# Patient Record
Sex: FEMALE | Race: BLACK OR AFRICAN AMERICAN | NOT HISPANIC OR LATINO | Employment: OTHER | URBAN - METROPOLITAN AREA
[De-identification: names, ages, dates, MRNs, and addresses within clinical notes are randomized per-mention and may not be internally consistent; named-entity substitution may affect disease eponyms.]

---

## 2017-01-17 ENCOUNTER — NURSE TRIAGE (OUTPATIENT)
Dept: ADMINISTRATIVE | Facility: CLINIC | Age: 82
End: 2017-01-17

## 2017-01-17 PROCEDURE — 93005 ELECTROCARDIOGRAM TRACING: CPT

## 2017-01-17 PROCEDURE — 99285 EMERGENCY DEPT VISIT HI MDM: CPT | Mod: 25

## 2017-01-17 PROCEDURE — 96374 THER/PROPH/DIAG INJ IV PUSH: CPT

## 2017-01-17 PROCEDURE — 96372 THER/PROPH/DIAG INJ SC/IM: CPT

## 2017-01-17 PROCEDURE — 96375 TX/PRO/DX INJ NEW DRUG ADDON: CPT

## 2017-01-17 PROCEDURE — 99285 EMERGENCY DEPT VISIT HI MDM: CPT | Mod: GC,,, | Performed by: EMERGENCY MEDICINE

## 2017-01-17 PROCEDURE — 93010 ELECTROCARDIOGRAM REPORT: CPT | Mod: ,,, | Performed by: INTERNAL MEDICINE

## 2017-01-17 PROCEDURE — 96376 TX/PRO/DX INJ SAME DRUG ADON: CPT

## 2017-01-18 ENCOUNTER — HOSPITAL ENCOUNTER (INPATIENT)
Facility: HOSPITAL | Age: 82
LOS: 3 days | Discharge: HOME | DRG: 248 | End: 2017-01-21
Attending: EMERGENCY MEDICINE | Admitting: INTERNAL MEDICINE
Payer: MEDICARE

## 2017-01-18 DIAGNOSIS — E78.00 PURE HYPERCHOLESTEROLEMIA: ICD-10-CM

## 2017-01-18 DIAGNOSIS — R07.9 CHEST PAIN, UNSPECIFIED TYPE: ICD-10-CM

## 2017-01-18 DIAGNOSIS — N18.9 ACUTE ON CHRONIC RENAL FAILURE: ICD-10-CM

## 2017-01-18 DIAGNOSIS — N18.30 CHRONIC KIDNEY DISEASE, STAGE III (MODERATE): ICD-10-CM

## 2017-01-18 DIAGNOSIS — I35.0 AORTIC STENOSIS, SEVERE: ICD-10-CM

## 2017-01-18 DIAGNOSIS — I35.0 NONRHEUMATIC AORTIC VALVE STENOSIS: ICD-10-CM

## 2017-01-18 DIAGNOSIS — I21.4 NSTEMI (NON-ST ELEVATED MYOCARDIAL INFARCTION): ICD-10-CM

## 2017-01-18 DIAGNOSIS — N17.9 ACUTE ON CHRONIC RENAL FAILURE: ICD-10-CM

## 2017-01-18 DIAGNOSIS — R79.89 ELEVATED TROPONIN LEVEL: ICD-10-CM

## 2017-01-18 DIAGNOSIS — I10 ESSENTIAL HYPERTENSION: ICD-10-CM

## 2017-01-18 DIAGNOSIS — I25.10 CORONARY ARTERY DISEASE INVOLVING NATIVE CORONARY ARTERY OF NATIVE HEART WITHOUT ANGINA PECTORIS: ICD-10-CM

## 2017-01-18 DIAGNOSIS — E11.22 TYPE 2 DIABETES MELLITUS WITH DIABETIC CHRONIC KIDNEY DISEASE, UNSPECIFIED CKD STAGE, UNSPECIFIED LONG TERM INSULIN USE STATUS: ICD-10-CM

## 2017-01-18 DIAGNOSIS — I50.33 ACUTE ON CHRONIC DIASTOLIC CONGESTIVE HEART FAILURE: Primary | ICD-10-CM

## 2017-01-18 LAB
ALBUMIN SERPL BCP-MCNC: 2.9 G/DL
ALP SERPL-CCNC: 86 U/L
ALT SERPL W/O P-5'-P-CCNC: 7 U/L
ANION GAP SERPL CALC-SCNC: 7 MMOL/L
AST SERPL-CCNC: 12 U/L
BASOPHILS # BLD AUTO: 0.01 K/UL
BASOPHILS NFR BLD: 0.1 %
BILIRUB SERPL-MCNC: 0.5 MG/DL
BNP SERPL-MCNC: 1960 PG/ML
BUN SERPL-MCNC: 31 MG/DL
CALCIUM SERPL-MCNC: 8.8 MG/DL
CHLORIDE SERPL-SCNC: 111 MMOL/L
CO2 SERPL-SCNC: 25 MMOL/L
CREAT SERPL-MCNC: 1.4 MG/DL
DIFFERENTIAL METHOD: ABNORMAL
EOSINOPHIL # BLD AUTO: 0.2 K/UL
EOSINOPHIL NFR BLD: 1.7 %
ERYTHROCYTE [DISTWIDTH] IN BLOOD BY AUTOMATED COUNT: 14.9 %
EST. GFR  (AFRICAN AMERICAN): 39.8 ML/MIN/1.73 M^2
EST. GFR  (NON AFRICAN AMERICAN): 34.5 ML/MIN/1.73 M^2
ESTIMATED AVG GLUCOSE: 134 MG/DL
GLUCOSE SERPL-MCNC: 102 MG/DL
HBA1C MFR BLD HPLC: 6.3 %
HCT VFR BLD AUTO: 27.1 %
HGB BLD-MCNC: 8.9 G/DL
INR PPP: 1.2
LYMPHOCYTES # BLD AUTO: 1.8 K/UL
LYMPHOCYTES NFR BLD: 14.4 %
MAGNESIUM SERPL-MCNC: 1.9 MG/DL
MCH RBC QN AUTO: 31.4 PG
MCHC RBC AUTO-ENTMCNC: 32.8 %
MCV RBC AUTO: 96 FL
MONOCYTES # BLD AUTO: 0.7 K/UL
MONOCYTES NFR BLD: 5.3 %
NEUTROPHILS # BLD AUTO: 9.8 K/UL
NEUTROPHILS NFR BLD: 78.2 %
PLATELET # BLD AUTO: 158 K/UL
PMV BLD AUTO: 12.7 FL
POCT GLUCOSE: 117 MG/DL (ref 70–110)
POCT GLUCOSE: 138 MG/DL (ref 70–110)
POCT GLUCOSE: 149 MG/DL (ref 70–110)
POTASSIUM SERPL-SCNC: 3.9 MMOL/L
PROT SERPL-MCNC: 7 G/DL
PROTHROMBIN TIME: 12.2 SEC
RBC # BLD AUTO: 2.83 M/UL
SODIUM SERPL-SCNC: 143 MMOL/L
TROPONIN I SERPL DL<=0.01 NG/ML-MCNC: 0.05 NG/ML
TROPONIN I SERPL DL<=0.01 NG/ML-MCNC: 0.05 NG/ML
TROPONIN I SERPL DL<=0.01 NG/ML-MCNC: 0.07 NG/ML
WBC # BLD AUTO: 12.58 K/UL

## 2017-01-18 PROCEDURE — 83880 ASSAY OF NATRIURETIC PEPTIDE: CPT

## 2017-01-18 PROCEDURE — 80053 COMPREHEN METABOLIC PANEL: CPT

## 2017-01-18 PROCEDURE — 83735 ASSAY OF MAGNESIUM: CPT

## 2017-01-18 PROCEDURE — 97165 OT EVAL LOW COMPLEX 30 MIN: CPT

## 2017-01-18 PROCEDURE — 25000003 PHARM REV CODE 250: Performed by: INTERNAL MEDICINE

## 2017-01-18 PROCEDURE — 20600002 HC STEP DOWN SEMI-PRIVATE ROOM

## 2017-01-18 PROCEDURE — 63600175 PHARM REV CODE 636 W HCPCS: Performed by: INTERNAL MEDICINE

## 2017-01-18 PROCEDURE — G8979 MOBILITY GOAL STATUS: HCPCS | Mod: CJ

## 2017-01-18 PROCEDURE — 99223 1ST HOSP IP/OBS HIGH 75: CPT | Mod: ,,, | Performed by: INTERNAL MEDICINE

## 2017-01-18 PROCEDURE — G8980 MOBILITY D/C STATUS: HCPCS | Mod: CK

## 2017-01-18 PROCEDURE — 84484 ASSAY OF TROPONIN QUANT: CPT | Mod: 91

## 2017-01-18 PROCEDURE — 85610 PROTHROMBIN TIME: CPT

## 2017-01-18 PROCEDURE — 97530 THERAPEUTIC ACTIVITIES: CPT

## 2017-01-18 PROCEDURE — 83036 HEMOGLOBIN GLYCOSYLATED A1C: CPT

## 2017-01-18 PROCEDURE — 85025 COMPLETE CBC W/AUTO DIFF WBC: CPT

## 2017-01-18 PROCEDURE — 25000003 PHARM REV CODE 250: Performed by: STUDENT IN AN ORGANIZED HEALTH CARE EDUCATION/TRAINING PROGRAM

## 2017-01-18 PROCEDURE — 84484 ASSAY OF TROPONIN QUANT: CPT

## 2017-01-18 PROCEDURE — 97161 PT EVAL LOW COMPLEX 20 MIN: CPT

## 2017-01-18 PROCEDURE — G8978 MOBILITY CURRENT STATUS: HCPCS | Mod: CK

## 2017-01-18 PROCEDURE — 63600175 PHARM REV CODE 636 W HCPCS: Performed by: STUDENT IN AN ORGANIZED HEALTH CARE EDUCATION/TRAINING PROGRAM

## 2017-01-18 RX ORDER — GLUCAGON 1 MG
1 KIT INJECTION
Status: DISCONTINUED | OUTPATIENT
Start: 2017-01-18 | End: 2017-01-21 | Stop reason: HOSPADM

## 2017-01-18 RX ORDER — HEPARIN SODIUM 5000 [USP'U]/ML
5000 INJECTION, SOLUTION INTRAVENOUS; SUBCUTANEOUS EVERY 8 HOURS
Status: DISCONTINUED | OUTPATIENT
Start: 2017-01-18 | End: 2017-01-21 | Stop reason: HOSPADM

## 2017-01-18 RX ORDER — NAPROXEN SODIUM 220 MG/1
81 TABLET, FILM COATED ORAL DAILY
Status: DISCONTINUED | OUTPATIENT
Start: 2017-01-18 | End: 2017-01-21 | Stop reason: HOSPADM

## 2017-01-18 RX ORDER — IBUPROFEN 200 MG
16 TABLET ORAL
Status: DISCONTINUED | OUTPATIENT
Start: 2017-01-18 | End: 2017-01-21 | Stop reason: HOSPADM

## 2017-01-18 RX ORDER — NITROGLYCERIN 400 UG/1
1 SPRAY ORAL EVERY 5 MIN PRN
Status: DISCONTINUED | OUTPATIENT
Start: 2017-01-18 | End: 2017-01-21 | Stop reason: HOSPADM

## 2017-01-18 RX ORDER — ONDANSETRON 2 MG/ML
4 INJECTION INTRAMUSCULAR; INTRAVENOUS
Status: COMPLETED | OUTPATIENT
Start: 2017-01-18 | End: 2017-01-18

## 2017-01-18 RX ORDER — FUROSEMIDE 10 MG/ML
40 INJECTION INTRAMUSCULAR; INTRAVENOUS 2 TIMES DAILY
Status: DISCONTINUED | OUTPATIENT
Start: 2017-01-18 | End: 2017-01-19

## 2017-01-18 RX ORDER — FUROSEMIDE 10 MG/ML
40 INJECTION INTRAMUSCULAR; INTRAVENOUS
Status: COMPLETED | OUTPATIENT
Start: 2017-01-18 | End: 2017-01-18

## 2017-01-18 RX ORDER — INSULIN ASPART 100 [IU]/ML
0-5 INJECTION, SOLUTION INTRAVENOUS; SUBCUTANEOUS
Status: DISCONTINUED | OUTPATIENT
Start: 2017-01-18 | End: 2017-01-21 | Stop reason: HOSPADM

## 2017-01-18 RX ORDER — ISOSORBIDE MONONITRATE 60 MG/1
60 TABLET, EXTENDED RELEASE ORAL DAILY
Status: DISCONTINUED | OUTPATIENT
Start: 2017-01-18 | End: 2017-01-21 | Stop reason: HOSPADM

## 2017-01-18 RX ORDER — DIPHENHYDRAMINE HCL 50 MG
50 CAPSULE ORAL ONCE
Status: COMPLETED | OUTPATIENT
Start: 2017-01-20 | End: 2017-01-20

## 2017-01-18 RX ORDER — CLONIDINE 0.1 MG/24H
1 PATCH, EXTENDED RELEASE TRANSDERMAL
Status: DISCONTINUED | OUTPATIENT
Start: 2017-01-18 | End: 2017-01-21 | Stop reason: HOSPADM

## 2017-01-18 RX ORDER — ALLOPURINOL 100 MG/1
100 TABLET ORAL DAILY
Status: DISCONTINUED | OUTPATIENT
Start: 2017-01-18 | End: 2017-01-21 | Stop reason: HOSPADM

## 2017-01-18 RX ORDER — CARVEDILOL 12.5 MG/1
25 TABLET ORAL 2 TIMES DAILY
Status: DISCONTINUED | OUTPATIENT
Start: 2017-01-18 | End: 2017-01-21 | Stop reason: HOSPADM

## 2017-01-18 RX ORDER — CLOPIDOGREL BISULFATE 75 MG/1
75 TABLET ORAL DAILY
Status: DISCONTINUED | OUTPATIENT
Start: 2017-01-20 | End: 2017-01-21 | Stop reason: HOSPADM

## 2017-01-18 RX ORDER — SODIUM CHLORIDE 9 MG/ML
INJECTION, SOLUTION INTRAVENOUS CONTINUOUS
Status: DISCONTINUED | OUTPATIENT
Start: 2017-01-20 | End: 2017-01-20

## 2017-01-18 RX ORDER — IRBESARTAN 300 MG/1
300 TABLET ORAL NIGHTLY
Status: DISCONTINUED | OUTPATIENT
Start: 2017-01-18 | End: 2017-01-19

## 2017-01-18 RX ORDER — IBUPROFEN 200 MG
24 TABLET ORAL
Status: DISCONTINUED | OUTPATIENT
Start: 2017-01-18 | End: 2017-01-21 | Stop reason: HOSPADM

## 2017-01-18 RX ORDER — ESCITALOPRAM OXALATE 10 MG/1
20 TABLET ORAL DAILY
Status: DISCONTINUED | OUTPATIENT
Start: 2017-01-18 | End: 2017-01-21 | Stop reason: HOSPADM

## 2017-01-18 RX ORDER — ALBUTEROL SULFATE 90 UG/1
1 AEROSOL, METERED RESPIRATORY (INHALATION) EVERY 6 HOURS PRN
Status: DISCONTINUED | OUTPATIENT
Start: 2017-01-18 | End: 2017-01-21 | Stop reason: HOSPADM

## 2017-01-18 RX ORDER — PRAVASTATIN SODIUM 40 MG/1
40 TABLET ORAL NIGHTLY
Status: DISCONTINUED | OUTPATIENT
Start: 2017-01-18 | End: 2017-01-21 | Stop reason: HOSPADM

## 2017-01-18 RX ORDER — CLOPIDOGREL 300 MG/1
300 TABLET, FILM COATED ORAL ONCE
Status: COMPLETED | OUTPATIENT
Start: 2017-01-19 | End: 2017-01-19

## 2017-01-18 RX ORDER — HYDRALAZINE HYDROCHLORIDE 50 MG/1
100 TABLET, FILM COATED ORAL EVERY 8 HOURS
Status: DISCONTINUED | OUTPATIENT
Start: 2017-01-18 | End: 2017-01-21 | Stop reason: HOSPADM

## 2017-01-18 RX ORDER — ASPIRIN 325 MG
325 TABLET ORAL
Status: COMPLETED | OUTPATIENT
Start: 2017-01-18 | End: 2017-01-18

## 2017-01-18 RX ADMIN — ISOSORBIDE MONONITRATE 60 MG: 60 TABLET, EXTENDED RELEASE ORAL at 09:01

## 2017-01-18 RX ADMIN — CARVEDILOL 25 MG: 12.5 TABLET, FILM COATED ORAL at 08:01

## 2017-01-18 RX ADMIN — HEPARIN SODIUM 5000 UNITS: 5000 INJECTION, SOLUTION INTRAVENOUS; SUBCUTANEOUS at 09:01

## 2017-01-18 RX ADMIN — FUROSEMIDE 40 MG: 10 INJECTION, SOLUTION INTRAMUSCULAR; INTRAVENOUS at 02:01

## 2017-01-18 RX ADMIN — HYDRALAZINE HYDROCHLORIDE 100 MG: 50 TABLET ORAL at 02:01

## 2017-01-18 RX ADMIN — HEPARIN SODIUM 5000 UNITS: 5000 INJECTION, SOLUTION INTRAVENOUS; SUBCUTANEOUS at 07:01

## 2017-01-18 RX ADMIN — HYDRALAZINE HYDROCHLORIDE 100 MG: 50 TABLET ORAL at 08:01

## 2017-01-18 RX ADMIN — ASPIRIN 325 MG ORAL TABLET 325 MG: 325 PILL ORAL at 01:01

## 2017-01-18 RX ADMIN — ASPIRIN 81 MG CHEWABLE TABLET 81 MG: 81 TABLET CHEWABLE at 09:01

## 2017-01-18 RX ADMIN — ESCITALOPRAM OXALATE 20 MG: 10 TABLET ORAL at 09:01

## 2017-01-18 RX ADMIN — PRAVASTATIN SODIUM 40 MG: 40 TABLET ORAL at 09:01

## 2017-01-18 RX ADMIN — CLONIDINE 1 PATCH: 0.1 PATCH TRANSDERMAL at 10:01

## 2017-01-18 RX ADMIN — FUROSEMIDE 40 MG: 10 INJECTION, SOLUTION INTRAVENOUS at 05:01

## 2017-01-18 RX ADMIN — CARVEDILOL 25 MG: 12.5 TABLET, FILM COATED ORAL at 09:01

## 2017-01-18 RX ADMIN — HEPARIN SODIUM 5000 UNITS: 5000 INJECTION, SOLUTION INTRAVENOUS; SUBCUTANEOUS at 02:01

## 2017-01-18 RX ADMIN — FUROSEMIDE 40 MG: 10 INJECTION, SOLUTION INTRAVENOUS at 09:01

## 2017-01-18 RX ADMIN — IRBESARTAN 300 MG: 300 TABLET ORAL at 09:01

## 2017-01-18 RX ADMIN — ONDANSETRON 4 MG: 2 INJECTION INTRAMUSCULAR; INTRAVENOUS at 01:01

## 2017-01-18 RX ADMIN — HYDRALAZINE HYDROCHLORIDE 100 MG: 50 TABLET ORAL at 07:01

## 2017-01-18 RX ADMIN — ALLOPURINOL 100 MG: 100 TABLET ORAL at 09:01

## 2017-01-18 NOTE — H&P
[facility]  History & Physical    SUBJECTIVE:     Chief Complaint/Reason for Admission: Chest pain, Shortness of breath    History of Present Illness:  Patient is a 84 y.o. female with h/o CAD s/p CABG x 3 2010, Severe Aortic Stenosis, HTN, HLD, DM-2 who presents with complaints of shortness of breath and chest pain.    Shortness of breath-at rest and exertion worse over last 1 month-associated with orthopnea, Cough, PND, leg swelling-Lasix cut down recently due to worsening renal function. Cough when lies down, non productive. No fever.    Chest pain-left sided radiation to left shoulder, no aggravating factors, no relation with exertion, sharp, lasts 3-5 mins at a time, NTG helps but not all the way,Never had pain like this before.    Also has some dizziness, loss of balance.    Review of patient's allergies indicates:   Allergen Reactions    Indocin [indomethacin sodium] Other (See Comments)    Lotensin [benazepril] Other (See Comments)       Past Medical History   Diagnosis Date    Arthritis     Coronary artery disease     Diabetes mellitus     Glaucoma     Gout, joint     Hx of CABG 9/21/10    Hyperlipidemia     Hypertension     NSTEMI (non-ST elevated myocardial infarction) 09/21/10    Systolic heart failure 6/19/2015     Past Surgical History   Procedure Laterality Date    Joint replacement      Cataract extraction      Coronary artery bypass graft  9/21/2010     Family History   Problem Relation Age of Onset    Diabetes Mother     Hypertension Father     Diabetes Father     Glaucoma Father     No Known Problems Sister     No Known Problems Brother     No Known Problems Maternal Aunt     No Known Problems Maternal Uncle     No Known Problems Paternal Aunt     No Known Problems Paternal Uncle     No Known Problems Maternal Grandmother     No Known Problems Maternal Grandfather     No Known Problems Paternal Grandmother     No Known Problems Paternal Grandfather      Amblyopia Neg Hx     Blindness Neg Hx     Cancer Neg Hx     Cataracts Neg Hx     Macular degeneration Neg Hx     Retinal detachment Neg Hx     Strabismus Neg Hx     Stroke Neg Hx     Thyroid disease Neg Hx      Social History   Substance Use Topics    Smoking status: Never Smoker    Smokeless tobacco: Never Used    Alcohol use No            Scheduled Meds:  Continuous Infusions:  PRN Meds:    Review of Systems:  Constitutional: no fever or chills  Eyes: no visual changes  Respiratory: no cough +shortness of breath  Cardiovascular: + chest pain- palpitations  Gastrointestinal: no nausea or vomiting, no abdominal pain or change in bowel habits  Hematologic/Lymphatic: no easy bruising or lymphadenopathy  Musculoskeletal: no arthralgias or myalgias  Neurological: no seizures or tremors      OBJECTIVE:     Vital Signs (Most Recent):  Pulse: 69 (01/18/17 0317)  Resp: (!) 22 (01/18/17 0317)  BP: (!) 173/72 (01/18/17 0317)  SpO2: 96 % (01/18/17 0317)        Physical Exam:  General: alert, awake and oriented x 3  Eyes:  PERRL.   Neck: supple, +HJR  Lungs: bibasilar crackles  Cardiovascular: Heart: regular rate and rhythm, S1, S2 normal, +CALI 3/6 at RUSB  Chest Wall: no tenderness.   Extremities: no cyanosis 2+ edema  Pulses: 2+ and symmetric.  Abdomen/Rectal: Abdomen: soft, non-tender non-distented; bowel sounds normal  Skin: No rashes or lesions  Neurologic: Normal strength and tone. No focal numbness or weakness  Psych/Behavioral:  Normal.      Laboratory:  CBC:   Recent Labs  Lab 01/18/17 0133   WBC 12.58   RBC 2.83*   HGB 8.9*   HCT 27.1*      MCV 96   MCH 31.4*   MCHC 32.8     CMP:   Recent Labs  Lab 01/18/17 0133      CALCIUM 8.8   ALBUMIN 2.9*   PROT 7.0      K 3.9   CO2 25   *   BUN 31*   CREATININE 1.4   ALKPHOS 86   ALT 7*   AST 12   BILITOT 0.5     LFTs:   Recent Labs  Lab 01/18/17 0133   ALT 7*   AST 12   ALKPHOS 86   BILITOT 0.5   PROT 7.0   ALBUMIN 2.9*      Coagulation:   Recent Labs  Lab 01/18/17  0133   INR 1.2     Cardiac markers:   Recent Labs  Lab 01/18/17  0133   TROPONINI 0.051*       Diagnostic Results:  Labs: Reviewed  ECG: Reviewed  X-Ray: Reviewed    No results found for this visit on 01/18/17.  No results found for this visit on 01/18/17.    Ejection Fractions   EF   Date Value Ref Range Status   12/16/2016 65 55 - 65    09/02/2015 55 55 - 65    05/01/2015 35 (A) 55 - 65         CXR-Cardiomegaly with increased attenuation of the pulmonary parenchyma, suggestive of mild CHF. Suggest clinical correlation    EKG-NSR, LVH, Nonspecific st and t wave abnormality    ASSESSMENT/PLAN:   Acute diastolic heart failure+VHD  -lasix 40 Mg IV given in ED with good response  -echocardiogram with color flow doppler 12/2016  -beta blocker  -ARB  -strict ins and outs  -daily weights  -fluid restriction  -PT/OT  -HF nurse consult    Atypical Chest pain/H/o CAD  -Trend Troponin  -In setting of CHF, Severe AS,CKD-III  -NTG prn chest pain  -EKG prn chest pain  -cardiac monitoring  -continue aspirin, statin, BB    Severe AS  -MICHAEL 0.8  And MG 40  -consider TAVR team + CTS consult while inhouse/initial workup    Anemia-chronic, normocytic  -Baseline 8-9  -last iron profile,b12, folic acid 2012(worsening since then)  -recheck    HTN  -continue BB, ARB,Hydralazine, Imdur  -Consider wean off clonidine patch    CKD-III  -monitor with diuresis  -Avoid nephrotoxic drugs    DM-2  -Diet controlled  -last A1c 2015-5.4     DVT px-heparin  Full Code  Cardiac Diet

## 2017-01-18 NOTE — CONSULTS
[facility]  Interventional Cardiology  Valve Center  Consult Note    Patient Name: Krystina Washington  MRN: [id number]  Admission Date: 1/18/2017  Hospital Length of Stay: 0 days  Code Status: Full Code       Consults  Subjective:     Primary Cardiologist: Dr. Golden    HPI:   Ms. Washington is a very pleasant lady referred by Dr. Golden for evaluation of severe AS. She has a PMH significant for CAD s/p CABG (2010), diet-controlled DM, Stage III CKD, and HTN. Additionally, she has had moderate AS for quite some time and she has been followed clinically. She also has chronic diastolic heart failure and she has had several hospitalizations for decompensated heart failure related to dietary indiscretions. She now monitors her salt and fluid intake and has been doing better.   She presented to our ED today with c/o CP and DA SILVA.  Troponins were minimally elevated but stable and EKG showed no ischemic changes. BNP was 1960 and CXR showed pulmonary edema. She has been admitted and is being diuresed with IV Lasix. Her sx have resolved.     Her last echo in December 2016 showed progression of her AS with an MICHAEL = 0.8 cm2, peak velocity = 4.1 m/s, mean gradient = 40 mmHg, EF= 60%. We have been asked to evaluate her for TAVR during this hospitalization.     She has undergone the following TAVR work-up:  · Echo (12/16/16):  MICHAEL = 0.8 cm2, peak velocity = 4.1 m/s, mean gradient = 40 mmHg, EF= 60%  · Needs LHC +/-  · STS= 6.8%  · Needs frailty  · Needs PFTs  · Needs TAVR CTA  · Needs CTS consult x 2    Past Medical History   Diagnosis Date    Arthritis     Coronary artery disease     Diabetes mellitus     Glaucoma     Gout, joint     Hx of CABG 9/21/10    Hyperlipidemia     Hypertension     NSTEMI (non-ST elevated myocardial infarction) 09/21/10    Systolic heart failure 6/19/2015       Past Surgical History   Procedure Laterality Date    Joint replacement      Cataract extraction      Coronary artery  bypass graft  9/21/2010       Review of patient's allergies indicates:   Allergen Reactions    Indocin [indomethacin sodium] Other (See Comments)    Lotensin [benazepril] Other (See Comments)       No current facility-administered medications on file prior to encounter.      Current Outpatient Prescriptions on File Prior to Encounter   Medication Sig    acetaminophen (TYLENOL) 325 MG tablet Take 650 mg by mouth every 6 (six) hours as needed for Pain.    albuterol 90 mcg/actuation inhaler Inhale 1 puff into the lungs every 6 (six) hours as needed for Wheezing. 1 HFA Aerosol Inhaler Inhalation Twice a day    allopurinol (ZYLOPRIM) 100 MG tablet TAKE 1 TABLET BY MOUTH ONCE DAILY.    aspirin 81 MG chewable tablet Take 81 mg by mouth. 1 Tablet, Chewable Oral Every day    carvedilol (COREG) 25 MG tablet TAKE 1 TABLET BY MOUTH TWICE A DAY With FOOD.    cloNIDine 0.1 mg/24 hr td ptwk (CATAPRES) 0.1 mg/24 hr PLACE 1 PATCH ONTO THE SKIN EVERY 7 DAYS.    escitalopram oxalate (LEXAPRO) 20 MG tablet Take 1 tablet (20 mg total) by mouth once daily.    furosemide (LASIX) 20 MG tablet Take 20 mg by mouth as directed. 20mg QOD alternating with 40mg QOD    hydrALAZINE (APRESOLINE) 100 MG tablet TAKE 1 TABLET (100 MG TOTAL) BY MOUTH EVERY 8 (EIGHT) HOURS.    irbesartan (AVAPRO) 300 MG tablet TAKE 1 TABLET (300 MG TOTAL) BY MOUTH EVERY EVENING.    isosorbide mononitrate (IMDUR) 60 MG 24 hr tablet TAKE 1 TABLET (60 MG TOTAL) BY MOUTH ONCE DAILY.    nitroglycerin 400 mcg/spray SprA Place 1 spray onto the tongue every 5 (five) minutes as needed.    pravastatin (PRAVACHOL) 40 MG tablet TAKE 1 TABLET BY MOUTH EVERY EVENING    cetirizine (ZYRTEC) 10 MG tablet Take 1 tablet (10 mg total) by mouth once daily. (Patient taking differently: Take 10 mg by mouth as needed. )    cholecalciferol, vitamin D3, 50,000 unit capsule Take 1 capsule (50,000 Units total) by mouth once a week. 1 Capsule Oral Weekly    inhalation device  (AEROCHAMBER PLUS FLOW-VU) Use as directed for inhalation.     Family History     Problem Relation (Age of Onset)    Diabetes Mother, Father    Glaucoma Father    Hypertension Father    No Known Problems Sister, Brother, Maternal Aunt, Maternal Uncle, Paternal Aunt, Paternal Uncle, Maternal Grandmother, Maternal Grandfather, Paternal Grandmother, Paternal Grandfather        Social History Main Topics    Smoking status: Never Smoker    Smokeless tobacco: Never Used    Alcohol use No    Drug use: No    Sexual activity: No     Review of Systems   Constitutional: No fevers, chills, unintentional weight loss or weight gain, or excessive fatigue  HEENT: No headache, change in vision or hearing, sore throat, rhinorrhea   Respiratory: No cough, Sputum production, hemoptysis, wheezing  Cardiovascular: + CP and DA SILVA. No PND, orthopnea, LE edema, palpitations, dizziness, lightheadedness, syncope, or near-syncope.  Gastrointestinal: No abdominal pain, nausea, vomiting, black or bloody stools  Genitourinary: No difficulty urinating or hematuria, no incontinence.   Musculoskeletal: No joint pain or muscle pain or weakness  Neurological: No focal weakness, numbness, tingling, or sensory changes.     Objective:     Vital Signs (Most Recent):  Temp: 97.6 °F (36.4 °C) (01/18/17 1430)  Pulse: (!) 56 (01/18/17 1500)  Resp: 18 (01/18/17 1430)  BP: (!) 140/61 (01/18/17 1430)  SpO2: (!) 94 % (01/18/17 1430) Vital Signs (24h Range):  Temp:  [97.6 °F (36.4 °C)] 97.6 °F (36.4 °C)  Pulse:  [53-70] 56  Resp:  [0-22] 18  SpO2:  [92 %-100 %] 94 %  BP: (140-215)/(61-80) 140/61     Weight: 86.2 kg (190 lb)  Body mass index is 31.62 kg/(m^2).    SpO2: (!) 94 %  O2 Device (Oxygen Therapy): room air      Intake/Output Summary (Last 24 hours) at 01/18/17 1629  Last data filed at 01/18/17 0328   Gross per 24 hour   Intake                0 ml   Output              250 ml   Net             -250 ml       Lines/Drains/Airways     Peripheral Intravenous  Line                 Peripheral IV - Single Lumen 01/18/17 0124 Left Hand less than 1 day                Physical Exam  General Appearance:    Alert, cooperative, no distress, appears stated age   Head:    Normocephalic, without obvious abnormality, atraumatic   Eyes:    PERRL, conjunctiva/corneas clear, EOM's intact, both eyes   Neck:   Supple, symmetrical, trachea midline, no carotid    bruit or JVD   Lungs:     Clear to auscultation bilaterally, respirations unlabored   Chest Wall:    No tenderness or deformity    Heart:    Regular rate and rhythm, S1 and S2 normal, no rub or gallop,    III/VI CALI at RSB   Abdomen:     Soft, non-tender, bowel sounds active all four quadrants,     no masses, no organomegaly   Extremities:   Extremities normal, atraumatic, no cyanosis or edema   Pulses:   2+ and symmetric all extremities   Skin:   Skin color, texture, turgor normal, no rashes or lesions   Neurologic:   Grossly intact         Significant Labs:   BMP:   Glucose (mg/dL)   Date/Time Value Status   01/18/2017 01:33  Final   12/05/2016 07:37  (H) Final   03/03/2016 08:46 AM 99 Final     Sodium (mmol/L)   Date/Time Value Status   01/18/2017 01:33  Final   12/05/2016 07:37  Final   03/03/2016 08:46  Final     Potassium (mmol/L)   Date/Time Value Status   01/18/2017 01:33 AM 3.9 Final   12/05/2016 07:37 AM 5.0 Final   03/03/2016 08:46 AM 4.3 Final     Chloride (mmol/L)   Date/Time Value Status   01/18/2017 01:33  (H) Final   12/05/2016 07:37  Final   03/03/2016 08:46  Final     CO2 (mmol/L)   Date/Time Value Status   01/18/2017 01:33 AM 25 Final   12/05/2016 07:37 AM 25 Final   03/03/2016 08:46 AM 29 Final     BUN, Bld (mg/dL)   Date/Time Value Status   01/18/2017 01:33 AM 31 (H) Final   12/05/2016 07:37 AM 48 (H) Final   03/03/2016 08:46 AM 41 (H) Final     Creatinine (mg/dL)   Date/Time Value Status   01/18/2017 01:33 AM 1.4 Final   12/05/2016 07:37 AM 2.1 (H) Final    03/03/2016 08:46 AM 1.6 (H) Final     Calcium (mg/dL)   Date/Time Value Status   01/18/2017 01:33 AM 8.8 Final   12/05/2016 07:37 AM 9.1 Final   03/03/2016 08:46 AM 9.4 Final     Magnesium (mg/dL)   Date/Time Value Status   01/18/2017 01:33 AM 1.9 Final   09/16/2015 03:36 AM 2.3 Final   09/15/2015 03:55 AM 2.2 Final    and CBC   WBC (K/uL)   Date/Time Value Status   01/18/2017 01:33 AM 12.58 Final   11/30/2015 04:31 AM 8.54 Final   11/29/2015 04:27 AM 8.52 Final     Hemoglobin (g/dL)   Date/Time Value Status   01/18/2017 01:33 AM 8.9 (L) Final   11/30/2015 04:31 AM 8.3 (L) Final   11/29/2015 04:27 AM 8.2 (L) Final     Hematocrit (%)   Date/Time Value Status   01/18/2017 01:33 AM 27.1 (L) Final     Platelets (K/uL)   Date/Time Value Status   01/18/2017 01:33  Final   11/30/2015 04:31  Final   11/29/2015 04:27  Final       Assessment and Plan:      Aortic stenosis - severe, symptomatic (NYHA Class III sx)  She has undergone the following TAVR work-up:  · Echo (12/16/16):  MICHAEL = 0.8 cm2, peak velocity = 4.1 m/s, mean gradient = 40 mmHg, EF= 60%  · Needs LHC +/-  · STS= 6.8%  · Needs frailty  · Needs PFTs  · Needs TAVR CTA  · Needs CTS consult x 2    Acute on chronic diastolic heart failure - Resolving, diuresing well, managed by primary team    CAD - s/p CABG (2010)    Chronic kidney disease, stage III (moderate) - stable    Type II diabetes mellitus with renal manifestations - diet-controlled    Essential hypertension - controlled     Complete TAVR work-up: LHC +/- (could be done Friday) -- needs Plavix load and PRU, TAVR CTA, CTS consult, PFTs, and frailty.  Further recommendations pending the results of above.       Treva Baker PA-C  Cardiology   [facility]

## 2017-01-18 NOTE — PT/OT/SLP EVAL
Physical Therapy  Evaluation    Krystina Washington   MRN: [id number]   Admitting Diagnosis: Acute on chronic diastolic congestive heart failure    PT Received On: 17  PT Start Time: 0950     PT Stop Time: 1023    PT Total Time (min): 33 min       Billable Minutes:  Evaluation 25 and Therapeutic Activity 8    Diagnosis: Acute on chronic diastolic congestive heart failure      Past Medical History   Diagnosis Date    Arthritis     Coronary artery disease     Diabetes mellitus     Glaucoma     Gout, joint     Hx of CABG 9/21/10    Hyperlipidemia     Hypertension     NSTEMI (non-ST elevated myocardial infarction) 09/21/10    Systolic heart failure 2015      Past Surgical History   Procedure Laterality Date    Joint replacement      Cataract extraction      Coronary artery bypass graft  2010       Referring physician: Vikram Justin MD  Date referred to PT: 2017    General Precautions: Standard, fall, hearing impaired  Orthopedic Precautions: N/A   Braces:              Patient History:  Lives With: grandchild(ever) (grandson and great-grandson)  Living Arrangements: house  Home Accessibility: stairs to enter home  Home Layout: Able to live on 1st floor  Number of Stairs to Enter Home: 1  Stair Railings at Home: none  Living Environment Comment: pt. will be alone most of the day.  Equipment Currently Used at Home: bedside commode, shower chair, rollator, cane, straight      Previous Level of Function:  Ambulation Skills: needs device (Rollator)  Transfer Skills: needs device (rollator)  ADL Skills: independent  Work/Leisure Activity: independent    Subjective:  Communicated with nursing prior to session.    Chief Complaint: none specified  Patient goals: to go home    Pain Ratin/10               Pain Rating Post-Intervention: 0/10    Objective:   Patient found with: blood pressure cuff, oxygen, peripheral IV, pulse ox (continuous), telemetry     Cognitive Exam:  Oriented to: Person, Place,  Time and Situation    Follows Commands/attention: Follows multistep  commands  Communication: clear/fluent  Safety awareness/insight to disability: intact    Physical Exam:  Postural examination/scapula alignment: Rounded shoulder    Skin integrity: Visible skin intact  Edema: Moderate (B) LE    Sensation:   Intact    Upper Extremity Range of Motion:  Right Upper Extremity: WFL  Left Upper Extremity: WFL    Upper Extremity Strength:  Right Upper Extremity: WFL  Left Upper Extremity: WFL    Lower Extremity Range of Motion:  Right Lower Extremity: WFL  Left Lower Extremity: WFL    Lower Extremity Strength:  Right Lower Extremity: WFL  Left Lower Extremity: WFL     Fine motor coordination:  Intact    Gross motor coordination: WFL    Functional Mobility:  Bed Mobility:  Rolling/Turning to Left: Stand by assistance  Rolling/Turning Right: Stand by assistance  Scooting/Bridging: Stand by Assistance, Contact Guard Assistance  Supine to Sit: Stand by Assistance, Contact Guard Assistance  Sit to Supine: Stand by Assistance, Contact Guard Assistance    Transfers:  Sit <> Stand Assistance: Contact Guard Assistance  Sit <> Stand Assistive Device: Rolling Walker  Bed <> Chair Technique: Stand Pivot  Bed <> Chair Assistance: Stand By Assistance, Contact Guard Assistance  Bed <> Chair Assistive Device: Rolling Walker    Gait:   Gait Distance: 100'  Assistance 1: Stand by Assistance, Contact Guard Assistance  Gait Assistive Device: Rolling walker  Gait Pattern: reciprocal  Gait Deviation(s): decreased yvonne, decreased stride length    Stairs:      Balance:   Static Sit: FAIR+: Able to take MINIMAL challenges from all directions  Dynamic Sit: FAIR+: Maintains balance through MINIMAL excursions of active trunk motion  Static Stand: FAIR+: Takes MINIMAL challenges from all directions  Dynamic stand: FAIR+: Needs CLOSE SUPERVISION during gait and is able to right self with minor LOB    Therapeutic Activities and Exercises:  Assisted  to/from bathroom and on/off toilet. Pt. performed az-care on her own. Discussed pt.'s POC.     AM-PAC 6 CLICK MOBILITY  How much help from another person does this patient currently need?   1 = Unable, Total/Dependent Assistance  2 = A lot, Maximum/Moderate Assistance  3 = A little, Minimum/Contact Guard/Supervision  4 = None, Modified [place]/Independent    Turning over in bed (including adjusting bedclothes, sheets and blankets)?: 3  Sitting down on and standing up from a chair with arms (e.g., wheelchair, bedside commode, etc.): 3  Moving from lying on back to sitting on the side of the bed?: 3  Moving to and from a bed to a chair (including a wheelchair)?: 3  Need to walk in hospital room?: 3  Climbing 3-5 steps with a railing?: 3  Total Score: 18     AM-PAC Raw Score CMS G-Code Modifier Level of Impairment Assistance   6 % Total / Unable   7 - 9 CM 80 - 100% Maximal Assist   10 - 14 CL 60 - 80% Moderate Assist   15 - 19 CK 40 - 60% Moderate Assist   20 - 22 CJ 20 - 40% Minimal Assist   23 CI 1-20% SBA / CGA   24 CH 0% Independent/ Mod I     Patient left supine with all lines intact and call button in reach.    Assessment:   Krystina Washington is a 84 y.o. female with a medical diagnosis of Acute on chronic diastolic congestive heart failure and presents with deconditioning. Pt. cooperative and tolerated treatment well. Pt. would benefit from continued PT to increase strength/endurance and improve functional mobility.    Rehab identified problem list/impairments: Rehab identified problem list/impairments: weakness, impaired endurance, impaired functional mobilty, gait instability, impaired balance, impaired self care skills ([place])    Rehab potential is good.    Activity tolerance: Good    Discharge recommendations: Discharge Facility/Level Of Care Needs: home health PT     Barriers to discharge: Barriers to Discharge: Decreased caregiver support    Equipment recommendations: Equipment Needed After  Discharge: none     GOALS:   Physical Therapy Goals        Problem: Physical Therapy Goal    Goal Priority Disciplines Outcome Goal Variances Interventions   Physical Therapy Goal     PT/OT, PT Ongoing (interventions implemented as appropriate)     Description:  Goals to be met by: 2017     Patient will increase functional independence with mobility by performin. Supine to sit with Modified [place]  2. Sit to supine with Modified [place]  3. Sit to stand transfer with Modified [place]  4. Bed to chair transfer with Modified [place] using Rolling Walker  5. Gait  x 150 feet with Modified [place] using Rolling Walker.   6. Lower extremity exercise program x15 reps per handout, with independence                PLAN:    Patient to be seen 4 x/week to address the above listed problems via gait training, therapeutic activities, therapeutic exercises  Plan of Care expires: 17  Plan of Care reviewed with: patient    Functional Assessment Tool Used: AM-PAC  Score: 18  Functional Limitation: Mobility: Walking and moving around  Mobility: Walking and Moving Around Current Status (): CK  Mobility: Walking and Moving Around Goal Status (): RENITA Gilbert, PT  2017

## 2017-01-18 NOTE — PLAN OF CARE
Problem: Physical Therapy Goal  Goal: Physical Therapy Goal  Goals to be met by: 2017     Patient will increase functional independence with mobility by performin. Supine to sit with Modified [place]  2. Sit to supine with Modified [place]  3. Sit to stand transfer with Modified [place]  4. Bed to chair transfer with Modified [place] using Rolling Walker  5. Gait x 150 feet with Modified [place] using Rolling Walker.   6. Lower extremity exercise program x15 reps per handout, with independence  Outcome: Ongoing (interventions implemented as appropriate)  Goal set

## 2017-01-18 NOTE — PROVIDER PROGRESS NOTES - EMERGENCY DEPT.
Encounter Date: 1/17/2017    ED Physician Progress Notes         EKG - STEMI Decision  Initial Reading: No STEMI present.  Response: No Action Needed.

## 2017-01-18 NOTE — IP AVS SNAPSHOT
[facility]  [address]  [place] [address]  Phone: [phone]           Patient Discharge Instructions     Our goal is to set you up for success. This packet includes information on your condition, medications, and your home care. It will help you to care for yourself so you don't get sicker and need to go back to the hospital.     Please ask your nurse if you have any questions.        There are many details to remember when preparing to leave the hospital. Here is what you will need to do:    1. Take your medicine. If you are prescribed medications, review your Medication List in the following pages. You may have new medications to  at the pharmacy and others that you'll need to stop taking. Review the instructions for how and when to take your medications. Talk with your doctor or nurses if you are unsure of what to do.     2. Go to your follow-up appointments. Specific follow-up information is listed in the following pages. Your may be contacted by a transition nurse or clinical provider about future appointments. Be sure we have all of the phone numbers to reach you, if needed. Please contact your provider's office if you are unable to make an appointment.     3. Watch for warning signs. Your doctor or nurse will give you detailed warning signs to watch for and when to call for assistance. These instructions may also include educational information about your condition. If you experience any of warning signs to your health, call your doctor.               [facility]  Unless otherwise directed by your provider, please contact [facility], our nurse care line that is available for 24/7 assistance.     [phone] (toll-free)    Registered nurses in the [facility] provide clinical advisement, health education, appointment booking, and other advisory services.                    ** Verify the list of medication(s) below is accurate and up  to date. Carry this with you in case of emergency. If your medications have changed, please notify your healthcare provider.             Medication List      START taking these medications        Additional Info    Begin Date AM Noon PM Bedtime    clopidogrel 75 mg tablet   Commonly known as:  PLAVIX   Quantity:  30 tablet   Refills:  0   Dose:  75 mg    Last time this was given:  75 mg on 1/21/2017  8:36 AM   Instructions:  Take 1 tablet (75 mg total) by mouth once daily.            Take tomorrow 1/22/2017                     CHANGE how you take these medications        Additional Info    Begin Date AM Noon PM Bedtime    cetirizine 10 MG tablet   Commonly known as:  ZYRTEC   Quantity:  30 tablet   Refills:  0   Dose:  10 mg   What changed:    - when to take this  - reasons to take this    Instructions:  Take 1 tablet (10 mg total) by mouth once daily.            Take tomorrow 1/22/2017                     CONTINUE taking these medications        Additional Info    Begin Date AM Noon PM Bedtime    acetaminophen 325 MG tablet   Commonly known as:  TYLENOL   Refills:  0   Dose:  650 mg    Instructions:  Take 650 mg by mouth every 6 (six) hours as needed for Pain.                            albuterol 90 mcg/actuation inhaler   Quantity:  18 g   Refills:  0   Dose:  1 puff    Last time this was given:  1 puff on 1/20/2017  8:18 AM   Instructions:  Inhale 1 puff into the lungs every 6 (six) hours as needed for Wheezing. 1 HFA Aerosol Inhaler Inhalation Twice a day                            allopurinol 100 MG tablet   Commonly known as:  ZYLOPRIM   Quantity:  90 tablet   Refills:  2    Last time this was given:  100 mg on 1/21/2017  8:36 AM   Instructions:  TAKE 1 TABLET BY MOUTH ONCE DAILY.            Take tomorrow 1/22/2017                   aspirin 81 MG Chew   Refills:  0   Dose:  81 mg    Last time this was given:  81 mg on 1/21/2017  8:35 AM   Instructions:  Take 81 mg by mouth. 1 Tablet, Chewable Oral Every day             Take tomorrow 1/22/2017                   carvedilol 25 MG tablet   Commonly known as:  COREG   Quantity:  60 tablet   Refills:  11    Last time this was given:  25 mg on 1/21/2017  8:36 AM   Instructions:  TAKE 1 TABLET BY MOUTH TWICE A DAY With FOOD.                    Take tonight 1/21/2017           cholecalciferol (vitamin D3) 50,000 unit capsule   Quantity:  12 capsule   Refills:  0   Dose:  85195 Units    Instructions:  Take 1 capsule (50,000 Units total) by mouth once a week. 1 Capsule Oral Weekly                            cloNIDine 0.1 mg/24 hr td ptwk 0.1 mg/24 hr   Commonly known as:  CATAPRES   Quantity:  4 patch   Refills:  6    Last time this was given:  1 patch on 1/18/2017 10:02 AM   Instructions:  PLACE 1 PATCH ONTO THE SKIN EVERY 7 DAYS.                            escitalopram oxalate 20 MG tablet   Commonly known as:  LEXAPRO   Quantity:  30 tablet   Refills:  11   Dose:  20 mg    Last time this was given:  20 mg on 1/21/2017  8:36 AM   Instructions:  Take 1 tablet (20 mg total) by mouth once daily.            Take tomorrow 1/22/2017                   hydrALAZINE 100 MG tablet   Commonly known as:  APRESOLINE   Quantity:  90 tablet   Refills:  11    Last time this was given:  100 mg on 1/21/2017  1:18 PM   Instructions:  TAKE 1 TABLET (100 MG TOTAL) BY MOUTH EVERY 8 (EIGHT) HOURS.                    Take tonight 1/21/2017           inhalation device   Commonly known as:  AEROCHAMBER PLUS FLOW-VU   Quantity:  1 Device   Refills:  0    Instructions:  Use as directed for inhalation.                            irbesartan 300 MG tablet   Commonly known as:  AVAPRO   Quantity:  30 tablet   Refills:  11    Last time this was given:  300 mg on 1/20/2017 10:10 PM   Instructions:  TAKE 1 TABLET (300 MG TOTAL) BY MOUTH EVERY EVENING.                    Take tonight 1/21/2017           isosorbide mononitrate 60 MG 24 hr tablet   Commonly known as:  IMDUR   Quantity:  30 tablet   Refills:  6    Last  time this was given:  60 mg on 1/21/2017  8:36 AM   Instructions:  TAKE 1 TABLET (60 MG TOTAL) BY MOUTH ONCE DAILY.            Take tomorrow 1/22/2017                   nitroglycerin 400 mcg/spray   Commonly known as:  NITROMIST   Quantity:  8.5 g   Refills:  0   Dose:  1 spray    Instructions:  Place 1 spray onto the tongue every 5 (five) minutes as needed.                            pravastatin 40 MG tablet   Commonly known as:  PRAVACHOL   Quantity:  90 tablet   Refills:  2    Last time this was given:  40 mg on 1/20/2017 10:10 PM   Instructions:  TAKE 1 TABLET BY MOUTH EVERY EVENING                    Take tonight 1/21/2017             STOP taking these medications     furosemide 20 MG tablet   Commonly known as:  LASIX            Where to Get Your Medications      These medications were sent to [facility]/pharmacy #[id number] - [place] - [address] AT [facility]  [address] [address]     Phone:  [phone]     clopidogrel 75 mg tablet                  Please bring to all follow up appointments:    1. A copy of your discharge instructions.  2. All medicines you are currently taking in their original bottles.  3. Identification and insurance card.    Please arrive 15 minutes ahead of scheduled appointment time.    Please call 24 hours in advance if you must reschedule your appointment and/or time.        Follow-up Information     Follow up with Oniel Johnson MD In 1 week.    Specialty:  Family Medicine    Contact information:    [address] [facility]  [place] [address] [phone]          Follow up with Guillermo Duran MD.    Specialties:  Cardiology, INTERVENTIONAL CARDIOLOGY    Why:  TAVR work up    Contact information:    [facility]  [place] [address] [phone]          Discharge Instructions     Future Orders    Basic metabolic panel     Comments:    Fax results to PCP/Dr. Johnson    Activity as tolerated     Call MD for:  difficulty breathing  "or increased cough     Call MD for:  persistent dizziness, light-headedness, or visual disturbances     Call MD for:  redness, tenderness, or signs of infection (pain, swelling, redness, odor or green/yellow discharge around incision site)     Diet general     Questions:    Total calories:      Fat restriction, if any:      Protein restriction, if any:      Na restriction, if any:      Fluid restriction:      Additional restrictions:  Low Sodium,2gm        Primary Diagnosis     Your primary diagnosis was:  Heart Failure      Admission Information     Date & Time Provider Department CSN    1/18/2017  1:13 AM Lesvia Estrada MD [facility] [id number]      Care Providers     Provider Role Specialty Primary office phone    Lesvia Estrada MD Attending Provider Hospitalist [phone]    Lesvai Estrada MD Team Attending  Hospitalist [phone]      Your Vitals Were     BP Pulse Temp Resp Height Weight    148/65 (BP Method: cNIBP) 70 98.1 °F (36.7 °C) (Oral) 16 5' 5" (1.651 m) 81.4 kg (179 lb 7.3 oz)    Last Period SpO2 BMI          (LMP Unknown) 93% 29.86 kg/m2        Recent Lab Values        3/29/2012 9/23/2012 10/5/2013 8/29/2014 4/30/2015 6/12/2015 11/29/2015 1/18/2017      8:44 AM 12:30 PM  9:54 AM 11:15 AM 10:29 AM 12:36 PM  4:27 AM  1:33 AM    A1C 6.9 (H) 6.7 (H) 7.8 (H) 6.6 (H) 6.5 (H) 6.6 (H) 5.4 6.3 (H)    Comment for A1C at  1:33 AM on 1/18/2017:  According to ADA guidelines, hemoglobin A1C <7.0% represents  optimal control in non-pregnant diabetic patients.  Different  metrics may apply to specific populations.   Standards of Medical Care in Diabetes - 2016.  For the purpose of screening for the presence of diabetes:  <5.7%     Consistent with the absence of diabetes  5.7-6.4%  Consistent with increasing risk for diabetes   (prediabetes)  >or=6.5%  Consistent with diabetes  Currently no consensus exists for use of hemoglobin A1C  for diagnosis of diabetes for children.        Allergies as " of 1/21/2017        Reactions    Indocin [Indomethacin Sodium] Other (See Comments)    Lotensin [Benazepril] Other (See Comments)      Advance Directives     An advance directive is a document which, in the event you are no longer able to make decisions for yourself, tells your healthcare team what kind of treatment you do or do not want to receive, or who you would like to make those decisions for you.  If you do not currently have an advance directive, [facility] encourages you to create one.  For more information call:  [phone]), [phone] ([phone]),  or log on to [email or web address].        Language Assistance Services     ATTENTION: Language assistance services are available, free of charge. Please call [phone].      ATENCIÓN: Si habla español, tiene a riley disposición servicios gratuitos de asistencia lingüística. Llame al [phone].     CHÚ Ý: N?u b?n nói Ti?ng Vi?t, có các d?ch v? h? tr? ngôn ng? mi?n phí dành cho b?n. G?i s? [phone].        Heart Failure Education       Heart Failure: Being Active  You have a condition called heart failure. Being active doesnt mean that you have to wear yourself out. Even a little movement each day helps to strengthen your heart. If you cant get out to exercise, you can do simple stretching and strengthening exercises at home. These are good ways to keep you well-conditioned and prevent you and your heart from becoming excessively weak.    Ideas to get you started  · Add a little movement to things you do now. Walk to mail letters. Park your car at the far end of the parking lot and walk to the store. Walk up a flight of stairs instead of taking the elevator.  · Choose activities you enjoy. You might walk, swim, or ride an exercise bike. Things like gardening and washing the car count, too. Other possibilities include: washing dishes, walking the dog, walking around the mall, and doing aerobic activities with friends.  · Join  a group exercise program at a [facility], a senior center, or a community center. Or look into a hospital cardiac rehabilitation program. Ask your doctor if you qualify.  Tips to keep you going  · Get up and get dressed each day. Go to a coffee shop and read a newspaper or go somewhere that you'll be in the presence of other active people. Youll feel more like being active.  · Make a plan. Choose one or more activities that you enjoy and that you can easily do. Then plan to do at least one each day. You might write your plan on a calendar.  · Go with a friend or a group if you like company. This can help you feel supported and stay motivated, too.  · Plan social events that you enjoy. This will keep you mentally engaged as well as physically motivated to do things you find pleasure in.  For your safety  · Talk with your healthcare provider before starting an exercise program.  · Exercise indoors when its too hot or too cold outside, or when the air quality is poor. Try walking at a shopping mall.  · Wear socks and sturdy shoes to maintain your balance and prevent falls.  · Start slowly. Do a few minutes several times a day at first. Increase your time and speed little by little.  · Stop and rest whenever you feel tired or get short of breath.  · Dont push yourself on days when you dont feel well.  © 4060-0863 [employer]. [address] [place] [address]. All rights reserved. This information is not intended as a substitute for professional medical care. Always follow your healthcare professional's instructions.              Heart Failure: Evaluating Your Heart  You have a condition called heart failure. To evaluate your condition, your doctor will examine you, ask questions, and do some tests. Along with looking for signs of heart failure, the doctor looks for any other health problems that may have led to heart failure. The results of your evaluation will help your doctor form a  treatment plan.  Health history and physical exam  Your visit will start with a health history. Tell the doctor about any symptoms youve noticed and about all medicines you take. Then youll have a physical exam. This includes listening to your heartbeat and breathing. Youll also be checked for swelling (edema) in your legs and neck. When you have fluid buildup or fluid in the lungs, it may be called congestive heart failure.  Diagnosing heart failure     During an echocardiogram, sound waves bounce off the heart. These are converted into a picture on the screen.   The following may be done to help your doctor form a diagnosis:  · X-rays show the size and shape of your heart. These pictures can also show fluid in your lungs.  · An electrocardiogram (ECG or EKG) shows the pattern of your heartbeat. Small pads (electrodes) are placed on your chest, arms, and legs. Wires connect the pads to the ECG machine, which records your hearts electrical signals. This can give the doctor information about heart function.  · An echocardiogram uses ultrasound waves to show the structure and movement of your heart muscle. This shows how well the heart pumps. It also shows the thickness of the heart walls, and if the heart is enlarged. It is one of the most useful, non-invasive tests as it provides information about the heart's general function. This helps your doctor make treatment decisions.  · Lab tests evaluate small amounts of blood or urine for signs of problems. A BNP lab test can help diagnose and evaluate heart failure. BNP stands for B-type natriuretic peptide. The ventricles secrete more BNP when heart failure worsens. Lab tests can also provide information about metabolic dysfunction or heart dysfunction.  Your treatment plan  Based on the results of your evaluation and tests, your doctor will develop a treatment plan. This plan is designed to relieve some of your heart failure symptoms and help make you more  comfortable. Your treatment plan may include:  · Medicine to help your heart work better and improve your quality of life  · Changes in what you eat and drink to help prevent fluid from backing up in your body  · Daily monitoring of your weight and heart failure symptoms to see how well your treatment plan is working  · Exercise to help you stay healthy  · Help with quitting smoking  · Emotional and psychological support to help adjust to the changes  · Referrals to other specialists to make sure you are being treated comprehensively  © 1432-6800 [employer]. [address] [place] [address]. All rights reserved. This information is not intended as a substitute for professional medical care. Always follow your healthcare professional's instructions.              Heart Failure: Making Changes to Your Diet  You have a condition called heart failure. When you have heart failure, excess fluid is more likely to build up in your body because your heart isn't working well. This makes the heart work harder to pump blood. Fluid buildup causes symptoms such as shortness of breath and swelling (edema). This is often referred to as congestive heart failure or CHF. Controlling the amount of salt (sodium) you eat may help stop fluid from building up. Your doctor may also tell you to reduce the amount of fluid you drink.  Reading food labels    Your healthcare provider will tell you how much sodium you can eat each day. Read food labels to keep track. Keep in mind that certain foods are high in salt. These include canned, frozen, and processed foods. Check the amount of sodium in each serving. Watch out for high-sodium ingredients. These include MSG (monosodium glutamate), baking soda, and sodium phosphate.   Eating less salt  Give yourself time to get used to eating less salt. It may take a little while. Here are some tips to help:  · Take the saltshaker off the table. Replace it with salt-free herb mixes  and spices.  · Eat fresh or plain frozen vegetables. These have much less salt than canned vegetables.  · Choose low-sodium snacks like sodium-free pretzels, crackers, or air-popped popcorn.  · Dont add salt to your food when youre cooking. Instead, season your foods with pepper, lemon, garlic, or onion.  · When you eat out, ask that your food be cooked without added salt.  · Avoid eating fried foods as these often have a great deal of salt.  If youre told to limit fluids  You may need to limit how much fluid you have to help prevent swelling. This includes anything that is liquid at room temperature, such as ice cream and soup. If your doctor tells you to limit fluid, try these tips:  · Measure drinks in a measuring cup before you drink them. This will help you meet daily goals.  · Chill drinks to make them more refreshing.  · Suck on frozen lemon wedges to quench thirst.  · Only drink when youre thirsty.  · Chew sugarless gum or suck on hard candy to keep your mouth moist.  · Weigh yourself daily to know if your body's fluid content is rising.  My sodium goal  Your healthcare provider may give you a sodium goal to meet each day. This includes sodium found in food as well as salt that you add. My goal is to eat no more than ___________ mg of sodium per day.     When to call your doctor  Call your doctor right away if you have any symptoms of worsening heart failure. These can include:  · Sudden weight gain  · Increased swelling of your legs or ankles  · Trouble breathing when youre resting or at night  · Increase in the number of pillows you have to sleep on  · Chest pain, pressure, discomfort, or pain in the jaw, neck, or back   © 4299-3988 [employer]. [address] [place] [address], [place] [address]. All rights reserved. This information is not intended as a substitute for professional medical care. Always follow your healthcare professional's instructions.              Heart Failure: Medicines to  Help Your Heart    You have a condition called heart failure (also known as congestive heart failure, or CHF). Your doctor will likely prescribe medicines for heart failure and any underlying health problems you have. Most heart failure patients take one or more types of medicinen. Your healthcare provider will work to find the combination of medicines that works best for you.  Heart failure medicines  Here are the most common heart failure medicines:  · ACE inhibitors lower blood pressure and decrease strain on the heart. This makes it easier for the heart to pump. Angiotensin receptor blockers have similar effects. These are prescribed for some patients instead of ACE inhibitors.  · Beta-blockers relieve stress on the heart. They also improve symptoms. They may also improve the heart's pumping action over time.  · Diuretics (also called water pills) help rid your body of excess water. This can help rid your body of swelling (edema). Having less fluid to pump means your heart doesnt have to work as hard. Some diuretics make your body lose a mineral called potassium. Your doctor will tell you if you need to take supplements or eat more foods high in potassium.  · Digoxin helps your heart pump with more strength. This helps your heart pump more blood with each beat. So, more oxygen-rich blood travels to the rest of the body.  · Aldosterone antagonists help alter hormones and decrease strain on the heart.  · Hydralazine and nitrates are two separate medicines used together to treat heart failure. They may come in one combination pill. They lower blood pressure and decrease how hard the heart has to pump.  Medicines for related conditions  Controlling other heart problems helps keep heart failure under control, too. Depending on other heart problems you have, medicines may be prescribed to:  · Lower blood pressure (antihypertensives).  · Lower cholesterol levels (statins).  · Prevent blood clots (anticoagulants or  aspirin).  · Keep the heartbeat steady (antiarrhythmics).  © 2220-7441 [employer]. [address] [place] [address], [place] [address]. All rights reserved. This information is not intended as a substitute for professional medical care. Always follow your healthcare professional's instructions.              Heart Failure: Procedures That May Help    The heart is a muscle that pumps oxygen-rich blood to all parts of the body. When you have heart failure, the heart is not able to pump as well as it should. Blood and fluid may back up into the lungs (congestive heart failure), and some parts of the body dont get enough oxygen-rich blood to work normally. These problems lead to the symptoms of heart failure.     Certain procedures may help the heart pump better in some cases of heart failure. Some procedures are done to treat health problems that may have caused the heart failure such as coronary artery disease or heart rhythm problems. For more serious heart failure, other options are available.  Treating artery and valve problems  If you have coronary artery disease or valve disease, procedures may be done to improve blood flow. This helps the heart pump better, which can improve heart failure symptoms. First, your doctor may do a cardiac catheterization to help detect clogged blood vessels or valve damage. During this procedure, a  thin tube (catheter) in inserted into a blood vessel and guided to the heart. There a dye is injected and a special type of X-ray (angiogram) is taken of the blood vessels. Procedures to open a blocked artery or fix damaged valves can also be done using catheterization.  · Angioplasty uses a balloon-tipped instrument at the end of the catheter. The balloon is inflated to widen the narrowed artery. In many cases, a stent is expanded to further support the narrowed artery. A stent is a metal mesh tube.  · Valve surgery repairs or replacement of faulty valves can also be done during  catheterization so blood can flow properly through the chambers of the heart.  Bypass surgery is another option to help treat blocked arteries. It uses a healthy blood vessel from elsewhere in the body. The healthy blood vessel is attached above and below the blocked area so that blood can flow around the blocked artery.  Treating heart rhythm problems  A device may be placed in the chest to help a weak heart maintain a healthy, heartbeat so the heart can pump more effectively:  · Pacemaker. A pacemaker is an implanted device that regulates your heartbeat electronically. It monitors your heart's rhythm and generates a painless electric impulse that helps the heart beat in a regular rhythm. A pacemaker is programmed to meet your specific heart rhythm needs.  · Biventricular pacing/cardiac resynchronization therapy. A type of pacemaker that paces both pumping chambers of the heart at the same time to coordinate contractions and to improve the heart's function. Some people with heart failure are candidates for this therapy.  · Implantable cardioverter defibrillator. A device similar to a pacemaker that senses when the heart is beating too fast and delivers an electrical shock to convert the fast rhythm to a normal rhythm. This can be a life saving device.  In severe cases  In more serious cases of heart failure when other treatments no longer work, other options may include:  · Ventricular assist devices (VADs). These are mechanical devices used to take over the pumping function for one or both of the heart's ventricles, or pumping chambers. A VAD may be necessary when heart failure progresses to the point that medicines and other treatments no longer help. In some cases, a VAD may be used as a bridge to transplant.  · Heart transplant. This is replacing the diseased heart with a healthy one from a donor. This is an option for a few people who are very sick. A heart transplant is very serious and not an option for all  patients. Your doctor can tell you more.  © 5796-7275 [employer]. [address] [place] [address], [place] [address]. All rights reserved. This information is not intended as a substitute for professional medical care. Always follow your healthcare professional's instructions.              Heart Failure: Tracking Your Weight  You have a condition called heart failure. When you have heart failure, a sudden weight gain or a steady rise in weight is a warning sign that your body is retaining too much water and salt. This could mean your heart failure is getting worse. If left untreated, it can cause problems for your lungs and result in shortness of breath. Weighing yourself each day is the best way to know if youre retaining water. If your weight goes up quickly, call your doctor. You will be given instructions on how to get rid of the excess water. You will likely need medicines and to avoid salt. This will help your heart work better.  Call your doctor if you gain more than 2 pounds in 1 day, more than 5 pounds in 1 week, or whatever weight gain you were told to report by your doctor. This is often a sign of worsening heart failure and needs to be evaluated and treated. Your doctor will tell you what to do next.   Tips for weighing yourself    · Weigh yourself at the same time each morning, wearing the same clothes. Weigh yourself after urinating and before eating.  · Use the same scale each day. Make sure the numbers are easy to read. Put the scale on a flat, hard surface -- not on a rug or carpet.  · Do not stop weighing yourself. If you forget one day, weigh again the next morning.  How to use your weight chart  · Keep your weight chart near the scale. Write your weight on the chart as soon as you get off the scale.  · Fill in the month and the start date on the chart. Then write down your weight each day. Your chart will look like this:    · If you miss a day, leave the space blank. Weigh yourself the  next day and write your weight in the next space.  · Take your weight chart with you when you go to see your doctor.  © 3666-6260 [employer]. [address] [place] [address], [place] [address]. All rights reserved. This information is not intended as a substitute for professional medical care. Always follow your healthcare professional's instructions.              Heart Failure: Warning Signs of a Flare-Up  You have a condition called heart failure. Once you have heart failure, flare-ups can happen. Below are signs that can mean your heart failure is getting worse. If you notice any of these warning signs, call your healthcare provider.  Swelling    · Your feet, ankles, or lower legs get puffier.  · You notice skin changes on your lower legs.  · Your shoes feel too tight.  · Your clothes are tighter in the waist.  · You have trouble getting rings on or off your fingers.  Shortness of breath  · You have to breathe harder even when youre doing your normal activities or when youre resting.  · You are short of breath walking up stairs or even short distances.  · You wake up at night short of breath or coughing.  · You need to use more pillows or sit up to sleep.  · You wake up tired or restless.  Other warning signs  · You feel weaker, dizzy, or more tired.  · You have chest pain or changes in your heartbeat.  · You have a cough that wont go away.  · You cant remember things or dont feel like eating.  Tracking your weight  Gaining weight is often the first warning sign that heart failure is getting worse. Gaining even a few pounds can be a sign that your body is retaining excess water and salt. Weighing yourself each day in the morning after you urinate and before you eat, is the best way to know if you're retaining water. Get a scale that is easy to read and make sure you wear the same clothes and use the same scale every time you weigh. Your healthcare provider will show you how to track your weight. Call  your doctor if you gain more than 2 pounds in 1 day, 5 pounds in 1 week, or whatever weight gain you were told to report by your doctor. This is often a sign of worsening heart failure and needs to be evaluated and treated before it compromises your breathing. Your doctor will tell you what to do next.    © 5161-0641 [employer]. [address] [place] [address], [place] [address]. All rights reserved. This information is not intended as a substitute for professional medical care. Always follow your healthcare professional's instructions.              Chronic [place] Disease Education             Diabetes Discharge Instructions                                    [facility] complies with applicable Federal civil rights laws and does not discriminate on the basis of race, color, national origin, age, disability, or sex.

## 2017-01-18 NOTE — PLAN OF CARE
Problem: Occupational Therapy Goal  Goal: Occupational Therapy Goal  Eval initiated and completed. Pt demonstrating no OT needs performing self care  tasks and functional mobility with no need of OT intervention. D/C Pt from IPOT  secondary to no demonstrated OT needs.  D/C acute OT services    Comments:   Mickey Cazares OTR/L  1/18/2017

## 2017-01-18 NOTE — TELEPHONE ENCOUNTER
"  Reason for Disposition   Difficulty breathing at rest    Answer Assessment - Initial Assessment Questions  1. ONSET: "When did the swelling start?" (e.g., minutes, hours, days)      3-4 days ago  2. LOCATION: "What part of the leg is swollen?"  "Are both legs swollen or just one leg?"      both  3. SEVERITY: "How bad is the swelling?" (e.g., localized; mild, moderate, severe)   - Localized - small area of swelling localized to one leg   - MILD pedal edema - swelling limited to foot and ankle, pitting edema < 1/4 inch (6 mm) deep, rest and elevation eliminate most or all swelling   - MODERATE edema - swelling of lower leg to knee, pitting edema > 1/4 inch (6 mm) deep, rest and elevation only partially reduce swelling   - SEVERE edema - swelling extends above knee, facial or hand swelling present       Mild/moderate  4. REDNESS: "Does the swelling look red or infected?"      denies  5. PAIN: "Is the swelling painful to touch?" If so, ask: "How painful is it?"   (Scale 1-10; mild, moderate or severe)      Slightly tender  Rates 5/10  6. FEVER: "Do you have a fever?" If so, ask: "What is it, how was it measured, and when did it start?"       denies  7. CAUSE: "What do you think is causing the leg swelling?"      R/t leaky valve in heart  8. MEDICAL HISTORY: "Do you have a history of heart failure, kidney disease, liver failure, or cancer?"      cardiac and kidney issues  9. RECURRENT SYMPTOM: "Have you had leg swelling before?" If so, ask: "When was the last time?" "What happened that time?"      Yes--but not like this tonight  10. OTHER SYMPTOMS: "Do you have any other symptoms?" (e.g., chest pain, difficulty breathing)     + tightness in chest/ intermittent SOB  11. PREGNANCY: "Is there any chance you are pregnant?" "When was your last menstrual period?"        n/a    Protocols used: ST LEG SWELLING AND EDEMA-A-  grandson calls to report that pt has a "leaky valve"/ he was told should swelling in feet reoccur she " would need some type of procedure / he states she has swelling of feet above the ankles/ painful when he touches it/ + SOB --intermittent/ pt c/o tightness in chest/ has been out of fluid pills x 3-4 days    To ER for evaluation and tx    Yue Dodd RN

## 2017-01-18 NOTE — PROGRESS NOTES
Pt to be admitted to [facility] per MD orders.  Report received from ELVIRA Pruitt of ED.  Awaiting pt arrival.    1430  Pt admitted to [facility] per MD orders.  Pt arrived on unit via stretcher on RA.  Telemetry swapped.  Visi and fitbit applied; iPad at the bedside.  VSS.  Pt oriented to room and unit.  Bed in lowest position with siderails up x2; call bell within reach.  Pt instructed to call for any assistance; bed alarm activated.  Pt tolerating plan of care.  Will continue to monitor.

## 2017-01-18 NOTE — ED PROVIDER NOTES
Encounter Date: 1/17/2017    SCRIBE #1 NOTE: I, Mack Bond, am scribing for, and in the presence of,  Dr. Alexander. I have scribed the following portions of the note - the Resident attestation.       History     Chief Complaint   Patient presents with    Chest Pain     chest pain and pedal edema. EMS administered 2 NTG SL and 324 mg ASA and 1 in of NTG paste     Review of patient's allergies indicates:   Allergen Reactions    Indocin [indomethacin sodium] Other (See Comments)    Lotensin [benazepril] Other (See Comments)     HPI Comments: 85 y/o female w/ hx of CAD w/ CABG in 2010, HTN, HLD, CKD, DM, HFpEF, and severe aortic stenosis presents for evaluation of intermittent left sided CP starting earlier today. Pt describes the pain as tightness radiating to her left neck and shoulder. Pt's pain is relieved w/ nitroglycerin. Pt has had similar episodes in the past but today was worst than previously. Pt denies any associated SOB or diaphoresis w/ CP episodes. Additionally pt has SOB w/ walking which is chronic. Pt recently ran out of her lasix medication.     The history is provided by the patient.     Past Medical History   Diagnosis Date    Arthritis     Coronary artery disease     Diabetes mellitus     Glaucoma     Gout, joint     Hx of CABG 9/21/10    Hyperlipidemia     Hypertension     NSTEMI (non-ST elevated myocardial infarction) 09/21/10    Systolic heart failure 6/19/2015     Past Medical History Pertinent Negatives   Diagnosis Date Noted    Amblyopia 10/21/2014    Cataract 10/11/2013    Diabetic retinopathy 10/21/2014    Macular degeneration 10/11/2013    Retinal detachment 10/11/2013    Strabismus 10/11/2013    Uveitis 10/11/2013     Past Surgical History   Procedure Laterality Date    Joint replacement      Cataract extraction      Coronary artery bypass graft  9/21/2010     Family History   Problem Relation Age of Onset    Diabetes Mother     Hypertension Father      Diabetes Father     Glaucoma Father     No Known Problems Sister     No Known Problems Brother     No Known Problems Maternal Aunt     No Known Problems Maternal Uncle     No Known Problems Paternal Aunt     No Known Problems Paternal Uncle     No Known Problems Maternal Grandmother     No Known Problems Maternal Grandfather     No Known Problems Paternal Grandmother     No Known Problems Paternal Grandfather     Amblyopia Neg Hx     Blindness Neg Hx     Cancer Neg Hx     Cataracts Neg Hx     Macular degeneration Neg Hx     Retinal detachment Neg Hx     Strabismus Neg Hx     Stroke Neg Hx     Thyroid disease Neg Hx      Social History   Substance Use Topics    Smoking status: Never Smoker    Smokeless tobacco: Never Used    Alcohol use No     Review of Systems   Constitutional: Negative for fever.   HENT: Positive for sore throat.    Eyes: Negative for visual disturbance.   Respiratory: Positive for shortness of breath. Negative for cough.    Cardiovascular: Positive for chest pain and leg swelling.   Gastrointestinal: Positive for nausea. Negative for abdominal pain.   Genitourinary: Negative for dysuria and flank pain.   Musculoskeletal: Negative for back pain.   Skin: Negative for rash.   Neurological: Negative for headaches.       Physical Exam   Initial Vitals   BP Pulse Resp Temp SpO2   01/17/17 2312 01/17/17 2312 01/17/17 2312 -- 01/17/17 2312   181/72 70 18  98 %     Physical Exam    Nursing note and vitals reviewed.  Constitutional: She appears well-developed and well-nourished. No distress.   HENT:   Head: Normocephalic and atraumatic.   Eyes: Conjunctivae and EOM are normal.   Neck: Normal range of motion. Neck supple. JVD present.   Cardiovascular: Normal rate, regular rhythm and intact distal pulses.   Murmur heard.  Pulmonary/Chest: No respiratory distress. She has no wheezes.   Crackles in the bases.    Abdominal: Soft. She exhibits no distension. There is no tenderness. There  is no rebound and no guarding.   Musculoskeletal:   Bilateral pitting edema to the knee.    Neurological: She is alert and oriented to person, place, and time. She has normal strength. No sensory deficit.   Skin: Skin is warm and dry.         ED Course   Procedures  Labs Reviewed   CBC W/ AUTO DIFFERENTIAL - Abnormal; Notable for the following:        Result Value    RBC 2.83 (*)     Hemoglobin 8.9 (*)     Hematocrit 27.1 (*)     MCH 31.4 (*)     RDW 14.9 (*)     Gran # 9.8 (*)     Gran% 78.2 (*)     Lymph% 14.4 (*)     All other components within normal limits    Narrative:     PLEASE REVIEW ORDER START TIME BEFORE MARKING SPECIMEN  COLLECTED.   COMPREHENSIVE METABOLIC PANEL - Abnormal; Notable for the following:     Chloride 111 (*)     BUN, Bld 31 (*)     Albumin 2.9 (*)     ALT 7 (*)     Anion Gap 7 (*)     eGFR if  39.8 (*)     eGFR if non  34.5 (*)     All other components within normal limits    Narrative:     PLEASE REVIEW ORDER START TIME BEFORE MARKING SPECIMEN  COLLECTED.   TROPONIN I - Abnormal; Notable for the following:     Troponin I 0.051 (*)     All other components within normal limits    Narrative:     PLEASE REVIEW ORDER START TIME BEFORE MARKING SPECIMEN  COLLECTED.   B-TYPE NATRIURETIC PEPTIDE - Abnormal; Notable for the following:     BNP 1960 (*)     All other components within normal limits    Narrative:     PLEASE REVIEW ORDER START TIME BEFORE MARKING SPECIMEN  COLLECTED.   PROTIME-INR    Narrative:     PLEASE REVIEW ORDER START TIME BEFORE MARKING SPECIMEN  COLLECTED.   MAGNESIUM    Narrative:     PLEASE REVIEW ORDER START TIME BEFORE MARKING SPECIMEN  COLLECTED.   HEMOGLOBIN A1C   TROPONIN I     EKG Readings: (Independently Interpreted)   Initial Reading: No STEMI. Previous EKG: Compared with most recent EKG Rhythm: Normal Sinus Rhythm.                APC / Resident Notes:   85 y/o female w/ hx of CAD, CHF, HTN presents for evaluation of left sided CP  earlier today. Pt describes pain as tightness, radiation to left shoulder, relieved w/ nitroglycerin. Pt reports nausea. SOB w/ walking. VSS. NAD. JVD, RRR. Murmur appreciated. Crackles in the bases. Abdomen soft nontender. Bilateral edema. Ddx: Aortic stenosis, ACS, pneumonia, CHF, and pneumonia. Will evaluate w/ CXR, EKG, trop, BNP, and CBC. Will treat nausea and give ASA.  Markus Cristina MD  [facility] EM PGY1  01/18/2017 3:53 AM    Update Pt's labs resulted. Pt has elevated BNP and troponin. CXR shows mild congestion. Most likely represents CHF exacerbation. Discussed case w/ medicine who will admit pt for further evaluation and diuresis.   Markus Cristina MD  [facility] EM PGY1  01/18/2017 3:55 AM           Scribe Attestation:   Scribe #1: I performed the above scribed service and the documentation accurately describes the services I performed. I attest to the accuracy of the note.    Attending Attestation:   Physician Attestation Statement for Resident:  As the supervising MD   Physician Attestation Statement: I have personally seen and examined this patient.   I agree with the above history. -:   As the supervising MD I agree with the above PE.    As the supervising MD I agree with the above treatment, course, plan, and disposition.   -:     Pt with history of CAD, CHF presents with intermittent chest pain and chronic SOB. Work up with CHF exacerbation and mildly elevated troponin. Treated with IV lasix and admitted to medicine with cardiology consult.          Physician Attestation for Scribe:  Physician Attestation Statement for Scribe #1: I, Dr. Alexander, reviewed documentation, as scribed by Mack Bond in my presence, and it is both accurate and complete.                 ED Course     Clinical Impression:   The primary encounter diagnosis was Acute on chronic diastolic congestive heart failure. A diagnosis of Chest pain, unspecified type was also pertinent to this visit.          Jayson Alexander,  MD  01/18/17 0732

## 2017-01-18 NOTE — ED NOTES
"Patient identifiers verified and correct for Krystina Washington.  Placed patient on bedpan per request and gee AM labs from existing PIV.  Pt tolerated well and has no c/o pain at this time.    LOC: The patient is awake, alert and oriented x 4. speaking appropriately, no slurred speech  APPEARANCE: Patient resting comfortably and in no acute distress. clean and well groomed. No JVD visible. Stated, "pain level is a 0."  SKIN: skin is warm dry and intact. color consistent with ethnicity. No tenting observed. capillary refill <3 seconds. no clubbing noted to nail beds. no breakdown or brusing visible. Mucus membranes moist acyanotic  MUSKULOSKELETAL: Full range of motion present in all extremities. Hand  and leg strength strong +2 bilaterally  RESPIRATORY: Airway is open and patent. Respirations-unlabored, spontaneous, equal bilaterally on inspiration and expiration. normal rate and rhythm. No accessory muscle use noted. Lungs clear to auscultation in all fields bilaterally anterior and posterior.   CARDIAC: Patient has even and regular rhythm. no bruits auscultated or thrills palpated. no peripheral edema noted. No chest pain.  ABDOMEN: Soft and non tender to palpation. no distention noted. Normoactive bowel sounds X4 quadrants. No complaints of abnormal bowel movements. Normal appetite.   NEUROLOGIC: Eyes open spontaneously. behavior appropriate to situation. follows commands. facial expression symmetrical. purposeful motor response noted. sensation present in all extremities when touched with a finger. No s/s of ischemic stroke. PERRLA  : No complaints of frequency, burning, urgency or blood in the urine.           "

## 2017-01-18 NOTE — PROGRESS NOTES
Patient seen and examined. 84 year old female admitted overnight for atypical chest pain and SOB. At time of exam, patient up working with PT/OT and reports SOB improved and no further chest pain. VSS. Case discussed with Cardiology co-management rounds and Interventional Cardiology consulted given severe AS and elevated troponins.       Edna Mitchell PA-C  [facility] Medicine

## 2017-01-18 NOTE — PT/OT/SLP EVAL
Occupational Therapy  Evaluation/ Discharge     Krystina Washington   MRN: [id number]   Admitting Diagnosis: Acute on chronic diastolic congestive heart failure    OT Date of Treatment: 01/18/17   OT Start Time: 0951  OT Stop Time: 1021  OT Total Time (min): 30 min    Billable Minutes:  Evaluation 20 minutes  Self Care/Home Management 10 minutes    Diagnosis: Acute on chronic diastolic congestive heart failure       Past Medical History   Diagnosis Date    Arthritis     Coronary artery disease     Diabetes mellitus     Glaucoma     Gout, joint     Hx of CABG 9/21/10    Hyperlipidemia     Hypertension     NSTEMI (non-ST elevated myocardial infarction) 09/21/10    Systolic heart failure 6/19/2015      Past Surgical History   Procedure Laterality Date    Joint replacement      Cataract extraction      Coronary artery bypass graft  9/21/2010       Referring physician: TRIP Alexander  Date referred to OT: 1/17/2017    General Precautions: Standard, fall  Orthopedic Precautions: N/A  Braces: N/A    Patient History:  Living Environment  Lives With: child(ever), adult (grandson and great grandson. )  Living Arrangements: house  Home Accessibility: stairs to enter home  Number of Stairs to Enter Home: 1  Stair Railings at Home: none  Transportation Available: family or friend will provide  Living Environment Comment:  (Pt lives in a home w/ one step to enter w/ her grandson and great grandson. Pt is alone for the day. Pt PLOF Indep)  Equipment Currently Used at Home: oxygen, bedside commode, shower chair, rollator    Prior level of function:   Bed Mobility/Transfers: independent  Grooming: independent  Bathing: independent  Upper Body Dressing: independent  Lower Body Dressing: independent  Toileting: independent  Home Management Skills: independent        Dominant hand: right    Subjective:  Communicated with nurse prior to session.  Pt agreeable to skilled OT services.  Chief Complaint: weakness   Patient/Family  stated goals: return home    Pain Ratin/10  Pain Rating Post-Intervention: 0/10    Objective:  Patient found with: oxygen, peripheral IV   Pt received supine on stretcher.    Cognitive Exam:  Oriented to: Person, Place, Time and Situation  Follows Commands/attention: Follows multistep  commands  Communication: clear/fluent  Memory:  No Deficits noted  Safety awareness/insight to disability: intact  Coping skills/emotional control: Appropriate to situation    Visual/perceptual:  Intact    Physical Exam:  Postural examination/scapula alignment: No postural abnormalities identified  Skin integrity: Visible skin intact  Edema: None noted     Sensation:   Intact    Upper Extremity Range of Motion:  Right Upper Extremity: WFL  Left Upper Extremity: WFL    Upper Extremity Strength:  Right Upper Extremity: WFL  Left Upper Extremity: WFL except 3+/5   Strength: WFL    Fine motor coordination:   Intact    Gross motor coordination: WFL    Functional Mobility:  Bed Mobility:  Rolling/Turning to Left: Stand by assistance  Scooting/Bridging: Stand by Assistance  Supine to Sit: Stand by Assistance  Sit to Supine: Stand by Assistance    Transfers:  Sit <> Stand Assistance: Stand By Assistance  Sit <> Stand Assistive Device: Rolling Walker  Bed <> Chair Technique: Stand Pivot  Bed <> Chair Transfer Assistance: Supervision  Bed <> Chair Assistive Device: Rolling Walker  Toilet Transfer Technique: Stand Pivot  Toilet Transfer Assistance: Contact Guard Assistance  Toilet Transfer Assistive Device: Rolling Walker    Functional Ambulation: Pt ambulated 100 ft at sba-cga w/ 2ww.    Activities of Daily Living:  UE Dressing Level of Assistance: Set-up Assistance  LE Dressing Level of Assistance: Supervision      Balance:   Static Sit: FAIR+: Able to take MINIMAL challenges from all directions  Dynamic Sit: FAIR+: Maintains balance through MINIMAL excursions of active trunk motion  Static Stand: FAIR+: Takes MINIMAL challenges from  "all directions  Dynamic stand: FAIR+: Needs CLOSE SUPERVISION during gait and is able to right self with minor LOB    Therapeutic Activities and Exercises:  Pt educated on POC  Pt educated on energy conservation technique of pursed lipped breathing.      AM-PAC 6 CLICK ADL  How much help from another person does this patient currently need?  1 = Unable, Total/Dependent Assistance  2 = A lot, Maximum/Moderate Assistance  3 = A little, Minimum/Contact Guard/Supervision  4 = None, Modified [place]/Independent    Putting on and taking off regular lower body clothing? : 4  Bathing (including washing, rinsing, drying)?: 3  Toileting, which includes using toilet, bedpan, or urinal? : 4  Putting on and taking off regular upper body clothing?: 4  Taking care of personal grooming such as brushing teeth?: 4  Eating meals?: 4  Total Score: 23    AM-PAC Raw Score CMS "G-Code Modifier Level of Impairment Assistance   6 % Total / Unable   7 - 9 CM 80 - 100% Maximal Assist   10 - 14 CL 60 - 80% Moderate Assist   15 - 19 CK 40 - 60% Moderate Assist   20 - 22 CJ 20 - 40% Minimal Assist   23 CI 1-20% SBA / CGA   24 CH 0% Independent/ Mod I       Patient left supine with call button in reach    Assessment:  Krystina Washington is a 84 y.o. female with a medical diagnosis of Acute on chronic diastolic congestive heart failure and presents with impairments listed below. D/C acute OT services d/t pt at [facility] and not displaying a need for acute skilled OT services.     Rehab identified problem list/impairments: Rehab identified problem list/impairments: weakness, impaired endurance, impaired cardiopulmonary response to activity, impaired self care skills, impaired functional mobilty, decreased safety awareness    Rehab potential is good.    Activity tolerance: Good    Discharge recommendations: Discharge Facility/Level Of Care Needs: home with home health     Barriers to discharge: Barriers to Discharge: Decreased caregiver " support    Equipment recommendations: none     GOALS:   Occupational Therapy Goals        Problem: Occupational Therapy Goal    Goal Priority Disciplines Outcome Interventions   Occupational Therapy Goal     OT, PT/OT     Description:  Eval initiated and completed. Pt demonstrating no OT needs performing self care  tasks and functional mobility with no need of OT intervention. D/C Pt from IPOT  secondary to no demonstrated OT needs.                PLAN:  (D/C acute OT services )   Plan of Care reviewed with: patient    Mickey AHMET Cazares, OT  01/18/2017

## 2017-01-19 PROBLEM — R79.89 ELEVATED TROPONIN LEVEL: Status: ACTIVE | Noted: 2017-01-19

## 2017-01-19 LAB
ANION GAP SERPL CALC-SCNC: 7 MMOL/L
ANISOCYTOSIS BLD QL SMEAR: SLIGHT
BASOPHILS # BLD AUTO: 0.02 K/UL
BASOPHILS NFR BLD: 0.2 %
BUN SERPL-MCNC: 41 MG/DL
CALCIUM SERPL-MCNC: 9 MG/DL
CHLORIDE SERPL-SCNC: 105 MMOL/L
CO2 SERPL-SCNC: 30 MMOL/L
CREAT SERPL-MCNC: 1.9 MG/DL
DIFFERENTIAL METHOD: ABNORMAL
EOSINOPHIL # BLD AUTO: 0.2 K/UL
EOSINOPHIL NFR BLD: 2.1 %
ERYTHROCYTE [DISTWIDTH] IN BLOOD BY AUTOMATED COUNT: 14.7 %
EST. GFR  (AFRICAN AMERICAN): 27.5 ML/MIN/1.73 M^2
EST. GFR  (NON AFRICAN AMERICAN): 23.9 ML/MIN/1.73 M^2
GLUCOSE SERPL-MCNC: 120 MG/DL
HCT VFR BLD AUTO: 28.7 %
HGB BLD-MCNC: 9 G/DL
LYMPHOCYTES # BLD AUTO: 1.7 K/UL
LYMPHOCYTES NFR BLD: 15.5 %
MAGNESIUM SERPL-MCNC: 1.7 MG/DL
MCH RBC QN AUTO: 31.4 PG
MCHC RBC AUTO-ENTMCNC: 31.4 %
MCV RBC AUTO: 100 FL
MONOCYTES # BLD AUTO: 0.7 K/UL
MONOCYTES NFR BLD: 6.3 %
NEUTROPHILS # BLD AUTO: 8.4 K/UL
NEUTROPHILS NFR BLD: 75.9 %
PLATELET # BLD AUTO: 189 K/UL
PLATELET BLD QL SMEAR: ABNORMAL
PMV BLD AUTO: 13.2 FL
POCT GLUCOSE: 105 MG/DL (ref 70–110)
POCT GLUCOSE: 130 MG/DL (ref 70–110)
POCT GLUCOSE: 146 MG/DL (ref 70–110)
POLYCHROMASIA BLD QL SMEAR: ABNORMAL
POTASSIUM SERPL-SCNC: 4.2 MMOL/L
RBC # BLD AUTO: 2.87 M/UL
SODIUM SERPL-SCNC: 142 MMOL/L
WBC # BLD AUTO: 11.13 K/UL

## 2017-01-19 PROCEDURE — 85025 COMPLETE CBC W/AUTO DIFF WBC: CPT

## 2017-01-19 PROCEDURE — 80048 BASIC METABOLIC PNL TOTAL CA: CPT

## 2017-01-19 PROCEDURE — 99232 SBSQ HOSP IP/OBS MODERATE 35: CPT | Mod: ,,, | Performed by: NURSE PRACTITIONER

## 2017-01-19 PROCEDURE — 20600001 HC STEP DOWN PRIVATE ROOM

## 2017-01-19 PROCEDURE — 25000003 PHARM REV CODE 250: Performed by: PHYSICIAN ASSISTANT

## 2017-01-19 PROCEDURE — 63600175 PHARM REV CODE 636 W HCPCS: Performed by: INTERNAL MEDICINE

## 2017-01-19 PROCEDURE — 36415 COLL VENOUS BLD VENIPUNCTURE: CPT

## 2017-01-19 PROCEDURE — 25000003 PHARM REV CODE 250: Performed by: INTERNAL MEDICINE

## 2017-01-19 PROCEDURE — 99233 SBSQ HOSP IP/OBS HIGH 50: CPT | Mod: ,,, | Performed by: PHYSICIAN ASSISTANT

## 2017-01-19 PROCEDURE — 83735 ASSAY OF MAGNESIUM: CPT

## 2017-01-19 PROCEDURE — 25500020 PHARM REV CODE 255: Performed by: HOSPITALIST

## 2017-01-19 RX ORDER — SODIUM CHLORIDE 9 MG/ML
INJECTION, SOLUTION INTRAVENOUS CONTINUOUS
Status: DISCONTINUED | OUTPATIENT
Start: 2017-01-19 | End: 2017-01-20

## 2017-01-19 RX ADMIN — ASPIRIN 81 MG CHEWABLE TABLET 81 MG: 81 TABLET CHEWABLE at 09:01

## 2017-01-19 RX ADMIN — HYDRALAZINE HYDROCHLORIDE 100 MG: 50 TABLET ORAL at 09:01

## 2017-01-19 RX ADMIN — CARVEDILOL 25 MG: 12.5 TABLET, FILM COATED ORAL at 09:01

## 2017-01-19 RX ADMIN — PRAVASTATIN SODIUM 40 MG: 40 TABLET ORAL at 09:01

## 2017-01-19 RX ADMIN — ISOSORBIDE MONONITRATE 60 MG: 60 TABLET, EXTENDED RELEASE ORAL at 09:01

## 2017-01-19 RX ADMIN — SODIUM CHLORIDE: 0.9 INJECTION, SOLUTION INTRAVENOUS at 01:01

## 2017-01-19 RX ADMIN — HEPARIN SODIUM 5000 UNITS: 5000 INJECTION, SOLUTION INTRAVENOUS; SUBCUTANEOUS at 06:01

## 2017-01-19 RX ADMIN — HYDRALAZINE HYDROCHLORIDE 100 MG: 50 TABLET ORAL at 06:01

## 2017-01-19 RX ADMIN — ESCITALOPRAM OXALATE 20 MG: 10 TABLET ORAL at 09:01

## 2017-01-19 RX ADMIN — HEPARIN SODIUM 5000 UNITS: 5000 INJECTION, SOLUTION INTRAVENOUS; SUBCUTANEOUS at 09:01

## 2017-01-19 RX ADMIN — HYDRALAZINE HYDROCHLORIDE 100 MG: 50 TABLET ORAL at 01:01

## 2017-01-19 RX ADMIN — CLOPIDOGREL BISULFATE 300 MG: 300 TABLET, FILM COATED ORAL at 06:01

## 2017-01-19 RX ADMIN — ALLOPURINOL 100 MG: 100 TABLET ORAL at 09:01

## 2017-01-19 RX ADMIN — IOHEXOL 100 ML: 350 INJECTION, SOLUTION INTRAVENOUS at 06:01

## 2017-01-19 RX ADMIN — HEPARIN SODIUM 5000 UNITS: 5000 INJECTION, SOLUTION INTRAVENOUS; SUBCUTANEOUS at 01:01

## 2017-01-19 RX ADMIN — ALBUTEROL SULFATE 1 PUFF: 90 AEROSOL, METERED RESPIRATORY (INHALATION) at 09:01

## 2017-01-19 RX ADMIN — FUROSEMIDE 40 MG: 10 INJECTION, SOLUTION INTRAVENOUS at 09:01

## 2017-01-19 NOTE — CONSULTS
Consult Note    Inpatient consult to Cardiothoracic Surgery  Consult performed by: IFRAH GUERRERO  Consult ordered by: KELLI PRECIADO  Reason for consult: TAVR eval        SUBJECTIVE:     History of Present Illness:  Patient is a 84 y.o. female presents with  h/o CAD s/p CABG x 3 2010, Severe Aortic Stenosis, HTN, HLD, DM-2 who presents with complaints of shortness of breath, dizziness and chest pain.Shortness of breath-at rest and exertion worse over last 1 month-associated with orthopnea, Cough, PND, leg swelling-Lasix cut down recently due to worsening renal function. Cough when lies down, non productive. No fever.    Review of patient's allergies indicates:   Allergen Reactions    Indocin [indomethacin sodium] Other (See Comments)    Lotensin [benazepril] Other (See Comments)     Past Medical History   Diagnosis Date    Arthritis     Coronary artery disease     Diabetes mellitus     Glaucoma     Gout, joint     Hx of CABG 9/21/10    Hyperlipidemia     Hypertension     NSTEMI (non-ST elevated myocardial infarction) 09/21/10    Systolic heart failure 6/19/2015     Past Surgical History   Procedure Laterality Date    Joint replacement      Cataract extraction      Coronary artery bypass graft  9/21/2010     Family History   Problem Relation Age of Onset    Diabetes Mother     Hypertension Father     Diabetes Father     Glaucoma Father     No Known Problems Sister     No Known Problems Brother     No Known Problems Maternal Aunt     No Known Problems Maternal Uncle     No Known Problems Paternal Aunt     No Known Problems Paternal Uncle     No Known Problems Maternal Grandmother     No Known Problems Maternal Grandfather     No Known Problems Paternal Grandmother     No Known Problems Paternal Grandfather     Amblyopia Neg Hx     Blindness Neg Hx     Cancer Neg Hx     Cataracts Neg Hx     Macular degeneration Neg Hx     Retinal detachment Neg Hx     Strabismus Neg Hx      Stroke Neg Hx     Thyroid disease Neg Hx      Social History   Substance Use Topics    Smoking status: Never Smoker    Smokeless tobacco: Never Used    Alcohol use No     Review of Systems   Constitutional: Negative for fever.   Respiratory: Positive for shortness of breath. Negative for cough and sputum production.    Cardiovascular: Negative for chest pain, palpitations, orthopnea, claudication, leg swelling and PND.   Gastrointestinal: Negative for abdominal pain, nausea and vomiting.   Genitourinary: Negative for dysuria.   Musculoskeletal: Negative for falls.   Skin: Negative for rash.   Neurological: Negative for dizziness, seizures and headaches.   Endo/Heme/Allergies: Negative for polydipsia.   Psychiatric/Behavioral: Negative for substance abuse.     OBJECTIVE:     Vital Signs:  Temp:  [97.7 °F (36.5 °C)]   Pulse:  [66-73]   Resp:  [18-19]   BP: (149-157)/(74-76)   SpO2:  [88 %-100 %]     Physical Exam   Constitutional: She is oriented to person, place, and time. She appears well-developed.   Frail     HENT:   Head: Normocephalic.   Eyes: Pupils are equal, round, and reactive to light.   Cardiovascular: Normal rate and regular rhythm.    Murmur heard.  Pulmonary/Chest: Effort normal and breath sounds normal.   Abdominal: Soft.   Musculoskeletal: Normal range of motion.   Neurological: She is alert and oriented to person, place, and time.   Skin: Skin is warm and dry.   Psychiatric: She has a normal mood and affect.     Laboratory:  CBC:   Recent Labs  Lab 01/19/17  0645   WBC 11.13   RBC 2.87*   HGB 9.0*   HCT 28.7*      *   MCH 31.4*   MCHC 31.4*     BMP:   Recent Labs  Lab 01/19/17  0645   *      K 4.2      CO2 30*   BUN 41*   CREATININE 1.9*   CALCIUM 9.0   MG 1.7       Diagnostic Results:  She has undergone the following TAVR work-up:  ¨ Echo (12/16/16): MICHAEL = 0.8 cm2, peak velocity = 4.1 m/s, mean gradient = 40 mmHg, EF= 60%  ¨ Needs LHC +/-  ¨ STS= 6.8%  ¨ Needs  frailty  ¨ Needs PFTs  ¨ Needs TAVR CTA  ASSESSMENT/PLAN:   Patient is a 84 y.o. female presents with  h/o CAD s/p CABG x 3 2010, Severe Aortic Stenosis, HTN, HLD, DM-2 who presents with complaints of shortness of breath, dizziness and chest pain    Plan: Due to patient's redo sternotomy, comorbidites and high STS Score, patient is high risk for SAVR.

## 2017-01-19 NOTE — ASSESSMENT & PLAN NOTE
- SCr 1.9>1.8 (from 1.4) but baseline SCr ~1.6-2.0.    - lasix held and ARB held 1/19. Given IVFs overnight  - Monitor renal function and I/Os

## 2017-01-19 NOTE — ASSESSMENT & PLAN NOTE
- Chest pain currently resolved. Troponins: 0.051>0.068>0.049  - As above, plan for TAVR CTA today and likely [facility] Friday

## 2017-01-19 NOTE — ASSESSMENT & PLAN NOTE
- BP uncontrolled. Continue on Coreg 25 mg BID, Clonidine 0.1 mg patch, Hydralazine 100 mg q 8 hours, irbesartan 300 mg nightly, and imdur 60 mg daily.

## 2017-01-19 NOTE — PROGRESS NOTES
Pt escorted to CT via wheelchair for CTA.   Pre-procedure orders implemented as ordered.  Pt showing no S/S of distress; AAOx3.  Pt transported with telemetry and IV NS.  Awaiting return.    1750  Pt from CT via wheelchair with escort.  Pt in no distress, resting comfortably in bed.  Bed locked, in lowest position, siderails up x2.  Call bell in reach.  Pt instructed to call for assistance.  Will continue to monitor.

## 2017-01-19 NOTE — ASSESSMENT & PLAN NOTE
- SCr 1.9 (from 1.4) but baseline SCr ~1.6-2.0.   - Holding lasix and giving gentle IVFs in setting of LHC tomorrow

## 2017-01-19 NOTE — PROGRESS NOTES
Ms. Washington has Stage III CKD. Her GFR was 39.8 yesterday. She has been receiving IV Lasix for diuresis. Her BUN/Cr is 41/1.9 today (up from 31/1.4 yesterday) and her GFR is now 27.5.     Discussed with Dr. Duran. OK to proceed with TAVR CTA despite GFR <30.

## 2017-01-19 NOTE — ASSESSMENT & PLAN NOTE
- Severe AS and now symptomatic with elevated troponin.   - Interventional Cardiology consulted and recs appreciated.   - TAVR work-up: [facility] +/- (plan to do Friday ). Given Plavix load 1/18 and started plavix 75 mg daily. TAVR CTA pending today. Will need CTS consult, PFTs, and frailty.  - Discussed in Cardiology co-management rounds

## 2017-01-19 NOTE — PLAN OF CARE
Stopped by room. Pt sleepingl left blue folder and left msg for grandcordelia Cross to call when he had a chance.     01/19/17 0474   Discharge Assessment   Assessment Type Discharge Planning Assessment

## 2017-01-19 NOTE — ASSESSMENT & PLAN NOTE
- Symptoms improved however unclear UOP (not accurately documented).   - BNP 1960. 2D echo (12/2016) with EF 65%, diastolic dysfunction, Severe AS, and PA pressure 51.56.  - Started on lasix 40 mg IV BID but will hold in setting of rise in SCr  - Continue Coreg 25 mg BID  - Strict I/Os and daily weights  - see further plan below

## 2017-01-19 NOTE — PROGRESS NOTES
Pt's /77; ; Dr. Justin notified. Ordered to give pt's BP medication now; scheduled 2200 Hydralazine & 2100 Coreg. Orders carried out. Will continue to monitor.

## 2017-01-19 NOTE — PLAN OF CARE
Problem: Patient Care Overview  Goal: Plan of Care Review  Outcome: Ongoing (interventions implemented as appropriate)  Pt to go for possible CTA in AM. Pt's BP elevated this shift; MD notified (see previous note.) All other VSS. O2 sats stable on RA. Pt denies CP, SOB, palpitations, lightheadedness and dizziness. Fall precautions maintained this shift, pt remains free from falls, trauma and injury. Plan for [facility] Friday 1/20/17. POC reviewed with pt, verbalized understanding. NADN. Will continue to monitor.

## 2017-01-19 NOTE — PROGRESS NOTES
[facility] Hospital Medicine  Progress Note    Patient Name: Krystina Washington  MRN: [id number]  Patient Class: IP- Inpatient   Admission Date: 1/18/2017  Length of Stay: 1 days  Attending Physician: Lesvia Estrada MD  Primary Care Provider: Oniel Johnson MD    Hospital Medicine Team: Networked reference to record PCT  Edna Mitchell PA-C    Subjective:     Principal Problem:Acute on chronic diastolic congestive heart failure    HPI:  Patient is a 84 y.o. female with h/o CAD s/p CABG x 3 2010, Severe Aortic Stenosis, HTN, HLD, DM-2 who presents with complaints of shortness of breath and chest pain.     Shortness of breath-at rest and exertion worse over last 1 month-associated with orthopnea, Cough, PND, leg swelling-Lasix cut down recently due to worsening renal function. Cough when lies down, non productive. No fever.     Chest pain-left sided radiation to left shoulder, no aggravating factors, no relation with exertion, sharp, lasts 3-5 mins at a time, NTG helps but not all the way,Never had pain like this before.     Also has some dizziness, loss of balance.      Hospital Course:  Patient admitted to hospital medicine for management of acute on chronic heart failure and atypical chest pain in patient with known CAD and severe AS. Interventional Cardiology consulted regarding severe, symptomatic Aortic Stenosis and recommend completion of TAVR work up. TAVR CTA ordered and possible LHC to be done this Friday.     Interval History: Patient resting in bed. She reports feeling nervous but denies any further chest pain and shortness of breath improved.     1015: Attempted to call Hermelindo Cross to give update but no answer.     Review of Systems   Constitutional: Negative for activity change, appetite change, chills, fatigue and fever.   Respiratory: Positive for shortness of breath (improved). Negative for cough and wheezing.    Cardiovascular: Negative for chest pain (resolved),  palpitations and leg swelling.   Gastrointestinal: Negative for abdominal pain, constipation, diarrhea, nausea and vomiting.   Neurological: Negative for dizziness, weakness, numbness and headaches.   Psychiatric/Behavioral: The patient is nervous/anxious.      Objective:     Vital Signs (Most Recent):  Temp: 97.7 °F (36.5 °C) (01/19/17 0945)  Pulse: 67 (01/19/17 0945)  Resp: 18 (01/19/17 0945)  BP: (!) 157/76 (01/19/17 0945)  SpO2: 100 % (01/19/17 0945) Vital Signs (24h Range):  Temp:  [97.6 °F (36.4 °C)-98.3 °F (36.8 °C)] 97.7 °F (36.5 °C)  Pulse:  [53-73] 67  Resp:  [15-20] 18  SpO2:  [88 %-100 %] 100 %  BP: (140-185)/(61-91) 157/76     Weight: 81.1 kg (178 lb 12.7 oz)  Body mass index is 29.75 kg/(m^2).    Intake/Output Summary (Last 24 hours) at 01/19/17 1014  Last data filed at 01/19/17 0945   Gross per 24 hour   Intake               60 ml   Output              550 ml   Net             -490 ml      Physical Exam   Constitutional: She is oriented to person, place, and time. She appears well-developed and well-nourished. No distress.   HENT:   Head: Normocephalic and atraumatic.   Cardiovascular: Normal rate, regular rhythm and intact distal pulses.    Murmur (3/6 systolic ejection murmur) heard.  Pulmonary/Chest: Effort normal and breath sounds normal. She has no wheezes. She has no rales.   Abdominal: Soft. Bowel sounds are normal. She exhibits no distension and no mass. There is no tenderness. There is no guarding.   Musculoskeletal: Normal range of motion. She exhibits no edema, tenderness or deformity.   Neurological: She is alert and oriented to person, place, and time. No cranial nerve deficit.       Significant Labs:   BMP:   Recent Labs  Lab 01/19/17  0645   *      K 4.2      CO2 30*   BUN 41*   CREATININE 1.9*   CALCIUM 9.0   MG 1.7     CBC:   Recent Labs  Lab 01/18/17  0133 01/19/17  0645   WBC 12.58 11.13   HGB 8.9* 9.0*   HCT 27.1* 28.7*    189     Assessment/Plan:      *  Acute on chronic diastolic congestive heart failure  - Symptoms improved however unclear UOP (not accurately documented).   - BNP 1960. 2D echo (12/2016) with EF 65%, diastolic dysfunction, Severe AS, and PA pressure 51.56.  - Started on lasix 40 mg IV BID but will hold in setting of rise in SCr  - Continue Coreg 25 mg BID  - Strict I/Os and daily weights  - see further plan below    Aortic stenosis, severe  - Severe AS and now symptomatic with elevated troponin.   - Interventional Cardiology consulted and recs appreciated.   - TAVR work-up: [facility] +/- (plan to do Friday ). Given Plavix load 1/18 and started plavix 75 mg daily. TAVR CTA pending today. Will need CTS consult, PFTs, and frailty.  - Discussed in Cardiology co-management rounds    Elevated troponin level  - Chest pain currently resolved. Troponins: 0.051>0.068>0.049  - As above, plan for TAVR CTA today and likely [facility] Friday    HTN (hypertension)  - BP uncontrolled. Continue on Coreg 25 mg BID, Clonidine 0.1 mg patch, Hydralazine 100 mg q 8 hours, irbesartan 300 mg nightly, and imdur 60 mg daily.     Chronic kidney disease, stage III (moderate)  - SCr 1.9 (from 1.4) but baseline SCr ~1.6-2.0.   - Holding lasix and giving gentle IVFs in setting of [facility] tomorrow    CAD (coronary artery disease)  - S/P CABG x 3. No further chest pain  - See plan above    Type 2 diabetes mellitus with diabetic chronic kidney disease  - controlled. A1C 6.3. Cover with sliding scale insulin  - monitor glucose     Hyperlipidemia  - Continue statin      VTE Risk Mitigation         Ordered     heparin (porcine) injection 5,000 Units  Every 8 hours     Route:  Subcutaneous        01/18/17 0441     Medium Risk of VTE  Once      01/18/17 9930          Edna Mitchell PA-C  Department of Hospital Medicine   [facility]

## 2017-01-19 NOTE — PLAN OF CARE
Problem: Patient Care Overview  Goal: Plan of Care Review  Outcome: Revised  Pt free of falls/traumas/injuries.  Denies c/o SOB, CP, or discomfort.  Generalized skin remains CDI; trace edema noted to BLEs.  TEDs placed.  Lasix on hold at this time and NS gtt at 50cc/hr initiated this shift; pt continues to diurese well.  Plan for CTA today and LHC tomorrow. Blood glucose diet controlled; no supplemental insulin required.  Pt tolerating plan of care.

## 2017-01-19 NOTE — PLAN OF CARE
Dc plan a : home  Dc plan b; hh  Met with pt; [place]. Family at bs. Has ride when dc  pcp dr brian koenig     01/19/17 1365   Discharge Assessment   Assessment Type Discharge Planning Assessment   Confirmed/corrected address and phone number on facesheet? Yes   Assessment information obtained from? Patient;Caregiver   Expected Length of Stay (days) 3   Prior to hospitilization cognitive status: Alert/Oriented;No Deficits   Prior to hospitalization functional status: Independent;Assistive Equipment   Current cognitive status: Alert/Oriented;No Deficits   Current Functional Status: Independent;Assistive Equipment   Arrived From home or self-care   Lives With grandchild(ever)   Able to Return to Prior Arrangements yes   Is patient able to care for self after discharge? Yes;No   How many people do you have in your home that can help with your care after discharge? 1   Who are your caregiver(s) and their phone number(s)? lives w maurice varghese   Patient's perception of discharge disposition admitted as an inpatient   Patient currently being followed by outpatient case management? No   Patient currently receives home health services? No   Does the patient currently use HME? Yes   Equipment Currently Used at Home bedside commode;cane, straight;rollator;shower chair   Do you have any problems affording any of your prescribed medications? No   Is the patient taking medications as prescribed? yes   Do you have any financial concerns preventing you from receiving the healthcare you need? No   Does the patient have transportation to healthcare appointments? Yes   Transportation Available family or friend will provide   On Dialysis? No   Does the patient receive services at the [facility]? Yes   Are there any open cases? No   Discharge Plan A Home with family   Discharge Plan B Home with family;Home Health   Patient/Family In Agreement With Plan yes

## 2017-01-19 NOTE — SUBJECTIVE & OBJECTIVE
Interval History: Patient resting in bed. She reports feeling nervous but denies any further chest pain and shortness of breath improved.     1015: Attempted to call Hermelindo Cross to give update but no answer.     Review of Systems   Constitutional: Negative for activity change, appetite change, chills, fatigue and fever.   Respiratory: Positive for shortness of breath (improved). Negative for cough and wheezing.    Cardiovascular: Negative for chest pain (resolved), palpitations and leg swelling.   Gastrointestinal: Negative for abdominal pain, constipation, diarrhea, nausea and vomiting.   Neurological: Negative for dizziness, weakness, numbness and headaches.   Psychiatric/Behavioral: The patient is nervous/anxious.      Objective:     Vital Signs (Most Recent):  Temp: 97.7 °F (36.5 °C) (01/19/17 0945)  Pulse: 67 (01/19/17 0945)  Resp: 18 (01/19/17 0945)  BP: (!) 157/76 (01/19/17 0945)  SpO2: 100 % (01/19/17 0945) Vital Signs (24h Range):  Temp:  [97.6 °F (36.4 °C)-98.3 °F (36.8 °C)] 97.7 °F (36.5 °C)  Pulse:  [53-73] 67  Resp:  [15-20] 18  SpO2:  [88 %-100 %] 100 %  BP: (140-185)/(61-91) 157/76     Weight: 81.1 kg (178 lb 12.7 oz)  Body mass index is 29.75 kg/(m^2).    Intake/Output Summary (Last 24 hours) at 01/19/17 1014  Last data filed at 01/19/17 0945   Gross per 24 hour   Intake               60 ml   Output              550 ml   Net             -490 ml      Physical Exam   Constitutional: She is oriented to person, place, and time. She appears well-developed and well-nourished. No distress.   HENT:   Head: Normocephalic and atraumatic.   Cardiovascular: Normal rate, regular rhythm and intact distal pulses.    Murmur (3/6 systolic ejection murmur) heard.  Pulmonary/Chest: Effort normal and breath sounds normal. She has no wheezes. She has no rales.   Abdominal: Soft. Bowel sounds are normal. She exhibits no distension and no mass. There is no tenderness. There is no guarding.   Musculoskeletal: Normal range  of motion. She exhibits no edema, tenderness or deformity.   Neurological: She is alert and oriented to person, place, and time. No cranial nerve deficit.       Significant Labs:   BMP:   Recent Labs  Lab 01/19/17  0645   *      K 4.2      CO2 30*   BUN 41*   CREATININE 1.9*   CALCIUM 9.0   MG 1.7     CBC:   Recent Labs  Lab 01/18/17  0133 01/19/17  0645   WBC 12.58 11.13   HGB 8.9* 9.0*   HCT 27.1* 28.7*    189

## 2017-01-20 DIAGNOSIS — Z95.5 STENTED CORONARY ARTERY: Primary | ICD-10-CM

## 2017-01-20 PROBLEM — I21.4 NSTEMI (NON-ST ELEVATED MYOCARDIAL INFARCTION): Status: ACTIVE | Noted: 2017-01-19

## 2017-01-20 LAB
ABO + RH BLD: NORMAL
ANION GAP SERPL CALC-SCNC: 9 MMOL/L
BASOPHILS # BLD AUTO: 0.02 K/UL
BASOPHILS NFR BLD: 0.2 %
BLD GP AB SCN CELLS X3 SERPL QL: NORMAL
BUN SERPL-MCNC: 46 MG/DL
CALCIUM SERPL-MCNC: 8.7 MG/DL
CHLORIDE SERPL-SCNC: 106 MMOL/L
CO2 SERPL-SCNC: 28 MMOL/L
CREAT SERPL-MCNC: 1.8 MG/DL
DIFFERENTIAL METHOD: ABNORMAL
EOSINOPHIL # BLD AUTO: 0.2 K/UL
EOSINOPHIL NFR BLD: 2.1 %
ERYTHROCYTE [DISTWIDTH] IN BLOOD BY AUTOMATED COUNT: 14.5 %
EST. GFR  (AFRICAN AMERICAN): 29.4 ML/MIN/1.73 M^2
EST. GFR  (NON AFRICAN AMERICAN): 25.5 ML/MIN/1.73 M^2
GLUCOSE SERPL-MCNC: 111 MG/DL
HCT VFR BLD AUTO: 28.1 %
HGB BLD-MCNC: 8.9 G/DL
LYMPHOCYTES # BLD AUTO: 1.3 K/UL
LYMPHOCYTES NFR BLD: 14 %
MAGNESIUM SERPL-MCNC: 1.8 MG/DL
MCH RBC QN AUTO: 31.6 PG
MCHC RBC AUTO-ENTMCNC: 31.7 %
MCV RBC AUTO: 100 FL
MONOCYTES # BLD AUTO: 0.7 K/UL
MONOCYTES NFR BLD: 7.7 %
NEUTROPHILS # BLD AUTO: 6.8 K/UL
NEUTROPHILS NFR BLD: 75.7 %
PLATELET # BLD AUTO: 181 K/UL
PLATELET RESPONSE PLAVIX: 374 PRU
PMV BLD AUTO: 12.9 FL
POCT GLUCOSE: 106 MG/DL (ref 70–110)
POCT GLUCOSE: 116 MG/DL (ref 70–110)
POCT GLUCOSE: 119 MG/DL (ref 70–110)
POCT GLUCOSE: 173 MG/DL (ref 70–110)
POTASSIUM SERPL-SCNC: 4.1 MMOL/L
RBC # BLD AUTO: 2.82 M/UL
SODIUM SERPL-SCNC: 143 MMOL/L
WBC # BLD AUTO: 9.01 K/UL

## 2017-01-20 PROCEDURE — 83735 ASSAY OF MAGNESIUM: CPT

## 2017-01-20 PROCEDURE — 20600001 HC STEP DOWN PRIVATE ROOM

## 2017-01-20 PROCEDURE — 36415 COLL VENOUS BLD VENIPUNCTURE: CPT

## 2017-01-20 PROCEDURE — 93010 ELECTROCARDIOGRAM REPORT: CPT | Mod: ,,, | Performed by: INTERNAL MEDICINE

## 2017-01-20 PROCEDURE — 80048 BASIC METABOLIC PNL TOTAL CA: CPT

## 2017-01-20 PROCEDURE — 25000003 PHARM REV CODE 250

## 2017-01-20 PROCEDURE — B2131ZZ FLUOROSCOPY OF MULTIPLE CORONARY ARTERY BYPASS GRAFTS USING LOW OSMOLAR CONTRAST: ICD-10-PCS | Performed by: INTERNAL MEDICINE

## 2017-01-20 PROCEDURE — 4A023N7 MEASUREMENT OF CARDIAC SAMPLING AND PRESSURE, LEFT HEART, PERCUTANEOUS APPROACH: ICD-10-PCS | Performed by: INTERNAL MEDICINE

## 2017-01-20 PROCEDURE — 25000003 PHARM REV CODE 250: Performed by: PHYSICIAN ASSISTANT

## 2017-01-20 PROCEDURE — B2111ZZ FLUOROSCOPY OF MULTIPLE CORONARY ARTERIES USING LOW OSMOLAR CONTRAST: ICD-10-PCS | Performed by: INTERNAL MEDICINE

## 2017-01-20 PROCEDURE — 92928 PRQ TCAT PLMT NTRAC ST 1 LES: CPT | Mod: RC,,, | Performed by: INTERNAL MEDICINE

## 2017-01-20 PROCEDURE — 93455 CORONARY ART/GRFT ANGIO S&I: CPT | Mod: 26,59,, | Performed by: INTERNAL MEDICINE

## 2017-01-20 PROCEDURE — 85576 BLOOD PLATELET AGGREGATION: CPT

## 2017-01-20 PROCEDURE — 25000003 PHARM REV CODE 250: Performed by: INTERNAL MEDICINE

## 2017-01-20 PROCEDURE — 02703DZ DILATION OF CORONARY ARTERY, ONE ARTERY WITH INTRALUMINAL DEVICE, PERCUTANEOUS APPROACH: ICD-10-PCS | Performed by: INTERNAL MEDICINE

## 2017-01-20 PROCEDURE — 63600175 PHARM REV CODE 636 W HCPCS

## 2017-01-20 PROCEDURE — 93005 ELECTROCARDIOGRAM TRACING: CPT

## 2017-01-20 PROCEDURE — 86901 BLOOD TYPING SEROLOGIC RH(D): CPT

## 2017-01-20 PROCEDURE — 99233 SBSQ HOSP IP/OBS HIGH 50: CPT | Mod: ,,, | Performed by: PHYSICIAN ASSISTANT

## 2017-01-20 PROCEDURE — 85025 COMPLETE CBC W/AUTO DIFF WBC: CPT

## 2017-01-20 PROCEDURE — 63600175 PHARM REV CODE 636 W HCPCS: Performed by: INTERNAL MEDICINE

## 2017-01-20 PROCEDURE — 86900 BLOOD TYPING SEROLOGIC ABO: CPT

## 2017-01-20 RX ORDER — IRBESARTAN 300 MG/1
300 TABLET ORAL NIGHTLY
Status: DISCONTINUED | OUTPATIENT
Start: 2017-01-20 | End: 2017-01-21 | Stop reason: HOSPADM

## 2017-01-20 RX ORDER — ACETAMINOPHEN 10 MG/ML
1000 INJECTION, SOLUTION INTRAVENOUS EVERY 8 HOURS
Status: DISCONTINUED | OUTPATIENT
Start: 2017-01-21 | End: 2017-01-20

## 2017-01-20 RX ORDER — ACETAMINOPHEN 10 MG/ML
1000 INJECTION, SOLUTION INTRAVENOUS EVERY 8 HOURS
Status: DISCONTINUED | OUTPATIENT
Start: 2017-01-20 | End: 2017-01-21 | Stop reason: HOSPADM

## 2017-01-20 RX ORDER — IRBESARTAN 300 MG/1
300 TABLET ORAL NIGHTLY
Qty: 90 TABLET | Refills: 3 | Status: SHIPPED | OUTPATIENT
Start: 2017-01-20 | End: 2017-01-21 | Stop reason: HOSPADM

## 2017-01-20 RX ORDER — SODIUM CHLORIDE 9 MG/ML
3 INJECTION, SOLUTION INTRAVENOUS CONTINUOUS
Status: ACTIVE | OUTPATIENT
Start: 2017-01-20 | End: 2017-01-20

## 2017-01-20 RX ADMIN — ISOSORBIDE MONONITRATE 60 MG: 60 TABLET, EXTENDED RELEASE ORAL at 08:01

## 2017-01-20 RX ADMIN — DIPHENHYDRAMINE HYDROCHLORIDE 50 MG: 50 CAPSULE ORAL at 06:01

## 2017-01-20 RX ADMIN — HYDRALAZINE HYDROCHLORIDE 100 MG: 50 TABLET ORAL at 06:01

## 2017-01-20 RX ADMIN — IRBESARTAN 300 MG: 300 TABLET ORAL at 10:01

## 2017-01-20 RX ADMIN — SODIUM CHLORIDE: 0.9 INJECTION, SOLUTION INTRAVENOUS at 06:01

## 2017-01-20 RX ADMIN — ACETAMINOPHEN 1000 MG: 10 INJECTION, SOLUTION INTRAVENOUS at 11:01

## 2017-01-20 RX ADMIN — SODIUM CHLORIDE 3 ML/KG/HR: 0.9 INJECTION, SOLUTION INTRAVENOUS at 12:01

## 2017-01-20 RX ADMIN — ALLOPURINOL 100 MG: 100 TABLET ORAL at 08:01

## 2017-01-20 RX ADMIN — CARVEDILOL 25 MG: 12.5 TABLET, FILM COATED ORAL at 10:01

## 2017-01-20 RX ADMIN — ASPIRIN 81 MG CHEWABLE TABLET 81 MG: 81 TABLET CHEWABLE at 08:01

## 2017-01-20 RX ADMIN — HEPARIN SODIUM 5000 UNITS: 5000 INJECTION, SOLUTION INTRAVENOUS; SUBCUTANEOUS at 04:01

## 2017-01-20 RX ADMIN — ESCITALOPRAM OXALATE 20 MG: 10 TABLET ORAL at 08:01

## 2017-01-20 RX ADMIN — ALBUTEROL SULFATE 1 PUFF: 90 AEROSOL, METERED RESPIRATORY (INHALATION) at 08:01

## 2017-01-20 RX ADMIN — HEPARIN SODIUM 5000 UNITS: 5000 INJECTION, SOLUTION INTRAVENOUS; SUBCUTANEOUS at 06:01

## 2017-01-20 RX ADMIN — CARVEDILOL 25 MG: 12.5 TABLET, FILM COATED ORAL at 08:01

## 2017-01-20 RX ADMIN — CLOPIDOGREL 75 MG: 75 TABLET, FILM COATED ORAL at 06:01

## 2017-01-20 RX ADMIN — HEPARIN SODIUM 5000 UNITS: 5000 INJECTION, SOLUTION INTRAVENOUS; SUBCUTANEOUS at 10:01

## 2017-01-20 RX ADMIN — PRAVASTATIN SODIUM 40 MG: 40 TABLET ORAL at 10:01

## 2017-01-20 RX ADMIN — HYDRALAZINE HYDROCHLORIDE 100 MG: 50 TABLET ORAL at 10:01

## 2017-01-20 NOTE — CONSULTS
"Cardiac Rehab     Krystina Washington   [id number]   1/20/2017       Activity taught: Yes    Cardiac Rehab Phase Taught: Phase 1    Risk Factors-Modifiable: diabetes, nutrition, hyperlipidemia, hypertension, obesity, sedentary lifestyle, exercise    Risk Factors-Non modifiable: age, race    Teaching Method: Verbal, Written, Living with Heart Disease    Understanding: Yes    Response: Verbalized understanding and questions answered. She's interested in cardiac rehab, but does not drive. She thinks her grandson can provide transportation.    Comments: S/P coronary stent. Discussed cardiac rehab and risk factor modification. Referred patient to [facility]. Educational materials were used in the process and given to the patient. They included "Your Guide to Living with Heart Disease", Phase Two Cardiac Rehabilitation information along with a sample Mediterranean diet.The patient expressed understanding of the teaching and expressed desire to take a role in modifying the risk factors when they return home.        "

## 2017-01-20 NOTE — PROGRESS NOTES
Pt noted to have pauses on the monitor. MD Nhan at nurses station and showed telemetry strip with pause. No new orders at this time as pause is about 1 second on the strip. Will continue to monitor.

## 2017-01-20 NOTE — PROGRESS NOTES
Interventional Cardiology Progress Note  Attending Physician: Lesvia Esrtada MD  Hospital Day: 3    Subjective:   Interval History: Patient feels well without any complaints. She denies SOB and chest pains. She had developed an MARGARET yesterday with creatinine up to 1.9. Morning labs pending.    Medications:   Continuous Infusions:   sodium chloride 0.9% 86 mL/hr at 01/20/17 0632    sodium chloride 0.9% 50 mL/hr at 01/19/17 1311       Scheduled Meds:   allopurinol  100 mg Oral Daily    aspirin  81 mg Oral Daily    carvedilol  25 mg Oral BID    cloNIDine 0.1 mg/24 hr td ptwk  1 patch Transdermal Q7 Days    clopidogrel  75 mg Oral Daily    escitalopram oxalate  20 mg Oral Daily    heparin (porcine)  5,000 Units Subcutaneous Q8H    hydrALAZINE  100 mg Oral Q8H    isosorbide mononitrate  60 mg Oral Daily    pravastatin  40 mg Oral QHS     PRN Meds:albuterol, dextrose 50%, dextrose 50%, glucagon (human recombinant), glucose, glucose, insulin aspart, nitroGLYCERIN 0.4 MG/DOSE TL SPRY  Objective:     Vitals:  Temp:  [97.6 °F (36.4 °C)-98.4 °F (36.9 °C)]   Pulse:  [55-72]   Resp:  [15-23]   BP: (136-174)/(57-82)   SpO2:  [88 %-100 %]  on RA I/O's:    Intake/Output Summary (Last 24 hours) at 01/20/17 0836  Last data filed at 01/19/17 2100   Gross per 24 hour   Intake           540.83 ml   Output              850 ml   Net          -309.17 ml        Constitutional: NAD, conversant  HEENT: Sclera anicteric, PERRLA, EOMI  Neck: No JVD, no carotid bruits  CV: RRR, high pitched 4/6 systolic murmur,  Pulm: Bibasilar crackles  GI: Abdomen soft, NTND, +BS  Extremities: No LE edema, warm and well perfused  Skin: No ecchymosis, erythema, or ulcers  Psych: AOx3, appropriate affect  Pulses: 2+ bilateral radial, femoral, DP, PT. Allens test indicative of adequate ulnar flow.    Labs:       CBC: CMP:      Recent Labs  Lab 01/18/17  0133 01/19/17  0645 01/20/17  0739   WBC 12.58 11.13 9.01   HGB 8.9* 9.0* 8.9*   HCT 27.1*  28.7* 28.1*    189 181   MCV 96 100* 100*   RDW 14.9* 14.7* 14.5      Recent Labs  Lab 01/18/17  0133 01/19/17  0645    142   K 3.9 4.2   * 105   CO2 25 30*   BUN 31* 41*   CREATININE 1.4 1.9*   CALCIUM 8.8 9.0   PROT 7.0  --    BILITOT 0.5  --    ALKPHOS 86  --    ALT 7*  --    AST 12  --    MG 1.9 1.7        Cholesterol: Coagulation   Lab Results   Component Value Date    CHOL 109 (L) 12/05/2016    HDL 42 12/05/2016    LDLCALC 49.6 (L) 12/05/2016    TRIG 87 12/05/2016      Recent Labs  Lab 01/18/17  0133   INR 1.2        Misc:     Recent Labs  Lab 01/18/17  1310   TROPONINI 0.049*      Lab Results   Component Value Date    HGBA1C 6.3 (H) 01/18/2017        Microbiology   Microbiology Results (last 7 days)     ** No results found for the last 168 hours. **           Imaging:     CXR (01/18/2017):   The patient is status post median sternotomy.  Pacemaker leads are present.  There are calcifications of the aortic knob.  There is stable enlargement of the cardiac silhouette.  The hemidiaphragms are unremarkable.  There are no pleural effusions.  There is no evidence of a pneumothorax.  There is increased attenuation of the pulmonary parenchyma.  There are degenerative changes in the osseous structures.     2D Echo (12/16/2016):     1 - Eccentric hypertrophy.     2 - Normal left ventricular systolic function (EF 60-65%).     3 - Normal right ventricular systolic function .     4 - Grade II left ventricular diastolic dysfunction.     5 - Moderate left atrial enlargement.     6 - Mild tricuspid regurgitation.     7 - Mild aortic regurgitation.     8 - Severe aortic stenosis, MICHAEL = 0.8 cm2, peak velocity = 4.1 m/s, mean gradient = 40 mmHg.     9 - Pulmonary hypertension. The estimated PA systolic pressure is 52 mmHg.     10 - Intermediate central venous pressure.      Stress Test (06/15/2015):   1. There is a very small sized severe ischemia in the mid to distal inferior wall in the usual distribution  of the proximal Posterior Lateral Branch. This defect comprises 5 % of the left ventricular myocardium.   2. The extracardiac distribution of radioactivity is normal.   3. There was no previous study available to compare.     Assessment:   Ms. Washington is an 84 year old female with multiple comorbidities whom Interventional consult has been placed for pre-TAVR [facility]    Plan:     1.) Severe Aortic Stenosis  --morning labs pending, but had an MARGARET yesterday. Had received fluids all night  --LHC +/- PCI. Currently on ASA and plavix  --access of R CFA with 5 Fr sheath with 4Fr JL4/JR4/pigtail  --1cc/kg/hr fluids before and after due to tendency for volume overload.   --benadryl before cath    .The risks, benefits, and alternatives of coronary vascular angiography and possible intervention were discussed with the patient. All questions were answered and informed consent was obtained. I had a detailed discussion with the patient regarding risk of stroke, MI, bleeding access site complications, renal failure, emergent need for heart surgery, acute limb complications including ischemia and loss, contrast allergy and death. Patient understands the risks and benefits of the procedure and wishes to proceed. If stents are needed and there is preference for ANATOLIY, patient understands that would necessitate aspirin for life with plavix or other anti-platelet agent for at least 1 year. Additionally, patient is aware that non-compliance is likely to result in stent clotting with heart attack, heart failure, and/or death.    Staff addendum to follow.    Signed:  Alie Strauss MD  Cardiology Fellow, PGY-4  1/20/2017 8:36 AM

## 2017-01-20 NOTE — ASSESSMENT & PLAN NOTE
- BP uncontrolled. Continue on Coreg 25 mg BID, Clonidine 0.1 mg patch, Hydralazine 100 mg q 8 hours, irbesartan 300 mg nightly held 1/19 in setting of MARGARET but resume today, and imdur 60 mg daily.

## 2017-01-20 NOTE — PROGRESS NOTES
"Interventional Cardiology    Findings:  1. Severe 3 vessel CAD  2. LM is patent  3. LAD is severely stenosed at the proximal segemnt  4. RCA has 90% stenosis at mid segment  5. Patent LIMA to LAD  6. Occluded SVG X 3    Interventions:  1. PCI with BMS to mRCA    Closure: Mynx    S: Pt resting comfortably. Denies chest pain, palpitations, lightheadedness, dizziness, dyspnea, numbness or weakness. Denies pain, bleeding or discharge from the cath site.    O:  Visit Vitals    BP (!) 166/65 (BP Location: Right arm, Patient Position: Lying, BP Method: cNIBP)    Pulse (!) 59    Temp 97.9 °F (36.6 °C) (Oral)    Resp 18    Ht 5' 5" (1.651 m)    Wt 80.9 kg (178 lb 5.6 oz)    LMP  (LMP Unknown)    SpO2 98%    Breastfeeding No    BMI 29.68 kg/m2     Gen: NAD, resting comfortably  Extr: Left femoral cath site: no bleeding or discharge, no hematoma or mass  Pulse: 2+  Neuro: A+Ox3, 5/5 strength, grossly normal sensation    A/P: 84 y.o.female s/p PCI.  1. Stable as above, cont to monitor  2. Hydration next 4 hours  3. Watch renal function  4. TAVR summary to follow  5. Please refer to full report on EPIC      Usman Morgan MD  Interventional Cardiology  Structural/Valvular heart disease  Fellow PGY-8  [phone]      "

## 2017-01-20 NOTE — PROGRESS NOTES
Pt has undergone TAVR w/u including CTA and LHC + PCI today. Frailty 3/4. Will need to be scheduled for procedure, and at this time, patient is not appropriate for enrollment in [facility] HF Program.    Removed from list.

## 2017-01-20 NOTE — PROGRESS NOTES
[facility] Hospital Medicine  Progress Note    Patient Name: Krystina Washington  MRN: [id number]  Patient Class: IP- Inpatient   Admission Date: 1/18/2017  Length of Stay: 2 days  Attending Physician: Lesvia Estrada MD  Primary Care Provider: Oniel Johnson MD    [facility] Medicine Team: [facility] HOSP MED J Edna Mitchell PA-C    Subjective:     Principal Problem:Acute on chronic diastolic congestive heart failure    HPI:  Patient is a 84 y.o. female with h/o CAD s/p CABG x 3 2010, Severe Aortic Stenosis, HTN, HLD, DM-2 who presents with complaints of shortness of breath and chest pain.     Shortness of breath-at rest and exertion worse over last 1 month-associated with orthopnea, Cough, PND, leg swelling-Lasix cut down recently due to worsening renal function. Cough when lies down, non productive. No fever.     Chest pain-left sided radiation to left shoulder, no aggravating factors, no relation with exertion, sharp, lasts 3-5 mins at a time, NTG helps but not all the way,Never had pain like this before.     Also has some dizziness, loss of balance.      Hospital Course:  Patient admitted to hospital medicine for management of acute on chronic heart failure and atypical chest pain in patient with known CAD and severe AS. Interventional Cardiology consulted regarding severe, symptomatic Aortic Stenosis and recommend completion of TAVR work up. Obtained TAVR CTA (1/19) and s/p LHC (1/20) showing 3v CAD and S/P PCI with BMS to mRCA.      Interval History: No acute events overnight. Patient seen after LHC and resting comfortably in bed, no acute distress. Denies CP or SOB.     Review of Systems   Constitutional: Negative for appetite change, chills, fatigue and fever.   Respiratory: Positive for shortness of breath (improved). Negative for cough and wheezing.    Cardiovascular: Negative for chest pain (resolved), palpitations and leg swelling.   Gastrointestinal: Negative for abdominal pain,  constipation, diarrhea, nausea and vomiting.   Neurological: Negative for dizziness, weakness, numbness and headaches.     Objective:     Vital Signs (Most Recent):  Temp: 97.6 °F (36.4 °C) (01/20/17 1240)  Pulse: (!) 55 (01/20/17 1430)  Resp: 13 (01/20/17 1430)  BP: (!) 152/67 (01/20/17 1430)  SpO2: 99 % (01/20/17 1430) Vital Signs (24h Range):  Temp:  [97.6 °F (36.4 °C)-98.4 °F (36.9 °C)] 97.6 °F (36.4 °C)  Pulse:  [54-72] 55  Resp:  [13-23] 13  SpO2:  [90 %-99 %] 99 %  BP: (138-174)/(57-82) 152/67     Weight: 80.9 kg (178 lb 5.6 oz)  Body mass index is 29.68 kg/(m^2).    Intake/Output Summary (Last 24 hours) at 01/20/17 1445  Last data filed at 01/19/17 2100   Gross per 24 hour   Intake           420.83 ml   Output              600 ml   Net          -179.17 ml      Physical Exam   Constitutional: She is oriented to person, place, and time. She appears well-developed and well-nourished. No distress.   HENT:   Head: Normocephalic and atraumatic.   Cardiovascular: Normal rate, regular rhythm and intact distal pulses.    Murmur (3/6 systolic ejection murmur) heard.  Pulmonary/Chest: Effort normal and breath sounds normal. No respiratory distress. She has no wheezes. She has no rales.   Abdominal: Soft. Bowel sounds are normal. She exhibits no distension and no mass. There is no tenderness. There is no guarding.   Musculoskeletal: Normal range of motion. She exhibits no edema, tenderness or deformity.   Neurological: She is alert and oriented to person, place, and time. No cranial nerve deficit.       Significant Labs:   CBC:   Recent Labs  Lab 01/19/17  0645 01/20/17  0739   WBC 11.13 9.01   HGB 9.0* 8.9*   HCT 28.7* 28.1*    181     CMP:   Recent Labs  Lab 01/19/17  0645 01/20/17  0739    143   K 4.2 4.1    106   CO2 30* 28   * 111*   BUN 41* 46*   CREATININE 1.9* 1.8*   CALCIUM 9.0 8.7   ANIONGAP 7* 9   EGFRNONAA 23.9* 25.5*     Assessment/Plan:      * Acute on chronic diastolic congestive  heart failure  - Symptoms improved however unclear total UOP (not accurately documented).   - BNP 1960. 2D echo (12/2016) with EF 65%, diastolic dysfunction, Severe AS, and PA pressure 51.56.  - Started on lasix 40 mg IV BID but will held in setting of rise in SCr and given gentle IVFs prior to cath today.   - Continue Coreg 25 mg BID  - Strict I/Os and daily weights. Monitor renal function.  - see further plan below    Aortic stenosis, severe  - Severe AS and elevated troponin on admission.   - Interventional Cardiology following   - TAVR CTA (1/19) complete and  s/p LHC (1/20) showing 3v CAD and S/P PCI with BMS to mRCA.  - Given Plavix load 1/18 and started plavix 75 mg daily.   - CTS consulted and note due to patient's redo sternotomy, comorbidites and high STS Score, patient is high risk for SAVR.     Elevated troponin level  - Chest pain currently resolved. Troponins: 0.051>0.068>0.049  - See above      HTN (hypertension)  - BP uncontrolled. Continue on Coreg 25 mg BID, Clonidine 0.1 mg patch, Hydralazine 100 mg q 8 hours, irbesartan 300 mg nightly held 1/19 in setting of MARGARET but resume today, and imdur 60 mg daily.     Chronic kidney disease, stage III (moderate)  - SCr 1.9>1.8 (from 1.4) but baseline SCr ~1.6-2.0.    - lasix held and ARB held 1/19. Given IVFs overnight  - Monitor renal function and I/Os    CAD (coronary artery disease)  - S/P CABG x 3. See above.   - Continue ASA, plavix, and statin    Type 2 diabetes mellitus with diabetic chronic kidney disease  - controlled. A1C 6.3. Cover with sliding scale insulin  - monitor glucose     Hyperlipidemia  - Continue statin      VTE Risk Mitigation         Ordered     heparin (porcine) injection 5,000 Units  Every 8 hours     Route:  Subcutaneous        01/18/17 0441     Medium Risk of VTE  Once      01/18/17 8550          Edna Mitchell PA-C  Department of Hospital Medicine   [facility]

## 2017-01-20 NOTE — SUBJECTIVE & OBJECTIVE
Interval History: No acute events overnight. Patient seen after [facility] and resting comfortably in bed, no acute distress. Denies CP or SOB.     Review of Systems   Constitutional: Negative for appetite change, chills, fatigue and fever.   Respiratory: Positive for shortness of breath (improved). Negative for cough and wheezing.    Cardiovascular: Negative for chest pain (resolved), palpitations and leg swelling.   Gastrointestinal: Negative for abdominal pain, constipation, diarrhea, nausea and vomiting.   Neurological: Negative for dizziness, weakness, numbness and headaches.     Objective:     Vital Signs (Most Recent):  Temp: 97.6 °F (36.4 °C) (01/20/17 1240)  Pulse: (!) 55 (01/20/17 1430)  Resp: 13 (01/20/17 1430)  BP: (!) 152/67 (01/20/17 1430)  SpO2: 99 % (01/20/17 1430) Vital Signs (24h Range):  Temp:  [97.6 °F (36.4 °C)-98.4 °F (36.9 °C)] 97.6 °F (36.4 °C)  Pulse:  [54-72] 55  Resp:  [13-23] 13  SpO2:  [90 %-99 %] 99 %  BP: (138-174)/(57-82) 152/67     Weight: 80.9 kg (178 lb 5.6 oz)  Body mass index is 29.68 kg/(m^2).    Intake/Output Summary (Last 24 hours) at 01/20/17 1445  Last data filed at 01/19/17 2100   Gross per 24 hour   Intake           420.83 ml   Output              600 ml   Net          -179.17 ml      Physical Exam   Constitutional: She is oriented to person, place, and time. She appears well-developed and well-nourished. No distress.   HENT:   Head: Normocephalic and atraumatic.   Cardiovascular: Normal rate, regular rhythm and intact distal pulses.    Murmur (3/6 systolic ejection murmur) heard.  Pulmonary/Chest: Effort normal and breath sounds normal. No respiratory distress. She has no wheezes. She has no rales.   Abdominal: Soft. Bowel sounds are normal. She exhibits no distension and no mass. There is no tenderness. There is no guarding.   Musculoskeletal: Normal range of motion. She exhibits no edema, tenderness or deformity.   Neurological: She is alert and oriented to person, place, and  time. No cranial nerve deficit.       Significant Labs:   CBC:   Recent Labs  Lab 01/19/17  0645 01/20/17  0739   WBC 11.13 9.01   HGB 9.0* 8.9*   HCT 28.7* 28.1*    181     CMP:   Recent Labs  Lab 01/19/17  0645 01/20/17  0739    143   K 4.2 4.1    106   CO2 30* 28   * 111*   BUN 41* 46*   CREATININE 1.9* 1.8*   CALCIUM 9.0 8.7   ANIONGAP 7* 9   EGFRNONAA 23.9* 25.5*

## 2017-01-20 NOTE — PLAN OF CARE
Problem: Patient Care Overview  Goal: Plan of Care Review  Outcome: Ongoing (interventions implemented as appropriate)  No significant events occurred during the night.  Pt is NPO past midnight for a LHC in the morning.  Free of falls/trauma/injury. VSS. Denies any CP, SOB, palpitations, dizziness, pain and discomfort. Turning/repositioning independently in bed. Plan of care reviewed with patient and all questions answered, verbalizes understanding. No acute distress noted. Will continue to monitor.

## 2017-01-20 NOTE — ASSESSMENT & PLAN NOTE
- Severe AS and elevated troponin on admission.   - Interventional Cardiology following   - TAVR CTA (1/19) complete and  s/p LHC (1/20) showing 3v CAD and S/P PCI with BMS to mRCA.  - Given Plavix load 1/18 and started plavix 75 mg daily.   - CTS consulted and note due to patient's redo sternotomy, comorbidites and high STS Score, patient is high risk for SAVR.

## 2017-01-20 NOTE — PROGRESS NOTES
TAVR Summary Note    Referring doctor: Dr. Golden    84 y.o. female referred by Dr Golden for evaluation of severe AS (NYHA Class III sx).    Comorbidities  1. Diabetes  2. CKD stage IV  3. Hyperlipidemia    The patient has undergone the following TAVR work-up:   ECHO (Date 1216.16): MICHAEL= 0.8 cm2, MG= 40mmHg, Peak Gerard= 4.1 m/s, EF= 65%.   LHC (Date 1.20.17): Patent LIMA to LAD, SVG to RCA and OM occluded. LM into LCx has luminal irregularities. S/P PCI or mid RCA with BMS 1.20.17  STS: 6.8%   Frailty: 3/4   Iliacs are >6.83 on L and > 5.27 on R (Right is more tortuous, high bifurcations)   LVOT area by CTA is 3.88 cm2 and Avg Diameter is 22.2 (25.9X20) per Dr Morgan  she is currently  high risk, per Dr Sheth due to Redo status and comorbidities.  Rhythm issues: NSR, no conduction problems  PFTs: Pending read at the time of this summary      Krystina Washington is a 23 mm Jessica S3 candidate via left TF access.    Will contact patient to schedule procedure.     Usman Morgan MD  Interventional Cardiology  Structural/Valvular heart disease  Fellow PGY-8  [phone]

## 2017-01-20 NOTE — ASSESSMENT & PLAN NOTE
- Symptoms improved however unclear total UOP (not accurately documented).   - BNP 1960. 2D echo (12/2016) with EF 65%, diastolic dysfunction, Severe AS, and PA pressure 51.56.  - Started on lasix 40 mg IV BID but will held in setting of rise in SCr and given gentle IVFs prior to cath today.   - Continue Coreg 25 mg BID  - Strict I/Os and daily weights. Monitor renal function.  - see further plan below

## 2017-01-21 VITALS
RESPIRATION RATE: 16 BRPM | BODY MASS INDEX: 29.9 KG/M2 | DIASTOLIC BLOOD PRESSURE: 65 MMHG | TEMPERATURE: 98 F | HEIGHT: 65 IN | SYSTOLIC BLOOD PRESSURE: 148 MMHG | WEIGHT: 179.44 LBS | HEART RATE: 70 BPM | OXYGEN SATURATION: 93 %

## 2017-01-21 LAB
ANION GAP SERPL CALC-SCNC: 8 MMOL/L
BASOPHILS # BLD AUTO: 0.01 K/UL
BASOPHILS NFR BLD: 0.1 %
BUN SERPL-MCNC: 47 MG/DL
CALCIUM SERPL-MCNC: 8.3 MG/DL
CHLORIDE SERPL-SCNC: 107 MMOL/L
CO2 SERPL-SCNC: 26 MMOL/L
CREAT SERPL-MCNC: 2 MG/DL
DIFFERENTIAL METHOD: ABNORMAL
EOSINOPHIL # BLD AUTO: 0.1 K/UL
EOSINOPHIL NFR BLD: 0.8 %
ERYTHROCYTE [DISTWIDTH] IN BLOOD BY AUTOMATED COUNT: 14.5 %
EST. GFR  (AFRICAN AMERICAN): 25.9 ML/MIN/1.73 M^2
EST. GFR  (NON AFRICAN AMERICAN): 22.4 ML/MIN/1.73 M^2
GLUCOSE SERPL-MCNC: 124 MG/DL
HCT VFR BLD AUTO: 25.3 %
HGB BLD-MCNC: 8.1 G/DL
LYMPHOCYTES # BLD AUTO: 1.2 K/UL
LYMPHOCYTES NFR BLD: 10.4 %
MAGNESIUM SERPL-MCNC: 1.9 MG/DL
MCH RBC QN AUTO: 30.8 PG
MCHC RBC AUTO-ENTMCNC: 32 %
MCV RBC AUTO: 96 FL
MONOCYTES # BLD AUTO: 0.6 K/UL
MONOCYTES NFR BLD: 5.3 %
NEUTROPHILS # BLD AUTO: 9.8 K/UL
NEUTROPHILS NFR BLD: 83.1 %
PLATELET # BLD AUTO: 176 K/UL
PMV BLD AUTO: 13 FL
POCT GLUCOSE: 107 MG/DL (ref 70–110)
POCT GLUCOSE: 244 MG/DL (ref 70–110)
POTASSIUM SERPL-SCNC: 3.9 MMOL/L
RBC # BLD AUTO: 2.63 M/UL
SODIUM SERPL-SCNC: 141 MMOL/L
WBC # BLD AUTO: 11.78 K/UL

## 2017-01-21 PROCEDURE — 85025 COMPLETE CBC W/AUTO DIFF WBC: CPT

## 2017-01-21 PROCEDURE — 25000003 PHARM REV CODE 250: Performed by: INTERNAL MEDICINE

## 2017-01-21 PROCEDURE — 63600175 PHARM REV CODE 636 W HCPCS: Performed by: INTERNAL MEDICINE

## 2017-01-21 PROCEDURE — 80048 BASIC METABOLIC PNL TOTAL CA: CPT

## 2017-01-21 PROCEDURE — 36415 COLL VENOUS BLD VENIPUNCTURE: CPT

## 2017-01-21 PROCEDURE — 63600175 PHARM REV CODE 636 W HCPCS: Performed by: PHYSICIAN ASSISTANT

## 2017-01-21 PROCEDURE — 25000003 PHARM REV CODE 250: Performed by: PHYSICIAN ASSISTANT

## 2017-01-21 PROCEDURE — 99239 HOSP IP/OBS DSCHRG MGMT >30: CPT | Mod: ,,, | Performed by: PHYSICIAN ASSISTANT

## 2017-01-21 PROCEDURE — 83735 ASSAY OF MAGNESIUM: CPT

## 2017-01-21 RX ORDER — CLOPIDOGREL BISULFATE 75 MG/1
75 TABLET ORAL DAILY
Qty: 30 TABLET | Refills: 0 | Status: SHIPPED | OUTPATIENT
Start: 2017-01-21 | End: 2017-03-09

## 2017-01-21 RX ORDER — SODIUM CHLORIDE 9 MG/ML
INJECTION, SOLUTION INTRAVENOUS CONTINUOUS
Status: DISCONTINUED | OUTPATIENT
Start: 2017-01-21 | End: 2017-01-21 | Stop reason: HOSPADM

## 2017-01-21 RX ADMIN — ACETAMINOPHEN 1000 MG: 10 INJECTION, SOLUTION INTRAVENOUS at 03:01

## 2017-01-21 RX ADMIN — ALLOPURINOL 100 MG: 100 TABLET ORAL at 08:01

## 2017-01-21 RX ADMIN — ESCITALOPRAM OXALATE 20 MG: 10 TABLET ORAL at 08:01

## 2017-01-21 RX ADMIN — SODIUM CHLORIDE: 0.9 INJECTION, SOLUTION INTRAVENOUS at 08:01

## 2017-01-21 RX ADMIN — MAGNESIUM SULFATE HEPTAHYDRATE 1 G: 500 INJECTION, SOLUTION INTRAMUSCULAR; INTRAVENOUS at 01:01

## 2017-01-21 RX ADMIN — HEPARIN SODIUM 5000 UNITS: 5000 INJECTION, SOLUTION INTRAVENOUS; SUBCUTANEOUS at 05:01

## 2017-01-21 RX ADMIN — HYDRALAZINE HYDROCHLORIDE 100 MG: 50 TABLET ORAL at 05:01

## 2017-01-21 RX ADMIN — CARVEDILOL 25 MG: 12.5 TABLET, FILM COATED ORAL at 08:01

## 2017-01-21 RX ADMIN — INSULIN ASPART 2 UNITS: 100 INJECTION, SOLUTION INTRAVENOUS; SUBCUTANEOUS at 01:01

## 2017-01-21 RX ADMIN — ISOSORBIDE MONONITRATE 60 MG: 60 TABLET, EXTENDED RELEASE ORAL at 08:01

## 2017-01-21 RX ADMIN — ASPIRIN 81 MG CHEWABLE TABLET 81 MG: 81 TABLET CHEWABLE at 08:01

## 2017-01-21 RX ADMIN — HEPARIN SODIUM 5000 UNITS: 5000 INJECTION, SOLUTION INTRAVENOUS; SUBCUTANEOUS at 01:01

## 2017-01-21 RX ADMIN — HYDRALAZINE HYDROCHLORIDE 100 MG: 50 TABLET ORAL at 01:01

## 2017-01-21 RX ADMIN — CLOPIDOGREL 75 MG: 75 TABLET, FILM COATED ORAL at 08:01

## 2017-01-21 NOTE — PLAN OF CARE
Problem: Patient Care Overview  Goal: Plan of Care Review  Outcome: Ongoing (interventions implemented as appropriate)  Pt c/o back pain. No PRN orders for pain medications. Orders given for 1000mg IV tylenol q8h. Orders carried out. Pt remains free from falls/trauma/injury. VSS. Denies any CP, SOB, palpitations, and dizziness. Turning/repositioning independently in bed. Plan of care reviewed with patient and all questions answered, verbalizes understanding. No acute distress noted. Will continue to monitor.

## 2017-01-21 NOTE — DISCHARGE SUMMARY
[facility] Hospital Medicine  Discharge Summary      Patient Name: Krystina Washington  MRN: [id number]  Admission Date: 1/18/2017  Hospital Length of Stay: 3 days  Discharge Date and Time: 1/21/2017 12:32 PM  Attending Physician: Lesvia Estrada MD   Discharging Provider: Edna Mitchell PA-C  Primary Care Provider: Oniel Johnson MD  [facility] Medicine Team: [facility] J Edna Mitchell PA-C    HPI:   Patient is a 84 y.o. female with h/o CAD s/p CABG x 3 2010, Severe Aortic Stenosis, HTN, HLD, DM-2 who presents with complaints of shortness of breath and chest pain. Shortness of breath-at rest and exertion worse over last 1 month-associated with orthopnea, Cough, PND, leg swelling-Lasix cut down recently due to worsening renal function. Cough when lies down, non productive. No fever. Chest pain-left sided radiation to left shoulder, no aggravating factors, no relation with exertion, sharp, lasts 3-5 mins at a time, NTG helps but not all the way,Never had pain like this before. Also has some dizziness, loss of balance.    Procedure(s) (LRB):  HEART CATH-LEFT (Left)      Indwelling Lines/Drains at time of discharge:   Lines/Drains/Airways          No matching active lines, drains, or airways        Hospital Course:   Patient admitted to hospital medicine for management of acute on chronic heart failure and atypical chest pain in patient with known CAD and severe AS. Interventional Cardiology consulted regarding severe, symptomatic Aortic Stenosis and recommend completion of TAVR work up. Obtained TAVR CTA (1/19) and s/p LHC (1/20) showing 3v CAD and S/P PCI with BMS to mRCA.  Patient monitored overnight following cath. Discharged home in stable condition with close outpatient follow up with interventional cardiology/valve service and cardiac rehab.     Consults:   Consults         Status Ordering Provider     Inpatient consult to Cardiology  Once     Provider:  (Not yet assigned)     Completed MARIBEL MONTENEGRO     Inpatient consult to Cardiothoracic Surgery  Once     Provider:  (Not yet assigned)    Final result PAULA ALVARENGA     Inpatient consult to Internal Medicine  Once     Provider:  (Not yet assigned)    Acknowledged MARIBEL MONTENEGRO     Inpatient consult to PICC team ([facility] in [place])  Once     Provider:  (Not yet assigned)    Completed REE DUMAS          Significant Diagnostic Studies: Labs:   BMP:     Recent Labs  Lab 01/20/17  0739 01/21/17  0557   * 124*    141   K 4.1 3.9    107   CO2 28 26   BUN 46* 47*   CREATININE 1.8* 2.0*   CALCIUM 8.7 8.3*   MG 1.8 1.9   , CBC     Recent Labs  Lab 01/20/17  0739 01/21/17  0557   WBC 9.01 11.78   HGB 8.9* 8.1*   HCT 28.1* 25.3*    176      and All labs within the past 24 hours have been reviewed    [facility] 1/20/17   Diagnostic:        Patient has co-dominant coronary arteries.        - Left Main Coronary Artery:             The LM is normal. There is VIN 3 flow.     - Left Anterior Descending Artery:             The mid LAD is occluded. There is VIN 0 flow.     - Left Circumflex Artery:             The LCX has luminal irregularities. There is VIN 3 flow.     - Right Coronary Artery:             The mid RCA has a 80% stenosis. There is VIN 3 flow.     - DA SILVA To LAD:             The LIMA to LAD is normal. There is VIN 3 flow.     - SVG To OM1:             The SVG to OM1 was not found.     - SVG To RCA:             The SVG to RCA is occluded. There is VIN 0 flow.     - Common Femoral Artery:             The left CFA is normal. There is VIN 3 flow.     - Femoral Artery:             The left femoral artery is normal. There is VIN 3 flow.     - OM2:             The OM2 has a 80% stenosis. There is VIN 3 flow. The remaining portion of the vessel is of small caliber.  Intervention       Mid RCA:              The lesion was successfully intervened. Post-stenosis of 20%, post-VIN 3 flow and TMP grade 3.  The vessel was accessed natively.  The following items were used: 2.5X20 Euphora SC Balloon and Stent Integrity 2.75 X 22.      Pending Diagnostic Studies:     None        Final Active Diagnoses:    Diagnosis Date Noted POA    PRINCIPAL PROBLEM:  Acute on chronic diastolic congestive heart failure [I50.33] 01/18/2017 Yes    Aortic stenosis, severe [I35.0] 10/01/2015 Yes    NSTEMI (non-ST elevated myocardial infarction) [I21.4] 01/19/2017 Yes    HTN (hypertension) [I10] 07/19/2012 Yes    Coronary artery disease involving native coronary artery of native heart without angina pectoris [I25.10] 04/09/2013 Yes    Chronic kidney disease, stage III (moderate) [N18.3] 09/26/2012 Yes    Type 2 diabetes mellitus with diabetic chronic kidney disease [E11.22] 11/29/2015 Yes    Hyperlipidemia [E78.5] 07/19/2012 Yes    Pure hypercholesterolemia [E78.00]  Yes      Problems Resolved During this Admission:    Diagnosis Date Noted Date Resolved POA      * Acute on chronic diastolic congestive heart failure  - Symptoms improved however unclear total UOP (not accurately documented).   - BNP 1960. 2D echo (12/2016) with EF 65%, diastolic dysfunction, Severe AS, and PA pressure 51.56.  - Started on lasix 40 mg IV BID but will held in setting of rise in SCr and given gentle IVFs prior to cath.   - Continue Coreg 25 mg BID, ASA and Plavix  - Strict I/Os and daily weights. Monitor renal function.  - see further plan below     Aortic stenosis, severe  - Severe AS and elevated troponin on admission.   - TAVR CTA (1/19) complete and s/p LHC (1/20) showing 3v CAD and S/P PCI with BMS to mRCA.  - Given Plavix load 1/18 and started plavix 75 mg daily.   - CTS consulted and note due to patient's redo sternotomy, comorbidites and high STS Score, patient is high risk for SAVR.  - Interventional Cardiology consulted and will need outpatient follow up       NSTEMI (non-ST elevated myocardial infarction)  - Chest pain resolved. Troponins:  0.051>0.068>0.049  - S/P [facility] as above.   - Continue ASA, plavix, imdur, Coreg, and statin      HTN (hypertension)  - BP better controlled. Continue on Coreg 25 mg BID, Clonidine 0.1 mg patch, Hydralazine 100 mg q 8 hours, irbesartan 300 mg nightly held 1/19 in setting of MARGARET but resumed, and imdur 60 mg daily.      Chronic kidney disease, stage III (moderate)  - SCr 1.9>1.8 (from 1.4) but baseline SCr ~2.0.   - lasix held and ARB held 1/19. Given gentle IVFs  - Monitor renal function and I/Os  - Hold lasix over weekend and repeat BMP 1/24/17 and follow up PCP within 1 week     Coronary artery disease involving native coronary artery of native heart without angina pectoris  - S/P CABG x 3. See above.   - Continue ASA, plavix, and statin     Type 2 diabetes mellitus with diabetic chronic kidney disease  - controlled. A1C 6.3. Cover with sliding scale insulin  - monitor glucose      Hyperlipidemia  - Continue statin     Pure hypercholesterolemia  - continue statin    Service: Hospital Medicine  Discharged Condition: good    Disposition: Home or Self Care    Follow Up:    Follow-up Information     Follow up with Oniel Johnson MD In 1 week.    Specialty:  Family Medicine    Contact information:    [address] [facility] [address] [phone]          Follow up with Guillermo Duran MD.    Specialties:  Cardiology, INTERVENTIONAL CARDIOLOGY    Why:  TAVR work up    Contact information:    [facility]  [place] [address] [phone]          Patient Instructions:     Basic metabolic panel   Standing Status: Future  Standing Exp. Date: 03/22/18   Order Comments: Fax results to PCP/Dr. Johnson     Diet general   Order Specific Question Answer Comments   Additional restrictions: Low Sodium,2gm      Activity as tolerated     Call MD for:  redness, tenderness, or signs of infection (pain, swelling, redness, odor or green/yellow discharge around incision site)     Call MD for:  persistent  dizziness, light-headedness, or visual disturbances     Call MD for:  difficulty breathing or increased cough       Medications:  Reconciled Home Medications:   Current Discharge Medication List      START taking these medications    Details   clopidogrel (PLAVIX) 75 mg tablet Take 1 tablet (75 mg total) by mouth once daily.  Qty: 30 tablet, Refills: 0         CONTINUE these medications which have NOT CHANGED    Details   acetaminophen (TYLENOL) 325 MG tablet Take 650 mg by mouth every 6 (six) hours as needed for Pain.      albuterol 90 mcg/actuation inhaler Inhale 1 puff into the lungs every 6 (six) hours as needed for Wheezing. 1 HFA Aerosol Inhaler Inhalation Twice a day  Qty: 18 g, Refills: 0      allopurinol (ZYLOPRIM) 100 MG tablet TAKE 1 TABLET BY MOUTH ONCE DAILY.  Qty: 90 tablet, Refills: 2      aspirin 81 MG chewable tablet Take 81 mg by mouth. 1 Tablet, Chewable Oral Every day      carvedilol (COREG) 25 MG tablet TAKE 1 TABLET BY MOUTH TWICE A DAY With FOOD.  Qty: 60 tablet, Refills: 11      cloNIDine 0.1 mg/24 hr td ptwk (CATAPRES) 0.1 mg/24 hr PLACE 1 PATCH ONTO THE SKIN EVERY 7 DAYS.  Qty: 4 patch, Refills: 6      escitalopram oxalate (LEXAPRO) 20 MG tablet Take 1 tablet (20 mg total) by mouth once daily.  Qty: 30 tablet, Refills: 11    Associated Diagnoses: Major depressive disorder, recurrent episode, moderate      hydrALAZINE (APRESOLINE) 100 MG tablet TAKE 1 TABLET (100 MG TOTAL) BY MOUTH EVERY 8 (EIGHT) HOURS.  Qty: 90 tablet, Refills: 11    Associated Diagnoses: Systolic heart failure, chronic; Chronic diastolic heart failure; Aortic stenosis; Coronary artery disease due to lipid rich plaque; Renovascular hypertension; Chronic kidney disease, stage III (moderate); Type 2 diabetes mellitus with diabetic chronic kidney disease; S/P CABG x 3; Hyperlipidemia      irbesartan (AVAPRO) 300 MG tablet TAKE 1 TABLET (300 MG TOTAL) BY MOUTH EVERY EVENING.  Qty: 30 tablet, Refills: 11      isosorbide  mononitrate (IMDUR) 60 MG 24 hr tablet TAKE 1 TABLET (60 MG TOTAL) BY MOUTH ONCE DAILY.  Qty: 30 tablet, Refills: 6      nitroglycerin 400 mcg/spray SprA Place 1 spray onto the tongue every 5 (five) minutes as needed.  Qty: 8.5 g, Refills: 0      pravastatin (PRAVACHOL) 40 MG tablet TAKE 1 TABLET BY MOUTH EVERY EVENING  Qty: 90 tablet, Refills: 2      cetirizine (ZYRTEC) 10 MG tablet Take 1 tablet (10 mg total) by mouth once daily.  Qty: 30 tablet, Refills: 0      cholecalciferol, vitamin D3, 50,000 unit capsule Take 1 capsule (50,000 Units total) by mouth once a week. 1 Capsule Oral Weekly  Qty: 12 capsule, Refills: 0      inhalation device (AEROCHAMBER PLUS FLOW-VU) Use as directed for inhalation.  Qty: 1 Device, Refills: 0    Associated Diagnoses: Chronic pulmonary edema; Shortness of breath         STOP taking these medications       furosemide (LASIX) 20 MG tablet Comments:   Reason for Stopping:             Time spent on the discharge of patient: 37 minutes    Edna Mitchell PA-C  Department of Hospital Medicine  [facility]

## 2017-01-21 NOTE — ASSESSMENT & PLAN NOTE
- BP better controlled. Continue on Coreg 25 mg BID, Clonidine 0.1 mg patch, Hydralazine 100 mg q 8 hours, irbesartan 300 mg nightly held 1/19 in setting of MARGARET but resumed, and imdur 60 mg daily.

## 2017-01-21 NOTE — PROGRESS NOTES
[facility] Hospital Medicine  Progress Note    Patient Name: Krystina Washington  MRN: [id number]  Patient Class: IP- Inpatient   Admission Date: 1/18/2017  Length of Stay: 3 days  Attending Physician: Lesvia Estrada MD  Primary Care Provider: Oniel Johnson MD    [facility] Medicine Team: [facility] Edna Mitchell PA-C    Subjective:     Principal Problem:Acute on chronic diastolic congestive heart failure    HPI:  Patient is a 84 y.o. female with h/o CAD s/p CABG x 3 2010, Severe Aortic Stenosis, HTN, HLD, DM-2 who presents with complaints of shortness of breath and chest pain. Shortness of breath-at rest and exertion worse over last 1 month-associated with orthopnea, Cough, PND, leg swelling-Lasix cut down recently due to worsening renal function. Cough when lies down, non productive. No fever. Chest pain-left sided radiation to left shoulder, no aggravating factors, no relation with exertion, sharp, lasts 3-5 mins at a time, NTG helps but not all the way,Never had pain like this before. Also has some dizziness, loss of balance.    Hospital Course:  Patient admitted to hospital medicine for management of acute on chronic heart failure and atypical chest pain in patient with known CAD and severe AS. Interventional Cardiology consulted regarding severe, symptomatic Aortic Stenosis and recommend completion of TAVR work up. Obtained TAVR CTA (1/19) and s/p LHC (1/20) showing 3v CAD and S/P PCI with BMS to mRCA.  Patient monitored overnight following cath. Discharged home in stable condition with close outpatient follow up with interventional cardiology/valve service and cardiac rehab.    Interval History: No acute events overnight. Patient denies any further chest pain or shortness of breath. Ready to go home.     Review of Systems   Constitutional: Negative for appetite change, chills, fatigue and fever.   Respiratory: Negative for cough, shortness of breath (resolved) and wheezing.     Cardiovascular: Negative for chest pain (resolved), palpitations and leg swelling.   Gastrointestinal: Negative for abdominal pain, constipation, diarrhea, nausea and vomiting.   Neurological: Negative for dizziness, weakness, numbness and headaches.     Objective:     Vital Signs (Most Recent):  Temp: 98.1 °F (36.7 °C) (01/21/17 0825)  Pulse: 66 (01/21/17 1500)  Resp: 16 (01/21/17 1104)  BP: (!) 148/65 (01/21/17 1104)  SpO2: (!) 93 % (01/21/17 1104) Vital Signs (24h Range):  Temp:  [97.8 °F (36.6 °C)-98.1 °F (36.7 °C)] 98.1 °F (36.7 °C)  Pulse:  [56-73] 66  Resp:  [12-24] 16  SpO2:  [93 %-100 %] 93 %  BP: (142-168)/(62-74) 148/65     Weight: 81.4 kg (179 lb 7.3 oz)  Body mass index is 29.86 kg/(m^2).    Intake/Output Summary (Last 24 hours) at 01/21/17 1702  Last data filed at 01/21/17 1400   Gross per 24 hour   Intake              540 ml   Output              100 ml   Net              440 ml      Physical Exam   Constitutional: She is oriented to person, place, and time. She appears well-developed and well-nourished. No distress.   HENT:   Head: Normocephalic and atraumatic.   Cardiovascular: Normal rate, regular rhythm and intact distal pulses.    Murmur (3/6 systolic ejection murmur) heard.  Pulmonary/Chest: Effort normal and breath sounds normal. No respiratory distress. She has no wheezes. She has no rales.   Abdominal: Soft. Bowel sounds are normal. She exhibits no distension and no mass. There is no tenderness. There is no guarding.   Musculoskeletal: Normal range of motion. She exhibits no edema, tenderness or deformity.   Neurological: She is alert and oriented to person, place, and time. No cranial nerve deficit.       Significant Labs:   BMP:   Recent Labs  Lab 01/21/17  0557   *      K 3.9      CO2 26   BUN 47*   CREATININE 2.0*   CALCIUM 8.3*   MG 1.9     CBC:   Recent Labs  Lab 01/20/17  0739 01/21/17  0557   WBC 9.01 11.78   HGB 8.9* 8.1*   HCT 28.1* 25.3*    176      Assessment/Plan:      * Acute on chronic diastolic congestive heart failure  - Symptoms improved however unclear total UOP (not accurately documented).   - BNP 1960. 2D echo (12/2016) with EF 65%, diastolic dysfunction, Severe AS, and PA pressure 51.56.  - Started on lasix 40 mg IV BID but will held in setting of rise in SCr and given gentle IVFs prior to cath.   - Continue Coreg 25 mg BID, ASA and Plavix  - Strict I/Os and daily weights. Monitor renal function.  - see further plan below    Aortic stenosis, severe  - Severe AS and elevated troponin on admission.   - TAVR CTA (1/19) complete and  s/p LHC (1/20) showing 3v CAD and S/P PCI with BMS to mRCA.  - Given Plavix load 1/18 and started plavix 75 mg daily.   - CTS consulted and note due to patient's redo sternotomy, comorbidites and high STS Score, patient is high risk for SAVR.  - Interventional Cardiology consulted and will need outpatient follow up      NSTEMI (non-ST elevated myocardial infarction)  - Chest pain resolved. Troponins: 0.051>0.068>0.049  - S/P LHC as above.   - Continue ASA, plavix, imdur, Coreg, and statin     HTN (hypertension)  - BP better controlled. Continue on Coreg 25 mg BID, Clonidine 0.1 mg patch, Hydralazine 100 mg q 8 hours, irbesartan 300 mg nightly held 1/19 in setting of MARGARET but resumed, and imdur 60 mg daily.     Chronic kidney disease, stage III (moderate)  - SCr 1.9>1.8 (from 1.4) but baseline SCr ~2.0.    - lasix held and ARB held 1/19. Given gentle IVFs  - Monitor renal function and I/Os  - Hold lasix over weekend and repeat BMP 1/24/17 and follow up PCP within 1 week    Coronary artery disease involving native coronary artery of native heart without angina pectoris  - S/P CABG x 3. See above.   - Continue ASA, plavix, and statin    Type 2 diabetes mellitus with diabetic chronic kidney disease  - controlled. A1C 6.3. Cover with sliding scale insulin  - monitor glucose     Hyperlipidemia  - Continue statin    Pure  hypercholesterolemia  - continue statin      VTE Risk Mitigation         Ordered     heparin (porcine) injection 5,000 Units  Every 8 hours     Route:  Subcutaneous        01/18/17 0441     Medium Risk of VTE  Once      01/18/17 0413          Edna Mitchell PA-C  Department of Hospital Medicine   [facility]

## 2017-01-21 NOTE — ASSESSMENT & PLAN NOTE
- Chest pain resolved. Troponins: 0.051>0.068>0.049  - S/P LHC as above. Continue ASA, plavix, imdur, Coreg, and statin

## 2017-01-21 NOTE — ASSESSMENT & PLAN NOTE
- Severe AS and elevated troponin on admission.   - Interventional Cardiology consulted and will need outpatient follow up  - TAVR CTA (1/19) complete and  s/p LHC (1/20) showing 3v CAD and S/P PCI with BMS to mRCA.  - Given Plavix load 1/18 and started plavix 75 mg daily.   - CTS consulted and note due to patient's redo sternotomy, comorbidites and high STS Score, patient is high risk for SAVR.

## 2017-01-21 NOTE — PROGRESS NOTES
D/c hl; d/c telemetry; d/c instructions given per diet activity and meds.  HF d/c instructions reviewed with s/s to report to  MD for heart failure.  Verbalized understanding.  D/c home with grandson per w/c

## 2017-01-21 NOTE — PLAN OF CARE
Problem: Patient Care Overview  Goal: Plan of Care Review  Outcome: Ongoing (interventions implemented as appropriate)  Pt verbalizes no complaints. Denies CP, SOB, or other discomforts. Pt remains free of fall or injury. Pt verbalizes understanding of plan of care. Will continue to monitor.

## 2017-01-21 NOTE — SUBJECTIVE & OBJECTIVE
Interval History: No acute events overnight. Patient denies any further chest pain or shortness of breath. Ready to go home.     Review of Systems   Constitutional: Negative for appetite change, chills, fatigue and fever.   Respiratory: Negative for cough, shortness of breath (resolved) and wheezing.    Cardiovascular: Negative for chest pain (resolved), palpitations and leg swelling.   Gastrointestinal: Negative for abdominal pain, constipation, diarrhea, nausea and vomiting.   Neurological: Negative for dizziness, weakness, numbness and headaches.     Objective:     Vital Signs (Most Recent):  Temp: 98.1 °F (36.7 °C) (01/21/17 0825)  Pulse: 66 (01/21/17 1500)  Resp: 16 (01/21/17 1104)  BP: (!) 148/65 (01/21/17 1104)  SpO2: (!) 93 % (01/21/17 1104) Vital Signs (24h Range):  Temp:  [97.8 °F (36.6 °C)-98.1 °F (36.7 °C)] 98.1 °F (36.7 °C)  Pulse:  [56-73] 66  Resp:  [12-24] 16  SpO2:  [93 %-100 %] 93 %  BP: (142-168)/(62-74) 148/65     Weight: 81.4 kg (179 lb 7.3 oz)  Body mass index is 29.86 kg/(m^2).    Intake/Output Summary (Last 24 hours) at 01/21/17 1702  Last data filed at 01/21/17 1400   Gross per 24 hour   Intake              540 ml   Output              100 ml   Net              440 ml      Physical Exam   Constitutional: She is oriented to person, place, and time. She appears well-developed and well-nourished. No distress.   HENT:   Head: Normocephalic and atraumatic.   Cardiovascular: Normal rate, regular rhythm and intact distal pulses.    Murmur (3/6 systolic ejection murmur) heard.  Pulmonary/Chest: Effort normal and breath sounds normal. No respiratory distress. She has no wheezes. She has no rales.   Abdominal: Soft. Bowel sounds are normal. She exhibits no distension and no mass. There is no tenderness. There is no guarding.   Musculoskeletal: Normal range of motion. She exhibits no edema, tenderness or deformity.   Neurological: She is alert and oriented to person, place, and time. No cranial nerve  deficit.       Significant Labs:   BMP:   Recent Labs  Lab 01/21/17  0557   *      K 3.9      CO2 26   BUN 47*   CREATININE 2.0*   CALCIUM 8.3*   MG 1.9     CBC:   Recent Labs  Lab 01/20/17  0739 01/21/17  0557   WBC 9.01 11.78   HGB 8.9* 8.1*   HCT 28.1* 25.3*    176

## 2017-01-21 NOTE — ASSESSMENT & PLAN NOTE
- SCr 1.9>1.8 (from 1.4) but baseline SCr ~2.0.    - lasix held and ARB held 1/19. Given IVFs overnight  - Monitor renal function and I/Os  - Hold lasix over weekend and repeat BMP 1/24/17 and follow up PCP within 1 week

## 2017-01-21 NOTE — ASSESSMENT & PLAN NOTE
- Symptoms improved however unclear total UOP (not accurately documented).   - BNP 1960. 2D echo (12/2016) with EF 65%, diastolic dysfunction, Severe AS, and PA pressure 51.56.  - Started on lasix 40 mg IV BID but will held in setting of rise in SCr and given gentle IVFs prior to cath.   - Continue Coreg 25 mg BID, ASA and Plavix  - Strict I/Os and daily weights. Monitor renal function.  - see further plan below

## 2017-01-22 NOTE — PT/OT/SLP PROGRESS
Physical Therapy   Discharge    Krystina Washington   MRN: [id number]     Hospital discharge with PT eval only completed; therefore, no goals met. Pt. discharged home.    Ahmet Gilbert, PT  1/21/2017

## 2017-01-23 LAB
POC ACTIVATED CLOTTING TIME K: 229 SEC (ref 74–137)
SAMPLE: ABNORMAL

## 2017-01-23 NOTE — PLAN OF CARE
"Pt dc over w/e. msg sent to dr hawk for one week f/u post hospital stay  Called IR and spoke jw Dalton re: f/u with Guillermo Duran MD. Krystle stated that they "know about this case already."     01/23/17 1010   Final Note   Assessment Type Final Discharge Note     "

## 2017-01-24 LAB
CORONARY STENOSIS: ABNORMAL
CORONARY STENT: YES

## 2017-01-27 ENCOUNTER — OFFICE VISIT (OUTPATIENT)
Dept: FAMILY MEDICINE | Facility: CLINIC | Age: 82
End: 2017-01-27
Payer: MEDICARE

## 2017-01-27 VITALS
DIASTOLIC BLOOD PRESSURE: 82 MMHG | WEIGHT: 179.25 LBS | HEIGHT: 65 IN | HEART RATE: 74 BPM | OXYGEN SATURATION: 96 % | BODY MASS INDEX: 29.87 KG/M2 | SYSTOLIC BLOOD PRESSURE: 136 MMHG

## 2017-01-27 DIAGNOSIS — E11.22 TYPE 2 DIABETES MELLITUS WITH STAGE 3 CHRONIC KIDNEY DISEASE, UNSPECIFIED LONG TERM INSULIN USE STATUS: ICD-10-CM

## 2017-01-27 DIAGNOSIS — F33.1 MAJOR DEPRESSIVE DISORDER, RECURRENT EPISODE, MODERATE: ICD-10-CM

## 2017-01-27 DIAGNOSIS — I10 ESSENTIAL HYPERTENSION: Primary | ICD-10-CM

## 2017-01-27 DIAGNOSIS — I20.9 ANGINA PECTORIS: ICD-10-CM

## 2017-01-27 DIAGNOSIS — N18.4 CKD (CHRONIC KIDNEY DISEASE), STAGE 4 (SEVERE): ICD-10-CM

## 2017-01-27 DIAGNOSIS — N18.3 TYPE 2 DIABETES MELLITUS WITH STAGE 3 CHRONIC KIDNEY DISEASE, UNSPECIFIED LONG TERM INSULIN USE STATUS: ICD-10-CM

## 2017-01-27 DIAGNOSIS — Z23 NEED FOR INFLUENZA VACCINATION: ICD-10-CM

## 2017-01-27 PROCEDURE — 99213 OFFICE O/P EST LOW 20 MIN: CPT | Mod: PBBFAC,PO | Performed by: FAMILY MEDICINE

## 2017-01-27 PROCEDURE — 99999 PR PBB SHADOW E&M-EST. PATIENT-LVL III: CPT | Mod: PBBFAC,,, | Performed by: FAMILY MEDICINE

## 2017-01-27 PROCEDURE — 99214 OFFICE O/P EST MOD 30 MIN: CPT | Mod: S$PBB,,, | Performed by: FAMILY MEDICINE

## 2017-01-27 PROCEDURE — G0008 ADMIN INFLUENZA VIRUS VAC: HCPCS | Mod: PBBFAC,PO | Performed by: FAMILY MEDICINE

## 2017-01-27 RX ORDER — ESCITALOPRAM OXALATE 20 MG/1
20 TABLET ORAL DAILY
Qty: 30 TABLET | Refills: 11 | Status: SHIPPED | OUTPATIENT
Start: 2017-01-27 | End: 2017-09-12

## 2017-01-27 NOTE — PROGRESS NOTES
Subjective:       Patient ID: Krystina Washington is a 84 y.o. female.    Chief Complaint: Hospital Follow Up and Leg Pain    HPI Comments: 84 years old female who came to the clinic after recent hospitalization secondary to coronary artery disease.  Patient status post stent placement.  No palpitations orthopnea or PND.  Patient with mild angina taking nitroglycerin with significant improvement.  Blood pressure today is stable.  Patient requests influenza vaccination.  No flareups of her depression.  Patient using Lexapro with significant improvement.  Patient monitoring blood sugar at home.  We are planning to do A1c next appointment.    Leg Pain        Review of Systems   Constitutional: Negative.  Negative for chills and fever.   HENT: Negative.    Eyes: Negative.    Respiratory: Negative.    Cardiovascular: Negative.  Negative for chest pain, palpitations and leg swelling.   Gastrointestinal: Negative.    Endocrine: Negative for cold intolerance, heat intolerance, polydipsia, polyphagia and polyuria.   Genitourinary: Negative.    Musculoskeletal: Negative.    Skin: Negative.    Neurological: Negative.    Psychiatric/Behavioral: Negative.        Objective:      Physical Exam   Constitutional: She is oriented to person, place, and time. She appears well-developed and well-nourished. No distress.   HENT:   Head: Normocephalic and atraumatic.   Right Ear: External ear normal.   Left Ear: External ear normal.   Nose: Nose normal.   Mouth/Throat: Oropharynx is clear and moist. No oropharyngeal exudate.   Eyes: Conjunctivae and EOM are normal. Pupils are equal, round, and reactive to light. Right eye exhibits no discharge. Left eye exhibits no discharge. No scleral icterus.   Neck: Normal range of motion. Neck supple. No JVD present. No tracheal deviation present. No thyromegaly present.   Cardiovascular: Normal rate, regular rhythm, normal heart sounds and intact distal pulses.  Exam reveals no gallop and no friction  rub.    No murmur heard.  Pulmonary/Chest: Effort normal and breath sounds normal. No stridor. No respiratory distress. She has no wheezes. She has no rales. She exhibits no tenderness.   Abdominal: Soft. Bowel sounds are normal. She exhibits no distension and no mass. There is no tenderness. There is no rebound and no guarding.   Musculoskeletal: Normal range of motion. She exhibits no edema or tenderness.   Lymphadenopathy:     She has no cervical adenopathy.   Neurological: She is alert and oriented to person, place, and time. She has normal reflexes. No cranial nerve deficit. She exhibits normal muscle tone. Coordination and gait abnormal.   Skin: Skin is warm and dry. No rash noted. She is not diaphoretic. No erythema. No pallor.   Psychiatric: She has a normal mood and affect. Her behavior is normal. Judgment and thought content normal. Cognition and memory are impaired. She exhibits abnormal recent memory. She exhibits normal remote memory.       Assessment:       1. Essential hypertension    2. Need for influenza vaccination    3. Type 2 diabetes mellitus with stage 3 chronic kidney disease, unspecified long term insulin use status    4. Major depressive disorder, recurrent episode, moderate    5. Angina pectoris    6. CKD (chronic kidney disease), stage 4 (severe)        Plan:     Krystina was seen today for hospital follow up and leg pain.    Diagnoses and all orders for this visit:    Essential hypertension  -     Comprehensive metabolic panel; Future  -     Lipid panel; Future    Need for influenza vaccination  -     Influenza - High Dose (65+) (PF) (IM)    Type 2 diabetes mellitus with stage 3 chronic kidney disease, unspecified long term insulin use status  -     Comprehensive metabolic panel; Future  -     Hemoglobin A1c; Future    Major depressive disorder, recurrent episode, moderate  -     escitalopram oxalate (LEXAPRO) 20 MG tablet; Take 1 tablet (20 mg total) by mouth once daily.    Angina  pectoris    CKD (chronic kidney disease), stage 4 (severe)  -     Comprehensive metabolic panel; Future    Continue monitoring blood pressure at home, low sodium diet.    Continue monitoring blood sugar at home,ADA diet.

## 2017-01-27 NOTE — MR AVS SNAPSHOT
[facility]   [place] [address]  Phone: [phone]  Fax: [phone]                  Krystina Washington   2017 11:20 AM   Office Visit    Description:  Female : 1932   Provider:  Oniel Johnson MD   Department:  [facility]           Reason for Visit     Hospital Follow Up     Leg Pain           Diagnoses this Visit        Comments    Essential hypertension    -  Primary     Need for influenza vaccination         Type 2 diabetes mellitus with stage 3 chronic kidney disease, unspecified long term insulin use status         Major depressive disorder, recurrent episode, moderate         Angina pectoris         CKD (chronic kidney disease), stage 4 (severe)                To Do List           Goals (5 Years of Data)              Today    17    3 x per week           Blood Pressure < 130/80   136/82  148/65  154/67      Follow-Up and Disposition     Return in about 4 months (around 2017).       These Medications        Disp Refills Start End    escitalopram oxalate (LEXAPRO) 20 MG tablet 30 tablet 11 2017    Take 1 tablet (20 mg total) by mouth once daily. - Oral    Pharmacy: [facility]/pharmacy #[id number] - [place] - [address] AT [facility] Ph #: [phone]         [facility] On Call     [facility] - 24/ Assistance  Registered nurses in the [facility] provide clinical advisement, health education, appointment booking, and other advisory services.  Call for this free service at [phone].             Medications           Message regarding Medications     Verify the changes and/or additions to your medication regime listed below are the same as discussed with your clinician today.  If any of these changes or additions are incorrect, please notify your healthcare provider.             Verify that the below list of medications is an accurate  representation of the medications you are currently taking.  If none reported, the list may be blank. If incorrect, please contact your healthcare provider. Carry this list with you in case of emergency.           Current Medications     acetaminophen (TYLENOL) 325 MG tablet Take 650 mg by mouth every 6 (six) hours as needed for Pain.    albuterol 90 mcg/actuation inhaler Inhale 1 puff into the lungs every 6 (six) hours as needed for Wheezing. 1 HFA Aerosol Inhaler Inhalation Twice a day    allopurinol (ZYLOPRIM) 100 MG tablet TAKE 1 TABLET BY MOUTH ONCE DAILY.    aspirin 81 MG chewable tablet Take 81 mg by mouth. 1 Tablet, Chewable Oral Every day    carvedilol (COREG) 25 MG tablet TAKE 1 TABLET BY MOUTH TWICE A DAY With FOOD.    cetirizine (ZYRTEC) 10 MG tablet Take 1 tablet (10 mg total) by mouth once daily.    cholecalciferol, vitamin D3, 50,000 unit capsule Take 1 capsule (50,000 Units total) by mouth once a week. 1 Capsule Oral Weekly    cloNIDine 0.1 mg/24 hr td ptwk (CATAPRES) 0.1 mg/24 hr PLACE 1 PATCH ONTO THE SKIN EVERY 7 DAYS.    clopidogrel (PLAVIX) 75 mg tablet Take 1 tablet (75 mg total) by mouth once daily.    escitalopram oxalate (LEXAPRO) 20 MG tablet Take 1 tablet (20 mg total) by mouth once daily.    hydrALAZINE (APRESOLINE) 100 MG tablet TAKE 1 TABLET (100 MG TOTAL) BY MOUTH EVERY 8 (EIGHT) HOURS.    inhalation device (AEROCHAMBER PLUS FLOW-VU) Use as directed for inhalation.    irbesartan (AVAPRO) 300 MG tablet TAKE 1 TABLET (300 MG TOTAL) BY MOUTH EVERY EVENING.    isosorbide mononitrate (IMDUR) 60 MG 24 hr tablet TAKE 1 TABLET (60 MG TOTAL) BY MOUTH ONCE DAILY.    nitroglycerin 400 mcg/spray SprA Place 1 spray onto the tongue every 5 (five) minutes as needed.    pravastatin (PRAVACHOL) 40 MG tablet TAKE 1 TABLET BY MOUTH EVERY EVENING           Clinical Reference Information           Vital Signs - Last Recorded  Most recent update: 1/27/2017 11:26 AM by Jennifer Jimenez MA    BP Pulse Ht Wt  "LMP SpO2    136/82 (BP Location: Left arm, Patient Position: Sitting, BP Method: Manual) 74 5' 5" (1.651 m) 81.3 kg (179 lb 3.7 oz) (LMP Unknown) 96%    BMI                29.83 kg/m2          Blood Pressure          Most Recent Value    BP  136/82      Allergies as of 1/27/2017     Indocin [Indomethacin Sodium]    Lotensin [Benazepril]      Immunizations Administered on Date of Encounter - 1/27/2017     Name Date Dose VIS Date Route    Influenza - High Dose  Incomplete 0.5 mL 8/7/2015 Intramuscular      Orders Placed During Today's Visit      Normal Orders This Visit    Influenza - High Dose (65+) (PF) (IM)     Future Labs/Procedures Expected by Expires    Comprehensive metabolic panel  1/27/2017 4/27/2017    Hemoglobin A1c  1/27/2017 4/27/2017    Lipid panel  1/27/2017 4/27/2017      Instructions      Stable Angina  The chest discomfort you have appears to be coming from your heart -- a condition called angina. Angina is a pain in the heart due to poor blood flow to the heart muscle. This can occur when plaque builds up on the artery wall or a blood clot forms in one or more of the small blood vessels that deliver oxygen to the heart muscle. Plaque is a fatty material made up of cholesterol, calcium deposits, and other materials  and other particles due to inflammation.  Exercise, increased activity, emotional upset, or stress can trigger this pain. With proper treatment and lifestyle changes to reduce risk factors, most people with angina are able to maintain a full and active life.  Angina is not a heart attack. But if angina pain is severe or prolonged, it is a sign of an impending heart attack, also called acute myocardial infarction, or AMI. Your angina is under control at this time. Therefore, it is safe for you to go home. Follow up as instructed by your doctor for any further tests and office visits.    Home care  · Rest at home today and avoid any emotional or physically strenuous activity.  · Take " medicine (usually nitroglycerin) for chest pain exactly as prescribed. Keep your nitroglycerin with you at all times.  · When taking nitroglycerin for angina, sit or lie down. The medicine may make you feel dizzy.  ¨ Place one tablet under your tongue, or between your lip and gum, or between your cheek and gum. Let the tablet dissolve completely; do not chew or swallow the tablet.  ¨ If you use a spray, then spray once on or under your tongue. Do not inhale. Right after you use the spray, close your mouth. Wait a few seconds before you swallow.  ¨ After taking one tablet or spraying once, continue sitting or lying for 5 minutes.  ¨ If the angina goes away completely, rest awhile and continue your normal routine.  Note: Your healthcare provider may give you slightly different instructions than those above. If so, follow them carefully.  Prevention  · Learn how to take your own blood pressure. Keep a record of your results. Ask your doctor which readings mean that you need medical attention. Reduce salt intake and follow lifestyle change instructions if you have high blood pressure (hypertension).  · Maintain a healthy weight. Get help to lose any extra pounds. Talk to your doctor about a safe diet program. Sudden large weight losses can be dangerous.  · If you have diabetes, talk to your doctor about healthy control of your blood sugar.  · Begin an exercise program. Ask your doctor how to get started. You can benefit from simple activities such as walking or gardening. Short, high intensity exercise sessions may also be beneficial.  · Break the smoking habit. Enroll in a stop-smoking program to improve your chances of success.  · Get adequate rest.  · Avoid stressful situations. Learn stress-management techniques.  Follow-up care  Follow up with your doctor, or as advised.  If an X-ray was done , it will be reviewed by another specialist. You will be notified of any new findings that may affect your care.  Call  "911  This is the fastest and safest way to get to the nearest emergency department. The paramedics can also start treatment on the way to the hospital, saving valuable time for your heart.  · If angina gets worse, it continues, or if it stops and returns, call 911 immediately, Do not delay. You may be having a heart attack.  · After you call 911, take a second nitroglycerine tablet or spray unless instructed otherwise. When repeating doses, sit down if possible because it can make you feel lightheaded or dizzy. Wait another 5 minutes. If the angina still does not go away, take a third tablet or spray. Do not take more than 3 tablets or sprays within 15 minutes. Stay on the phone with 911 for further instructions.  · Your healthcare provider may give you slightly different instructions than those above. If so, follow them carefully.  Don't wait until your symptoms are severe to call 911. Other reasons to call 911 besides chest pain include:  · Trouble breathing  · Feeling lightheaded, faint, or dizzy  · Rapid heart beat  · Slower than usual heart rate compared to your normal  · Angina with weakness, dizziness, fainting, heavy sweating, nausea, or vomiting  · Extreme drowsiness, or confusion  · Weakness of an arm or leg or on one side of the face  · Difficulty with speech or vision  When to seek medical advice  Remember, the signs and symptoms of a heart attack are not always like they are on TV. Sometimes they are not so obvious. You may only feel weak or just "not right." If it is not clear or if you have any doubt, call for advice.  · Seek help if there is a change in the type of pain, if it feels different, or if your symptoms are mild.  · Do not drive yourself. Have someone else drive. If no one can drive you, call 911.  · If your doctor has given you medicine to take when you have symptoms, take them, but do not delay getting  help.  · Do not delay. Fast diagnosis and treatment can prevent or  limit the amount " of heart damage during a heart attack or stroke.  · Do not go to your doctor's office or a clinic because they may not be able to provide all the testing and treatment you need.  © 0836-4018 [employer]. [address] [place] [address], [place] [address]. All rights reserved. This information is not intended as a substitute for professional medical care. Always follow your healthcare professional's instructions.

## 2017-01-27 NOTE — PATIENT INSTRUCTIONS
Stable Angina  The chest discomfort you have appears to be coming from your heart -- a condition called angina. Angina is a pain in the heart due to poor blood flow to the heart muscle. This can occur when plaque builds up on the artery wall or a blood clot forms in one or more of the small blood vessels that deliver oxygen to the heart muscle. Plaque is a fatty material made up of cholesterol, calcium deposits, and other materials  and other particles due to inflammation.  Exercise, increased activity, emotional upset, or stress can trigger this pain. With proper treatment and lifestyle changes to reduce risk factors, most people with angina are able to maintain a full and active life.  Angina is not a heart attack. But if angina pain is severe or prolonged, it is a sign of an impending heart attack, also called acute myocardial infarction, or AMI. Your angina is under control at this time. Therefore, it is safe for you to go home. Follow up as instructed by your doctor for any further tests and office visits.    Home care  · Rest at home today and avoid any emotional or physically strenuous activity.  · Take medicine (usually nitroglycerin) for chest pain exactly as prescribed. Keep your nitroglycerin with you at all times.  · When taking nitroglycerin for angina, sit or lie down. The medicine may make you feel dizzy.  ¨ Place one tablet under your tongue, or between your lip and gum, or between your cheek and gum. Let the tablet dissolve completely; do not chew or swallow the tablet.  ¨ If you use a spray, then spray once on or under your tongue. Do not inhale. Right after you use the spray, close your mouth. Wait a few seconds before you swallow.  ¨ After taking one tablet or spraying once, continue sitting or lying for 5 minutes.  ¨ If the angina goes away completely, rest awhile and continue your normal routine.  Note: Your healthcare provider may give you slightly different instructions than those above. If  so, follow them carefully.  Prevention  · Learn how to take your own blood pressure. Keep a record of your results. Ask your doctor which readings mean that you need medical attention. Reduce salt intake and follow lifestyle change instructions if you have high blood pressure (hypertension).  · Maintain a healthy weight. Get help to lose any extra pounds. Talk to your doctor about a safe diet program. Sudden large weight losses can be dangerous.  · If you have diabetes, talk to your doctor about healthy control of your blood sugar.  · Begin an exercise program. Ask your doctor how to get started. You can benefit from simple activities such as walking or gardening. Short, high intensity exercise sessions may also be beneficial.  · Break the smoking habit. Enroll in a stop-smoking program to improve your chances of success.  · Get adequate rest.  · Avoid stressful situations. Learn stress-management techniques.  Follow-up care  Follow up with your doctor, or as advised.  If an X-ray was done , it will be reviewed by another specialist. You will be notified of any new findings that may affect your care.  Call 911  This is the fastest and safest way to get to the nearest emergency department. The paramedics can also start treatment on the way to the hospital, saving valuable time for your heart.  · If angina gets worse, it continues, or if it stops and returns, call 911 immediately, Do not delay. You may be having a heart attack.  · After you call 911, take a second nitroglycerine tablet or spray unless instructed otherwise. When repeating doses, sit down if possible because it can make you feel lightheaded or dizzy. Wait another 5 minutes. If the angina still does not go away, take a third tablet or spray. Do not take more than 3 tablets or sprays within 15 minutes. Stay on the phone with 911 for further instructions.  · Your healthcare provider may give you slightly different instructions than those above. If so,  "follow them carefully.  Don't wait until your symptoms are severe to call 911. Other reasons to call 911 besides chest pain include:  · Trouble breathing  · Feeling lightheaded, faint, or dizzy  · Rapid heart beat  · Slower than usual heart rate compared to your normal  · Angina with weakness, dizziness, fainting, heavy sweating, nausea, or vomiting  · Extreme drowsiness, or confusion  · Weakness of an arm or leg or on one side of the face  · Difficulty with speech or vision  When to seek medical advice  Remember, the signs and symptoms of a heart attack are not always like they are on TV. Sometimes they are not so obvious. You may only feel weak or just "not right." If it is not clear or if you have any doubt, call for advice.  · Seek help if there is a change in the type of pain, if it feels different, or if your symptoms are mild.  · Do not drive yourself. Have someone else drive. If no one can drive you, call 911.  · If your doctor has given you medicine to take when you have symptoms, take them, but do not delay getting  help.  · Do not delay. Fast diagnosis and treatment can prevent or  limit the amount of heart damage during a heart attack or stroke.  · Do not go to your doctor's office or a clinic because they may not be able to provide all the testing and treatment you need.  © 8861-5999 [employer]. [address] [place] [address], [place] [address]. All rights reserved. This information is not intended as a substitute for professional medical care. Always follow your healthcare professional's instructions.      Flu consent signed, bandaid applied, pt instructed to wait 15 minutes in lobby  "

## 2017-02-06 DIAGNOSIS — I35.0 AORTIC VALVE STENOSIS, UNSPECIFIED ETIOLOGY: Primary | ICD-10-CM

## 2017-02-06 DIAGNOSIS — N18.9 CHRONIC KIDNEY DISEASE, UNSPECIFIED STAGE: ICD-10-CM

## 2017-02-06 RX ORDER — DIPHENHYDRAMINE HCL 25 MG
50 CAPSULE ORAL ONCE
Status: CANCELLED | OUTPATIENT
Start: 2017-02-06 | End: 2017-02-06

## 2017-02-06 RX ORDER — DEXTROSE MONOHYDRATE AND SODIUM CHLORIDE 5; .45 G/100ML; G/100ML
INJECTION, SOLUTION INTRAVENOUS CONTINUOUS
Status: CANCELLED | OUTPATIENT
Start: 2017-02-06

## 2017-02-17 RX ORDER — ISOSORBIDE MONONITRATE 60 MG/1
60 TABLET, EXTENDED RELEASE ORAL DAILY
Qty: 90 TABLET | Refills: 3 | Status: SHIPPED | OUTPATIENT
Start: 2017-02-17 | End: 2017-04-04 | Stop reason: SDUPTHER

## 2017-03-06 ENCOUNTER — ANESTHESIA EVENT (OUTPATIENT)
Dept: MEDSURG UNIT | Facility: HOSPITAL | Age: 82
DRG: 266 | End: 2017-03-06
Payer: MEDICARE

## 2017-03-06 ENCOUNTER — OFFICE VISIT (OUTPATIENT)
Dept: CARDIOLOGY | Facility: CLINIC | Age: 82
DRG: 266 | End: 2017-03-06
Payer: MEDICARE

## 2017-03-06 VITALS
BODY MASS INDEX: 33.25 KG/M2 | HEIGHT: 61 IN | SYSTOLIC BLOOD PRESSURE: 158 MMHG | WEIGHT: 176.13 LBS | HEART RATE: 66 BPM | DIASTOLIC BLOOD PRESSURE: 65 MMHG

## 2017-03-06 DIAGNOSIS — I11.0 HYPERTENSIVE HEART DISEASE WITH HEART FAILURE: ICD-10-CM

## 2017-03-06 DIAGNOSIS — I21.4 NSTEMI (NON-ST ELEVATED MYOCARDIAL INFARCTION): ICD-10-CM

## 2017-03-06 DIAGNOSIS — E11.22 TYPE 2 DIABETES MELLITUS WITH STAGE 3 CHRONIC KIDNEY DISEASE, UNSPECIFIED LONG TERM INSULIN USE STATUS: ICD-10-CM

## 2017-03-06 DIAGNOSIS — I25.2 OLD MYOCARDIAL INFARCT: ICD-10-CM

## 2017-03-06 DIAGNOSIS — I10 ESSENTIAL HYPERTENSION: ICD-10-CM

## 2017-03-06 DIAGNOSIS — N18.4 TYPE 2 DIABETES MELLITUS WITH STAGE 4 CHRONIC KIDNEY DISEASE, WITHOUT LONG-TERM CURRENT USE OF INSULIN: ICD-10-CM

## 2017-03-06 DIAGNOSIS — I50.33 ACUTE ON CHRONIC DIASTOLIC CONGESTIVE HEART FAILURE: ICD-10-CM

## 2017-03-06 DIAGNOSIS — E78.00 PURE HYPERCHOLESTEROLEMIA: ICD-10-CM

## 2017-03-06 DIAGNOSIS — E11.22 TYPE 2 DIABETES MELLITUS WITH STAGE 4 CHRONIC KIDNEY DISEASE, WITHOUT LONG-TERM CURRENT USE OF INSULIN: ICD-10-CM

## 2017-03-06 DIAGNOSIS — R06.02 SHORTNESS OF BREATH: ICD-10-CM

## 2017-03-06 DIAGNOSIS — I50.32 CHRONIC DIASTOLIC HEART FAILURE: ICD-10-CM

## 2017-03-06 DIAGNOSIS — N18.3 TYPE 2 DIABETES MELLITUS WITH STAGE 3 CHRONIC KIDNEY DISEASE, UNSPECIFIED LONG TERM INSULIN USE STATUS: ICD-10-CM

## 2017-03-06 DIAGNOSIS — I25.10 CORONARY ARTERY DISEASE INVOLVING NATIVE CORONARY ARTERY OF NATIVE HEART WITHOUT ANGINA PECTORIS: ICD-10-CM

## 2017-03-06 DIAGNOSIS — N18.30 CHRONIC KIDNEY DISEASE, STAGE III (MODERATE): ICD-10-CM

## 2017-03-06 DIAGNOSIS — I35.0 AORTIC STENOSIS, SEVERE: Primary | ICD-10-CM

## 2017-03-06 PROCEDURE — 99999 PR PBB SHADOW E&M-EST. PATIENT-LVL IV: CPT | Mod: PBBFAC,,,

## 2017-03-06 PROCEDURE — 99215 OFFICE O/P EST HI 40 MIN: CPT | Mod: S$PBB,,, | Performed by: INTERNAL MEDICINE

## 2017-03-06 NOTE — PROGRESS NOTES
[facility]  PGY-8 NOTE    REFERRING PHYSICIAN: Dr Golden    CHIEF COMPLIANT:  Severe aortic stenosis    HISTORY OF PRESENT ILLNESS  84 y.o. female referred by Dr Golden for evaluation of severe AS (NYHA Class II sx). She has h/o CAD s/p CABG x 3 2010, Severe Aortic Stenosis, HTN, HLD, DM-2 who presented last month with complaints of shortness of breath and chest pain. Shortness of breath-at rest and exertion worse over last 1 month-associated with orthopnea, Cough, PND, leg swelling-Lasix cut down recently due to worsening renal function. Cough when lies down, non productive. No fever. Chest pain-left sided radiation to left shoulder, no aggravating factors, no relation with exertion, sharp, lasts 3-5 mins at a time, NTG helps but not all the way,Never had pain like this before. Also has some dizziness, loss of balance.     She underwent PCI to RCA on 1.20.17 with improvement in her symptoms from NYHA class III to II.     Comorbidities  1. Diabetes  2. CKD stage IV  3. Hyperlipidemia     1. Aortic stenosis, severe    TAVR work-up:   · ECHO (Date 1216.16): MICHAEL= 0.8 cm2, MG= 40mmHg, Peak Gerard= 4.1 m/s, EF= 65%.   · LHC (Date 1.20.17): Patent LIMA to LAD, SVG to RCA and OM occluded. LM into LCx has luminal irregularities. S/P PCI or mid RCA with BMS 1.20.17  · STS: 6.8%   · Frailty: 3/4    · Iliacs are >6.83 on L and > 5.27 on R (Right is more tortuous, high bifurcations)   · LVOT area by CTA is 3.88 cm2 and Avg Diameter is 22.2 (25.9X20) per Dr Mrogan  · she is currently high risk, per Dr Sheth due to Redo status and comorbidities.  · Rhythm issues: NSR, no conduction problems  · PFTs: Pending read at the time of this summary        Krystina Washington is a 23 mm Jessica S3 candidate via left TF access. (Will add 1 cc)            PAST MEDICAL HISTORY  Past Medical History:   Diagnosis Date    Arthritis     CHF (congestive heart failure)     Coronary artery disease     Diabetes mellitus      Glaucoma     Gout, joint     Hx of CABG 9/21/10    Hyperlipidemia     Hypertension     NSTEMI (non-ST elevated myocardial infarction) 09/21/10    Systolic heart failure 6/19/2015        PAST SURGICAL HISTORY  Past Surgical History:   Procedure Laterality Date    CATARACT EXTRACTION      CORONARY ARTERY BYPASS GRAFT  9/21/2010    JOINT REPLACEMENT         MEDICATIONS  Current Outpatient Prescriptions on File Prior to Visit   Medication Sig Dispense Refill    acetaminophen (TYLENOL) 325 MG tablet Take 650 mg by mouth every 6 (six) hours as needed for Pain.      albuterol 90 mcg/actuation inhaler Inhale 1 puff into the lungs every 6 (six) hours as needed for Wheezing. 1 HFA Aerosol Inhaler Inhalation Twice a day 18 g 0    allopurinol (ZYLOPRIM) 100 MG tablet TAKE 1 TABLET BY MOUTH ONCE DAILY. 90 tablet 2    aspirin 81 MG chewable tablet Take 81 mg by mouth. 1 Tablet, Chewable Oral Every day      carvedilol (COREG) 25 MG tablet TAKE 1 TABLET BY MOUTH TWICE A DAY With FOOD. 60 tablet 11    cetirizine (ZYRTEC) 10 MG tablet Take 1 tablet (10 mg total) by mouth once daily. (Patient taking differently: Take 10 mg by mouth as needed. ) 30 tablet 0    cholecalciferol, vitamin D3, 50,000 unit capsule Take 1 capsule (50,000 Units total) by mouth once a week. 1 Capsule Oral Weekly 12 capsule 0    cloNIDine 0.1 mg/24 hr td ptwk (CATAPRES) 0.1 mg/24 hr PLACE 1 PATCH ONTO THE SKIN EVERY 7 DAYS. 4 patch 6    clopidogrel (PLAVIX) 75 mg tablet Take 1 tablet (75 mg total) by mouth once daily. 30 tablet 0    escitalopram oxalate (LEXAPRO) 20 MG tablet Take 1 tablet (20 mg total) by mouth once daily. 30 tablet 11    hydrALAZINE (APRESOLINE) 100 MG tablet TAKE 1 TABLET (100 MG TOTAL) BY MOUTH EVERY 8 (EIGHT) HOURS. 90 tablet 11    inhalation device (AEROCHAMBER PLUS FLOW-VU) Use as directed for inhalation. 1 Device 0    irbesartan (AVAPRO) 300 MG tablet TAKE 1 TABLET (300 MG TOTAL) BY MOUTH EVERY EVENING. 30 tablet 11     isosorbide mononitrate (IMDUR) 60 MG 24 hr tablet Take 1 tablet (60 mg total) by mouth once daily. 90 tablet 3    nitroglycerin 400 mcg/spray SprA Place 1 spray onto the tongue every 5 (five) minutes as needed. 8.5 g 0    pravastatin (PRAVACHOL) 40 MG tablet TAKE 1 TABLET BY MOUTH EVERY EVENING 90 tablet 2     No current facility-administered medications on file prior to visit.         SOCIAL HISTORY  TOBACCO:  ETOH:  ILLEGAL DRUGS:      HPI    Review of Systems   Constitution: Negative for fever and weakness.   HENT: Negative for congestion and hoarse voice.    Eyes: Negative for blurred vision and double vision.   Cardiovascular: Positive for dyspnea on exertion. Negative for chest pain, claudication, cyanosis, irregular heartbeat, leg swelling, near-syncope, orthopnea, palpitations and paroxysmal nocturnal dyspnea.   Respiratory: Negative for cough, hemoptysis and shortness of breath.    Endocrine: Negative for cold intolerance and heat intolerance.   Hematologic/Lymphatic: Negative for bleeding problem. Does not bruise/bleed easily.   Skin: Negative for dry skin and nail changes.   Musculoskeletal: Negative for back pain and falls.   Gastrointestinal: Negative for abdominal pain and anorexia.   Neurological: Negative for brief paralysis and dizziness.        Objective:    Physical Exam   Constitutional: She is oriented to person, place, and time. She appears well-developed and well-nourished.   Eyes: Pupils are equal, round, and reactive to light.   Neck: No JVD present. No thyromegaly present.   Cardiovascular: Normal rate, regular rhythm and intact distal pulses.    Murmur heard.   Harsh midsystolic murmur is present with a grade of 2/6  at the upper right sternal border radiating to the neck  Pulses:       Carotid pulses are 2+ on the right side, and 2+ on the left side.       Radial pulses are 2+ on the right side, and 2+ on the left side.        Femoral pulses are 2+ on the right side, and 2+ on the  left side.       Dorsalis pedis pulses are 2+ on the right side, and 2+ on the left side.        Posterior tibial pulses are 2+ on the right side, and 2+ on the left side.   Pulmonary/Chest: No respiratory distress. She has no wheezes. She exhibits no tenderness.   Abdominal: She exhibits no distension. There is no tenderness. There is no rebound.   Musculoskeletal: She exhibits no edema or tenderness.   Neurological: She is alert and oriented to person, place, and time.   Skin: Skin is warm and dry.   Psychiatric: She has a normal mood and affect.         Assessment:       1. Aortic stenosis, severe    TAVR work-up:   · ECHO (Date 1216.16): MICHAEL= 0.8 cm2, MG= 40mmHg, Peak Gerard= 4.1 m/s, EF= 65%.   · LHC (Date 1.20.17): Patent LIMA to LAD, SVG to RCA and OM occluded. LM into LCx has luminal irregularities. S/P PCI or mid RCA with BMS 1.20.17  · STS: 6.8%   · Frailty: 3/4    · Iliacs are >6.83 on L and > 5.27 on R (Right is more tortuous, high bifurcations)   · LVOT area by CTA is 3.88 cm2 and Avg Diameter is 22.2 (25.9X20) per Dr Morgan  · she is currently high risk, per Dr Sheth due to Redo status and comorbidities.  · Rhythm issues: NSR, no conduction problems  · PFTs: Pending read at the time of this summary        Krystina Washington is a 23 mm Jessica S3 candidate via left TF access. (Will add 1 cc)     2. Hypertensive heart disease with heart failure: NYHA class II sympotms, improved after angioplasty on 1.20.17.   3. Old myocardial infarct   4. Coronary artery disease involving native coronary artery of native heart without angina pectoris    5. Essential hypertension: Controlled on current regimen, will likely increase after TAVR   6. Chronic diastolic heart failure    7. Acute on chronic diastolic congestive heart failure    8. NSTEMI (non-ST elevated myocardial infarction)    9. Chronic kidney disease, stage IV (moderate): Will use low contrast technique for implant   10. Type 2 diabetes mellitus with stage 4  chronic kidney disease, without long-term current use of insulin    11. Shortness of breath    12. Pure hypercholesterolemia on statin   13. Type 2 diabetes mellitus with stage 3 chronic kidney disease, unspecified long term insulin use status         Plan:           Transcatheter Aortic valve replacement   4. Valve: 23 mm Jessica S3 (Add 1 cc to balloon)  5. TAVR access: LTF  6. Valvuloplasty balloon: 20 True  7. Antithrombotic therapy: ASA, plavix  8. Pacemaker risk factors: None  1. The patient was explained the the procedure carries around a 12.5% risk of permanent pacemaker requirement and that risk depends on the patients underlying conduction system.  9. Patient was educated abut the pathophysiology and natural history of severe aortic stenosis and was educated about the treatment options including surgical and transcatheter valve replacement. She agrees to be full code for the forseable future and to undergo workup for treatment of severe AS.   10. The risks, benefits, and alternatives of transcatheter aortic valve replacement were discussed with the patient. All questions were answered and informed consent was obtained. I had a detailed discussion with the patient regarding risk of stroke, MI, bleeding access site complications including limb loss, allergy, kidney failure including dialysis and death.  11. The patient understands the risks and benefits and wishes to go ahead with the procedure.  12. All patient's questions were answered        Usman Morgan MD  Interventional Cardiology  Structural/Valvular heart disease  Fellow PGY-8  [phone]    Staff:  I have personally taken the history and examined this patient and agree with the fellow's note as stated above and amended it accordingly :-)

## 2017-03-06 NOTE — MR AVS SNAPSHOT
[facility]  [address]  [place] [address]  Phone: [phone]                  Krystina Washington   3/6/2017 4:00 PM   Office Visit    Description:  Female : 1932   Provider:  [facility]   Department:  [facility]           Reason for Visit     Aortic Stenosis           Diagnoses this Visit        Comments    Aortic stenosis, severe    -  Primary     Hypertensive heart disease with heart failure         Old myocardial infarct         Coronary artery disease involving native coronary artery of native heart without angina pectoris         Essential hypertension         Chronic diastolic heart failure         Acute on chronic diastolic congestive heart failure         NSTEMI (non-ST elevated myocardial infarction)         Chronic kidney disease, stage III (moderate)         Type 2 diabetes mellitus with stage 4 chronic kidney disease, without long-term current use of insulin         Shortness of breath         Pure hypercholesterolemia         Type 2 diabetes mellitus with stage 3 chronic kidney disease, unspecified long term insulin use status                To Do List           Future Appointments        Provider Department Dept Phone    3/7/2017 8:00 AM 3PREP, [address] [facility] [phone]    3/7/2017 10:00 AM INPATIENT, [facility] - Echo/Stress Lab [phone]      Your Future Surgeries/Procedures     Mar 07, 2017   Surgery with Guillermo Duran MD   [facility] ([facility])    [address] [facility] [id number] [phone]              Goals (5 Years of Data)              Today    Today    17    3 x per week           Blood Pressure < 130/80   158/65  158/65  158/65      [facility] -  Assistance  Registered nurses in the [facility] provide clinical advisement, health education, appointment booking, and  other advisory services.  Call for this free service at [phone].             Medications           Message regarding Medications     Verify the changes and/or additions to your medication regime listed below are the same as discussed with your clinician today.  If any of these changes or additions are incorrect, please notify your healthcare provider.             Verify that the below list of medications is an accurate representation of the medications you are currently taking.  If none reported, the list may be blank. If incorrect, please contact your healthcare provider. Carry this list with you in case of emergency.           Current Medications     acetaminophen (TYLENOL) 325 MG tablet Take 650 mg by mouth every 6 (six) hours as needed for Pain.    albuterol 90 mcg/actuation inhaler Inhale 1 puff into the lungs every 6 (six) hours as needed for Wheezing. 1 HFA Aerosol Inhaler Inhalation Twice a day    allopurinol (ZYLOPRIM) 100 MG tablet TAKE 1 TABLET BY MOUTH ONCE DAILY.    aspirin 81 MG chewable tablet Take 81 mg by mouth. 1 Tablet, Chewable Oral Every day    carvedilol (COREG) 25 MG tablet TAKE 1 TABLET BY MOUTH TWICE A DAY With FOOD.    cetirizine (ZYRTEC) 10 MG tablet Take 1 tablet (10 mg total) by mouth once daily.    cholecalciferol, vitamin D3, 50,000 unit capsule Take 1 capsule (50,000 Units total) by mouth once a week. 1 Capsule Oral Weekly    cloNIDine 0.1 mg/24 hr td ptwk (CATAPRES) 0.1 mg/24 hr PLACE 1 PATCH ONTO THE SKIN EVERY 7 DAYS.    clopidogrel (PLAVIX) 75 mg tablet Take 1 tablet (75 mg total) by mouth once daily.    escitalopram oxalate (LEXAPRO) 20 MG tablet Take 1 tablet (20 mg total) by mouth once daily.    hydrALAZINE (APRESOLINE) 100 MG tablet TAKE 1 TABLET (100 MG TOTAL) BY MOUTH EVERY 8 (EIGHT) HOURS.    inhalation device (AEROCHAMBER PLUS FLOW-VU) Use as directed for inhalation.    irbesartan (AVAPRO) 300 MG tablet TAKE 1 TABLET (300 MG TOTAL) BY MOUTH EVERY EVENING.     "isosorbide mononitrate (IMDUR) 60 MG 24 hr tablet Take 1 tablet (60 mg total) by mouth once daily.    nitroglycerin 400 mcg/spray SprA Place 1 spray onto the tongue every 5 (five) minutes as needed.    pravastatin (PRAVACHOL) 40 MG tablet TAKE 1 TABLET BY MOUTH EVERY EVENING           Clinical Reference Information           Your Vitals Were     BP Pulse Height Weight Last Period BMI    158/65 66 5' 1" (1.549 m) 79.9 kg (176 lb 2.4 oz) (LMP Unknown) 33.28 kg/m2      Blood Pressure          Most Recent Value    BP  (!)  158/65 [LT]      Allergies as of 3/6/2017     Indocin [Indomethacin Sodium]    Lotensin [Benazepril]      Immunizations Administered on Date of Encounter - 3/6/2017     None      Language Assistance Services     ATTENTION: Language assistance services are available, free of charge. Please call [phone].      ATENCIÓN: Si habla shimon, tiene a riley disposición servicios gratuitos de asistencia lingüística. Llame al [phone].     LALI Ý: N?u b?n nói Ti?ng Vi?t, có các d?ch v? h? tr? ngôn ng? mi?n phí dành cho b?n. G?i s? [phone].         [facility] complies with applicable Federal civil rights laws and does not discriminate on the basis of race, color, national origin, age, disability, or sex.        "

## 2017-03-06 NOTE — ANESTHESIA PREPROCEDURE EVALUATION
Pre-operative evaluation for Procedure(s) (LRB):  REPLACEMENT-VALVE-AORTIC (N/A)    Krystina Washington is a 84 y.o. female w/ PMHx of severe AS, HTN, CKD III, DM II, gout, MDD who presents for above procedure.     The patient has undergone the following TAVR work-up:   · ECHO (Date 1216.16): MICHAEL= 0.8 cm2, MG= 40mmHg, Peak Gerard= 4.1 m/s, EF= 65%.   · LHC (Date 1.20.17): Patent LIMA to LAD, SVG to RCA and OM occluded. LM into LCx has luminal irregularities. S/P PCI or mid RCA with BMS 1.20.17  · STS: 6.8%   · Frailty: 3/4   · Iliacs are >6.83 on L and > 5.27 on R (Right is more tortuous, high bifurcations)   · LVOT area by CTA is 3.88 cm2 and Avg Diameter is 22.2 (25.9X20) per Dr Morgan  · she is currently high risk, per Dr Sheth due to Redo status and comorbidities.  · Rhythm issues: NSR, no conduction problems  · PFTs: Pending read at the time of this summary    Prev airway:   None on record       Patient Active Problem List   Diagnosis    HTN (hypertension)    Hyperlipidemia    Chronic kidney disease, stage III (moderate)    Type II diabetes mellitus with renal manifestations    Proteinuria    Acquired cyst of kidney    Gout, unspecified    Coronary artery disease involving native coronary artery of native heart without angina pectoris    Old myocardial infarct    S/P CABG x 3    Glaucoma (increased eye pressure)    Primary open-angle glaucoma(365.11)    Pulmonary edema    Major depressive disorder, recurrent episode, moderate    Iatrogenic hypotension    Chest pain    Aortic stenosis, severe    Hypertensive heart disease with heart failure    Shortness of breath    Nonrheumatic aortic valve stenosis    Essential hypertension    Type 2 diabetes mellitus with diabetic chronic kidney disease    Chronic diastolic heart failure    Acute on chronic renal failure    Acute on chronic diastolic congestive heart failure    NSTEMI  (non-ST elevated myocardial infarction)    Elevated troponin level    Pure hypercholesterolemia       Review of patient's allergies indicates:   Allergen Reactions    Indocin [indomethacin sodium] Other (See Comments)    Lotensin [benazepril] Other (See Comments)        No current facility-administered medications on file prior to encounter.      Current Outpatient Prescriptions on File Prior to Encounter   Medication Sig Dispense Refill    acetaminophen (TYLENOL) 325 MG tablet Take 650 mg by mouth every 6 (six) hours as needed for Pain.      albuterol 90 mcg/actuation inhaler Inhale 1 puff into the lungs every 6 (six) hours as needed for Wheezing. 1 HFA Aerosol Inhaler Inhalation Twice a day 18 g 0    allopurinol (ZYLOPRIM) 100 MG tablet TAKE 1 TABLET BY MOUTH ONCE DAILY. 90 tablet 2    aspirin 81 MG chewable tablet Take 81 mg by mouth. 1 Tablet, Chewable Oral Every day      carvedilol (COREG) 25 MG tablet TAKE 1 TABLET BY MOUTH TWICE A DAY With FOOD. 60 tablet 11    cetirizine (ZYRTEC) 10 MG tablet Take 1 tablet (10 mg total) by mouth once daily. (Patient taking differently: Take 10 mg by mouth as needed. ) 30 tablet 0    cholecalciferol, vitamin D3, 50,000 unit capsule Take 1 capsule (50,000 Units total) by mouth once a week. 1 Capsule Oral Weekly 12 capsule 0    cloNIDine 0.1 mg/24 hr td ptwk (CATAPRES) 0.1 mg/24 hr PLACE 1 PATCH ONTO THE SKIN EVERY 7 DAYS. 4 patch 6    clopidogrel (PLAVIX) 75 mg tablet Take 1 tablet (75 mg total) by mouth once daily. 30 tablet 0    escitalopram oxalate (LEXAPRO) 20 MG tablet Take 1 tablet (20 mg total) by mouth once daily. 30 tablet 11    hydrALAZINE (APRESOLINE) 100 MG tablet TAKE 1 TABLET (100 MG TOTAL) BY MOUTH EVERY 8 (EIGHT) HOURS. 90 tablet 11    inhalation device (AEROCHAMBER PLUS FLOW-VU) Use as directed for inhalation. 1 Device 0    irbesartan (AVAPRO) 300 MG tablet TAKE 1 TABLET (300 MG TOTAL) BY MOUTH EVERY EVENING. 30 tablet 11    nitroglycerin 400  mcg/spray SprA Place 1 spray onto the tongue every 5 (five) minutes as needed. 8.5 g 0    pravastatin (PRAVACHOL) 40 MG tablet TAKE 1 TABLET BY MOUTH EVERY EVENING 90 tablet 2       Past Surgical History:   Procedure Laterality Date    CATARACT EXTRACTION      CORONARY ARTERY BYPASS GRAFT  9/21/2010    JOINT REPLACEMENT         Social History     Social History    Marital status:      Spouse name: N/A    Number of children: N/A    Years of education: N/A     Occupational History    Not on file.     Social History Main Topics    Smoking status: Never Smoker    Smokeless tobacco: Never Used    Alcohol use No    Drug use: No    Sexual activity: No     Other Topics Concern    Not on file     Social History Narrative         Vital Signs Range (Last 24H):  Pulse:  [66]   BP: (158-167)/(65-77)       CBC:   Recent Labs      03/06/17   1324   WBC  11.48   RBC  2.88*   HGB  9.0*   HCT  29.3*   PLT  228   MCV  102*   MCH  31.3*   MCHC  30.7*       CMP:   Recent Labs      03/06/17   1324   NA  139   K  5.1   CL  107   CO2  27   BUN  37*   CREATININE  1.7*   GLU  180*   CALCIUM  9.6       INR  Recent Labs      03/06/17   1324   INR  1.1   APTT  28.7           Diagnostic Studies:      EKG:  Vent. Rate : 067 BPM     Atrial Rate : 067 BPM     P-R Int : 140 ms          QRS Dur : 094 ms      QT Int : 402 ms       P-R-T Axes : 009 -15 -22 degrees     QTc Int : 424 ms    Sinus rhythm with Premature atrial complexes  Moderate voltage criteria for LVH, may be normal variant  Nonspecific ST and T wave abnormality  Abnormal ECG  When compared with ECG of 14-DEC-2016 13:29,  Premature atrial complexes are now Present  Confirmed by Brady Zhao MD ([id number]) on 1/18/2017 10:49:12 AM    Referred By: SELF REFERRAL           Confirmed By:Brady hZao MD    2D Echo:  CONCLUSIONS     1 - Eccentric hypertrophy.     2 - Normal left ventricular systolic function (EF 60-65%).     3 - Normal right ventricular systolic function .     4 -  Grade II left ventricular diastolic dysfunction.     5 - Moderate left atrial enlargement.     6 - Mild tricuspid regurgitation.     7 - Mild aortic regurgitation.     8 - Severe aortic stenosis, MICHAEL = 0.8 cm2, peak velocity = 4.1 m/s, mean gradient = 40 mmHg.     9 - Pulmonary hypertension. The estimated PA systolic pressure is 52 mmHg.     10 - Intermediate central venous pressure.     This document has been electronically    SIGNED BY: Hank Brumfield MD On: 12/16/2016 15:51    Cath lab 1/20/17  Diagnostic:          Patient has co-dominant coronary arteries.        - Left Main Coronary Artery:             The LM is normal. There is VIN 3 flow.     - Left Anterior Descending Artery:             The mid LAD is occluded. There is VIN 0 flow.     - Left Circumflex Artery:             The LCX has luminal irregularities. There is VIN 3 flow.     - Right Coronary Artery:             The mid RCA has a 80% stenosis. There is VIN 3 flow.     - DA SILVA To LAD:             The LIMA to LAD is normal. There is VIN 3 flow.     - SVG To OM1:             The SVG to OM1 was not found.     - SVG To RCA:             The SVG to RCA is occluded. There is VIN 0 flow.     - Common Femoral Artery:             The left CFA is normal. There is VIN 3 flow.     - Femoral Artery:             The left femoral artery is normal. There is VIN 3 flow.     - OM2:             The OM2 has a 80% stenosis. There is VIN 3 flow. The remaining portion of the vessel is of small caliber.    Intervention          Mid RCA:              The lesion was successfully intervened. Post-stenosis of 20%, post-VIN 3 flow and TMP grade 3. The vessel was accessed natively.  The following items were used: 2.5X20 Euphora SC Balloon and Stent Integrity 2.75 X 22.      [facility] Anesthesia Evaluation    I have reviewed the Patient Summary Reports.     I have reviewed the Medications.     Review of Systems  Anesthesia Hx:  No problems with previous Anesthesia Denies Hx of  Anesthetic complications  History of prior surgery of interest to airway management or planning:  Denies Personal Hx of Anesthesia complications.   Hematology/Oncology:     Oncology Normal    -- Anemia:   EENT/Dental:EENT/Dental Normal   Cardiovascular:   Hypertension Valvular problems/Murmurs, AS Past MI CAD   CHF    Pulmonary:   Shortness of breath    Renal/:   Chronic Renal Disease, CRI    Hepatic/GI:  Hepatic/GI Normal    Musculoskeletal:  Musculoskeletal Normal    Neurological:  Neurology Normal    Endocrine:   Diabetes    Dermatological:  Skin Normal    Psych:   Psychiatric History depression          Physical Exam  General:  Well nourished    Airway/Jaw/Neck:  Airway Findings: Mouth Opening: Normal Tongue: Normal  General Airway Assessment: Adult  Mallampati: II  TM Distance: Normal, at least 6 cm  Jaw/Neck Findings:  Neck ROM: Normal ROM     Eyes/Ears/Nose:  EYES/EARS/NOSE FINDINGS: Normal   Dental:  Dental Findings:   Chest/Lungs:  Chest/Lungs Findings: Clear to auscultation         Mental Status:  Mental Status Findings:  Cooperative         Anesthesia Plan  Type of Anesthesia, risks & benefits discussed:  Anesthesia Type:  general, MAC  Patient's Preference:   Intra-op Monitoring Plan: arterial line and central line  Intra-op Monitoring Plan Comments:   Post Op Pain Control Plan:   Post Op Pain Control Plan Comments:   Induction:   IV  Beta Blocker:  Patient is on a Beta-Blocker and has received one dose within the past 24 hours (No further documentation required).       Informed Consent: Patient understands risks and agrees with Anesthesia plan.  Questions answered. Anesthesia consent signed with patient.  ASA Score: 4     Day of Surgery Review of History & Physical:  There are no significant changes.          Ready For Surgery From Anesthesia Perspective.

## 2017-03-07 ENCOUNTER — ANESTHESIA EVENT (OUTPATIENT)
Dept: MEDSURG UNIT | Facility: HOSPITAL | Age: 82
DRG: 266 | End: 2017-03-07
Payer: MEDICARE

## 2017-03-07 ENCOUNTER — HOSPITAL ENCOUNTER (INPATIENT)
Facility: HOSPITAL | Age: 82
LOS: 1 days | Discharge: HOME | DRG: 266 | End: 2017-03-08
Attending: INTERNAL MEDICINE | Admitting: INTERNAL MEDICINE
Payer: MEDICARE

## 2017-03-07 ENCOUNTER — ANESTHESIA (OUTPATIENT)
Dept: MEDSURG UNIT | Facility: HOSPITAL | Age: 82
DRG: 266 | End: 2017-03-07
Payer: MEDICARE

## 2017-03-07 DIAGNOSIS — I35.0 AORTIC VALVE STENOSIS, UNSPECIFIED ETIOLOGY: ICD-10-CM

## 2017-03-07 DIAGNOSIS — I10 ESSENTIAL (PRIMARY) HYPERTENSION: ICD-10-CM

## 2017-03-07 DIAGNOSIS — I35.0 AORTIC VALVE STENOSIS: ICD-10-CM

## 2017-03-07 DIAGNOSIS — I44.2 ATRIOVENTRICULAR BLOCK, COMPLETE: ICD-10-CM

## 2017-03-07 LAB
ABO + RH BLD: NORMAL
ANION GAP SERPL CALC-SCNC: 6 MMOL/L
APTT BLDCRRT: 109.6 SEC
BASOPHILS # BLD AUTO: 0.02 K/UL
BASOPHILS NFR BLD: 0.2 %
BLD GP AB SCN CELLS X3 SERPL QL: NORMAL
BUN SERPL-MCNC: 34 MG/DL
CALCIUM SERPL-MCNC: 8.4 MG/DL
CHLORIDE SERPL-SCNC: 109 MMOL/L
CO2 SERPL-SCNC: 25 MMOL/L
CREAT SERPL-MCNC: 1.5 MG/DL
DIFFERENTIAL METHOD: ABNORMAL
EOSINOPHIL # BLD AUTO: 0.2 K/UL
EOSINOPHIL NFR BLD: 1.7 %
ERYTHROCYTE [DISTWIDTH] IN BLOOD BY AUTOMATED COUNT: 14.7 %
EST. GFR  (AFRICAN AMERICAN): 36.6 ML/MIN/1.73 M^2
EST. GFR  (NON AFRICAN AMERICAN): 31.8 ML/MIN/1.73 M^2
GLUCOSE SERPL-MCNC: 166 MG/DL
HCT VFR BLD AUTO: 25.2 %
HGB BLD-MCNC: 7.9 G/DL
INR PPP: 1.2
LYMPHOCYTES # BLD AUTO: 1 K/UL
LYMPHOCYTES NFR BLD: 10.2 %
MCH RBC QN AUTO: 31.7 PG
MCHC RBC AUTO-ENTMCNC: 31.3 %
MCV RBC AUTO: 101 FL
MONOCYTES # BLD AUTO: 0.5 K/UL
MONOCYTES NFR BLD: 4.8 %
NEUTROPHILS # BLD AUTO: 8 K/UL
NEUTROPHILS NFR BLD: 82.9 %
PLATELET # BLD AUTO: 184 K/UL
PMV BLD AUTO: 11.8 FL
POC ACTIVATED CLOTTING TIME K: 250 SEC (ref 74–137)
POCT GLUCOSE: 144 MG/DL (ref 70–110)
POTASSIUM SERPL-SCNC: 4.6 MMOL/L
PROTHROMBIN TIME: 12.3 SEC
RBC # BLD AUTO: 2.49 M/UL
SAMPLE: ABNORMAL
SODIUM SERPL-SCNC: 140 MMOL/L
WBC # BLD AUTO: 9.64 K/UL

## 2017-03-07 PROCEDURE — 93005 ELECTROCARDIOGRAM TRACING: CPT

## 2017-03-07 PROCEDURE — 37000008 HC ANESTHESIA 1ST 15 MINUTES: Performed by: INTERNAL MEDICINE

## 2017-03-07 PROCEDURE — 80048 BASIC METABOLIC PNL TOTAL CA: CPT

## 2017-03-07 PROCEDURE — 36620 INSERTION CATHETER ARTERY: CPT | Mod: 59,,, | Performed by: ANESTHESIOLOGY

## 2017-03-07 PROCEDURE — 02RF38Z REPLACEMENT OF AORTIC VALVE WITH ZOOPLASTIC TISSUE, PERCUTANEOUS APPROACH: ICD-10-PCS | Performed by: INTERNAL MEDICINE

## 2017-03-07 PROCEDURE — 5A1213Z PERFORMANCE OF CARDIAC PACING, INTERMITTENT: ICD-10-PCS | Performed by: INTERNAL MEDICINE

## 2017-03-07 PROCEDURE — 25000003 PHARM REV CODE 250: Performed by: INTERNAL MEDICINE

## 2017-03-07 PROCEDURE — 33361 REPLACE AORTIC VALVE PERQ: CPT | Mod: 62,Q0,, | Performed by: INTERNAL MEDICINE

## 2017-03-07 PROCEDURE — 25000003 PHARM REV CODE 250: Performed by: PAIN MEDICINE

## 2017-03-07 PROCEDURE — 63600175 PHARM REV CODE 636 W HCPCS: Performed by: INTERNAL MEDICINE

## 2017-03-07 PROCEDURE — 93010 ELECTROCARDIOGRAM REPORT: CPT | Mod: ,,, | Performed by: INTERNAL MEDICINE

## 2017-03-07 PROCEDURE — 99233 SBSQ HOSP IP/OBS HIGH 50: CPT | Mod: ,,, | Performed by: INTERNAL MEDICINE

## 2017-03-07 PROCEDURE — 63600175 PHARM REV CODE 636 W HCPCS: Performed by: PAIN MEDICINE

## 2017-03-07 PROCEDURE — 86920 COMPATIBILITY TEST SPIN: CPT

## 2017-03-07 PROCEDURE — 4A023FZ MEASUREMENT OF CARDIAC RHYTHM, PERCUTANEOUS APPROACH: ICD-10-PCS | Performed by: INTERNAL MEDICINE

## 2017-03-07 PROCEDURE — 25000003 PHARM REV CODE 250

## 2017-03-07 PROCEDURE — 33361 REPLACE AORTIC VALVE PERQ: CPT | Mod: 62,Q0,, | Performed by: THORACIC SURGERY (CARDIOTHORACIC VASCULAR SURGERY)

## 2017-03-07 PROCEDURE — 20000000 HC ICU ROOM

## 2017-03-07 PROCEDURE — 37000009 HC ANESTHESIA EA ADD 15 MINS: Performed by: INTERNAL MEDICINE

## 2017-03-07 PROCEDURE — 85025 COMPLETE CBC W/AUTO DIFF WBC: CPT

## 2017-03-07 PROCEDURE — 86901 BLOOD TYPING SEROLOGIC RH(D): CPT

## 2017-03-07 PROCEDURE — D9220A PRA ANESTHESIA: Mod: Q0,,, | Performed by: ANESTHESIOLOGY

## 2017-03-07 PROCEDURE — 85610 PROTHROMBIN TIME: CPT

## 2017-03-07 PROCEDURE — 27000191 HC C-V MONITORING

## 2017-03-07 PROCEDURE — 86900 BLOOD TYPING SEROLOGIC ABO: CPT

## 2017-03-07 PROCEDURE — 27000239 HC STAND-BY BYPASS PUMP

## 2017-03-07 PROCEDURE — 93010 ELECTROCARDIOGRAM REPORT: CPT | Mod: 76,,, | Performed by: INTERNAL MEDICINE

## 2017-03-07 PROCEDURE — 85730 THROMBOPLASTIN TIME PARTIAL: CPT

## 2017-03-07 RX ORDER — SODIUM CHLORIDE, SODIUM LACTATE, POTASSIUM CHLORIDE, CALCIUM CHLORIDE 600; 310; 30; 20 MG/100ML; MG/100ML; MG/100ML; MG/100ML
INJECTION, SOLUTION INTRAVENOUS CONTINUOUS PRN
Status: DISCONTINUED | OUTPATIENT
Start: 2017-03-07 | End: 2017-03-07

## 2017-03-07 RX ORDER — ISOSORBIDE MONONITRATE 60 MG/1
60 TABLET, EXTENDED RELEASE ORAL DAILY
Status: DISCONTINUED | OUTPATIENT
Start: 2017-03-08 | End: 2017-03-08 | Stop reason: HOSPADM

## 2017-03-07 RX ORDER — ASPIRIN 81 MG/1
81 TABLET ORAL DAILY
Status: DISCONTINUED | OUTPATIENT
Start: 2017-03-07 | End: 2017-03-07

## 2017-03-07 RX ORDER — ALLOPURINOL 100 MG/1
100 TABLET ORAL DAILY
Status: DISCONTINUED | OUTPATIENT
Start: 2017-03-08 | End: 2017-03-08 | Stop reason: HOSPADM

## 2017-03-07 RX ORDER — HYDRALAZINE HYDROCHLORIDE 50 MG/1
100 TABLET, FILM COATED ORAL EVERY 8 HOURS
Status: DISCONTINUED | OUTPATIENT
Start: 2017-03-08 | End: 2017-03-08 | Stop reason: HOSPADM

## 2017-03-07 RX ORDER — IRBESARTAN 300 MG/1
300 TABLET ORAL NIGHTLY
Status: DISCONTINUED | OUTPATIENT
Start: 2017-03-08 | End: 2017-03-08 | Stop reason: HOSPADM

## 2017-03-07 RX ORDER — SODIUM CHLORIDE 9 MG/ML
INJECTION, SOLUTION INTRAVENOUS CONTINUOUS
Status: ACTIVE | OUTPATIENT
Start: 2017-03-07 | End: 2017-03-07

## 2017-03-07 RX ORDER — MIDAZOLAM HYDROCHLORIDE 1 MG/ML
INJECTION, SOLUTION INTRAMUSCULAR; INTRAVENOUS
Status: DISCONTINUED | OUTPATIENT
Start: 2017-03-07 | End: 2017-03-07

## 2017-03-07 RX ORDER — HEPARIN SODIUM 1000 [USP'U]/ML
INJECTION, SOLUTION INTRAVENOUS; SUBCUTANEOUS
Status: DISCONTINUED | OUTPATIENT
Start: 2017-03-07 | End: 2017-03-07

## 2017-03-07 RX ORDER — CEFAZOLIN SODIUM 1 G/3ML
INJECTION, POWDER, FOR SOLUTION INTRAMUSCULAR; INTRAVENOUS
Status: DISCONTINUED | OUTPATIENT
Start: 2017-03-07 | End: 2017-03-07

## 2017-03-07 RX ORDER — ONDANSETRON 2 MG/ML
INJECTION INTRAMUSCULAR; INTRAVENOUS
Status: DISCONTINUED | OUTPATIENT
Start: 2017-03-07 | End: 2017-03-07

## 2017-03-07 RX ORDER — FENTANYL CITRATE 50 UG/ML
INJECTION, SOLUTION INTRAMUSCULAR; INTRAVENOUS
Status: DISCONTINUED | OUTPATIENT
Start: 2017-03-07 | End: 2017-03-07

## 2017-03-07 RX ORDER — HYDROCODONE BITARTRATE AND ACETAMINOPHEN 500; 5 MG/1; MG/1
TABLET ORAL
Status: DISCONTINUED | OUTPATIENT
Start: 2017-03-07 | End: 2017-03-08

## 2017-03-07 RX ORDER — NITROGLYCERIN 5 MG/ML
INJECTION, SOLUTION INTRAVENOUS
Status: DISCONTINUED | OUTPATIENT
Start: 2017-03-07 | End: 2017-03-07

## 2017-03-07 RX ORDER — CETIRIZINE HYDROCHLORIDE 10 MG/1
10 TABLET ORAL DAILY PRN
Status: DISCONTINUED | OUTPATIENT
Start: 2017-03-07 | End: 2017-03-08 | Stop reason: HOSPADM

## 2017-03-07 RX ORDER — ACETAMINOPHEN 325 MG/1
650 TABLET ORAL EVERY 6 HOURS PRN
Status: DISCONTINUED | OUTPATIENT
Start: 2017-03-07 | End: 2017-03-08 | Stop reason: HOSPADM

## 2017-03-07 RX ORDER — ESCITALOPRAM OXALATE 10 MG/1
20 TABLET ORAL DAILY
Status: DISCONTINUED | OUTPATIENT
Start: 2017-03-08 | End: 2017-03-08 | Stop reason: HOSPADM

## 2017-03-07 RX ORDER — SODIUM CHLORIDE 9 MG/ML
INJECTION, SOLUTION INTRAVENOUS CONTINUOUS PRN
Status: DISCONTINUED | OUTPATIENT
Start: 2017-03-07 | End: 2017-03-07

## 2017-03-07 RX ORDER — PRAVASTATIN SODIUM 40 MG/1
40 TABLET ORAL NIGHTLY
Status: DISCONTINUED | OUTPATIENT
Start: 2017-03-07 | End: 2017-03-08 | Stop reason: HOSPADM

## 2017-03-07 RX ORDER — CARVEDILOL 25 MG/1
25 TABLET ORAL 2 TIMES DAILY WITH MEALS
Status: DISCONTINUED | OUTPATIENT
Start: 2017-03-08 | End: 2017-03-08

## 2017-03-07 RX ORDER — CLOPIDOGREL BISULFATE 75 MG/1
75 TABLET ORAL DAILY
Status: DISCONTINUED | OUTPATIENT
Start: 2017-03-08 | End: 2017-03-08 | Stop reason: HOSPADM

## 2017-03-07 RX ORDER — CLONIDINE 0.1 MG/24H
1 PATCH, EXTENDED RELEASE TRANSDERMAL
Status: DISCONTINUED | OUTPATIENT
Start: 2017-03-08 | End: 2017-03-08 | Stop reason: HOSPADM

## 2017-03-07 RX ORDER — PROTAMINE SULFATE 10 MG/ML
INJECTION, SOLUTION INTRAVENOUS
Status: DISCONTINUED | OUTPATIENT
Start: 2017-03-07 | End: 2017-03-07

## 2017-03-07 RX ORDER — NOREPINEPHRINE BITARTRATE/D5W 4MG/250ML
0.02 PLASTIC BAG, INJECTION (ML) INTRAVENOUS CONTINUOUS
Status: DISCONTINUED | OUTPATIENT
Start: 2017-03-07 | End: 2017-03-08

## 2017-03-07 RX ORDER — LIDOCAINE HCL/PF 100 MG/5ML
SYRINGE (ML) INTRAVENOUS
Status: DISCONTINUED | OUTPATIENT
Start: 2017-03-07 | End: 2017-03-07

## 2017-03-07 RX ORDER — DIPHENHYDRAMINE HCL 50 MG
50 CAPSULE ORAL ONCE
Status: COMPLETED | OUTPATIENT
Start: 2017-03-07 | End: 2017-03-07

## 2017-03-07 RX ORDER — NAPROXEN SODIUM 220 MG/1
81 TABLET, FILM COATED ORAL DAILY
Status: DISCONTINUED | OUTPATIENT
Start: 2017-03-08 | End: 2017-03-08 | Stop reason: HOSPADM

## 2017-03-07 RX ORDER — DEXTROSE MONOHYDRATE AND SODIUM CHLORIDE 5; .45 G/100ML; G/100ML
INJECTION, SOLUTION INTRAVENOUS CONTINUOUS
Status: DISCONTINUED | OUTPATIENT
Start: 2017-03-07 | End: 2017-03-07

## 2017-03-07 RX ORDER — CEFAZOLIN SODIUM 1 G/50ML
1 SOLUTION INTRAVENOUS
Status: COMPLETED | OUTPATIENT
Start: 2017-03-07 | End: 2017-03-07

## 2017-03-07 RX ORDER — ESCITALOPRAM OXALATE 20 MG/1
20 TABLET ORAL DAILY
Status: DISCONTINUED | OUTPATIENT
Start: 2017-03-07 | End: 2017-03-07

## 2017-03-07 RX ADMIN — DIPHENHYDRAMINE HYDROCHLORIDE 50 MG: 50 CAPSULE ORAL at 09:03

## 2017-03-07 RX ADMIN — SODIUM CHLORIDE, SODIUM LACTATE, POTASSIUM CHLORIDE, AND CALCIUM CHLORIDE: 600; 310; 30; 20 INJECTION, SOLUTION INTRAVENOUS at 09:03

## 2017-03-07 RX ADMIN — SODIUM CHLORIDE: 0.9 INJECTION, SOLUTION INTRAVENOUS at 12:03

## 2017-03-07 RX ADMIN — ASPIRIN 81 MG: 81 TABLET, COATED ORAL at 09:03

## 2017-03-07 RX ADMIN — LIDOCAINE HYDROCHLORIDE 100 MG: 20 INJECTION, SOLUTION INTRAVENOUS at 11:03

## 2017-03-07 RX ADMIN — CEFAZOLIN 2 G: 1 INJECTION, POWDER, FOR SOLUTION INTRAMUSCULAR; INTRAVENOUS; PARENTERAL at 10:03

## 2017-03-07 RX ADMIN — CEFAZOLIN SODIUM 1 G: 1 SOLUTION INTRAVENOUS at 12:03

## 2017-03-07 RX ADMIN — DEXMEDETOMIDINE HYDROCHLORIDE 1 MCG/KG/HR: 100 INJECTION, SOLUTION, CONCENTRATE INTRAVENOUS at 09:03

## 2017-03-07 RX ADMIN — NITROGLYCERIN 25 MCG: 5 INJECTION, SOLUTION INTRAVENOUS at 10:03

## 2017-03-07 RX ADMIN — DEXTROSE AND SODIUM CHLORIDE: 5; .45 INJECTION, SOLUTION INTRAVENOUS at 08:03

## 2017-03-07 RX ADMIN — PROTAMINE SULFATE 50 MG: 10 INJECTION, SOLUTION INTRAVENOUS at 11:03

## 2017-03-07 RX ADMIN — SODIUM CHLORIDE: 0.9 INJECTION, SOLUTION INTRAVENOUS at 09:03

## 2017-03-07 RX ADMIN — ONDANSETRON 4 MG: 2 INJECTION INTRAMUSCULAR; INTRAVENOUS at 11:03

## 2017-03-07 RX ADMIN — CEFAZOLIN SODIUM 1 G: 1 SOLUTION INTRAVENOUS at 08:03

## 2017-03-07 RX ADMIN — NITROGLYCERIN 100 MCG: 5 INJECTION, SOLUTION INTRAVENOUS at 10:03

## 2017-03-07 RX ADMIN — FENTANYL CITRATE 50 MCG: 50 INJECTION, SOLUTION INTRAMUSCULAR; INTRAVENOUS at 09:03

## 2017-03-07 RX ADMIN — PRAVASTATIN SODIUM 40 MG: 40 TABLET ORAL at 08:03

## 2017-03-07 RX ADMIN — MIDAZOLAM HYDROCHLORIDE 1 MG: 1 INJECTION INTRAMUSCULAR; INTRAVENOUS at 09:03

## 2017-03-07 RX ADMIN — HEPARIN SODIUM 10000 UNITS: 1000 INJECTION INTRAVENOUS; SUBCUTANEOUS at 10:03

## 2017-03-07 NOTE — IP AVS SNAPSHOT
[facility]  [address]  [facility] [address]  Phone: [phone]           Patient Discharge Instructions     Our goal is to set you up for success. This packet includes information on your condition, medications, and your home care. It will help you to care for yourself so you don't get sicker and need to go back to the hospital.     Please ask your nurse if you have any questions.        There are many details to remember when preparing to leave the hospital. Here is what you will need to do:    1. Take your medicine. If you are prescribed medications, review your Medication List in the following pages. You may have new medications to  at the pharmacy and others that you'll need to stop taking. Review the instructions for how and when to take your medications. Talk with your doctor or nurses if you are unsure of what to do.     2. Go to your follow-up appointments. Specific follow-up information is listed in the following pages. Your may be contacted by a transition nurse or clinical provider about future appointments. Be sure we have all of the phone numbers to reach you, if needed. Please contact your provider's office if you are unable to make an appointment.     3. Watch for warning signs. Your doctor or nurse will give you detailed warning signs to watch for and when to call for assistance. These instructions may also include educational information about your condition. If you experience any of warning signs to your health, call your doctor.               [facility]  Unless otherwise directed by your provider, please contact [facility], our nurse care line that is available for 24/7 assistance.     [phone] (toll-free)    Registered nurses in the [facility] provide clinical advisement, health education, appointment booking, and other advisory services.                    ** Verify the list of medication(s) below is accurate and up  to date. Carry this with you in case of emergency. If your medications have changed, please notify your healthcare provider.             Medication List      START taking these medications        Additional Info                      furosemide 20 MG tablet   Commonly known as:  LASIX   Quantity:  30 tablet   Refills:  5    Instructions:  Take 1 pill daily as need for weight gain, swelling, or shortness of breath     Begin Date    AM    Noon    PM    Bedtime         CONTINUE taking these medications        Additional Info                      acetaminophen 325 MG tablet   Commonly known as:  TYLENOL   Refills:  0   Dose:  650 mg    Instructions:  Take 650 mg by mouth every 6 (six) hours as needed for Pain.     Begin Date    AM    Noon    PM    Bedtime       albuterol 90 mcg/actuation inhaler   Quantity:  18 g   Refills:  0   Dose:  1 puff    Instructions:  Inhale 1 puff into the lungs every 6 (six) hours as needed for Wheezing. 1 HFA Aerosol Inhaler Inhalation Twice a day     Begin Date    AM    Noon    PM    Bedtime       allopurinol 100 MG tablet   Commonly known as:  ZYLOPRIM   Quantity:  90 tablet   Refills:  2    Last time this was given:  100 mg on 3/8/2017  8:47 AM   Instructions:  TAKE 1 TABLET BY MOUTH ONCE DAILY.     Begin Date    AM    Noon    PM    Bedtime       aspirin 81 MG Chew   Refills:  0   Dose:  81 mg    Last time this was given:  81 mg on 3/8/2017  8:47 AM   Instructions:  Take 81 mg by mouth. 1 Tablet, Chewable Oral Every day     Begin Date    AM    Noon    PM    Bedtime       carvedilol 25 MG tablet   Commonly known as:  COREG   Quantity:  60 tablet   Refills:  11    Instructions:  TAKE 1 TABLET BY MOUTH TWICE A DAY With FOOD.     Begin Date    AM    Noon    PM    Bedtime       cetirizine 10 MG tablet   Commonly known as:  ZYRTEC   Quantity:  30 tablet   Refills:  11   Dose:  10 mg    Instructions:  Take 1 tablet (10 mg total) by mouth daily as needed for Allergies.     Begin Date    AM    Noon     PM    Bedtime       cholecalciferol (vitamin D3) 50,000 unit capsule   Quantity:  12 capsule   Refills:  0   Dose:  59788 Units    Instructions:  Take 1 capsule (50,000 Units total) by mouth once a week. 1 Capsule Oral Weekly     Begin Date    AM    Noon    PM    Bedtime       cloNIDine 0.1 mg/24 hr td ptwk 0.1 mg/24 hr   Commonly known as:  CATAPRES   Quantity:  4 patch   Refills:  6    Last time this was given:  1 patch on 3/8/2017  8:48 AM   Instructions:  PLACE 1 PATCH ONTO THE SKIN EVERY 7 DAYS.     Begin Date    AM    Noon    PM    Bedtime       clopidogrel 75 mg tablet   Commonly known as:  PLAVIX   Quantity:  30 tablet   Refills:  0   Dose:  75 mg    Last time this was given:  75 mg on 3/8/2017  8:47 AM   Instructions:  Take 1 tablet (75 mg total) by mouth once daily.     Begin Date    AM    Noon    PM    Bedtime       escitalopram oxalate 20 MG tablet   Commonly known as:  LEXAPRO   Quantity:  30 tablet   Refills:  11   Dose:  20 mg    Last time this was given:  20 mg on 3/8/2017  8:47 AM   Instructions:  Take 1 tablet (20 mg total) by mouth once daily.     Begin Date    AM    Noon    PM    Bedtime       hydrALAZINE 100 MG tablet   Commonly known as:  APRESOLINE   Quantity:  90 tablet   Refills:  11    Last time this was given:  100 mg on 3/8/2017  5:27 AM   Instructions:  TAKE 1 TABLET (100 MG TOTAL) BY MOUTH EVERY 8 (EIGHT) HOURS.     Begin Date    AM    Noon    PM    Bedtime       inhalation device   Commonly known as:  AEROCHAMBER PLUS FLOW-VU   Quantity:  1 Device   Refills:  0    Instructions:  Use as directed for inhalation.     Begin Date    AM    Noon    PM    Bedtime       irbesartan 300 MG tablet   Commonly known as:  AVAPRO   Quantity:  30 tablet   Refills:  11    Instructions:  TAKE 1 TABLET (300 MG TOTAL) BY MOUTH EVERY EVENING.     Begin Date    AM    Noon    PM    Bedtime       isosorbide mononitrate 60 MG 24 hr tablet   Commonly known as:  IMDUR   Quantity:  90 tablet   Refills:  3    Dose:  60 mg    Last time this was given:  60 mg on 3/8/2017  8:47 AM   Instructions:  Take 1 tablet (60 mg total) by mouth once daily.     Begin Date    AM    Noon    PM    Bedtime       nitroglycerin 400 mcg/spray   Commonly known as:  NITROMIST   Quantity:  8.5 g   Refills:  0   Dose:  1 spray    Instructions:  Place 1 spray onto the tongue every 5 (five) minutes as needed.     Begin Date    AM    Noon    PM    Bedtime       pravastatin 40 MG tablet   Commonly known as:  PRAVACHOL   Quantity:  90 tablet   Refills:  2    Last time this was given:  40 mg on 3/7/2017  8:30 PM   Instructions:  TAKE 1 TABLET BY MOUTH EVERY EVENING     Begin Date    AM    Noon    PM    Bedtime            Where to Get Your Medications      These medications were sent to [facility]/pharmacy #[id number] - [place]son  [address] AT [facility]  [address] [address]     Phone:  [phone]     furosemide 20 MG tablet         You can get these medications from any pharmacy     Bring a paper prescription for each of these medications     cetirizine 10 MG tablet                  Please bring to all follow up appointments:    1. A copy of your discharge instructions.  2. All medicines you are currently taking in their original bottles.  3. Identification and insurance card.    Please arrive 15 minutes ahead of scheduled appointment time.    Please call 24 hours in advance if you must reschedule your appointment and/or time.        Referrals     Future Orders    Ambulatory referral to Outpatient Case Management     Questions:    Does the patient have a chronic or uncontrolled disease process?:      Does the patient have a new diagnosis of a catastrophic or life altering illness/treatment?:      Does the patient have any psycho-social issues that may affect their ability to adhere to treatment plan?:      Does patient have any behaviors or circumstances that may impede ability to adhere to treatment plan?:       Is patient at risk for admission/readmission?:          Discharge Instructions     Future Orders    Type & Screen     Activity as tolerated     Call MD for:  difficulty breathing or increased cough     Call MD for:  increased confusion or weakness     Call MD for:  persistent dizziness, light-headedness, or visual disturbances     Call MD for:  persistent nausea and vomiting or diarrhea     Call MD for:  redness, tenderness, or signs of infection (pain, swelling, redness, odor or green/yellow discharge around incision site)     Call MD for:  severe persistent headache     Call MD for:  severe uncontrolled pain     Call MD for:  temperature >100.4     Call MD for:  worsening rash     Diet general     Questions:    Total calories:      Fat restriction, if any:      Protein restriction, if any:      Na restriction, if any:      Fluid restriction:      Additional restrictions:  Cardiac (Low Na/Chol)    Lifting restrictions     Scheduling Instructions:    No lifting more than 5 lbs for 5 days        Discharge Instructions       Weigh yourself on the same scale every morning. If you gain more than 3 lbs in 1 day or more than 5 lbs in 1 week, or if you experience worsening shortness of breath, swelling in your feet or legs, or difficulty laying flat, take a Lasix pill until you reach your normal weight or your symptoms have resolved. If you have any questions, you can call our office at [phone].     You will need to take a single dose of antibiotics prior to certain dental procedures, colonoscopies, or bladder procedures. We can call this prescription in for you or the physician performing the procedure can call it in for you. If you have questions about whether or not a procedure will require antibiotics, you can call our office. Always let your physician know that you have an artificial heart valve.         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      Admission Information   "   Date & Time Provider Department CSN    3/7/2017  8:11 AM Guillermo Duran MD [facility] [id number]      Care Providers     Provider Role Specialty Primary office phone    Guillermo Duran MD Attending Provider Cardiology [phone]    Kash Moise MD Team Attending  Cardiology [phone]    Hank Queen MD Team Attending  Electrophysiology [phone]    Shelton Weiss MD Team Attending  Electrophysiology [phone]    Sidney Bruce MD Team Attending  Cardiology [phone]    Samuel South MD Team Attending  Electrophysiology [phone]      Your Vitals Were     BP Pulse Temp Resp Height Weight    157/110 60 97.8 °F (36.6 °C) (Oral) 20 5' 1" (1.549 m) 79.9 kg (176 lb 2.4 oz)    Last Period SpO2 BMI          (LMP Unknown) 96% 33.28 kg/m2        Recent Lab Values        3/29/2012 9/23/2012 10/5/2013 8/29/2014 4/30/2015 6/12/2015 11/29/2015 1/18/2017      8:44 AM 12:30 PM  9:54 AM 11:15 AM 10:29 AM 12:36 PM  4:27 AM  1:33 AM    A1C 6.9 (H) 6.7 (H) 7.8 (H) 6.6 (H) 6.5 (H) 6.6 (H) 5.4 6.3 (H)    Comment for A1C at  1:33 AM on 1/18/2017:  According to ADA guidelines, hemoglobin A1C <7.0% represents  optimal control in non-pregnant diabetic patients.  Different  metrics may apply to specific populations.   Standards of Medical Care in Diabetes - 2016.  For the purpose of screening for the presence of diabetes:  <5.7%     Consistent with the absence of diabetes  5.7-6.4%  Consistent with increasing risk for diabetes   (prediabetes)  >or=6.5%  Consistent with diabetes  Currently no consensus exists for use of hemoglobin A1C  for diagnosis of diabetes for children.        Pending Labs     Order Current Status    Prepare RBC 4 Units; pre-operative Preliminary result      Allergies as of 3/8/2017        Reactions    Indocin [Indomethacin Sodium] Other (See Comments)    Lotensin [Benazepril] Other (See Comments)      Advance Directives     An advance directive is a document " which, in the event you are no longer able to make decisions for yourself, tells your healthcare team what kind of treatment you do or do not want to receive, or who you would like to make those decisions for you.  If you do not currently have an advance directive, [facility] encourages you to create one.  For more information call:  [phone]), [phone] ([phone]),  or log on to [email or web address].        Language Assistance Services     ATTENTION: Language assistance services are available, free of charge. Please call [phone].      ATENCIÓN: Si habla español, tiene a riley disposición servicios gratuitos de asistencia lingüística. Llame al [phone].     CHÚ Ý: N?u b?n nói Ti?ng Vi?t, có các d?ch v? h? tr? ngôn ng? mi?n phí dành cho b?n. G?i s? [phone].        Heart Failure Education       Heart Failure: Being Active  You have a condition called heart failure. Being active doesnt mean that you have to wear yourself out. Even a little movement each day helps to strengthen your heart. If you cant get out to exercise, you can do simple stretching and strengthening exercises at home. These are good ways to keep you well-conditioned and prevent you and your heart from becoming excessively weak.    Ideas to get you started  · Add a little movement to things you do now. Walk to mail letters. Park your car at the far end of the parking lot and walk to the store. Walk up a flight of stairs instead of taking the elevator.  · Choose activities you enjoy. You might walk, swim, or ride an exercise bike. Things like gardening and washing the car count, too. Other possibilities include: washing dishes, walking the dog, walking around the mall, and doing aerobic activities with friends.  · Join a group exercise program at a [facility], a senior center, or a community center. Or look into a hospital cardiac rehabilitation program. Ask your doctor if you qualify.  Tips to keep you  going  · Get up and get dressed each day. Go to a coffee shop and read a newspaper or go somewhere that you'll be in the presence of other active people. Youll feel more like being active.  · Make a plan. Choose one or more activities that you enjoy and that you can easily do. Then plan to do at least one each day. You might write your plan on a calendar.  · Go with a friend or a group if you like company. This can help you feel supported and stay motivated, too.  · Plan social events that you enjoy. This will keep you mentally engaged as well as physically motivated to do things you find pleasure in.  For your safety  · Talk with your healthcare provider before starting an exercise program.  · Exercise indoors when its too hot or too cold outside, or when the air quality is poor. Try walking at a shopping mall.  · Wear socks and sturdy shoes to maintain your balance and prevent falls.  · Start slowly. Do a few minutes several times a day at first. Increase your time and speed little by little.  · Stop and rest whenever you feel tired or get short of breath.  · Dont push yourself on days when you dont feel well.  Date Last Reviewed: 3/20/2016  © 9024-9801 [employer]. [address] [place] [address], [place] [address]. All rights reserved. This information is not intended as a substitute for professional medical care. Always follow your healthcare professional's instructions.              Heart Failure: Evaluating Your Heart  You have a condition called heart failure. To evaluate your condition, your doctor will examine you, ask questions, and do some tests. Along with looking for signs of heart failure, the doctor looks for any other health problems that may have led to heart failure. The results of your evaluation will help your doctor form a treatment plan.  Health history and physical exam  Your visit will start with a health history. Tell the doctor about any symptoms youve noticed and about all  medicines you take. Then youll have a physical exam. This includes listening to your heartbeat and breathing. Youll also be checked for swelling (edema) in your legs and neck. When you have fluid buildup or fluid in the lungs, it may be called congestive heart failure.  Diagnosing heart failure     During an echocardiogram, sound waves bounce off the heart. These are converted into a picture on the screen.   The following may be done to help your doctor form a diagnosis:  · X-rays show the size and shape of your heart. These pictures can also show fluid in your lungs.  · An electrocardiogram (ECG or EKG) shows the pattern of your heartbeat. Small pads (electrodes) are placed on your chest, arms, and legs. Wires connect the pads to the ECG machine, which records your hearts electrical signals. This can give the doctor information about heart function.  · An echocardiogram uses ultrasound waves to show the structure and movement of your heart muscle. This shows how well the heart pumps. It also shows the thickness of the heart walls, and if the heart is enlarged. It is one of the most useful, non-invasive tests as it provides information about the heart's general function. This helps your doctor make treatment decisions.  · Lab tests evaluate small amounts of blood or urine for signs of problems. A BNP lab test can help diagnose and evaluate heart failure. BNP stands for B-type natriuretic peptide. The ventricles secrete more BNP when heart failure worsens. Lab tests can also provide information about metabolic dysfunction or heart dysfunction.  Your treatment plan  Based on the results of your evaluation and tests, your doctor will develop a treatment plan. This plan is designed to relieve some of your heart failure symptoms and help make you more comfortable. Your treatment plan may include:  · Medicine to help your heart work better and improve your quality of life  · Changes in what you eat and drink to help  prevent fluid from backing up in your body  · Daily monitoring of your weight and heart failure symptoms to see how well your treatment plan is working  · Exercise to help you stay healthy  · Help with quitting smoking  · Emotional and psychological support to help adjust to the changes  · Referrals to other specialists to make sure you are being treated comprehensively  Date Last Reviewed: 3/21/2016  © 0357-1006 [employer]. [address] [place] [address], [place] [address]. All rights reserved. This information is not intended as a substitute for professional medical care. Always follow your healthcare professional's instructions.              Heart Failure: Making Changes to Your Diet  You have a condition called heart failure. When you have heart failure, excess fluid is more likely to build up in your body because your heart isn't working well. This makes the heart work harder to pump blood. Fluid buildup causes symptoms such as shortness of breath and swelling (edema). This is often referred to as congestive heart failure or CHF. Controlling the amount of salt (sodium) you eat may help stop fluid from building up. Your doctor may also tell you to reduce the amount of fluid you drink.  Reading food labels    Your healthcare provider will tell you how much sodium you can eat each day. Read food labels to keep track. Keep in mind that certain foods are high in salt. These include canned, frozen, and processed foods. Check the amount of sodium in each serving. Watch out for high-sodium ingredients. These include MSG (monosodium glutamate), baking soda, and sodium phosphate.   Eating less salt  Give yourself time to get used to eating less salt. It may take a little while. Here are some tips to help:  · Take the saltshaker off the table. Replace it with salt-free herb mixes and spices.  · Eat fresh or plain frozen vegetables. These have much less salt than canned vegetables.  · Choose low-sodium snacks like  sodium-free pretzels, crackers, or air-popped popcorn.  · Dont add salt to your food when youre cooking. Instead, season your foods with pepper, lemon, garlic, or onion.  · When you eat out, ask that your food be cooked without added salt.  · Avoid eating fried foods as these often have a great deal of salt.  If youre told to limit fluids  You may need to limit how much fluid you have to help prevent swelling. This includes anything that is liquid at room temperature, such as ice cream and soup. If your doctor tells you to limit fluid, try these tips:  · Measure drinks in a measuring cup before you drink them. This will help you meet daily goals.  · Chill drinks to make them more refreshing.  · Suck on frozen lemon wedges to quench thirst.  · Only drink when youre thirsty.  · Chew sugarless gum or suck on hard candy to keep your mouth moist.  · Weigh yourself daily to know if your body's fluid content is rising.  My sodium goal  Your healthcare provider may give you a sodium goal to meet each day. This includes sodium found in food as well as salt that you add. My goal is to eat no more than ___________ mg of sodium per day.     When to call your doctor  Call your doctor right away if you have any symptoms of worsening heart failure. These can include:  · Sudden weight gain  · Increased swelling of your legs or ankles  · Trouble breathing when youre resting or at night  · Increase in the number of pillows you have to sleep on  · Chest pain, pressure, discomfort, or pain in the jaw, neck, or back   Date Last Reviewed: 3/21/2016  © 8614-2169 [employer]. [address] [place] [address], [place] [address]. All rights reserved. This information is not intended as a substitute for professional medical care. Always follow your healthcare professional's instructions.              Heart Failure: Medicines to Help Your Heart    You have a condition called heart failure (also known as congestive heart failure, or  CHF). Your doctor will likely prescribe medicines for heart failure and any underlying health problems you have. Most heart failure patients take one or more types of medicinen. Your healthcare provider will work to find the combination of medicines that works best for you.  Heart failure medicines  Here are the most common heart failure medicines:  · ACE inhibitors lower blood pressure and decrease strain on the heart. This makes it easier for the heart to pump. Angiotensin receptor blockers have similar effects. These are prescribed for some patients instead of ACE inhibitors.  · Beta-blockers relieve stress on the heart. They also improve symptoms. They may also improve the heart's pumping action over time.  · Diuretics (also called water pills) help rid your body of excess water. This can help rid your body of swelling (edema). Having less fluid to pump means your heart doesnt have to work as hard. Some diuretics make your body lose a mineral called potassium. Your doctor will tell you if you need to take supplements or eat more foods high in potassium.  · Digoxin helps your heart pump with more strength. This helps your heart pump more blood with each beat. So, more oxygen-rich blood travels to the rest of the body.  · Aldosterone antagonists help alter hormones and decrease strain on the heart.  · Hydralazine and nitrates are two separate medicines used together to treat heart failure. They may come in one combination pill. They lower blood pressure and decrease how hard the heart has to pump.  Medicines for related conditions  Controlling other heart problems helps keep heart failure under control, too. Depending on other heart problems you have, medicines may be prescribed to:  · Lower blood pressure (antihypertensives).  · Lower cholesterol levels (statins).  · Prevent blood clots (anticoagulants or aspirin).  · Keep the heartbeat steady (antiarrhythmics).  Date Last Reviewed: 3/5/2016  © 0146-0130 [employer]. [address] [place] [address], [place] [address]. All rights reserved. This information is not intended as a substitute for professional medical care. Always follow your healthcare professional's instructions.              Heart Failure: Procedures That May Help    The heart is a muscle that pumps oxygen-rich blood to all parts of the body. When you have heart failure, the heart is not able to pump as well as it should. Blood and fluid may back up into the lungs (congestive heart failure), and some parts of the body dont get enough oxygen-rich blood to work normally. These problems lead to the symptoms of heart failure.     Certain procedures may help the heart pump better in some cases of heart failure. Some procedures are done to treat health problems that may have caused the heart failure such as coronary artery disease or heart rhythm problems. For more serious heart failure, other options are available.  Treating artery and valve problems  If you have coronary artery disease or valve disease, procedures may be done to improve blood flow. This helps the heart pump better, which can improve heart failure symptoms. First, your doctor may do a cardiac catheterization to help detect clogged blood vessels or valve damage. During this procedure, a  thin tube (catheter) in inserted into a blood vessel and guided to the heart. There a dye is injected and a special type of X-ray (angiogram) is taken of the blood vessels. Procedures to open a blocked artery or fix damaged valves can also be done using catheterization.  · Angioplasty uses a balloon-tipped instrument at the end of the catheter. The balloon is inflated to widen the narrowed artery. In many cases, a stent is expanded to further support the narrowed artery. A stent is a metal mesh tube.  · Valve surgery repairs or replacement of faulty valves can also be done during catheterization so blood can flow properly through the chambers of the  heart.  Bypass surgery is another option to help treat blocked arteries. It uses a healthy blood vessel from elsewhere in the body. The healthy blood vessel is attached above and below the blocked area so that blood can flow around the blocked artery.  Treating heart rhythm problems  A device may be placed in the chest to help a weak heart maintain a healthy, heartbeat so the heart can pump more effectively:  · Pacemaker. A pacemaker is an implanted device that regulates your heartbeat electronically. It monitors your heart's rhythm and generates a painless electric impulse that helps the heart beat in a regular rhythm. A pacemaker is programmed to meet your specific heart rhythm needs.  · Biventricular pacing/cardiac resynchronization therapy. A type of pacemaker that paces both pumping chambers of the heart at the same time to coordinate contractions and to improve the heart's function. Some people with heart failure are candidates for this therapy.  · Implantable cardioverter defibrillator. A device similar to a pacemaker that senses when the heart is beating too fast and delivers an electrical shock to convert the fast rhythm to a normal rhythm. This can be a life saving device.  In severe cases  In more serious cases of heart failure when other treatments no longer work, other options may include:  · Ventricular assist devices (VADs). These are mechanical devices used to take over the pumping function for one or both of the heart's ventricles, or pumping chambers. A VAD may be necessary when heart failure progresses to the point that medicines and other treatments no longer help. In some cases, a VAD may be used as a bridge to transplant.  · Heart transplant. This is replacing the diseased heart with a healthy one from a donor. This is an option for a few people who are very sick. A heart transplant is very serious and not an option for all patients. Your doctor can tell you more.  Date Last Reviewed:  3/20/2016  © 3902-6848 [employer]. [address] [place] [address], [place] [address]. All rights reserved. This information is not intended as a substitute for professional medical care. Always follow your healthcare professional's instructions.              Heart Failure: Tracking Your Weight  You have a condition called heart failure. When you have heart failure, a sudden weight gain or a steady rise in weight is a warning sign that your body is retaining too much water and salt. This could mean your heart failure is getting worse. If left untreated, it can cause problems for your lungs and result in shortness of breath. Weighing yourself each day is the best way to know if youre retaining water. If your weight goes up quickly, call your doctor. You will be given instructions on how to get rid of the excess water. You will likely need medicines and to avoid salt. This will help your heart work better.  Call your doctor if you gain more than 2 pounds in 1 day, more than 5 pounds in 1 week, or whatever weight gain you were told to report by your doctor. This is often a sign of worsening heart failure and needs to be evaluated and treated. Your doctor will tell you what to do next.   Tips for weighing yourself    · Weigh yourself at the same time each morning, wearing the same clothes. Weigh yourself after urinating and before eating.  · Use the same scale each day. Make sure the numbers are easy to read. Put the scale on a flat, hard surface -- not on a rug or carpet.  · Do not stop weighing yourself. If you forget one day, weigh again the next morning.  How to use your weight chart  · Keep your weight chart near the scale. Write your weight on the chart as soon as you get off the scale.  · Fill in the month and the start date on the chart. Then write down your weight each day. Your chart will look like this:    · If you miss a day, leave the space blank. Weigh yourself the next day and write your weight in  the next space.  · Take your weight chart with you when you go to see your doctor.  Date Last Reviewed: 3/20/2016  © 5292-4713 [employer]. [address] [place] [address], [place] [address]. All rights reserved. This information is not intended as a substitute for professional medical care. Always follow your healthcare professional's instructions.              Heart Failure: Warning Signs of a Flare-Up  You have a condition called heart failure. Once you have heart failure, flare-ups can happen. Below are signs that can mean your heart failure is getting worse. If you notice any of these warning signs, call your healthcare provider.  Swelling    · Your feet, ankles, or lower legs get puffier.  · You notice skin changes on your lower legs.  · Your shoes feel too tight.  · Your clothes are tighter in the waist.  · You have trouble getting rings on or off your fingers.  Shortness of breath  · You have to breathe harder even when youre doing your normal activities or when youre resting.  · You are short of breath walking up stairs or even short distances.  · You wake up at night short of breath or coughing.  · You need to use more pillows or sit up to sleep.  · You wake up tired or restless.  Other warning signs  · You feel weaker, dizzy, or more tired.  · You have chest pain or changes in your heartbeat.  · You have a cough that wont go away.  · You cant remember things or dont feel like eating.  Tracking your weight  Gaining weight is often the first warning sign that heart failure is getting worse. Gaining even a few pounds can be a sign that your body is retaining excess water and salt. Weighing yourself each day in the morning after you urinate and before you eat, is the best way to know if you're retaining water. Get a scale that is easy to read and make sure you wear the same clothes and use the same scale every time you weigh. Your healthcare provider will show you how to track your weight. Call your  doctor if you gain more than 2 pounds in 1 day, 5 pounds in 1 week, or whatever weight gain you were told to report by your doctor. This is often a sign of worsening heart failure and needs to be evaluated and treated before it compromises your breathing. Your doctor will tell you what to do next.    Date Last Reviewed: 3/15/2016  © 3581-1985 [employer]. [address] [place] [address], [place] [address]. All rights reserved. This information is not intended as a substitute for professional medical care. Always follow your healthcare professional's instructions.              Chronic [place] Disease Education             Diabetes Discharge Instructions                                    [facility] complies with applicable Federal civil rights laws and does not discriminate on the basis of race, color, national origin, age, disability, or sex.

## 2017-03-07 NOTE — CONSULTS
Electrophysiology Consult Note  Attending Physician: Guillermo Duran MD  Reason for Consult: Complete heart block post TAVR      HPI:   84 y.o. Female with PMH of CAD s/p CABG x 3 2010,  HTN, HLD, DM-2 and Severe Aortic Stenosis s/p TAVR today developed CHB after procedure EP consulted for PPM placement.    ROS:    Unable to assess patient still under sedation effect   PMH:     Past Medical History:   Diagnosis Date    Arthritis     CHF (congestive heart failure)     Coronary artery disease     Diabetes mellitus     Glaucoma     Gout, joint     Hx of CABG 9/21/10    Hyperlipidemia     Hypertension     NSTEMI (non-ST elevated myocardial infarction) 09/21/10    Systolic heart failure 6/19/2015     Past Surgical History:   Procedure Laterality Date    CATARACT EXTRACTION      CORONARY ARTERY BYPASS GRAFT  9/21/2010    JOINT REPLACEMENT       Allergies:     Review of patient's allergies indicates:   Allergen Reactions    Indocin [indomethacin sodium] Other (See Comments)    Lotensin [benazepril] Other (See Comments)     Medications:     No current facility-administered medications on file prior to encounter.      Current Outpatient Prescriptions on File Prior to Encounter   Medication Sig Dispense Refill    allopurinol (ZYLOPRIM) 100 MG tablet TAKE 1 TABLET BY MOUTH ONCE DAILY. 90 tablet 2    aspirin 81 MG chewable tablet Take 81 mg by mouth. 1 Tablet, Chewable Oral Every day      carvedilol (COREG) 25 MG tablet TAKE 1 TABLET BY MOUTH TWICE A DAY With FOOD. 60 tablet 11    cetirizine (ZYRTEC) 10 MG tablet Take 1 tablet (10 mg total) by mouth once daily. (Patient taking differently: Take 10 mg by mouth as needed. ) 30 tablet 0    cholecalciferol, vitamin D3, 50,000 unit capsule Take 1 capsule (50,000 Units total) by mouth once a week. 1 Capsule Oral Weekly 12 capsule 0    cloNIDine 0.1 mg/24 hr td ptwk (CATAPRES) 0.1 mg/24 hr PLACE 1 PATCH ONTO THE SKIN EVERY 7 DAYS. 4 patch 6     clopidogrel (PLAVIX) 75 mg tablet Take 1 tablet (75 mg total) by mouth once daily. 30 tablet 0    escitalopram oxalate (LEXAPRO) 20 MG tablet Take 1 tablet (20 mg total) by mouth once daily. 30 tablet 11    hydrALAZINE (APRESOLINE) 100 MG tablet TAKE 1 TABLET (100 MG TOTAL) BY MOUTH EVERY 8 (EIGHT) HOURS. 90 tablet 11    irbesartan (AVAPRO) 300 MG tablet TAKE 1 TABLET (300 MG TOTAL) BY MOUTH EVERY EVENING. 30 tablet 11    pravastatin (PRAVACHOL) 40 MG tablet TAKE 1 TABLET BY MOUTH EVERY EVENING 90 tablet 2    acetaminophen (TYLENOL) 325 MG tablet Take 650 mg by mouth every 6 (six) hours as needed for Pain.      albuterol 90 mcg/actuation inhaler Inhale 1 puff into the lungs every 6 (six) hours as needed for Wheezing. 1 HFA Aerosol Inhaler Inhalation Twice a day 18 g 0    inhalation device (AEROCHAMBER PLUS FLOW-VU) Use as directed for inhalation. 1 Device 0    nitroglycerin 400 mcg/spray SprA Place 1 spray onto the tongue every 5 (five) minutes as needed. 8.5 g 0       Inpatient Medications   Continuous Infusions:   sodium chloride 0.9%      norepinephrine bitartrate-D5W       Scheduled Meds:   [START ON 3/8/2017] allopurinol  100 mg Oral Daily    [START ON 3/8/2017] aspirin  81 mg Oral Daily    [START ON 3/8/2017] carvedilol  25 mg Oral BID WM    ceFAZolin (ANCEF) IVPB  1 g Intravenous Q8H    [START ON 3/8/2017] cloNIDine 0.1 mg/24 hr td ptwk  1 patch Transdermal Q7 Days    [START ON 3/8/2017] clopidogrel  75 mg Oral Daily    [START ON 3/8/2017] escitalopram oxalate  20 mg Oral Daily    [START ON 3/8/2017] hydrALAZINE  100 mg Oral Q8H    [START ON 3/8/2017] irbesartan  300 mg Oral QHS    [START ON 3/8/2017] isosorbide mononitrate  60 mg Oral Daily    pravastatin  40 mg Oral QHS     PRN Meds:sodium chloride, acetaminophen, cetirizine     Social History:     Social History   Substance Use Topics    Smoking status: Never Smoker    Smokeless tobacco: Never Used    Alcohol use No     Family  History:     Family History   Problem Relation Age of Onset    Diabetes Mother     Hypertension Father     Diabetes Father     Glaucoma Father     No Known Problems Sister     No Known Problems Brother     No Known Problems Maternal Aunt     No Known Problems Maternal Uncle     No Known Problems Paternal Aunt     No Known Problems Paternal Uncle     No Known Problems Maternal Grandmother     No Known Problems Maternal Grandfather     No Known Problems Paternal Grandmother     No Known Problems Paternal Grandfather     Amblyopia Neg Hx     Blindness Neg Hx     Cancer Neg Hx     Cataracts Neg Hx     Macular degeneration Neg Hx     Retinal detachment Neg Hx     Strabismus Neg Hx     Stroke Neg Hx     Thyroid disease Neg Hx      Physical Exam:     Vitals:  Temp:  [97.8 °F (36.6 °C)]   Pulse:  [66-67]   Resp:  [20]   BP: (158-185)/(65-77)   SpO2:  [96 %]  I/O's:    Intake/Output Summary (Last 24 hours) at 03/07/17 1218  Last data filed at 03/07/17 1100   Gross per 24 hour   Intake              900 ml   Output                0 ml   Net              900 ml        Constitutional: Under sedation effect   HEENT: Sclera anicteric, PERRLA, EOMI  Neck: No JVD, no carotid bruits  CV: RRR, no murmur, normal S1/S2  Pulm: CTAB, no wheezes, rales, or ronchi  GI: Abdomen soft, NTND, +BS  Extremities: No LE edema, warm and well perfused  Skin: No ecchymosis, erythema, or ulcers  Psych: AOx3, appropriate affect  Neuro: CNII-XII intact, no focal deficits    Labs:       Recent Labs  Lab 03/06/17  1324      K 5.1      CO2 27   BUN 37*   CREATININE 1.7*   ANIONGAP 5*     No results for input(s): AST, ALT, ALKPHOS, BILITOT, BILIDIR, ALBUMIN in the last 168 hours.  No results for input(s): TROPONINI, BNP in the last 168 hours.   Recent Labs  Lab 03/06/17  1324   WBC 11.48   HGB 9.0*   HCT 29.3*          Recent Labs  Lab 03/06/17  1324   INR 1.1     Lab Results   Component Value Date    CHOL 109 (L)  12/05/2016    HDL 42 12/05/2016    LDLCALC 49.6 (L) 12/05/2016    TRIG 87 12/05/2016     Lab Results   Component Value Date    HGBA1C 6.3 (H) 01/18/2017        Micro:  Blood Cultures  Lab Results   Component Value Date    LABBLOO No growth after 5 days. 11/28/2015    LABBLOO No growth after 5 days. 11/28/2015    LABBLOO No growth after 5 days. 09/14/2015    LABBLOO No growth after 5 days. 09/14/2015    LABBLOO NO GROWTH AFTER 5 DAYS  - FINAL REPORT. 08/31/2011     Urine Cultures  Lab Results   Component Value Date    LABURIN No growth 05/24/2015    LABURIN ESCHERICHIA COLI  >100,000 cfu/ml   04/30/2015    LABURIN ESCHERICHIA COLI  >100,000 cfu/ml   04/30/2015    LABURIN  08/31/2011     MULTIPLE ORGANISMS ISOLATED. NONE IN PREDOMINANCE  REPEAT IF CLINICALLY NECESSARY.    LABURIN  06/28/2010     MULTIPLE ORGANISMS ISOLATED. NONE IN PREDOMINANCE REPEAT IF CLINICALLY NECESSARY.       Imaging:         EF   Date Value Ref Range Status   12/16/2016 65 55 - 65    09/02/2015 55 55 - 65    05/01/2015 35 (A) 55 - 65    11/03/2014 40 (A) 55 - 65    01/14/2014 60           Telemetry: paced 1t 70    Assessment&Plan:   83 Y/O female with severe AS S/P TAVR 23 mm Jessica valve. TVP was placed and EP consulted to evaluate patient for possible requirement for PPM.    Recommendations:    -Will watch till tomorrow her EKG showing NSR in 50  -Drop TVP to 40 bpm  -Keep NPO after midnight   -No heparin products    Thank you for your consult     Attending addendum to follow     Wade Novoa MD  Cardiology Fellow  Pager: [phone]

## 2017-03-07 NOTE — ANESTHESIA POSTPROCEDURE EVALUATION
"Anesthesia Post Evaluation    Patient: Krystina Washington    Procedure(s) Performed: Procedure(s) (LRB):  REPLACEMENT-VALVE-AORTIC (N/A)    Final Anesthesia Type: MAC  Patient location during evaluation: ICU  Patient participation: Yes- Able to Participate  Level of consciousness: awake and alert  Post-procedure vital signs: reviewed and stable  Pain management: adequate  Airway patency: patent  PONV status at discharge: No PONV  Anesthetic complications: no      Cardiovascular status: hemodynamically stable  Respiratory status: unassisted  Hydration status: euvolemic  Follow-up not needed.        Visit Vitals    BP (!) 185/76 (BP Location: Right arm, Patient Position: Lying, BP Method: Automatic)    Pulse (!) 53    Temp 36.6 °C (97.8 °F) (Oral)    Resp 17    Ht 5' 1" (1.549 m)    Wt 79.9 kg (176 lb 2.4 oz)    LMP  (LMP Unknown)    SpO2 96%    Breastfeeding No    BMI 33.28 kg/m2       Pain/Gael Score: Pain Assessment Performed: Yes (3/7/2017  8:31 AM)  Presence of Pain: denies (3/7/2017  8:31 AM)  Pain Rating Post Med Admin: 0 (3/7/2017 10:19 AM)      "

## 2017-03-07 NOTE — H&P (VIEW-ONLY)
INTERVENTIONAL CARDIOLOGY CLINIC  PGY-8 NOTE    REFERRING PHYSICIAN: Dr Golden    CHIEF COMPLIANT:  Severe aortic stenosis    HISTORY OF PRESENT ILLNESS  84 y.o. female referred by Dr Golden for evaluation of severe AS (NYHA Class II sx). She has h/o CAD s/p CABG x 3 2010, Severe Aortic Stenosis, HTN, HLD, DM-2 who presented last month with complaints of shortness of breath and chest pain. Shortness of breath-at rest and exertion worse over last 1 month-associated with orthopnea, Cough, PND, leg swelling-Lasix cut down recently due to worsening renal function. Cough when lies down, non productive. No fever. Chest pain-left sided radiation to left shoulder, no aggravating factors, no relation with exertion, sharp, lasts 3-5 mins at a time, NTG helps but not all the way,Never had pain like this before. Also has some dizziness, loss of balance.     She underwent PCI to RCA on 1.20.17 with improvement in her symptoms from NYHA class III to II.     Comorbidities  1. Diabetes  2. CKD stage IV  3. Hyperlipidemia     1. Aortic stenosis, severe    TAVR work-up:   · ECHO (Date 1216.16): MICHAEL= 0.8 cm2, MG= 40mmHg, Peak Gerard= 4.1 m/s, EF= 65%.   · LHC (Date 1.20.17): Patent LIMA to LAD, SVG to RCA and OM occluded. LM into LCx has luminal irregularities. S/P PCI or mid RCA with BMS 1.20.17  · STS: 6.8%   · Frailty: 3/4    · Iliacs are >6.83 on L and > 5.27 on R (Right is more tortuous, high bifurcations)   · LVOT area by CTA is 3.88 cm2 and Avg Diameter is 22.2 (25.9X20) per Dr Morgan  · she is currently high risk, per Dr Sheth due to Redo status and comorbidities.  · Rhythm issues: NSR, no conduction problems  · PFTs: Pending read at the time of this summary        Krystina Washington is a 23 mm Jessica S3 candidate via left TF access. (Will add 1 cc)            PAST MEDICAL HISTORY  Past Medical History:   Diagnosis Date    Arthritis     CHF (congestive heart failure)     Coronary artery disease     Diabetes mellitus      Glaucoma     Gout, joint     Hx of CABG 9/21/10    Hyperlipidemia     Hypertension     NSTEMI (non-ST elevated myocardial infarction) 09/21/10    Systolic heart failure 6/19/2015        PAST SURGICAL HISTORY  Past Surgical History:   Procedure Laterality Date    CATARACT EXTRACTION      CORONARY ARTERY BYPASS GRAFT  9/21/2010    JOINT REPLACEMENT         MEDICATIONS  Current Outpatient Prescriptions on File Prior to Visit   Medication Sig Dispense Refill    acetaminophen (TYLENOL) 325 MG tablet Take 650 mg by mouth every 6 (six) hours as needed for Pain.      albuterol 90 mcg/actuation inhaler Inhale 1 puff into the lungs every 6 (six) hours as needed for Wheezing. 1 HFA Aerosol Inhaler Inhalation Twice a day 18 g 0    allopurinol (ZYLOPRIM) 100 MG tablet TAKE 1 TABLET BY MOUTH ONCE DAILY. 90 tablet 2    aspirin 81 MG chewable tablet Take 81 mg by mouth. 1 Tablet, Chewable Oral Every day      carvedilol (COREG) 25 MG tablet TAKE 1 TABLET BY MOUTH TWICE A DAY With FOOD. 60 tablet 11    cetirizine (ZYRTEC) 10 MG tablet Take 1 tablet (10 mg total) by mouth once daily. (Patient taking differently: Take 10 mg by mouth as needed. ) 30 tablet 0    cholecalciferol, vitamin D3, 50,000 unit capsule Take 1 capsule (50,000 Units total) by mouth once a week. 1 Capsule Oral Weekly 12 capsule 0    cloNIDine 0.1 mg/24 hr td ptwk (CATAPRES) 0.1 mg/24 hr PLACE 1 PATCH ONTO THE SKIN EVERY 7 DAYS. 4 patch 6    clopidogrel (PLAVIX) 75 mg tablet Take 1 tablet (75 mg total) by mouth once daily. 30 tablet 0    escitalopram oxalate (LEXAPRO) 20 MG tablet Take 1 tablet (20 mg total) by mouth once daily. 30 tablet 11    hydrALAZINE (APRESOLINE) 100 MG tablet TAKE 1 TABLET (100 MG TOTAL) BY MOUTH EVERY 8 (EIGHT) HOURS. 90 tablet 11    inhalation device (AEROCHAMBER PLUS FLOW-VU) Use as directed for inhalation. 1 Device 0    irbesartan (AVAPRO) 300 MG tablet TAKE 1 TABLET (300 MG TOTAL) BY MOUTH EVERY EVENING. 30 tablet 11     isosorbide mononitrate (IMDUR) 60 MG 24 hr tablet Take 1 tablet (60 mg total) by mouth once daily. 90 tablet 3    nitroglycerin 400 mcg/spray SprA Place 1 spray onto the tongue every 5 (five) minutes as needed. 8.5 g 0    pravastatin (PRAVACHOL) 40 MG tablet TAKE 1 TABLET BY MOUTH EVERY EVENING 90 tablet 2     No current facility-administered medications on file prior to visit.         SOCIAL HISTORY  TOBACCO:  ETOH:  ILLEGAL DRUGS:      HPI    Review of Systems   Constitution: Negative for fever and weakness.   HENT: Negative for congestion and hoarse voice.    Eyes: Negative for blurred vision and double vision.   Cardiovascular: Positive for dyspnea on exertion. Negative for chest pain, claudication, cyanosis, irregular heartbeat, leg swelling, near-syncope, orthopnea, palpitations and paroxysmal nocturnal dyspnea.   Respiratory: Negative for cough, hemoptysis and shortness of breath.    Endocrine: Negative for cold intolerance and heat intolerance.   Hematologic/Lymphatic: Negative for bleeding problem. Does not bruise/bleed easily.   Skin: Negative for dry skin and nail changes.   Musculoskeletal: Negative for back pain and falls.   Gastrointestinal: Negative for abdominal pain and anorexia.   Neurological: Negative for brief paralysis and dizziness.        Objective:    Physical Exam   Constitutional: She is oriented to person, place, and time. She appears well-developed and well-nourished.   Eyes: Pupils are equal, round, and reactive to light.   Neck: No JVD present. No thyromegaly present.   Cardiovascular: Normal rate, regular rhythm and intact distal pulses.    Murmur heard.   Harsh midsystolic murmur is present with a grade of 2/6  at the upper right sternal border radiating to the neck  Pulses:       Carotid pulses are 2+ on the right side, and 2+ on the left side.       Radial pulses are 2+ on the right side, and 2+ on the left side.        Femoral pulses are 2+ on the right side, and 2+ on the  left side.       Dorsalis pedis pulses are 2+ on the right side, and 2+ on the left side.        Posterior tibial pulses are 2+ on the right side, and 2+ on the left side.   Pulmonary/Chest: No respiratory distress. She has no wheezes. She exhibits no tenderness.   Abdominal: She exhibits no distension. There is no tenderness. There is no rebound.   Musculoskeletal: She exhibits no edema or tenderness.   Neurological: She is alert and oriented to person, place, and time.   Skin: Skin is warm and dry.   Psychiatric: She has a normal mood and affect.         Assessment:       1. Aortic stenosis, severe    TAVR work-up:   · ECHO (Date 1216.16): MICHAEL= 0.8 cm2, MG= 40mmHg, Peak Gerard= 4.1 m/s, EF= 65%.   · LHC (Date 1.20.17): Patent LIMA to LAD, SVG to RCA and OM occluded. LM into LCx has luminal irregularities. S/P PCI or mid RCA with BMS 1.20.17  · STS: 6.8%   · Frailty: 3/4    · Iliacs are >6.83 on L and > 5.27 on R (Right is more tortuous, high bifurcations)   · LVOT area by CTA is 3.88 cm2 and Avg Diameter is 22.2 (25.9X20) per Dr Morgan  · she is currently high risk, per Dr Sheth due to Redo status and comorbidities.  · Rhythm issues: NSR, no conduction problems  · PFTs: Pending read at the time of this summary        Krystina Washington is a 23 mm Jessica S3 candidate via left TF access. (Will add 1 cc)     2. Hypertensive heart disease with heart failure: NYHA class II sympotms, improved after angioplasty on 1.20.17.   3. Old myocardial infarct   4. Coronary artery disease involving native coronary artery of native heart without angina pectoris    5. Essential hypertension: Controlled on current regimen, will likely increase after TAVR   6. Chronic diastolic heart failure    7. Acute on chronic diastolic congestive heart failure    8. NSTEMI (non-ST elevated myocardial infarction)    9. Chronic kidney disease, stage IV (moderate): Will use low contrast technique for implant   10. Type 2 diabetes mellitus with stage 4  chronic kidney disease, without long-term current use of insulin    11. Shortness of breath    12. Pure hypercholesterolemia on statin   13. Type 2 diabetes mellitus with stage 3 chronic kidney disease, unspecified long term insulin use status         Plan:           Transcatheter Aortic valve replacement   4. Valve: 23 mm Jessica S3 (Add 1 cc to balloon)  5. TAVR access: LTF  6. Valvuloplasty balloon: 20 True  7. Antithrombotic therapy: ASA, plavix  8. Pacemaker risk factors: None  1. The patient was explained the the procedure carries around a 12.5% risk of permanent pacemaker requirement and that risk depends on the patients underlying conduction system.  9. Patient was educated abut the pathophysiology and natural history of severe aortic stenosis and was educated about the treatment options including surgical and transcatheter valve replacement. She agrees to be full code for the forseable future and to undergo workup for treatment of severe AS.   10. The risks, benefits, and alternatives of transcatheter aortic valve replacement were discussed with the patient. All questions were answered and informed consent was obtained. I had a detailed discussion with the patient regarding risk of stroke, MI, bleeding access site complications including limb loss, allergy, kidney failure including dialysis and death.  11. The patient understands the risks and benefits and wishes to go ahead with the procedure.  12. All patient's questions were answered        Usman Morgan MD  Interventional Cardiology  Structural/Valvular heart disease  Fellow PGY-8  [phone]    Staff:  I have personally taken the history and examined this patient and agree with the fellow's note as stated above and amended it accordingly :-)

## 2017-03-07 NOTE — INTERVAL H&P NOTE
The patient has been examined and the H&P has been reviewed:    I concur with the findings and no changes have occurred since H&P was written.  The patient did take plavix but not aspirin this AM. Will give her a dose of aspirin.           TAVR work-up:   · ECHO (Date 1216.16): MICHAEL= 0.8 cm2, MG= 40mmHg, Peak Gerard= 4.1 m/s, EF= 65%.   · LHC (Date 1.20.17): Patent LIMA to LAD, SVG to RCA and OM occluded. LM into LCx has luminal irregularities. S/P PCI or mid RCA with BMS 1.20.17  · STS: 6.8%   · Frailty: 3/4    · Iliacs are >6.83 on L and > 5.27 on R (Right is more tortuous, high bifurcations)   · LVOT area by CTA is 3.88 cm2 and Avg Diameter is 22.2 (25.9X20) per Dr Morgan  · she is currently high risk, per Dr Sheth due to Redo status and comorbidities.  · Rhythm issues: NSR, no conduction problems  · PFTs: Pending read at the time of this summary        Krystina Washington is a 23 mm Jessica S3 candidate via left TF access. (Will add 1 cc)           Anesthesia/Surgery risks, benefits and alternative options discussed and understood by patient/family.          Active Hospital Problems    Diagnosis  POA    Aortic valve stenosis [I35.0]  Yes      Resolved Hospital Problems    Diagnosis Date Resolved POA   No resolved problems to display.

## 2017-03-07 NOTE — BRIEF OP NOTE
"Cardiology Post Procedure Note  s/p TAVR    Findings:  1. Severe AS    Interventions:  1. LTF 26 S3     Closure: per close        S: waking up from anesthesia     O:BP (!) 185/76 (BP Location: Right arm, Patient Position: Lying, BP Method: Automatic)  Pulse 67  Temp 97.8 °F (36.6 °C) (Oral)   Resp 20  Ht 5' 1" (1.549 m)  Wt 79.9 kg (176 lb 2.4 oz)  LMP  (LMP Unknown)  SpO2 96%  Breastfeeding? No  BMI 33.28 kg/m2  Gen: NAD, resting comfortably  Extr:  cath site: no bleeding or discharge, no hematoma or mass  Pulse: 2+  Neuro: A+Ox3, 5/5 strength, grossly normal sensation    A/P: 84 y.o.female s/p cardiac catheterization, in complete heart block right now  1. Stable as above, cont to monitor  2. EP consult placed and called  3. Incentive spirometry   4. Out of bed to the chair in 2  Hours  5. Diet as tolerated  6. physical therapy   7. Please refer to full report on EPIC    Vamshi Yeh MD  Structural Interventional Cardiology Fellow  Beeper : [phone]      "

## 2017-03-07 NOTE — PLAN OF CARE
Problem: Patient Care Overview  Goal: Plan of Care Review  Outcome: Ongoing (interventions implemented as appropriate)  S/P TAVR this shift. VS per flowsheet. Post procedure orders followed. MD aware of continued lethargy, pt following commands but difficult to arouse. HR SB with BBB, 54. Temporary Pacemaker lower limits set at 40. POC to assess need to PPM in AM. Pt to be NPO at MN. POC reviewed with pt and family at bedside. All questions and concerns addressed. Comfort and modesty maintained.

## 2017-03-07 NOTE — ANESTHESIA PROCEDURE NOTES
Arterial    Diagnosis: severe AS     Patient location during procedure: done in OR  Procedure start time: 3/7/2017 9:27 AM  Timeout: 3/7/2017 9:27 AM  Procedure end time: 3/7/2017 9:33 AM  Staffing  Anesthesiologist: KURT POPE  Resident/CRNA: MILTON CLEVELAND  Performed by: resident/CRNA   Anesthesiologist was present at the time of the procedure.  Preanesthetic Checklist  Completed: patient identified, site marked, surgical consent, pre-op evaluation, timeout performed, IV checked, risks and benefits discussed, monitors and equipment checked and anesthesia consent given  Arterial Line  Skin Prep: chlorhexidine gluconate  Local Infiltration: lidocaine  Orientation: right  Location: radial  Catheter Size: 20 G{OHS ANESTHESIA BLOCK ART PLACEMENT  Vessel Caliber: small, patent, compressibility normal  Needle advanced into vessel with real time Ultrasound guidance.  Sterile sheath used.Insertion Attempts: 2  Assessment  Dressing: secured with tape and tegaderm  Patient: Tolerated well

## 2017-03-07 NOTE — TRANSFER OF CARE
"Anesthesia Transfer of Care Note    Patient: Krystina Washington    Procedure(s) Performed: Procedure(s) (LRB):  REPLACEMENT-VALVE-AORTIC (N/A)    Patient location: ICU    Anesthesia Type: general    Transport from OR: Transported from OR on 6-10 L/min O2 by face mask with adequate spontaneous ventilation    Post pain: adequate analgesia    Post assessment: no apparent anesthetic complications    Post vital signs: stable    Level of consciousness: awake    Nausea/Vomiting: no nausea/vomiting    Complications: none          Last vitals:   Visit Vitals    BP (!) 185/76 (BP Location: Right arm, Patient Position: Lying, BP Method: Automatic)    Pulse 67    Temp 36.6 °C (97.8 °F) (Oral)    Resp 20    Ht 5' 1" (1.549 m)    Wt 79.9 kg (176 lb 2.4 oz)    LMP  (LMP Unknown)    SpO2 96%    Breastfeeding No    BMI 33.28 kg/m2     "

## 2017-03-08 ENCOUNTER — OUTPATIENT CASE MANAGEMENT (OUTPATIENT)
Dept: ADMINISTRATIVE | Facility: OTHER | Age: 82
End: 2017-03-08

## 2017-03-08 VITALS
HEIGHT: 61 IN | SYSTOLIC BLOOD PRESSURE: 157 MMHG | OXYGEN SATURATION: 96 % | DIASTOLIC BLOOD PRESSURE: 110 MMHG | TEMPERATURE: 98 F | BODY MASS INDEX: 33.25 KG/M2 | WEIGHT: 176.13 LBS | RESPIRATION RATE: 20 BRPM | HEART RATE: 60 BPM

## 2017-03-08 PROBLEM — D63.8 ANEMIA OF CHRONIC DISEASE: Status: ACTIVE | Noted: 2017-03-08

## 2017-03-08 LAB
ANION GAP SERPL CALC-SCNC: 6 MMOL/L
ANION GAP SERPL CALC-SCNC: 6 MMOL/L
APTT BLDCRRT: 26.9 SEC
BASOPHILS # BLD AUTO: 0.01 K/UL
BASOPHILS # BLD AUTO: 0.02 K/UL
BASOPHILS NFR BLD: 0.1 %
BASOPHILS NFR BLD: 0.2 %
BUN SERPL-MCNC: 32 MG/DL
BUN SERPL-MCNC: 33 MG/DL
CALCIUM SERPL-MCNC: 8.4 MG/DL
CALCIUM SERPL-MCNC: 8.6 MG/DL
CHLORIDE SERPL-SCNC: 109 MMOL/L
CHLORIDE SERPL-SCNC: 111 MMOL/L
CO2 SERPL-SCNC: 23 MMOL/L
CO2 SERPL-SCNC: 26 MMOL/L
CREAT SERPL-MCNC: 1.5 MG/DL
CREAT SERPL-MCNC: 1.6 MG/DL
DIFFERENTIAL METHOD: ABNORMAL
DIFFERENTIAL METHOD: ABNORMAL
EOSINOPHIL # BLD AUTO: 0.1 K/UL
EOSINOPHIL # BLD AUTO: 0.1 K/UL
EOSINOPHIL NFR BLD: 1.1 %
EOSINOPHIL NFR BLD: 1.3 %
ERYTHROCYTE [DISTWIDTH] IN BLOOD BY AUTOMATED COUNT: 14.7 %
ERYTHROCYTE [DISTWIDTH] IN BLOOD BY AUTOMATED COUNT: 14.8 %
EST. GFR  (AFRICAN AMERICAN): 33.9 ML/MIN/1.73 M^2
EST. GFR  (AFRICAN AMERICAN): 36.6 ML/MIN/1.73 M^2
EST. GFR  (NON AFRICAN AMERICAN): 29.4 ML/MIN/1.73 M^2
EST. GFR  (NON AFRICAN AMERICAN): 31.8 ML/MIN/1.73 M^2
GLUCOSE SERPL-MCNC: 85 MG/DL
GLUCOSE SERPL-MCNC: 93 MG/DL
HCT VFR BLD AUTO: 24.5 %
HCT VFR BLD AUTO: 25.1 %
HGB BLD-MCNC: 7.8 G/DL
HGB BLD-MCNC: 8.1 G/DL
INR PPP: 1.1
LYMPHOCYTES # BLD AUTO: 0.9 K/UL
LYMPHOCYTES # BLD AUTO: 1.2 K/UL
LYMPHOCYTES NFR BLD: 10.4 %
LYMPHOCYTES NFR BLD: 9.4 %
MAGNESIUM SERPL-MCNC: 1.8 MG/DL
MCH RBC QN AUTO: 31.8 PG
MCH RBC QN AUTO: 32.1 PG
MCHC RBC AUTO-ENTMCNC: 31.8 %
MCHC RBC AUTO-ENTMCNC: 32.3 %
MCV RBC AUTO: 100 FL
MCV RBC AUTO: 100 FL
MONOCYTES # BLD AUTO: 0.6 K/UL
MONOCYTES # BLD AUTO: 0.9 K/UL
MONOCYTES NFR BLD: 6.3 %
MONOCYTES NFR BLD: 7.9 %
NEUTROPHILS # BLD AUTO: 7.8 K/UL
NEUTROPHILS # BLD AUTO: 8.9 K/UL
NEUTROPHILS NFR BLD: 80.1 %
NEUTROPHILS NFR BLD: 82.6 %
PLATELET # BLD AUTO: 168 K/UL
PLATELET # BLD AUTO: 175 K/UL
PMV BLD AUTO: 11.7 FL
PMV BLD AUTO: 11.7 FL
POTASSIUM SERPL-SCNC: 4.3 MMOL/L
POTASSIUM SERPL-SCNC: 4.6 MMOL/L
PROTHROMBIN TIME: 11.7 SEC
RBC # BLD AUTO: 2.45 M/UL
RBC # BLD AUTO: 2.52 M/UL
SODIUM SERPL-SCNC: 140 MMOL/L
SODIUM SERPL-SCNC: 141 MMOL/L
WBC # BLD AUTO: 11.05 K/UL
WBC # BLD AUTO: 9.49 K/UL

## 2017-03-08 PROCEDURE — D9220A PRA ANESTHESIA: Mod: ANES,,, | Performed by: ANESTHESIOLOGY

## 2017-03-08 PROCEDURE — 63600175 PHARM REV CODE 636 W HCPCS: Performed by: INTERNAL MEDICINE

## 2017-03-08 PROCEDURE — 80048 BASIC METABOLIC PNL TOTAL CA: CPT

## 2017-03-08 PROCEDURE — 25000003 PHARM REV CODE 250: Performed by: NURSE ANESTHETIST, CERTIFIED REGISTERED

## 2017-03-08 PROCEDURE — 85025 COMPLETE CBC W/AUTO DIFF WBC: CPT

## 2017-03-08 PROCEDURE — 37000008 HC ANESTHESIA 1ST 15 MINUTES: Performed by: INTERNAL MEDICINE

## 2017-03-08 PROCEDURE — C1730 CATH, EP, 19 OR FEW ELECT: HCPCS

## 2017-03-08 PROCEDURE — 93010 ELECTROCARDIOGRAM REPORT: CPT | Mod: ,,, | Performed by: INTERNAL MEDICINE

## 2017-03-08 PROCEDURE — D9220A PRA ANESTHESIA: Mod: CRNA,,, | Performed by: NURSE ANESTHETIST, CERTIFIED REGISTERED

## 2017-03-08 PROCEDURE — 37000009 HC ANESTHESIA EA ADD 15 MINS: Performed by: INTERNAL MEDICINE

## 2017-03-08 PROCEDURE — 83735 ASSAY OF MAGNESIUM: CPT

## 2017-03-08 PROCEDURE — 93600 BUNDLE OF HIS RECORDING: CPT | Mod: 26,,, | Performed by: INTERNAL MEDICINE

## 2017-03-08 PROCEDURE — 80048 BASIC METABOLIC PNL TOTAL CA: CPT | Mod: 91

## 2017-03-08 PROCEDURE — 85610 PROTHROMBIN TIME: CPT

## 2017-03-08 PROCEDURE — 25000003 PHARM REV CODE 250: Performed by: INTERNAL MEDICINE

## 2017-03-08 PROCEDURE — 63600175 PHARM REV CODE 636 W HCPCS: Performed by: NURSE ANESTHETIST, CERTIFIED REGISTERED

## 2017-03-08 PROCEDURE — 85730 THROMBOPLASTIN TIME PARTIAL: CPT

## 2017-03-08 RX ORDER — FENTANYL CITRATE 50 UG/ML
INJECTION, SOLUTION INTRAMUSCULAR; INTRAVENOUS
Status: DISCONTINUED | OUTPATIENT
Start: 2017-03-08 | End: 2017-03-08

## 2017-03-08 RX ORDER — HYDRALAZINE HYDROCHLORIDE 20 MG/ML
INJECTION INTRAMUSCULAR; INTRAVENOUS
Status: DISCONTINUED | OUTPATIENT
Start: 2017-03-08 | End: 2017-03-08

## 2017-03-08 RX ORDER — SODIUM CHLORIDE 9 MG/ML
INJECTION, SOLUTION INTRAVENOUS CONTINUOUS PRN
Status: DISCONTINUED | OUTPATIENT
Start: 2017-03-08 | End: 2017-03-08

## 2017-03-08 RX ORDER — CETIRIZINE HYDROCHLORIDE 10 MG/1
10 TABLET ORAL DAILY PRN
Qty: 30 TABLET | Refills: 11 | OUTPATIENT
Start: 2017-03-08 | End: 2020-10-06

## 2017-03-08 RX ORDER — FUROSEMIDE 10 MG/ML
20 INJECTION INTRAMUSCULAR; INTRAVENOUS ONCE
Status: COMPLETED | OUTPATIENT
Start: 2017-03-08 | End: 2017-03-08

## 2017-03-08 RX ORDER — CETIRIZINE HYDROCHLORIDE 10 MG/1
10 TABLET ORAL DAILY PRN
Refills: 0 | COMMUNITY
Start: 2017-03-08 | End: 2017-03-08 | Stop reason: HOSPADM

## 2017-03-08 RX ORDER — FUROSEMIDE 20 MG/1
TABLET ORAL
Qty: 30 TABLET | Refills: 5 | Status: SHIPPED | OUTPATIENT
Start: 2017-03-08 | End: 2017-08-23 | Stop reason: SDUPTHER

## 2017-03-08 RX ADMIN — ALLOPURINOL 100 MG: 100 TABLET ORAL at 08:03

## 2017-03-08 RX ADMIN — CLOPIDOGREL 75 MG: 75 TABLET, FILM COATED ORAL at 08:03

## 2017-03-08 RX ADMIN — SODIUM CHLORIDE: 0.9 INJECTION, SOLUTION INTRAVENOUS at 12:03

## 2017-03-08 RX ADMIN — ASPIRIN 81 MG CHEWABLE TABLET 81 MG: 81 TABLET CHEWABLE at 08:03

## 2017-03-08 RX ADMIN — HYDRALAZINE HYDROCHLORIDE 100 MG: 50 TABLET ORAL at 05:03

## 2017-03-08 RX ADMIN — FUROSEMIDE 20 MG: 10 INJECTION, SOLUTION INTRAMUSCULAR; INTRAVENOUS at 08:03

## 2017-03-08 RX ADMIN — HYDRALAZINE HYDROCHLORIDE 5 MG: 20 INJECTION INTRAMUSCULAR; INTRAVENOUS at 12:03

## 2017-03-08 RX ADMIN — FENTANYL CITRATE 25 MCG: 50 INJECTION, SOLUTION INTRAMUSCULAR; INTRAVENOUS at 12:03

## 2017-03-08 RX ADMIN — DEXMEDETOMIDINE HYDROCHLORIDE 0.5 MCG/KG/HR: 100 INJECTION, SOLUTION, CONCENTRATE INTRAVENOUS at 12:03

## 2017-03-08 RX ADMIN — FENTANYL CITRATE 25 MCG: 50 INJECTION, SOLUTION INTRAMUSCULAR; INTRAVENOUS at 01:03

## 2017-03-08 RX ADMIN — ESCITALOPRAM OXALATE 20 MG: 10 TABLET ORAL at 08:03

## 2017-03-08 RX ADMIN — CLONIDINE 1 PATCH: 0.1 PATCH TRANSDERMAL at 08:03

## 2017-03-08 RX ADMIN — ISOSORBIDE MONONITRATE 60 MG: 60 TABLET, EXTENDED RELEASE ORAL at 08:03

## 2017-03-08 RX ADMIN — SODIUM CHLORIDE, SODIUM GLUCONATE, SODIUM ACETATE, POTASSIUM CHLORIDE, MAGNESIUM CHLORIDE, SODIUM PHOSPHATE, DIBASIC, AND POTASSIUM PHOSPHATE: .53; .5; .37; .037; .03; .012; .00082 INJECTION, SOLUTION INTRAVENOUS at 12:03

## 2017-03-08 NOTE — PLAN OF CARE
Problem: Patient Care Overview  Goal: Plan of Care Review  Outcome: Ongoing (interventions implemented as appropriate)  VSS, see flowsheet.  Pt drowsy but easily arouseable to voice, oriented x4.  HR remained in 50s-60s throughout night, no paced rhythm noted.  Remains laying flat d/t femoral sheath.  IS performed when awake.  Had 2 unmeasured urine occurrences.  Fem site oozing small amt, no significant bleeding or hematoma.  Pulses remain palpable.  Pt denies pain.  Partial bath and linen change.  Grandson at bedside.

## 2017-03-08 NOTE — PROGRESS NOTES
"Interventional / Structural Cardiology progress Note  Reason for hospital admision:s/p TAVR  Primary service: Dr Duran    Subjective:   Doing well no complications      Medications:   Scheduled Meds:   allopurinol  100 mg Oral Daily    aspirin  81 mg Oral Daily    cloNIDine 0.1 mg/24 hr td ptwk  1 patch Transdermal Q7 Days    clopidogrel  75 mg Oral Daily    escitalopram oxalate  20 mg Oral Daily    hydrALAZINE  100 mg Oral Q8H    irbesartan  300 mg Oral QHS    isosorbide mononitrate  60 mg Oral Daily    pravastatin  40 mg Oral QHS     Continuous Infusions:   norepinephrine bitartrate-D5W Stopped (03/07/17 1245)     PRN Meds:.sodium chloride, acetaminophen, cetirizine    Physical Exam:   BP (!) 158/74 (BP Location: Right arm, Patient Position: Lying, BP Method: Automatic)  Pulse 70  Temp 98.8 °F (37.1 °C) (Axillary)   Resp 15  Ht 5' 1" (1.549 m)  Wt 79.9 kg (176 lb 2.4 oz)  LMP  (LMP Unknown)  SpO2 (!) 94%  Breastfeeding? No  BMI 33.28 kg/m2     Constitutional: No acute distress, conversant  HEENT: Sclera anicteric, Pupils equal, round and reactive to light, extraocular motions intact, Oropharynx clear  Neck: No JVD, no carotid bruits  Cardiovascular: regular rate and rhythm, no murmur, rubs or gallops, normal S1/S2  Pulmonary: basal crackles present bilaterally  Abdominal: Abdomen soft, nontender, nondistended, positive bowel sounds  Extremities: No lower extremity edema,   Pulses: 2+ BL Radial, 2+ BL carotid, 2+ BL DP, 2+ BL PT, normal Allens Test BL  Skin: No ecchymosis, erythema, or ulcers  Psych: Alert and oriented x 3, appropriate affect  Neuro: CNII-XII intact, no focal deficits    Labs:         CBC    Recent Labs  Lab 03/06/17  1324 03/07/17  1250 03/08/17  0509   WBC 11.48 9.64 9.49   HGB 9.0* 7.9* 7.8*   HCT 29.3* 25.2* 24.5*    184 168     LFTs  No results for input(s): ALT, AST, ALKPHOS, BILITOT, BILIDIR in the last 168 hours. BMP    Recent Labs  Lab 03/06/17  1324 " 03/07/17  1250 03/08/17  0509    140 140   K 5.1 4.6 4.6    109 111*   CO2 27 25 23   BUN 37* 34* 33*   CREATININE 1.7* 1.5* 1.6*   CALCIUM 9.6 8.4* 8.4*     Coags    Recent Labs  Lab 03/07/17  1250   INR 1.2     Cardiac Enzymes  No results for input(s): TROPONINT, CKTOTAL, CKMB in the last 168 hours.    Invalid input(s): TROPONINPOC pBNP  No components found for: BTYPENATRIUR  Lipids  Lab Results   Component Value Date    CHOL 109 (L) 12/05/2016    HDL 42 12/05/2016    LDLCALC 49.6 (L) 12/05/2016    TRIG 87 12/05/2016    A1C  Lab Results   Component Value Date    HGBA1C 6.3 (H) 01/18/2017       Imaging:       EF   Date Value Ref Range Status   12/16/2016 65 55 - 65    09/02/2015 55 55 - 65    05/01/2015 35 (A) 55 - 65          Assessment:   LTF 26 S 3  No issues over night   Acute on chronic heart failure  Blood loss anemia   New non specific conduction block     Plan:   TVP out after ok with EP (might need a EP study)  Out of bed and ambulate once the groin venous sheath comes out  Incentive spirometry   A line out  Lasix 20 mg IV   Depending upon EP plan will discharge today or tomorrow     Signed:  Vamshi Yhe MD  Interventional / Structural  Cardiology Fellow  Beeper: [phone]  3/8/2017 7:40 AM

## 2017-03-08 NOTE — TRANSFER OF CARE
"Anesthesia Transfer of Care Note    Patient: Krystina Washington    Procedure(s) Performed: Procedure(s) (LRB):  INSERTION-PACEMAKER-DUAL (Left)    Patient location: ICU    Anesthesia Type: general    Transport from OR: Transported from OR on 2-3 L/min O2 by NC with adequate spontaneous ventilation    Post pain: adequate analgesia    Post assessment: no apparent anesthetic complications and tolerated procedure well    Post vital signs: stable    Level of consciousness: awake, alert and oriented    Nausea/Vomiting: no nausea/vomiting    Complications: none          Last vitals:   Visit Vitals    BP (!) 196/79 (BP Location: Right arm, Patient Position: Lying, BP Method: Automatic)    Pulse 67    Temp 36.8 °C (98.3 °F) (Oral)    Resp 14    Ht 5' 1" (1.549 m)    Wt 79.9 kg (176 lb 2.4 oz)    LMP  (LMP Unknown)    SpO2 100%    Breastfeeding No    BMI 33.28 kg/m2     "

## 2017-03-08 NOTE — PLAN OF CARE
Problem: Patient Care Overview  Goal: Plan of Care Review  Outcome: Outcome(s) achieved Date Met:  03/08/17  Pt stable for discharge. Discharge instructions given. Verbalized understanding. Instructed to call with further questions.

## 2017-03-08 NOTE — PROGRESS NOTES
Progress Note  Post TAVR    Admit Date: 3/7/2017   LOS: 1 day     SUBJECTIVE:     Interval History: Patient seen and examined.  Reports no events over night and has no new complaints      Scheduled Meds:   allopurinol  100 mg Oral Daily    aspirin  81 mg Oral Daily    cloNIDine 0.1 mg/24 hr td ptwk  1 patch Transdermal Q7 Days    clopidogrel  75 mg Oral Daily    escitalopram oxalate  20 mg Oral Daily    hydrALAZINE  100 mg Oral Q8H    irbesartan  300 mg Oral QHS    isosorbide mononitrate  60 mg Oral Daily    pravastatin  40 mg Oral QHS     Continuous Infusions:   norepinephrine bitartrate-D5W Stopped (03/07/17 1245)     PRN Meds:sodium chloride, acetaminophen, cetirizine    Review of patient's allergies indicates:   Allergen Reactions    Indocin [indomethacin sodium] Other (See Comments)    Lotensin [benazepril] Other (See Comments)         OBJECTIVE:     Vital Signs (Most Recent)  Temp: 98.8 °F (37.1 °C) (03/08/17 0400)  Pulse: 70 (03/08/17 0600)  Resp: 15 (03/08/17 0600)  BP: (!) 158/74 (03/08/17 0000)  SpO2: (!) 94 % (03/08/17 0600)    Vital Signs Range (Last 24H):  Temp:  [97.8 °F (36.6 °C)-98.8 °F (37.1 °C)]   Pulse:  [52-70]   Resp:  [14-22]   BP: (158-185)/(73-76)   SpO2:  [94 %-97 %]   Arterial Line BP: (100-168)/(48-71)     I & O (Last 24H):  Intake/Output Summary (Last 24 hours) at 03/08/17 0744  Last data filed at 03/08/17 0600   Gross per 24 hour   Intake          2409.58 ml   Output                0 ml   Net          2409.58 ml       Telemetry: Sinus bradycardia    Physical Exam:  Constitutional: Oriented to person, place, and time. Appears well-developed and well-nourished.   Neck: No JVD present. No thyromegaly present.   Cardiovascular: Normal rate, regular rhythm and intact distal pulses.  No murmurs heard.   Pulmonary/Chest: No respiratory distress. No wheezes. Pt exhibits no tenderness. Bilateral base crackles  Abdominal: + BS, exhibits no distension. There is no tenderness. There is  no rebound.  Access sites: No hematoma, clean   Extremities: no cyanosis, clubbing or edema    Labs: Reviewed      ASSESSMENT/PLAN:     Patient Active Problem List   Diagnosis    HTN (hypertension)    Hyperlipidemia    Chronic kidney disease, stage III (moderate)    Type II diabetes mellitus with renal manifestations    Proteinuria    Acquired cyst of kidney    Gout, unspecified    Coronary artery disease involving native coronary artery of native heart without angina pectoris    Old myocardial infarct    S/P CABG x 3    Glaucoma (increased eye pressure)    Primary open-angle glaucoma(365.11)    Pulmonary edema    Major depressive disorder, recurrent episode, moderate    Iatrogenic hypotension    Chest pain    Aortic stenosis, severe    Hypertensive heart disease with heart failure    Shortness of breath    Nonrheumatic aortic valve stenosis    Essential hypertension    Type 2 diabetes mellitus with diabetic chronic kidney disease    Chronic diastolic heart failure    Acute on chronic renal failure    Acute on chronic diastolic congestive heart failure    NSTEMI (non-ST elevated myocardial infarction)    Elevated troponin level    Pure hypercholesterolemia    Aortic valve stenosis           Krystina Washington is a 84 y.o. female status post TAVR    1. S/P TAVR: 26 mm Jessica S3. Doing well. Out of bed to chair and ambulate today. PTOT consult, incentive spirometry. Remove arterial lines. Likely DC once EP issues resolved.  2. Acute on chronic diastolic or combined heart failure: Has positive i/o and mild crackles on exam. Will give IV lasix and monitor electrolytes.    3. TVP: Awaiting EP recs  4. Anemia due to acute blood loss: Expected post TAVR, will continue to monitor, no need for blood transfusion at this time   5. Antithrombotic therapy: ASA, plavix  6. Access sites: No issues      Usman Morgan MD  Interventional Cardiology  Structural/Valvular heart disease  Fellow  PGY-8  [phone]

## 2017-03-08 NOTE — DISCHARGE SUMMARY
[facility]  Discharge Summary      Admit Date: 3/7/2017    Discharge Date and Time:  03/08/2017 1:56 PM    Attending Physician: Guillermo Duran MD     Reason for Admission: 84 y.o. female referred by Dr Golden for evaluation of severe AS (NYHA Class II sx). She has h/o CAD s/p CABG x 3 2010, Severe Aortic Stenosis, HTN, HLD, DM-2 who presented last month with complaints of shortness of breath and chest pain. Shortness of breath-at rest and exertion worse over last 1 month-associated with orthopnea, Cough, PND, leg swelling-Lasix cut down recently due to worsening renal function. Cough when lies down, non productive. No fever. Chest pain-left sided radiation to left shoulder, no aggravating factors, no relation with exertion, sharp, lasts 3-5 mins at a time, NTG helps but not all the way,Never had pain like this before. Also has some dizziness, loss of balance.      She underwent PCI to RCA on 1.20.17 with improvement in her symptoms from NYHA class III to II.      Comorbidities  1. Diabetes  2. CKD stage IV  3. Hyperlipidemia      1. Aortic stenosis, severe   TAVR work-up:   · ECHO (Date 1216.16): MICHAEL= 0.8 cm2, MG= 40mmHg, Peak Gerard= 4.1 m/s, EF= 65%.   · LHC (Date 1.20.17): Patent LIMA to LAD, SVG to RCA and OM occluded. LM into LCx has luminal irregularities. S/P PCI or mid RCA with BMS 1.20.17  · STS: 6.8%   · Frailty: 3/4   · Iliacs are >6.83 on L and > 5.27 on R (Right is more tortuous, high bifurcations)   · LVOT area by CTA is 3.88 cm2 and Avg Diameter is 22.2 (25.9X20) per Dr Morgan  · she is currently high risk, per Dr Sheth due to Redo status and comorbidities.  · Rhythm issues: NSR, no conduction problems  · PFTs: Pending read at the time of this summary          Krystina Washington is a 23 mm Jessica S3 candidate via left TF access. (Will add 1 cc)         Procedures Performed: Procedure(s) (LRB):  INSERTION-PACEMAKER-DUAL (Left)    Hospital Course (synopsis of major diagnoses, care,  treatment, and services provided during the course of the hospital stay): Ms. Washington was admitted and underwent successful placement of a 26 mm Jessica S3 TAVR via TF access under MAC sedation. She developed a LBBB and transient CHB post-procedure. TVP was left in place and EP was consulted. She was taken to the CCU in stable condition. She remained stable overnight. The following morning, she required diuresis of acute on chronic diastolic heart failure with IV Lasix. That afternoon, she underwent EP study which revealed normal HV intervals, therefore no PPM was recommended. She was eager to go home. It was felt she was stable for discharge.     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       Discharged Condition: stable    Disposition: Home or Self Care    Follow Up/Patient Instructions: Follow up in 1 month with echo.     Medications:  Reconciled Home Medications:   Current Discharge Medication List      START taking these medications    Details   furosemide (LASIX) 20 MG  tablet Take 1 pill daily as need for weight gain, swelling, or shortness of breath  Qty: 30 tablet, Refills: 5         CONTINUE these medications which have CHANGED    Details   cetirizine (ZYRTEC) 10 MG tablet Take 1 tablet (10 mg total) by mouth daily as needed for Allergies.  Qty: 30 tablet, Refills: 11         CONTINUE these medications which have NOT CHANGED    Details   allopurinol (ZYLOPRIM) 100 MG tablet TAKE 1 TABLET BY MOUTH ONCE DAILY.  Qty: 90 tablet, Refills: 2      aspirin 81 MG chewable tablet Take 81 mg by mouth. 1 Tablet, Chewable Oral Every day      carvedilol (COREG) 25 MG tablet TAKE 1 TABLET BY MOUTH TWICE A DAY With FOOD.  Qty: 60 tablet, Refills: 11      cholecalciferol, vitamin D3, 50,000 unit capsule Take 1 capsule (50,000 Units total) by mouth once a week. 1 Capsule Oral Weekly  Qty: 12 capsule, Refills: 0      cloNIDine 0.1 mg/24 hr td ptwk (CATAPRES) 0.1 mg/24 hr PLACE 1 PATCH ONTO THE SKIN EVERY 7 DAYS.  Qty: 4 patch, Refills: 6      clopidogrel (PLAVIX) 75 mg tablet Take 1 tablet (75 mg total) by mouth once daily.  Qty: 30 tablet, Refills: 0      escitalopram oxalate (LEXAPRO) 20 MG tablet Take 1 tablet (20 mg total) by mouth once daily.  Qty: 30 tablet, Refills: 11    Associated Diagnoses: Major depressive disorder, recurrent episode, moderate      hydrALAZINE (APRESOLINE) 100 MG tablet TAKE 1 TABLET (100 MG TOTAL) BY MOUTH EVERY 8 (EIGHT) HOURS.  Qty: 90 tablet, Refills: 11    Associated Diagnoses: Systolic heart failure, chronic; Chronic diastolic heart failure; Aortic stenosis; Coronary artery disease due to lipid rich plaque; Renovascular hypertension; Chronic kidney disease, stage III (moderate); Type 2 diabetes mellitus with diabetic chronic kidney disease; S/P CABG x 3; Hyperlipidemia      irbesartan (AVAPRO) 300 MG tablet TAKE 1 TABLET (300 MG TOTAL) BY MOUTH EVERY EVENING.  Qty: 30 tablet, Refills: 11      isosorbide mononitrate (IMDUR) 60 MG 24 hr tablet Take 1 tablet (60 mg  total) by mouth once daily.  Qty: 90 tablet, Refills: 3      pravastatin (PRAVACHOL) 40 MG tablet TAKE 1 TABLET BY MOUTH EVERY EVENING  Qty: 90 tablet, Refills: 2      acetaminophen (TYLENOL) 325 MG tablet Take 650 mg by mouth every 6 (six) hours as needed for Pain.      albuterol 90 mcg/actuation inhaler Inhale 1 puff into the lungs every 6 (six) hours as needed for Wheezing. 1 HFA Aerosol Inhaler Inhalation Twice a day  Qty: 18 g, Refills: 0      inhalation device (AEROCHAMBER PLUS FLOW-VU) Use as directed for inhalation.  Qty: 1 Device, Refills: 0    Associated Diagnoses: Chronic pulmonary edema; Shortness of breath      nitroglycerin 400 mcg/spray SprA Place 1 spray onto the tongue every 5 (five) minutes as needed.  Qty: 8.5 g, Refills: 0             Discharge Procedure Orders  Ambulatory referral to Outpatient Case Management   Referral Priority: Routine Referral Type: Consultation   Referral Reason: Specialty Services Required    Number of Visits Requested: 1      Diet general   Order Specific Question Answer Comments   Additional restrictions: Cardiac (Low Na/Chol)      Activity as tolerated     Lifting restrictions   Scheduling Instructions: No lifting more than 5 lbs for 5 days     Call MD for:  temperature >100.4     Call MD for:  persistent nausea and vomiting or diarrhea     Call MD for:  severe uncontrolled pain     Call MD for:  redness, tenderness, or signs of infection (pain, swelling, redness, odor or green/yellow discharge around incision site)     Call MD for:  difficulty breathing or increased cough     Call MD for:  severe persistent headache     Call MD for:  worsening rash     Call MD for:  persistent dizziness, light-headedness, or visual disturbances     Call MD for:  increased confusion or weakness     Type & Screen   Standing Status: Future  Standing Exp. Date: 04/07/18

## 2017-03-08 NOTE — PROGRESS NOTES
EPS shows HV 58-61 ms.  PPM not indicated.  Femoral TVP d/c'd.    Flat in bed for hemostasis.  f/u with Dr Duran

## 2017-03-08 NOTE — PROGRESS NOTES
Patient in for TAVR 3/7/17. Admit <48hrs. Bedrest until discharged. Pt is not eligible during this admission for enrollment in [facility] HF Program.    Removed from suspect list.

## 2017-03-08 NOTE — DISCHARGE INSTRUCTIONS
Weigh yourself on the same scale every morning. If you gain more than 3 lbs in 1 day or more than 5 lbs in 1 week, or if you experience worsening shortness of breath, swelling in your feet or legs, or difficulty laying flat, take a Lasix pill until you reach your normal weight or your symptoms have resolved. If you have any questions, you can call our office at [phone].     You will need to take a single dose of antibiotics prior to certain dental procedures, colonoscopies, or bladder procedures. We can call this prescription in for you or the physician performing the procedure can call it in for you. If you have questions about whether or not a procedure will require antibiotics, you can call our office. Always let your physician know that you have an artificial heart valve.

## 2017-03-08 NOTE — PROGRESS NOTES
Pt ambulated to bathroom without difficulty. Stable for discharge awaiting arrival of grandson. Will continue to monitor closely.

## 2017-03-08 NOTE — PT/OT/SLP PROGRESS
Physical Therapy      Krystina Washington  MRN: [id number]    Patient not seen today secondary to pt gone to EP lab in am for possible PPM. Returned in pm, no PPM placed, however, pt on bedrest until 5 pm s/p removal of femoral lines with pending D/C home after bedrest lifted.     Maliha Solano, PT

## 2017-03-09 RX ORDER — CLOPIDOGREL BISULFATE 75 MG/1
TABLET ORAL
Qty: 90 TABLET | Refills: 0 | Status: SHIPPED | OUTPATIENT
Start: 2017-03-09 | End: 2017-06-12 | Stop reason: SDUPTHER

## 2017-03-09 NOTE — PROGRESS NOTES
Please note the following patient has been assigned to Vira Betancur RN, with Outpatient Complex Care Mgmt for screening.    Please contact [facility] at ext [phone] with questions.    Thank you    Ann-Marie Sanchez, SSC

## 2017-03-09 NOTE — ANESTHESIA RELEASE NOTE
"Anesthesia Release from PACU Note    Patient: Krystina Washington    Procedure(s) Performed: Procedure(s) (LRB):  INSERTION-PACEMAKER-DUAL (Left)    Anesthesia type: MAC    Post pain: Adequate analgesia    Post assessment: no apparent anesthetic complications, tolerated procedure well and no evidence of recall    Last Vitals:   Visit Vitals    BP (!) 157/110    Pulse 60    Temp 36.6 °C (97.8 °F) (Oral)    Resp 20    Ht 5' 1" (1.549 m)    Wt 79.9 kg (176 lb 2.4 oz)    LMP  (LMP Unknown)    SpO2 96%    Breastfeeding No    BMI 33.28 kg/m2       Post vital signs: stable    Level of consciousness: awake, alert  and oriented    Nausea/Vomiting: no nausea/no vomiting    Complications: none    Airway Patency: patent    Respiratory: unassisted, spontaneous ventilation    Cardiovascular: stable and blood pressure at baseline    Hydration: euvolemic  "

## 2017-03-09 NOTE — ANESTHESIA POSTPROCEDURE EVALUATION
"Anesthesia Post Evaluation    Patient: Krystina Washington    Procedure(s) Performed: Procedure(s) (LRB):  INSERTION-PACEMAKER-DUAL (Left)    Final Anesthesia Type: MAC  Patient location during evaluation: PACU  Patient participation: Yes- Able to Participate  Level of consciousness: awake and alert and awake  Post-procedure vital signs: reviewed and stable  Pain management: adequate  Airway patency: patent  PONV status at discharge: No PONV  Anesthetic complications: no      Cardiovascular status: blood pressure returned to baseline  Respiratory status: unassisted and spontaneous ventilation  Hydration status: euvolemic  Follow-up not needed.        Visit Vitals    BP (!) 157/110    Pulse 60    Temp 36.6 °C (97.8 °F) (Oral)    Resp 20    Ht 5' 1" (1.549 m)    Wt 79.9 kg (176 lb 2.4 oz)    LMP  (LMP Unknown)    SpO2 96%    Breastfeeding No    BMI 33.28 kg/m2       Pain/Gael Score: Pain Assessment Performed: Yes (3/8/2017  5:00 PM)  Presence of Pain: denies (3/8/2017  5:00 PM)      "

## 2017-03-10 ENCOUNTER — OUTPATIENT CASE MANAGEMENT (OUTPATIENT)
Dept: ADMINISTRATIVE | Facility: OTHER | Age: 82
End: 2017-03-10

## 2017-03-10 ENCOUNTER — PATIENT OUTREACH (OUTPATIENT)
Dept: ADMINISTRATIVE | Facility: CLINIC | Age: 82
End: 2017-03-10
Payer: MEDICARE

## 2017-03-10 NOTE — OP NOTE
DATE OF PROCEDURE:  03/07/2017    PREOPERATIVE DIAGNOSIS:  Severe aortic stenosis.    POSTOPERATIVE DIAGNOSIS:  Severe aortic stenosis.    PROCEDURE:  Left transfemoral transcatheter aortic valve replacement with a 26   mm Rubalcava S3 valve.    SURGEON:  Hank Licea M.D.    CO-SURGEON:  Guillermo Duran M.D.    ANESTHESIA:  Moderate sedation and local anesthetic.    INDICATIONS FOR PROCEDURE:  This is an 84-year-old woman, high-risk for surgical   AVR, seen and evaluated by the Valve team and found to be an acceptable   transcatheter valve candidate.    OPERATIVE FINDINGS:  Severe aortic stenosis, severely calcified aortic valve.    Post-implant, the valve was well seated with no paravalvular leak.    BLOOD LOSS:  100 mL    PROCEDURE IN DETAIL:  The patient was taken electively to the Cath Lab, placed   supine upon the table.  Moderate sedation and local anesthetic was used.  Groins   were accessed percutaneously with micropuncture technique.  We upsized to   delivery sheaths, heparinized the patient, placed catheters into the root of the   aorta, obtained our implant angle, performed balloon valvuloplasty under rapid   pacing, which the patient tolerated well.  We then placed the delivery sheath   through the left femoral artery.  The valve was prepared, brought on the field   sterilely, placed the valve in through the sheath across the aortic arch with   carotid occlusion and positioned it correctly at the level of the aortic valve   annulus.  The valve was delivered under rapid pacing.  Transthoracic echo   demonstrated a well-seated prosthesis with no paravalvular leak.  Wires and   catheters and sheaths were removed.  The groins were closed percutaneously.    Protamine was administered, and excellent hemostasis was achieved.  Dr. Duran   and NELSON were present for the procedure and performed the procedure together as   co-surgeons.      BROOKLYN/KRISTOFER  dd: 03/10/2017 12:06:07 (CST)  td: 03/10/2017 13:40:13 (CST)  Doc ID    #[id number]  Job ID #[id number]    CC:

## 2017-03-10 NOTE — PATIENT INSTRUCTIONS
Heart Failure: Being Active  You have a condition called heart failure. Being active doesnt mean that you have to wear yourself out. Even a little movement each day helps to strengthen your heart. If you cant get out to exercise, you can do simple stretching and strengthening exercises at home. These are good ways to keep you well-conditioned and prevent you and your heart from becoming excessively weak.    Ideas to get you started  · Add a little movement to things you do now. Walk to mail letters. Park your car at the far end of the parking lot and walk to the store. Walk up a flight of stairs instead of taking the elevator.  · Choose activities you enjoy. You might walk, swim, or ride an exercise bike. Things like gardening and washing the car count, too. Other possibilities include: washing dishes, walking the dog, walking around the mall, and doing aerobic activities with friends.  · Join a group exercise program at a [facility] or [facility], a senior center, or a community center. Or look into a hospital cardiac rehabilitation program. Ask your doctor if you qualify.  Tips to keep you going  · Get up and get dressed each day. Go to a coffee shop and read a newspaper or go somewhere that you'll be in the presence of other active people. Youll feel more like being active.  · Make a plan. Choose one or more activities that you enjoy and that you can easily do. Then plan to do at least one each day. You might write your plan on a calendar.  · Go with a friend or a group if you like company. This can help you feel supported and stay motivated, too.  · Plan social events that you enjoy. This will keep you mentally engaged as well as physically motivated to do things you find pleasure in.  For your safety  · Talk with your healthcare provider before starting an exercise program.  · Exercise indoors when its too hot or too cold outside, or when the air quality is poor. Try walking at a shopping mall.  · Wear socks and  sturdy shoes to maintain your balance and prevent falls.  · Start slowly. Do a few minutes several times a day at first. Increase your time and speed little by little.  · Stop and rest whenever you feel tired or get short of breath.  · Dont push yourself on days when you dont feel well.  Date Last Reviewed: 3/20/2016  © 1028-4547 [employer]. [address] [place] [address], [place] [id number]. All rights reserved. This information is not intended as a substitute for professional medical care. Always follow your healthcare professional's instructions.

## 2017-03-10 NOTE — PROGRESS NOTES
First attempt to perform initial assessment for OPCM; patient asked that this RN call back at a later date and time. GURPREET Betancur, OPCM-RN

## 2017-03-11 LAB
BLD PROD TYP BPU: NORMAL
BLOOD UNIT EXPIRATION DATE: NORMAL
BLOOD UNIT TYPE CODE: 5100
BLOOD UNIT TYPE: NORMAL
CODING SYSTEM: NORMAL
DISPENSE STATUS: NORMAL
TRANS ERYTHROCYTES VOL PATIENT: NORMAL ML

## 2017-03-14 DIAGNOSIS — I35.0 AORTIC VALVE STENOSIS, UNSPECIFIED ETIOLOGY: Primary | ICD-10-CM

## 2017-03-17 ENCOUNTER — OUTPATIENT CASE MANAGEMENT (OUTPATIENT)
Dept: ADMINISTRATIVE | Facility: OTHER | Age: 82
End: 2017-03-17

## 2017-03-17 NOTE — LETTER
March 17, 2017    Krystina Washington  [address] [place] [address]             [facility]  [address]  [facility] [address] Dear Krystina,    I work with [facility] Outpatient Case Management Department. I have been unsuccessful at reaching you to follow-up to see how you have been doing. If you require any future assistance or if any new concerns or problems arise, please do not hesitate to call.     The Outpatient Case Management Department can be reached at [phone] from 8:00AM to 4:30 PM on Monday thru Friday. [facility] also has a program where a nurse is available 24/7 to answer questions or provide medical advice, their number is [phone].    Thanks,      Vira Betancur, RN  Outpatient Case Management

## 2017-03-17 NOTE — PROGRESS NOTES
Second attempt to perform initial assessment for OPCM; unable to reach.  Letter sent.  GURPREET Betancur, OPCM-RN

## 2017-03-20 DIAGNOSIS — I50.9 ACUTE CONGESTIVE HEART FAILURE, UNSPECIFIED CONGESTIVE HEART FAILURE TYPE: Primary | ICD-10-CM

## 2017-03-20 RX ORDER — ISOSORBIDE MONONITRATE 60 MG/1
TABLET, EXTENDED RELEASE ORAL
Qty: 30 TABLET | Refills: 6 | Status: SHIPPED | OUTPATIENT
Start: 2017-03-20 | End: 2017-12-31 | Stop reason: SDUPTHER

## 2017-03-21 ENCOUNTER — HOSPITAL ENCOUNTER (OUTPATIENT)
Dept: CARDIOLOGY | Facility: HOSPITAL | Age: 82
Discharge: HOME | End: 2017-03-21
Attending: INTERNAL MEDICINE
Payer: MEDICARE

## 2017-03-21 DIAGNOSIS — I50.9 ACUTE CONGESTIVE HEART FAILURE, UNSPECIFIED CONGESTIVE HEART FAILURE TYPE: ICD-10-CM

## 2017-03-21 PROCEDURE — 93005 ELECTROCARDIOGRAM TRACING: CPT

## 2017-03-24 ENCOUNTER — OUTPATIENT CASE MANAGEMENT (OUTPATIENT)
Dept: ADMINISTRATIVE | Facility: OTHER | Age: 82
End: 2017-03-24

## 2017-03-24 NOTE — PROGRESS NOTES
Third attempt to perform initial assessment for OPCM; left voice message to return call.  Unable to reach.  GURPREET Betancur, OPCM-RN

## 2017-03-28 DIAGNOSIS — I25.83 CORONARY ARTERY DISEASE DUE TO LIPID RICH PLAQUE: ICD-10-CM

## 2017-03-28 DIAGNOSIS — Z95.1 S/P CABG X 3: ICD-10-CM

## 2017-03-28 DIAGNOSIS — E78.5 HYPERLIPIDEMIA, UNSPECIFIED HYPERLIPIDEMIA TYPE: ICD-10-CM

## 2017-03-28 DIAGNOSIS — I25.10 CORONARY ARTERY DISEASE DUE TO LIPID RICH PLAQUE: ICD-10-CM

## 2017-03-28 DIAGNOSIS — I35.0 AORTIC VALVE STENOSIS, UNSPECIFIED ETIOLOGY: ICD-10-CM

## 2017-03-28 DIAGNOSIS — I50.32 CHRONIC DIASTOLIC HEART FAILURE: ICD-10-CM

## 2017-03-28 DIAGNOSIS — I50.22 SYSTOLIC HEART FAILURE, CHRONIC: ICD-10-CM

## 2017-03-28 DIAGNOSIS — I15.0 RENOVASCULAR HYPERTENSION: ICD-10-CM

## 2017-03-28 DIAGNOSIS — N18.30 CHRONIC KIDNEY DISEASE, STAGE III (MODERATE): ICD-10-CM

## 2017-03-30 RX ORDER — HYDRALAZINE HYDROCHLORIDE 100 MG/1
TABLET, FILM COATED ORAL
Qty: 270 TABLET | Refills: 3 | Status: SHIPPED | OUTPATIENT
Start: 2017-03-30 | End: 2018-05-29 | Stop reason: SDUPTHER

## 2017-04-04 ENCOUNTER — OFFICE VISIT (OUTPATIENT)
Dept: CARDIOLOGY | Facility: CLINIC | Age: 82
End: 2017-04-04
Payer: MEDICARE

## 2017-04-04 ENCOUNTER — LAB VISIT (OUTPATIENT)
Dept: LAB | Facility: HOSPITAL | Age: 82
End: 2017-04-04
Attending: INTERNAL MEDICINE
Payer: MEDICARE

## 2017-04-04 ENCOUNTER — HOSPITAL ENCOUNTER (OUTPATIENT)
Dept: CARDIOLOGY | Facility: CLINIC | Age: 82
Discharge: HOME | End: 2017-04-04
Payer: MEDICARE

## 2017-04-04 VITALS
DIASTOLIC BLOOD PRESSURE: 73 MMHG | BODY MASS INDEX: 32.92 KG/M2 | HEIGHT: 61 IN | WEIGHT: 174.38 LBS | SYSTOLIC BLOOD PRESSURE: 153 MMHG | HEART RATE: 61 BPM | OXYGEN SATURATION: 98 %

## 2017-04-04 DIAGNOSIS — I35.0 AORTIC VALVE STENOSIS, UNSPECIFIED ETIOLOGY: ICD-10-CM

## 2017-04-04 DIAGNOSIS — I50.32 CHRONIC DIASTOLIC HEART FAILURE: ICD-10-CM

## 2017-04-04 DIAGNOSIS — Z95.1 S/P CABG X 3: ICD-10-CM

## 2017-04-04 DIAGNOSIS — I25.10 CORONARY ARTERY DISEASE INVOLVING NATIVE CORONARY ARTERY OF NATIVE HEART WITHOUT ANGINA PECTORIS: ICD-10-CM

## 2017-04-04 DIAGNOSIS — E11.22 TYPE 2 DIABETES MELLITUS WITH STAGE 4 CHRONIC KIDNEY DISEASE, WITHOUT LONG-TERM CURRENT USE OF INSULIN: ICD-10-CM

## 2017-04-04 DIAGNOSIS — N18.4 TYPE 2 DIABETES MELLITUS WITH STAGE 4 CHRONIC KIDNEY DISEASE, WITHOUT LONG-TERM CURRENT USE OF INSULIN: ICD-10-CM

## 2017-04-04 DIAGNOSIS — I10 ESSENTIAL HYPERTENSION: ICD-10-CM

## 2017-04-04 DIAGNOSIS — D63.8 ANEMIA OF CHRONIC DISEASE: Primary | ICD-10-CM

## 2017-04-04 DIAGNOSIS — E78.00 PURE HYPERCHOLESTEROLEMIA: ICD-10-CM

## 2017-04-04 LAB
CREAT SERPL-MCNC: 1.3 MG/DL
DIASTOLIC DYSFUNCTION: YES
ERYTHROCYTE [DISTWIDTH] IN BLOOD BY AUTOMATED COUNT: 14.7 %
EST. GFR  (AFRICAN AMERICAN): 43.5 ML/MIN/1.73 M^2
EST. GFR  (NON AFRICAN AMERICAN): 37.8 ML/MIN/1.73 M^2
ESTIMATED PA SYSTOLIC PRESSURE: 59
HCT VFR BLD AUTO: 26.7 %
HGB BLD-MCNC: 8.6 G/DL
MCH RBC QN AUTO: 31.5 PG
MCHC RBC AUTO-ENTMCNC: 32.2 %
MCV RBC AUTO: 98 FL
MITRAL VALVE REGURGITATION: ABNORMAL
PLATELET # BLD AUTO: 177 K/UL
PMV BLD AUTO: 11.8 FL
RBC # BLD AUTO: 2.73 M/UL
RETIRED EF AND QEF - SEE NOTES: 53 (ref 55–65)
TRICUSPID VALVE REGURGITATION: ABNORMAL
WBC # BLD AUTO: 11.43 K/UL

## 2017-04-04 PROCEDURE — 99214 OFFICE O/P EST MOD 30 MIN: CPT | Mod: S$PBB,,, | Performed by: PHYSICIAN ASSISTANT

## 2017-04-04 PROCEDURE — 85027 COMPLETE CBC AUTOMATED: CPT

## 2017-04-04 PROCEDURE — 82565 ASSAY OF CREATININE: CPT

## 2017-04-04 PROCEDURE — 93306 TTE W/DOPPLER COMPLETE: CPT | Mod: 26,S$PBB,, | Performed by: INTERNAL MEDICINE

## 2017-04-04 PROCEDURE — 99999 PR PBB SHADOW E&M-EST. PATIENT-LVL IV: CPT | Mod: PBBFAC,,, | Performed by: PHYSICIAN ASSISTANT

## 2017-04-04 PROCEDURE — 36415 COLL VENOUS BLD VENIPUNCTURE: CPT

## 2017-04-04 NOTE — PROGRESS NOTES
"Subjective:    Patient ID:  Krystina Washington is a 84 y.o. female who presents for follow-up of TAVR.    Referring: Dr. Chantel ADLER  Ms. Washington presents for 1 mon f/u s/p 26mm Jessica S3 TAVR via L TF access. She has done well since her TAVR and she is without complaints today. She denies CP, PND, orthopnea, or LE edema. Her DA SILVA has improved.     NYHA Class II, CCS Class I    Review of Systems   Constitution: Negative for chills, diaphoresis, fever, weakness, weight gain and weight loss.   HENT: Negative for headaches and sore throat.    Eyes: Negative for blurred vision, vision loss in left eye, vision loss in right eye and visual disturbance.   Cardiovascular: Negative for chest pain, claudication, dyspnea on exertion, leg swelling, near-syncope, orthopnea, palpitations, paroxysmal nocturnal dyspnea and syncope.   Respiratory: Negative for cough, hemoptysis, shortness of breath, sputum production and wheezing.    Endocrine: Negative for cold intolerance and heat intolerance.   Hematologic/Lymphatic: Negative for adenopathy. Does not bruise/bleed easily.   Skin: Negative for rash.   Musculoskeletal: Negative for falls, muscle weakness and myalgias.   Gastrointestinal: Negative for abdominal pain, change in bowel habit, constipation, diarrhea, melena and nausea.   Genitourinary: Negative for bladder incontinence.   Neurological: Negative for dizziness, focal weakness, light-headedness and numbness.   Psychiatric/Behavioral: Negative for altered mental status.         Vitals:    04/04/17 1122 04/04/17 1124   BP: (!) 165/68 (!) 153/73   BP Location: Right arm Left arm   Patient Position: Sitting Sitting   BP Method: Automatic Automatic   Pulse: 61 61   SpO2: 98%    Weight: 79.1 kg (174 lb 6.1 oz)    Height: 5' 1" (1.549 m)    Body mass index is 32.95 kg/(m^2).      Objective:    Physical Exam   Constitutional: She is oriented to person, place, and time. She appears well-developed and well-nourished. No distress. "   HENT:   Head: Normocephalic and atraumatic.   Mouth/Throat: Oropharynx is clear and moist.   Eyes: Conjunctivae and EOM are normal. Pupils are equal, round, and reactive to light. No scleral icterus.   Neck: Neck supple. No JVD present. No tracheal deviation present.   Cardiovascular: Normal rate and regular rhythm.  Exam reveals no gallop and no friction rub.    Murmur heard.  Pulmonary/Chest: Effort normal and breath sounds normal. No respiratory distress. She has no wheezes. She has no rales. She exhibits no tenderness.   Abdominal: Soft. Bowel sounds are normal. She exhibits no distension. There is no hepatosplenomegaly. There is no tenderness.   Musculoskeletal: She exhibits no edema or tenderness.   Neurological: She is alert and oriented to person, place, and time.   Skin: Skin is warm and dry. No rash noted. No erythema.   Psychiatric: She has a normal mood and affect. Her behavior is normal.         Assessment:            Aortic stenosis - s/p 26mm Jessica S3 TAVR via TF access. Doing well. On ASA and Plavix.     Chronic diastolic heart failure - well compensated clinically at this time.     CAD - s/p CABG (2010), Patent LIMA to LAD, SVG to RCA and OM occluded. LM into LCx has luminal irregularities. S/P PCI or mid RCA with BMS 1.20.17    Chronic kidney disease, stage III (moderate) - stable    Type II diabetes mellitus with renal manifestations - diet-controlled    Essential hypertension - controlled       Plan:       ASA indefinitely. OK to d/c Plavix in Sept 2017.  SBEP for life.  F/U in 1 year (March 2018) with echo.

## 2017-04-10 ENCOUNTER — OFFICE VISIT (OUTPATIENT)
Dept: CARDIOLOGY | Facility: CLINIC | Age: 82
End: 2017-04-10
Payer: MEDICARE

## 2017-04-10 VITALS
WEIGHT: 174 LBS | HEART RATE: 66 BPM | HEIGHT: 61 IN | BODY MASS INDEX: 32.85 KG/M2 | SYSTOLIC BLOOD PRESSURE: 127 MMHG | DIASTOLIC BLOOD PRESSURE: 70 MMHG

## 2017-04-10 DIAGNOSIS — I50.9 ACUTE CONGESTIVE HEART FAILURE, UNSPECIFIED CONGESTIVE HEART FAILURE TYPE: ICD-10-CM

## 2017-04-10 DIAGNOSIS — I10 ESSENTIAL HYPERTENSION: Primary | ICD-10-CM

## 2017-04-10 DIAGNOSIS — I35.0 AORTIC VALVE STENOSIS, UNSPECIFIED ETIOLOGY: ICD-10-CM

## 2017-04-10 DIAGNOSIS — I50.33 ACUTE ON CHRONIC DIASTOLIC CONGESTIVE HEART FAILURE: ICD-10-CM

## 2017-04-10 PROCEDURE — 93005 ELECTROCARDIOGRAM TRACING: CPT | Mod: PBBFAC,PO | Performed by: INTERNAL MEDICINE

## 2017-04-10 PROCEDURE — 93010 ELECTROCARDIOGRAM REPORT: CPT | Mod: S$PBB,,, | Performed by: INTERNAL MEDICINE

## 2017-04-10 PROCEDURE — 99214 OFFICE O/P EST MOD 30 MIN: CPT | Mod: S$PBB,,, | Performed by: INTERNAL MEDICINE

## 2017-04-10 PROCEDURE — 99213 OFFICE O/P EST LOW 20 MIN: CPT | Mod: PBBFAC,25 | Performed by: INTERNAL MEDICINE

## 2017-04-10 PROCEDURE — 99999 PR PBB SHADOW E&M-EST. PATIENT-LVL III: CPT | Mod: PBBFAC,,, | Performed by: INTERNAL MEDICINE

## 2017-04-10 NOTE — MR AVS SNAPSHOT
[facility]  [address], [address]  [place] [address]  Phone: [phone]                  Krystina Washington   4/10/2017 11:00 AM   Office Visit    Description:  Female : 1932   Provider:  Sidney Bruce MD   Department:  [facility]           Reason for Visit     Coronary Artery Disease           Diagnoses this Visit        Comments    Essential hypertension    -  Primary     Acute congestive heart failure, unspecified congestive heart failure type         Aortic valve stenosis, unspecified etiology         Acute on chronic diastolic congestive heart failure                To Do List           Goals (5 Years of Data)              Today    17    3 x per week           Blood Pressure < 130/80   127/70  127/70  127/70      Follow-Up and Disposition     Return if symptoms worsen or fail to improve.      [facility] On Call     [facility] On Call Nurse Care Line -  Assistance  Unless otherwise directed by your provider, please contact [facility], our nurse care line that is available for  assistance.     Registered nurses in the [facility] On Call Center provide: appointment scheduling, clinical advisement, health education, and other advisory services.  Call: [phone] (toll free)               Medications           Message regarding Medications     Verify the changes and/or additions to your medication regime listed below are the same as discussed with your clinician today.  If any of these changes or additions are incorrect, please notify your healthcare provider.             Verify that the below list of medications is an accurate representation of the medications you are currently taking.  If none reported, the list may be blank. If incorrect, please contact your healthcare provider. Carry this list with you in case of emergency.           Current Medications     acetaminophen (TYLENOL) 325 MG tablet Take 650 mg by mouth every 6 (six) hours as  "needed for Pain.    albuterol 90 mcg/actuation inhaler Inhale 1 puff into the lungs every 6 (six) hours as needed for Wheezing. 1 HFA Aerosol Inhaler Inhalation Twice a day    allopurinol (ZYLOPRIM) 100 MG tablet TAKE 1 TABLET BY MOUTH ONCE DAILY.    aspirin 81 MG chewable tablet Take 81 mg by mouth. 1 Tablet, Chewable Oral Every day    carvedilol (COREG) 25 MG tablet TAKE 1 TABLET BY MOUTH TWICE A DAY With FOOD.    cetirizine (ZYRTEC) 10 MG tablet Take 1 tablet (10 mg total) by mouth daily as needed for Allergies.    cholecalciferol, vitamin D3, 50,000 unit capsule Take 1 capsule (50,000 Units total) by mouth once a week. 1 Capsule Oral Weekly    cloNIDine 0.1 mg/24 hr td ptwk (CATAPRES) 0.1 mg/24 hr PLACE 1 PATCH ONTO THE SKIN EVERY 7 DAYS.    clopidogrel (PLAVIX) 75 mg tablet TAKE 1 TABLET BY MOUTH EVERY DAY    escitalopram oxalate (LEXAPRO) 20 MG tablet Take 1 tablet (20 mg total) by mouth once daily.    furosemide (LASIX) 20 MG tablet Take 1 pill daily as need for weight gain, swelling, or shortness of breath    hydrALAZINE (APRESOLINE) 100 MG tablet TAKE 1 TABLET (100 MG TOTAL) BY MOUTH EVERY 8 (EIGHT) HOURS.    inhalation device (AEROCHAMBER PLUS FLOW-VU) Use as directed for inhalation.    irbesartan (AVAPRO) 300 MG tablet TAKE 1 TABLET (300 MG TOTAL) BY MOUTH EVERY EVENING.    isosorbide mononitrate (IMDUR) 60 MG 24 hr tablet TAKE 1 TABLET (60 MG TOTAL) BY MOUTH ONCE DAILY.    nitroglycerin 400 mcg/spray SprA Place 1 spray onto the tongue every 5 (five) minutes as needed.    pravastatin (PRAVACHOL) 40 MG tablet TAKE 1 TABLET BY MOUTH EVERY EVENING           Clinical Reference Information           Your Vitals Were     BP Pulse Height Weight Last Period BMI    127/70 66 5' 1" (1.549 m) 78.9 kg (174 lb) (LMP Unknown) 32.88 kg/m2      Blood Pressure          Most Recent Value    BP  127/70      Allergies as of 4/10/2017     Indocin [Indomethacin Sodium]    Lotensin [Benazepril]      Immunizations Administered " on Date of Encounter - 4/10/2017     None      Orders Placed During Today's Visit      Normal Orders This Visit    Rhythm strip       Language Assistance Services     ATTENTION: Language assistance services are available, free of charge. Please call [phone].      ATENCIÓN: Si ruma robins, tiene a riley disposición servicios gratuitos de asistencia lingüística. Llame al [phone].     LALI Ý: N?u b?n nói Ti?ng Vi?t, có các d?ch v? h? tr? ngôn ng? mi?n phí dành cho b?n. G?i s? [phone].         [facility] complies with applicable Federal civil rights laws and does not discriminate on the basis of race, color, national origin, age, disability, or sex.

## 2017-04-10 NOTE — PROGRESS NOTES
Subjective:    Patient ID:  Krystina Washington is a 84 y.o. female who presents for follow-up of Coronary Artery Disease      HPI   84 y.o. F  HFpEF  CAD/CABG  CKD HTN    Severe AS; underwent TAVR 3/7/17, c/b new LBBB.  On 3/8/17, EPS showed mildly prolonged HV (58-61 ms) -- PPM not required.  She's been feeling well.   No LH/presync/sync, ever.    4/17 53% LVEF    My interpretation of ECGs is as follows:  3/8: LBBB, 144 ms. Today: NSR, LBBB, 124 ms.      Review of Systems   Constitution: Negative. Negative for weakness and malaise/fatigue.   HENT: Negative.  Negative for ear pain and tinnitus.    Eyes: Negative for blurred vision.   Cardiovascular: Negative.  Negative for chest pain, dyspnea on exertion, near-syncope, palpitations and syncope.   Respiratory: Negative.  Negative for shortness of breath.    Endocrine: Negative.  Negative for polyuria.   Hematologic/Lymphatic: Does not bruise/bleed easily.   Skin: Negative.  Negative for rash.   Musculoskeletal: Positive for arthritis. Negative for joint pain and muscle weakness.   Gastrointestinal: Negative.  Negative for abdominal pain and change in bowel habit.   Genitourinary: Negative for frequency.   Neurological: Negative.  Negative for dizziness.   Psychiatric/Behavioral: Negative.  Negative for depression. The patient is not nervous/anxious.    Allergic/Immunologic: Negative for environmental allergies.        Objective:    Physical Exam   Constitutional: She is oriented to person, place, and time. Vital signs are normal. She appears well-developed and well-nourished. She is active and cooperative.   HENT:   Head: Normocephalic and atraumatic.   Eyes: Conjunctivae and EOM are normal.   Neck: Normal range of motion. Carotid bruit is not present. No tracheal deviation and no edema present. No thyroid mass and no thyromegaly present.   Cardiovascular: Normal rate, regular rhythm, intact distal pulses and normal pulses.   No extrasystoles are present. PMI is not  displaced.  Exam reveals no gallop and no friction rub.    Murmur heard.   Systolic murmur is present with a grade of 2/6   Pulmonary/Chest: Effort normal and breath sounds normal. No respiratory distress. She has no wheezes. She has no rales.   Abdominal: Soft. Normal appearance. She exhibits no distension. There is no hepatosplenomegaly.   Musculoskeletal: Normal range of motion.   Neurological: She is alert and oriented to person, place, and time. Coordination normal.   Skin: Skin is warm and dry. No rash noted.   Psychiatric: She has a normal mood and affect. Her speech is normal and behavior is normal. Thought content normal. Cognition and memory are normal.   Nursing note and vitals reviewed.        Assessment:       1. Essential hypertension    2. Acute congestive heart failure, unspecified congestive heart failure type    3. Aortic valve stenosis, unspecified etiology    4. Acute on chronic diastolic congestive heart failure         Plan:       Doing well s/p TAVR c/b LBBB.  Asx.    f/u prn... especially if develops sx referable to bradyarrhythmia (LH, syncope...)

## 2017-04-10 NOTE — LETTER
April 10, 2017      Oniel Johnson MD  [address] [address]           [place]  [address], [address]  [facility] [address]  Phone: [phone]          Patient: Krystina Washington   MR Number: [id number]   YOB: 1932   Date of Visit: 4/10/2017       Dear Dr. Oniel Johnson:    Thank you for referring Krystina Washington to me for evaluation. Attached you will find relevant portions of my assessment and plan of care.    If you have questions, please do not hesitate to call me. I look forward to following Krystina Washington along with you.    Sincerely,    Sidney Bruce MD    Enclosure  CC:  No Recipients    If you would like to receive this communication electronically, please contact [email or web address] or [phone] to request more information on [employer] Link access.    For providers and/or their staff who would like to refer a patient to [facility], please contact us through our one-stop-shop provider referral line, [facility], at [phone].    If you feel you have received this communication in error or would no longer like to receive these types of communications, please e-mail [email or web address]

## 2017-05-24 RX ORDER — CARVEDILOL 25 MG/1
TABLET ORAL
Qty: 180 TABLET | Refills: 3 | Status: SHIPPED | OUTPATIENT
Start: 2017-05-24 | End: 2017-10-05 | Stop reason: SDUPTHER

## 2017-05-30 RX ORDER — ALLOPURINOL 100 MG/1
TABLET ORAL
Qty: 90 TABLET | Refills: 2 | Status: SHIPPED | OUTPATIENT
Start: 2017-05-30 | End: 2018-06-10 | Stop reason: SDUPTHER

## 2017-06-12 RX ORDER — CLOPIDOGREL BISULFATE 75 MG/1
TABLET ORAL
Qty: 90 TABLET | Refills: 0 | Status: SHIPPED | OUTPATIENT
Start: 2017-06-12 | End: 2017-09-07 | Stop reason: SDUPTHER

## 2017-08-18 DIAGNOSIS — E78.00 HYPERCHOLESTEROLEMIA: Primary | ICD-10-CM

## 2017-08-21 RX ORDER — CLONIDINE 0.1 MG/24H
PATCH, EXTENDED RELEASE TRANSDERMAL
Qty: 4 PATCH | Refills: 6 | Status: SHIPPED | OUTPATIENT
Start: 2017-08-21 | End: 2018-06-04 | Stop reason: SDUPTHER

## 2017-08-21 RX ORDER — PRAVASTATIN SODIUM 40 MG/1
TABLET ORAL
Qty: 90 TABLET | Refills: 2 | Status: SHIPPED | OUTPATIENT
Start: 2017-08-21 | End: 2018-10-05 | Stop reason: SDUPTHER

## 2017-08-23 RX ORDER — FUROSEMIDE 20 MG/1
TABLET ORAL
Qty: 30 TABLET | Refills: 5 | Status: SHIPPED | OUTPATIENT
Start: 2017-08-23 | End: 2018-02-02 | Stop reason: SDUPTHER

## 2017-09-07 RX ORDER — CLOPIDOGREL BISULFATE 75 MG/1
TABLET ORAL
Qty: 90 TABLET | Refills: 0 | Status: SHIPPED | OUTPATIENT
Start: 2017-09-07 | End: 2017-09-19 | Stop reason: ALTCHOICE

## 2017-09-09 ENCOUNTER — LAB VISIT (OUTPATIENT)
Dept: LAB | Facility: HOSPITAL | Age: 82
End: 2017-09-09
Attending: FAMILY MEDICINE
Payer: MEDICARE

## 2017-09-09 DIAGNOSIS — I50.32 CHRONIC DIASTOLIC HEART FAILURE: ICD-10-CM

## 2017-09-09 DIAGNOSIS — E78.00 HYPERCHOLESTEROLEMIA: ICD-10-CM

## 2017-09-09 DIAGNOSIS — E11.9 TYPE 2 DIABETES MELLITUS WITHOUT COMPLICATION: ICD-10-CM

## 2017-09-09 DIAGNOSIS — E78.00 PURE HYPERCHOLESTEROLEMIA: ICD-10-CM

## 2017-09-09 LAB
ALT SERPL W/O P-5'-P-CCNC: 9 U/L
ANION GAP SERPL CALC-SCNC: 7 MMOL/L
AST SERPL-CCNC: 19 U/L
BUN SERPL-MCNC: 33 MG/DL
CALCIUM SERPL-MCNC: 9.3 MG/DL
CHLORIDE SERPL-SCNC: 108 MMOL/L
CHOLEST SERPL-MCNC: 113 MG/DL
CHOLEST/HDLC SERPL: 2.5 {RATIO}
CO2 SERPL-SCNC: 26 MMOL/L
CREAT SERPL-MCNC: 1.7 MG/DL
EST. GFR  (AFRICAN AMERICAN): 31.3 ML/MIN/1.73 M^2
EST. GFR  (NON AFRICAN AMERICAN): 27.1 ML/MIN/1.73 M^2
ESTIMATED AVG GLUCOSE: 128 MG/DL
GLUCOSE SERPL-MCNC: 123 MG/DL
HBA1C MFR BLD HPLC: 6.1 %
HDLC SERPL-MCNC: 46 MG/DL
HDLC SERPL: 40.7 %
LDLC SERPL CALC-MCNC: 52 MG/DL
NONHDLC SERPL-MCNC: 67 MG/DL
POTASSIUM SERPL-SCNC: 4.2 MMOL/L
SODIUM SERPL-SCNC: 141 MMOL/L
TRIGL SERPL-MCNC: 75 MG/DL

## 2017-09-09 PROCEDURE — 84460 ALANINE AMINO (ALT) (SGPT): CPT

## 2017-09-09 PROCEDURE — 36415 COLL VENOUS BLD VENIPUNCTURE: CPT | Mod: PO

## 2017-09-09 PROCEDURE — 83036 HEMOGLOBIN GLYCOSYLATED A1C: CPT

## 2017-09-09 PROCEDURE — 80048 BASIC METABOLIC PNL TOTAL CA: CPT

## 2017-09-09 PROCEDURE — 84450 TRANSFERASE (AST) (SGOT): CPT

## 2017-09-09 PROCEDURE — 80061 LIPID PANEL: CPT

## 2017-09-12 ENCOUNTER — OFFICE VISIT (OUTPATIENT)
Dept: INTERNAL MEDICINE | Facility: CLINIC | Age: 82
End: 2017-09-12
Payer: MEDICARE

## 2017-09-12 VITALS
SYSTOLIC BLOOD PRESSURE: 142 MMHG | HEART RATE: 45 BPM | DIASTOLIC BLOOD PRESSURE: 89 MMHG | WEIGHT: 181.88 LBS | HEIGHT: 61 IN | OXYGEN SATURATION: 96 % | BODY MASS INDEX: 34.34 KG/M2

## 2017-09-12 DIAGNOSIS — I50.32 CHRONIC DIASTOLIC HEART FAILURE: ICD-10-CM

## 2017-09-12 DIAGNOSIS — H61.21 IMPACTED CERUMEN OF RIGHT EAR: ICD-10-CM

## 2017-09-12 DIAGNOSIS — Z00.00 ROUTINE HEALTH MAINTENANCE: Primary | ICD-10-CM

## 2017-09-12 DIAGNOSIS — N18.30 CHRONIC KIDNEY DISEASE, STAGE III (MODERATE): ICD-10-CM

## 2017-09-12 DIAGNOSIS — I10 ESSENTIAL HYPERTENSION: ICD-10-CM

## 2017-09-12 DIAGNOSIS — E78.00 PURE HYPERCHOLESTEROLEMIA: ICD-10-CM

## 2017-09-12 DIAGNOSIS — Z23 NEED FOR INFLUENZA VACCINATION: ICD-10-CM

## 2017-09-12 PROCEDURE — 99213 OFFICE O/P EST LOW 20 MIN: CPT | Mod: PBBFAC,PO,25 | Performed by: INTERNAL MEDICINE

## 2017-09-12 PROCEDURE — G0008 ADMIN INFLUENZA VIRUS VAC: HCPCS | Mod: PBBFAC,PO

## 2017-09-12 PROCEDURE — 99214 OFFICE O/P EST MOD 30 MIN: CPT | Mod: S$PBB,,, | Performed by: INTERNAL MEDICINE

## 2017-09-12 PROCEDURE — 99999 PR PBB SHADOW E&M-EST. PATIENT-LVL III: CPT | Mod: PBBFAC,,, | Performed by: INTERNAL MEDICINE

## 2017-09-12 NOTE — PROGRESS NOTES
Subjective:       Patient ID: Krystina Washington is a 85 y.o. female.    Chief Complaint: Annual Exam (physical ) and Orders (review labs )    HPI Mrs. Washington is 85 year old female with CHF, CAD (s/p NSTEMI 2010), diabetes, HTN, HLD aortic valve stenosis (s/p TAVR) and CKD stage III B who presents today for annual exam. She is a patient of Dr. Bauer. She is accompanied by her grandson's partner today. She initially had no complaints. On ROS, admitted to occasional cough, few episodes of chest tightness (only in morning upon awakening and could not characterize further) and headache (occurred last night but doesn't usually have headaches). Otherwise ROS negative. Grandson's partner reports that she lives with grandson and great-grandson who care for her. She was recently seen by her cardiologist Dr. Bruce and will follow up with Cards PRN.     Review of Systems   Constitutional: Negative for chills and fever.   HENT: Negative for rhinorrhea, sinus pain and sinus pressure.    Respiratory: Positive for cough. Negative for wheezing.    Cardiovascular: Negative for leg swelling. Chest pain: chest tightness in morning sometimes.   Gastrointestinal: Negative for constipation, diarrhea and vomiting.   Genitourinary: Negative for difficulty urinating and hematuria.   Musculoskeletal: Negative for joint swelling and myalgias.   Skin: Negative for color change and wound.   Neurological: Positive for headaches (occurred just last night). Negative for dizziness.   Psychiatric/Behavioral: Negative for suicidal ideas. The patient is not nervous/anxious.        Objective:      Physical Exam   Constitutional: She is oriented to person, place, and time. She appears well-developed and well-nourished. No distress.   HENT:   Head: Normocephalic and atraumatic.   Right Ear: External ear normal.   Left Ear: External ear normal.   Nose: Nose normal.   Mouth/Throat: Oropharynx is clear and moist. No oropharyngeal exudate.   Left ear exam  performed and normal. Good light reflex. No erythema of canal and no fluid behind Tm.     Right ear: exam limited due to cerumen which was hard and difficult to remove   Eyes: Conjunctivae and EOM are normal. Pupils are equal, round, and reactive to light. Right eye exhibits no discharge. Left eye exhibits no discharge. No scleral icterus.   Neck: Neck supple. No tracheal deviation present. No thyromegaly present.   Cardiovascular: Normal rate and regular rhythm.  Exam reveals no gallop and no friction rub (3/6 systolic murmur ).    Murmur heard.  Pulmonary/Chest: Effort normal. No respiratory distress. She has no wheezes. She has rales (some crackles in bases bilaterally).   Abdominal: Soft. Bowel sounds are normal.   Musculoskeletal: She exhibits no edema (No edema of bilateral lower extremities) or deformity.   Lymphadenopathy:     She has no cervical adenopathy.   Neurological: She is alert and oriented to person, place, and time.   Skin: Skin is warm and dry. No rash noted. She is not diaphoretic. No erythema.   Psychiatric: She has a normal mood and affect. Her behavior is normal. Judgment and thought content normal.       Assessment:       1. Routine health maintenance    2. Essential hypertension    3. Pure hypercholesterolemia    4. Chronic diastolic heart failure    5. Chronic kidney disease, stage III (moderate)    6. Need for influenza vaccination    7. Impacted cerumen of right ear        Plan:     1. Routine health maintenance  -The patient received influenza vaccine today  -She has had recent labs (lipids, BMP, AST, ALT) and these were reviewed with patient and caregiver    2. Essential HTN  -Patient at goal   -Continue current meds (carvedilol, clonidine, hydralazine, irbesartan, isosorbide mononitrate, lasix)    3. Hypercholesterolemia  -Lipid profile and LFTs reviewed. Patient on pravastatin 40 mg daily    4. Chronic diastolic heart failure  -No signs of acute exacerbation on exam  -Followed by  Cards. Continue current meds    5. CKD, stage IIIB  -Renal function at baseline    6. Need for influenza vaccine  -Given today    7. Impacted cerumen  -Caregiver requested removal of cerumen as she feels this may contribute to patient's hearing loss. Unable to remove hard cerumen from right ear canal  -Caregiver given handout for OTC Debrox     RTC 6 months with Dr. Bauer

## 2017-09-14 ENCOUNTER — OUTPATIENT CASE MANAGEMENT (OUTPATIENT)
Dept: ADMINISTRATIVE | Facility: OTHER | Age: 82
End: 2017-09-14

## 2017-09-14 NOTE — PROGRESS NOTES
The following patient has been assigned to Vira Betancur RN with Outpatient Complex Care Management for high risk screening.    Reason: High Risk    Please contact [facility] at [phone] with any questions.    Thank you,  Chayo Castellano

## 2017-09-18 ENCOUNTER — OUTPATIENT CASE MANAGEMENT (OUTPATIENT)
Dept: ADMINISTRATIVE | Facility: OTHER | Age: 82
End: 2017-09-18

## 2017-09-18 NOTE — PROGRESS NOTES
First attempt to perform initial assessment for [facility]; left voice message to return call.  GURPREET Betancur, OPCM-RN

## 2017-09-19 ENCOUNTER — OUTPATIENT CASE MANAGEMENT (OUTPATIENT)
Dept: ADMINISTRATIVE | Facility: OTHER | Age: 82
End: 2017-09-19

## 2017-09-19 ENCOUNTER — OFFICE VISIT (OUTPATIENT)
Dept: CARDIOLOGY | Facility: CLINIC | Age: 82
End: 2017-09-19
Payer: MEDICARE

## 2017-09-19 VITALS
WEIGHT: 179.38 LBS | DIASTOLIC BLOOD PRESSURE: 82 MMHG | HEIGHT: 63 IN | HEART RATE: 36 BPM | BODY MASS INDEX: 31.79 KG/M2 | SYSTOLIC BLOOD PRESSURE: 174 MMHG

## 2017-09-19 DIAGNOSIS — N18.30 CHRONIC KIDNEY DISEASE, STAGE III (MODERATE): ICD-10-CM

## 2017-09-19 DIAGNOSIS — I35.0 AORTIC STENOSIS, SEVERE: Primary | ICD-10-CM

## 2017-09-19 DIAGNOSIS — I25.2 OLD MYOCARDIAL INFARCT: ICD-10-CM

## 2017-09-19 DIAGNOSIS — R69 DIAGNOSIS DEFERRED: ICD-10-CM

## 2017-09-19 DIAGNOSIS — I10 ESSENTIAL HYPERTENSION: ICD-10-CM

## 2017-09-19 DIAGNOSIS — E11.22 TYPE 2 DIABETES MELLITUS WITH STAGE 3 CHRONIC KIDNEY DISEASE, UNSPECIFIED LONG TERM INSULIN USE STATUS: ICD-10-CM

## 2017-09-19 DIAGNOSIS — I50.32 CHRONIC DIASTOLIC CONGESTIVE HEART FAILURE: ICD-10-CM

## 2017-09-19 DIAGNOSIS — N18.3 TYPE 2 DIABETES MELLITUS WITH STAGE 3 CHRONIC KIDNEY DISEASE, UNSPECIFIED LONG TERM INSULIN USE STATUS: ICD-10-CM

## 2017-09-19 DIAGNOSIS — I25.10 CORONARY ARTERY DISEASE INVOLVING NATIVE CORONARY ARTERY OF NATIVE HEART WITHOUT ANGINA PECTORIS: ICD-10-CM

## 2017-09-19 DIAGNOSIS — I44.7 LBBB (LEFT BUNDLE BRANCH BLOCK): ICD-10-CM

## 2017-09-19 DIAGNOSIS — Z95.1 S/P CABG X 3: ICD-10-CM

## 2017-09-19 DIAGNOSIS — E78.00 PURE HYPERCHOLESTEROLEMIA: ICD-10-CM

## 2017-09-19 DIAGNOSIS — I49.8 BIGEMINY: ICD-10-CM

## 2017-09-19 PROCEDURE — 93005 ELECTROCARDIOGRAM TRACING: CPT | Mod: PBBFAC,PO | Performed by: NURSE PRACTITIONER

## 2017-09-19 PROCEDURE — 1126F AMNT PAIN NOTED NONE PRSNT: CPT | Mod: ,,, | Performed by: NURSE PRACTITIONER

## 2017-09-19 PROCEDURE — 99214 OFFICE O/P EST MOD 30 MIN: CPT | Mod: S$PBB,,, | Performed by: NURSE PRACTITIONER

## 2017-09-19 PROCEDURE — 99213 OFFICE O/P EST LOW 20 MIN: CPT | Mod: PBBFAC,PO | Performed by: NURSE PRACTITIONER

## 2017-09-19 PROCEDURE — 1159F MED LIST DOCD IN RCRD: CPT | Mod: ,,, | Performed by: NURSE PRACTITIONER

## 2017-09-19 PROCEDURE — 99999 PR PBB SHADOW E&M-EST. PATIENT-LVL III: CPT | Mod: PBBFAC,,, | Performed by: NURSE PRACTITIONER

## 2017-09-19 PROCEDURE — 93010 ELECTROCARDIOGRAM REPORT: CPT | Mod: ,,, | Performed by: INTERNAL MEDICINE

## 2017-09-19 PROCEDURE — 1157F ADVNC CARE PLAN IN RCRD: CPT | Mod: ,,, | Performed by: NURSE PRACTITIONER

## 2017-09-19 PROCEDURE — 3079F DIAST BP 80-89 MM HG: CPT | Mod: ,,, | Performed by: NURSE PRACTITIONER

## 2017-09-19 PROCEDURE — 3077F SYST BP >= 140 MM HG: CPT | Mod: ,,, | Performed by: NURSE PRACTITIONER

## 2017-09-19 NOTE — LETTER
September 19, 2017    Krystina Washington  [address] [place] [address]             [facility]  [address]  [facility] [address] Dear Krystina,    I work with [facility]'s Outpatient Case Management Department. I have been unsuccessful at reaching you to follow-up to see how you have been doing. If you require any future assistance or if any new concerns or problems arise, please do not hesitate to call.     The Outpatient Case Management Department can be reached at [phone] from 8:00AM to 4:30 PM on Monday thru Friday. [facility] also has a program where a nurse is available 24/7 to answer questions or provide medical advice, their number is [phone].    Thanks,      Vira Betancur,  RN  Outpatient Case Management

## 2017-09-19 NOTE — PROGRESS NOTES
After two failed attempts to perform initial assessment for [facility], letter will be sent.  Case close.  GURPREET Betancur, OPCM-RN

## 2017-09-19 NOTE — PROGRESS NOTES
Ms. Washington is a patient of Dr. Golden and was last seen in [facility] 12/14/2016.      Subjective:   Patient ID:  Krystina Washington is a 85 y.o. female who presents for follow-up of Coronary Artery Disease  .   Problem List:  Chr diastolic heart failure   Aortic stenosis  - s/p TAVR 3/7/2017  CAD   S/P CABG x3  9/2010  Old MI   HTN   Hyperlipidemia   T2D - long standing   CKD 3    HPI:     Ms. Washington is an 84yo female with HFpEF (EF 50-55%), aortic stenosis s/p TAVR on 3/7/2017, CAD (s/p MI; CABGx3 in 2010), HTN, HLD, DMII, and CKD3 here for follow up. She is here with her grandson.  She had a TAVR on 3/7/2017. She says that she woke up today feeling fatigued. Prior to that she says she has felt well. Since her surgery, her SOB has significantly improved, although she still has SOB from time to time. Grandcordelia says that some days she can walk throughout the neighborhood with no SOB and some days she still needs frequent resting. She also has very mild bilateral lower leg edema. She feels palpitations every once in a while but not every day, lasting a few minutes. Ms. Washington denies chest pain with exertion or at rest, dizziness, syncope,  claudication, PND. She has 3 pillow orthopnea which she says is chronic.    She is currently taking ASA 81mg, plavix 75mg and pravastatin 40mg. LDL 52 on 9/9/2017. She denies bleeding issues. She is also taking carvedilol 25mg BID, hydralazine 100mg TID, irbesartan 300mg, clonidine patch 0.1mg/24hr and imdur 60mg daily. Her BP is high in clinic today but she only just took her medications prior to her appt. Her grandson says it is normally in the 130s systolic. Her creatinine is 1.7 which is baseline for her. Hepatic transaminases are within normal limits. HA1C is 6.1.  EKG today indicates sinus bradycardia with PVCs in bigeminy and left bundle branch block.     Recent Cardiac Tests:    2D Echo (4/4/2017):  CONCLUSIONS     1 - Upper limit of normal left ventricular  enlargement.     2 - Low normal to mildly depressed left ventricular systolic function (EF 50-55%).     3 - Concentric hypertrophy.     4 - Left ventricular diastolic dysfunction.     5 - Low normal right ventricular systolic function .     6 - Biatrial enlargement.     7 - S/p transcatheter AVR, effective prosthetic valve area corrected for BSA is 1.16 cm2.     8 - Trivial to mild mitral regurgitation.     9 - Moderate to severe tricuspid regurgitation.     10 - Trivial to mild pulmonic regurgitation.     11 - Intermediate central venous pressure.     12 - Pulmonary hypertension. The estimated PA systolic pressure is 59 mmHg.     Current Outpatient Prescriptions   Medication Sig    acetaminophen (TYLENOL) 325 MG tablet Take 650 mg by mouth every 6 (six) hours as needed for Pain.    albuterol 90 mcg/actuation inhaler Inhale 1 puff into the lungs every 6 (six) hours as needed for Wheezing. 1 HFA Aerosol Inhaler Inhalation Twice a day    allopurinol (ZYLOPRIM) 100 MG tablet TAKE 1 TABLET BY MOUTH ONCE DAILY.    aspirin 81 MG chewable tablet Take 81 mg by mouth. 1 Tablet, Chewable Oral Every day    carvedilol (COREG) 25 MG tablet TAKE 1 TABLET BY MOUTH TWICE A DAY. *REPLACES METOPROLOL*    cetirizine (ZYRTEC) 10 MG tablet Take 1 tablet (10 mg total) by mouth daily as needed for Allergies.    cholecalciferol, vitamin D3, 50,000 unit capsule Take 1 capsule (50,000 Units total) by mouth once a week. 1 Capsule Oral Weekly    cloNIDine 0.1 mg/24 hr td ptwk (CATAPRES) 0.1 mg/24 hr PLACE 1 PATCH ONTO THE SKIN EVERY 7 DAYS.    clopidogrel (PLAVIX) 75 mg tablet TAKE 1 TABLET BY MOUTH EVERY DAY    furosemide (LASIX) 20 MG tablet TAKE 1 PILL DAILY AS NEED FOR WEIGHT GAIN, SWELLING, OR SHORTNESS OF BREATH    hydrALAZINE (APRESOLINE) 100 MG tablet TAKE 1 TABLET (100 MG TOTAL) BY MOUTH EVERY 8 (EIGHT) HOURS.    inhalation device (AEROCHAMBER PLUS FLOW-VU) Use as directed for inhalation.    irbesartan (AVAPRO) 300 MG  "tablet TAKE 1 TABLET (300 MG TOTAL) BY MOUTH EVERY EVENING.    isosorbide mononitrate (IMDUR) 60 MG 24 hr tablet TAKE 1 TABLET (60 MG TOTAL) BY MOUTH ONCE DAILY.    nitroglycerin 400 mcg/spray SprA Place 1 spray onto the tongue every 5 (five) minutes as needed.    pravastatin (PRAVACHOL) 40 MG tablet TAKE 1 TABLET BY MOUTH EVERY EVENING     No current facility-administered medications for this visit.      Review of Systems   Constitution: Positive for weight gain. Negative for malaise/fatigue.   Eyes: Negative for blurred vision.   Cardiovascular: Negative for chest pain, claudication, dyspnea on exertion, irregular heartbeat, leg swelling, orthopnea, palpitations, paroxysmal nocturnal dyspnea and syncope.   Respiratory: Positive for cough and shortness of breath.    Hematologic/Lymphatic: Negative for bleeding problem.   Skin: Negative for rash.   Musculoskeletal: Positive for back pain. Negative for myalgias.   Gastrointestinal: Negative for abdominal pain, constipation, diarrhea and nausea.   Genitourinary: Negative for hematuria.   Neurological: Positive for loss of balance and numbness. Negative for headaches.   Psychiatric/Behavioral: Positive for depression. Negative for altered mental status. The patient is nervous/anxious.    Allergic/Immunologic: Negative for persistent infections.     Objective:     Right Arm BP - Sittin/75 (17 1548)  BP (!) 174/82   Pulse (!) 36   Ht 5' 3" (1.6 m)   Wt 81.4 kg (179 lb 5.5 oz)   LMP  (LMP Unknown)   BMI 31.77 kg/m²     Physical Exam   Constitutional: She is oriented to person, place, and time. She appears well-developed and well-nourished.   HENT:   Head: Normocephalic.   Nose: Nose normal.   Eyes: Pupils are equal, round, and reactive to light.   Neck: JVD (v waves noted) present. Carotid bruit is not present.   Cardiovascular: S1 normal and S2 normal.  A regularly irregular rhythm present. Frequent extrasystoles are present. Bradycardia present.  " Exam reveals no gallop.    Murmur heard.   Harsh midsystolic murmur is present with a grade of 2/6  at the upper right sternal border radiating to the neck  Pulses:       Carotid pulses are 2+ on the right side, and 2+ on the left side.       Radial pulses are 2+ on the right side, and 2+ on the left side.   Pulmonary/Chest: No respiratory distress.   Median sternotomy scar noted.  Crackles to lower lobes.     Abdominal: Soft. Bowel sounds are normal. She exhibits no distension. There is no tenderness.   Musculoskeletal: Normal range of motion. She exhibits edema (mild bilateral lower leg pitting edema).   Neurological: She is alert and oriented to person, place, and time.   Skin: Skin is warm and dry. No erythema.   Psychiatric: She has a normal mood and affect. Her speech is normal and behavior is normal.   Nursing note and vitals reviewed.    Lab Results   Component Value Date     09/09/2017    K 4.2 09/09/2017    MG 1.8 03/08/2017     09/09/2017    CO2 26 09/09/2017    BUN 33 (H) 09/09/2017    CREATININE 1.7 (H) 09/09/2017     (H) 09/09/2017    HGBA1C 6.1 (H) 09/09/2017    AST 19 09/09/2017    ALT 9 (L) 09/09/2017    ALBUMIN 2.9 (L) 01/18/2017    PROT 7.0 01/18/2017    BILITOT 0.5 01/18/2017    WBC 11.43 04/04/2017    HGB 8.6 (L) 04/04/2017    HCT 26.7 (L) 04/04/2017    MCV 98 04/04/2017     04/04/2017    TSH 2.375 09/14/2015    CHOL 113 (L) 09/09/2017    HDL 46 09/09/2017    LDLCALC 52.0 (L) 09/09/2017    TRIG 75 09/09/2017         Recent Labs  Lab 01/18/17  0133 03/06/17  1324 03/07/17  1250 03/08/17  1021   INR 1.2 1.1 1.2 1.1        Test(s) Reviewed  I have reviewed the following in detail:  [] Stress test   [] Angiography   [] Echocardiogram   [x] Labs   [x] Other:  EKG       Assessment:         1. Aortic stenosis, severe. S/P TAVR on 3/8/2017. Patient has some SOB and mild bilateral lower leg edema. Weights are stable.      2. Chronic diastolic congestive heart failure. Some SOB.  Weights are stable. Will make no changes to regimen. Encouraged very low salt diet.     3. Chronic kidney disease, stage III (moderate). Creatinine 1.7 which is essentially baseline for her.     4. Coronary artery disease involving native coronary artery of native heart without angina pectoris. On DAPT and statin. Patient denies chest pain. Will stop plavix to reduce bleeding risk.     5. Essential hypertension. Elevated when first in clinic but repeat measure 138/75. Will make no medication changes at this time. Encouraged very low salt diet.      6. Pure hypercholesterolemia. LDL 52 on atorvastatin 20mg.       7. Old myocardial infarct. On DAPT and statin.     8. S/P CABG x 3. On DAPT and statin.     9. Type 2 diabetes mellitus with stage 3 chronic kidney disease, unspecified long term insulin use status. Controlled. HA1C 6.1.     10. Bigeminy. Will obtain repeat EKG later this week.     11. LBBB (left bundle branch block). Will notify EP.         Plan:     Krystina was seen today for coronary artery disease.    Diagnoses and all orders for this visit:    Aortic stenosis, severe    Chronic diastolic congestive heart failure  -     Lipid panel; Future; Expected date: 05/01/2018  -     ALT (SGPT); Future; Expected date: 05/01/2018  -     AST (SGOT); Future; Expected date: 05/01/2018  -     Basic metabolic panel; Future; Expected date: 05/01/2018    Chronic kidney disease, stage III (moderate)    Coronary artery disease involving native coronary artery of native heart without angina pectoris  -     IN OFFICE EKG 12-LEAD (to Muse); Future    Essential hypertension    Pure hypercholesterolemia  -     Lipid panel; Future; Expected date: 05/01/2018  -     ALT (SGPT); Future; Expected date: 05/01/2018  -     AST (SGOT); Future; Expected date: 05/01/2018    Old myocardial infarct    S/P CABG x 3    Type 2 diabetes mellitus with stage 3 chronic kidney disease, unspecified long term insulin use status    Bigeminy  -      SCHEDULED EKG 12-LEAD (to Muse); Future; Expected date: 09/22/2017    LBBB (left bundle branch block)    Diagnosis deferred  -     IN OFFICE EKG 12-LEAD (to Muse); Future      Come to clinic for repeat EKG on Friday.  Can stop clopidogrel (plavix) after you finish the pills that you have. Continue other medications.  Continue very low salt diet.  Return to clinic in May.    Return in about 7 months (around 5/1/2018).    ------------------------------------------------------------------    CAREN Calloway, NP-C  Consult Cardiology

## 2017-09-20 ENCOUNTER — TELEPHONE (OUTPATIENT)
Dept: CARDIOLOGY | Facility: CLINIC | Age: 82
End: 2017-09-20

## 2017-09-20 NOTE — TELEPHONE ENCOUNTER
----- Message from Paty Golden MD sent at 9/20/2017  3:13 PM CDT -----  I spoke w Dr. Bruce. He suggested reducing the dose of the beta-blocker.  Pl tell grandson to skip one dose of carvedelol and to decrease the dose in 1/2. So 1/2 of a 25 mg tab bid.  Kenyatta this is just FYI.

## 2017-09-26 ENCOUNTER — TELEPHONE (OUTPATIENT)
Dept: CARDIOLOGY | Facility: CLINIC | Age: 82
End: 2017-09-26

## 2017-09-26 NOTE — TELEPHONE ENCOUNTER
Message left on abimael's voicemail asking for return call to reschedule EKG as ordered recently by .

## 2017-09-29 ENCOUNTER — TELEPHONE (OUTPATIENT)
Dept: CARDIOLOGY | Facility: CLINIC | Age: 82
End: 2017-09-29

## 2017-09-29 NOTE — TELEPHONE ENCOUNTER
Message left on abimael's voicemail asking for return call to reschedule appointment for EKG as advised recently by .

## 2017-10-02 ENCOUNTER — TELEPHONE (OUTPATIENT)
Dept: CARDIOLOGY | Facility: CLINIC | Age: 82
End: 2017-10-02

## 2017-10-02 NOTE — TELEPHONE ENCOUNTER
Another message left on Hermelindo Cross's voicemail asking for return call to reschedule appointment for repeat EKG as ordered recently by .

## 2017-10-03 RX ORDER — ESCITALOPRAM OXALATE 20 MG/1
20 TABLET ORAL DAILY
Qty: 30 TABLET | Refills: 11 | Status: SHIPPED | OUTPATIENT
Start: 2017-10-03 | End: 2018-10-27 | Stop reason: SDUPTHER

## 2017-10-05 ENCOUNTER — CLINICAL SUPPORT (OUTPATIENT)
Dept: CARDIOLOGY | Facility: CLINIC | Age: 82
End: 2017-10-05
Payer: MEDICARE

## 2017-10-05 DIAGNOSIS — I49.8 BIGEMINY: ICD-10-CM

## 2017-10-05 PROCEDURE — 93005 ELECTROCARDIOGRAM TRACING: CPT | Mod: PBBFAC,PO | Performed by: NURSE PRACTITIONER

## 2017-10-05 PROCEDURE — 93010 ELECTROCARDIOGRAM REPORT: CPT | Mod: ,,, | Performed by: INTERNAL MEDICINE

## 2017-10-05 RX ORDER — CARVEDILOL 12.5 MG/1
12.5 TABLET ORAL 2 TIMES DAILY
Qty: 60 TABLET | Refills: 6 | Status: SHIPPED | OUTPATIENT
Start: 2017-10-05 | End: 2018-07-12 | Stop reason: SDUPTHER

## 2017-10-05 NOTE — PROGRESS NOTES
ECG today: sinus rhythm at 61 bpm. On Coreg 25 mg 1 tab a day instead of 12.5 mg bid. Will send a prescription for 12.5 mg tabs: one tab bid

## 2017-10-06 ENCOUNTER — TELEPHONE (OUTPATIENT)
Dept: CARDIOLOGY | Facility: CLINIC | Age: 82
End: 2017-10-06

## 2017-12-15 ENCOUNTER — HOSPITAL ENCOUNTER (EMERGENCY)
Facility: HOSPITAL | Age: 82
Discharge: HOME | End: 2017-12-15
Attending: EMERGENCY MEDICINE
Payer: MEDICARE

## 2017-12-15 VITALS
HEIGHT: 63 IN | TEMPERATURE: 99 F | SYSTOLIC BLOOD PRESSURE: 168 MMHG | WEIGHT: 179 LBS | RESPIRATION RATE: 18 BRPM | HEART RATE: 59 BPM | DIASTOLIC BLOOD PRESSURE: 79 MMHG | OXYGEN SATURATION: 95 % | BODY MASS INDEX: 31.71 KG/M2

## 2017-12-15 DIAGNOSIS — S09.90XA INJURY OF HEAD, INITIAL ENCOUNTER: ICD-10-CM

## 2017-12-15 DIAGNOSIS — G44.319 ACUTE POST-TRAUMATIC HEADACHE, NOT INTRACTABLE: Primary | ICD-10-CM
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
## 2017-12-15 PROCEDURE — 99284 EMERGENCY DEPT VISIT MOD MDM: CPT

## 2017-12-15 RX ORDER — CLOPIDOGREL BISULFATE 75 MG/1
TABLET ORAL
Qty: 90 TABLET | Refills: 0 | Status: SHIPPED | OUTPATIENT
Start: 2017-12-15 | End: 2018-03-12 | Stop reason: SDUPTHER

## 2017-12-15 NOTE — ED PROVIDER NOTES
Encounter Date: 12/15/2017       History     Chief Complaint   Patient presents with    Fall     family member reports pt fell 2 days ago hitting head on tile floor. pt is AAO x4     The patient resents with headache and dizziness.  She slipped and fell in a puddle of water 2 days ago.  She fell backwards and struck her head.  There is no loss of consciousness.  She and her family have been using ice and Tylenol, but her headache worsen slightly today.  No confusion.  She ambulates with a cane and has had no change in her gait.  No nausea or vomiting.  No numbness or weakness.  No prior episodes.    She is a history of congestive heart failure and is on Lasix.  She denies any new chest pain or shortness of breath aside from her occasional baseline dyspnea on exertion.      The history is provided by the patient and a relative.     Review of patient's allergies indicates:   Allergen Reactions    Indocin [indomethacin sodium] Other (See Comments)    Lotensin [benazepril] Other (See Comments)     Past Medical History:   Diagnosis Date    Arthritis     CHF (congestive heart failure)     Coronary artery disease     Diabetes mellitus     Glaucoma     Gout, joint     Hx of CABG 9/21/10    Hyperlipidemia     Hypertension     NSTEMI (non-ST elevated myocardial infarction) 09/21/10    Stenosis of aortic and mitral valves     Systolic heart failure 6/19/2015     Past Surgical History:   Procedure Laterality Date    CARDIAC VALVE SURGERY      CATARACT EXTRACTION      CORONARY ARTERY BYPASS GRAFT  9/21/2010    JOINT REPLACEMENT       Family History   Problem Relation Age of Onset    Diabetes Mother     Hypertension Father     Diabetes Father     Glaucoma Father     No Known Problems Sister     No Known Problems Brother     No Known Problems Maternal Aunt     No Known Problems Maternal Uncle     No Known Problems Paternal Aunt     No Known Problems Paternal Uncle     No Known Problems Maternal  Grandmother     No Known Problems Maternal Grandfather     No Known Problems Paternal Grandmother     No Known Problems Paternal Grandfather     Amblyopia Neg Hx     Blindness Neg Hx     Cancer Neg Hx     Cataracts Neg Hx     Macular degeneration Neg Hx     Retinal detachment Neg Hx     Strabismus Neg Hx     Stroke Neg Hx     Thyroid disease Neg Hx      Social History   Substance Use Topics    Smoking status: Never Smoker    Smokeless tobacco: Never Used    Alcohol use No     Review of Systems   Constitutional: Negative for chills and fever.   Respiratory: Positive for shortness of breath (chronic).    Cardiovascular: Negative for chest pain.   Gastrointestinal: Negative for nausea and vomiting.   Musculoskeletal: Negative for back pain and neck pain.   Neurological: Positive for dizziness and headaches. Negative for weakness and numbness.   All other systems reviewed and are negative.      Physical Exam     Initial Vitals [12/15/17 1359]   BP Pulse Resp Temp SpO2   (!) 215/90 65 19 98.8 °F (37.1 °C) 96 %      MAP       131.67         Physical Exam    Nursing note and vitals reviewed.  Constitutional: She appears well-developed and well-nourished. She is not diaphoretic. No distress.   HENT:   Mouth/Throat: Oropharynx is clear and moist.   There is tenderness over the right parietal occipital scalp with a small palpable hematoma.  No step-off.   Eyes: EOM are normal. Pupils are equal, round, and reactive to light.   Neck:   No cervical spine tenderness.  Full range motion of the neck.   Cardiovascular: Normal rate, regular rhythm, normal heart sounds and intact distal pulses.   Pulmonary/Chest: Breath sounds normal. No respiratory distress. She has no wheezes. She has no rhonchi. She has no rales.   Abdominal: Soft. Bowel sounds are normal. She exhibits no distension. There is no tenderness. There is no rebound and no guarding.   Musculoskeletal: Normal range of motion. She exhibits no edema.    Neurological: She is alert and oriented to person, place, and time. She has normal strength. No cranial nerve deficit or sensory deficit.   GCS 15   Skin: Skin is warm and dry. Capillary refill takes less than 2 seconds.   Psychiatric: She has a normal mood and affect.         ED Course   Procedures  Labs Reviewed - No data to display          Medical Decision Making:   Initial Assessment:   The patient had a fall 2 days ago and is on Plavix.  Despite conservative care at home, should her headache has persisted and is now slightly worse.  I will obtain a head CT to exclude subarachnoid hemorrhage, subdural hematoma or skull fracture.              Attending Attestation:             Attending ED Notes:   3:35 PM   I reviewed the head CT.  There were no acute findings.  No acute hemorrhage.  There was evidence of chronic microvascular change.  I reassured the patient and her family and she will be discharged.          ED Course      Clinical Impression:   The primary encounter diagnosis was Acute post-traumatic headache, not intractable. Diagnoses of Injury of head, initial encounter and Fall, initial encounter were also pertinent to this visit.                           Solitario Lugo MD  12/15/17 [id number]

## 2017-12-15 NOTE — ED NOTES
Pt presents to ED with c/o fall. Pt family member states pt fell on Wednesday after slipping on water and hitting her head on tile floor. Family states she had a hematoma to back of head but they implemented R.I.C.E with improvement. Pt denies LOC with fall. Pt states she has continued to have headache to back of head that radiates to front of head. Also reports dizziness that started today. Pt states she usually ambulates with walker. Full active ROM to all extremities. Pt denies any other pain. Family to remain at bedside. Pt also hypertensive on arrival but states she took her BP meds this morning. Will continue to monitor.

## 2018-01-02 RX ORDER — ISOSORBIDE MONONITRATE 60 MG/1
TABLET, EXTENDED RELEASE ORAL
Qty: 90 TABLET | Refills: 1 | Status: SHIPPED | OUTPATIENT
Start: 2018-01-02 | End: 2018-09-10 | Stop reason: SDUPTHER

## 2018-01-12 DIAGNOSIS — I35.0 AORTIC STENOSIS, SEVERE: Primary | ICD-10-CM

## 2018-02-02 RX ORDER — FUROSEMIDE 20 MG/1
TABLET ORAL
Qty: 30 TABLET | Refills: 2 | Status: SHIPPED | OUTPATIENT
Start: 2018-02-02 | End: 2018-07-15 | Stop reason: SDUPTHER

## 2018-03-12 ENCOUNTER — LAB VISIT (OUTPATIENT)
Dept: LAB | Facility: HOSPITAL | Age: 83
End: 2018-03-12
Attending: FAMILY MEDICINE
Payer: MEDICARE

## 2018-03-12 ENCOUNTER — CLINICAL SUPPORT (OUTPATIENT)
Dept: FAMILY MEDICINE | Facility: CLINIC | Age: 83
End: 2018-03-12
Attending: FAMILY MEDICINE
Payer: MEDICARE

## 2018-03-12 ENCOUNTER — OFFICE VISIT (OUTPATIENT)
Dept: FAMILY MEDICINE | Facility: CLINIC | Age: 83
End: 2018-03-12
Payer: MEDICARE

## 2018-03-12 VITALS
SYSTOLIC BLOOD PRESSURE: 130 MMHG | DIASTOLIC BLOOD PRESSURE: 78 MMHG | BODY MASS INDEX: 32.81 KG/M2 | HEART RATE: 72 BPM | WEIGHT: 185.19 LBS | HEIGHT: 63 IN

## 2018-03-12 DIAGNOSIS — I10 ESSENTIAL HYPERTENSION: Primary | ICD-10-CM

## 2018-03-12 DIAGNOSIS — E11.22 TYPE 2 DIABETES MELLITUS WITH STAGE 3 CHRONIC KIDNEY DISEASE, UNSPECIFIED LONG TERM INSULIN USE STATUS: ICD-10-CM

## 2018-03-12 DIAGNOSIS — I10 ESSENTIAL HYPERTENSION: ICD-10-CM

## 2018-03-12 DIAGNOSIS — N18.3 TYPE 2 DIABETES MELLITUS WITH STAGE 3 CHRONIC KIDNEY DISEASE, UNSPECIFIED LONG TERM INSULIN USE STATUS: ICD-10-CM

## 2018-03-12 DIAGNOSIS — Z78.0 ASYMPTOMATIC MENOPAUSE: ICD-10-CM

## 2018-03-12 DIAGNOSIS — H61.23 BILATERAL IMPACTED CERUMEN: ICD-10-CM

## 2018-03-12 DIAGNOSIS — N18.4 CKD (CHRONIC KIDNEY DISEASE) STAGE 4, GFR 15-29 ML/MIN: ICD-10-CM

## 2018-03-12 LAB
ALBUMIN SERPL BCP-MCNC: 3.2 G/DL
ALP SERPL-CCNC: 102 U/L
ALT SERPL W/O P-5'-P-CCNC: 10 U/L
ANION GAP SERPL CALC-SCNC: 8 MMOL/L
AST SERPL-CCNC: 20 U/L
BASOPHILS # BLD AUTO: 0.02 K/UL
BASOPHILS NFR BLD: 0.2 %
BILIRUB SERPL-MCNC: 0.3 MG/DL
BUN SERPL-MCNC: 39 MG/DL
CALCIUM SERPL-MCNC: 9.8 MG/DL
CHLORIDE SERPL-SCNC: 106 MMOL/L
CHOLEST SERPL-MCNC: 124 MG/DL
CHOLEST/HDLC SERPL: 2.3 {RATIO}
CO2 SERPL-SCNC: 30 MMOL/L
CREAT SERPL-MCNC: 1.6 MG/DL
DIFFERENTIAL METHOD: ABNORMAL
EOSINOPHIL # BLD AUTO: 0.2 K/UL
EOSINOPHIL NFR BLD: 2.1 %
ERYTHROCYTE [DISTWIDTH] IN BLOOD BY AUTOMATED COUNT: 13.7 %
EST. GFR  (AFRICAN AMERICAN): 33.6 ML/MIN/1.73 M^2
EST. GFR  (NON AFRICAN AMERICAN): 29.2 ML/MIN/1.73 M^2
ESTIMATED AVG GLUCOSE: 128 MG/DL
GLUCOSE SERPL-MCNC: 165 MG/DL
HBA1C MFR BLD HPLC: 6.1 %
HCT VFR BLD AUTO: 35 %
HDLC SERPL-MCNC: 54 MG/DL
HDLC SERPL: 43.5 %
HGB BLD-MCNC: 10.9 G/DL
IMM GRANULOCYTES # BLD AUTO: 0.05 K/UL
IMM GRANULOCYTES NFR BLD AUTO: 0.5 %
LDLC SERPL CALC-MCNC: 55.8 MG/DL
LYMPHOCYTES # BLD AUTO: 1.4 K/UL
LYMPHOCYTES NFR BLD: 12.9 %
MCH RBC QN AUTO: 31.8 PG
MCHC RBC AUTO-ENTMCNC: 31.1 G/DL
MCV RBC AUTO: 102 FL
MONOCYTES # BLD AUTO: 0.8 K/UL
MONOCYTES NFR BLD: 7.2 %
NEUTROPHILS # BLD AUTO: 8.4 K/UL
NEUTROPHILS NFR BLD: 77.1 %
NONHDLC SERPL-MCNC: 70 MG/DL
NRBC BLD-RTO: 0 /100 WBC
PLATELET # BLD AUTO: 178 K/UL
PMV BLD AUTO: 12.9 FL
POTASSIUM SERPL-SCNC: 4.7 MMOL/L
PROT SERPL-MCNC: 7.4 G/DL
RBC # BLD AUTO: 3.43 M/UL
SODIUM SERPL-SCNC: 144 MMOL/L
TRIGL SERPL-MCNC: 71 MG/DL
TSH SERPL DL<=0.005 MIU/L-ACNC: 2.75 UIU/ML
WBC # BLD AUTO: 10.86 K/UL

## 2018-03-12 PROCEDURE — 99999 PR PBB SHADOW E&M-EST. PATIENT-LVL IV: CPT | Mod: PBBFAC,,, | Performed by: FAMILY MEDICINE

## 2018-03-12 PROCEDURE — 85025 COMPLETE CBC W/AUTO DIFF WBC: CPT

## 2018-03-12 PROCEDURE — 99214 OFFICE O/P EST MOD 30 MIN: CPT | Mod: S$PBB,,, | Performed by: FAMILY MEDICINE

## 2018-03-12 PROCEDURE — 36415 COLL VENOUS BLD VENIPUNCTURE: CPT | Mod: PO

## 2018-03-12 PROCEDURE — 99214 OFFICE O/P EST MOD 30 MIN: CPT | Mod: PBBFAC,PO | Performed by: FAMILY MEDICINE

## 2018-03-12 PROCEDURE — 83036 HEMOGLOBIN GLYCOSYLATED A1C: CPT

## 2018-03-12 PROCEDURE — 80061 LIPID PANEL: CPT

## 2018-03-12 PROCEDURE — 80053 COMPREHEN METABOLIC PANEL: CPT

## 2018-03-12 PROCEDURE — 84443 ASSAY THYROID STIM HORMONE: CPT

## 2018-03-12 RX ORDER — CLOPIDOGREL BISULFATE 75 MG/1
TABLET ORAL
Qty: 90 TABLET | Refills: 0 | Status: SHIPPED | OUTPATIENT
Start: 2018-03-12 | End: 2018-03-15

## 2018-03-12 NOTE — PATIENT INSTRUCTIONS
Diabetes: Activity Tips    Being more active can help you manage your diabetes. The tips on this sheet can help you get the most from your exercise. They can also help you stay safe.  Staying Active  Its important for adults to spend less time sitting and being inactive. This is especially true if you have type 2 diabetes. When you are sitting for long periods of time, get up for short sessions of light activity every 30 minutes.  You should aim for at least 150 minutes a week of exercise or physical activity. Dont let more than 2 days go by without being active.  Benefit from briskness  Brisk activity gets your heart beating faster. This can help you increase your fitness, lose extra weight, and manage your blood sugar level. Try brisk walking. Or, if you have foot or leg problems, you can try swimming or bike riding. You can break up your exercise into chunks throughout the day. Work up to at least 30 minutes of steady, brisk exercise on most days.  Warm up and cool down  Warming up and cooling down reduce your risk of injury. They also help limit muscle soreness. Do a mild version of your activity for 5 minutes before and after your routine. You can also learn stretches that will help keep your muscles loose. Your healthcare provider may show you good ways to warm up and stretch.  Do the talk-sing test  The talk-sing test is a simple way to tell how hard youre exercising. If you can talk while exercising, youre in a safe range. If youre out of breath, slow down. If you can carry a tune, its time to  the pace. Walk up a hill. Increase the resistance on your stationary bike. Or swim faster.  What about eating?  You may be told to plan your exercise for 1 to 2 hours after a meal. In most cases, you dont need to eat while being active. If you take insulin or medicine that can cause low blood sugar, test your blood sugar before exercising. And carry a fast-acting sugar that will raise your blood sugar  level quickly. This includes glucose tablets or hard candy. Use it if you feel low blood sugar symptoms.  Safety tips  These tips can help you stay safe as you become fit:  · Exercise with a friend or carry a cell phone if you have one.  · Carry or wear identification, such as a necklace or bracelet, that says you have diabetes.  · Use the proper footwear and safety equipment for your activity.  · Drink water before, during, and after exercise.  · Dress properly for the weather.  · Dont exercise in very hot or very cold weather.  · Dont exercise if you are sick.  · If you are instructed to do so, test your blood sugar before and after you exercise. Have a small carbohydrate snack if your blood sugar is low before you start exercising.   When to stop exercising and call your healthcare provider  Stop exercising and call your healthcare provider right away if you notice any of the following:  · Pain, pressure, tightness, or heaviness in the chest  · Pain or heaviness in the neck, shoulders, back, arms, legs, or feet  · Unusual shortness of breath  · Dizziness or lightheadedness  · Unusually rapid or slow pulse  · Increased joint or muscle pain  · Nausea or vomiting  Date Last Reviewed: 5/1/2016  © 1808-7307 [employer]. [address] [place] [address], [place] [address]. All rights reserved. This information is not intended as a substitute for professional medical care. Always follow your healthcare professional's instructions.

## 2018-03-12 NOTE — PROGRESS NOTES
Subjective:       Patient ID: Krystina Washington is a 85 y.o. female.    Chief Complaint: Follow-up and Dizziness    85 years old female who came to the clinic with episodic dizziness for the last couple of weeks.  Patient reports cerumen buildup bilaterally.  Blood pressure today stable.  No chest pain palpitations orthopnea or PND.  Patient with decreased kidney function but stable in comparison with previous reports.  No recent A1c.  No polyuria polydipsia polyphagia.      Dizziness: no palpitations and no chest pain.    Review of Systems   Constitutional: Negative.    HENT: Negative.    Eyes: Negative.    Respiratory: Negative.    Cardiovascular: Negative.  Negative for chest pain, palpitations and leg swelling.   Gastrointestinal: Negative.    Endocrine: Negative for polydipsia, polyphagia and polyuria.   Genitourinary: Negative.    Musculoskeletal: Negative.    Skin: Negative.    Neurological: Positive for dizziness.   Psychiatric/Behavioral: Negative.        Objective:      Physical Exam   Constitutional: She is oriented to person, place, and time. She appears well-developed and well-nourished. No distress.   HENT:   Head: Normocephalic and atraumatic.   Right Ear: External ear normal.   Left Ear: External ear normal.   Ears:    Nose: Nose normal.   Mouth/Throat: Oropharynx is clear and moist. No oropharyngeal exudate.   Eyes: Conjunctivae and EOM are normal. Pupils are equal, round, and reactive to light. Right eye exhibits no discharge. Left eye exhibits no discharge. No scleral icterus.   Neck: Normal range of motion. Neck supple. No JVD present. No tracheal deviation present. No thyromegaly present.   Cardiovascular: Normal rate, regular rhythm, normal heart sounds and intact distal pulses.  Exam reveals no gallop and no friction rub.    No murmur heard.  Pulmonary/Chest: Effort normal and breath sounds normal. No stridor. No respiratory distress. She has no wheezes. She has no rales. She exhibits no  tenderness.   Abdominal: Soft. Bowel sounds are normal. She exhibits no distension and no mass. There is no tenderness. There is no rebound and no guarding.   Musculoskeletal: Normal range of motion. She exhibits no edema or tenderness.   Feet:   Right Foot:   Protective Sensation: 10 sites tested. 10 sites sensed.   Skin Integrity: Positive for dry skin. Negative for ulcer, blister, skin breakdown, erythema, warmth or callus.   Left Foot:   Protective Sensation: 10 sites tested. 10 sites sensed.   Skin Integrity: Positive for dry skin. Negative for ulcer, blister, skin breakdown, erythema, warmth or callus.   Lymphadenopathy:     She has no cervical adenopathy.   Neurological: She is alert and oriented to person, place, and time. She has normal reflexes. No cranial nerve deficit. She exhibits normal muscle tone. Coordination and gait abnormal.   Skin: Skin is warm and dry. No rash noted. She is not diaphoretic. No erythema. No pallor.   Psychiatric: She has a normal mood and affect. Her behavior is normal. Judgment and thought content normal.       Assessment:       1. Essential hypertension    2. Type 2 diabetes mellitus with stage 3 chronic kidney disease, unspecified long term insulin use status    3. CKD (chronic kidney disease) stage 4, GFR 15-29 ml/min    4. Bilateral impacted cerumen    5. Asymptomatic menopause        Plan:         Krystina was seen today for follow-up and dizziness.    Diagnoses and all orders for this visit:    Essential hypertension  -     Comprehensive metabolic panel; Future  -     Lipid panel; Future  -     Urinalysis; Future  -     TSH; Future  -     CBC auto differential; Future    Type 2 diabetes mellitus with stage 3 chronic kidney disease, unspecified long term insulin use status  -     Diabetic Eye Screening Photo; Future  -     Ambulatory referral to Optometry  -     Hemoglobin A1c; Future    CKD (chronic kidney disease) stage 4, GFR 15-29 ml/min  -     Comprehensive metabolic  panel; Future  -     CBC auto differential; Future    Bilateral impacted cerumen  -     Ear wax removal    Asymptomatic menopause  -     DXA Bone Density Spine And Hip; Future    Continue monitoring blood pressure at home, low sodium diet.  Continue monitoring blood sugar at home,ADA diet.

## 2018-03-13 ENCOUNTER — HOSPITAL ENCOUNTER (OUTPATIENT)
Dept: RADIOLOGY | Facility: HOSPITAL | Age: 83
Discharge: HOME | End: 2018-03-13
Attending: FAMILY MEDICINE
Payer: MEDICARE

## 2018-03-13 DIAGNOSIS — Z78.0 ASYMPTOMATIC MENOPAUSE: ICD-10-CM

## 2018-03-13 PROCEDURE — 77080 DXA BONE DENSITY AXIAL: CPT | Mod: 26,,, | Performed by: RADIOLOGY

## 2018-03-13 PROCEDURE — 77080 DXA BONE DENSITY AXIAL: CPT | Mod: TC

## 2018-03-15 RX ORDER — CLOPIDOGREL BISULFATE 75 MG/1
TABLET ORAL
Qty: 90 TABLET | Refills: 0 | Status: SHIPPED | OUTPATIENT
Start: 2018-03-15 | End: 2018-12-31 | Stop reason: SDUPTHER

## 2018-03-15 RX ORDER — NITROGLYCERIN 400 UG/1
1 AEROSOL, METERED SUBLINGUAL EVERY 5 MIN PRN
Qty: 4.1 G | Refills: 0 | Status: SHIPPED | OUTPATIENT
Start: 2018-03-15 | End: 2018-11-01 | Stop reason: SDUPTHER

## 2018-03-20 ENCOUNTER — HOSPITAL ENCOUNTER (OUTPATIENT)
Dept: CARDIOLOGY | Facility: CLINIC | Age: 83
Discharge: HOME | End: 2018-03-20
Payer: MEDICARE

## 2018-03-20 ENCOUNTER — OFFICE VISIT (OUTPATIENT)
Dept: CARDIOLOGY | Facility: CLINIC | Age: 83
End: 2018-03-20
Payer: MEDICARE

## 2018-03-20 VITALS
SYSTOLIC BLOOD PRESSURE: 152 MMHG | HEIGHT: 63 IN | OXYGEN SATURATION: 97 % | HEART RATE: 77 BPM | BODY MASS INDEX: 32.3 KG/M2 | DIASTOLIC BLOOD PRESSURE: 90 MMHG | WEIGHT: 182.31 LBS

## 2018-03-20 DIAGNOSIS — Z95.1 S/P CABG X 3: ICD-10-CM

## 2018-03-20 DIAGNOSIS — I35.0 AORTIC STENOSIS, SEVERE: ICD-10-CM

## 2018-03-20 DIAGNOSIS — N18.3 TYPE 2 DIABETES MELLITUS WITH STAGE 3 CHRONIC KIDNEY DISEASE, UNSPECIFIED LONG TERM INSULIN USE STATUS: ICD-10-CM

## 2018-03-20 DIAGNOSIS — I35.0 AORTIC STENOSIS, SEVERE: Primary | ICD-10-CM

## 2018-03-20 DIAGNOSIS — I50.32 CHRONIC DIASTOLIC CONGESTIVE HEART FAILURE: ICD-10-CM

## 2018-03-20 DIAGNOSIS — E11.22 TYPE 2 DIABETES MELLITUS WITH STAGE 3 CHRONIC KIDNEY DISEASE, UNSPECIFIED LONG TERM INSULIN USE STATUS: ICD-10-CM

## 2018-03-20 DIAGNOSIS — E78.00 PURE HYPERCHOLESTEROLEMIA: ICD-10-CM

## 2018-03-20 DIAGNOSIS — I10 ESSENTIAL HYPERTENSION: ICD-10-CM

## 2018-03-20 LAB
DIASTOLIC DYSFUNCTION: YES
ESTIMATED PA SYSTOLIC PRESSURE: 53.13
MITRAL VALVE REGURGITATION: ABNORMAL
RETIRED EF AND QEF - SEE NOTES: 55 (ref 55–65)
TRICUSPID VALVE REGURGITATION: ABNORMAL

## 2018-03-20 PROCEDURE — 93306 TTE W/DOPPLER COMPLETE: CPT | Mod: PBBFAC | Performed by: INTERNAL MEDICINE

## 2018-03-20 PROCEDURE — 99999 PR PBB SHADOW E&M-EST. PATIENT-LVL IV: CPT | Mod: PBBFAC,,,

## 2018-03-20 PROCEDURE — 99214 OFFICE O/P EST MOD 30 MIN: CPT | Mod: PBBFAC,25

## 2018-03-20 PROCEDURE — 99214 OFFICE O/P EST MOD 30 MIN: CPT | Mod: S$PBB,,, | Performed by: INTERNAL MEDICINE

## 2018-03-20 NOTE — PROGRESS NOTES
"Subjective:    Patient ID:  Krystina Washington is a 85 y.o. female who presents for follow-up of TAVR.    Referring: Dr. Chantel ADLER  Ms. Washington presents for 1 year f/u s/p 26mm Jessica S3 TAVR via L TF access. She has done well since her TAVR and she is without complaints today. She denies CP, PND, orthopnea, or LE edema. Her DA SILVA has improved.     NYHA Class II, CCS Class I    Review of Systems   Constitution: Negative for chills, diaphoresis, fever, weakness, weight gain and weight loss.   HENT: Negative for sore throat.    Eyes: Negative for blurred vision, vision loss in left eye, vision loss in right eye and visual disturbance.   Cardiovascular: Negative for chest pain, claudication, dyspnea on exertion, leg swelling, near-syncope, orthopnea, palpitations, paroxysmal nocturnal dyspnea and syncope.   Respiratory: Negative for cough, hemoptysis, shortness of breath, sputum production and wheezing.    Endocrine: Negative for cold intolerance and heat intolerance.   Hematologic/Lymphatic: Negative for adenopathy. Does not bruise/bleed easily.   Skin: Negative for rash.   Musculoskeletal: Negative for falls, muscle weakness and myalgias.   Gastrointestinal: Negative for abdominal pain, change in bowel habit, constipation, diarrhea, melena and nausea.   Genitourinary: Negative for bladder incontinence.   Neurological: Negative for dizziness, focal weakness, headaches, light-headedness and numbness.   Psychiatric/Behavioral: Negative for altered mental status.         Vitals:    03/20/18 0831 03/20/18 0832   BP: (!) 148/90 (!) 152/90   BP Location: Right arm Left arm   Patient Position: Sitting Sitting   BP Method: Large (Manual) Large (Manual)   Pulse: 77 77   SpO2: 97%    Weight: 82.7 kg (182 lb 5.1 oz)    Height: 5' 3" (1.6 m)    Body mass index is 32.3 kg/m².    Objective:    Physical Exam   Constitutional: She is oriented to person, place, and time. She appears well-developed and well-nourished. No distress. "   HENT:   Head: Normocephalic and atraumatic.   Mouth/Throat: Oropharynx is clear and moist.   Eyes: Conjunctivae and EOM are normal. Pupils are equal, round, and reactive to light. No scleral icterus.   Neck: Neck supple. No JVD present. No tracheal deviation present.   Cardiovascular: Normal rate and regular rhythm.  Exam reveals no gallop and no friction rub.    Murmur heard.  Pulmonary/Chest: Effort normal and breath sounds normal. No respiratory distress. She has no wheezes. She has no rales. She exhibits no tenderness.   Abdominal: Soft. Bowel sounds are normal. She exhibits no distension. There is no hepatosplenomegaly. There is no tenderness.   Musculoskeletal: She exhibits no edema or tenderness.   Neurological: She is alert and oriented to person, place, and time.   Skin: Skin is warm and dry. No rash noted. No erythema.   Psychiatric: She has a normal mood and affect. Her behavior is normal.         Assessment:            Aortic stenosis - s/p 26mm Jessica S3 TAVR via TF access. Doing well. On ASA and Plavix.     Chronic diastolic heart failure - well compensated clinically at this time.     CAD - s/p CABG (2010), Patent LIMA to LAD, SVG to RCA and OM occluded. LM into LCx has luminal irregularities. S/P PCI or mid RCA with BMS 1.20.17    Chronic kidney disease, stage III (moderate) - stable    Type II diabetes mellitus with renal manifestations - diet-controlled    Essential hypertension - controlled       Plan:       ASA indefinitely.   SBEP for life.  F/U with Dr. Golden. F/U with us prn.

## 2018-05-29 DIAGNOSIS — I50.22 SYSTOLIC HEART FAILURE, CHRONIC: ICD-10-CM

## 2018-05-29 DIAGNOSIS — Z95.1 S/P CABG X 3: ICD-10-CM

## 2018-05-29 DIAGNOSIS — N18.30 CHRONIC KIDNEY DISEASE, STAGE III (MODERATE): ICD-10-CM

## 2018-05-29 DIAGNOSIS — E78.5 HYPERLIPIDEMIA, UNSPECIFIED HYPERLIPIDEMIA TYPE: ICD-10-CM

## 2018-05-29 DIAGNOSIS — I25.10 CORONARY ARTERY DISEASE DUE TO LIPID RICH PLAQUE: ICD-10-CM

## 2018-05-29 DIAGNOSIS — I50.32 CHRONIC DIASTOLIC HEART FAILURE: ICD-10-CM

## 2018-05-29 DIAGNOSIS — I15.0 RENOVASCULAR HYPERTENSION: ICD-10-CM

## 2018-05-29 DIAGNOSIS — I25.83 CORONARY ARTERY DISEASE DUE TO LIPID RICH PLAQUE: ICD-10-CM

## 2018-05-29 DIAGNOSIS — I35.0 AORTIC VALVE STENOSIS, UNSPECIFIED ETIOLOGY: ICD-10-CM

## 2018-05-30 RX ORDER — HYDRALAZINE HYDROCHLORIDE 100 MG/1
TABLET, FILM COATED ORAL
Qty: 270 TABLET | Refills: 3 | Status: SHIPPED | OUTPATIENT
Start: 2018-05-30 | End: 2019-06-05 | Stop reason: SDUPTHER

## 2018-06-04 RX ORDER — CLONIDINE 0.1 MG/24H
PATCH, EXTENDED RELEASE TRANSDERMAL
Qty: 4 PATCH | Refills: 6 | Status: SHIPPED | OUTPATIENT
Start: 2018-06-04 | End: 2018-12-06 | Stop reason: DRUGHIGH

## 2018-06-11 RX ORDER — ALLOPURINOL 100 MG/1
TABLET ORAL
Qty: 90 TABLET | Refills: 0 | Status: SHIPPED | OUTPATIENT
Start: 2018-06-11 | End: 2018-09-16 | Stop reason: SDUPTHER

## 2018-07-12 RX ORDER — FUROSEMIDE 20 MG/1
TABLET ORAL
Qty: 30 TABLET | Refills: 2 | OUTPATIENT
Start: 2018-07-12

## 2018-07-12 RX ORDER — CARVEDILOL 12.5 MG/1
12.5 TABLET ORAL 2 TIMES DAILY
Qty: 60 TABLET | Refills: 6 | Status: SHIPPED | OUTPATIENT
Start: 2018-07-12 | End: 2019-02-11 | Stop reason: SDUPTHER

## 2018-07-16 RX ORDER — IRBESARTAN 300 MG/1
300 TABLET ORAL NIGHTLY
Qty: 90 TABLET | Refills: 3 | Status: SHIPPED | OUTPATIENT
Start: 2018-07-16 | End: 2020-10-06 | Stop reason: SDUPTHER

## 2018-07-16 RX ORDER — FUROSEMIDE 20 MG/1
TABLET ORAL
Qty: 30 TABLET | Refills: 2 | Status: SHIPPED | OUTPATIENT
Start: 2018-07-16 | End: 2018-08-03 | Stop reason: SDUPTHER

## 2018-08-01 ENCOUNTER — LAB VISIT (OUTPATIENT)
Dept: LAB | Facility: HOSPITAL | Age: 83
End: 2018-08-01
Attending: NURSE PRACTITIONER
Payer: MEDICARE

## 2018-08-01 DIAGNOSIS — I50.32 CHRONIC DIASTOLIC CONGESTIVE HEART FAILURE: ICD-10-CM

## 2018-08-01 DIAGNOSIS — E78.00 PURE HYPERCHOLESTEROLEMIA: ICD-10-CM

## 2018-08-01 LAB
ALT SERPL W/O P-5'-P-CCNC: 10 U/L
ANION GAP SERPL CALC-SCNC: 8 MMOL/L
AST SERPL-CCNC: 19 U/L
BUN SERPL-MCNC: 32 MG/DL
CALCIUM SERPL-MCNC: 9.5 MG/DL
CHLORIDE SERPL-SCNC: 101 MMOL/L
CHOLEST SERPL-MCNC: 122 MG/DL
CHOLEST/HDLC SERPL: 2.9 {RATIO}
CO2 SERPL-SCNC: 30 MMOL/L
CREAT SERPL-MCNC: 1.5 MG/DL
EST. GFR  (AFRICAN AMERICAN): 36.4 ML/MIN/1.73 M^2
EST. GFR  (NON AFRICAN AMERICAN): 31.5 ML/MIN/1.73 M^2
GLUCOSE SERPL-MCNC: 132 MG/DL
HDLC SERPL-MCNC: 42 MG/DL
HDLC SERPL: 34.4 %
LDLC SERPL CALC-MCNC: 63.6 MG/DL
NONHDLC SERPL-MCNC: 80 MG/DL
POTASSIUM SERPL-SCNC: 4.2 MMOL/L
SODIUM SERPL-SCNC: 139 MMOL/L
TRIGL SERPL-MCNC: 82 MG/DL

## 2018-08-01 PROCEDURE — 80048 BASIC METABOLIC PNL TOTAL CA: CPT

## 2018-08-01 PROCEDURE — 84460 ALANINE AMINO (ALT) (SGPT): CPT

## 2018-08-01 PROCEDURE — 80061 LIPID PANEL: CPT

## 2018-08-01 PROCEDURE — 36415 COLL VENOUS BLD VENIPUNCTURE: CPT | Mod: PO

## 2018-08-01 PROCEDURE — 84450 TRANSFERASE (AST) (SGOT): CPT

## 2018-08-03 ENCOUNTER — TELEPHONE (OUTPATIENT)
Dept: FAMILY MEDICINE | Facility: CLINIC | Age: 83
End: 2018-08-03

## 2018-08-03 ENCOUNTER — OFFICE VISIT (OUTPATIENT)
Dept: CARDIOLOGY | Facility: CLINIC | Age: 83
End: 2018-08-03
Payer: MEDICARE

## 2018-08-03 VITALS
HEIGHT: 63 IN | BODY MASS INDEX: 32.79 KG/M2 | WEIGHT: 185.06 LBS | SYSTOLIC BLOOD PRESSURE: 171 MMHG | HEART RATE: 62 BPM | DIASTOLIC BLOOD PRESSURE: 74 MMHG

## 2018-08-03 DIAGNOSIS — Z95.3 STATUS POST TRANSCATHETER AORTIC VALVE REPLACEMENT (TAVR) USING BIOPROSTHESIS: ICD-10-CM

## 2018-08-03 DIAGNOSIS — E11.65 TYPE 2 DIABETES MELLITUS WITH HYPERGLYCEMIA, WITHOUT LONG-TERM CURRENT USE OF INSULIN: Primary | ICD-10-CM

## 2018-08-03 DIAGNOSIS — I10 ESSENTIAL HYPERTENSION: ICD-10-CM

## 2018-08-03 DIAGNOSIS — N18.3 TYPE 2 DIABETES MELLITUS WITH STAGE 3 CHRONIC KIDNEY DISEASE, UNSPECIFIED WHETHER LONG TERM INSULIN USE: ICD-10-CM

## 2018-08-03 DIAGNOSIS — I25.2 OLD MYOCARDIAL INFARCT: ICD-10-CM

## 2018-08-03 DIAGNOSIS — I35.0 NONRHEUMATIC AORTIC VALVE STENOSIS: ICD-10-CM

## 2018-08-03 DIAGNOSIS — I25.10 CORONARY ARTERY DISEASE INVOLVING NATIVE CORONARY ARTERY OF NATIVE HEART WITHOUT ANGINA PECTORIS: Chronic | ICD-10-CM

## 2018-08-03 DIAGNOSIS — E11.22 TYPE 2 DIABETES MELLITUS WITH STAGE 3 CHRONIC KIDNEY DISEASE, UNSPECIFIED WHETHER LONG TERM INSULIN USE: ICD-10-CM

## 2018-08-03 DIAGNOSIS — I50.32 CHRONIC DIASTOLIC CONGESTIVE HEART FAILURE: Primary | ICD-10-CM

## 2018-08-03 DIAGNOSIS — Z95.1 S/P CABG X 3: ICD-10-CM

## 2018-08-03 DIAGNOSIS — D53.9 MACROCYTIC ANEMIA: ICD-10-CM

## 2018-08-03 DIAGNOSIS — E78.00 PURE HYPERCHOLESTEROLEMIA: ICD-10-CM

## 2018-08-03 PROCEDURE — 99999 PR PBB SHADOW E&M-EST. PATIENT-LVL III: CPT | Mod: PBBFAC,,, | Performed by: NURSE PRACTITIONER

## 2018-08-03 PROCEDURE — 99213 OFFICE O/P EST LOW 20 MIN: CPT | Mod: PBBFAC,PO | Performed by: NURSE PRACTITIONER

## 2018-08-03 PROCEDURE — 99214 OFFICE O/P EST MOD 30 MIN: CPT | Mod: S$PBB,,, | Performed by: NURSE PRACTITIONER

## 2018-08-03 RX ORDER — LANCETS
EACH MISCELLANEOUS
Qty: 100 EACH | Refills: 6 | Status: ON HOLD | OUTPATIENT
Start: 2018-08-03 | End: 2021-11-16 | Stop reason: SDUPTHER

## 2018-08-03 RX ORDER — FUROSEMIDE 20 MG/1
20 TABLET ORAL DAILY
Qty: 140 TABLET | Refills: 3 | Status: SHIPPED | OUTPATIENT
Start: 2018-08-03 | End: 2018-10-15

## 2018-08-03 RX ORDER — INSULIN PUMP SYRINGE, 3 ML
EACH MISCELLANEOUS
Qty: 1 EACH | Refills: 0 | Status: SHIPPED | OUTPATIENT
Start: 2018-08-03 | End: 2019-08-03

## 2018-08-03 NOTE — PROGRESS NOTES
"Ms. Washington is a patient of Dr. Golden and was last seen in [facility] Visit 9/19/17.    Subjective:   Patient ID:  Krystina Washington is a 85 y.o. female who presents for follow-up of Coronary Artery Disease    Problem List:  Chr diastolic heart failure   Aortic stenosis  - s/p TAVR 3/7/2017  CAD S/P CABG x3  9/2010  Old MI   HTN   Hyperlipidemia   T2D - long standing   CKD 3  Diastolic dysfunction, EF 55-60%    HPI:   Ms. Washington is in clinic today for routine follow up.  Reports chest pain last week that "felt like gas."  The pain resolved after burping.  The pain came on when she was sitting down and lasted a few seconds.  Patient denies palpitations,  dizziness, syncope, edema, orthopnea, PND, or claudication.  Reports routine exercise, chair exercises.  She walks 30-60 minutes a couple times a week.  Reports DA SILVA intermittently and will sometimes prevent her from exercising.  States that she has had DA SILVA for the last week.  Her echo revealed normal systolic function and grade II diastolic dysfunction, EF 55-60%.  She is not weighing herself daily.  She takes the furosemide when she notices leg swelling.  She is treated with low dose ASA and low intensity statin.  Patient is taking pravastatin 40mg and LDL is 64.      Review of Systems   Constitution: Negative for decreased appetite, diaphoresis, weakness, malaise/fatigue, weight gain and weight loss.   Eyes: Negative for visual disturbance.   Cardiovascular: Negative for chest pain, claudication, dyspnea on exertion, irregular heartbeat, leg swelling, near-syncope, orthopnea, palpitations, paroxysmal nocturnal dyspnea and syncope.        Denies chest pressure   Respiratory: Negative for cough, hemoptysis, shortness of breath, sleep disturbances due to breathing and snoring.    Endocrine: Negative for cold intolerance and heat intolerance.   Hematologic/Lymphatic: Negative for bleeding problem. Does not bruise/bleed easily.   Musculoskeletal: Negative for " myalgias.   Gastrointestinal: Negative for bloating, abdominal pain, anorexia, change in bowel habit, constipation, diarrhea, nausea and vomiting.   Neurological: Negative for difficulty with concentration, disturbances in coordination, excessive daytime sleepiness, dizziness, headaches, light-headedness, loss of balance and numbness.   Psychiatric/Behavioral: The patient does not have insomnia.        Allergies and current medications updated and reviewed:  Review of patient's allergies indicates:   Allergen Reactions    Indocin [indomethacin sodium] Other (See Comments)    Lotensin [benazepril] Other (See Comments)     Current Outpatient Prescriptions   Medication Sig    acetaminophen (TYLENOL) 325 MG tablet Take 650 mg by mouth every 6 (six) hours as needed for Pain.    albuterol 90 mcg/actuation inhaler Inhale 1 puff into the lungs every 6 (six) hours as needed for Wheezing. 1 HFA Aerosol Inhaler Inhalation Twice a day    allopurinol (ZYLOPRIM) 100 MG tablet TAKE 1 TABLET BY MOUTH ONCE DAILY.    aspirin 81 MG chewable tablet Take 81 mg by mouth. 1 Tablet, Chewable Oral Every day    carvedilol (COREG) 12.5 MG tablet TAKE 1 TABLET (12.5 MG TOTAL) BY MOUTH 2 (TWO) TIMES DAILY.    cetirizine (ZYRTEC) 10 MG tablet Take 1 tablet (10 mg total) by mouth daily as needed for Allergies.    cholecalciferol, vitamin D3, 50,000 unit capsule Take 1 capsule (50,000 Units total) by mouth once a week. 1 Capsule Oral Weekly    cloNIDine 0.1 mg/24 hr td ptwk (CATAPRES) 0.1 mg/24 hr PLACE 1 PATCH ONTO THE SKIN EVERY 7 DAYS.    clopidogrel (PLAVIX) 75 mg tablet TAKE 1 TABLET BY MOUTH EVERY DAY    escitalopram oxalate (LEXAPRO) 20 MG tablet Take 1 tablet (20 mg total) by mouth once daily.    furosemide (LASIX) 20 MG tablet TAKE 1 PILL DAILY AS NEED FOR WEIGHT GAIN, SWELLING, OR SHORTNESS OF BREATH    hydrALAZINE (APRESOLINE) 100 MG tablet TAKE 1 TABLET (100 MG TOTAL) BY MOUTH EVERY 8 (EIGHT) HOURS.    inhalation device  "(AEROCHAMBER PLUS FLOW-VU) Use as directed for inhalation.    irbesartan (AVAPRO) 300 MG tablet TAKE 1 TABLET (300 MG TOTAL) BY MOUTH EVERY EVENING.    isosorbide mononitrate (IMDUR) 60 MG 24 hr tablet TAKE 1 TABLET (60 MG TOTAL) BY MOUTH ONCE DAILY.    NITROMIST 400 mcg/spray PLACE 1 SPRAY ONTO THE TONGUE EVERY 5 (FIVE) MINUTES AS NEEDED.    pravastatin (PRAVACHOL) 40 MG tablet TAKE 1 TABLET BY MOUTH EVERY EVENING     No current facility-administered medications for this visit.        Objective:          BP (!) 171/74   Pulse 62   Ht 5' 3" (1.6 m)   Wt 84 kg (185 lb 1.2 oz)   LMP  (LMP Unknown)   BMI 32.78 kg/m²       Physical Exam   Constitutional: She is oriented to person, place, and time. Vital signs are normal. She appears well-developed and well-nourished. She is active. No distress.   HENT:   Head: Normocephalic and atraumatic.   Eyes: Conjunctivae and lids are normal. No scleral icterus.   Neck: Neck supple. Normal carotid pulses, no hepatojugular reflux and no JVD present. Carotid bruit is not present.   Cardiovascular: Normal rate, regular rhythm, S1 normal, S2 normal and intact distal pulses.  PMI is not displaced.  Exam reveals no gallop and no friction rub.    Murmur heard.   Harsh midsystolic murmur is present with a grade of 2/6  at the upper right sternal border radiating to the neck  Pulses:       Carotid pulses are 2+ on the right side, and 2+ on the left side.       Radial pulses are 2+ on the right side, and 2+ on the left side.        Dorsalis pedis pulses are 2+ on the right side, and 2+ on the left side.        Posterior tibial pulses are 1+ on the right side, and 1+ on the left side.   Pulmonary/Chest: Effort normal and breath sounds normal. No respiratory distress. She has no decreased breath sounds. She has no wheezes. She has no rhonchi. She has no rales. She exhibits no tenderness.   Abdominal: Soft. Normal appearance and bowel sounds are normal. She exhibits no distension, no " fluid wave, no abdominal bruit, no ascites and no pulsatile midline mass. There is no hepatosplenomegaly. There is no tenderness.   Musculoskeletal: She exhibits edema (BLE +1 pitting edema to knees).   Neurological: She is alert and oriented to person, place, and time. Gait normal.   Skin: Skin is warm, dry and intact. No rash noted. She is not diaphoretic. Nails show no clubbing.   Psychiatric: She has a normal mood and affect. Her speech is normal and behavior is normal. Judgment and thought content normal. Cognition and memory are normal.   Nursing note and vitals reviewed.      Chemistry        Component Value Date/Time     08/01/2018 0849    K 4.2 08/01/2018 0849     08/01/2018 0849    CO2 30 (H) 08/01/2018 0849    BUN 32 (H) 08/01/2018 0849    CREATININE 1.5 (H) 08/01/2018 0849     (H) 08/01/2018 0849        Component Value Date/Time    CALCIUM 9.5 08/01/2018 0849    ALKPHOS 102 03/12/2018 1045    AST 19 08/01/2018 0849    ALT 10 08/01/2018 0849    BILITOT 0.3 03/12/2018 1045    ESTGFRAFRICA 36.4 (A) 08/01/2018 0849    EGFRNONAA 31.5 (A) 08/01/2018 0849        Lab Results   Component Value Date    HGBA1C 6.1 (H) 03/12/2018         Recent Labs  Lab 09/14/15  1115  11/28/15  1341  01/18/17  0133  03/12/18  1045 03/20/18  0818 08/01/18  0849   WHITE BLOOD CELL COUNT 8.78  < > 9.13  < > 12.58  < > 10.86 10.58  --    HEMOGLOBIN 9.3 L  < > 9.2 L  < > 8.9 L  < > 10.9 L 11.0 L  --    HEMATOCRIT 28.7 L  < > 28.1 L  < > 27.1 L  < > 35.0 L 34.9 L  --     H  < > 101 H  < > 96  < > 102 H 101 H  --    PLATELETS 190  < > 190  < > 158  < > 178 157  --    BNP 1,051 H  --  685 H  --  1,960 H  --   --   --   --    TSH 2.375  --   --   --   --   --  2.747  --   --    CHOLESTEROL  --   < >  --   < >  --   < > 124  --  122   HDL  --   < >  --   < >  --   < > 54  --  42   LDL CHOLESTEROL  --   < >  --   < >  --   < > 55.8 L  --  63.6   TRIGLYCERIDES  --   < >  --   < >  --   < > 71  --  82    HDL/CHOLESTEROL RATIO  --   < >  --   < >  --   < > 43.5  --  34.4   < > = values in this interval not displayed.    Lab Results   Component Value Date    DQEXNQAC00 724 09/24/2012         Recent Labs  Lab 01/18/17  0133 03/06/17  1324 03/07/17  1250 03/08/17  1021   INR 1.2 1.1 1.2 1.1        Test(s) Reviewed  I have reviewed the following in detail:  [] Stress test   [] Angiography   [x] Echocardiogram   [x] Labs   [] Other:         Assessment/Plan:     Chronic diastolic congestive heart failure  Comments:  Hypervolemic. Instructed to take furosemide BID until she loses 5lbs. Daily wts. 2gm sodium diet.   Orders:  -     furosemide (LASIX) 20 MG tablet; Take 1 tablet (20 mg total) by mouth once daily. Take a second dose 6 hours after 1st dose if you gain 2 pounds in a day or 5 pounds in a week.  Dispense: 140 tablet; Refill: 3    Coronary artery disease involving native coronary artery of native heart without angina pectoris  Comments:  Asymptomatic. Taking low to moderate intensity statin and ASA. No changes.    Old myocardial infarct    S/P CABG x 3    Pure hypercholesterolemia  Comments:  LDL at goal <70. Continue pravastatin 40mg    Nonrheumatic aortic valve stenosis  Comments:  2/6 systolic murmur    Status post transcatheter aortic valve replacement (TAVR) using bioprosthesis- 2017    Essential hypertension  Comments:  BP not at goal <130/80. Suspect hypervolemia is cause of increased BP. Reassess in 4 weeks.     Macrocytic anemia  Comments:  MCV >100. No recent B12 or folate levels. Instructed to f/u with PCP.     Type 2 diabetes mellitus with stage 3 chronic kidney disease, unspecified whether long term insulin use  Comments:  A1C at goal <7. F/U with PCP as planned. Emailed PCP re broken glucometer.        Follow-up in about 4 weeks (around 8/31/2018).

## 2018-08-03 NOTE — PATIENT INSTRUCTIONS
Increase cardiovascular exercise to 30 minutes of brisk walking a day for 4-5 days a week.  You can use a stationary bike or swim for 30 minutes a day instead of walking.  Whatever exercise you choose, make sure you are working hard enough to increase your heart rate.     Salt restriction of 2,000mg a day. Weigh yourself every morning after you go to the restroom.  Take a dose of Lasix (furosemide) if weight increases 2 or more pounds in a 24 hr period, or 5 pounds over a 7 day period. Contact our office if weight does not return to baseline within 1-2 days.    Take your lasix (furosemide) 20mg by mouth 2 times a day for 3-5 days or until you lose 5 pounds.  Then go back to taking the lasix once a day.

## 2018-08-03 NOTE — TELEPHONE ENCOUNTER
----- Message from Kenyatta Wynn NP sent at 8/3/2018  4:09 PM CDT -----  Regarding: Glucometer  Hi Dr. Johnson,    Ms. Washington said that her glucometer isn't working and she has been waking with sx of hypoglycemia relieved with drinking juice.  Can you please order her a new meter and strips?    Thank you,  Kenyatta

## 2018-08-30 ENCOUNTER — LAB VISIT (OUTPATIENT)
Dept: LAB | Facility: HOSPITAL | Age: 83
End: 2018-08-30
Attending: NURSE PRACTITIONER
Payer: MEDICARE

## 2018-08-30 DIAGNOSIS — I50.32 CHRONIC DIASTOLIC CONGESTIVE HEART FAILURE: ICD-10-CM

## 2018-08-30 LAB
ANION GAP SERPL CALC-SCNC: 8 MMOL/L
BUN SERPL-MCNC: 52 MG/DL
CALCIUM SERPL-MCNC: 9.4 MG/DL
CHLORIDE SERPL-SCNC: 101 MMOL/L
CO2 SERPL-SCNC: 31 MMOL/L
CREAT SERPL-MCNC: 1.9 MG/DL
EST. GFR  (AFRICAN AMERICAN): 27.1 ML/MIN/1.73 M^2
EST. GFR  (NON AFRICAN AMERICAN): 23.5 ML/MIN/1.73 M^2
GLUCOSE SERPL-MCNC: 155 MG/DL
POTASSIUM SERPL-SCNC: 4.3 MMOL/L
SODIUM SERPL-SCNC: 140 MMOL/L

## 2018-08-30 PROCEDURE — 36415 COLL VENOUS BLD VENIPUNCTURE: CPT | Mod: PO

## 2018-08-30 PROCEDURE — 80048 BASIC METABOLIC PNL TOTAL CA: CPT

## 2018-09-07 ENCOUNTER — OFFICE VISIT (OUTPATIENT)
Dept: CARDIOLOGY | Facility: CLINIC | Age: 83
End: 2018-09-07
Payer: MEDICARE

## 2018-09-07 VITALS
BODY MASS INDEX: 33.86 KG/M2 | SYSTOLIC BLOOD PRESSURE: 144 MMHG | DIASTOLIC BLOOD PRESSURE: 71 MMHG | HEIGHT: 62 IN | HEART RATE: 67 BPM | WEIGHT: 184 LBS

## 2018-09-07 DIAGNOSIS — M79.10 MYALGIA: ICD-10-CM

## 2018-09-07 DIAGNOSIS — I10 ESSENTIAL HYPERTENSION: ICD-10-CM

## 2018-09-07 DIAGNOSIS — I35.0 AORTIC STENOSIS, SEVERE: ICD-10-CM

## 2018-09-07 DIAGNOSIS — N17.9 ACUTE KIDNEY INJURY SUPERIMPOSED ON CHRONIC KIDNEY DISEASE: ICD-10-CM

## 2018-09-07 DIAGNOSIS — E78.00 PURE HYPERCHOLESTEROLEMIA: Chronic | ICD-10-CM

## 2018-09-07 DIAGNOSIS — I50.32 CHRONIC DIASTOLIC CONGESTIVE HEART FAILURE: Primary | ICD-10-CM

## 2018-09-07 DIAGNOSIS — I25.10 CORONARY ARTERY DISEASE INVOLVING NATIVE CORONARY ARTERY OF NATIVE HEART WITHOUT ANGINA PECTORIS: ICD-10-CM

## 2018-09-07 DIAGNOSIS — R25.2 MUSCLE CRAMPING: ICD-10-CM

## 2018-09-07 DIAGNOSIS — N18.9 ACUTE KIDNEY INJURY SUPERIMPOSED ON CHRONIC KIDNEY DISEASE: ICD-10-CM

## 2018-09-07 PROCEDURE — 99999 PR PBB SHADOW E&M-EST. PATIENT-LVL III: CPT | Mod: PBBFAC,,, | Performed by: NURSE PRACTITIONER

## 2018-09-07 PROCEDURE — 99213 OFFICE O/P EST LOW 20 MIN: CPT | Mod: PBBFAC,PO | Performed by: NURSE PRACTITIONER

## 2018-09-07 PROCEDURE — 99214 OFFICE O/P EST MOD 30 MIN: CPT | Mod: S$PBB,,, | Performed by: NURSE PRACTITIONER

## 2018-09-07 NOTE — PROGRESS NOTES
Ms. Washington is a patient of Dr. Golden and was last seen in [facility] 8/3/2018.    Subjective:   Patient ID:  Krystina Washington is a 86 y.o. female who presents for follow-up of Coronary Artery Disease    Problems:  Chr diastolic heart failure   Aortic stenosis  - s/p TAVR 3/7/2017  CAD S/P CABG x3  9/2010  Old MI   HTN   Hyperlipidemia   T2D - long standing   CKD 3  Diastolic dysfunction, EF 55-60%    HPI  Ms. Washington is in clinic today with her great grandson for routine follow up HFpEF after increasing lasix to 20mg BID.  Reports improvement in her breathing.  She was able to walk from the parking garage to the clinic without stopping or SOB.  Her BUN and CRT paddy and she is likely a little dehydrated. Denies lightheadedness/dizziness and states she is feeling well.     Review of Systems   Constitution: Negative for decreased appetite, diaphoresis, weakness, malaise/fatigue, weight gain and weight loss.   Eyes: Negative for visual disturbance.   Cardiovascular: Negative for chest pain, claudication, dyspnea on exertion, irregular heartbeat, leg swelling, near-syncope, orthopnea, palpitations, paroxysmal nocturnal dyspnea and syncope.        Denies chest pressure   Respiratory: Negative for cough, hemoptysis, shortness of breath, sleep disturbances due to breathing and snoring.    Endocrine: Negative for cold intolerance and heat intolerance.   Hematologic/Lymphatic: Negative for bleeding problem. Does not bruise/bleed easily.   Musculoskeletal: Negative for myalgias.   Gastrointestinal: Negative for bloating, abdominal pain, anorexia, change in bowel habit, constipation, diarrhea, nausea and vomiting.   Neurological: Negative for difficulty with concentration, disturbances in coordination, excessive daytime sleepiness, dizziness, headaches, light-headedness, loss of balance and numbness.   Psychiatric/Behavioral: The patient does not have insomnia.        Allergies and current medications updated and  reviewed:  Review of patient's allergies indicates:   Allergen Reactions    Indocin [indomethacin sodium] Other (See Comments)    Lotensin [benazepril] Other (See Comments)     Current Outpatient Medications   Medication Sig    acetaminophen (TYLENOL) 325 MG tablet Take 650 mg by mouth every 6 (six) hours as needed for Pain.    albuterol 90 mcg/actuation inhaler Inhale 1 puff into the lungs every 6 (six) hours as needed for Wheezing. 1 HFA Aerosol Inhaler Inhalation Twice a day    allopurinol (ZYLOPRIM) 100 MG tablet TAKE 1 TABLET BY MOUTH ONCE DAILY.    aspirin 81 MG chewable tablet Take 81 mg by mouth. 1 Tablet, Chewable Oral Every day    blood sugar diagnostic Strp To check BG 1 times daily, to use with insurance preferred meter    blood-glucose meter kit To check BG 1 times daily, to use with insurance preferred meter    carvedilol (COREG) 12.5 MG tablet TAKE 1 TABLET (12.5 MG TOTAL) BY MOUTH 2 (TWO) TIMES DAILY.    cetirizine (ZYRTEC) 10 MG tablet Take 1 tablet (10 mg total) by mouth daily as needed for Allergies.    cholecalciferol, vitamin D3, 50,000 unit capsule Take 1 capsule (50,000 Units total) by mouth once a week. 1 Capsule Oral Weekly    cloNIDine 0.1 mg/24 hr td ptwk (CATAPRES) 0.1 mg/24 hr PLACE 1 PATCH ONTO THE SKIN EVERY 7 DAYS.    clopidogrel (PLAVIX) 75 mg tablet TAKE 1 TABLET BY MOUTH EVERY DAY    escitalopram oxalate (LEXAPRO) 20 MG tablet Take 1 tablet (20 mg total) by mouth once daily.    furosemide (LASIX) 20 MG tablet Take 1 tablet (20 mg total) by mouth once daily. Take a second dose 6 hours after 1st dose if you gain 2 pounds in a day or 5 pounds in a week.    hydrALAZINE (APRESOLINE) 100 MG tablet TAKE 1 TABLET (100 MG TOTAL) BY MOUTH EVERY 8 (EIGHT) HOURS.    inhalation device (AEROCHAMBER PLUS FLOW-VU) Use as directed for inhalation.    irbesartan (AVAPRO) 300 MG tablet TAKE 1 TABLET (300 MG TOTAL) BY MOUTH EVERY EVENING.    isosorbide mononitrate (IMDUR) 60 MG 24 hr  "tablet TAKE 1 TABLET (60 MG TOTAL) BY MOUTH ONCE DAILY.    lancets Misc To check BG 1 times daily, to use with insurance preferred meter    NITROMIST 400 mcg/spray PLACE 1 SPRAY ONTO THE TONGUE EVERY 5 (FIVE) MINUTES AS NEEDED.    pravastatin (PRAVACHOL) 40 MG tablet TAKE 1 TABLET BY MOUTH EVERY EVENING     No current facility-administered medications for this visit.        Objective:        BP (!) 144/71   Pulse 67   Ht 5' 2" (1.575 m)   Wt 83.4 kg (183 lb 15.6 oz)   LMP  (LMP Unknown)   BMI 33.65 kg/m²       Physical Exam   Constitutional: She is oriented to person, place, and time. Vital signs are normal. She appears well-developed and well-nourished. She is active. No distress.   HENT:   Head: Normocephalic and atraumatic.   Eyes: Conjunctivae and lids are normal. No scleral icterus.   Neck: Neck supple. Normal carotid pulses, no hepatojugular reflux and no JVD present. Carotid bruit is not present.   Cardiovascular: Normal rate, regular rhythm, S1 normal, S2 normal and intact distal pulses. PMI is not displaced. Exam reveals no gallop and no friction rub.   Murmur heard.   Harsh midsystolic murmur is present with a grade of 2/6 at the upper right sternal border radiating to the neck.  Pulses:       Carotid pulses are 2+ on the right side, and 2+ on the left side.       Radial pulses are 2+ on the right side, and 2+ on the left side.        Dorsalis pedis pulses are 2+ on the right side, and 2+ on the left side.        Posterior tibial pulses are 1+ on the right side, and 1+ on the left side.   Pulmonary/Chest: Effort normal and breath sounds normal. No respiratory distress. She has no decreased breath sounds. She has no wheezes. She has no rhonchi. She has no rales. She exhibits no tenderness.   Abdominal: Soft. Normal appearance and bowel sounds are normal. She exhibits no distension, no fluid wave, no abdominal bruit, no ascites and no pulsatile midline mass. There is no hepatosplenomegaly. There is " no tenderness.   Musculoskeletal: She exhibits edema.   Neurological: She is alert and oriented to person, place, and time. Gait normal.   Skin: Skin is warm, dry and intact. No rash noted. She is not diaphoretic. Nails show no clubbing.   Psychiatric: She has a normal mood and affect. Her speech is normal and behavior is normal. Judgment and thought content normal. Cognition and memory are normal.   Nursing note and vitals reviewed.      Chemistry        Component Value Date/Time     08/30/2018 0858    K 4.3 08/30/2018 0858     08/30/2018 0858    CO2 31 (H) 08/30/2018 0858    BUN 52 (H) 08/30/2018 0858    CREATININE 1.9 (H) 08/30/2018 0858     (H) 08/30/2018 0858        Component Value Date/Time    CALCIUM 9.4 08/30/2018 0858    ALKPHOS 102 03/12/2018 1045    AST 19 08/01/2018 0849    ALT 10 08/01/2018 0849    BILITOT 0.3 03/12/2018 1045    ESTGFRAFRICA 27.1 (A) 08/30/2018 0858    EGFRNONAA 23.5 (A) 08/30/2018 0858        Lab Results   Component Value Date    HGBA1C 6.1 (H) 03/12/2018       Recent Labs   Lab  09/14/15   1115   11/28/15   1341   01/18/17   0133   03/12/18   1045  03/20/18   0818  08/01/18   0849   WHITE BLOOD CELL COUNT  8.78   < >  9.13   < >  12.58   < >  10.86  10.58   --    HEMOGLOBIN  9.3 L   < >  9.2 L   < >  8.9 L   < >  10.9 L  11.0 L   --    HEMATOCRIT  28.7 L   < >  28.1 L   < >  27.1 L   < >  35.0 L  34.9 L   --    MCV  100 H   < >  101 H   < >  96   < >  102 H  101 H   --    PLATELETS  190   < >  190   < >  158   < >  178  157   --    BNP  1,051 H   --   685 H   --   1,960 H   --    --    --    --    TSH  2.375   --    --    --    --    --   2.747   --    --    CHOLESTEROL   --    < >   --    < >   --    < >  124   --   122   HDL   --    < >   --    < >   --    < >  54   --   42   LDL CHOLESTEROL   --    < >   --    < >   --    < >  55.8 L   --   63.6   TRIGLYCERIDES   --    < >   --    < >   --    < >  71   --   82   HDL/CHOLESTEROL RATIO   --    < >   --    < >   --     < >  43.5   --   34.4    < > = values in this interval not displayed.       Recent Labs   Lab  01/18/17   0133  03/06/17   1324  03/07/17   1250  03/08/17   1021   INR  1.2  1.1  1.2  1.1        Test(s) Reviewed  I have reviewed the following in detail:  [] Stress test   [] Angiography   [x] Echocardiogram   [x] Labs   [] Other:         Assessment/Plan:     Chronic diastolic congestive heart failure  Comments:  Well compensated. Decrease lasix to daily 20mg. Double lasix for 3lbs/day or 5lbs/wk wt gain.  Orders:  -     Comprehensive metabolic panel; Future; Expected date: 12/06/2018    Aortic stenosis, severe  Comments:  2/6 systolic murmur    Coronary artery disease involving native coronary artery of native heart without angina pectoris  Comments:  Asymptomatic. Taking low to moderate intensity statin and ASA. No changes    Pure hypercholesterolemia  Comments:  LDL at goal <70. Continue pravastatin 40mg.    Essential hypertension  Comments:  BP fairly well controlled, 130-140s. Will tolerate BP to <140/80 given advanced age to prevent falls.    Myalgia  -     Magnesium; Future; Expected date: 09/07/2018  -     VITAMIN D; Future; Expected date: 09/07/2018    Muscle cramping  Comments:  Check magnesium and vitamin D with next lab draw. Instructed to try drinking diet tonic water at night.  Orders:  -     Magnesium; Future; Expected date: 09/07/2018  -     VITAMIN D; Future; Expected date: 09/07/2018    Acute kidney injury superimposed on chronic kidney disease  Comments:  Over diuresed. Instructed to decrease lasix to daily and increase fluids. Rpt cmp in 3 months       Follow-up in about 3 months (around 12/7/2018).

## 2018-09-07 NOTE — PATIENT INSTRUCTIONS
Try drinking some diet tonic water at night to help with the muscle cramping    Decrease lasix (furosemide) to 20mg by mouth daily.  Salt restriction of 2,000mg a day. Weigh yourself every morning after you go to the restroom.  Take an extra dose of Lasix (furosemide) if weight increases 2 or more pounds in a 24 hr period, or 5 pounds over a 7 day period. Contact our office if weight does not return to baseline within 1-2 days

## 2018-09-10 RX ORDER — ISOSORBIDE MONONITRATE 60 MG/1
TABLET, EXTENDED RELEASE ORAL
Qty: 90 TABLET | Refills: 1 | Status: SHIPPED | OUTPATIENT
Start: 2018-09-10 | End: 2019-03-08 | Stop reason: SDUPTHER

## 2018-09-17 RX ORDER — ALLOPURINOL 100 MG/1
TABLET ORAL
Qty: 90 TABLET | Refills: 0 | Status: SHIPPED | OUTPATIENT
Start: 2018-09-17 | End: 2018-12-15 | Stop reason: SDUPTHER

## 2018-10-05 RX ORDER — PRAVASTATIN SODIUM 40 MG/1
TABLET ORAL
Qty: 90 TABLET | Refills: 2 | Status: SHIPPED | OUTPATIENT
Start: 2018-10-05 | End: 2019-08-29 | Stop reason: SDUPTHER

## 2018-10-15 RX ORDER — FUROSEMIDE 20 MG/1
TABLET ORAL
Qty: 30 TABLET | Refills: 2 | Status: SHIPPED | OUTPATIENT
Start: 2018-10-15 | End: 2019-01-07 | Stop reason: SDUPTHER

## 2018-10-29 RX ORDER — ESCITALOPRAM OXALATE 20 MG/1
20 TABLET ORAL DAILY
Qty: 30 TABLET | Refills: 11 | Status: SHIPPED | OUTPATIENT
Start: 2018-10-29 | End: 2019-06-07 | Stop reason: SDUPTHER

## 2018-11-01 RX ORDER — NITROGLYCERIN 400 UG/1
SPRAY ORAL
Qty: 12 G | Refills: 0 | Status: SHIPPED | OUTPATIENT
Start: 2018-11-01 | End: 2024-01-03 | Stop reason: SDUPTHER

## 2018-12-05 ENCOUNTER — LAB VISIT (OUTPATIENT)
Dept: LAB | Facility: HOSPITAL | Age: 83
End: 2018-12-05
Attending: NURSE PRACTITIONER
Payer: MEDICARE

## 2018-12-05 DIAGNOSIS — I50.32 CHRONIC DIASTOLIC CONGESTIVE HEART FAILURE: ICD-10-CM

## 2018-12-05 DIAGNOSIS — R25.2 MUSCLE CRAMPING: ICD-10-CM

## 2018-12-05 DIAGNOSIS — M79.10 MYALGIA: ICD-10-CM

## 2018-12-05 LAB
25(OH)D3+25(OH)D2 SERPL-MCNC: 18 NG/ML
ALBUMIN SERPL BCP-MCNC: 3.2 G/DL
ALP SERPL-CCNC: 99 U/L
ALT SERPL W/O P-5'-P-CCNC: 9 U/L
ANION GAP SERPL CALC-SCNC: 8 MMOL/L
AST SERPL-CCNC: 18 U/L
BILIRUB SERPL-MCNC: 0.3 MG/DL
BUN SERPL-MCNC: 32 MG/DL
CALCIUM SERPL-MCNC: 9.5 MG/DL
CHLORIDE SERPL-SCNC: 101 MMOL/L
CO2 SERPL-SCNC: 32 MMOL/L
CREAT SERPL-MCNC: 1.7 MG/DL
EST. GFR  (AFRICAN AMERICAN): 31 ML/MIN/1.73 M^2
EST. GFR  (NON AFRICAN AMERICAN): 27 ML/MIN/1.73 M^2
GLUCOSE SERPL-MCNC: 146 MG/DL
MAGNESIUM SERPL-MCNC: 2.1 MG/DL
POTASSIUM SERPL-SCNC: 4.3 MMOL/L
PROT SERPL-MCNC: 7.7 G/DL
SODIUM SERPL-SCNC: 141 MMOL/L

## 2018-12-05 PROCEDURE — 36415 COLL VENOUS BLD VENIPUNCTURE: CPT

## 2018-12-05 PROCEDURE — 83735 ASSAY OF MAGNESIUM: CPT

## 2018-12-05 PROCEDURE — 80053 COMPREHEN METABOLIC PANEL: CPT

## 2018-12-05 PROCEDURE — 82306 VITAMIN D 25 HYDROXY: CPT

## 2018-12-06 ENCOUNTER — OFFICE VISIT (OUTPATIENT)
Dept: CARDIOLOGY | Facility: CLINIC | Age: 83
End: 2018-12-06
Payer: MEDICARE

## 2018-12-06 ENCOUNTER — TELEPHONE (OUTPATIENT)
Dept: FAMILY MEDICINE | Facility: CLINIC | Age: 83
End: 2018-12-06

## 2018-12-06 VITALS
WEIGHT: 187.75 LBS | HEIGHT: 61 IN | DIASTOLIC BLOOD PRESSURE: 79 MMHG | BODY MASS INDEX: 35.45 KG/M2 | HEART RATE: 72 BPM | SYSTOLIC BLOOD PRESSURE: 172 MMHG

## 2018-12-06 DIAGNOSIS — I25.10 CORONARY ARTERY DISEASE INVOLVING NATIVE CORONARY ARTERY OF NATIVE HEART WITHOUT ANGINA PECTORIS: ICD-10-CM

## 2018-12-06 DIAGNOSIS — Z95.1 S/P CABG X 3: ICD-10-CM

## 2018-12-06 DIAGNOSIS — I10 ESSENTIAL HYPERTENSION: ICD-10-CM

## 2018-12-06 DIAGNOSIS — I25.2 OLD MYOCARDIAL INFARCT: ICD-10-CM

## 2018-12-06 DIAGNOSIS — N18.30 CHRONIC KIDNEY DISEASE, STAGE III (MODERATE): ICD-10-CM

## 2018-12-06 DIAGNOSIS — E78.00 PURE HYPERCHOLESTEROLEMIA: ICD-10-CM

## 2018-12-06 DIAGNOSIS — E55.9 VITAMIN D INSUFFICIENCY: ICD-10-CM

## 2018-12-06 DIAGNOSIS — J06.9 UPPER RESPIRATORY TRACT INFECTION, UNSPECIFIED TYPE: ICD-10-CM

## 2018-12-06 DIAGNOSIS — I35.0 AORTIC STENOSIS, SEVERE: ICD-10-CM

## 2018-12-06 DIAGNOSIS — I50.32 CHRONIC DIASTOLIC CONGESTIVE HEART FAILURE: Primary | ICD-10-CM

## 2018-12-06 PROCEDURE — 99214 OFFICE O/P EST MOD 30 MIN: CPT | Mod: PBBFAC,PO | Performed by: NURSE PRACTITIONER

## 2018-12-06 PROCEDURE — 99214 OFFICE O/P EST MOD 30 MIN: CPT | Mod: S$PBB,,, | Performed by: NURSE PRACTITIONER

## 2018-12-06 PROCEDURE — 99999 PR PBB SHADOW E&M-EST. PATIENT-LVL IV: CPT | Mod: PBBFAC,,, | Performed by: NURSE PRACTITIONER

## 2018-12-06 RX ORDER — ASPIRIN 325 MG
TABLET, DELAYED RELEASE (ENTERIC COATED) ORAL
Qty: 9 CAPSULE | Refills: 0 | Status: SHIPPED | OUTPATIENT
Start: 2018-12-06 | End: 2019-07-23

## 2018-12-06 RX ORDER — CLONIDINE 0.2 MG/24H
1 PATCH, EXTENDED RELEASE TRANSDERMAL
Qty: 4 PATCH | Refills: 11 | Status: SHIPPED | OUTPATIENT
Start: 2018-12-06 | End: 2019-10-28

## 2018-12-06 RX ORDER — ASPIRIN 325 MG
50000 TABLET, DELAYED RELEASE (ENTERIC COATED) ORAL WEEKLY
Qty: 9 CAPSULE | Refills: 0 | Status: SHIPPED | OUTPATIENT
Start: 2018-12-06 | End: 2018-12-06

## 2018-12-06 NOTE — PROGRESS NOTES
Ms. Washington is a patient of Dr. Golden and was last seen in [facility] 9/7/2018.    Subjective:   Patient ID:  Krystina Washington is a 86 y.o. female who presents for follow-up of Coronary Artery Disease    Problems:  Chr diastolic heart failure   Aortic stenosis  - s/p TAVR 3/7/2017  CAD S/P CABG x3  9/2010  Old MI   HTN   Hyperlipidemia   T2D - long standing   CKD 3  Diastolic dysfunction, EF 55-60%    HPI  Ms. Washington is in clinic today for routine follow up.  Patient denies chest pain with exertion or at rest, palpitations, dizziness, syncope, SOB, edema, orthopnea, PND, or claudication.  Reports no routine exercise.  Her weight is up about 4 pounds since her last visit.  She is treated with low dose ASA and low intensity statin.  Patient is taking pravastatin 40mg and LDL is 64.  Reports not following a low salt diet. Her son cooks her low salt foods from scratch but she sneaks things like hot sausage sandwhiches and greens with pickled meat.  Home blood pressure readings are 150s.    Review of Systems   Constitution: Positive for malaise/fatigue. Negative for decreased appetite, diaphoresis, weakness, weight gain and weight loss.   Eyes: Negative for visual disturbance.   Cardiovascular: Positive for leg swelling. Negative for chest pain, claudication, dyspnea on exertion, irregular heartbeat, near-syncope, orthopnea, palpitations, paroxysmal nocturnal dyspnea and syncope.        Denies chest pressure   Respiratory: Positive for cough and sputum production. Negative for hemoptysis, shortness of breath, sleep disturbances due to breathing and snoring.    Endocrine: Negative for cold intolerance and heat intolerance.   Hematologic/Lymphatic: Negative for bleeding problem. Does not bruise/bleed easily.   Musculoskeletal: Negative for myalgias.   Gastrointestinal: Negative for bloating, abdominal pain, anorexia, change in bowel habit, constipation, diarrhea, nausea and vomiting.   Neurological: Positive for  headaches. Negative for difficulty with concentration, disturbances in coordination, excessive daytime sleepiness, dizziness, light-headedness, loss of balance and numbness.   Psychiatric/Behavioral: The patient does not have insomnia.        Allergies and current medications updated and reviewed:  Review of patient's allergies indicates:   Allergen Reactions    Indocin [indomethacin sodium] Other (See Comments)    Lotensin [benazepril] Other (See Comments)     Current Outpatient Medications   Medication Sig    acetaminophen (TYLENOL) 325 MG tablet Take 650 mg by mouth every 6 (six) hours as needed for Pain.    albuterol 90 mcg/actuation inhaler Inhale 1 puff into the lungs every 6 (six) hours as needed for Wheezing. 1 HFA Aerosol Inhaler Inhalation Twice a day (Patient taking differently: Inhale 1 puff into the lungs every 6 (six) hours as needed for Wheezing. 1 HFA Aerosol Inhaler Inhalation Twice a day. Pt using prn.)    allopurinol (ZYLOPRIM) 100 MG tablet TAKE 1 TABLET BY MOUTH ONCE DAILY.    aspirin 81 MG chewable tablet Take 81 mg by mouth. 1 Tablet, Chewable Oral Every day    blood sugar diagnostic Strp To check BG 1 times daily, to use with insurance preferred meter    blood-glucose meter kit To check BG 1 times daily, to use with insurance preferred meter    carvedilol (COREG) 12.5 MG tablet TAKE 1 TABLET (12.5 MG TOTAL) BY MOUTH 2 (TWO) TIMES DAILY.    cetirizine (ZYRTEC) 10 MG tablet Take 1 tablet (10 mg total) by mouth daily as needed for Allergies.    cholecalciferol, vitamin D3, 50,000 unit capsule Take 1 capsule (50,000 Units total) by mouth once a week. 1 Capsule Oral Weekly    cloNIDine 0.1 mg/24 hr td ptwk (CATAPRES) 0.1 mg/24 hr PLACE 1 PATCH ONTO THE SKIN EVERY 7 DAYS.    clopidogrel (PLAVIX) 75 mg tablet TAKE 1 TABLET BY MOUTH EVERY DAY    escitalopram oxalate (LEXAPRO) 20 MG tablet TAKE 1 TABLET (20 MG TOTAL) BY MOUTH ONCE DAILY.    furosemide (LASIX) 20 MG tablet TAKE 1 PILL  "DAILY AS NEED FOR WEIGHT GAIN, SWELLING, OR SHORTNESS OF BREATH    hydrALAZINE (APRESOLINE) 100 MG tablet TAKE 1 TABLET (100 MG TOTAL) BY MOUTH EVERY 8 (EIGHT) HOURS.    inhalation device (AEROCHAMBER PLUS FLOW-VU) Use as directed for inhalation.    irbesartan (AVAPRO) 300 MG tablet TAKE 1 TABLET (300 MG TOTAL) BY MOUTH EVERY EVENING.    isosorbide mononitrate (IMDUR) 60 MG 24 hr tablet TAKE 1 TABLET (60 MG TOTAL) BY MOUTH ONCE DAILY.    lancets Misc To check BG 1 times daily, to use with insurance preferred meter    nitroGLYCERIN 0.4 MG/DOSE TL SPRY (NITROLINGUAL) 400 mcg/spray spray PLACE 1 SPRAY ONTO THE TONGUE EVERY 5 (FIVE) MINUTES AS NEEDED.    pravastatin (PRAVACHOL) 40 MG tablet TAKE 1 TABLET BY MOUTH EVERY EVENING     No current facility-administered medications for this visit.        Objective:        BP (!) 172/79   Pulse 72   Ht 5' 1" (1.549 m)   Wt 85.2 kg (187 lb 11.6 oz)   LMP  (LMP Unknown)   BMI 35.47 kg/m²       Physical Exam   Constitutional: She is oriented to person, place, and time. Vital signs are normal. She appears well-developed and well-nourished. She is active. No distress.   HENT:   Head: Normocephalic and atraumatic.   Eyes: Conjunctivae and lids are normal. No scleral icterus.   Neck: Neck supple. Normal carotid pulses, no hepatojugular reflux and no JVD present. Carotid bruit is not present.   Cardiovascular: Normal rate, regular rhythm, S1 normal, S2 normal and intact distal pulses. PMI is not displaced. Exam reveals no gallop and no friction rub.   No murmur heard.  Pulses:       Carotid pulses are 2+ on the right side, and 2+ on the left side.       Radial pulses are 2+ on the right side, and 2+ on the left side.        Dorsalis pedis pulses are 2+ on the right side, and 2+ on the left side.        Posterior tibial pulses are 1+ on the right side, and 1+ on the left side.   Pulmonary/Chest: Effort normal and breath sounds normal. No respiratory distress. She has no " decreased breath sounds. She has no wheezes. She has no rhonchi. She has no rales. She exhibits no tenderness.   Abdominal: Soft. Normal appearance and bowel sounds are normal. She exhibits no distension, no fluid wave, no abdominal bruit, no ascites and no pulsatile midline mass. There is no hepatosplenomegaly. There is no tenderness.   Musculoskeletal: She exhibits no edema.   Neurological: She is alert and oriented to person, place, and time. Gait normal.   Skin: Skin is warm, dry and intact. No rash noted. She is not diaphoretic. Nails show no clubbing.   Psychiatric: She has a normal mood and affect. Her speech is normal and behavior is normal. Judgment and thought content normal. Cognition and memory are normal.   Nursing note and vitals reviewed.      Chemistry        Component Value Date/Time     12/05/2018 1619    K 4.3 12/05/2018 1619     12/05/2018 1619    CO2 32 (H) 12/05/2018 1619    BUN 32 (H) 12/05/2018 1619    CREATININE 1.7 (H) 12/05/2018 1619     (H) 12/05/2018 1619        Component Value Date/Time    CALCIUM 9.5 12/05/2018 1619    ALKPHOS 99 12/05/2018 1619    AST 18 12/05/2018 1619    ALT 9 (L) 12/05/2018 1619    BILITOT 0.3 12/05/2018 1619    ESTGFRAFRICA 31 (A) 12/05/2018 1619    EGFRNONAA 27 (A) 12/05/2018 1619            Recent Labs   Lab 01/18/17  0133  03/12/18  1045 03/20/18  0818 08/01/18  0849   WHITE BLOOD CELL COUNT 12.58   < > 10.86 10.58  --    HEMOGLOBIN 8.9 L   < > 10.9 L 11.0 L  --    HEMATOCRIT 27.1 L   < > 35.0 L 34.9 L  --    MCV 96   < > 102 H 101 H  --    PLATELETS 158   < > 178 157  --    BNP 1,960 H  --   --   --   --    TSH  --   --  2.747  --   --    CHOLESTEROL  --    < > 124  --  122   HDL  --    < > 54  --  42   LDL CHOLESTEROL  --    < > 55.8 L  --  63.6   TRIGLYCERIDES  --    < > 71  --  82   HDL/CHOLESTEROL RATIO  --    < > 43.5  --  34.4    < > = values in this interval not displayed.       Recent Labs   Lab 01/18/17  0133 03/06/17  0229  03/07/17  1250 03/08/17  1021   INR 1.2 1.1 1.2 1.1        Test(s) Reviewed  I have reviewed the following in detail:  [] Stress test   [] Angiography   [x] Echocardiogram   [x] Labs   [] Other:         Assessment/Plan:   1. Chronic diastolic congestive heart failure  Well compensated. Discussed importance of low salt diet and daily wts.     2. Aortic stenosis, severe  2/6 systolic murmur    3. Coronary artery disease involving native coronary artery of native heart without angina pectoris  Asymptomatic. Taking low intensity statin and ASA. No changes.    4. S/P CABG x 3      5. Old myocardial infarct      6. Pure hypercholesterolemia  LDL at goal <70. No changes    7. Chronic kidney disease, stage III (moderate)      8. Essential hypertension  BP not at goal <130/80. Increase clonidine patch to 0.2mg.  Requested 2 week bp log.  Has f/u planned for <3 months from now.     - cloNIDine 0.2 mg/24 hr td ptwk (CATAPRES) 0.2 mg/24 hr; Place 1 patch onto the skin every 7 days.  Dispense: 4 patch; Refill: 11    9. Upper respiratory tract infection, unspecified type      10. Vitamin D insufficiency  Instructed on how to take the ergocalcipherol and to take OTC vitamin D3 2,000 units daily after she completes the prescription.     - cholecalciferol, vitamin D3, 50,000 unit capsule; Take 1 capsule (50,000 units) weekly for 4 weeks. Then take 1 capsule (50,000 units) once a month.  Dispense: 9 capsule; Refill: 0       Patient was discussed with but not examined by Dr. Golden    Follow-up in about 6 months (around 6/6/2019). with Dr. Golden and labs before

## 2018-12-06 NOTE — PATIENT INSTRUCTIONS
Take ergocalcipherol 50,000 units by mouth once a week for 4 weeks. Then take it once a month for the next 5 months.  When she completes the prescription, she should take vitamin D3 2,000 units daily.     Increase the clonidine patch to 0.2mg/24 hr.

## 2018-12-06 NOTE — TELEPHONE ENCOUNTER
----- Message from Kenyatta Wynn NP sent at 12/6/2018  4:43 PM CST -----  Hi Dr. Bauer,  MsSarah Washington said that she needs a refill on her timolol eye drops.  Could you please send her a refill?  Thanks,  Kenyatta

## 2018-12-07 RX ORDER — TIMOLOL MALEATE 5 MG/ML
1 SOLUTION/ DROPS OPHTHALMIC 2 TIMES DAILY
Qty: 15 ML | Refills: 3 | Status: SHIPPED | OUTPATIENT
Start: 2018-12-07 | End: 2019-08-16 | Stop reason: SDUPTHER

## 2018-12-17 RX ORDER — ALLOPURINOL 100 MG/1
TABLET ORAL
Qty: 90 TABLET | Refills: 3 | Status: SHIPPED | OUTPATIENT
Start: 2018-12-17 | End: 2019-10-10 | Stop reason: SDUPTHER

## 2018-12-21 ENCOUNTER — TELEPHONE (OUTPATIENT)
Dept: CARDIOLOGY | Facility: CLINIC | Age: 83
End: 2018-12-21

## 2018-12-21 NOTE — TELEPHONE ENCOUNTER
Pt's grandson calling to report BP earlier this week was 96/50. Grandson reports SBP back to 120's. Grandson advised to continue to monitor BP, contact our office with any concerns. Grandson verbalized understanding. notified.

## 2018-12-31 RX ORDER — CLOPIDOGREL BISULFATE 75 MG/1
TABLET ORAL
Qty: 90 TABLET | Refills: 0 | Status: SHIPPED | OUTPATIENT
Start: 2018-12-31 | End: 2019-02-14 | Stop reason: SDUPTHER

## 2019-01-07 RX ORDER — FUROSEMIDE 20 MG/1
TABLET ORAL
Qty: 30 TABLET | Refills: 2 | Status: ON HOLD | OUTPATIENT
Start: 2019-01-07 | End: 2019-02-10

## 2019-01-18 ENCOUNTER — PES CALL (OUTPATIENT)
Dept: ADMINISTRATIVE | Facility: CLINIC | Age: 84
End: 2019-01-18

## 2019-02-08 ENCOUNTER — HOSPITAL ENCOUNTER (INPATIENT)
Facility: HOSPITAL | Age: 84
LOS: 2 days | Discharge: HOME | DRG: 871 | End: 2019-02-10
Attending: EMERGENCY MEDICINE | Admitting: EMERGENCY MEDICINE
Payer: MEDICARE

## 2019-02-08 ENCOUNTER — NURSE TRIAGE (OUTPATIENT)
Dept: ADMINISTRATIVE | Facility: CLINIC | Age: 84
End: 2019-02-08

## 2019-02-08 DIAGNOSIS — I50.32 CHRONIC DIASTOLIC CONGESTIVE HEART FAILURE: ICD-10-CM

## 2019-02-08 DIAGNOSIS — E11.22 TYPE 2 DIABETES MELLITUS WITH STAGE 4 CHRONIC KIDNEY DISEASE, WITHOUT LONG-TERM CURRENT USE OF INSULIN: Primary | ICD-10-CM

## 2019-02-08 DIAGNOSIS — N12 PYELONEPHRITIS: ICD-10-CM

## 2019-02-08 DIAGNOSIS — R68.89 MULTIPLE COMPLAINTS: ICD-10-CM

## 2019-02-08 DIAGNOSIS — N18.4 TYPE 2 DIABETES MELLITUS WITH STAGE 4 CHRONIC KIDNEY DISEASE, WITHOUT LONG-TERM CURRENT USE OF INSULIN: Primary | ICD-10-CM

## 2019-02-08 DIAGNOSIS — I95.9 HYPOTENSIVE EPISODE: ICD-10-CM

## 2019-02-08 PROBLEM — A41.9 SEVERE SEPSIS: Status: ACTIVE | Noted: 2019-02-08

## 2019-02-08 PROBLEM — J11.1 INFLUENZA: Status: ACTIVE | Noted: 2019-02-08

## 2019-02-08 PROBLEM — N10 ACUTE PYELONEPHRITIS: Status: ACTIVE | Noted: 2019-02-08

## 2019-02-08 PROBLEM — R65.20 SEVERE SEPSIS: Status: ACTIVE | Noted: 2019-02-08

## 2019-02-08 LAB
ALBUMIN SERPL BCP-MCNC: 2.8 G/DL
ALP SERPL-CCNC: 82 U/L
ALT SERPL W/O P-5'-P-CCNC: 25 U/L
ANION GAP SERPL CALC-SCNC: 11 MMOL/L
AST SERPL-CCNC: 37 U/L
BACTERIA #/AREA URNS AUTO: ABNORMAL /HPF
BASOPHILS # BLD AUTO: 0.03 K/UL
BASOPHILS NFR BLD: 0.2 %
BILIRUB SERPL-MCNC: 0.5 MG/DL
BILIRUB UR QL STRIP: NEGATIVE
BUN SERPL-MCNC: 60 MG/DL
CALCIUM SERPL-MCNC: 9.7 MG/DL
CHLORIDE SERPL-SCNC: 98 MMOL/L
CLARITY UR REFRACT.AUTO: ABNORMAL
CO2 SERPL-SCNC: 27 MMOL/L
COLOR UR AUTO: ABNORMAL
CREAT SERPL-MCNC: 2.7 MG/DL
CREAT UR-MCNC: 195 MG/DL
CTP QC/QA: YES
DIFFERENTIAL METHOD: ABNORMAL
EOSINOPHIL # BLD AUTO: 0.1 K/UL
EOSINOPHIL NFR BLD: 0.8 %
ERYTHROCYTE [DISTWIDTH] IN BLOOD BY AUTOMATED COUNT: 13 %
EST. GFR  (AFRICAN AMERICAN): 17.7 ML/MIN/1.73 M^2
EST. GFR  (NON AFRICAN AMERICAN): 15.4 ML/MIN/1.73 M^2
ESTIMATED AVG GLUCOSE: 151 MG/DL
GLUCOSE SERPL-MCNC: 184 MG/DL (ref 70–110)
GLUCOSE SERPL-MCNC: 210 MG/DL
GLUCOSE UR QL STRIP: NEGATIVE
HBA1C MFR BLD HPLC: 6.9 %
HCT VFR BLD AUTO: 33.7 %
HGB BLD-MCNC: 10.6 G/DL
HGB UR QL STRIP: NEGATIVE
HYALINE CASTS UR QL AUTO: 49 /LPF
IMM GRANULOCYTES # BLD AUTO: 0.19 K/UL
IMM GRANULOCYTES NFR BLD AUTO: 1.2 %
KETONES UR QL STRIP: NEGATIVE
LACTATE SERPL-SCNC: 0.7 MMOL/L
LACTATE SERPL-SCNC: 1 MMOL/L
LEUKOCYTE ESTERASE UR QL STRIP: ABNORMAL
LIPASE SERPL-CCNC: 57 U/L
LYMPHOCYTES # BLD AUTO: 2.1 K/UL
LYMPHOCYTES NFR BLD: 13.3 %
MAGNESIUM SERPL-MCNC: 2.5 MG/DL
MCH RBC QN AUTO: 32.3 PG
MCHC RBC AUTO-ENTMCNC: 31.5 G/DL
MCV RBC AUTO: 103 FL
MICROSCOPIC COMMENT: ABNORMAL
MONOCYTES # BLD AUTO: 1.2 K/UL
MONOCYTES NFR BLD: 7.7 %
NEUTROPHILS # BLD AUTO: 12.1 K/UL
NEUTROPHILS NFR BLD: 76.8 %
NITRITE UR QL STRIP: NEGATIVE
NRBC BLD-RTO: 0 /100 WBC
PH UR STRIP: 5 [PH] (ref 5–8)
PHOSPHATE SERPL-MCNC: 3.1 MG/DL
PLATELET # BLD AUTO: 189 K/UL
PMV BLD AUTO: 12.7 FL
POC MOLECULAR INFLUENZA A AGN: POSITIVE
POC MOLECULAR INFLUENZA B AGN: POSITIVE
POTASSIUM SERPL-SCNC: 4.7 MMOL/L
PROCALCITONIN SERPL IA-MCNC: 1.23 NG/ML
PROT SERPL-MCNC: 7.8 G/DL
PROT UR QL STRIP: ABNORMAL
RBC # BLD AUTO: 3.28 M/UL
RBC #/AREA URNS AUTO: 20 /HPF (ref 0–4)
SODIUM SERPL-SCNC: 136 MMOL/L
SP GR UR STRIP: 1.01 (ref 1–1.03)
SQUAMOUS #/AREA URNS AUTO: 3 /HPF
TROPONIN I SERPL DL<=0.01 NG/ML-MCNC: 0.07 NG/ML
URN SPEC COLLECT METH UR: ABNORMAL
UUN UR-MCNC: 335 MG/DL
WBC # BLD AUTO: 15.75 K/UL
WBC #/AREA URNS AUTO: >100 /HPF (ref 0–5)

## 2019-02-08 PROCEDURE — 99291 CRITICAL CARE FIRST HOUR: CPT | Mod: 25

## 2019-02-08 PROCEDURE — 84484 ASSAY OF TROPONIN QUANT: CPT

## 2019-02-08 PROCEDURE — 25000003 PHARM REV CODE 250: Performed by: STUDENT IN AN ORGANIZED HEALTH CARE EDUCATION/TRAINING PROGRAM

## 2019-02-08 PROCEDURE — 83735 ASSAY OF MAGNESIUM: CPT

## 2019-02-08 PROCEDURE — 93005 ELECTROCARDIOGRAM TRACING: CPT

## 2019-02-08 PROCEDURE — 87088 URINE BACTERIA CULTURE: CPT

## 2019-02-08 PROCEDURE — 84540 ASSAY OF URINE/UREA-N: CPT

## 2019-02-08 PROCEDURE — 87077 CULTURE AEROBIC IDENTIFY: CPT

## 2019-02-08 PROCEDURE — 96375 TX/PRO/DX INJ NEW DRUG ADDON: CPT

## 2019-02-08 PROCEDURE — 93010 ELECTROCARDIOGRAM REPORT: CPT | Mod: ,,, | Performed by: INTERNAL MEDICINE

## 2019-02-08 PROCEDURE — 84484 ASSAY OF TROPONIN QUANT: CPT | Mod: 91

## 2019-02-08 PROCEDURE — 81001 URINALYSIS AUTO W/SCOPE: CPT

## 2019-02-08 PROCEDURE — 85025 COMPLETE CBC W/AUTO DIFF WBC: CPT

## 2019-02-08 PROCEDURE — 84100 ASSAY OF PHOSPHORUS: CPT

## 2019-02-08 PROCEDURE — 87205 SMEAR GRAM STAIN: CPT

## 2019-02-08 PROCEDURE — 87186 SC STD MICRODIL/AGAR DIL: CPT

## 2019-02-08 PROCEDURE — 80053 COMPREHEN METABOLIC PANEL: CPT

## 2019-02-08 PROCEDURE — 83605 ASSAY OF LACTIC ACID: CPT | Mod: 91

## 2019-02-08 PROCEDURE — 83036 HEMOGLOBIN GLYCOSYLATED A1C: CPT

## 2019-02-08 PROCEDURE — 82570 ASSAY OF URINE CREATININE: CPT

## 2019-02-08 PROCEDURE — 96365 THER/PROPH/DIAG IV INF INIT: CPT

## 2019-02-08 PROCEDURE — 99291 CRITICAL CARE FIRST HOUR: CPT | Mod: GC,,, | Performed by: EMERGENCY MEDICINE

## 2019-02-08 PROCEDURE — 84145 PROCALCITONIN (PCT): CPT

## 2019-02-08 PROCEDURE — 87040 BLOOD CULTURE FOR BACTERIA: CPT | Mod: 59

## 2019-02-08 PROCEDURE — 12000002 HC ACUTE/MED SURGE SEMI-PRIVATE ROOM

## 2019-02-08 PROCEDURE — 93010 EKG 12-LEAD: ICD-10-PCS | Mod: ,,, | Performed by: INTERNAL MEDICINE

## 2019-02-08 PROCEDURE — 63600175 PHARM REV CODE 636 W HCPCS: Performed by: STUDENT IN AN ORGANIZED HEALTH CARE EDUCATION/TRAINING PROGRAM

## 2019-02-08 PROCEDURE — 96361 HYDRATE IV INFUSION ADD-ON: CPT

## 2019-02-08 PROCEDURE — 99291 PR CRITICAL CARE, E/M 30-74 MINUTES: ICD-10-PCS | Mod: GC,,, | Performed by: EMERGENCY MEDICINE

## 2019-02-08 PROCEDURE — 82962 GLUCOSE BLOOD TEST: CPT

## 2019-02-08 PROCEDURE — 83690 ASSAY OF LIPASE: CPT

## 2019-02-08 PROCEDURE — 87086 URINE CULTURE/COLONY COUNT: CPT

## 2019-02-08 RX ORDER — GLUCAGON 1 MG
1 KIT INJECTION
Status: DISCONTINUED | OUTPATIENT
Start: 2019-02-08 | End: 2019-02-10 | Stop reason: HOSPADM

## 2019-02-08 RX ORDER — CEFTRIAXONE 1 G/1
1 INJECTION, POWDER, FOR SOLUTION INTRAMUSCULAR; INTRAVENOUS
Status: DISCONTINUED | OUTPATIENT
Start: 2019-02-09 | End: 2019-02-10 | Stop reason: HOSPADM

## 2019-02-08 RX ORDER — ALLOPURINOL 100 MG/1
100 TABLET ORAL DAILY
Status: DISCONTINUED | OUTPATIENT
Start: 2019-02-09 | End: 2019-02-08

## 2019-02-08 RX ORDER — OSELTAMIVIR PHOSPHATE 30 MG/1
30 CAPSULE ORAL 2 TIMES DAILY
Status: DISCONTINUED | OUTPATIENT
Start: 2019-02-08 | End: 2019-02-08

## 2019-02-08 RX ORDER — INSULIN ASPART 100 [IU]/ML
0-5 INJECTION, SOLUTION INTRAVENOUS; SUBCUTANEOUS
Status: DISCONTINUED | OUTPATIENT
Start: 2019-02-08 | End: 2019-02-10 | Stop reason: HOSPADM

## 2019-02-08 RX ORDER — NAPROXEN SODIUM 220 MG/1
81 TABLET, FILM COATED ORAL DAILY
Status: DISCONTINUED | OUTPATIENT
Start: 2019-02-09 | End: 2019-02-10 | Stop reason: HOSPADM

## 2019-02-08 RX ORDER — PRAVASTATIN SODIUM 40 MG/1
40 TABLET ORAL NIGHTLY
Status: DISCONTINUED | OUTPATIENT
Start: 2019-02-08 | End: 2019-02-10 | Stop reason: HOSPADM

## 2019-02-08 RX ORDER — IBUPROFEN 200 MG
24 TABLET ORAL
Status: DISCONTINUED | OUTPATIENT
Start: 2019-02-08 | End: 2019-02-10 | Stop reason: HOSPADM

## 2019-02-08 RX ORDER — CLOPIDOGREL BISULFATE 75 MG/1
75 TABLET ORAL DAILY
Status: DISCONTINUED | OUTPATIENT
Start: 2019-02-09 | End: 2019-02-10 | Stop reason: HOSPADM

## 2019-02-08 RX ORDER — ACETAMINOPHEN 325 MG/1
650 TABLET ORAL EVERY 4 HOURS PRN
Status: DISCONTINUED | OUTPATIENT
Start: 2019-02-08 | End: 2019-02-10 | Stop reason: HOSPADM

## 2019-02-08 RX ORDER — IBUPROFEN 200 MG
16 TABLET ORAL
Status: DISCONTINUED | OUTPATIENT
Start: 2019-02-08 | End: 2019-02-10 | Stop reason: HOSPADM

## 2019-02-08 RX ORDER — OSELTAMIVIR PHOSPHATE 30 MG/1
30 CAPSULE ORAL 2 TIMES DAILY
Status: DISCONTINUED | OUTPATIENT
Start: 2019-02-08 | End: 2019-02-09

## 2019-02-08 RX ORDER — HEPARIN SODIUM 5000 [USP'U]/ML
5000 INJECTION, SOLUTION INTRAVENOUS; SUBCUTANEOUS EVERY 8 HOURS
Status: DISCONTINUED | OUTPATIENT
Start: 2019-02-09 | End: 2019-02-10 | Stop reason: HOSPADM

## 2019-02-08 RX ORDER — PROMETHAZINE HYDROCHLORIDE 12.5 MG/1
12.5 TABLET ORAL EVERY 6 HOURS PRN
Status: DISCONTINUED | OUTPATIENT
Start: 2019-02-08 | End: 2019-02-10 | Stop reason: HOSPADM

## 2019-02-08 RX ORDER — ONDANSETRON 2 MG/ML
4 INJECTION INTRAMUSCULAR; INTRAVENOUS
Status: COMPLETED | OUTPATIENT
Start: 2019-02-08 | End: 2019-02-08

## 2019-02-08 RX ORDER — ESCITALOPRAM OXALATE 20 MG/1
20 TABLET ORAL DAILY
Status: DISCONTINUED | OUTPATIENT
Start: 2019-02-09 | End: 2019-02-10 | Stop reason: HOSPADM

## 2019-02-08 RX ORDER — TIMOLOL MALEATE 5 MG/ML
1 SOLUTION/ DROPS OPHTHALMIC 2 TIMES DAILY
Status: DISCONTINUED | OUTPATIENT
Start: 2019-02-09 | End: 2019-02-10 | Stop reason: HOSPADM

## 2019-02-08 RX ADMIN — PRAVASTATIN SODIUM 40 MG: 40 TABLET ORAL at 11:02

## 2019-02-08 RX ADMIN — SODIUM CHLORIDE 1000 ML: 0.9 INJECTION, SOLUTION INTRAVENOUS at 06:02

## 2019-02-08 RX ADMIN — CEFTRIAXONE 2 G: 2 INJECTION, SOLUTION INTRAVENOUS at 08:02

## 2019-02-08 RX ADMIN — SODIUM CHLORIDE 500 ML: 0.9 INJECTION, SOLUTION INTRAVENOUS at 11:02

## 2019-02-08 RX ADMIN — ONDANSETRON 4 MG: 2 INJECTION INTRAMUSCULAR; INTRAVENOUS at 06:02

## 2019-02-08 RX ADMIN — OSELTAMIVIR PHOSPHATE 30 MG: 30 CAPSULE ORAL at 11:02

## 2019-02-08 NOTE — PROVIDER PROGRESS NOTES - EMERGENCY DEPT.
Encounter Date: 2/8/2019    ED Physician Progress Notes           Sinus Rhythm with PVC  Heart Rate: 67 bpm  No ischemia    IRobert, jennifer scribing for, and in the presence of, Dr. Nevarez. I performed the above scribed service and the documentation accurately describes the services I performed. I attest to the accuracy of the note.

## 2019-02-08 NOTE — ED NOTES
Patient identifiers verified and correct for Krystina Washington.   LOC: The patient is awake, alert and aware of environment with an appropriate affect, the patient is oriented x 3 and speaking appropriately.   APPEARANCE: Patient appears comfortable and in no acute distress, patient is clean and well groomed.  SKIN: The skin is warm and dry, color consistent with ethnicity, patient has normal skin turgor and moist mucus membranes, skin intact, no breakdown or bruising noted.   MUSCULOSKELETAL: Patient moving all extremities spontaneously, no swelling noted.  RESPIRATORY: Airway is open and patent, respirations are spontaneous, patient has a normal effort and rate, no accessory muscle use noted, pt placed on continuous pulse ox with O2 sats noted at 96% on room air.  CARDIAC: Pt placed on cardiac monitor. Patient has a normal rate and regular rhythm, no edema noted, capillary refill < 3 seconds.   GASTRO: Soft and non tender to palpation, no distention noted, normoactive bowel sounds present in all four quadrants. Pt states bowel movements have been regular.  : Pt denies any pain or frequency with urination.  NEURO: Pt opens eyes spontaneously, behavior appropriate to situation, follows commands, facial expression symmetrical, bilateral hand grasp equal and even, purposeful motor response noted, normal sensation in all extremities when touched with a finger.

## 2019-02-08 NOTE — TELEPHONE ENCOUNTER
Reason for Disposition   Nursing judgment or information in reference    Protocols used: ST NO GUIDELINE AVAILABLE - SICK ADULT CALL-A-AH  Grandson called re BP low  91/87 , BS high 191. Taking pt to hospital now. Call back with questions.

## 2019-02-08 NOTE — ED TRIAGE NOTES
+ Altered Mental Status (not really taking , not eating since 10am, )   Multiple Complaints (+ cardiac hx, avr, cabg, sugar in 190's, )

## 2019-02-09 LAB
ALBUMIN SERPL BCP-MCNC: 2.3 G/DL
ANION GAP SERPL CALC-SCNC: 9 MMOL/L
BASOPHILS # BLD AUTO: 0.04 K/UL
BASOPHILS NFR BLD: 0.3 %
BUN SERPL-MCNC: 59 MG/DL
CALCIUM SERPL-MCNC: 8.8 MG/DL
CHLORIDE SERPL-SCNC: 102 MMOL/L
CO2 SERPL-SCNC: 24 MMOL/L
CREAT SERPL-MCNC: 2.4 MG/DL
DIFFERENTIAL METHOD: ABNORMAL
EOSINOPHIL # BLD AUTO: 0.2 K/UL
EOSINOPHIL NFR BLD: 1.1 %
ERYTHROCYTE [DISTWIDTH] IN BLOOD BY AUTOMATED COUNT: 13.1 %
EST. GFR  (AFRICAN AMERICAN): 20.5 ML/MIN/1.73 M^2
EST. GFR  (NON AFRICAN AMERICAN): 17.7 ML/MIN/1.73 M^2
GLUCOSE SERPL-MCNC: 106 MG/DL
GRAM STN SPEC: NORMAL
HCT VFR BLD AUTO: 29.7 %
HGB BLD-MCNC: 9.3 G/DL
IMM GRANULOCYTES # BLD AUTO: 0.18 K/UL
IMM GRANULOCYTES NFR BLD AUTO: 1.2 %
LYMPHOCYTES # BLD AUTO: 2 K/UL
LYMPHOCYTES NFR BLD: 13.4 %
MAGNESIUM SERPL-MCNC: 2.2 MG/DL
MCH RBC QN AUTO: 31.7 PG
MCHC RBC AUTO-ENTMCNC: 31.3 G/DL
MCV RBC AUTO: 101 FL
MONOCYTES # BLD AUTO: 1.3 K/UL
MONOCYTES NFR BLD: 8.6 %
NEUTROPHILS # BLD AUTO: 11.1 K/UL
NEUTROPHILS NFR BLD: 75.4 %
NRBC BLD-RTO: 0 /100 WBC
PHOSPHATE SERPL-MCNC: 3.8 MG/DL
PLATELET # BLD AUTO: 166 K/UL
PMV BLD AUTO: 13 FL
POCT GLUCOSE: 109 MG/DL (ref 70–110)
POCT GLUCOSE: 151 MG/DL (ref 70–110)
POCT GLUCOSE: 155 MG/DL (ref 70–110)
POCT GLUCOSE: 171 MG/DL (ref 70–110)
POCT GLUCOSE: 184 MG/DL (ref 70–110)
POTASSIUM SERPL-SCNC: 4.4 MMOL/L
RBC # BLD AUTO: 2.93 M/UL
SODIUM SERPL-SCNC: 135 MMOL/L
TROPONIN I SERPL DL<=0.01 NG/ML-MCNC: 0.04 NG/ML
WBC # BLD AUTO: 14.72 K/UL

## 2019-02-09 PROCEDURE — 99233 SBSQ HOSP IP/OBS HIGH 50: CPT | Mod: ,,, | Performed by: HOSPITALIST

## 2019-02-09 PROCEDURE — 80069 RENAL FUNCTION PANEL: CPT

## 2019-02-09 PROCEDURE — 63600175 PHARM REV CODE 636 W HCPCS: Performed by: STUDENT IN AN ORGANIZED HEALTH CARE EDUCATION/TRAINING PROGRAM

## 2019-02-09 PROCEDURE — 99223 PR INITIAL HOSPITAL CARE,LEVL III: ICD-10-PCS | Mod: ,,, | Performed by: HOSPITALIST

## 2019-02-09 PROCEDURE — 85025 COMPLETE CBC W/AUTO DIFF WBC: CPT

## 2019-02-09 PROCEDURE — 99233 PR SUBSEQUENT HOSPITAL CARE,LEVL III: ICD-10-PCS | Mod: ,,, | Performed by: HOSPITALIST

## 2019-02-09 PROCEDURE — 20600001 HC STEP DOWN PRIVATE ROOM

## 2019-02-09 PROCEDURE — 36415 COLL VENOUS BLD VENIPUNCTURE: CPT

## 2019-02-09 PROCEDURE — 83735 ASSAY OF MAGNESIUM: CPT

## 2019-02-09 PROCEDURE — 99223 1ST HOSP IP/OBS HIGH 75: CPT | Mod: ,,, | Performed by: HOSPITALIST

## 2019-02-09 PROCEDURE — 25000003 PHARM REV CODE 250: Performed by: HOSPITALIST

## 2019-02-09 PROCEDURE — 25000003 PHARM REV CODE 250: Performed by: STUDENT IN AN ORGANIZED HEALTH CARE EDUCATION/TRAINING PROGRAM

## 2019-02-09 RX ORDER — SODIUM CHLORIDE 0.9 % (FLUSH) 0.9 %
5 SYRINGE (ML) INJECTION
Status: DISCONTINUED | OUTPATIENT
Start: 2019-02-09 | End: 2019-02-10 | Stop reason: HOSPADM

## 2019-02-09 RX ORDER — OSELTAMIVIR PHOSPHATE 30 MG/1
30 CAPSULE ORAL DAILY
Status: DISCONTINUED | OUTPATIENT
Start: 2019-02-09 | End: 2019-02-10 | Stop reason: HOSPADM

## 2019-02-09 RX ADMIN — ACETAMINOPHEN 650 MG: 325 TABLET, FILM COATED ORAL at 10:02

## 2019-02-09 RX ADMIN — ESCITALOPRAM 20 MG: 20 TABLET, FILM COATED ORAL at 09:02

## 2019-02-09 RX ADMIN — CLOPIDOGREL 75 MG: 75 TABLET, FILM COATED ORAL at 09:02

## 2019-02-09 RX ADMIN — SODIUM CHLORIDE 1000 ML: 0.9 INJECTION, SOLUTION INTRAVENOUS at 09:02

## 2019-02-09 RX ADMIN — TIMOLOL MALEATE 1 DROP: 5 SOLUTION/ DROPS OPHTHALMIC at 09:02

## 2019-02-09 RX ADMIN — TIMOLOL MALEATE 1 DROP: 5 SOLUTION/ DROPS OPHTHALMIC at 10:02

## 2019-02-09 RX ADMIN — HEPARIN SODIUM 5000 UNITS: 5000 INJECTION, SOLUTION INTRAVENOUS; SUBCUTANEOUS at 10:02

## 2019-02-09 RX ADMIN — OSELTAMIVIR PHOSPHATE 30 MG: 30 CAPSULE ORAL at 09:02

## 2019-02-09 RX ADMIN — ASPIRIN 81 MG CHEWABLE TABLET 81 MG: 81 TABLET CHEWABLE at 09:02

## 2019-02-09 RX ADMIN — CEFTRIAXONE SODIUM 1 G: 1 INJECTION, POWDER, FOR SOLUTION INTRAMUSCULAR; INTRAVENOUS at 10:02

## 2019-02-09 RX ADMIN — PRAVASTATIN SODIUM 40 MG: 40 TABLET ORAL at 10:02

## 2019-02-09 RX ADMIN — HEPARIN SODIUM 5000 UNITS: 5000 INJECTION, SOLUTION INTRAVENOUS; SUBCUTANEOUS at 03:02

## 2019-02-09 RX ADMIN — HEPARIN SODIUM 5000 UNITS: 5000 INJECTION, SOLUTION INTRAVENOUS; SUBCUTANEOUS at 06:02

## 2019-02-09 NOTE — ED PROVIDER NOTES
Encounter Date: 2/8/2019       History     Chief Complaint   Patient presents with    Multiple Complaints     + cardiac hx, avr, cabg, sugar in 190's,     Altered Mental Status     not really taking , not eating since 10am,      87 y/o w/ PMHx of HFpEF, CAD (s/p CABG in 2010), AS (s/p TAVR in 2017) that is presenting with increased lethargy.  According to family, patient has had decreased appetite for about 3 days.  Patient was acting at her baseline till this morning though.  She has had increased lethargy throughout the day.  Patient has been wincing in pain but did not tell the family there.  It appears that she is complaing of abdominal pain while in ER.  Patient's son gave sublingual nitro because of the wincing with no relief.  Patient has been having normal BMs and no issues with urination.  Family denies fevers and chills, but she had a URI one week ago that seems to have mostly resolved by now.  Patient took all antihypertensive medications this morning.          Review of patient's allergies indicates:   Allergen Reactions    Indocin [indomethacin sodium] Other (See Comments)    Lotensin [benazepril] Other (See Comments)     Past Medical History:   Diagnosis Date    Arthritis     CHF (congestive heart failure)     Coronary artery disease     Diabetes mellitus     Glaucoma     Gout, joint     Hx of CABG 9/21/10    Hyperlipidemia     Hypertension     NSTEMI (non-ST elevated myocardial infarction) 09/21/10    Stenosis of aortic and mitral valves     Systolic heart failure 6/19/2015     Past Surgical History:   Procedure Laterality Date    CARDIAC VALVE SURGERY      CATARACT EXTRACTION      CORONARY ARTERY BYPASS GRAFT  9/21/2010    HEART CATH-LEFT Left 1/20/2017    Performed [facility] Pipe [facility]le [facility] at [facility]    INSERTION-PACEMAKER-DUAL Left 3/8/2017    Performed by Sidney Bruce MD at [facility] CATH LAB    JOINT REPLACEMENT      REPLACEMENT-VALVE-AORTIC N/A 3/7/2017    Performed  by Guillermo Duran [facility] at [facility] LAB     Family History   Problem Relation Age of Onset    Diabetes Mother     Hypertension Father     Diabetes Father     Glaucoma Father     No Known Problems Sister     No Known Problems Brother     No Known Problems Maternal Aunt     No Known Problems Maternal Uncle     No Known Problems Paternal Aunt     No Known Problems Paternal Uncle     No Known Problems Maternal Grandmother     No Known Problems Maternal Grandfather     No Known Problems Paternal Grandmother     No Known Problems Paternal Grandfather     Amblyopia Neg Hx     Blindness Neg Hx     Cancer Neg Hx     Cataracts Neg Hx     Macular degeneration Neg Hx     Retinal detachment Neg Hx     Strabismus Neg Hx     Stroke Neg Hx     Thyroid disease Neg Hx      Social History     Tobacco Use    Smoking status: Never Smoker    Smokeless tobacco: Never Used   Substance Use Topics    Alcohol use: No    Drug use: No     Review of Systems   Unable to perform ROS: Dementia       Physical Exam     Initial Vitals [02/08/19 1742]   BP Pulse Resp Temp SpO2   (!) 92/52 (!) 55 18 98.1 °F (36.7 °C) 96 %      MAP       --         Physical Exam    Nursing note and vitals reviewed.  Constitutional: She appears well-developed and well-nourished. No distress.   HENT:   Head: Normocephalic and atraumatic.   Eyes: Conjunctivae and EOM are normal. Pupils are equal, round, and reactive to light.   Neck: Normal range of motion.   Cardiovascular: Normal rate, regular rhythm and normal heart sounds.   No murmur heard.  Pulmonary/Chest: No respiratory distress. She has wheezes.   No increased work of breathing, moving air well bilaterally    Abdominal: Soft. Bowel sounds are normal.   Musculoskeletal: Normal range of motion.   Neurological: She is alert and oriented to person, place, and time. No sensory deficit.   Skin: Skin is warm and dry.         ED Course   Procedures  Labs Reviewed   CBC W/ AUTO DIFFERENTIAL -  Abnormal; Notable for the following components:       Result Value    WBC 15.75 (*)     RBC 3.28 (*)     Hemoglobin 10.6 (*)     Hematocrit 33.7 (*)      (*)     MCH 32.3 (*)     MCHC 31.5 (*)     Immature Granulocytes 1.2 (*)     Gran # (ANC) 12.1 (*)     Immature Grans (Abs) 0.19 (*)     Mono # 1.2 (*)     Gran% 76.8 (*)     Lymph% 13.3 (*)     All other components within normal limits   COMPREHENSIVE METABOLIC PANEL - Abnormal; Notable for the following components:    Glucose 210 (*)     BUN, Bld 60 (*)     Creatinine 2.7 (*)     Albumin 2.8 (*)     eGFR if  17.7 (*)     eGFR if non  15.4 (*)     All other components within normal limits   URINALYSIS, REFLEX TO URINE CULTURE - Abnormal; Notable for the following components:    Appearance, UA Cloudy (*)     Protein, UA 2+ (*)     Leukocytes, UA 2+ (*)     All other components within normal limits    Narrative:     Preferred Collection Type->Urine, Clean Catch   TROPONIN I - Abnormal; Notable for the following components:    Troponin I 0.067 (*)     All other components within normal limits   PROCALCITONIN - Abnormal; Notable for the following components:    Procalcitonin 1.23 (*)     All other components within normal limits   URINALYSIS MICROSCOPIC - Abnormal; Notable for the following components:    RBC, UA 20 (*)     WBC, UA >100 (*)     Bacteria, UA Many (*)     Hyaline Casts, UA 49 (*)     All other components within normal limits    Narrative:     Preferred Collection Type->Urine, Clean Catch   HEMOGLOBIN A1C - Abnormal; Notable for the following components:    Hemoglobin A1C 6.9 (*)     Estimated Avg Glucose 151 (*)     All other components within normal limits    Narrative:     add on [facility] #[id number] per Ottoniel Hill MD @ 22:22  02/08/2019    POCT INFLUENZA A/B MOLECULAR - Abnormal; Notable for the following components:    POC Molecular Influenza A Ag Positive (*)     POC Molecular Influenza B Ag Positive (*)      All other components within normal limits   POCT GLUCOSE, HAND-HELD DEVICE - Abnormal; Notable for the following components:    POC Glucose 184 (*)     All other components within normal limits   GRAM STAIN   CULTURE, BLOOD   CULTURE, BLOOD   CULTURE, URINE   GRAM STAIN   LACTIC ACID, PLASMA   MAGNESIUM   PHOSPHORUS   LIPASE   LACTIC ACID, PLASMA   CREATININE, URINE, RANDOM    Narrative:     Preferred Collection Type->Urine, Clean Catch   UREA NITROGEN, URINE, RANDOM    Narrative:     Preferred Collection Type->Urine, Clean Catch   HEMOGLOBIN A1C   TROPONIN I     EKG Readings: (Independently Interpreted)   Initial Reading: No STEMI. Rhythm: Normal Sinus Rhythm. Heart Rate: 67. Ectopy: PVCs.       Imaging Results          X-Ray Chest AP Portable (Final result)  Result time 02/08/19 19:59:26    Final result by Vale Aguilar MD (02/08/19 19:59:26)                 Impression:      As above      Electronically signed by: Vale Aguilar MD  Date:    02/08/2019  Time:    19:59             Narrative:    EXAMINATION:  XR CHEST AP PORTABLE    CLINICAL HISTORY:  Sepsis;    TECHNIQUE:  Single frontal view of the chest was performed.    COMPARISON:  January 2017    FINDINGS:  There are surgical changes of sternotomy.  Cardiomegaly is present similar to prior exam.  Degenerative changes are present in the spine.  There is calcification along the wall of the aorta.  No large volume of pleural fluid is present on this single view.  Lung fields are free of consolidation.                               CT Abdomen Pelvis  Without Contrast (Final result)  Result time 02/08/19 19:35:32    Final result by Dru Sawant MD (02/08/19 19:35:32)                 Impression:      1. Right perinephric and periureteral inflammation noting limited examination secondary to motion artifact.  This may reflect sequela of recently passed calculus or infection, correlation advised.  There is additional thickening of the urinary bladder  walls, cystitis is not excluded.  2. Hepatomegaly, correlation with LFTs recommended.  3. Stable partial calcification of the gallbladder wall.  4. Additional findings above.      Electronically signed by: Dru Sawant MD  Date:    02/08/2019  Time:    19:35             Narrative:    EXAMINATION:  CT ABDOMEN PELVIS WITHOUT CONTRAST    CLINICAL HISTORY:  Abdominal pain, unspecified;    TECHNIQUE:  Low dose axial images, sagittal and coronal reformations were obtained from the lung bases to the pubic symphysis.  Oral contrast was not administered.    COMPARISON:  Ultrasound 11/02/2014, CT 11/02/2014    FINDINGS:  Images of the lower thorax are remarkable for bilateral dependent atelectasis/scarring.  There may be trace right pleural fluid.  There is postsurgical change of cardiac valve replacement.  There is calcification in the distribution of the coronary arteries.  No pericardial effusion.    The liver is enlarged, correlation with LFTs recommended.  The spleen, pancreas, and right adrenal gland are unremarkable.  There is nonspecific low attenuating thickening of the left adrenal gland.  There is partial calcification of the gallbladder wall, no gallbladder wall thickening or pericholecystic inflammation/fluid.  There is no biliary dilation or ascites.  The pancreatic duct is not dilated.    There is no hydronephrosis or nephrolithiasis.  There is right perinephric and periureteral fat stranding noting minimal prominence of the right renal collecting system.  There is a subcentimeter hypoattenuating lesion arising from the interpolar region of the right kidney, too small for characterization, not significantly changed as compared to the previous exam.  There is a prominent focus of vascular calcification in the region of the left renal hilum.  The left ureter is grossly unremarkable.  No definite right ureteral calculi noting a punctate focus of calcification lateral to the course of the right ureter is stable  from the previous examination.  The urinary bladder is decompressed, may account for wall thickening.  The uterus is absent the adnexa is unremarkable.    There are several scattered colonic diverticula without convincing surrounding inflammation to suggest diverticulitis noting exam is limited secondary to motion artifact.  The terminal ileum and appendix are grossly unremarkable.  The small bowel is grossly unremarkable.  There is atherosclerotic calcification of the aorta and its branches.  No focal organized pelvic fluid collection.    Postsurgical changes are noted of the proximal left femur.  Degenerative changes are noted of the spine.  There is atherosclerotic calcification of the aorta and its branches.  There is mild ectasia of the infrarenal abdominal aorta.  No significant inguinal lymphadenopathy.                                 Medical Decision Making:   Initial Assessment:   85 y/o W with significant cardiac history that is presenting due to hypotension and lethargy.  The differential includes sepsis, viral URI, UTI,   Clinical Tests:   Lab Tests: Ordered and Reviewed  Radiological Study: Ordered and Reviewed  Medical Tests: Ordered and Reviewed  Sepsis Perfusion Assessment: I attest, a sepsis perfusion exam was performed within 6 hours of Septic Shock presentation, following fluid resuscitation.       APC / Resident Notes:   6:30 PM  Sepsis workup initiated, troponin and lipase added  Due to age and cardiac hx, will give 1L NS and reassess if patient needs more fluids    7:47 PM  Patient is normotensive after 1L of fluids, will continue to monitor  WBC 16, Cr 2.7  CT shows inflammation of kidney and ureters    11:41 PM  Patient admitted to hospital medicine.  Received 2g rocephin IV for UTI treatment         Attending Attestation:   Physician Attestation Statement for Resident:  As the supervising MD   Physician Attestation Statement: I have personally seen and examined this patient.   I agree with  the above history. -:   As the supervising MD I agree with the above PE.    As the supervising MD I agree with the above treatment, course, plan, and disposition.  I have reviewed and agree with the residents interpretation of the following: lab data, x-rays, CT scans and EKG.  I have reviewed the following: old records at this facility.        Attending Critical Care:   Critical Care Times:   Direct Patient Care (initial evaluation, reassessments, and time considering the case)................................................................15 minutes.   Additional History from reviewing old medical records or taking additional history from the family, EMS, PCP, etc.......................5 minutes.   Ordering, Reviewing, and Interpreting Diagnostic Studies...............................................................................................................5 minutes.   Documentation..................................................................................................................................................................................5 minutes.   ==============================================================  · Total Critical Care Time - exclusive of procedural time: 30 minutes.  ==============================================================  Critical care was necessary to treat or prevent imminent or life-threatening deterioration of the following conditions: hypotension and sepsis.   Critical care was time spent personally by me on the following activities: obtaining history from patient or relative, examination of patient, review of x-rays / CT sent with the patient, ordering lab, x-rays, and/or EKG and re-evaluation of patient's conition.   Critical Care Condition: potentially life-threatening                  Clinical Impression:   The primary encounter diagnosis was Pyelonephritis. Diagnoses of Multiple complaints and Hypotensive episode were also pertinent to this  visit.      Disposition:   Disposition: Admitted  Condition: Stable           Ottoniel Angulo MD  Resident  02/08/19 [id number]       Savana Chiu MD  02/13/19 [id number]

## 2019-02-09 NOTE — ASSESSMENT & PLAN NOTE
- Pt with hx of HFpEF with grade II diastolic dysfunction on TTE in 3/2018  - Currently not hypervolemic on exam  - Holding home Lasix, Coreg, Imdur and hydralazine  - Resume when deemed safe

## 2019-02-09 NOTE — PLAN OF CARE
Problem: Adult Inpatient Plan of Care  Goal: Plan of Care Review  Outcome: Ongoing (interventions implemented as appropriate)  Plan of care reviewed with pt, verbalized understanding  Answered questions  Free from falls and injury  Afebrile  SR on tele, PVC  BLE  ANTB every 12hours  No insulin coverage needed  PT OT seen  Will continue to monitor

## 2019-02-09 NOTE — ASSESSMENT & PLAN NOTE
- Pt found to have elevated troponin on admission (0.067)  - On chart review, troponin chronically elevated  - Likely elevated today 2/2 demand ischemia in setting of hypotension as well as MARGARET  - Pt denies chest pain or shortness of breath  - EKG without acute ischemic changes  - Troponin trended down

## 2019-02-09 NOTE — ASSESSMENT & PLAN NOTE
"Pt is an 87 yo female with a hx of HFpEF, HTN, HLD, CAD (s/p CABG in 2010), AS (s/p TAVR in 2017), T2DM, presenting with a 3-day hx of lethargy.  Pt found to be hypotensive and have leukocytosis in the ED.  Pt received 1 L NS bolus in ED with improvement of hypotension.  UA with >100 leukocytes, many bacteria, nitrite negative.  CT abdomen/pelvis without contrast with "right perinephric and periureteral inflammation".  Additionally, pt reportedly with complaint of vague right flank pain.  Findings concerning for possible pyelonephritis.  UCx from 2015 with cephalosporin and fluoroquinolone sensitive E. coli.    Plan  - BCx, UCx pending; gram stain showed many WBCs and many gram negative rods  - Procalcitonin elevated at 1.23. Lactic acid 0.7.  - s/p Rocephin 2g IV x1 in ED  - Continue Rocephin 1g IV daily for total 5-day course  - Continue gentle fluid resuscitation  "

## 2019-02-09 NOTE — ASSESSMENT & PLAN NOTE
- Pt with hx of HFpEF with grade II diastolic dysfunction on TTE in 3/2018  - Currently not hypervolemic on exam  - Holding home Lasix, Coreg, Imdur and hydralazine  - Resume when appropriate

## 2019-02-09 NOTE — HOSPITAL COURSE
The following day after admission, patient was started on IV Rocephin 1g daily. Urine gram stain showed many WBCs, many gram negative rods and rare gram positive rods. Influenza came back positive so Tamiflu was begun. She was found to have an MARGARET with Cr 2.7 (baseline around 1.5-1.7), which slightly improved to 2.4 the following day. Urine culture is pending, but previous urine cultures grew E.coli.     On day of discharge (02/10), patient is feeling well. Her vitals are stable. Cr improved to 2.1. She lives with her grandson and would like to go home. Patient's grandson says that he can pick her up from hospital. PT/OT recommended short stay SNF. Upon discussion with family, patient and her grandson would like to go home. Grandson says he can take her to PT as outpatient. She will be discharged with prescriptions to continue Cefdinir 300mg daily for 12 more days (to complete 2 week treatment of pyelonephritis) and Tamiflu 30mg for 2 more days (to complete flu treatment). The rest of her home medications were resumed. She was encouraged to follow up with her PCP within 1-2 weeks for hospital discharge follow up.

## 2019-02-09 NOTE — SUBJECTIVE & OBJECTIVE
Past Medical History:   Diagnosis Date    Arthritis     CHF (congestive heart failure)     Coronary artery disease     Diabetes mellitus     Glaucoma     Gout, joint     Hx of CABG 9/21/10    Hyperlipidemia     Hypertension     NSTEMI (non-ST elevated myocardial infarction) 09/21/10    Stenosis of aortic and mitral valves     Systolic heart failure 6/19/2015       Past Surgical History:   Procedure Laterality Date    CARDIAC VALVE SURGERY      CATARACT EXTRACTION      CORONARY ARTERY BYPASS GRAFT  9/21/2010    HEART CATH-LEFT Left 1/20/2017    Performed by Pipe Gayle MD at [facility] CATH LAB    INSERTION-PACEMAKER-DUAL Left 3/8/2017    Performed by Sidney Bruce MD at [facility] CATH LAB    JOINT REPLACEMENT      REPLACEMENT-VALVE-AORTIC N/A 3/7/2017    Performed by Guillermo Duran MD at [facility] CATH LAB       Review of patient's allergies indicates:   Allergen Reactions    Indocin [indomethacin sodium] Other (See Comments)    Lotensin [benazepril] Other (See Comments)       No current facility-administered medications on file prior to encounter.      Current Outpatient Medications on File Prior to Encounter   Medication Sig    aspirin 81 MG chewable tablet Take 81 mg by mouth. 1 Tablet, Chewable Oral Every day    carvedilol (COREG) 12.5 MG tablet TAKE 1 TABLET (12.5 MG TOTAL) BY MOUTH 2 (TWO) TIMES DAILY.    cloNIDine 0.2 mg/24 hr td ptwk (CATAPRES) 0.2 mg/24 hr Place 1 patch onto the skin every 7 days.    escitalopram oxalate (LEXAPRO) 20 MG tablet TAKE 1 TABLET (20 MG TOTAL) BY MOUTH ONCE DAILY.    furosemide (LASIX) 20 MG tablet TAKE 1 PILL DAILY AS NEED FOR WEIGHT GAIN, SWELLING, OR SHORTNESS OF BREATH    hydrALAZINE (APRESOLINE) 100 MG tablet TAKE 1 TABLET (100 MG TOTAL) BY MOUTH EVERY 8 (EIGHT) HOURS.    irbesartan (AVAPRO) 300 MG tablet TAKE 1 TABLET (300 MG TOTAL) BY MOUTH EVERY EVENING.    isosorbide mononitrate (IMDUR) 60 MG 24 hr tablet TAKE 1 TABLET (60 MG TOTAL) BY MOUTH  ONCE DAILY.    nitroGLYCERIN 0.4 MG/DOSE TL SPRY (NITROLINGUAL) 400 mcg/spray spray PLACE 1 SPRAY ONTO THE TONGUE EVERY 5 (FIVE) MINUTES AS NEEDED.    pravastatin (PRAVACHOL) 40 MG tablet TAKE 1 TABLET BY MOUTH EVERY EVENING    acetaminophen (TYLENOL) 325 MG tablet Take 650 mg by mouth every 6 (six) hours as needed for Pain.    albuterol 90 mcg/actuation inhaler Inhale 1 puff into the lungs every 6 (six) hours as needed for Wheezing. 1 HFA Aerosol Inhaler Inhalation Twice a day (Patient taking differently: Inhale 1 puff into the lungs every 6 (six) hours as needed for Wheezing. 1 HFA Aerosol Inhaler Inhalation Twice a day. Pt using prn.)    allopurinol (ZYLOPRIM) 100 MG tablet TAKE 1 TABLET BY MOUTH ONCE DAILY.    blood sugar diagnostic Strp To check BG 1 times daily, to use with insurance preferred meter    blood-glucose meter kit To check BG 1 times daily, to use with insurance preferred meter    cetirizine (ZYRTEC) 10 MG tablet Take 1 tablet (10 mg total) by mouth daily as needed for Allergies.    cholecalciferol, vitamin D3, 50,000 unit capsule Take 1 capsule (50,000 units) weekly for 4 weeks. Then take 1 capsule (50,000 units) once a month.    clopidogrel (PLAVIX) 75 mg tablet TAKE 1 TABLET BY MOUTH EVERY DAY    inhalation device (AEROCHAMBER PLUS FLOW-VU) Use as directed for inhalation.    lancets Misc To check BG 1 times daily, to use with insurance preferred meter    timolol maleate 0.5% (TIMOPTIC) 0.5 % Drop Place 1 drop into both eyes 2 (two) times daily.     Family History     Problem Relation (Age of Onset)    Diabetes Mother, Father    Glaucoma Father    Hypertension Father    No Known Problems Sister, Brother, Maternal Aunt, Maternal Uncle, Paternal Aunt, Paternal Uncle, Maternal Grandmother, Maternal Grandfather, Paternal Grandmother, Paternal Grandfather        Tobacco Use    Smoking status: Never Smoker    Smokeless tobacco: Never Used   Substance and Sexual Activity    Alcohol use:  No    Drug use: No    Sexual activity: No     Review of Systems   Constitutional: Positive for appetite change, chills, fatigue and fever.   HENT: Negative for trouble swallowing and voice change.    Eyes: Negative for photophobia and visual disturbance.   Respiratory: Negative for cough, chest tightness, shortness of breath and wheezing.    Cardiovascular: Negative for chest pain and leg swelling.   Gastrointestinal: Positive for abdominal pain, diarrhea and nausea. Negative for abdominal distention, blood in stool, constipation and vomiting.   Genitourinary: Positive for difficulty urinating and flank pain. Negative for dysuria and hematuria.   Musculoskeletal: Negative for arthralgias and joint swelling.   Skin: Negative for color change and rash.   Neurological: Negative for light-headedness and headaches.     Objective:     Vital Signs (Most Recent):  Temp: 97.9 °F (36.6 °C) (02/08/19 2204)  Pulse: (!) 54 (02/08/19 2203)  Resp: 19 (02/08/19 2203)  BP: 125/60 (02/08/19 2203)  SpO2: 97 % (02/08/19 2203) Vital Signs (24h Range):  Temp:  [97.9 °F (36.6 °C)-98.1 °F (36.7 °C)] 97.9 °F (36.6 °C)  Pulse:  [54-64] 54  Resp:  [17-23] 19  SpO2:  [94 %-97 %] 97 %  BP: ()/(51-88) 125/60     Weight: 84.4 kg (186 lb)  Body mass index is 32.95 kg/m².    Physical Exam   Constitutional: She is oriented to person, place, and time. No distress.   HENT:   Head: Normocephalic and atraumatic.   Eyes: EOM are normal. No scleral icterus.   Neck: Neck supple. No JVD present.   Cardiovascular: Normal rate, regular rhythm and intact distal pulses.   Murmur heard.   Systolic murmur is present.  Pulmonary/Chest: Effort normal and breath sounds normal. No respiratory distress. She has no wheezes. She has no rales.   Abdominal: Soft. Bowel sounds are normal. She exhibits no distension. There is no tenderness. There is no rebound and no CVA tenderness.   Musculoskeletal: She exhibits no edema or tenderness.   Neurological: She is alert  and oriented to person, place, and time.   Skin: Skin is warm and dry. She is not diaphoretic.   Psychiatric: She has a normal mood and affect.   Vitals reviewed.    Significant Labs:   A1C:   Recent Labs   Lab 02/08/19 1815   HGBA1C 6.9*     CBC:   Recent Labs   Lab 02/08/19 1815   WBC 15.75*   HGB 10.6*   HCT 33.7*        CMP:   Recent Labs   Lab 02/08/19 1815      K 4.7   CL 98   CO2 27   *   BUN 60*   CREATININE 2.7*   CALCIUM 9.7   PROT 7.8   ALBUMIN 2.8*   BILITOT 0.5   ALKPHOS 82   AST 37   ALT 25   ANIONGAP 11   EGFRNONAA 15.4*     Lactic Acid:   Recent Labs   Lab 02/08/19 1945 02/08/19  2204   LACTATE 1.0 0.7     Lipase:   Recent Labs   Lab 02/08/19 1815   LIPASE 57     Magnesium:   Recent Labs   Lab 02/08/19 1815   MG 2.5     Troponin:   Recent Labs   Lab 02/08/19 1815   TROPONINI 0.067*     Urine Studies:   Recent Labs   Lab 02/08/19 2034   COLORU [place]   APPEARANCEUA Cloudy*   PHUR 5.0   SPECGRAV 1.015   PROTEINUA 2+*   GLUCUA Negative   KETONESU Negative   BILIRUBINUA Negative   OCCULTUA Negative   NITRITE Negative   LEUKOCYTESUR 2+*   RBCUA 20*   WBCUA >100*   BACTERIA Many*   SQUAMEPITHEL 3   HYALINECASTS 49*     All pertinent labs within the past 24 hours have been reviewed.    Significant Imaging: I have reviewed all pertinent imaging results/findings within the past 24 hours.   Imaging Results          X-Ray Chest AP Portable (Final result)  Result time 02/08/19 19:59:26    Final result by Vale Aguilar MD (02/08/19 19:59:26)                 Impression:      As above      Electronically signed by: Vale Aguilar MD  Date:    02/08/2019  Time:    19:59             Narrative:    EXAMINATION:  XR CHEST AP PORTABLE    CLINICAL HISTORY:  Sepsis;    TECHNIQUE:  Single frontal view of the chest was performed.    COMPARISON:  January 2017    FINDINGS:  There are surgical changes of sternotomy.  Cardiomegaly is present similar to prior exam.  Degenerative changes are  present in the spine.  There is calcification along the wall of the aorta.  No large volume of pleural fluid is present on this single view.  Lung fields are free of consolidation.                               CT Abdomen Pelvis  Without Contrast (Final result)  Result time 02/08/19 19:35:32    Final result by Dru Sawant MD (02/08/19 19:35:32)                 Impression:      1. Right perinephric and periureteral inflammation noting limited examination secondary to motion artifact.  This may reflect sequela of recently passed calculus or infection, correlation advised.  There is additional thickening of the urinary bladder walls, cystitis is not excluded.  2. Hepatomegaly, correlation with LFTs recommended.  3. Stable partial calcification of the gallbladder wall.  4. Additional findings above.      Electronically signed by: Dru Sawant MD  Date:    02/08/2019  Time:    19:35             Narrative:    EXAMINATION:  CT ABDOMEN PELVIS WITHOUT CONTRAST    CLINICAL HISTORY:  Abdominal pain, unspecified;    TECHNIQUE:  Low dose axial images, sagittal and coronal reformations were obtained from the lung bases to the pubic symphysis.  Oral contrast was not administered.    COMPARISON:  Ultrasound 11/02/2014, CT 11/02/2014    FINDINGS:  Images of the lower thorax are remarkable for bilateral dependent atelectasis/scarring.  There may be trace right pleural fluid.  There is postsurgical change of cardiac valve replacement.  There is calcification in the distribution of the coronary arteries.  No pericardial effusion.    The liver is enlarged, correlation with LFTs recommended.  The spleen, pancreas, and right adrenal gland are unremarkable.  There is nonspecific low attenuating thickening of the left adrenal gland.  There is partial calcification of the gallbladder wall, no gallbladder wall thickening or pericholecystic inflammation/fluid.  There is no biliary dilation or ascites.  The pancreatic duct is not  dilated.    There is no hydronephrosis or nephrolithiasis.  There is right perinephric and periureteral fat stranding noting minimal prominence of the right renal collecting system.  There is a subcentimeter hypoattenuating lesion arising from the interpolar region of the right kidney, too small for characterization, not significantly changed as compared to the previous exam.  There is a prominent focus of vascular calcification in the region of the left renal hilum.  The left ureter is grossly unremarkable.  No definite right ureteral calculi noting a punctate focus of calcification lateral to the course of the right ureter is stable from the previous examination.  The urinary bladder is decompressed, may account for wall thickening.  The uterus is absent the adnexa is unremarkable.    There are several scattered colonic diverticula without convincing surrounding inflammation to suggest diverticulitis noting exam is limited secondary to motion artifact.  The terminal ileum and appendix are grossly unremarkable.  The small bowel is grossly unremarkable.  There is atherosclerotic calcification of the aorta and its branches.  No focal organized pelvic fluid collection.    Postsurgical changes are noted of the proximal left femur.  Degenerative changes are noted of the spine.  There is atherosclerotic calcification of the aorta and its branches.  There is mild ectasia of the infrarenal abdominal aorta.  No significant inguinal lymphadenopathy.

## 2019-02-09 NOTE — ASSESSMENT & PLAN NOTE
- Pt found to have MARGARET superimposed on CKD3  - sCr 2.7 on admission, from baseline Cr 1.5-1.7, slightly improved to 2.4 the next AM  - Likely prerenal in setting of sepsis and decreased po intake  - FeUrea 7.7%, suggesting prerenal disease  - s/p 1 L NS in ED  - Gentle fluid resuscitation given CHF  - Renal function panel daily

## 2019-02-09 NOTE — ASSESSMENT & PLAN NOTE
"Pt is an 87 yo female with a hx of HFpEF, HTN, HLD, CAD (s/p CABG in 2010), AS (s/p TAVR in 2017), T2DM, presenting with a 3-day hx of lethargy.  Pt found to be hypotensive and have leukocytosis in the ED.  Pt received 1 L NS bolus in ED with improvement of hypotension.  UA with >100 leukocytes, many bacteria, nitrite negative.  CT abdomen/pelvis without contrast with "right perinephric and periureteral inflammation".  Additionally, pt reportedly with complaint of vague right flank pain.  Findings concerning for possible pyelonephritis.  UCx from 2015 with cephalosporin and fluoroquinolone sensitive E. coli.    Plan  - BCx, UCx and gram stain pending  - Procalcitonin elevated at 1.23. Lactic acid 0.7.  - s/p Rocephin 2g IV x1 in ED  - Continue Rocephin 1g IV daily for total 5-day course  - Continue gentle fluid resuscitation  "

## 2019-02-09 NOTE — ED NOTES
LOC: Pt is awake, alert, oriented x4. Affect is appropriate. Speech clear and appropriate.       Appearance: Pt resting comfortably in no acute distress. Pt clean and well groomed.     Skin: Skin warm and dry. Color consistent with ethnicity. Skin turgor normal. Mucus membranes moist. Skin intact. No breakdown or bruising noted.     Musculoskeletal: Pt moving upper and lower extremities without difficulty. Denies weakness.     Respiratory: Airway open and patent. Respirations spontaneous, even, easy, and unlabored. Pt has normal effort and rate. No accessory muscle use noted. Denies cough. Denies shortness of breath.     Cardiovascular: No peripheral edema noted. No complaints of chest pain. S1S2 present. Rate regular. Radial pulses 2+.     Abdominal: Abdomen soft but tender throughout. No distention noted. Denies n/v. Bowels sounds active x 4 quadrants.     Neurological: Eyes open spontaneously. Behavior appropriate to situation. Follows commands appropriately. Facial expression symmetrical. Purposeful motor response. Sensation intact.

## 2019-02-09 NOTE — SUBJECTIVE & OBJECTIVE
Interval History: Patient is up in bed, eating breakfast. She denies complaints. She is orientated to person, month, year, and place. Urine gram stain showed many bacteria and gram negative rods. Urine culture pending.    Review of Systems   Constitutional: Positive for appetite change, chills, fatigue and fever.   HENT: Negative for trouble swallowing and voice change.    Eyes: Negative for photophobia and visual disturbance.   Respiratory: Negative for cough, chest tightness, shortness of breath and wheezing.    Cardiovascular: Negative for chest pain and leg swelling.   Gastrointestinal: Positive for abdominal pain, diarrhea and nausea. Negative for abdominal distention, blood in stool, constipation and vomiting.   Genitourinary: Positive for difficulty urinating and flank pain. Negative for dysuria and hematuria.   Musculoskeletal: Negative for arthralgias and joint swelling.   Skin: Negative for color change and rash.   Neurological: Negative for light-headedness and headaches.     Objective:     Vital Signs (Most Recent):  Temp: 98 °F (36.7 °C) (02/09/19 1155)  Pulse: 72 (02/09/19 1155)  Resp: 16 (02/09/19 1155)  BP: 134/74 (02/09/19 1155)  SpO2: 96 % (02/09/19 1155) Vital Signs (24h Range):  Temp:  [97.9 °F (36.6 °C)-98.5 °F (36.9 °C)] 98 °F (36.7 °C)  Pulse:  [51-72] 72  Resp:  [16-23] 16  SpO2:  [93 %-97 %] 96 %  BP: ()/(51-88) 134/74     Weight: 82.4 kg (181 lb 11.2 oz)  Body mass index is 32.19 kg/m².    Intake/Output Summary (Last 24 hours) at 2/9/2019 1539  Last data filed at 2/9/2019 0900  Gross per 24 hour   Intake 1230 ml   Output 225 ml   Net 1005 ml      Physical Exam   Constitutional: She is oriented to person, place, and time. No distress.   HENT:   Head: Normocephalic and atraumatic.   Eyes: EOM are normal. No scleral icterus.   Neck: Neck supple. No JVD present.   Cardiovascular: Normal rate, regular rhythm and intact distal pulses.   Murmur heard.   Systolic murmur is  present.  Pulmonary/Chest: Effort normal and breath sounds normal. No respiratory distress. She has no wheezes. She has no rales.   Abdominal: Soft. Bowel sounds are normal. She exhibits no distension. There is no tenderness. There is no rebound and no CVA tenderness.   Musculoskeletal: She exhibits no edema or tenderness.   Neurological: She is alert and oriented to person, place, and time.   Skin: Skin is warm and dry. She is not diaphoretic.   Psychiatric: She has a normal mood and affect.   Vitals reviewed.      Significant Labs:   CBC:   Recent Labs   Lab 02/08/19 1815 02/09/19  0607   WBC 15.75* 14.72*   HGB 10.6* 9.3*   HCT 33.7* 29.7*    166     CMP:   Recent Labs   Lab 02/08/19 1815 02/09/19  0607    135*   K 4.7 4.4   CL 98 102   CO2 27 24   * 106   BUN 60* 59*   CREATININE 2.7* 2.4*   CALCIUM 9.7 8.8   PROT 7.8  --    ALBUMIN 2.8* 2.3*   BILITOT 0.5  --    ALKPHOS 82  --    AST 37  --    ALT 25  --    ANIONGAP 11 9   EGFRNONAA 15.4* 17.7*     Troponin:   Recent Labs   Lab 02/08/19 1815 02/08/19  2355   TROPONINI 0.067* 0.043*       Significant Imaging: I have reviewed all pertinent imaging results/findings within the past 24 hours.

## 2019-02-09 NOTE — ASSESSMENT & PLAN NOTE
- Pt s/p CABG x3 in 2010  - Continue home pravastatin 40mg po qHS  - Continue home ASA 81mg po daily  - Continue home Plavix 75mg po daily

## 2019-02-09 NOTE — NURSING
Contacted MD Mcclellan about pt scheduled tamiflu doses looking like duplicates, MD instructed RN to give ONE 30mg tablet now, will continue to monitor orders.

## 2019-02-09 NOTE — PROGRESS NOTES
[facility] Hospital Medicine  Progress Note    Patient Name: Krystina Washington  MRN: [id number]  Patient Class: IP- Inpatient   Admission Date: 2/8/2019  Length of Stay: 1 days  Attending Physician: Deborah Plaza MD  Primary Care Provider: Oniel Johnson MD    [facility] Medicine Team: [facility] MED 4 Lisa Cee MD    Subjective:     Principal Problem:Acute pyelonephritis    HPI:  Krystina Washington is a 86 y.o. female with a hx of HFpEF, HTN, T2DM, CAD (s/p CABG in 2010), AS (s/p TAVR in 2017) that presented to the ED with a 3-day hx of increased lethargy.  According to family at bedside, patient has been increasingly lethargic and has had decreased appetite for about 3 days.  Per family, this morning when pt sat down to eat, she began staring and was more somnolent than usual, so they brought her to the ED for evaluation.  As pt had been wincing in pain, her grandson gave her sublingual nitroglycerin without improvement.  Pt reports subjective fever, nausea and vague abdominal discomfort as well as some occasional right flank pain.  She denies pain with urination, but reports difficulty and decreased urine output.  She also reports one episode of diarrhea the morning of presentation.    In the ED, pt found to be hypotensive and to have a leukocytosis of 15.7k.  Given hx of CHF, she was given fluid resuscitation with 1 L NS x1.  UA was positive for UTI and CT abdomen/pelvis without contrast obtained showed perinephric inflammation and fat stranding, so pt given a dose of Rocephin 2g IV.    Hospital Course:  Urine culture pending. Urine gram stain showed many WBCs, many gram negative rods and rare gram positive rods. Influenza came back positive so Tamiflu was begun. She was found to have an MARGARET with Cr 2.7 (baseline around 1.5-1.7), which slightly improved to 2.4 the following day.     Interval History: Patient is up in bed, eating breakfast. She denies complaints. She is orientated to  person, month, year, and place. Urine gram stain showed many bacteria and gram negative rods. Urine culture pending.    Review of Systems   Constitutional: Positive for appetite change, chills, fatigue and fever.   HENT: Negative for trouble swallowing and voice change.    Eyes: Negative for photophobia and visual disturbance.   Respiratory: Negative for cough, chest tightness, shortness of breath and wheezing.    Cardiovascular: Negative for chest pain and leg swelling.   Gastrointestinal: Positive for abdominal pain, diarrhea and nausea. Negative for abdominal distention, blood in stool, constipation and vomiting.   Genitourinary: Positive for difficulty urinating and flank pain. Negative for dysuria and hematuria.   Musculoskeletal: Negative for arthralgias and joint swelling.   Skin: Negative for color change and rash.   Neurological: Negative for light-headedness and headaches.     Objective:     Vital Signs (Most Recent):  Temp: 98 °F (36.7 °C) (02/09/19 1155)  Pulse: 72 (02/09/19 1155)  Resp: 16 (02/09/19 1155)  BP: 134/74 (02/09/19 1155)  SpO2: 96 % (02/09/19 1155) Vital Signs (24h Range):  Temp:  [97.9 °F (36.6 °C)-98.5 °F (36.9 °C)] 98 °F (36.7 °C)  Pulse:  [51-72] 72  Resp:  [16-23] 16  SpO2:  [93 %-97 %] 96 %  BP: ()/(51-88) 134/74     Weight: 82.4 kg (181 lb 11.2 oz)  Body mass index is 32.19 kg/m².    Intake/Output Summary (Last 24 hours) at 2/9/2019 1539  Last data filed at 2/9/2019 0900  Gross per 24 hour   Intake 1230 ml   Output 225 ml   Net 1005 ml      Physical Exam   Constitutional: She is oriented to person, place, and time. No distress.   HENT:   Head: Normocephalic and atraumatic.   Eyes: EOM are normal. No scleral icterus.   Neck: Neck supple. No JVD present.   Cardiovascular: Normal rate, regular rhythm and intact distal pulses.   Murmur heard.   Systolic murmur is present.  Pulmonary/Chest: Effort normal and breath sounds normal. No respiratory distress. She has no wheezes. She has  "no rales.   Abdominal: Soft. Bowel sounds are normal. She exhibits no distension. There is no tenderness. There is no rebound and no CVA tenderness.   Musculoskeletal: She exhibits no edema or tenderness.   Neurological: She is alert and oriented to person, place, and time.   Skin: Skin is warm and dry. She is not diaphoretic.   Psychiatric: She has a normal mood and affect.   Vitals reviewed.      Significant Labs:   CBC:   Recent Labs   Lab 02/08/19  1815 02/09/19  0607   WBC 15.75* 14.72*   HGB 10.6* 9.3*   HCT 33.7* 29.7*    166     CMP:   Recent Labs   Lab 02/08/19  1815 02/09/19  0607    135*   K 4.7 4.4   CL 98 102   CO2 27 24   * 106   BUN 60* 59*   CREATININE 2.7* 2.4*   CALCIUM 9.7 8.8   PROT 7.8  --    ALBUMIN 2.8* 2.3*   BILITOT 0.5  --    ALKPHOS 82  --    AST 37  --    ALT 25  --    ANIONGAP 11 9   EGFRNONAA 15.4* 17.7*     Troponin:   Recent Labs   Lab 02/08/19  1815 02/08/19  2355   TROPONINI 0.067* 0.043*       Significant Imaging: I have reviewed all pertinent imaging results/findings within the past 24 hours.    Assessment/Plan:      * Acute pyelonephritis    Pt is an 85 yo female with a hx of HFpEF, HTN, HLD, CAD (s/p CABG in 2010), AS (s/p TAVR in 2017), T2DM, presenting with a 3-day hx of lethargy.  Pt found to be hypotensive and have leukocytosis in the ED.  Pt received 1 L NS bolus in ED with improvement of hypotension.  UA with >100 leukocytes, many bacteria, nitrite negative.  CT abdomen/pelvis without contrast with "right perinephric and periureteral inflammation".  Additionally, pt reportedly with complaint of vague right flank pain.  Findings concerning for possible pyelonephritis.  UCx from 2015 with cephalosporin and fluoroquinolone sensitive E. coli.    Plan  - BCx, UCx pending; gram stain showed many WBCs and many gram negative rods  - Procalcitonin elevated at 1.23. Lactic acid 0.7.  - s/p Rocephin 2g IV x1 in ED  - Continue Rocephin 1g IV daily for total 5-day " course  - Continue gentle fluid resuscitation     Influenza    - Influenza A and B positive  - Continue Tamiflu 30mg BID x5 days  - Droplet precautions       Severe sepsis    - See Acute pyelonephritis       Acute kidney injury superimposed on CKD    - Pt found to have MARGARET superimposed on CKD3  - sCr 2.7 on admission, from baseline Cr 1.5-1.7, slightly improved to 2.4 the next AM  - Likely prerenal in setting of sepsis and decreased po intake  - FeUrea 7.7%, suggesting prerenal disease  - s/p 1 L NS in ED  - Gentle fluid resuscitation given CHF  - Renal function panel daily        Elevated troponin level    - Pt found to have elevated troponin on admission (0.067)  - On chart review, troponin chronically elevated  - Likely elevated today 2/2 demand ischemia in setting of hypotension as well as MARGARET  - Pt denies chest pain or shortness of breath  - EKG without acute ischemic changes  - Troponin trended down     Chronic diastolic congestive heart failure    - Pt with hx of HFpEF with grade II diastolic dysfunction on TTE in 3/2018  - Currently not hypervolemic on exam  - Holding home Lasix, Coreg, Imdur and hydralazine  - Resume when appropriate     Essential hypertension    - Holding home antihypertensives in setting of sepsis with hypotension       Major depressive disorder, recurrent episode, moderate    - Continue home Lexapro 20mg po daily       Glaucoma (increased eye pressure)    - Continue home timolol eye gtt BID       S/P CABG x 3    - Pt s/p CABG x3 in 2010  - Continue home pravastatin 40mg po qHS  - Continue home ASA 81mg po daily  - Continue home Plavix 75mg po daily       Type II diabetes mellitus with renal manifestations    - HbA1c 6.9%, indicating optimal long-term glycemic control  - Diabetic diet  - Accuchecks qAC and qHS  - LDSSI       Hyperlipidemia    - Continue home pravastatin 40mg po qHS         VTE Risk Mitigation (From admission, onward)        Ordered     heparin (porcine) injection 5,000  Units  Every 8 hours      02/08/19 2212     IP VTE HIGH RISK PATIENT  Once      02/09/19 0556     Place sequential compression device  Until discontinued      02/09/19 0556     Place sequential compression device  Until discontinued      02/08/19 2212              Lisa Cee MD PGY1  Department of Hospital Medicine   [facility]                    02/09/2019                             STAFF PHYSICIAN NOTE                                   Attending Attestation for Rounds with Resident  I have reviewed and concur with the resident's history, physical, assessment, and plan.  I have personally interviewed and examined the patient at bedside and agree with the resident's findings.                                  ________________________________________

## 2019-02-09 NOTE — ASSESSMENT & PLAN NOTE
- HbA1c 6.9%, indicating optimal long-term glycemic control  - Diabetic diet  - Accuchecks qAC and qHS  - LDSSI

## 2019-02-09 NOTE — HPI
Krystina Washington is a 86 y.o. female with a hx of HFpEF, HTN, T2DM, CAD (s/p CABG in 2010), AS (s/p TAVR in 2017) that presented to the ED with a 3-day hx of increased lethargy.  According to family at bedside, patient has been increasingly lethargic and has had decreased appetite for about 3 days.  Per family, this morning when pt sat down to eat, she began staring and was more somnolent than usual, so they brought her to the ED for evaluation.  As pt had been wincing in pain, her grandson gave her sublingual nitroglycerin without improvement.  Pt reports subjective fever, nausea and vague abdominal discomfort as well as some occasional right flank pain.  She denies pain with urination, but reports difficulty and decreased urine output.  She also reports one episode of diarrhea the morning of presentation.    In the ED, pt found to be hypotensive and to have a leukocytosis of 15.7k.  Given hx of CHF, she was given fluid resuscitation with 1 L NS x1.  UA was positive for UTI and CT abdomen/pelvis without contrast obtained showed perinephric inflammation and fat stranding, so pt given a dose of Rocephin 2g IV.

## 2019-02-09 NOTE — PLAN OF CARE
I have seen the patient, discussed the resident's history and physical, assessment and plan. I have personally interviewed and examined the patient at bedside.  In summary:    Ms. Krystina Washington is a 86 y.o. female with an extensive cardiac history who presents to the ED with increasing lethargy and decreased PO intake.  Labs notable for WBC 15 and creatinine 2.7 (baseline 1.5-1.7) with procal 1.2.  · Toxic Encephalopathy 2/2 Acute Cystitis with Hematuria and Pyelo:  CT abdomen with right perinephric and periureteral inflammation.  Previous urine culture with E. Coli.  Check gram stain and culture.  Continue Ceftriaxone.  · Influenza:  Start Tamiflu 30mg PO BID x 5 days (renal dosing)    Full H&P by team intern to follow.    Sole Meek MD  [facility] Medicine  Cell:  [phone]  Spectra:  [phone]  Pager:  [phone]

## 2019-02-09 NOTE — ASSESSMENT & PLAN NOTE
- Pt found to have MARGARET superimposed on CKD3  - sCr 2.7 on admission, from baseline Cr 1.5-1.7  - Likely prerenal in setting of sepsis and decreased po intake  - FeUrea 7.7%, suggesting prerenal disease  - s/p 1 L NS in ED  - Gentle fluid resuscitation given CHF  - Renal function panel daily

## 2019-02-09 NOTE — ED NOTES
Tele box for room on [facility] that pt going to is not missing. Sent regular tele box to see if it would monitor on [facility]. Box not picking up for [facility] monitoring. Was told charge nurse will call me back with update on plan.

## 2019-02-09 NOTE — H&P
[facility] Medicine  History & Physical    Patient Name: Krystina Washington  MRN: [id number]  Admission Date: 2/8/2019  Attending Physician: Deborah Plaza MD   Primary Care Provider: Oniel Johnson MD    [facility] Medicine Team: [facility] 4 Ottoniel Hill MD     Patient information was obtained from patient, relative(s), past medical records and ER records.     Subjective:     Principal Problem:Acute pyelonephritis    Chief Complaint:   Chief Complaint   Patient presents with    Multiple Complaints     + cardiac hx, avr, cabg, sugar in 190's,     Altered Mental Status     not really taking , not eating since 10am,         HPI: Krystina Washington is a 86 y.o. female with a hx of HFpEF, HTN, T2DM, CAD (s/p CABG in 2010), AS (s/p TAVR in 2017) that presented to the ED with a 3-day hx of increased lethargy.  According to family at bedside, patient has been increasingly lethargic and has had decreased appetite for about 3 days.  Per family, this morning when pt sat down to eat, she began staring and was more somnolent than usual, so they brought her to the ED for evaluation.  As pt had been wincing in pain, her grandson gave her sublingual nitroglycerin without improvement.  Pt reports subjective fever, nausea and vague abdominal discomfort as well as some occasional right flank pain.  She denies pain with urination, but reports difficulty and decreased urine output.  She also reports one episode of diarrhea the morning of presentation.    In the ED, pt found to be hypotensive and to have a leukocytosis of 15.7k.  Given hx of CHF, she was given fluid resuscitation with 1 L NS x1.  UA was positive for UTI and CT abdomen/pelvis without contrast obtained showed perinephric inflammation and fat stranding, so pt given a dose of Rocephin 2g IV.    Past Medical History:   Diagnosis Date    Arthritis     CHF (congestive heart failure)     Coronary artery disease     Diabetes mellitus      Glaucoma     Gout, joint     Hx of CABG 9/21/10    Hyperlipidemia     Hypertension     NSTEMI (non-ST elevated myocardial infarction) 09/21/10    Stenosis of aortic and mitral valves     Systolic heart failure 6/19/2015       Past Surgical History:   Procedure Laterality Date    CARDIAC VALVE SURGERY      CATARACT EXTRACTION      CORONARY ARTERY BYPASS GRAFT  9/21/2010    HEART CATH-LEFT Left 1/20/2017    Performed by Pipe [facility]le [facility] C[facility]    INSERTION-PACEMAKER-DUAL Left 3/8/2017    Performed by Sidney Bruce MD at [facility] CATH LAB    JOINT REPLACEMENT      REPLACEMENT-VALVE-AORTIC N/A 3/7/2017    Performed by Guillermo Duran MD at [facility] CATH LAB       Review of patient's allergies indicates:   Allergen Reactions    Indocin [indomethacin sodium] Other (See Comments)    Lotensin [benazepril] Other (See Comments)       No current facility-administered medications on file prior to encounter.      Current Outpatient Medications on File Prior to Encounter   Medication Sig    aspirin 81 MG chewable tablet Take 81 mg by mouth. 1 Tablet, Chewable Oral Every day    carvedilol (COREG) 12.5 MG tablet TAKE 1 TABLET (12.5 MG TOTAL) BY MOUTH 2 (TWO) TIMES DAILY.    cloNIDine 0.2 mg/24 hr td ptwk (CATAPRES) 0.2 mg/24 hr Place 1 patch onto the skin every 7 days.    escitalopram oxalate (LEXAPRO) 20 MG tablet TAKE 1 TABLET (20 MG TOTAL) BY MOUTH ONCE DAILY.    furosemide (LASIX) 20 MG tablet TAKE 1 PILL DAILY AS NEED FOR WEIGHT GAIN, SWELLING, OR SHORTNESS OF BREATH    hydrALAZINE (APRESOLINE) 100 MG tablet TAKE 1 TABLET (100 MG TOTAL) BY MOUTH EVERY 8 (EIGHT) HOURS.    irbesartan (AVAPRO) 300 MG tablet TAKE 1 TABLET (300 MG TOTAL) BY MOUTH EVERY EVENING.    isosorbide mononitrate (IMDUR) 60 MG 24 hr tablet TAKE 1 TABLET (60 MG TOTAL) BY MOUTH ONCE DAILY.    nitroGLYCERIN 0.4 MG/DOSE TL SPRY (NITROLINGUAL) 400 mcg/spray spray PLACE 1 SPRAY ONTO THE TONGUE EVERY 5 (FIVE) MINUTES AS  NEEDED.    pravastatin (PRAVACHOL) 40 MG tablet TAKE 1 TABLET BY MOUTH EVERY EVENING    acetaminophen (TYLENOL) 325 MG tablet Take 650 mg by mouth every 6 (six) hours as needed for Pain.    albuterol 90 mcg/actuation inhaler Inhale 1 puff into the lungs every 6 (six) hours as needed for Wheezing. 1 HFA Aerosol Inhaler Inhalation Twice a day (Patient taking differently: Inhale 1 puff into the lungs every 6 (six) hours as needed for Wheezing. 1 HFA Aerosol Inhaler Inhalation Twice a day. Pt using prn.)    allopurinol (ZYLOPRIM) 100 MG tablet TAKE 1 TABLET BY MOUTH ONCE DAILY.    blood sugar diagnostic Strp To check BG 1 times daily, to use with insurance preferred meter    blood-glucose meter kit To check BG 1 times daily, to use with insurance preferred meter    cetirizine (ZYRTEC) 10 MG tablet Take 1 tablet (10 mg total) by mouth daily as needed for Allergies.    cholecalciferol, vitamin D3, 50,000 unit capsule Take 1 capsule (50,000 units) weekly for 4 weeks. Then take 1 capsule (50,000 units) once a month.    clopidogrel (PLAVIX) 75 mg tablet TAKE 1 TABLET BY MOUTH EVERY DAY    inhalation device (AEROCHAMBER PLUS FLOW-VU) Use as directed for inhalation.    lancets Misc To check BG 1 times daily, to use with insurance preferred meter    timolol maleate 0.5% (TIMOPTIC) 0.5 % Drop Place 1 drop into both eyes 2 (two) times daily.     Family History     Problem Relation (Age of Onset)    Diabetes Mother, Father    Glaucoma Father    Hypertension Father    No Known Problems Sister, Brother, Maternal Aunt, Maternal Uncle, Paternal Aunt, Paternal Uncle, Maternal Grandmother, Maternal Grandfather, Paternal Grandmother, Paternal Grandfather        Tobacco Use    Smoking status: Never Smoker    Smokeless tobacco: Never Used   Substance and Sexual Activity    Alcohol use: No    Drug use: No    Sexual activity: No     Review of Systems   Constitutional: Positive for appetite change, chills, fatigue and fever.    HENT: Negative for trouble swallowing and voice change.    Eyes: Negative for photophobia and visual disturbance.   Respiratory: Negative for cough, chest tightness, shortness of breath and wheezing.    Cardiovascular: Negative for chest pain and leg swelling.   Gastrointestinal: Positive for abdominal pain, diarrhea and nausea. Negative for abdominal distention, blood in stool, constipation and vomiting.   Genitourinary: Positive for difficulty urinating and flank pain. Negative for dysuria and hematuria.   Musculoskeletal: Negative for arthralgias and joint swelling.   Skin: Negative for color change and rash.   Neurological: Negative for light-headedness and headaches.     Objective:     Vital Signs (Most Recent):  Temp: 97.9 °F (36.6 °C) (02/08/19 2204)  Pulse: (!) 54 (02/08/19 2203)  Resp: 19 (02/08/19 2203)  BP: 125/60 (02/08/19 2203)  SpO2: 97 % (02/08/19 2203) Vital Signs (24h Range):  Temp:  [97.9 °F (36.6 °C)-98.1 °F (36.7 °C)] 97.9 °F (36.6 °C)  Pulse:  [54-64] 54  Resp:  [17-23] 19  SpO2:  [94 %-97 %] 97 %  BP: ()/(51-88) 125/60     Weight: 84.4 kg (186 lb)  Body mass index is 32.95 kg/m².    Physical Exam   Constitutional: She is oriented to person, place, and time. No distress.   HENT:   Head: Normocephalic and atraumatic.   Eyes: EOM are normal. No scleral icterus.   Neck: Neck supple. No JVD present.   Cardiovascular: Normal rate, regular rhythm and intact distal pulses.   Murmur heard.   Systolic murmur is present.  Pulmonary/Chest: Effort normal and breath sounds normal. No respiratory distress. She has no wheezes. She has no rales.   Abdominal: Soft. Bowel sounds are normal. She exhibits no distension. There is no tenderness. There is no rebound and no CVA tenderness.   Musculoskeletal: She exhibits no edema or tenderness.   Neurological: She is alert and oriented to person, place, and time.   Skin: Skin is warm and dry. She is not diaphoretic.   Psychiatric: She has a normal mood and  affect.   Vitals reviewed.    Significant Labs:   A1C:   Recent Labs   Lab 02/08/19 1815   HGBA1C 6.9*     CBC:   Recent Labs   Lab 02/08/19 1815   WBC 15.75*   HGB 10.6*   HCT 33.7*        CMP:   Recent Labs   Lab 02/08/19 1815      K 4.7   CL 98   CO2 27   *   BUN 60*   CREATININE 2.7*   CALCIUM 9.7   PROT 7.8   ALBUMIN 2.8*   BILITOT 0.5   ALKPHOS 82   AST 37   ALT 25   ANIONGAP 11   EGFRNONAA 15.4*     Lactic Acid:   Recent Labs   Lab 02/08/19  1945 02/08/19  2204   LACTATE 1.0 0.7     Lipase:   Recent Labs   Lab 02/08/19 1815   LIPASE 57     Magnesium:   Recent Labs   Lab 02/08/19 1815   MG 2.5     Troponin:   Recent Labs   Lab 02/08/19 1815   TROPONINI 0.067*     Urine Studies:   Recent Labs   Lab 02/08/19 2034   COLORU [place]   APPEARANCEUA Cloudy*   PHUR 5.0   SPECGRAV 1.015   PROTEINUA 2+*   GLUCUA Negative   KETONESU Negative   BILIRUBINUA Negative   OCCULTUA Negative   NITRITE Negative   LEUKOCYTESUR 2+*   RBCUA 20*   WBCUA >100*   BACTERIA Many*   SQUAMEPITHEL 3   HYALINECASTS 49*     All pertinent labs within the past 24 hours have been reviewed.    Significant Imaging: I have reviewed all pertinent imaging results/findings within the past 24 hours.   Imaging Results          X-Ray Chest AP Portable (Final result)  Result time 02/08/19 19:59:26    Final result by Vale Aguilar MD (02/08/19 19:59:26)                 Impression:      As above      Electronically signed by: Vale Aguilar MD  Date:    02/08/2019  Time:    19:59             Narrative:    EXAMINATION:  XR CHEST AP PORTABLE    CLINICAL HISTORY:  Sepsis;    TECHNIQUE:  Single frontal view of the chest was performed.    COMPARISON:  January 2017    FINDINGS:  There are surgical changes of sternotomy.  Cardiomegaly is present similar to prior exam.  Degenerative changes are present in the spine.  There is calcification along the wall of the aorta.  No large volume of pleural fluid is present on this single view.   Lung fields are free of consolidation.                               CT Abdomen Pelvis  Without Contrast (Final result)  Result time 02/08/19 19:35:32    Final result by Dru Sawant MD (02/08/19 19:35:32)                 Impression:      1. Right perinephric and periureteral inflammation noting limited examination secondary to motion artifact.  This may reflect sequela of recently passed calculus or infection, correlation advised.  There is additional thickening of the urinary bladder walls, cystitis is not excluded.  2. Hepatomegaly, correlation with LFTs recommended.  3. Stable partial calcification of the gallbladder wall.  4. Additional findings above.      Electronically signed by: Dru Sawant MD  Date:    02/08/2019  Time:    19:35             Narrative:    EXAMINATION:  CT ABDOMEN PELVIS WITHOUT CONTRAST    CLINICAL HISTORY:  Abdominal pain, unspecified;    TECHNIQUE:  Low dose axial images, sagittal and coronal reformations were obtained from the lung bases to the pubic symphysis.  Oral contrast was not administered.    COMPARISON:  Ultrasound 11/02/2014, CT 11/02/2014    FINDINGS:  Images of the lower thorax are remarkable for bilateral dependent atelectasis/scarring.  There may be trace right pleural fluid.  There is postsurgical change of cardiac valve replacement.  There is calcification in the distribution of the coronary arteries.  No pericardial effusion.    The liver is enlarged, correlation with LFTs recommended.  The spleen, pancreas, and right adrenal gland are unremarkable.  There is nonspecific low attenuating thickening of the left adrenal gland.  There is partial calcification of the gallbladder wall, no gallbladder wall thickening or pericholecystic inflammation/fluid.  There is no biliary dilation or ascites.  The pancreatic duct is not dilated.    There is no hydronephrosis or nephrolithiasis.  There is right perinephric and periureteral fat stranding noting minimal prominence of  "the right renal collecting system.  There is a subcentimeter hypoattenuating lesion arising from the interpolar region of the right kidney, too small for characterization, not significantly changed as compared to the previous exam.  There is a prominent focus of vascular calcification in the region of the left renal hilum.  The left ureter is grossly unremarkable.  No definite right ureteral calculi noting a punctate focus of calcification lateral to the course of the right ureter is stable from the previous examination.  The urinary bladder is decompressed, may account for wall thickening.  The uterus is absent the adnexa is unremarkable.    There are several scattered colonic diverticula without convincing surrounding inflammation to suggest diverticulitis noting exam is limited secondary to motion artifact.  The terminal ileum and appendix are grossly unremarkable.  The small bowel is grossly unremarkable.  There is atherosclerotic calcification of the aorta and its branches.  No focal organized pelvic fluid collection.    Postsurgical changes are noted of the proximal left femur.  Degenerative changes are noted of the spine.  There is atherosclerotic calcification of the aorta and its branches.  There is mild ectasia of the infrarenal abdominal aorta.  No significant inguinal lymphadenopathy.                                  Assessment/Plan:     * Acute pyelonephritis    Pt is an 85 yo female with a hx of HFpEF, HTN, HLD, CAD (s/p CABG in 2010), AS (s/p TAVR in 2017), T2DM, presenting with a 3-day hx of lethargy.  Pt found to be hypotensive and have leukocytosis in the ED.  Pt received 1 L NS bolus in ED with improvement of hypotension.  UA with >100 leukocytes, many bacteria, nitrite negative.  CT abdomen/pelvis without contrast with "right perinephric and periureteral inflammation".  Additionally, pt reportedly with complaint of vague right flank pain.  Findings concerning for possible pyelonephritis.  UCx " from 2015 with cephalosporin and fluoroquinolone sensitive E. coli.    Plan  - BCx, UCx and gram stain pending  - Procalcitonin elevated at 1.23. Lactic acid 0.7.  - s/p Rocephin 2g IV x1 in ED  - Continue Rocephin 1g IV daily for total 5-day course  - Continue gentle fluid resuscitation     Influenza    - Influenza A and B positive  - Continue Tamiflu 30mg BID x5 days  - Droplet precautions       Severe sepsis    - See Acute pyelonephritis       Acute kidney injury superimposed on CKD    - Pt found to have MARGARET superimposed on CKD3  - sCr 2.7 on admission, from baseline Cr 1.5-1.7  - Likely prerenal in setting of sepsis and decreased po intake  - FeUrea 7.7%, suggesting prerenal disease  - s/p 1 L NS in ED  - Gentle fluid resuscitation given CHF  - Renal function panel daily        Chronic diastolic congestive heart failure    - Pt with hx of HFpEF with grade II diastolic dysfunction on TTE in 3/2018  - Currently not hypervolemic on exam  - Holding home Lasix, Coreg, Imdur and hydralazine  - Resume when deemed safe       Essential hypertension    - Holding home antihypertensives in setting of sepsis with hypotension       S/P CABG x 3    - Pt s/p CABG x3 in 2010  - Continue home pravastatin 40mg po qHS  - Continue home ASA 81mg po daily  - Continue home Plavix 75mg po daily       Hyperlipidemia    - Continue home pravastatin 40mg po qHS       Elevated troponin level    - Pt found to have elevated troponin on admission (0.067)  - On chart review, troponin chronically elevated  - Likely elevated today 2/2 demand ischemia in setting of hypotension as well as MARGARET  - Pt denies chest pain or shortness of breath  - EKG without acute ischemic changes  - Trend troponin to ensure plateau/decrease       Type II diabetes mellitus with renal manifestations    - HbA1c 6.9%, indicating optimal long-term glycemic control  - Diabetic diet  - Accuchecks qAC and qHS  - LDSSI       Glaucoma (increased eye pressure)    - Continue home  timolol eye gtt BID       Major depressive disorder, recurrent episode, moderate    - Continue home Lexapro 20mg po daily         VTE Risk Mitigation (From admission, onward)        Ordered     heparin (porcine) injection 5,000 Units  Every 8 hours      02/08/19 2212     Place sequential compression device  Until discontinued      02/08/19 2212     IP VTE HIGH RISK PATIENT  Once      02/08/19 2212             Ottoniel Hill MD  Department of Hospital Medicine   [facility]

## 2019-02-09 NOTE — ASSESSMENT & PLAN NOTE
- Pt found to have elevated troponin on admission (0.067)  - On chart review, troponin chronically elevated  - Likely elevated today 2/2 demand ischemia in setting of hypotension as well as MARGARET  - Pt denies chest pain or shortness of breath  - EKG without acute ischemic changes  - Trend troponin to ensure plateau/decrease

## 2019-02-09 NOTE — ED NOTES
Telemetry Verification   Patient placed on Telemetry Box  Verified with War Room  Box # [id number]   Monitor Tech nasim   Rate 53   Rhythm Sinus derek

## 2019-02-09 NOTE — PROGRESS NOTES
Pharmacist Renal Dose Adjustment Note    Krystina Washington is a 86 y.o. female being treated with the medication oseltamivir    Patient Data:    Vital Signs (Most Recent):  Temp: 98.3 °F (36.8 °C) (02/09/19 0756)  Pulse: 67 (02/09/19 0756)  Resp: 20 (02/09/19 0756)  BP: 102/70 (02/09/19 0756)  SpO2: 96 % (02/09/19 0756) Vital Signs (72h Range):  Temp:  [97.9 °F (36.6 °C)-98.5 °F (36.9 °C)]   Pulse:  [51-67]   Resp:  [16-23]   BP: ()/(51-88)   SpO2:  [93 %-97 %]      Recent Labs   Lab 02/08/19  1815 02/09/19  0607   CREATININE 2.7* 2.4*     Serum creatinine: 2.4 mg/dL (H) 02/09/19 0607  Estimated creatinine clearance: 17.1 mL/min (A)    Medication: oseltamivir dose: 30 mg frequency: BID will be changed to medication: oseltamivir dose: 30 mg frequency: daily    Pharmacist's Name: Loren Rizzo  Pharmacist's Extension: [id number]

## 2019-02-10 VITALS
TEMPERATURE: 98 F | DIASTOLIC BLOOD PRESSURE: 102 MMHG | HEIGHT: 63 IN | OXYGEN SATURATION: 97 % | RESPIRATION RATE: 17 BRPM | SYSTOLIC BLOOD PRESSURE: 180 MMHG | HEART RATE: 67 BPM | BODY MASS INDEX: 32.19 KG/M2 | WEIGHT: 181.69 LBS

## 2019-02-10 LAB
ALBUMIN SERPL BCP-MCNC: 2.3 G/DL
ANION GAP SERPL CALC-SCNC: 8 MMOL/L
BASOPHILS # BLD AUTO: 0.04 K/UL
BASOPHILS NFR BLD: 0.3 %
BUN SERPL-MCNC: 53 MG/DL
CALCIUM SERPL-MCNC: 8.8 MG/DL
CHLORIDE SERPL-SCNC: 107 MMOL/L
CO2 SERPL-SCNC: 26 MMOL/L
CREAT SERPL-MCNC: 2.1 MG/DL
DIFFERENTIAL METHOD: ABNORMAL
EOSINOPHIL # BLD AUTO: 0.4 K/UL
EOSINOPHIL NFR BLD: 3 %
ERYTHROCYTE [DISTWIDTH] IN BLOOD BY AUTOMATED COUNT: 12.8 %
EST. GFR  (AFRICAN AMERICAN): 24 ML/MIN/1.73 M^2
EST. GFR  (NON AFRICAN AMERICAN): 20.9 ML/MIN/1.73 M^2
GLUCOSE SERPL-MCNC: 139 MG/DL
HCT VFR BLD AUTO: 31 %
HGB BLD-MCNC: 9.5 G/DL
IMM GRANULOCYTES # BLD AUTO: 0.27 K/UL
IMM GRANULOCYTES NFR BLD AUTO: 2.2 %
LYMPHOCYTES # BLD AUTO: 2 K/UL
LYMPHOCYTES NFR BLD: 16 %
MAGNESIUM SERPL-MCNC: 2.1 MG/DL
MCH RBC QN AUTO: 30.7 PG
MCHC RBC AUTO-ENTMCNC: 30.6 G/DL
MCV RBC AUTO: 100 FL
MONOCYTES # BLD AUTO: 0.9 K/UL
MONOCYTES NFR BLD: 7 %
NEUTROPHILS # BLD AUTO: 8.8 K/UL
NEUTROPHILS NFR BLD: 71.5 %
NRBC BLD-RTO: 0 /100 WBC
PHOSPHATE SERPL-MCNC: 3.6 MG/DL
PLATELET # BLD AUTO: 199 K/UL
PMV BLD AUTO: 12.6 FL
POCT GLUCOSE: 154 MG/DL (ref 70–110)
POCT GLUCOSE: 161 MG/DL (ref 70–110)
POTASSIUM SERPL-SCNC: 4.4 MMOL/L
RBC # BLD AUTO: 3.09 M/UL
SODIUM SERPL-SCNC: 141 MMOL/L
WBC # BLD AUTO: 12.3 K/UL

## 2019-02-10 PROCEDURE — 99239 PR HOSPITAL DISCHARGE DAY,>30 MIN: ICD-10-PCS | Mod: ,,, | Performed by: HOSPITALIST

## 2019-02-10 PROCEDURE — G8987 SELF CARE CURRENT STATUS: HCPCS | Mod: CJ

## 2019-02-10 PROCEDURE — 97165 OT EVAL LOW COMPLEX 30 MIN: CPT

## 2019-02-10 PROCEDURE — 25000003 PHARM REV CODE 250: Performed by: STUDENT IN AN ORGANIZED HEALTH CARE EDUCATION/TRAINING PROGRAM

## 2019-02-10 PROCEDURE — 85025 COMPLETE CBC W/AUTO DIFF WBC: CPT

## 2019-02-10 PROCEDURE — 99239 HOSP IP/OBS DSCHRG MGMT >30: CPT | Mod: ,,, | Performed by: HOSPITALIST

## 2019-02-10 PROCEDURE — 83735 ASSAY OF MAGNESIUM: CPT

## 2019-02-10 PROCEDURE — 80069 RENAL FUNCTION PANEL: CPT

## 2019-02-10 PROCEDURE — 97161 PT EVAL LOW COMPLEX 20 MIN: CPT

## 2019-02-10 PROCEDURE — 63600175 PHARM REV CODE 636 W HCPCS: Performed by: STUDENT IN AN ORGANIZED HEALTH CARE EDUCATION/TRAINING PROGRAM

## 2019-02-10 PROCEDURE — 36415 COLL VENOUS BLD VENIPUNCTURE: CPT

## 2019-02-10 PROCEDURE — 25000003 PHARM REV CODE 250: Performed by: HOSPITALIST

## 2019-02-10 PROCEDURE — G8988 SELF CARE GOAL STATUS: HCPCS | Mod: CI

## 2019-02-10 RX ORDER — HYDRALAZINE HYDROCHLORIDE 25 MG/1
25 TABLET, FILM COATED ORAL ONCE
Status: DISCONTINUED | OUTPATIENT
Start: 2019-02-10 | End: 2019-02-10

## 2019-02-10 RX ORDER — FUROSEMIDE 40 MG/1
40 TABLET ORAL EVERY OTHER DAY
COMMUNITY
End: 2019-07-02 | Stop reason: SDUPTHER

## 2019-02-10 RX ORDER — CEFDINIR 300 MG/1
300 CAPSULE ORAL DAILY
Qty: 12 CAPSULE | Refills: 0 | Status: SHIPPED | OUTPATIENT
Start: 2019-02-10 | End: 2019-02-22

## 2019-02-10 RX ORDER — ISOSORBIDE MONONITRATE 60 MG/1
60 TABLET, EXTENDED RELEASE ORAL DAILY
Status: DISCONTINUED | OUTPATIENT
Start: 2019-02-10 | End: 2019-02-10 | Stop reason: HOSPADM

## 2019-02-10 RX ORDER — HYDRALAZINE HYDROCHLORIDE 50 MG/1
100 TABLET, FILM COATED ORAL ONCE
Status: COMPLETED | OUTPATIENT
Start: 2019-02-10 | End: 2019-02-10

## 2019-02-10 RX ORDER — INSULIN PUMP SYRINGE, 3 ML
EACH MISCELLANEOUS
Qty: 1 EACH | Refills: 0 | Status: SHIPPED | OUTPATIENT
Start: 2019-02-10 | End: 2020-12-14 | Stop reason: SDUPTHER

## 2019-02-10 RX ORDER — OSELTAMIVIR PHOSPHATE 30 MG/1
30 CAPSULE ORAL DAILY
Qty: 2 CAPSULE | Refills: 0 | Status: SHIPPED | OUTPATIENT
Start: 2019-02-11 | End: 2019-02-13

## 2019-02-10 RX ORDER — CLONIDINE 0.2 MG/24H
1 PATCH, EXTENDED RELEASE TRANSDERMAL
Status: DISCONTINUED | OUTPATIENT
Start: 2019-02-10 | End: 2019-02-10 | Stop reason: HOSPADM

## 2019-02-10 RX ADMIN — HEPARIN SODIUM 5000 UNITS: 5000 INJECTION, SOLUTION INTRAVENOUS; SUBCUTANEOUS at 05:02

## 2019-02-10 RX ADMIN — CLOPIDOGREL 75 MG: 75 TABLET, FILM COATED ORAL at 09:02

## 2019-02-10 RX ADMIN — ASPIRIN 81 MG CHEWABLE TABLET 81 MG: 81 TABLET CHEWABLE at 09:02

## 2019-02-10 RX ADMIN — TIMOLOL MALEATE 1 DROP: 5 SOLUTION/ DROPS OPHTHALMIC at 09:02

## 2019-02-10 RX ADMIN — HEPARIN SODIUM 5000 UNITS: 5000 INJECTION, SOLUTION INTRAVENOUS; SUBCUTANEOUS at 01:02

## 2019-02-10 RX ADMIN — HYDRALAZINE HYDROCHLORIDE 100 MG: 50 TABLET ORAL at 01:02

## 2019-02-10 RX ADMIN — ISOSORBIDE MONONITRATE 60 MG: 60 TABLET, EXTENDED RELEASE ORAL at 04:02

## 2019-02-10 RX ADMIN — ESCITALOPRAM 20 MG: 20 TABLET, FILM COATED ORAL at 09:02

## 2019-02-10 RX ADMIN — OSELTAMIVIR PHOSPHATE 30 MG: 30 CAPSULE ORAL at 09:02

## 2019-02-10 NOTE — PT/OT/SLP EVAL
Occupational Therapy   Evaluation    Name: Krystina Washington  MRN: [id number]  Admitting Diagnosis:  Acute pyelonephritis      Recommendations:     Discharge Recommendations: nursing facility, skilled(Short Stay)  Discharge Equipment Recommendations:  walker, rolling  Barriers to discharge:  Decreased caregiver support    Assessment:     Krystina Washington is a 86 y.o. female with a medical diagnosis of Acute pyelonephritis. OT POC implemented. Pt presented to OT with generalized weakness affecting her pace and quality of movements during functional activities. Pt is CGA-min A for functional mobility,  SBA for bed mobility, and CGA-min A for functional transfers. Pt mainly limited this date by impaired endurance. Pt tolerated OT/ PT co-evaluation well with no c/o increased pain with activity. Based on the eval, OT is recommending short stay skilled nursing facility due to decreased caregiver support and functional status. Pt would benefit from skilled OT to increase her self care independence and maximize her functional independence.     Performance deficits affecting function: weakness, impaired endurance, gait instability, decreased lower extremity function, impaired cardiopulmonary response to activity, impaired self care skills, impaired balance, decreased safety awareness, impaired functional mobilty.      Rehab Prognosis: Good; patient would benefit from acute skilled OT services to address these deficits and reach maximum level of function.       Plan:     Patient to be seen 4 x/week to address the above listed problems via self-care/home management, therapeutic activities, therapeutic exercises  · Plan of Care Expires: 03/11/19  · Plan of Care Reviewed with: patient    Subjective     Chief Complaint: Shortness of breath  Patient/Family Comments/goals: Pt agreeable to OT POC.     Occupational Profile:  Living Environment: Pt lives with adult grandson in a [facility] with 0 JOHN. Bathroom set up: Tub shower combination    Previous level of function: I in all ADLs when she is feeling well. Mod I for functional mobility with use of rollator. Abimael completes the cleaning in the home. Pt reported of 1 fall within the last few months.   Roles and Routines: Homemaker  Equipment Used at Home:  rollator, bedside commode, shower chair, cane, straight  Assistance upon Discharge: Pt will have assistance from abimael upon discharge.     Pain/Comfort:  · Pain Rating 1: 0/10  · Pain Rating Post-Intervention 1: 0/10  · Pain Rating 2: 0/10    Patients cultural, spiritual, Confucianism conflicts given the current situation: no    Objective:     Communicated with: RN prior to session.  Patient found supine with telemetry, peripheral IV upon OT entry to room.    General Precautions: Standard, fall, droplet, special contact   Orthopedic Precautions:N/A   Braces: N/A     Occupational Performance:    Bed Mobility:    · Patient completed Rolling/Turning to Left with  stand by assistance  · Patient completed Rolling/Turning to Right with stand by assistance  · Patient completed Scooting/Bridging with stand by assistance  · Patient completed Supine to Sit with stand by assistance    Functional Mobility/Transfers:  · Patient completed Sit <> Stand Transfer from bed with contact guard assistance  with  no assistive device   · Patient completed Bed <> Chair Transfer using Step Transfer technique with contact guard assistance with rolling walker  · Patient completed Toilet Transfer Step Transfer technique with contact guard assistance to ascend and minimum assistance descend from commode with  rolling walker and grab bars  · Functional Mobility: Pt ambulated to restroom to complete self care tasks with BUE HHA and min A due to unsteady gait and impaired balance with no LOBs or rest breaks. Pt used RW to ambulate back to the bed with CGA.     Activities of Daily Living:  · Grooming: contact guard assistance for facial and hand hygiene in standing ~1  minute  · Toileting: contact guard assistance for wiping and clothing management    Cognitive/Visual Perceptual:  Cognitive/Psychosocial Skills:     -       Oriented to: Person, Place, Time and Situation   -       Follows Commands/attention:Follows multistep  commands  -       Communication: clear/fluent  -       Memory: No Deficits noted  -       Safety awareness/insight to disability: intact   -       Mood/Affect/Coping skills/emotional control: Appropriate to situation  Visual/Perceptual:      -Intact No deficits noted    Physical Exam:  Balance:    -       Fair  Postural examination/scapula alignment:    -       No postural abnormalities identified  Skin integrity: Visible skin intact  Edema:  None noted  Sensation:    -       Intact  Upper Extremity Range of Motion:     -       Right Upper Extremity: WFL  -       Left Upper Extremity: WFL  Upper Extremity Strength:    -       Right Upper Extremity: WFL  -       Left Upper Extremity: WFL   Strength:    -       Right Upper Extremity: WFL  -       Left Upper Extremity: WFL  Fine Motor Coordination:    -       Intact    AMPAC 6 Click ADL:  AMPAC Total Score: 19    Treatment & Education:  Educated pt on the following:  - Role of OT/ OT POC  - Self care safety/ independence  - Bed mobility safety  - Pursed lip breathing to improve endurance  - Functional transfer/ mobility safety  - Importance of sitting UIC  Education:    Patient left up in chair with all lines intact and call button in reach    GOALS:   Multidisciplinary Problems     Occupational Therapy Goals        Problem: Occupational Therapy Goal    Goal Priority Disciplines Outcome Interventions   Occupational Therapy Goal     OT, PT/OT Ongoing (interventions implemented as appropriate)    Description:  Goals to be met by: 2/17/19    Patient will increase functional independence with ADLs by performing:    UE Dressing with Set-up Assistance.  LE Dressing with Contact Guard Assistance.  Grooming while  standing at sink with Stand-by Assistance.  Toileting from toilet with Stand-by Assistance for hygiene and clothing management.   Step transfer with Stand-by Assistance with AD as needed to prepare for household mobility to complete occupations of choice.  Toilet transfer to toilet with Stand-by Assistance.  Upper extremity exercise program x15 reps per handout, with independence.                      History:     Past Medical History:   Diagnosis Date    Arthritis     CHF (congestive heart failure)     Coronary artery disease     Diabetes mellitus     Glaucoma     Gout, joint     Hx of CABG 9/21/10    Hyperlipidemia     Hypertension     NSTEMI (non-ST elevated myocardial infarction) 09/21/10    Stenosis of aortic and mitral valves     Systolic heart failure 6/19/2015       Past Surgical History:   Procedure Laterality Date    CARDIAC VALVE SURGERY      CATARACT EXTRACTION      CORONARY ARTERY BYPASS GRAFT  9/21/2010    HEART CATH-LEFT Left 1/20/2017    [facility]le [facility] C[facility]    INSERTION-PACEMAKER-DUAL Left 3/8/2017    Performed by Sidney Bruce [facility] at [facility] CATH LAB    JOINT REPLACEMENT      REPLACEMENT-VALVE-AORTIC N/A 3/7/2017    Performed by Guillermo Duran MD at [facility] CATH LAB       Time Tracking:     OT Date of Treatment: 02/10/19  OT Start Time: 0833  OT Stop Time: 0854  OT Total Time (min): 21 min    Billable Minutes:Evaluation 21    Mariela Garduno, OT  2/10/2019

## 2019-02-10 NOTE — NURSING
Pt and family given DC instructions  Verbalized understanding  Reviewed post-procedure instructions  Aware to  RX from pharm  Tele removed, and put in the dirty drawer  D/C IV awaiting transport  Awaiting escort

## 2019-02-10 NOTE — ASSESSMENT & PLAN NOTE
Pt found to have elevated troponin on admission (0.067). On chart review, troponin chronically elevated. Pt denies chest pain or shortness of breath. EKG without acute ischemic changes  - Likely elevated today 2/2 demand ischemia in setting of hypotension as well as MARGARET  - Troponin trended down

## 2019-02-10 NOTE — ASSESSMENT & PLAN NOTE
- Influenza A and B positive  - Continue Tamiflu 30mg daily - will be discharged with 2 doses to finish total 5 day course

## 2019-02-10 NOTE — ASSESSMENT & PLAN NOTE
"Pt is an 87 yo female with a hx of HFpEF, HTN, HLD, CAD (s/p CABG in 2010), AS (s/p TAVR in 2017), T2DM, presenting with a 3-day hx of lethargy.  Pt found to be hypotensive and have leukocytosis in the ED.  Pt received 1 L NS bolus in ED with improvement of hypotension.  UA with >100 leukocytes, many bacteria, nitrite negative.  CT abdomen/pelvis without contrast with "right perinephric and periureteral inflammation".  Additionally, pt reportedly with complaint of vague right flank pain.  Findings concerning for possible pyelonephritis.  UCx from 2015 with cephalosporin and fluoroquinolone sensitive E. coli.    Plan  - BCx, UCx pending; gram stain showed many WBCs and many gram negative rods. Previous UC grew E.coli  - Procalcitonin elevated at 1.23. Lactic acid 0.7.  - s/p Rocephin 2g IV x1 in ED and continued Rocephin 1g IV daily   - Received gentle fluid resuscitation  - Discharged with 12 day course of Cefdinir 300mg daily   "

## 2019-02-10 NOTE — PLAN OF CARE
Problem: Occupational Therapy Goal  Goal: Occupational Therapy Goal  Goals to be met by: 2/17/19    Patient will increase functional independence with ADLs by performing:    UE Dressing with Set-up Assistance.  LE Dressing with Contact Guard Assistance.  Grooming while standing at sink with Stand-by Assistance.  Toileting from toilet with Stand-by Assistance for hygiene and clothing management.   Step transfer with Stand-by Assistance with AD as needed to prepare for household mobility to complete occupations of choice.  Toilet transfer to toilet with Stand-by Assistance.  Upper extremity exercise program x15 reps per handout, with independence.    Outcome: Ongoing (interventions implemented as appropriate)  OT POC implemented. Pt presented to OT with generalized weakness affecting her pace and quality of movements during functional activities. Pt is CGA-min A for functional mobility,  SBA for bed mobility, and CGA-min A for functional transfers. Pt tolerated OT/ PT co-evaluation well with no c/o increased pain with activity. Based on the eval, OT is recommending short stay skilled nursing facility due to decreased caregiver support and functional status. Pt would benefit from skilled OT to increase her self care independence and maximize her functional independence.     Comments: Mariela Garduno OTR/L

## 2019-02-10 NOTE — DISCHARGE SUMMARY
[facility] Hospital Medicine  Discharge Summary      Patient Name: Krystina Washington  MRN: [id number]  Admission Date: 2/8/2019  Hospital Length of Stay: 2 days  Discharge Date and Time:  02/10/2019 4:13 PM  Attending Physician: Deborah Plaza MD   Discharging Provider: Lisa Cee MD  Primary Care Provider: Oniel Johnson MD  [facility] Hospital Medicine Team: [facility] HOSP MED 4 Lisa Cee MD    HPI:   Krystina Washington is a 86 y.o. female with a hx of HFpEF, HTN, T2DM, CAD (s/p CABG in 2010), AS (s/p TAVR in 2017) that presented to the ED with a 3-day hx of increased lethargy.  According to family at bedside, patient has been increasingly lethargic and has had decreased appetite for about 3 days.  Per family, this morning when pt sat down to eat, she began staring and was more somnolent than usual, so they brought her to the ED for evaluation.  As pt had been wincing in pain, her grandson gave her sublingual nitroglycerin without improvement.  Pt reports subjective fever, nausea and vague abdominal discomfort as well as some occasional right flank pain.  She denies pain with urination, but reports difficulty and decreased urine output.  She also reports one episode of diarrhea the morning of presentation.    In the ED, pt found to be hypotensive and to have a leukocytosis of 15.7k.  Given hx of CHF, she was given fluid resuscitation with 1 L NS x1.  UA was positive for UTI and CT abdomen/pelvis without contrast obtained showed perinephric inflammation and fat stranding, so pt given a dose of Rocephin 2g IV.    * No surgery found *      Hospital Course:   The following day after admission, patient was started on IV Rocephin 1g daily. Urine gram stain showed many WBCs, many gram negative rods and rare gram positive rods. Influenza came back positive so Tamiflu was begun. She was found to have an MARGARET with Cr 2.7 (baseline around 1.5-1.7), which slightly improved to 2.4 the following day. Urine culture is  pending, but previous urine cultures grew E.coli.     On day of discharge (02/10), patient is feeling well. Her vitals are stable. Cr improved to 2.1. She lives with her grandson and would like to go home. Patient's grandson says that he can pick her up from hospital. PT/OT recommended short stay SNF. Upon discussion with family, patient and her grandson would like to go home. Grandson says he can take her to PT as outpatient. She will be discharged with prescriptions to continue Cefdinir 300mg daily for 12 more days (to complete 2 week treatment of pyelonephritis) and Tamiflu 30mg for 2 more days (to complete flu treatment). The rest of her home medications were resumed. She was encouraged to follow up with her PCP within 1-2 weeks for hospital discharge follow up.      Vitals:    02/10/19 0723 02/10/19 1213 02/10/19 1444 02/10/19 1607   BP: (!) 146/94 (!) 194/86 (!) 192/93 (!) 180/102   BP Location: Left arm  Left arm Left arm   Patient Position: Lying  Lying Lying   Pulse:  70 68    Resp:  17     Temp:  98.5 °F (36.9 °C)     TempSrc:       SpO2:  (!) 94%     Weight:       Height:         Review of Systems   Constitutional: Negative for chills, fatigue, fever.   HENT: Negative for trouble swallowing and voice change.    Eyes: Negative for photophobia and visual disturbance.   Respiratory: Negative for cough, chest tightness, shortness of breath and wheezing.    Cardiovascular: Negative for chest pain and leg swelling.   Gastrointestinal: Negative for abdominal pain, abdominal distention, nausea, blood in stool, diarrhea, constipation and vomiting.   Genitourinary: Positive for difficulty urinating. Negative for flank pain, dysuria and hematuria.   Musculoskeletal: Negative for arthralgias and joint swelling.   Skin: Negative for color change and rash.   Neurological: Negative for light-headedness and headaches.     Physical Exam   Constitutional: She is oriented to person, place, and time. No distress.   HENT:  "  Head: Normocephalic and atraumatic.   Eyes: EOM are normal. No scleral icterus.   Neck: Neck supple. No JVD present.   Cardiovascular: Normal rate, regular rhythm and intact distal pulses.   Murmur heard.   Systolic murmur is present.  Pulmonary/Chest: Effort normal and breath sounds normal. No respiratory distress. She has no wheezes. She has no rales.   Abdominal: Soft. Bowel sounds are normal. She exhibits no distension. There is no tenderness. There is no rebound and no CVA tenderness.   Musculoskeletal: She exhibits no edema or tenderness.   Neurological: She is alert and oriented to person, place, and time.   Skin: Skin is warm and dry. She is not diaphoretic.   Psychiatric: She has a normal mood and affect.   Nursing note and vitals reviewed.      * Acute pyelonephritis    Pt is an 85 yo female with a hx of HFpEF, HTN, HLD, CAD (s/p CABG in 2010), AS (s/p TAVR in 2017), T2DM, presenting with a 3-day hx of lethargy.  Pt found to be hypotensive and have leukocytosis in the ED.  Pt received 1 L NS bolus in ED with improvement of hypotension.  UA with >100 leukocytes, many bacteria, nitrite negative.  CT abdomen/pelvis without contrast with "right perinephric and periureteral inflammation".  Additionally, pt reportedly with complaint of vague right flank pain.  Findings concerning for possible pyelonephritis.  UCx from 2015 with cephalosporin and fluoroquinolone sensitive E. coli.    Plan  - BCx, UCx pending; gram stain showed many WBCs and many gram negative rods. Previous UC grew E.coli  - Procalcitonin elevated at 1.23. Lactic acid 0.7.  - s/p Rocephin 2g IV x1 in ED and continued Rocephin 1g IV daily   - Received gentle fluid resuscitation  - Discharged with 12 day course of Cefdinir 300mg daily      Influenza    - Influenza A and B positive  - Continue Tamiflu 30mg daily - will be discharged with 2 doses to finish total 5 day course       Severe sepsis    - See Acute pyelonephritis       Acute kidney " injury superimposed on CKD    Pt found to have MARGARET superimposed on CKD3 with sCr 2.7 on admission, from baseline Cr 1.5-1.7. Likely prerenal in setting of sepsis and decreased po intake. FeUrea 7.7%, suggesting prerenal disease  - s/p 1 L NS in ED  - Gentle fluid resuscitation given CHF  - Improved to 2.1         Elevated troponin level    Pt found to have elevated troponin on admission (0.067). On chart review, troponin chronically elevated. Pt denies chest pain or shortness of breath. EKG without acute ischemic changes  - Likely elevated today 2/2 demand ischemia in setting of hypotension as well as MARGARET  - Troponin trended down     Chronic diastolic congestive heart failure    - Pt with hx of HFpEF with grade II diastolic dysfunction on TTE in 3/2018  - Currently not hypervolemic on exam  - Resume home Lasix, Coreg, Imdur and hydralazine     Essential hypertension    - Continue home medications: Lasix, Coreg, Imdur and hydralazine     Major depressive disorder, recurrent episode, moderate    - Continue home Lexapro 20mg po daily       Glaucoma (increased eye pressure)    - Continue home timolol eye gtt BID       S/P CABG x 3    - Pt s/p CABG x3 in 2010  - Continue home pravastatin 40mg po qHS  - Continue home ASA 81mg po daily  - Continue home Plavix 75mg po daily       Type II diabetes mellitus with renal manifestations    HbA1c 6.9%, indicating optimal long-term glycemic control. Continue home regimen.       Hyperlipidemia    - Continue home pravastatin 40mg po qHS         Final Active Diagnoses:    Diagnosis Date Noted POA    PRINCIPAL PROBLEM:  Acute pyelonephritis [N10] 02/08/2019 Unknown    Multiple complaints [R68.89] 02/08/2019 Yes    Severe sepsis [A41.9, R65.20] 02/08/2019 Unknown    Influenza [J11.1] 02/08/2019 Unknown    Acute kidney injury superimposed on CKD [N17.9, N18.9] 09/07/2018 Unknown    Elevated troponin level [R74.8]  Yes    Chronic diastolic congestive heart failure [I50.32]  01/18/2017 Yes    Essential hypertension [I10] 11/29/2015 Yes    Major depressive disorder, recurrent episode, moderate [F33.1] 05/06/2015 Yes    Glaucoma (increased eye pressure) [H40.9] 10/11/2013 Yes    S/P CABG x 3 [Z95.1] 04/09/2013 Not Applicable    Type II diabetes mellitus with renal manifestations [E11.29] 09/26/2012 Yes    Hyperlipidemia [E78.5] 07/19/2012 Yes      Problems Resolved During this Admission:       Discharged Condition: good    Disposition: Home or Self Care    Follow Up:  Follow-up Information     Oniel Johnson MD In 1 week.    Specialty:  Family Medicine  Why:  Hospital discharge follow-up  Contact information:  [address] [facility]  [place] [address] [phone]                   Significant Diagnostic Studies: Labs:   CMP   Recent Labs   Lab 02/08/19 1815 02/09/19  0607 02/10/19  0429    135* 141   K 4.7 4.4 4.4   CL 98 102 107   CO2 27 24 26   * 106 139*   BUN 60* 59* 53*   CREATININE 2.7* 2.4* 2.1*   CALCIUM 9.7 8.8 8.8   PROT 7.8  --   --    ALBUMIN 2.8* 2.3* 2.3*   BILITOT 0.5  --   --    ALKPHOS 82  --   --    AST 37  --   --    ALT 25  --   --    ANIONGAP 11 9 8   ESTGFRAFRICA 17.7* 20.5* 24.0*   EGFRNONAA 15.4* 17.7* 20.9*    and CBC   Recent Labs   Lab 02/08/19  1815 02/09/19  0607 02/10/19  0920   WBC 15.75* 14.72* 12.30   HGB 10.6* 9.3* 9.5*   HCT 33.7* 29.7* 31.0*    166 199       Medications:  Reconciled Home Medications:      Medication List      START taking these medications    cefdinir 300 MG capsule  Commonly known as:  OMNICEF  Take 1 capsule (300 mg total) by mouth once daily. for 12 days     oseltamivir 30 MG capsule  Commonly known as:  TAMIFLU  Take 1 capsule (30 mg total) by mouth once daily. for 2 days  Start taking on:  2/11/2019        CHANGE how you take these medications    cholecalciferol (vitamin D3) 50,000 unit capsule  Take 1 capsule (50,000 units) weekly for 4 weeks. Then take 1 capsule (50,000 units) once a month.  What  changed:  additional instructions     cloNIDine 0.2 mg/24 hr td ptwk 0.2 mg/24 hr  Commonly known as:  CATAPRES  Place 1 patch onto the skin every 7 days.  What changed:  when to take this        CONTINUE taking these medications    acetaminophen 325 MG tablet  Commonly known as:  TYLENOL  Take 650 mg by mouth every 6 (six) hours as needed for Pain.     albuterol 90 mcg/actuation inhaler  Commonly known as:  PROVENTIL/VENTOLIN HFA  Inhale 1 puff into the lungs every 6 (six) hours as needed for Wheezing. 1 HFA Aerosol Inhaler Inhalation Twice a day     allopurinol 100 MG tablet  Commonly known as:  ZYLOPRIM  TAKE 1 TABLET BY MOUTH ONCE DAILY.     aspirin 81 MG Chew  Take 81 mg by mouth. 1 Tablet, Chewable Oral Every day     blood sugar diagnostic Strp  To check BG 1 times daily, to use with insurance preferred meter     blood-glucose meter kit  To check BG 1 times daily, to use with insurance preferred meter     carvedilol 12.5 MG tablet  Commonly known as:  COREG  TAKE 1 TABLET (12.5 MG TOTAL) BY MOUTH 2 (TWO) TIMES DAILY.     cetirizine 10 MG tablet  Commonly known as:  ZYRTEC  Take 1 tablet (10 mg total) by mouth daily as needed for Allergies.     clopidogrel 75 mg tablet  Commonly known as:  PLAVIX  TAKE 1 TABLET BY MOUTH EVERY DAY     escitalopram oxalate 20 MG tablet  Commonly known as:  LEXAPRO  TAKE 1 TABLET (20 MG TOTAL) BY MOUTH ONCE DAILY.     furosemide 40 MG tablet  Commonly known as:  LASIX  Take 40 mg by mouth every other day.     hydrALAZINE 100 MG tablet  Commonly known as:  APRESOLINE  TAKE 1 TABLET (100 MG TOTAL) BY MOUTH EVERY 8 (EIGHT) HOURS.     irbesartan 300 MG tablet  Commonly known as:  AVAPRO  TAKE 1 TABLET (300 MG TOTAL) BY MOUTH EVERY EVENING.     isosorbide mononitrate 60 MG 24 hr tablet  Commonly known as:  IMDUR  TAKE 1 TABLET (60 MG TOTAL) BY MOUTH ONCE DAILY.     lancets Misc  To check BG 1 times daily, to use with insurance preferred meter     nitroGLYCERIN 0.4 MG/DOSE TL SPRY 400  mcg/spray spray  Commonly known as:  NITROLINGUAL  PLACE 1 SPRAY ONTO THE TONGUE EVERY 5 (FIVE) MINUTES AS NEEDED.     pravastatin 40 MG tablet  Commonly known as:  PRAVACHOL  TAKE 1 TABLET BY MOUTH EVERY EVENING     timolol maleate 0.5% 0.5 % Drop  Commonly known as:  TIMOPTIC  Place 1 drop into both eyes 2 (two) times daily.            Time spent on the discharge of patient: 35 minutes  Patient was seen and examined on the date of discharge and determined to be suitable for discharge.         Lisa Cee MD PGY1  Department of [facility] Medicine  [facility]

## 2019-02-10 NOTE — ASSESSMENT & PLAN NOTE
- Pt with hx of HFpEF with grade II diastolic dysfunction on TTE in 3/2018  - Currently not hypervolemic on exam  - Resume home Lasix, Coreg, Imdur and hydralazine

## 2019-02-10 NOTE — ASSESSMENT & PLAN NOTE
Pt found to have MAGRARET superimposed on CKD3 with sCr 2.7 on admission, from baseline Cr 1.5-1.7. Likely prerenal in setting of sepsis and decreased po intake. FeUrea 7.7%, suggesting prerenal disease  - s/p 1 L NS in ED  - Gentle fluid resuscitation given CHF  - Improved to 2.1

## 2019-02-10 NOTE — PT/OT/SLP EVAL
Physical Therapy Evaluation    Patient Name:  Krystina Washington   MRN:  [id number]    Recommendations:     Discharge Recommendations:  ([facility] SNF)   Discharge Equipment Recommendations: walker, rolling   Barriers to discharge: poor endurance and A for ADLs, gait at this time    Assessment:     Krystina Washington is a 86 y.o. female admitted with a medical diagnosis of Acute pyelonephritis.  She presents with the following impairments/functional limitations:  impaired endurance, weakness, impaired functional mobilty, gait instability, impaired balance, decreased lower extremity function, impaired cardiopulmonary response to activity Patient tolerated evaluation  fairly well. Patient limited at this time by increasd SOB with activity. Pt able to gait  X 12 feet ( 2 trials with/without AD) with CGA/min A. Pt states at baseline she was ( I) with all ADLs and was walking with rollator at times.   Patient will continue to require skilled PT services to address the above impairments to return to prior level of function as independent as possible.   Discharge recommendation  to skilled nursing facility  to maximize functional mobility, safety and decrease caregiver burden prior to returning home.       Rehab Prognosis: Good; patient would benefit from acute skilled PT services to address these deficits and reach maximum level of function.    Recent Surgery: * No surgery found *      Plan:     During this hospitalization, patient to be seen 4 x/week to address the identified rehab impairments via gait training, therapeutic activities, therapeutic exercises, neuromuscular re-education and progress toward the following goals:    · Plan of Care Expires:  03/10/19    Subjective     Chief Complaint: none; pt very pleasant  Patient/Family Comments/goals: to get to feeling better  Pain/Comfort:  · Pain Rating 1: 0/10  · Pain Rating Post-Intervention 1: 0/10    Patients cultural, spiritual, Hinduism conflicts given the current situation:  no    Living Environment:  Pt lives in a [facility] with her son, no JOHN.   Prior to admission, patients level of function was mod ( I) using rollator at times.  Equipment used at home: rollator, bedside commode, cane, straight.  DME owned (not currently used): none.  Upon discharge, patient will have assistance from her son .    Objective:     Communicated with RN prior to session.  Patient found in R sidelying telemetry  upon PT entry to room.    General Precautions: Standard, contact, droplet, fall   Orthopedic Precautions:N/A   Braces: N/A     Exams:  · Cognitive Exam:  Patient is oriented to Person, Place, Time and Situation  · Fine Motor Coordination: -       Intact  · Gross Motor Coordination:  WFL  · Postural Exam:  Patient presented with the following abnormalities: -       Rounded shoulders  · -       Forward head  · Sensation: -       Intact  · Skin Integrity/Edema:  -       Skin integrity: Visible skin intact  · RLE ROM: WFL  · RLE Strength: Deficits: globally weak noted during gait analysis  · LLE ROM: WFL  · LLE Strength: Deficits: globally weak noted during gait analysis    Functional Mobility:  · Bed Mobility:  Rolling Left:  supervision  · Supine to Sit: supervision  · Transfers:  Sit to Stand:  contact guard assistance with no AD and hand-held assist  · Bed to Chair: contact guard assistance with  rolling walker  using  Step Transfer  · Toilet Transfer: minimum assistance with  hand-held assist and grab bars  using  Step Transfer   ·   · Patient ambulated FWB/WBAT: bilateral lower extremity 12 feet without AD with min A HHA  and 12   feet on level tile with Rolling Walker with Contact Guard Assistance.    · Pt with demonstarting a  2-point gait and swing-to gait with decreased yvonne, increased time in double stance, decreased velocity of limb motion, decreased step length, decreased stride length, decreased toe-to-floor clearance and decreased weight-shifting ability.  · Impairments contributing to  gait deviations include impaired balance and decreased strength      Balance:   Static Sitting:   GOOD+: Takes MAXIMAL challenges from all directions.    Dynamic Sitting:   GOOD+: Maintains balance through MAXIMAL excursions of active trunk motion  Static Standing:   FAIR+: Takes MINIMAL challenges from all directions  Dynamic Standing:   · FAIR: Needs CONTACT GUARD during gait  ·         Therapeutic Activities and Exercises:       Patient education  · Patient educated on the role of PT and POC  · Patient educated on importance  activity while in the hosptial per tolerance for improved endurance and to limit deconditioning   · Patient educated on safe transfers with nursing as appropriate  · Patient educated on energy conservation, pursed lip breathing  · Patient educated on proper transfer mechanics and safety  · All of patients questions were answered within the scope of PT        AM-PAC 6 CLICK MOBILITY  Total Score:19     Patient left up in chair with all lines intact and call button in reach.    GOALS:   Multidisciplinary Problems     Physical Therapy Goals        Problem: Physical Therapy Goal    Goal Priority Disciplines Outcome Goal Variances Interventions   Physical Therapy Goal     PT, PT/OT Ongoing (interventions implemented as appropriate)     Description:  Goals to be met by: 19     Patient will increase functional independence with mobility by performin. Sit to stand transfer with Supervision  2. Bed to chair transfer with Supervision using Rolling Walker or LRD.   3. Gait  x 50 feet with Contact Guard Assistance using Rolling Walker.   4. Stand for 5 minutes with Contact Guard Assistance using Rolling Walker or LRD to prepare for ADLs such as cleaning/cooking, self care.                         History:     Past Medical History:   Diagnosis Date    Arthritis     CHF (congestive heart failure)     Coronary artery disease     Diabetes mellitus     Glaucoma     Gout, joint     Hx of  CABG 9/21/10    Hyperlipidemia     Hypertension     NSTEMI (non-ST elevated myocardial infarction) 09/21/10    Stenosis of aortic and mitral valves     Systolic heart failure 6/19/2015       Past Surgical History:   Procedure Laterality Date    CARDIAC VALVE SURGERY      CATARACT EXTRACTION      CORONARY ARTERY BYPASS GRAFT  9/21/2010    HEART CATH-LEFT Left 1/20/2017    [facility]    INSERTION-PACEMAKER-DUAL Left 3/8/2017    [facility] CATH LAB    JOINT REPLACEMENT      REPLACEMENT-VALVE-AORTIC N/A 3/7/2017    [facility] by [facility]ill[facility]an [facility] CATH LAB       Time Tracking:     PT Received On: 02/10/19  PT Start Time: 0835     PT Stop Time: 0857  PT Total Time (min): 22 min     Billable Minutes: Evaluation 20 min      Camden Steinberg, PT  02/10/2019

## 2019-02-10 NOTE — PLAN OF CARE
Problem: Physical Therapy Goal  Goal: Physical Therapy Goal  Goals to be met by: 19     Patient will increase functional independence with mobility by performin. Sit to stand transfer with Supervision  2. Bed to chair transfer with Supervision using Rolling Walker or LRD.   3. Gait  x 50 feet with Contact Guard Assistance using Rolling Walker.   4. Stand for 5 minutes with Contact Guard Assistance using Rolling Walker or LRD to prepare for ADLs such as cleaning/cooking, self care.       Outcome: Ongoing (interventions implemented as appropriate)  Patient evaluated today. All goals established are appropriate for patient progression at this time.     Camden Steinberg PT, DPT  2/10/2019  Pager: [phone]

## 2019-02-10 NOTE — PHARMACY MED REC
"Admission Medication Reconciliation - Pharmacy Consult Note    The home medication history was taken by Katherine Damian, Pharmacy Tech.      Based on information gathered and subsequent review by the clinical pharmacist, the items below may need attention.     You may go to "Admission" then "Reconcile Home Medications" tabs to review and/or act upon these items.     Potentially problematic discrepancies with current MAR  o Patient IS taking the following which was not ordered upon admit  o Cholecalciferol 50,000 units PO weekly (sundays)  o Allopurinol 100 mg PO daily    Potential issues to be addressed PRIOR TO DISCHARGE  o Holding home carvedilol, clonidine, furosemide, hydralazine, irbesartan, and isosorbide for hypotension in setting of sepsis; restart as appropriate  o Patient last filled hydralazine 8/24/18 for 90 day supply for $130. Consider attempting to manage HTN without hydralazine  o Last filled irbesartan 10/16/18 for 90 day supply, may need refill  o Last filled pravastatin 10/5/18 for 90 day supply, may need refill  o Patient needs new glucometer or one touch test strips    Please address this information as you see fit.  Feel free to contact us if you have any questions or require assistance.    Fernando Carlisle, Pharm.D., BCPS  [id number]                  .    .            "

## 2019-02-11 ENCOUNTER — PATIENT OUTREACH (OUTPATIENT)
Dept: ADMINISTRATIVE | Facility: CLINIC | Age: 84
End: 2019-02-11

## 2019-02-11 DIAGNOSIS — Z74.1 REQUIRES DAILY ASSISTANCE FOR ACTIVITIES OF DAILY LIVING (ADL) AND COMFORT NEEDS: Primary | ICD-10-CM

## 2019-02-11 DIAGNOSIS — N12 PYELONEPHRITIS: ICD-10-CM

## 2019-02-11 LAB — BACTERIA UR CULT: NORMAL

## 2019-02-11 RX ORDER — CARVEDILOL 12.5 MG/1
12.5 TABLET ORAL 2 TIMES DAILY
Qty: 60 TABLET | Refills: 6 | Status: SHIPPED | OUTPATIENT
Start: 2019-02-11 | End: 2019-10-10 | Stop reason: SDUPTHER

## 2019-02-11 NOTE — PT/OT/SLP DISCHARGE
Occupational Therapy Discharge Summary    Krystina Washington  MRN: [id number]   Principal Problem: Acute pyelonephritis      Patient Discharged from acute Occupational Therapy on 2/10/19.  Please refer to prior OT note dated 2/10/19 for functional status.    Assessment:      Goals partially met.    Objective:     GOALS:   Multidisciplinary Problems     Occupational Therapy Goals     Not on file          Multidisciplinary Problems (Resolved)        Problem: Occupational Therapy Goal    Goal Priority Disciplines Outcome Interventions   Occupational Therapy Goal   (Resolved)     OT, PT/OT Outcome(s) achieved    Description:  Goals to be met by: 2/17/19    Patient will increase functional independence with ADLs by performing:    UE Dressing with Set-up Assistance.  LE Dressing with Contact Guard Assistance.  Grooming while standing at sink with Stand-by Assistance.  Toileting from toilet with Stand-by Assistance for hygiene and clothing management.   Step transfer with Stand-by Assistance with AD as needed to prepare for household mobility to complete occupations of choice.  Toilet transfer to toilet with Stand-by Assistance.  Upper extremity exercise program x15 reps per handout, with independence.                      Reasons for Discontinuation of Therapy Services  Transfer to alternate level of care.      Plan:     Patient Discharged to: Home no OT services needed    Mariela Garduno, OT  2/11/2019

## 2019-02-11 NOTE — PATIENT INSTRUCTIONS
Discharge Instructions for Pyelonephritis  You have been told you have a kidney infection. This is called pyelonephritis. The infection can be serious. It can damage your kidneys and cause bacteria to enter your bloodstream. You were treated in the hospital. Once you return home, heres what you can do at home to aid in your recovery and prevent future infections.  Home care  · Take all the medicine you were prescribed, even if you feel better. Not finishing the medicine can make the infection come back. It may also make a future infection harder to treat.  · Unless told not to by your healthcare provider, drink 8 to 12 glasses of fluid every day. Clear fluids, such as water, are best. This may help flush the infection from your system.  Preventing future infection  · Keep your genital area clean. Use mild soap. Rinse with water.  · If you are a woman, always wipe the genital area from front to back.  · Urinate frequently. Avoid holding urine in the bladder for a long time.  · Always urinate after sexual intercourse.  Follow-up care  Follow up with your healthcare provider, or as advised. And see your healthcare provider for regular lab tests as directed.     When to call your healthcare provider  Call your healthcare provider right away if you have any of the following:  · Decreased urine output or trouble urinating  · Severe pain in the lower back or flank  · Fever of 100.4°F (38°C) or higher, or as directed by your healthcare provider  · Shaking chills  · Vomiting  · Blood in your urine  · Dark-colored or foul-smelling urine  · Nausea or other problems that prevent you from taking your prescribed medicine   Date Last Reviewed: 2/1/2017 © 2000-2019 [employer]. [address] [place] [address], [place] [address]. All rights reserved. This information is not intended as a substitute for professional medical care. Always follow your healthcare professional's instructions.

## 2019-02-13 ENCOUNTER — OUTPATIENT CASE MANAGEMENT (OUTPATIENT)
Dept: ADMINISTRATIVE | Facility: OTHER | Age: 84
End: 2019-02-13

## 2019-02-13 LAB
BACTERIA BLD CULT: NORMAL
BACTERIA BLD CULT: NORMAL

## 2019-02-13 NOTE — PROGRESS NOTES
Thank you for the referral.  Patient has been assigned to Kathrin Bosch LMSW for low risk screening for Outpatient Case Management.     Reason for referral:   Requires daily assistance for activities of daily living (ADL) and comfort needs    Son works and takes care of mom and blind brother. He would like info on sitter services in the area.       Please contact [facility] at ext. [phone] with any questions.    Thank you,    Chayo Woodard    \[facility]\""M

## 2019-02-14 ENCOUNTER — OUTPATIENT CASE MANAGEMENT (OUTPATIENT)
Dept: ADMINISTRATIVE | Facility: OTHER | Age: 84
End: 2019-02-14

## 2019-02-14 RX ORDER — CLOPIDOGREL BISULFATE 75 MG/1
TABLET ORAL
Qty: 90 TABLET | Refills: 3 | Status: SHIPPED | OUTPATIENT
Start: 2019-02-14 | End: 2019-10-10 | Stop reason: SDUPTHER

## 2019-02-14 NOTE — PT/OT/SLP DISCHARGE
Physical Therapy Discharge Summary    Name: Krystina Washington  MRN: [id number]   Principal Problem: Acute pyelonephritis     Patient Discharged from acute Physical Therapy on 2/10/19.  Please refer to prior PT noted date on 2/10/19  for functional status.     Assessment:     Patient was discharged unexpectedly.  Information required to complete an accurate discharge summary is unknown.  Refer to therapy initial evaluation and last progress note for initial and most recent functional status and goal achievement.  Recommendations made may be found in medical record.    Objective:     GOALS:   Multidisciplinary Problems     Physical Therapy Goals     Not on file          Multidisciplinary Problems (Resolved)        Problem: Physical Therapy Goal    Goal Priority Disciplines Outcome Goal Variances Interventions   Physical Therapy Goal   (Resolved)     PT, PT/OT Outcome(s) achieved     Description:  Goals to be met by: 19     Patient will increase functional independence with mobility by performin. Sit to stand transfer with Supervision  2. Bed to chair transfer with Supervision using Rolling Walker or LRD.   3. Gait  x 50 feet with Contact Guard Assistance using Rolling Walker.   4. Stand for 5 minutes with Contact Guard Assistance using Rolling Walker or LRD to prepare for ADLs such as cleaning/cooking, self care.                         Reasons for Discontinuation of Therapy Services  Transfer to alternate level of care.      Plan:     Patient Discharged to: Home no PT services needed and PT rec [facility] SNF.    Camden Steinberg, PT  2019

## 2019-02-14 NOTE — PROGRESS NOTES
This LMSW received a referral on the above patient.   Reason for referral: Low risk, Requires daily assistance for activities of daily living (ADL) and comfort needs; son works and takes care of mom and blind brother. He would like info on sitter services in the area  Name of the community resource that was provided: Private Hire Caregiver/ Providers, Medicaid Long Term Care Program, Senior Resource Guide  Resource given to: Patient via US mail     LMSW completed assessment with patient's grandson/caregiver, Hermelindo Cross. Pt's caregiver reports patient needing assistance with ADLs. Patient uses cane, walker, and rollator to ambulate. Patient's caregiver reports he works and has difficulty managing patient's personal care and other responsibilities. Patient's caregiver reports he sometimes receives assistance with patient's care from friends however he would like additional resources. LMSW discussed Medicaid LTC Program with patient's caregiver and encouraged him to complete application for waiver. Patient's caregiver offered and accepted resources for private hire caregivers. Patient's caregiver reports patient/family has difficulty affording Pt's Clonidine patches. He reports patient is cost 130 dollars each month to refill. LMSW will send referral to [facility] for medication assistance. No other needs identified by caregiver. Case closed.

## 2019-02-18 ENCOUNTER — TELEPHONE (OUTPATIENT)
Dept: PHARMACY | Facility: CLINIC | Age: 84
End: 2019-02-18

## 2019-02-18 NOTE — TELEPHONE ENCOUNTER
----- Message from Myranda Gaxiola sent at 2/15/2019 10:01 AM CST -----      ----- Message -----  From: Kathrin Bsoch LMSW  Sent: 2/14/2019   3:55 PM  To: Pharmacy Patient Assistance Team    Hello:    The above patient has expressed having difficulty affording their medications. Please contact patient to assess for assistance eligibility.     Thank you,     Kathrin Bosch LMSW

## 2019-02-18 NOTE — PHYSICIAN QUERY
"PT Name: Krystina Washington  MR #: [id number]  Physician Query Form - Renal Condition Clarification     CDS: Neetu Littlejohn RN, CCDS         Contact information :ext [phone])  [email or web address]       This form is a permanent document in the medical record.     QueryDate: February 17, 2019    By submitting this query, we are merely seeking further clarification of documentation. Please utilize your independent clinical judgment when addressing the question(s) below.    The Medical record contains the following:   Indicator Supporting Clinical Findings Location in Medical Record    Kidney (Renal) Insufficiency     x Kidney (Renal) Failure / Injury "Acute kidney injury superimposed on CKD" H&P 2/9/19    Nephrotoxic Agents     x BUN/Creatinine GFR BUN 60, creatinine 2.7, GFR 17.7  BUN 59, creatinine 2.4, GFR 20.5  BUN 53, creatinine 2.1, GFR 24.0 Lab 2/8/19  Lab 2/9/19  Lab 2/10/19     x Urine: Casts         Eosinophils Hyaline casts 49 Lab 2/8/19    Dehydration      Nausea/Vomiting      Dialysis/CRRT     x Treatment: - s/p Rocephin 2g IV x1 in ED  - Continue Rocephin 1g IV daily for total 5-day course  - Continue gentle fluid resuscitation H&P 2/9/19   x Other:  "severe sepsis"  "- sCr 2.7 on admission, from baseline Cr 1.5-1.7  - Likely prerenal in setting of sepsis and decreased po intake  - FeUrea 7.7%, suggesting prerenal disease  - s/p 1 L NS in ED  - Gentle fluid resuscitation given CHF  - Renal function panel daily"  BP 89/52 H&P 2/9/19                      VS 2/8/19   Acute Kidney Injury / Acute Renal Failure has different defining criteria. A generally accepted guideline  is:   A greater than 100% (2X) rise in serum creatinine from baseline* occurring during the course of a single hospital stay.   *Baseline as determined by the providers judgment and consideration of previous lab values and other documentation, if available.  A diagnosis of Acute Kidney Injury/ Acute Renal Failure should incorporate " abnormal labs and clinical findings that are clinically significant    References: 1. Abigail et al. Acute renal failure-definition, outcome measures, animal models, fluid therapy and information technology needs: the Second International Consensus Conference of the Acute Dialysis Quality Initiative (ADQI) Group. Crit Care 2004; 8:B204; 2. Huang et al. Acute Kidney Injury Network: report of an initiative to improve outcomes in acute kidney injury. Crit Care 2007; 11:R31; 3. Kidney Disease: Improving Global Outcomes (KDIGO). Acute Kidney Injury Work Group. KDIGO clinical practice guidelines for acute kidney injury. Kidney Int Suppl 2012; 2:1.  The clinical guidelines noted below is only a system guideline, it does not replace the providers clinical judgment.      Doctor, please specify the diagnosis or diagnoses associated with above clinical findings.  Please specify acute kidney injury.    [   ] Acute Kidney Failure/Injury with Tubular Necrosis  Damage to the tubule cells of the kidney. Common triggers: shock, hypotension, IV contrast, rhabdomyolysis, medications   [   ] Other Acute Kidney Failure/Injury (please specify): ____________     [ x  ] Unspecified Acute Kidney Failure/Injury      [  x ] Other (please specify): ________pre-renl azotemia, on top of CKD3.  She did not double baseline._________________________   [   ]  Clinically Undetermined       Please document in your progress notes daily for the duration of treatment until resolved and include in your discharge summary.

## 2019-02-18 NOTE — TELEPHONE ENCOUNTER
There are no programs available at this time to assist with current medication listed. Matthias will call me back 02/19/19 for help to complete a LIS taran online

## 2019-02-18 NOTE — PHYSICIAN QUERY
"PT Name: Krystina Washington  MR #: [id number]    Physician Query Form - Cause and Effect Relationship Clarification      CDS: Neetu Littlejohn RN, CCDS         Contact information :ext [phone])  [email or web address]       This form is a permanent document in the medical record.     Query Date: February 17, 2019    By submitting this query, we are merely seeking further clarification of documentation. Please utilize your independent clinical judgment when addressing the question(s) below.    The Medical record contains the following:  Supporting Clinical Findings   Location in record          "Severe sepsis"  "Acute pyelonephritis"                                                              Urine culture  x ESCHERICHIA COLI   >100,000 cfu/ml                                                                                                                         H&P 2/9/19      Lab 2/8/19                                Doctor, please clarify if there is any correlation between Sepsis  and  ESCHERICHIA COLI  .           Are the conditions:      [ x ] Due to or associated with each other   [  ] Unrelated to each other   [  ] Other (Please Specify): _________________________   [  ] Clinically Undetermined                                                                                                                                                                                             "

## 2019-03-02 DIAGNOSIS — E55.9 VITAMIN D INSUFFICIENCY: ICD-10-CM

## 2019-03-04 RX ORDER — ASPIRIN 325 MG
50000 TABLET, DELAYED RELEASE (ENTERIC COATED) ORAL WEEKLY
Qty: 9 CAPSULE | Refills: 0 | OUTPATIENT
Start: 2019-03-04

## 2019-03-08 RX ORDER — ISOSORBIDE MONONITRATE 60 MG/1
TABLET, EXTENDED RELEASE ORAL
Qty: 90 TABLET | Refills: 1 | Status: SHIPPED | OUTPATIENT
Start: 2019-03-08 | End: 2019-08-29 | Stop reason: SDUPTHER

## 2019-03-15 ENCOUNTER — TELEPHONE (OUTPATIENT)
Dept: ADMINISTRATIVE | Facility: CLINIC | Age: 84
End: 2019-03-15

## 2019-04-19 ENCOUNTER — NURSE TRIAGE (OUTPATIENT)
Dept: ADMINISTRATIVE | Facility: CLINIC | Age: 84
End: 2019-04-19

## 2019-04-19 NOTE — TELEPHONE ENCOUNTER
Fell about 2.5 hours ago and shoulder swelling and wrist pain.    Reason for Disposition   Can't move injured shoulder at all    Protocols used: SHOULDER INJURY-A-AH

## 2019-06-05 DIAGNOSIS — E78.5 HYPERLIPIDEMIA, UNSPECIFIED HYPERLIPIDEMIA TYPE: ICD-10-CM

## 2019-06-05 DIAGNOSIS — I15.0 RENOVASCULAR HYPERTENSION: ICD-10-CM

## 2019-06-05 DIAGNOSIS — Z95.1 S/P CABG X 3: ICD-10-CM

## 2019-06-05 DIAGNOSIS — I50.22 SYSTOLIC HEART FAILURE, CHRONIC: ICD-10-CM

## 2019-06-05 DIAGNOSIS — I35.0 AORTIC VALVE STENOSIS, UNSPECIFIED ETIOLOGY: ICD-10-CM

## 2019-06-05 DIAGNOSIS — N18.30 CHRONIC KIDNEY DISEASE, STAGE III (MODERATE): ICD-10-CM

## 2019-06-05 DIAGNOSIS — I25.83 CORONARY ARTERY DISEASE DUE TO LIPID RICH PLAQUE: ICD-10-CM

## 2019-06-05 DIAGNOSIS — I50.32 CHRONIC DIASTOLIC HEART FAILURE: ICD-10-CM

## 2019-06-05 DIAGNOSIS — I25.10 CORONARY ARTERY DISEASE DUE TO LIPID RICH PLAQUE: ICD-10-CM

## 2019-06-06 RX ORDER — HYDRALAZINE HYDROCHLORIDE 100 MG/1
TABLET, FILM COATED ORAL
Qty: 270 TABLET | Refills: 2 | Status: SHIPPED | OUTPATIENT
Start: 2019-06-06 | End: 2020-04-24

## 2019-06-07 RX ORDER — ESCITALOPRAM OXALATE 20 MG/1
20 TABLET ORAL DAILY
Qty: 30 TABLET | Refills: 5 | Status: SHIPPED | OUTPATIENT
Start: 2019-06-07 | End: 2019-10-10 | Stop reason: SDUPTHER

## 2019-07-02 RX ORDER — FUROSEMIDE 40 MG/1
40 TABLET ORAL EVERY OTHER DAY
Qty: 30 TABLET | Refills: 0 | Status: SHIPPED | OUTPATIENT
Start: 2019-07-02 | End: 2019-07-23 | Stop reason: SDUPTHER

## 2019-07-23 ENCOUNTER — HOSPITAL ENCOUNTER (OUTPATIENT)
Dept: RADIOLOGY | Facility: HOSPITAL | Age: 84
Discharge: HOME | End: 2019-07-23
Attending: FAMILY MEDICINE
Payer: MEDICARE

## 2019-07-23 ENCOUNTER — OFFICE VISIT (OUTPATIENT)
Dept: FAMILY MEDICINE | Facility: CLINIC | Age: 84
End: 2019-07-23
Payer: MEDICARE

## 2019-07-23 VITALS
SYSTOLIC BLOOD PRESSURE: 138 MMHG | OXYGEN SATURATION: 100 % | WEIGHT: 192.44 LBS | BODY MASS INDEX: 34.1 KG/M2 | HEART RATE: 68 BPM | HEIGHT: 63 IN | DIASTOLIC BLOOD PRESSURE: 76 MMHG

## 2019-07-23 DIAGNOSIS — E11.65 TYPE 2 DIABETES MELLITUS WITH HYPERGLYCEMIA, WITHOUT LONG-TERM CURRENT USE OF INSULIN: ICD-10-CM

## 2019-07-23 DIAGNOSIS — N18.4 CKD (CHRONIC KIDNEY DISEASE) STAGE 4, GFR 15-29 ML/MIN: ICD-10-CM

## 2019-07-23 DIAGNOSIS — R06.02 SOB (SHORTNESS OF BREATH): ICD-10-CM

## 2019-07-23 DIAGNOSIS — I10 ESSENTIAL HYPERTENSION: Primary | ICD-10-CM

## 2019-07-23 DIAGNOSIS — I50.32 CHRONIC DIASTOLIC CONGESTIVE HEART FAILURE: ICD-10-CM

## 2019-07-23 DIAGNOSIS — H91.93 BILATERAL HEARING LOSS, UNSPECIFIED HEARING LOSS TYPE: ICD-10-CM

## 2019-07-23 PROBLEM — N10 ACUTE PYELONEPHRITIS: Status: RESOLVED | Noted: 2019-02-08 | Resolved: 2019-07-23

## 2019-07-23 PROCEDURE — 99214 OFFICE O/P EST MOD 30 MIN: CPT | Mod: S$PBB,,, | Performed by: FAMILY MEDICINE

## 2019-07-23 PROCEDURE — 99999 PR PBB SHADOW E&M-EST. PATIENT-LVL V: CPT | Mod: PBBFAC,,, | Performed by: FAMILY MEDICINE

## 2019-07-23 PROCEDURE — 71046 X-RAY EXAM CHEST 2 VIEWS: CPT | Mod: TC,FY,PO

## 2019-07-23 PROCEDURE — 99214 PR OFFICE/OUTPT VISIT, EST, LEVL IV, 30-39 MIN: ICD-10-PCS | Mod: S$PBB,,, | Performed by: FAMILY MEDICINE

## 2019-07-23 PROCEDURE — 71046 XR CHEST PA AND LATERAL: ICD-10-PCS | Mod: 26,,, | Performed by: RADIOLOGY

## 2019-07-23 PROCEDURE — 71046 X-RAY EXAM CHEST 2 VIEWS: CPT | Mod: 26,,, | Performed by: RADIOLOGY

## 2019-07-23 PROCEDURE — 99999 PR PBB SHADOW E&M-EST. PATIENT-LVL V: ICD-10-PCS | Mod: PBBFAC,,, | Performed by: FAMILY MEDICINE

## 2019-07-23 PROCEDURE — 99215 OFFICE O/P EST HI 40 MIN: CPT | Mod: PBBFAC,25,PO | Performed by: FAMILY MEDICINE

## 2019-07-23 RX ORDER — FUROSEMIDE 20 MG/1
20 TABLET ORAL DAILY
Qty: 90 TABLET | Refills: 0 | Status: SHIPPED | OUTPATIENT
Start: 2019-07-23 | End: 2019-12-03 | Stop reason: SDUPTHER

## 2019-07-24 NOTE — PROGRESS NOTES
Subjective:       Patient ID: Krystina Washington is a 86 y.o. female.    Chief Complaint: Follow-up (on meds); Hypertension; and Shortness of Breath (on and off)    86 years old female came to the clinic for blood pressure check.  Blood pressure today stable.  No chest pain, palpitation, orthopnea or PND.  Patient with significant shortness of breath associated with generalized tiredness.  Patient using Lasix every other day.  Patient with decreased kidney function associated with CHF.  Patient weight at home is stable.  Last A1c was stable.  No polyuria, polydipsia or polyphagia.  Patient needs assistance for her activities of daily living.  Patient with significant hearing loss .  Family is requesting possible evaluation for hearing aids.    Review of Systems   Constitutional: Positive for fatigue.   HENT: Positive for hearing loss.    Eyes: Negative.    Respiratory: Positive for shortness of breath.    Cardiovascular: Negative.  Negative for chest pain, palpitations and leg swelling.   Gastrointestinal: Negative.    Genitourinary: Negative.    Musculoskeletal: Positive for gait problem.   Skin: Negative.    Psychiatric/Behavioral: Negative.        Objective:      Physical Exam   Constitutional: She is oriented to person, place, and time. She appears well-developed and well-nourished. No distress.   HENT:   Head: Normocephalic and atraumatic.   Right Ear: External ear normal.   Left Ear: External ear normal.   Nose: Nose normal.   Mouth/Throat: Oropharynx is clear and moist. No oropharyngeal exudate.   Eyes: Pupils are equal, round, and reactive to light. Conjunctivae and EOM are normal. Right eye exhibits no discharge. Left eye exhibits no discharge. No scleral icterus.   Neck: Normal range of motion. Neck supple. No JVD present. No tracheal deviation present. No thyromegaly present.   Cardiovascular: Normal rate, regular rhythm, normal heart sounds and intact distal pulses. Exam reveals no gallop and no friction  rub.   No murmur heard.  Pulmonary/Chest: Effort normal and breath sounds normal. No stridor. No respiratory distress. She has no wheezes. She has no rales. She exhibits no tenderness.   Abdominal: Soft. Bowel sounds are normal. She exhibits no distension and no mass. There is no tenderness. There is no rebound and no guarding.   Musculoskeletal: Normal range of motion. She exhibits no edema or tenderness.   Lymphadenopathy:     She has no cervical adenopathy.   Neurological: She is alert and oriented to person, place, and time. She has normal reflexes. No cranial nerve deficit. She exhibits normal muscle tone. Coordination and gait abnormal.   Skin: Skin is warm and dry. No rash noted. She is not diaphoretic. No erythema. No pallor.   Psychiatric: Her behavior is normal. Judgment and thought content normal. Her mood appears not anxious. Her affect is not angry, not blunt, not labile and not inappropriate. She exhibits a depressed mood.       Assessment:       1. Essential hypertension    2. CKD (chronic kidney disease) stage 4, GFR 15-29 ml/min    3. Chronic diastolic congestive heart failure    4. SOB (shortness of breath)    5. Type 2 diabetes mellitus with hyperglycemia, without long-term current use of insulin    6. Bilateral hearing loss, unspecified hearing loss type        Plan:         Krystina was seen today for follow-up, hypertension and shortness of breath.    Diagnoses and all orders for this visit:    Essential hypertension  -     Renal function panel; Future  -     CBC auto differential; Future    CKD (chronic kidney disease) stage 4, GFR 15-29 ml/min  -     furosemide (LASIX) 20 MG tablet; Take 1 tablet (20 mg total) by mouth once daily.  -     Renal function panel; Future  -     CBC auto differential; Future    Chronic diastolic congestive heart failure  -     X-Ray Chest PA And Lateral; Future  -     furosemide (LASIX) 20 MG tablet; Take 1 tablet (20 mg total) by mouth once daily.  -     Renal  function panel; Future  -     CBC auto differential; Future    SOB (shortness of breath)  -     X-Ray Chest PA And Lateral; Future  -     furosemide (LASIX) 20 MG tablet; Take 1 tablet (20 mg total) by mouth once daily.    Type 2 diabetes mellitus with hyperglycemia, without long-term current use of insulin  -     Ambulatory referral to Optometry  -     Hemoglobin A1c; Future    Bilateral hearing loss, unspecified hearing loss type  -     Ambulatory Referral to ENT    Continue monitoring blood pressure at home, low sodium diet.  Continue monitoring blood sugar at home,ADA diet.  Continue monitoring weight at home.  Lasix 20 mg daily to check improvement with shortness of breath.

## 2019-07-26 ENCOUNTER — TELEPHONE (OUTPATIENT)
Dept: FAMILY MEDICINE | Facility: CLINIC | Age: 84
End: 2019-07-26

## 2019-07-26 DIAGNOSIS — D63.8 CHRONIC DISEASE ANEMIA: ICD-10-CM

## 2019-07-26 DIAGNOSIS — D72.829 LEUKOCYTOSIS, UNSPECIFIED TYPE: Primary | ICD-10-CM

## 2019-07-26 NOTE — TELEPHONE ENCOUNTER
Patient with chronic anemia and leukocytosis.  I ordered a follow-up with hematologist .    Please notify the patient with appointment.

## 2019-08-16 ENCOUNTER — OFFICE VISIT (OUTPATIENT)
Dept: OPTOMETRY | Facility: CLINIC | Age: 84
End: 2019-08-16
Payer: MEDICARE

## 2019-08-16 DIAGNOSIS — H40.1121 PRIMARY OPEN ANGLE GLAUCOMA (POAG) OF LEFT EYE, MILD STAGE: ICD-10-CM

## 2019-08-16 DIAGNOSIS — E11.9 TYPE 2 DIABETES MELLITUS WITHOUT RETINOPATHY: Primary | ICD-10-CM

## 2019-08-16 DIAGNOSIS — Z96.1 PSEUDOPHAKIA OF BOTH EYES: ICD-10-CM

## 2019-08-16 DIAGNOSIS — H47.012 ISCHEMIC OPTIC NEUROPATHY OF LEFT EYE: ICD-10-CM

## 2019-08-16 DIAGNOSIS — H02.036: ICD-10-CM

## 2019-08-16 DIAGNOSIS — H52.7 REFRACTIVE ERROR: ICD-10-CM

## 2019-08-16 PROCEDURE — 92004 COMPRE OPH EXAM NEW PT 1/>: CPT | Mod: S$PBB,,, | Performed by: OPTOMETRIST

## 2019-08-16 PROCEDURE — 92015 PR REFRACTION: ICD-10-PCS | Mod: ,,, | Performed by: OPTOMETRIST

## 2019-08-16 PROCEDURE — 99999 PR PBB SHADOW E&M-EST. PATIENT-LVL II: ICD-10-PCS | Mod: PBBFAC,,, | Performed by: OPTOMETRIST

## 2019-08-16 PROCEDURE — 92004 PR EYE EXAM, NEW PATIENT,COMPREHESV: ICD-10-PCS | Mod: S$PBB,,, | Performed by: OPTOMETRIST

## 2019-08-16 PROCEDURE — 99212 OFFICE O/P EST SF 10 MIN: CPT | Mod: PBBFAC,PO | Performed by: OPTOMETRIST

## 2019-08-16 PROCEDURE — 99999 PR PBB SHADOW E&M-EST. PATIENT-LVL II: CPT | Mod: PBBFAC,,, | Performed by: OPTOMETRIST

## 2019-08-16 PROCEDURE — 92015 DETERMINE REFRACTIVE STATE: CPT | Mod: ,,, | Performed by: OPTOMETRIST

## 2019-08-16 RX ORDER — TIMOLOL MALEATE 5 MG/ML
1 SOLUTION/ DROPS OPHTHALMIC 2 TIMES DAILY
Qty: 15 ML | Refills: 3 | Status: SHIPPED | OUTPATIENT
Start: 2019-08-16 | End: 2021-08-03 | Stop reason: SDUPTHER

## 2019-08-16 NOTE — LETTER
August 16, 2019      Oniel Johnson MD  [address] [facility] [facility] [address]  [place] [address]           [facility]  [address] [facility] [address]  [place] [address]  Phone: [phone]  Fax: [phone]          Patient: Krystina Washington   MR Number: [id number]   YOB: 1932   Date of Visit: 8/16/2019       Dear Dr. Oniel Johnson:    Thank you for referring Krystina Washington to me for evaluation. Attached you will find relevant portions of my assessment and plan of care.    If you have questions, please do not hesitate to call me. I look forward to following Krystina Washington along with you.    Sincerely,    Kodak Herman, OD    Enclosure  CC:  No Recipients    If you would like to receive this communication electronically, please contact [email or web address] or [phone] to request more information on [employer] Link access.    For providers and/or their staff who would like to refer a patient to [facility], please contact us through our one-stop-shop provider referral line, [facility], at [phone].    If you feel you have received this communication in error or would no longer like to receive these types of communications, please e-mail [email or web address]

## 2019-08-16 NOTE — PROGRESS NOTES
HPI     ALVIN 10/2014  Diabetic BS usually 110-120.  Patient has been complaining   of FBS and redness OS and watering OU. Patient is here with grandson   today. Using Timoptic only once a day. Glasses about 5 or more yrs old.    Son noticed lid turns in towards eye      Hemoglobin A1C     Date                     Value               Ref Range           Status                  07/23/2019               6.8 (H)             4.0 - 5.6 %           Final           02/08/2019               6.9 (H)             4.0 - 5.6 %           Final           03/12/2018               6.1 (H)             4.0 - 5.6 %           Final        Last edited by Kodak Herman, OD on 8/16/2019 11:01 AM. (History)            Assessment /Plan     For exam results, see Encounter Report.    Type 2 diabetes mellitus without retinopathy    Senile entropion of left eye    Primary open angle glaucoma (POAG) of left eye, mild stage  -     Veliz Visual Field - OU - Intermediate - Both Eyes; Future  -     OCT - Optic Nerve; Future    Ischemic optic neuropathy of left eye    Pseudophakia of both eyes    Refractive error    Other orders  -     timolol maleate 0.5% (TIMOPTIC) 0.5 % Drop; Place 1 drop into both eyes 2 (two) times daily.  Dispense: 15 mL; Refill: 3      1. No diabetic retinopathy, no csme. Return in 1 year for dilated eye exam.  2. Refer to [facility] for eval. Ok to try art tears to help with hard blinking.   3. IOP slightly high normal, Cd ratio stable, cont Timolol but increase to 2x/day. Pachy thick, Mild right eye due to cupping and no vf defects. Moderate os due to Cupping and extensive inf VF loss. Today HVF normal OD and stable inf vf loss OS. RTC 1-2 mos IOP ck. With OCT of rnfl and HVf(24-2)  4. Monitor condition. Patient to report any changes. RTC 1 year recheck.  5. New Spec Rx given. Different lens options discussed with patient. RTC 1 year full exam.

## 2019-08-27 ENCOUNTER — PATIENT OUTREACH (OUTPATIENT)
Dept: ADMINISTRATIVE | Facility: OTHER | Age: 84
End: 2019-08-27

## 2019-08-29 RX ORDER — PRAVASTATIN SODIUM 40 MG/1
TABLET ORAL
Qty: 90 TABLET | Refills: 3 | Status: SHIPPED | OUTPATIENT
Start: 2019-08-29 | End: 2020-09-25

## 2019-08-29 RX ORDER — ISOSORBIDE MONONITRATE 60 MG/1
TABLET, EXTENDED RELEASE ORAL
Qty: 90 TABLET | Refills: 1 | Status: SHIPPED | OUTPATIENT
Start: 2019-08-29 | End: 2020-12-14 | Stop reason: SDUPTHER

## 2019-09-22 ENCOUNTER — PATIENT OUTREACH (OUTPATIENT)
Dept: ADMINISTRATIVE | Facility: OTHER | Age: 84
End: 2019-09-22

## 2019-10-07 ENCOUNTER — PES CALL (OUTPATIENT)
Dept: ADMINISTRATIVE | Facility: CLINIC | Age: 84
End: 2019-10-07

## 2019-10-11 RX ORDER — CLOPIDOGREL BISULFATE 75 MG/1
TABLET ORAL
Qty: 90 TABLET | Refills: 3 | Status: SHIPPED | OUTPATIENT
Start: 2019-10-11 | End: 2020-10-06 | Stop reason: SDUPTHER

## 2019-10-11 RX ORDER — ESCITALOPRAM OXALATE 20 MG/1
20 TABLET ORAL DAILY
Qty: 90 TABLET | Refills: 3 | Status: SHIPPED | OUTPATIENT
Start: 2019-10-11 | End: 2019-11-08 | Stop reason: SDUPTHER

## 2019-10-11 RX ORDER — CARVEDILOL 12.5 MG/1
12.5 TABLET ORAL 2 TIMES DAILY
Qty: 60 TABLET | Refills: 11 | Status: SHIPPED | OUTPATIENT
Start: 2019-10-11 | End: 2020-10-06 | Stop reason: SDUPTHER

## 2019-10-11 RX ORDER — ALLOPURINOL 100 MG/1
TABLET ORAL
Qty: 90 TABLET | Refills: 3 | Status: SHIPPED | OUTPATIENT
Start: 2019-10-11 | End: 2020-01-26

## 2019-10-28 RX ORDER — CLONIDINE 0.3 MG/24H
PATCH, EXTENDED RELEASE TRANSDERMAL
Qty: 4 PATCH | Refills: 2 | Status: SHIPPED | OUTPATIENT
Start: 2019-10-28 | End: 2021-02-22

## 2019-11-06 ENCOUNTER — PATIENT OUTREACH (OUTPATIENT)
Dept: ADMINISTRATIVE | Facility: OTHER | Age: 84
End: 2019-11-06

## 2019-11-07 ENCOUNTER — OFFICE VISIT (OUTPATIENT)
Dept: OPHTHALMOLOGY | Facility: CLINIC | Age: 84
End: 2019-11-07
Payer: MEDICARE

## 2019-11-07 DIAGNOSIS — H40.1121 PRIMARY OPEN ANGLE GLAUCOMA (POAG) OF LEFT EYE, MILD STAGE: ICD-10-CM

## 2019-11-07 DIAGNOSIS — H02.006 ENTROPION AND TRICHIASIS OF EYELID, LEFT: Primary | ICD-10-CM

## 2019-11-07 DIAGNOSIS — E11.9 TYPE 2 DIABETES MELLITUS WITHOUT RETINOPATHY: ICD-10-CM

## 2019-11-07 PROCEDURE — 92285 EXTERNAL OCULAR PHOTOGRAPHY: CPT | Mod: PBBFAC | Performed by: OPHTHALMOLOGY

## 2019-11-07 PROCEDURE — 92012 PR EYE EXAM, EST PATIENT,INTERMED: ICD-10-PCS | Mod: 25,S$PBB,, | Performed by: OPHTHALMOLOGY

## 2019-11-07 PROCEDURE — 92285 EXTERNAL PHOTOGRAPHY - OU - BOTH EYES: ICD-10-PCS | Mod: 26,S$PBB,, | Performed by: OPHTHALMOLOGY

## 2019-11-07 PROCEDURE — 92012 INTRM OPH EXAM EST PATIENT: CPT | Mod: 25,S$PBB,, | Performed by: OPHTHALMOLOGY

## 2019-11-07 PROCEDURE — 68840 EXPLORE/IRRIGATE TEAR DUCTS: CPT | Mod: S$PBB,LT,, | Performed by: OPHTHALMOLOGY

## 2019-11-07 PROCEDURE — 99212 OFFICE O/P EST SF 10 MIN: CPT | Mod: PBBFAC | Performed by: OPHTHALMOLOGY

## 2019-11-07 PROCEDURE — 68840 EXPLORE/IRRIGATE TEAR DUCTS: CPT | Mod: PBBFAC | Performed by: OPHTHALMOLOGY

## 2019-11-07 PROCEDURE — 68840 PR EXPLORE LACRIMAL CANALICULI: ICD-10-PCS | Mod: S$PBB,LT,, | Performed by: OPHTHALMOLOGY

## 2019-11-07 PROCEDURE — 99999 PR PBB SHADOW E&M-EST. PATIENT-LVL II: ICD-10-PCS | Mod: PBBFAC,,, | Performed by: OPHTHALMOLOGY

## 2019-11-07 PROCEDURE — 99999 PR PBB SHADOW E&M-EST. PATIENT-LVL II: CPT | Mod: PBBFAC,,, | Performed by: OPHTHALMOLOGY

## 2019-11-07 NOTE — LETTER
November 16, 2019      Kodak Herman, OD  [address]  [place] [address]           [facility]hthal[facility]  [address]  [place] [address]  Phone: [phone]  Fax: [phone]          Patient: Krystina Washington   MR Number: [id number]   YOB: 1932   Date of Visit: 11/7/2019       Dear Dr. Kodak Herman:    Thank you for referring Krystina Washington to me for evaluation. Attached you will find relevant portions of my assessment and plan of care.    If you have questions, please do not hesitate to call me. I look forward to following Krystina Washington along with you.    Sincerely,    Yulia Kincaid MD    Enclosure  CC:  No Recipients    If you would like to receive this communication electronically, please contact [email or web address] or [phone] to request more information on "[employer] Link access.    For providers and/or their staff who would like to refer a patient to [facility], please contact us through our one-stop-shop provider referral line, [facility], at [phone].    If you feel you have received this communication in error or would no longer like to receive these types of communications, please e-mail [email or web address]

## 2019-11-08 RX ORDER — ESCITALOPRAM OXALATE 20 MG/1
TABLET ORAL
Qty: 120 TABLET | Refills: 0 | Status: SHIPPED | OUTPATIENT
Start: 2019-11-08 | End: 2020-11-03

## 2019-11-16 NOTE — PROGRESS NOTES
HPI     Patient here for entropion Consult (SENILE ENTROPION OF THE LEFT EYE)  Referred by:     When did symptoms begin? 6 MONTHS AGO   Is entropion bothersome? YES  Patient complaint(s): IRRITATION AND DISCOMFORT THAT IS CO, DESCRIBED AS   CONSTANT WITH NO IMPROVEMENT. EXCESSIVE WATERING ALL PROBLEMS APPEAR TO   ONLY BE IN THE LEFT EYE     Eye meds: TIMOLOL QD OU     Ocular Surgery: PSEUDOPHAKIA OU     Ocular History: GLAUCOMA OU     Perform:  - [facility]  - Order externals OU        Last edited by Kleber Rodriguez on 11/7/2019  2:40 PM. (History)            Assessment /Plan     For exam results, see Encounter Report.    Entropion and trichiasis of eyelid, left  -     External/Slit Lamp Photography; Future  -     External Photography - OU - Both Eyes    Type 2 diabetes mellitus without retinopathy    Primary open angle glaucoma (POAG) of left eye, mild stage      The patient is a pleasant 87-year-old female here for evaluation of left sided chronic tearing and irritation.  She is accompanied by her grandson.    On exam the patient has a senile left lower eyelid entropion.  She has inferior exposure keratopathy consistent with chronic eyelash cornea touch.    Plan for left lower eyelid entropion repair under general anesthesia.    Informed consent obtained after extensive risks/benefits/alternatives were discussed with the patient including but not limited to pain, bleeding, infection, ocular injury, loss of the eye, asymmetry, need for revision in future, scarring.  Alternatives such as waiting were discussed.  All questions were answered.      Hold ASA, NSAIDS, and fish oil 5 to 7 days prior to procedure.    Return for surgery    Irrigation:  OS:patent lower punctum, canaliculus, and nld with 100% flow to the nose

## 2019-11-18 ENCOUNTER — TELEPHONE (OUTPATIENT)
Dept: OPHTHALMOLOGY | Facility: CLINIC | Age: 84
End: 2019-11-18

## 2019-11-18 DIAGNOSIS — H02.006 ENTROPION AND TRICHIASIS OF EYELID, LEFT: Primary | ICD-10-CM

## 2019-11-18 DIAGNOSIS — H40.1121 PRIMARY OPEN ANGLE GLAUCOMA (POAG) OF LEFT EYE, MILD STAGE: ICD-10-CM

## 2019-11-18 DIAGNOSIS — E11.9 TYPE 2 DIABETES MELLITUS WITHOUT RETINOPATHY: ICD-10-CM

## 2019-11-19 NOTE — ASSESSMENT & PLAN NOTE
- S/P CABG x 3. No further chest pain  - See plan above   Render Note In Bullet Format When Appropriate: No Consent: The patient's consent was obtained including but not limited to risks of crusting, scabbing, blistering, scarring, darker or lighter pigmentary change, recurrence, incomplete removal and infection. Detail Level: Detailed Duration Of Freeze Thaw-Cycle (Seconds): 0 Post-Care Instructions: I reviewed with the patient in detail post-care instructions. Patient is to wear sunprotection, and avoid picking at any of the treated lesions. Pt may apply Vaseline to crusted or scabbing areas.

## 2019-12-03 DIAGNOSIS — N18.4 CKD (CHRONIC KIDNEY DISEASE) STAGE 4, GFR 15-29 ML/MIN: ICD-10-CM

## 2019-12-03 DIAGNOSIS — R06.02 SOB (SHORTNESS OF BREATH): ICD-10-CM

## 2019-12-03 DIAGNOSIS — I50.32 CHRONIC DIASTOLIC CONGESTIVE HEART FAILURE: ICD-10-CM

## 2019-12-03 RX ORDER — FUROSEMIDE 20 MG/1
20 TABLET ORAL DAILY
Qty: 90 TABLET | Refills: 3 | Status: SHIPPED | OUTPATIENT
Start: 2019-12-03 | End: 2020-12-09 | Stop reason: SDUPTHER

## 2020-01-26 RX ORDER — ALLOPURINOL 100 MG/1
TABLET ORAL
Qty: 90 TABLET | Refills: 3 | Status: SHIPPED | OUTPATIENT
Start: 2020-01-26 | End: 2020-12-14 | Stop reason: SDUPTHER

## 2020-02-12 ENCOUNTER — TELEPHONE (OUTPATIENT)
Dept: FAMILY MEDICINE | Facility: CLINIC | Age: 85
End: 2020-02-12

## 2020-02-12 NOTE — TELEPHONE ENCOUNTER
----- Message from Fercho Peñaloza sent at 2/12/2020 12:17 PM CST -----  Contact: alex/Merry  [phone] or [phone]  Patient granddaughter is requesting for the patient to be seen this week for a medical clearance.   Please call back to assist at [phone] or [phone]

## 2020-02-18 ENCOUNTER — TELEPHONE (OUTPATIENT)
Dept: OPHTHALMOLOGY | Facility: CLINIC | Age: 85
End: 2020-02-18

## 2020-02-18 NOTE — PRE-PROCEDURE INSTRUCTIONS
PREOP INSTRUCTIONS:No solid food ,milk or milk products for 8 hours prior to procedure.Clear liquids are allowed up to 2 hours before procedure.Clear liquids are:water,apple juice,gatorade & powerade.Shower instructions as well as directions to the [facility] Center were given.Patient's grandson encouraged to have patient wear loose fitting,comfortable clothing.Medication instructions for pm prior to and am of procedure reviewed.Instructed patient's grandson to have patient avoid taking vitamins,supplements,aspirin and ibuprofen the morning of surgery.Patient's grandson also reported that the patient had continued to take Aspirin as well as Plavix through this morning.Patient's grandson informed that  would be notified as he typically requests all blood thinners like Aspirin and Plavix be held for 7 days before the surgery.(Melina in 's office notified)Patient's grandson stated an understanding.    Patient's grandson denies patient having any side effects or issues with anesthesia or sedation.

## 2020-02-22 ENCOUNTER — HOSPITAL ENCOUNTER (EMERGENCY)
Facility: HOSPITAL | Age: 85
Discharge: HOME | End: 2020-02-22
Attending: EMERGENCY MEDICINE
Payer: MEDICARE

## 2020-02-22 VITALS
SYSTOLIC BLOOD PRESSURE: 165 MMHG | HEART RATE: 62 BPM | TEMPERATURE: 99 F | BODY MASS INDEX: 34.01 KG/M2 | WEIGHT: 192 LBS | DIASTOLIC BLOOD PRESSURE: 85 MMHG | RESPIRATION RATE: 17 BRPM | OXYGEN SATURATION: 99 %

## 2020-02-22 DIAGNOSIS — R79.89 ELEVATED TROPONIN: Primary | ICD-10-CM

## 2020-02-22 DIAGNOSIS — R53.1 WEAKNESS: ICD-10-CM

## 2020-02-22 DIAGNOSIS — J06.9 VIRAL URI WITH COUGH: ICD-10-CM

## 2020-02-22 DIAGNOSIS — R06.02 SHORTNESS OF BREATH: ICD-10-CM

## 2020-02-22 LAB
ALBUMIN SERPL BCP-MCNC: 3 G/DL (ref 3.5–5.2)
ALP SERPL-CCNC: 84 U/L (ref 55–135)
ALT SERPL W/O P-5'-P-CCNC: 14 U/L (ref 10–44)
ANION GAP SERPL CALC-SCNC: 8 MMOL/L (ref 8–16)
AST SERPL-CCNC: 22 U/L (ref 10–40)
BASOPHILS # BLD AUTO: 0.04 K/UL (ref 0–0.2)
BASOPHILS NFR BLD: 0.4 % (ref 0–1.9)
BILIRUB SERPL-MCNC: 0.2 MG/DL (ref 0.1–1)
BNP SERPL-MCNC: 1193 PG/ML (ref 0–99)
BUN SERPL-MCNC: 44 MG/DL (ref 8–23)
CALCIUM SERPL-MCNC: 9 MG/DL (ref 8.7–10.5)
CHLORIDE SERPL-SCNC: 104 MMOL/L (ref 95–110)
CO2 SERPL-SCNC: 28 MMOL/L (ref 23–29)
CREAT SERPL-MCNC: 1.5 MG/DL (ref 0.5–1.4)
DIFFERENTIAL METHOD: ABNORMAL
EOSINOPHIL # BLD AUTO: 0.3 K/UL (ref 0–0.5)
EOSINOPHIL NFR BLD: 2.4 % (ref 0–8)
ERYTHROCYTE [DISTWIDTH] IN BLOOD BY AUTOMATED COUNT: 13.7 % (ref 11.5–14.5)
EST. GFR  (AFRICAN AMERICAN): 36 ML/MIN/1.73 M^2
EST. GFR  (NON AFRICAN AMERICAN): 31 ML/MIN/1.73 M^2
GLUCOSE SERPL-MCNC: 166 MG/DL (ref 70–110)
HCT VFR BLD AUTO: 33 % (ref 37–48.5)
HGB BLD-MCNC: 10.3 G/DL (ref 12–16)
IMM GRANULOCYTES # BLD AUTO: 0.06 K/UL (ref 0–0.04)
IMM GRANULOCYTES NFR BLD AUTO: 0.6 % (ref 0–0.5)
INFLUENZA A, MOLECULAR: NEGATIVE
INFLUENZA B, MOLECULAR: NEGATIVE
INR PPP: 1.1 (ref 0.8–1.2)
LYMPHOCYTES # BLD AUTO: 2.2 K/UL (ref 1–4.8)
LYMPHOCYTES NFR BLD: 20.6 % (ref 18–48)
MCH RBC QN AUTO: 31.5 PG (ref 27–31)
MCHC RBC AUTO-ENTMCNC: 31.2 G/DL (ref 32–36)
MCV RBC AUTO: 101 FL (ref 82–98)
MONOCYTES # BLD AUTO: 0.8 K/UL (ref 0.3–1)
MONOCYTES NFR BLD: 7.1 % (ref 4–15)
NEUTROPHILS # BLD AUTO: 7.4 K/UL (ref 1.8–7.7)
NEUTROPHILS NFR BLD: 68.9 % (ref 38–73)
NRBC BLD-RTO: 0 /100 WBC
PLATELET # BLD AUTO: 161 K/UL (ref 150–350)
PMV BLD AUTO: 12.4 FL (ref 9.2–12.9)
POTASSIUM SERPL-SCNC: 4.1 MMOL/L (ref 3.5–5.1)
PROT SERPL-MCNC: 7 G/DL (ref 6–8.4)
PROTHROMBIN TIME: 11.3 SEC (ref 9–12.5)
RBC # BLD AUTO: 3.27 M/UL (ref 4–5.4)
SODIUM SERPL-SCNC: 140 MMOL/L (ref 136–145)
SPECIMEN SOURCE: NORMAL
TROPONIN I SERPL DL<=0.01 NG/ML-MCNC: 0.05 NG/ML (ref 0–0.03)
WBC # BLD AUTO: 10.78 K/UL (ref 3.9–12.7)

## 2020-02-22 PROCEDURE — 87502 INFLUENZA DNA AMP PROBE: CPT

## 2020-02-22 PROCEDURE — 83880 ASSAY OF NATRIURETIC PEPTIDE: CPT

## 2020-02-22 PROCEDURE — 85025 COMPLETE CBC W/AUTO DIFF WBC: CPT

## 2020-02-22 PROCEDURE — 93005 ELECTROCARDIOGRAM TRACING: CPT

## 2020-02-22 PROCEDURE — 99285 EMERGENCY DEPT VISIT HI MDM: CPT | Mod: 25

## 2020-02-22 PROCEDURE — 85610 PROTHROMBIN TIME: CPT

## 2020-02-22 PROCEDURE — 84484 ASSAY OF TROPONIN QUANT: CPT

## 2020-02-22 PROCEDURE — 80053 COMPREHEN METABOLIC PANEL: CPT

## 2020-02-22 RX ORDER — AMOXICILLIN 500 MG/1
500 CAPSULE ORAL 3 TIMES DAILY
Qty: 21 CAPSULE | Refills: 0 | Status: SHIPPED | OUTPATIENT
Start: 2020-02-22 | End: 2020-02-29

## 2020-02-22 RX ORDER — BENZONATATE 100 MG/1
100 CAPSULE ORAL 3 TIMES DAILY PRN
Qty: 20 CAPSULE | Refills: 0 | Status: SHIPPED | OUTPATIENT
Start: 2020-02-22 | End: 2020-03-03

## 2020-02-22 NOTE — ED PROVIDER NOTES
Encounter Date: 2/22/2020    SCRIBE #1 NOTE: I, Moira Johnson, am scribing for, and in the presence of,  Dr. Anders. I have scribed the entire note.       History     Chief Complaint   Patient presents with    Cough     cough sore throat and congestion x 3 days     Krystina Washington is a 87 y.o. Female with h/o HTN, HLD, DM, CAD, CABG (x10 years ago), NSTEMI, and CHF who, according to her son, presents to the ED with complaint of URI symptoms including cough for the past few days.  PCP is Dr Bauer. No chest pain or dyspnea. Patient does not have a fever.    The history is provided by a relative.     Review of patient's allergies indicates:   Allergen Reactions    Indocin [indomethacin sodium] Other (See Comments)     Either caused hot,burning body or caused madness per patient's grandson    Lotensin [benazepril] Other (See Comments)     Either caused hot ,burning body or caused madness per patient's grandson     Past Medical History:   Diagnosis Date    Arthritis     CHF (congestive heart failure)     Coronary artery disease     Diabetes mellitus     Glaucoma     Gout, joint     Hx of CABG 9/21/10    Hyperlipidemia     Hypertension     NSTEMI (non-ST elevated myocardial infarction) 09/21/10    Stenosis of aortic and mitral valves     Systolic heart failure 6/19/2015     Past Surgical History:   Procedure Laterality Date    CARDIAC VALVE SURGERY      CATARACT EXTRACTION      CORONARY ARTERY BYPASS GRAFT  9/21/2010    JOINT REPLACEMENT       Family History   Problem Relation Age of Onset    Diabetes Mother     Hypertension Father     Diabetes Father     Glaucoma Father     No Known Problems Sister     No Known Problems Brother     No Known Problems Maternal Aunt     No Known Problems Maternal Uncle     No Known Problems Paternal Aunt     No Known Problems Paternal Uncle     No Known Problems Maternal Grandmother     No Known Problems Maternal Grandfather     No Known Problems Paternal  Grandmother     No Known Problems Paternal Grandfather     Amblyopia Neg Hx     Blindness Neg Hx     Cancer Neg Hx     Cataracts Neg Hx     Macular degeneration Neg Hx     Retinal detachment Neg Hx     Strabismus Neg Hx     Stroke Neg Hx     Thyroid disease Neg Hx      Social History     Tobacco Use    Smoking status: Never Smoker    Smokeless tobacco: Never Used   Substance Use Topics    Alcohol use: No    Drug use: No     Review of Systems   Constitutional: Negative for fever.   HENT: Positive for congestion and sore throat.    Respiratory: Positive for cough.    Cardiovascular: Negative for chest pain.   Gastrointestinal: Negative for nausea.   Genitourinary: Negative for dysuria.   Musculoskeletal: Negative for back pain.   Skin: Negative for rash.   Neurological: Negative for weakness.   Hematological: Does not bruise/bleed easily.   All other systems reviewed and are negative.      Physical Exam     Initial Vitals [02/22/20 1340]   BP Pulse Resp Temp SpO2   130/74 67 20 98.5 °F (36.9 °C) 100 %      MAP       --         Physical Exam    Nursing note and vitals reviewed.  Constitutional: She appears well-developed and well-nourished. No distress.   HENT:   Head: Normocephalic and atraumatic.   Mouth/Throat: Oropharynx is clear and moist.   Eyes: Conjunctivae and EOM are normal.   Neck: Normal range of motion. Neck supple.   Cardiovascular: Normal rate, regular rhythm, normal heart sounds and intact distal pulses. Exam reveals no gallop and no friction rub.    No murmur heard.  Pulses:       Posterior tibial pulses are 1+ on the right side, and 1+ on the left side.   Pulmonary/Chest: Breath sounds normal. No respiratory distress. She has no wheezes. She has no rhonchi. She has no rales.   Abdominal: Soft. There is no tenderness.   Musculoskeletal: Normal range of motion. She exhibits no edema.   Neurological: She is alert and oriented to person, place, and time. GCS score is 15. GCS eye subscore is  4. GCS verbal subscore is 5. GCS motor subscore is 6.   Skin: Skin is warm and dry. No rash noted.   Psychiatric: She has a normal mood and affect. Her behavior is normal.         ED Course   Procedures  Labs Reviewed   CBC W/ AUTO DIFFERENTIAL - Abnormal; Notable for the following components:       Result Value    RBC 3.27 (*)     Hemoglobin 10.3 (*)     Hematocrit 33.0 (*)     Mean Corpuscular Volume 101 (*)     Mean Corpuscular Hemoglobin 31.5 (*)     Mean Corpuscular Hemoglobin Conc 31.2 (*)     Immature Granulocytes 0.6 (*)     Immature Grans (Abs) 0.06 (*)     All other components within normal limits   COMPREHENSIVE METABOLIC PANEL - Abnormal; Notable for the following components:    Glucose 166 (*)     BUN, Bld 44 (*)     Creatinine 1.5 (*)     Albumin 3.0 (*)     eGFR if  36 (*)     eGFR if non  31 (*)     All other components within normal limits   TROPONIN I - Abnormal; Notable for the following components:    Troponin I 0.053 (*)     All other components within normal limits   B-TYPE NATRIURETIC PEPTIDE - Abnormal; Notable for the following components:    BNP 1,193 (*)     All other components within normal limits   INFLUENZA A & B BY MOLECULAR   PROTIME-INR     EKG Readings: (Independently Interpreted)   Heart Rate: 61.     Inferal Lateral   Similar to previous EKG       Imaging Results          X-Ray Chest AP Portable (Final result)  Result time 02/22/20 15:14:31    Final result by Amarjit Schulz MD (02/22/20 15:14:31)                 Impression:      Cardiomegaly without evidence of pulmonary edema.      Electronically signed by: Amarjit Schulz MD  Date:    02/22/2020  Time:    15:14             Narrative:    EXAMINATION:  XR CHEST AP PORTABLE    CLINICAL HISTORY:  CHF;    TECHNIQUE:  Single frontal view of the chest was performed.    COMPARISON:  07/23/2019.    FINDINGS:  Monitoring EKG leads are present.  There are postoperative changes of median sternotomy.  There  also changes of aortic valve replacement.  The trachea is unremarkable.  The cardiomediastinal silhouette is enlarged.  The hemidiaphragms are unremarkable.  No significant pleural effusion is identified.  There is no evidence of a pneumothorax.  There is no evidence of pneumomediastinum.  No airspace opacity is present.  The pulmonary vascularity is within normal limits.  There are degenerative changes in the osseous structures.                                 Medical Decision Making:   Independently Interpreted Test(s):   I have ordered and independently interpreted EKG Reading(s) - see prior notes  Clinical Tests:   Lab Tests: Ordered and Reviewed  Radiological Study: Ordered and Reviewed  Medical Tests: Ordered and Reviewed  ED Management:  Pt is doing well and non toxic in appearance. Pt came into ED for Antitussive prescription, pt denies CP or SOB, review of records show pt has chronic mild troponin elevation. EKG has improved in comparison to previous EKG in February 2019 in terms of the ST depression. Son now requesting to take pt home. Pt exhibits mild renal insuffiencey but improved from July 2019. Mild troponin elevation likely secondary to renal insuffiencey. GFR 36. Pt is already on dual antiplatelet therapy and lasix.          I, Dr. Brady Anders, personally performed the services described in this documentation. All medical record entries made by the scribe were at my direction and in my presence.  I have reviewed the chart and agree that the record reflects my personal performance and is accurate and complete                      Clinical Impression:             2. Renal insufficiency   1. Viral URI with cough                                Brady Anders MD  02/23/20 1007

## 2020-02-22 NOTE — ED NOTES
87 year old female presents to ed cc of productive cough congestion and sore throat x 3 days. Patient denies cp/sob

## 2020-03-05 ENCOUNTER — TELEPHONE (OUTPATIENT)
Dept: OPHTHALMOLOGY | Facility: CLINIC | Age: 85
End: 2020-03-05

## 2020-03-05 NOTE — PRE-PROCEDURE INSTRUCTIONS
Re reviewed pre op instructions as well as medication instructions with patient's Grandson Hermelindo Cross.Patient's Grandson stated that patient had been compliant with holding Aspirin and Plavix for 7 days preoperatively.

## 2020-03-06 ENCOUNTER — ANESTHESIA (OUTPATIENT)
Dept: SURGERY | Facility: HOSPITAL | Age: 85
End: 2020-03-06
Payer: MEDICARE

## 2020-03-06 ENCOUNTER — ANESTHESIA EVENT (OUTPATIENT)
Dept: SURGERY | Facility: HOSPITAL | Age: 85
End: 2020-03-06
Payer: MEDICARE

## 2020-03-06 ENCOUNTER — HOSPITAL ENCOUNTER (OUTPATIENT)
Facility: HOSPITAL | Age: 85
Discharge: HOME | End: 2020-03-06
Attending: OPHTHALMOLOGY | Admitting: OPHTHALMOLOGY
Payer: MEDICARE

## 2020-03-06 VITALS
HEIGHT: 63 IN | RESPIRATION RATE: 19 BRPM | SYSTOLIC BLOOD PRESSURE: 173 MMHG | TEMPERATURE: 98 F | DIASTOLIC BLOOD PRESSURE: 76 MMHG | WEIGHT: 183 LBS | HEART RATE: 68 BPM | BODY MASS INDEX: 32.43 KG/M2 | OXYGEN SATURATION: 94 %

## 2020-03-06 DIAGNOSIS — H02.006 ENTROPION, LEFT: Primary | ICD-10-CM

## 2020-03-06 LAB — POCT GLUCOSE: 152 MG/DL (ref 70–110)

## 2020-03-06 PROCEDURE — 37000009 HC ANESTHESIA EA ADD 15 MINS: Performed by: OPHTHALMOLOGY

## 2020-03-06 PROCEDURE — 25000003 PHARM REV CODE 250

## 2020-03-06 PROCEDURE — 63600175 PHARM REV CODE 636 W HCPCS: Performed by: NURSE ANESTHETIST, CERTIFIED REGISTERED

## 2020-03-06 PROCEDURE — 71000015 HC POSTOP RECOV 1ST HR: Performed by: OPHTHALMOLOGY

## 2020-03-06 PROCEDURE — 25000242 PHARM REV CODE 250 ALT 637 W/ HCPCS: Performed by: NURSE ANESTHETIST, CERTIFIED REGISTERED

## 2020-03-06 PROCEDURE — 25000003 PHARM REV CODE 250: Performed by: NURSE ANESTHETIST, CERTIFIED REGISTERED

## 2020-03-06 PROCEDURE — D9220A PRA ANESTHESIA: Mod: CRNA,,, | Performed by: NURSE ANESTHETIST, CERTIFIED REGISTERED

## 2020-03-06 PROCEDURE — D9220A PRA ANESTHESIA: ICD-10-PCS | Mod: CRNA,,, | Performed by: NURSE ANESTHETIST, CERTIFIED REGISTERED

## 2020-03-06 PROCEDURE — 36000707: Performed by: OPHTHALMOLOGY

## 2020-03-06 PROCEDURE — 82962 GLUCOSE BLOOD TEST: CPT | Mod: 91 | Performed by: OPHTHALMOLOGY

## 2020-03-06 PROCEDURE — 37000008 HC ANESTHESIA 1ST 15 MINUTES: Performed by: OPHTHALMOLOGY

## 2020-03-06 PROCEDURE — 25000003 PHARM REV CODE 250: Performed by: OPHTHALMOLOGY

## 2020-03-06 PROCEDURE — D9220A PRA ANESTHESIA: ICD-10-PCS | Mod: ANES,,, | Performed by: ANESTHESIOLOGY

## 2020-03-06 PROCEDURE — 67924 REPAIR EYELID DEFECT: CPT | Mod: E2,,, | Performed by: OPHTHALMOLOGY

## 2020-03-06 PROCEDURE — 36000706: Performed by: OPHTHALMOLOGY

## 2020-03-06 PROCEDURE — 67924 PR FIX ENTROPION,EXTENSV LID REPAIR: ICD-10-PCS | Mod: E2,,, | Performed by: OPHTHALMOLOGY

## 2020-03-06 PROCEDURE — 63600175 PHARM REV CODE 636 W HCPCS: Performed by: OPHTHALMOLOGY

## 2020-03-06 PROCEDURE — 25000003 PHARM REV CODE 250: Performed by: STUDENT IN AN ORGANIZED HEALTH CARE EDUCATION/TRAINING PROGRAM

## 2020-03-06 PROCEDURE — 25000003 PHARM REV CODE 250: Performed by: ANESTHESIOLOGY

## 2020-03-06 PROCEDURE — S0020 INJECTION, BUPIVICAINE HYDRO: HCPCS | Performed by: OPHTHALMOLOGY

## 2020-03-06 PROCEDURE — 82962 GLUCOSE BLOOD TEST: CPT | Performed by: OPHTHALMOLOGY

## 2020-03-06 PROCEDURE — D9220A PRA ANESTHESIA: Mod: ANES,,, | Performed by: ANESTHESIOLOGY

## 2020-03-06 RX ORDER — PROPOFOL 10 MG/ML
INJECTION, EMULSION INTRAVENOUS
Status: DISCONTINUED | OUTPATIENT
Start: 2020-03-06 | End: 2020-03-06

## 2020-03-06 RX ORDER — HYDROCODONE BITARTRATE AND ACETAMINOPHEN 5; 325 MG/1; MG/1
1 TABLET ORAL EVERY 6 HOURS PRN
Qty: 16 TABLET | Refills: 0 | Status: SHIPPED | OUTPATIENT
Start: 2020-03-06 | End: 2020-10-06

## 2020-03-06 RX ORDER — LIDOCAINE HYDROCHLORIDE 10 MG/ML
1 INJECTION, SOLUTION EPIDURAL; INFILTRATION; INTRACAUDAL; PERINEURAL ONCE
Status: COMPLETED | OUTPATIENT
Start: 2020-03-06 | End: 2020-03-06

## 2020-03-06 RX ORDER — ONDANSETRON 2 MG/ML
4 INJECTION INTRAMUSCULAR; INTRAVENOUS DAILY PRN
Status: DISCONTINUED | OUTPATIENT
Start: 2020-03-06 | End: 2020-03-06 | Stop reason: HOSPADM

## 2020-03-06 RX ORDER — DEXAMETHASONE SODIUM PHOSPHATE 4 MG/ML
INJECTION, SOLUTION INTRA-ARTICULAR; INTRALESIONAL; INTRAMUSCULAR; INTRAVENOUS; SOFT TISSUE
Status: DISCONTINUED | OUTPATIENT
Start: 2020-03-06 | End: 2020-03-06

## 2020-03-06 RX ORDER — SODIUM CHLORIDE 9 MG/ML
INJECTION, SOLUTION INTRAVENOUS CONTINUOUS
Status: DISCONTINUED | OUTPATIENT
Start: 2020-03-06 | End: 2020-03-06 | Stop reason: HOSPADM

## 2020-03-06 RX ORDER — ERYTHROMYCIN 5 MG/G
OINTMENT OPHTHALMIC
Status: DISCONTINUED
Start: 2020-03-06 | End: 2020-03-06 | Stop reason: HOSPADM

## 2020-03-06 RX ORDER — NEOSTIGMINE METHYLSULFATE 0.5 MG/ML
INJECTION, SOLUTION INTRAVENOUS
Status: DISCONTINUED | OUTPATIENT
Start: 2020-03-06 | End: 2020-03-06

## 2020-03-06 RX ORDER — ALBUTEROL SULFATE 90 UG/1
AEROSOL, METERED RESPIRATORY (INHALATION)
Status: DISCONTINUED | OUTPATIENT
Start: 2020-03-06 | End: 2020-03-06

## 2020-03-06 RX ORDER — ERYTHROMYCIN 5 MG/G
OINTMENT OPHTHALMIC
Status: DISCONTINUED | OUTPATIENT
Start: 2020-03-06 | End: 2020-03-06 | Stop reason: HOSPADM

## 2020-03-06 RX ORDER — FENTANYL CITRATE 50 UG/ML
25 INJECTION, SOLUTION INTRAMUSCULAR; INTRAVENOUS EVERY 5 MIN PRN
Status: DISCONTINUED | OUTPATIENT
Start: 2020-03-06 | End: 2020-03-06 | Stop reason: HOSPADM

## 2020-03-06 RX ORDER — CARVEDILOL 12.5 MG/1
12.5 TABLET ORAL ONCE
Status: COMPLETED | OUTPATIENT
Start: 2020-03-06 | End: 2020-03-06

## 2020-03-06 RX ORDER — TOBRAMYCIN AND DEXAMETHASONE 3; 1 MG/ML; MG/ML
SUSPENSION/ DROPS OPHTHALMIC
Status: DISCONTINUED | OUTPATIENT
Start: 2020-03-06 | End: 2020-03-06 | Stop reason: HOSPADM

## 2020-03-06 RX ORDER — BUPIVACAINE HYDROCHLORIDE 5 MG/ML
INJECTION, SOLUTION EPIDURAL; INTRACAUDAL
Status: DISCONTINUED
Start: 2020-03-06 | End: 2020-03-06 | Stop reason: HOSPADM

## 2020-03-06 RX ORDER — FENTANYL CITRATE 50 UG/ML
INJECTION, SOLUTION INTRAMUSCULAR; INTRAVENOUS
Status: DISCONTINUED | OUTPATIENT
Start: 2020-03-06 | End: 2020-03-06

## 2020-03-06 RX ORDER — HYDROCODONE BITARTRATE AND ACETAMINOPHEN 5; 325 MG/1; MG/1
1 TABLET ORAL ONCE AS NEEDED
Status: COMPLETED | OUTPATIENT
Start: 2020-03-06 | End: 2020-03-06

## 2020-03-06 RX ORDER — ONDANSETRON 2 MG/ML
INJECTION INTRAMUSCULAR; INTRAVENOUS
Status: DISCONTINUED | OUTPATIENT
Start: 2020-03-06 | End: 2020-03-06

## 2020-03-06 RX ORDER — LIDOCAINE HCL/PF 100 MG/5ML
SYRINGE (ML) INTRAVENOUS
Status: DISCONTINUED | OUTPATIENT
Start: 2020-03-06 | End: 2020-03-06

## 2020-03-06 RX ORDER — LIDOCAINE HCL/EPINEPHRINE/PF 2%-1:200K
VIAL (ML) INJECTION
Status: DISCONTINUED
Start: 2020-03-06 | End: 2020-03-06 | Stop reason: HOSPADM

## 2020-03-06 RX ORDER — ROCURONIUM BROMIDE 10 MG/ML
INJECTION, SOLUTION INTRAVENOUS
Status: DISCONTINUED | OUTPATIENT
Start: 2020-03-06 | End: 2020-03-06

## 2020-03-06 RX ORDER — TETRACAINE HYDROCHLORIDE 5 MG/ML
SOLUTION OPHTHALMIC
Status: DISCONTINUED
Start: 2020-03-06 | End: 2020-03-06 | Stop reason: HOSPADM

## 2020-03-06 RX ORDER — ERYTHROMYCIN 5 MG/G
OINTMENT OPHTHALMIC NIGHTLY
Qty: 1 TUBE | Refills: 2 | Status: ON HOLD | OUTPATIENT
Start: 2020-03-06 | End: 2021-01-07

## 2020-03-06 RX ORDER — BUPIVACAINE HYDROCHLORIDE 5 MG/ML
INJECTION, SOLUTION EPIDURAL; INTRACAUDAL
Status: DISCONTINUED | OUTPATIENT
Start: 2020-03-06 | End: 2020-03-06 | Stop reason: HOSPADM

## 2020-03-06 RX ORDER — LIDOCAINE HCL/EPINEPHRINE/PF 2%-1:200K
VIAL (ML) INJECTION
Status: DISCONTINUED | OUTPATIENT
Start: 2020-03-06 | End: 2020-03-06 | Stop reason: HOSPADM

## 2020-03-06 RX ORDER — HYDRALAZINE HYDROCHLORIDE 50 MG/1
100 TABLET, FILM COATED ORAL ONCE
Status: COMPLETED | OUTPATIENT
Start: 2020-03-06 | End: 2020-03-06

## 2020-03-06 RX ORDER — TETRACAINE HYDROCHLORIDE 5 MG/ML
1 SOLUTION OPHTHALMIC ONCE
Status: DISCONTINUED | OUTPATIENT
Start: 2020-03-06 | End: 2020-03-06 | Stop reason: HOSPADM

## 2020-03-06 RX ORDER — GLYCOPYRROLATE 0.2 MG/ML
INJECTION INTRAMUSCULAR; INTRAVENOUS
Status: DISCONTINUED | OUTPATIENT
Start: 2020-03-06 | End: 2020-03-06

## 2020-03-06 RX ORDER — PHENYLEPHRINE HYDROCHLORIDE 10 MG/ML
INJECTION INTRAVENOUS
Status: DISCONTINUED | OUTPATIENT
Start: 2020-03-06 | End: 2020-03-06

## 2020-03-06 RX ORDER — VASOPRESSIN 20 [USP'U]/ML
INJECTION, SOLUTION INTRAMUSCULAR; SUBCUTANEOUS
Status: DISCONTINUED | OUTPATIENT
Start: 2020-03-06 | End: 2020-03-06

## 2020-03-06 RX ADMIN — HYDROCODONE BITARTRATE AND ACETAMINOPHEN 1 TABLET: 5; 325 TABLET ORAL at 05:03

## 2020-03-06 RX ADMIN — CARVEDILOL 12.5 MG: 12.5 TABLET, FILM COATED ORAL at 05:03

## 2020-03-06 RX ADMIN — SODIUM CHLORIDE: 0.9 INJECTION, SOLUTION INTRAVENOUS at 10:03

## 2020-03-06 RX ADMIN — ROCURONIUM BROMIDE 30 MG: 10 INJECTION, SOLUTION INTRAVENOUS at 01:03

## 2020-03-06 RX ADMIN — GLYCOPYRROLATE 0.4 MG: 0.2 INJECTION INTRAMUSCULAR; INTRAVENOUS at 03:03

## 2020-03-06 RX ADMIN — VASOPRESSIN 1 UNITS: 20 INJECTION, SOLUTION INTRAMUSCULAR; SUBCUTANEOUS at 01:03

## 2020-03-06 RX ADMIN — PHENYLEPHRINE HYDROCHLORIDE 200 MCG: 10 INJECTION INTRAVENOUS at 01:03

## 2020-03-06 RX ADMIN — SODIUM CHLORIDE, SODIUM GLUCONATE, SODIUM ACETATE, POTASSIUM CHLORIDE, MAGNESIUM CHLORIDE, SODIUM PHOSPHATE, DIBASIC, AND POTASSIUM PHOSPHATE: .53; .5; .37; .037; .03; .012; .00082 INJECTION, SOLUTION INTRAVENOUS at 01:03

## 2020-03-06 RX ADMIN — FENTANYL CITRATE 25 MCG: 50 INJECTION, SOLUTION INTRAMUSCULAR; INTRAVENOUS at 01:03

## 2020-03-06 RX ADMIN — HYDRALAZINE HYDROCHLORIDE 100 MG: 50 TABLET ORAL at 05:03

## 2020-03-06 RX ADMIN — NEOSTIGMINE METHYLSULFATE 2 MG: 0.5 INJECTION INTRAVENOUS at 03:03

## 2020-03-06 RX ADMIN — ALBUTEROL SULFATE 4 PUFF: 90 AEROSOL, METERED RESPIRATORY (INHALATION) at 03:03

## 2020-03-06 RX ADMIN — LIDOCAINE HYDROCHLORIDE 10 MG: 10 INJECTION, SOLUTION EPIDURAL; INFILTRATION; INTRACAUDAL; PERINEURAL at 10:03

## 2020-03-06 RX ADMIN — PHENYLEPHRINE HYDROCHLORIDE 300 MCG: 10 INJECTION INTRAVENOUS at 01:03

## 2020-03-06 RX ADMIN — LIDOCAINE HYDROCHLORIDE 100 MG: 20 INJECTION, SOLUTION INTRAVENOUS at 01:03

## 2020-03-06 RX ADMIN — PHENYLEPHRINE HYDROCHLORIDE 0.6 MCG/KG/MIN: 10 INJECTION INTRAVENOUS at 01:03

## 2020-03-06 RX ADMIN — SODIUM CHLORIDE 2 G: 9 INJECTION, SOLUTION INTRAVENOUS at 01:03

## 2020-03-06 RX ADMIN — DEXAMETHASONE SODIUM PHOSPHATE 4 MG: 4 INJECTION, SOLUTION INTRAMUSCULAR; INTRAVENOUS at 01:03

## 2020-03-06 RX ADMIN — FENTANYL CITRATE 75 MCG: 50 INJECTION, SOLUTION INTRAMUSCULAR; INTRAVENOUS at 01:03

## 2020-03-06 RX ADMIN — PROPOFOL 70 MG: 10 INJECTION, EMULSION INTRAVENOUS at 01:03

## 2020-03-06 RX ADMIN — ROCURONIUM BROMIDE 10 MG: 10 INJECTION, SOLUTION INTRAVENOUS at 01:03

## 2020-03-06 RX ADMIN — ONDANSETRON 4 MG: 2 INJECTION INTRAMUSCULAR; INTRAVENOUS at 01:03

## 2020-03-06 NOTE — ANESTHESIA PROCEDURE NOTES
Intubation  Performed by: Dafne Hernandez CRNA  Authorized by: Mayte Hassan MD     Intubation:     Induction:  Intravenous    Intubated:  Postinduction    Mask Ventilation:  Easy mask    Attempts:  1    Attempted By:  CRNA    Method of Intubation:  Direct    Blade:  Moffett 2    Laryngeal View Grade: Grade I - full view of chords      Difficult Airway Encountered?: No      Complications:  None    Airway Device:  Oral endotracheal tube    Airway Device Size:  7.5    Style/Cuff Inflation:  Cuffed (inflated to minimal occlusive pressure)    Inflation Amount (mL):  5    Tube secured:  20    Secured at:  The lips    Placement Verified By:  Capnometry    Complicating Factors:  None    Findings Post-Intubation:  BS equal bilateral and atraumatic/condition of teeth unchanged

## 2020-03-06 NOTE — TRANSFER OF CARE
"Anesthesia Transfer of Care Note    Patient: Krystina Washington    Procedure(s) Performed: Procedure(s) (LRB):  REPAIR, ENTROPION (Left)    Patient location: [facility]    Transport from OR: Transported from OR on 6-10 L/min O2 by face mask with adequate spontaneous ventilation    Post pain: adequate analgesia    Post assessment: no apparent anesthetic complications and tolerated procedure well    Post vital signs: stable    Level of consciousness: alert and awake    Nausea/Vomiting: no nausea/vomiting    Complications: none    Transfer of care protocol was followed      Last vitals:   Visit Vitals  BP (!) 145/70 (BP Location: Left arm, Patient Position: Lying)   Pulse 62   Temp 36.9 °C (98.4 °F) (Oral)   Resp 16   Ht 5' 3" (1.6 m)   Wt 83 kg (183 lb)   LMP  (LMP Unknown)   SpO2 99%   Breastfeeding? No   BMI 32.42 kg/m²     "

## 2020-03-06 NOTE — OP NOTE
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  Patient understands the risk of pain, bleeding, infection, ocular injury, loss of the eye, asymmetry, need for revision in future, scarring.      Procedure in detail:   Informed consent was obtained prior to surgery. The patient was brought to the operating room. After adequate anesthesia was achieved, subcutaneous lidocaine with epinephrine with 0.5% marcaine, and vitrase was injected into the left lower eyelid and left lateral canthus. The face was prepped and draped in sterile fashion using topical Betadine.     A lateral canthal incision was made using a #15 blade. Straight Patel scissors were used to perform a canthotomy and monopolar cautery was used to incise the inferior magda of the lateral cathal tendon.  Hemostasis was maintained.     A 4-0 silk traction suture was placed in the lower lid margin. Desmarres retractors were used to jazmyne the lower eyelid. The conjunctiva was marked using a sterile marking pen at the level of the inferior border of the tarsus. A coated Jaegar lid plate was used to protect the eye. Monopolar electrocautery was used to incise the conjunctiva and lid retractors. The pretarsal orbicularis muscle was identified and tented using Bishops forceps. Monopolar electrocautery was used to excise a strip of preseptal orbicularis muscle. Two interrupted 6-0 Vicryl sutures in buried fashion were passed from the edge of the conjunctiva and retractors to the anterior inferior edge of the tarsus.      The lower eyelid was draped over the  zygoma.  The excess eyelid was excised full-thickness with Mame scissors.  Hemostasis was obtained.  One 5-0 Vicryl on P-2 needle was passed through the tarsus backhanded anterior inferiorly exiting posterior superiorly within the wound and then passed back through the periosteum approximately 3 mm from the orbital rim at the level of the superior magda of the lateral canthal tendon and temporarily tied.  Once the proper contour and tightness was achieved, the suture was permanently tied in a horizontal mattress fashion after passing back through the remnants of the inferior magda.   Attention was turned to the upper lid, whose lateral mucocutaneous junction was trimmed using Mame scissors. The gray lines of the lateral upper and lower eyelids were reapposed using 6-0 Vicryl.     The lateral canthal incision was closed with a running 6-0 plain gut suture. Erythromycin ointment was applied to the eye and eyelid and tobradex drops were applied to the eye. The patient tolerated the procedure well without complications. She was awoken and extubated and taken to the recovery room in stable condition.

## 2020-03-06 NOTE — BRIEF OP NOTE
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  Patient understands the risk of pain, bleeding, infection, ocular injury, loss of the eye, asymmetry, need for revision in future, scarring.      Procedure in detail:   Informed consent was obtained prior to surgery. The patient was brought to the operating room. After adequate anesthesia was achieved, subcutaneous lidocaine with epinephrine with 0.5% marcaine, and vitrase was injected into the left lower eyelid and left lateral canthus. The face was prepped and draped in sterile fashion using topical Betadine.     A lateral canthal incision was made using a #15 blade. Straight Patel scissors were used to perform a canthotomy and monopolar cautery was used to incise the inferior magda of the lateral cathal tendon.  Hemostasis was maintained.     A 4-0 silk traction suture was placed in the lower lid margin. Desmarres retractors were used to jazmyne the lower eyelid. The conjunctiva was marked using a sterile marking pen at the level of the inferior border of the tarsus. A coated Jaegar lid plate was used to protect the eye. Monopolar electrocautery was used to incise the conjunctiva and lid retractors. The pretarsal orbicularis muscle was identified and tented using Bishops forceps. Monopolar electrocautery was used to excise a strip of preseptal orbicularis muscle. Two interrupted 6-0 Vicryl sutures in buried fashion were passed from the edge of the conjunctiva and retractors to the anterior inferior edge of the tarsus.      The lower eyelid was draped over the  zygoma.  The excess eyelid was excised full-thickness with Mame scissors.  Hemostasis was obtained.  One 5-0 Vicryl on P-2 needle was passed through the tarsus backhanded anterior inferiorly exiting posterior superiorly within the wound and then passed back through the periosteum approximately 3 mm from the orbital rim at the level of the superior magda of the lateral canthal tendon and temporarily tied.  Once the proper contour and tightness was achieved, the suture was permanently tied in a horizontal mattress fashion after passing back through the remnants of the inferior magda.   Attention was turned to the upper lid, whose lateral mucocutaneous junction was trimmed using Amme scissors. The gray lines of the lateral upper and lower eyelids were reapposed using 6-0 Vicryl.     The lateral canthal incision was closed with a running 6-0 plain gut suture. Erythromycin ointment was applied to the eye and eyelid and tobradex drops were applied to the eye. The patient tolerated the procedure well without complications. She was awoken and extubated and taken to the recovery room in stable condition.

## 2020-03-06 NOTE — DISCHARGE SUMMARY
Discharge Summary  Ophthalmology Service    Admit Date: 3/6/2020     Discharge Date: 3/6/2020     Attending Physician: Yulia Kincaid MD     Discharge Physician: Maryan Morris MD    Discharged Condition: Good    Reason for Admission: Entropion and trichiasis of eyelid, left [H02.006]  Type 2 diabetes mellitus without retinopathy [E11.9]  Primary open angle glaucoma (POAG) of left eye, mild stage [H40.1121]  Entropion, left [H02.006]     Treatments/Procedures: Procedure(s) (LRB):  REPAIR, ENTROPION (Left) (see dictated report for details).    Hospital Course: Stable    Consults: None    Significant Diagnostic Studies: None    Disposition: Home    Patient Instructions:   - Resume same diet as prior to surgery  - Limit activity, no heavy lifting  - Call MD for severe uncontrolled pain  - Follow-up with ophthalmology as instructed    Patient Instructions:   Current Discharge Medication List      CONTINUE these medications which have NOT CHANGED    Details   acetaminophen (TYLENOL) 325 MG tablet Take 650 mg by mouth every 6 (six) hours as needed for Pain.      allopurinol (ZYLOPRIM) 100 MG tablet TAKE 1 TABLET BY MOUTH ONCE DAILY  Qty: 90 tablet, Refills: 3      aspirin 81 MG chewable tablet Take 81 mg by mouth. 1 Tablet, Chewable Oral Every day      carvedilol (COREG) 12.5 MG tablet TAKE 1 TABLET (12.5 MG TOTAL) BY MOUTH 2 (TWO) TIMES DAILY.  Qty: 60 tablet, Refills: 11      cloNIDine 0.3 mg/24 hr td ptwk (CATAPRES) 0.3 mg/24 hr APPLY 1 PATCH WEEKLY  Qty: 4 patch, Refills: 2      clopidogrel (PLAVIX) 75 mg tablet TAKE 1 TABLET BY MOUTH EVERY DAY  Qty: 90 tablet, Refills: 3      escitalopram oxalate (LEXAPRO) 20 MG tablet TAKE 1 TABLET BY MOUTH ONCE DAILY  Qty: 120 tablet, Refills: 0      furosemide (LASIX) 20 MG tablet Take 1 tablet (20 mg total) by mouth once daily.  Qty: 90 tablet, Refills: 3    Associated Diagnoses: CKD (chronic kidney disease) stage 4, GFR 15-29 ml/min; Chronic diastolic congestive heart failure;  SOB (shortness of breath)      hydrALAZINE (APRESOLINE) 100 MG tablet TAKE 1 TABLET (100 MG TOTAL) BY MOUTH EVERY 8 (EIGHT) HOURS.  Qty: 270 tablet, Refills: 2    Associated Diagnoses: Systolic heart failure, chronic; Chronic diastolic heart failure; Aortic valve stenosis, unspecified etiology; Coronary artery disease due to lipid rich plaque; Renovascular hypertension; Chronic kidney disease, stage III (moderate); S/P CABG x 3; Hyperlipidemia, unspecified hyperlipidemia type      irbesartan (AVAPRO) 300 MG tablet TAKE 1 TABLET (300 MG TOTAL) BY MOUTH EVERY EVENING.  Qty: 90 tablet, Refills: 3      isosorbide mononitrate (IMDUR) 60 MG 24 hr tablet TAKE 1 TABLET (60 MG TOTAL) BY MOUTH ONCE DAILY.  Qty: 90 tablet, Refills: 1      pravastatin (PRAVACHOL) 40 MG tablet TAKE 1 TABLET BY MOUTH EVERY EVENING  Qty: 90 tablet, Refills: 3      timolol maleate 0.5% (TIMOPTIC) 0.5 % Drop Place 1 drop into both eyes 2 (two) times daily.  Qty: 15 mL, Refills: 3      albuterol 90 mcg/actuation inhaler Inhale 1 puff into the lungs every 6 (six) hours as needed for Wheezing. 1 HFA Aerosol Inhaler Inhalation Twice a day  Qty: 18 g, Refills: 0      blood sugar diagnostic Strp To check BG 1 times daily, to use with insurance preferred meter  Qty: 100 strip, Refills: 6    Associated Diagnoses: Type 2 diabetes mellitus with hyperglycemia, without long-term current use of insulin      blood-glucose meter (FREESTYLE SYSTEM KIT) kit Use as instructed  Qty: 1 each, Refills: 0      cetirizine (ZYRTEC) 10 MG tablet Take 1 tablet (10 mg total) by mouth daily as needed for Allergies.  Qty: 30 tablet, Refills: 11      lancets Misc To check BG 1 times daily, to use with insurance preferred meter  Qty: 100 each, Refills: 6    Associated Diagnoses: Type 2 diabetes mellitus with hyperglycemia, without long-term current use of insulin      nitroGLYCERIN 0.4 MG/DOSE TL SPRY (NITROLINGUAL) 400 mcg/spray spray PLACE 1 SPRAY ONTO THE TONGUE EVERY 5 (FIVE)  MINUTES AS NEEDED.  Qty: 12 g, Refills: 0             No discharge procedures on file.

## 2020-03-06 NOTE — ANESTHESIA PREPROCEDURE EVALUATION
03/06/2020  Krystina Washington is a 87 y.o., female.  Pre-operative evaluation for Procedure(s) (LRB):  REPAIR, ENTROPION (Left)    Krystina Washington is a 87 y.o. female     Patient Active Problem List   Diagnosis    Hyperlipidemia    Chronic kidney disease, stage III (moderate)    Type II diabetes mellitus with renal manifestations    Proteinuria    Acquired cyst of kidney    Gout, unspecified    Coronary artery disease involving native coronary artery of native heart without angina pectoris    Old myocardial infarct    S/P CABG x 3    Glaucoma (increased eye pressure)    Primary open-angle glaucoma(365.11)    Pulmonary edema    Major depressive disorder, recurrent episode, moderate    Aortic stenosis, severe    Hypertensive heart disease with heart failure    Shortness of breath    Nonrheumatic aortic valve stenosis    Essential hypertension    Type 2 diabetes mellitus with diabetic chronic kidney disease    Chronic diastolic congestive heart failure    NSTEMI (non-ST elevated myocardial infarction)    Elevated troponin level    Pure hypercholesterolemia    Anemia of chronic disease    Macrocytic anemia    Acute kidney injury superimposed on CKD    Multiple complaints    Severe sepsis    Influenza    Leukocytosis    Entropion, left       Review of patient's allergies indicates:   Allergen Reactions    Indocin [indomethacin sodium] Other (See Comments)     Either caused hot,burning body or caused madness per patient's grandson    Lotensin [benazepril] Other (See Comments)     Either caused hot ,burning body or caused madness per patient's grandson       No current facility-administered medications on file prior to encounter.      Current Outpatient Medications on File Prior to Encounter   Medication Sig Dispense Refill    aspirin 81 MG chewable tablet Take 81 mg by mouth. 1  Tablet, Chewable Oral Every day      carvedilol (COREG) 12.5 MG tablet TAKE 1 TABLET (12.5 MG TOTAL) BY MOUTH 2 (TWO) TIMES DAILY. 60 tablet 11    cloNIDine 0.3 mg/24 hr td ptwk (CATAPRES) 0.3 mg/24 hr APPLY 1 PATCH WEEKLY 4 patch 2    clopidogrel (PLAVIX) 75 mg tablet TAKE 1 TABLET BY MOUTH EVERY DAY 90 tablet 3    escitalopram oxalate (LEXAPRO) 20 MG tablet TAKE 1 TABLET BY MOUTH ONCE DAILY 120 tablet 0    hydrALAZINE (APRESOLINE) 100 MG tablet TAKE 1 TABLET (100 MG TOTAL) BY MOUTH EVERY 8 (EIGHT) HOURS. 270 tablet 2    irbesartan (AVAPRO) 300 MG tablet TAKE 1 TABLET (300 MG TOTAL) BY MOUTH EVERY EVENING. 90 tablet 3    isosorbide mononitrate (IMDUR) 60 MG 24 hr tablet TAKE 1 TABLET (60 MG TOTAL) BY MOUTH ONCE DAILY. 90 tablet 1    pravastatin (PRAVACHOL) 40 MG tablet TAKE 1 TABLET BY MOUTH EVERY EVENING 90 tablet 3    acetaminophen (TYLENOL) 325 MG tablet Take 650 mg by mouth every 6 (six) hours as needed for Pain.      albuterol 90 mcg/actuation inhaler Inhale 1 puff into the lungs every 6 (six) hours as needed for Wheezing. 1 HFA Aerosol Inhaler Inhalation Twice a day 18 g 0    blood sugar diagnostic Strp To check BG 1 times daily, to use with insurance preferred meter 100 strip 6    blood-glucose meter (FREESTYLE SYSTEM KIT) kit Use as instructed 1 each 0    cetirizine (ZYRTEC) 10 MG tablet Take 1 tablet (10 mg total) by mouth daily as needed for Allergies. 30 tablet 11    lancets Misc To check BG 1 times daily, to use with insurance preferred meter 100 each 6    nitroGLYCERIN 0.4 MG/DOSE TL SPRY (NITROLINGUAL) 400 mcg/spray spray PLACE 1 SPRAY ONTO THE TONGUE EVERY 5 (FIVE) MINUTES AS NEEDED. 12 g 0    timolol maleate 0.5% (TIMOPTIC) 0.5 % Drop Place 1 drop into both eyes 2 (two) times daily. 15 mL 3       Past Surgical History:   Procedure Laterality Date    CARDIAC VALVE SURGERY      CATARACT EXTRACTION      CORONARY ARTERY BYPASS GRAFT  9/21/2010    JOINT REPLACEMENT         Social  History     Socioeconomic History    Marital status:      Spouse name: Not on file    Number of children: Not on file    Years of education: Not on file    Highest education level: Not on file   Occupational History    Not on file   Social Needs    Financial resource strain: Not on file    Food insecurity:     Worry: Not on file     Inability: Not on file    Transportation needs:     Medical: Not on file     Non-medical: Not on file   Tobacco Use    Smoking status: Never Smoker    Smokeless tobacco: Never Used   Substance and Sexual Activity    Alcohol use: No    Drug use: No    Sexual activity: Never   Lifestyle    Physical activity:     Days per week: Not on file     Minutes per session: Not on file    Stress: Not on file   Relationships    Social connections:     Talks on phone: Not on file     Gets together: Not on file     Attends Worship service: Not on file     Active member of club or organization: Not on file     Attends meetings of clubs or organizations: Not on file     Relationship status: Not on file   Other Topics Concern    Not on file   Social History Narrative    Not on file           2D Echo:  Results for orders placed or performed during the hospital encounter of 03/20/18   2D Echo w/ Color Flow Doppler   Result Value Ref Range    QEF 55 55 - 65    Mitral Valve Regurgitation MILD     Diastolic Dysfunction Yes (A)     Est. PA Systolic Pressure 53.13 (A)     Tricuspid Valve Regurgitation MODERATE (A)          Anesthesia Evaluation    I have reviewed the Patient Summary Reports.    I have reviewed the Nursing Notes.   I have reviewed the Medications.     Review of Systems  Anesthesia Hx:  No problems with previous Anesthesia  History of prior surgery of interest to airway management or planning: Denies Family Hx of Anesthesia complications.   Denies Personal Hx of Anesthesia complications.   Social:  Non-Smoker    Hematology/Oncology:  Hematology Normal   Oncology Normal      EENT/Dental:EENT/Dental Normal   Cardiovascular:   Hypertension Past MI CAD   CHF CONCLUSIONS     1 - Normal left ventricular systolic function (EF 55-60%).     2 - Mildly depressed right ventricular systolic function .     3 - Impaired LV relaxation, elevated LAP (grade 2 diastolic dysfunction).     4 - Biatrial enlargement.     5 - Moderate tricuspid regurgitation.     6 - Mild mitral regurgitation.     7 - Normally functioning 26mm S3 bioprosthesis in the aortic position.     8 - Pulmonary hypertension. The estimated PA systolic pressure is 53 mmHg.    Pulmonary:   Shortness of breath    Renal/:   Chronic Renal Disease    Hepatic/GI:  Hepatic/GI Normal    Musculoskeletal:  Musculoskeletal Normal    Neurological:  Neurology Normal    Endocrine:   Diabetes    Psych:  Psychiatric Normal           Physical Exam  General:  Well nourished, Obesity    Airway/Jaw/Neck:  Airway Findings: Mouth Opening: Normal Tongue: Normal  General Airway Assessment: Adult  Mallampati: II  TM Distance: Normal, at least 6 cm  Jaw/Neck Findings:  Neck ROM: Normal ROM      Dental:  Dental Findings: In tact   Chest/Lungs:  Chest/Lungs Findings: Clear to auscultation, Normal Respiratory Rate     Heart/Vascular:  Heart Findings: Rate: Normal  Rhythm: Regular Rhythm  Sounds: Normal        Mental Status:  Mental Status Findings:  Cooperative, Alert and Oriented         Anesthesia Plan  Type of Anesthesia, risks & benefits discussed:  Anesthesia Type:  general  Patient's Preference:   Intra-op Monitoring Plan: standard ASA monitors  Intra-op Monitoring Plan Comments:   Post Op Pain Control Plan: multimodal analgesia  Post Op Pain Control Plan Comments:   Induction:   IV  Beta Blocker:  Patient is not currently on a Beta-Blocker (No further documentation required).       Informed Consent: Patient understands risks and agrees with Anesthesia plan.  Questions answered. Anesthesia consent signed with patient.  ASA Score: 3     Day of Surgery  Review of History & Physical:    H&P update referred to the surgeon.         Ready For Surgery From Anesthesia Perspective.

## 2020-03-06 NOTE — H&P
Preoperative H&P prior to entropion repair, OS    CC: Entropion with irritation of left eye    HPI: Krystina Washington is a 87 y.o. female, with entropion (SENILE ENTROPION OF THE LEFT EYE)     When did symptoms begin? 6 MONTHS AGO   Is entropion bothersome? YES  Patient complaint(s): IRRITATION AND DISCOMFORT THAT IS CO, DESCRIBED AS   CONSTANT WITH NO IMPROVEMENT. EXCESSIVE WATERING ALL PROBLEMS APPEAR TO   ONLY BE IN THE LEFT EYE      Eye meds: TIMOLOL QD OU      The patient is a pleasant 87-year-old female here for evaluation of left sided chronic tearing and irritation.              PMHx: has a past medical history of Arthritis, CHF (congestive heart failure), Coronary artery disease, Diabetes mellitus, Glaucoma, Gout, joint, CABG (9/21/10), Hyperlipidemia, Hypertension, NSTEMI (non-ST elevated myocardial infarction) (09/21/10), Stenosis of aortic and mitral valves, and Systolic heart failure (6/19/2015).     PSurgHx:  has a past surgical history that includes Joint replacement; Cataract extraction; Coronary artery bypass graft (9/21/2010); and Cardiac valuve replacement.     Medications:  Prior to Admission medications    Medication Sig Start Date End Date Taking? Authorizing Provider   allopurinol (ZYLOPRIM) 100 MG tablet TAKE 1 TABLET BY MOUTH ONCE DAILY 1/26/20  Yes Oniel Johnson MD   aspirin 81 MG chewable tablet Take 81 mg by mouth. 1 Tablet, Chewable Oral Every day   Yes Historical Provider, MD   carvedilol (COREG) 12.5 MG tablet TAKE 1 TABLET (12.5 MG TOTAL) BY MOUTH 2 (TWO) TIMES DAILY. 10/11/19  Yes Homeyabutch Golden MD   cloNIDine 0.3 mg/24 hr td ptwk (CATAPRES) 0.3 mg/24 hr APPLY 1 PATCH WEEKLY 10/28/19  Yes Oniel Johnson MD   clopidogrel (PLAVIX) 75 mg tablet TAKE 1 TABLET BY MOUTH EVERY DAY 10/11/19  Yes Oniel Johnson MD   escitalopram oxalate (LEXAPRO) 20 MG tablet TAKE 1 TABLET BY MOUTH ONCE DAILY 11/8/19  Yes Oniel Johnson MD   furosemide (LASIX) 20 MG  tablet Take 1 tablet (20 mg total) by mouth once daily. 12/3/19 12/2/20 Yes Oniel Johnson MD   hydrALAZINE (APRESOLINE) 100 MG tablet TAKE 1 TABLET (100 MG TOTAL) BY MOUTH EVERY 8 (EIGHT) HOURS. 6/6/19  Yes Paty Golden MD   irbesartan (AVAPRO) 300 MG tablet TAKE 1 TABLET (300 MG TOTAL) BY MOUTH EVERY EVENING. 7/16/18 2/18/20 Yes Oniel Johnson MD   isosorbide mononitrate (IMDUR) 60 MG 24 hr tablet TAKE 1 TABLET (60 MG TOTAL) BY MOUTH ONCE DAILY. 8/29/19  Yes Rika Rangel MD   pravastatin (PRAVACHOL) 40 MG tablet TAKE 1 TABLET BY MOUTH EVERY EVENING 8/29/19  Yes Oniel Johnson MD   acetaminophen (TYLENOL) 325 MG tablet Take 650 mg by mouth every 6 (six) hours as needed for Pain.    Elisabeth Arauz MD   albuterol 90 mcg/actuation inhaler Inhale 1 puff into the lungs every 6 (six) hours as needed for Wheezing. 1 HFA Aerosol Inhaler Inhalation Twice a day 12/16/15   Oniel Johnson MD   blood sugar diagnostic Strp To check BG 1 times daily, to use with insurance preferred meter 8/3/18   Oniel Johnson MD   blood-glucose meter (FREESTYLE SYSTEM KIT) kit Use as instructed 2/10/19 2/10/20  Ahmet Guido MD   cetirizine (ZYRTEC) 10 MG tablet Take 1 tablet (10 mg total) by mouth daily as needed for Allergies. 3/8/17 7/23/19  Treva Baker PA-C   lancets Misc To check BG 1 times daily, to use with insurance preferred meter 8/3/18   Oniel Johnson MD   nitroGLYCERIN 0.4 MG/DOSE TL SPRY (NITROLINGUAL) 400 mcg/spray spray PLACE 1 SPRAY ONTO THE TONGUE EVERY 5 (FIVE) MINUTES AS NEEDED. 11/1/18   Oniel Johnson MD   timolol maleate 0.5% (TIMOPTIC) 0.5 % Drop Place 1 drop into both eyes 2 (two) times daily. 8/16/19 8/15/20  Kodak Herman, OD       Allergies:is allergic to indocin [indomethacin sodium] and lotensin [benazepril].      Social: reports that she has never smoked. She has never used smokeless tobacco. She reports that she  does not drink alcohol or use drugs.     Family Hx:family history includes Diabetes in her father and mother; Glaucoma in her father; Hypertension in her father; No Known Problems in her brother, maternal aunt, maternal grandfather, maternal grandmother, maternal uncle, paternal aunt, paternal grandfather, paternal grandmother, paternal uncle, and sister.     ROS: Negative x 10 except for eye complaints pre-operatively; negative for fever, chills, weight loss, nausea, vomiting, diarrhea, shortness of breath, nasal discharge, cough, abdominal pain, dyspnea, difficulty moving arms and legs, confusion, dysuria, palpitations, or chest pain     PE:  Patient appears well developed and well nourished and is in no acute distress, is normocephalic and atraumatic. Pt is resting comfortably and breathing at a normal rate. Pulses are intact and heart is beating at a normal rate. Abdomen is not distended. Pt is able to ambulate and move arms and legs appropriately. Pt is awake, alert, and oriented x3. See ophthalmology clinic note for full ocular details of examination findings.     On exam the patient has a senile left lower eyelid entropion.  She has inferior exposure keratopathy consistent with chronic eyelash cornea touch.     Irrigation:  OS:patent lower punctum, canaliculus, and nld with 100% flow to the nose     Assessment/Plan:   Plan for left lower eyelid entropion repair under general anesthesia.    Informed consent obtained after extensive risks/benefits/alternatives were discussed with the patient including but not limited to pain, bleeding, infection, ocular injury, loss of the eye, asymmetry, need for revision in future, scarring.  Alternatives such as waiting were discussed.  All questions were answered.           Hold ASA, NSAIDS, and fish oil 5 to 7 days prior to procedure.      H&P completed on 3/5/20 has been reviewed, the patient has been examined and:  I concur with the findings and no changes have occurred  since H&P was written.    Active Hospital Problems    Diagnosis  POA    Entropion, left [H02.006]  Yes      Resolved Hospital Problems   No resolved problems to display.

## 2020-03-06 NOTE — DISCHARGE INSTRUCTIONS
No swimming, lifting, bending, or stooping until sutures out. Place tobradex drops into left eye 4 times each day and erythromycin ointment into left eye every night before bed. Place cool compresses onto lid for first 48 hours then warm compresses for following 48 hours. Follow up with Dr. Kincaid in 1 week.

## 2020-03-07 NOTE — PLAN OF CARE
Pt discharged to home.  Discharge instructions given, pt and family stated understanding.  Dressing to eye dry and intact, IV removed.  Pt left via wheelchair with family to home.  Rx delivered to bedside.

## 2020-03-08 NOTE — ANESTHESIA POSTPROCEDURE EVALUATION
Anesthesia Post Evaluation    Patient: Krystina Washington    Procedure(s) Performed: Procedure(s) (LRB):  REPAIR, ENTROPION (Left)    Final Anesthesia Type: general    Patient location during evaluation: PACU  Patient participation: Yes- Able to Participate  Level of consciousness: awake and alert and oriented  Post-procedure vital signs: reviewed and stable  Pain management: adequate  Airway patency: patent    PONV status at discharge: No PONV  Anesthetic complications: no      Cardiovascular status: blood pressure returned to baseline  Respiratory status: unassisted  Hydration status: euvolemic  Follow-up not needed.          Vitals Value Taken Time   /76 3/6/2020  6:09 PM   Temp 36.6 °C (97.9 °F) 3/6/2020  3:45 PM   Pulse 69 3/6/2020  6:11 PM   Resp 18 3/6/2020  6:11 PM   SpO2 95 % 3/6/2020  6:11 PM   Vitals shown include unvalidated device data.      No case tracking events are documented in the log.      Pain/Gael Score: No data recorded

## 2020-03-10 LAB — POCT GLUCOSE: 162 MG/DL (ref 70–110)

## 2020-03-12 ENCOUNTER — OFFICE VISIT (OUTPATIENT)
Dept: OPHTHALMOLOGY | Facility: CLINIC | Age: 85
End: 2020-03-12
Payer: MEDICARE

## 2020-03-12 DIAGNOSIS — Z98.890 POST-OPERATIVE STATE: Primary | ICD-10-CM

## 2020-03-12 DIAGNOSIS — E11.9 TYPE 2 DIABETES MELLITUS WITHOUT RETINOPATHY: ICD-10-CM

## 2020-03-12 DIAGNOSIS — H02.006 ENTROPION AND TRICHIASIS OF EYELID, LEFT: ICD-10-CM

## 2020-03-12 DIAGNOSIS — H40.1121 PRIMARY OPEN ANGLE GLAUCOMA (POAG) OF LEFT EYE, MILD STAGE: ICD-10-CM

## 2020-03-12 PROCEDURE — 99212 OFFICE O/P EST SF 10 MIN: CPT | Mod: PBBFAC,25 | Performed by: OPHTHALMOLOGY

## 2020-03-12 PROCEDURE — 99999 PR PBB SHADOW E&M-EST. PATIENT-LVL II: CPT | Mod: PBBFAC,,, | Performed by: OPHTHALMOLOGY

## 2020-03-12 PROCEDURE — 99999 PR PBB SHADOW E&M-EST. PATIENT-LVL II: ICD-10-PCS | Mod: PBBFAC,,, | Performed by: OPHTHALMOLOGY

## 2020-03-12 PROCEDURE — 92285 EXTERNAL PHOTOGRAPHY - OU - BOTH EYES: ICD-10-PCS | Mod: 26,S$PBB,, | Performed by: OPHTHALMOLOGY

## 2020-03-12 PROCEDURE — 92285 EXTERNAL OCULAR PHOTOGRAPHY: CPT | Mod: PBBFAC | Performed by: OPHTHALMOLOGY

## 2020-03-12 PROCEDURE — 99024 POSTOP FOLLOW-UP VISIT: CPT | Mod: POP,,, | Performed by: OPHTHALMOLOGY

## 2020-03-12 PROCEDURE — 99024 PR POST-OP FOLLOW-UP VISIT: ICD-10-PCS | Mod: POP,,, | Performed by: OPHTHALMOLOGY

## 2020-03-12 RX ORDER — NEOMYCIN SULFATE, POLYMYXIN B SULFATE AND DEXAMETHASONE 3.5; 10000; 1 MG/ML; [USP'U]/ML; MG/ML
1 SUSPENSION/ DROPS OPHTHALMIC EVERY 6 HOURS
Qty: 5 ML | Refills: 0 | Status: SHIPPED | OUTPATIENT
Start: 2020-03-12 | End: 2020-10-06

## 2020-03-12 NOTE — PROGRESS NOTES
HPI     Pt here for post-op.  Date of surgery: 3/6/2020  Oral antibiotics: none  Eye meds: E- mycin ou qhs    Patient has not been getting maxitrol gtts qid os. Has been using   erythromycin andrae qid os.         Last edited by Yulia Kincaid MD on 3/12/2020 11:24 AM. (History)            Assessment /Plan     For exam results, see Encounter Report.    Post-operative state  -     External/Slit Lamp Photography    Entropion and trichiasis of eyelid, left    Type 2 diabetes mellitus without retinopathy    Primary open angle glaucoma (POAG) of left eye, mild stage      Patient postop week 1.  Status post left lower eyelid transconjunctival entropion repair.  Patient continues to have medial entropion on forced closure.    Patient has been getting erythromycin ointment 4 times a day to the left eye. The patient has not been getting any antibiotic steroid combination drop to the left eye. Will prescribe Maxitrol 4 times daily to the left eye.    Return in 1 week sooner any worsening.  Patient may need revision in the minor procedure room.

## 2020-03-13 ENCOUNTER — TELEPHONE (OUTPATIENT)
Dept: OPHTHALMOLOGY | Facility: CLINIC | Age: 85
End: 2020-03-13

## 2020-03-13 NOTE — TELEPHONE ENCOUNTER
[facility] did not have generic maxitrol drops, so I called into  pharmacy, [phone]/ l.m. For pt to pick it up there

## 2020-03-19 ENCOUNTER — TELEPHONE (OUTPATIENT)
Dept: OPHTHALMOLOGY | Facility: CLINIC | Age: 85
End: 2020-03-19

## 2020-04-24 DIAGNOSIS — I35.0 AORTIC VALVE STENOSIS, UNSPECIFIED ETIOLOGY: ICD-10-CM

## 2020-04-24 DIAGNOSIS — I50.22 SYSTOLIC HEART FAILURE, CHRONIC: ICD-10-CM

## 2020-04-24 DIAGNOSIS — I50.32 CHRONIC DIASTOLIC HEART FAILURE: ICD-10-CM

## 2020-04-24 DIAGNOSIS — I15.0 RENOVASCULAR HYPERTENSION: ICD-10-CM

## 2020-04-24 DIAGNOSIS — I25.83 CORONARY ARTERY DISEASE DUE TO LIPID RICH PLAQUE: ICD-10-CM

## 2020-04-24 DIAGNOSIS — I25.10 CORONARY ARTERY DISEASE DUE TO LIPID RICH PLAQUE: ICD-10-CM

## 2020-04-24 DIAGNOSIS — E78.5 HYPERLIPIDEMIA, UNSPECIFIED HYPERLIPIDEMIA TYPE: ICD-10-CM

## 2020-04-24 DIAGNOSIS — N18.30 CHRONIC KIDNEY DISEASE, STAGE III (MODERATE): ICD-10-CM

## 2020-04-24 DIAGNOSIS — Z95.1 S/P CABG X 3: ICD-10-CM

## 2020-04-24 RX ORDER — HYDRALAZINE HYDROCHLORIDE 100 MG/1
TABLET, FILM COATED ORAL
Qty: 270 TABLET | Refills: 2 | Status: SHIPPED | OUTPATIENT
Start: 2020-04-24 | End: 2021-05-02

## 2020-09-29 ENCOUNTER — PATIENT MESSAGE (OUTPATIENT)
Dept: OTHER | Facility: OTHER | Age: 85
End: 2020-09-29

## 2020-10-05 ENCOUNTER — PATIENT MESSAGE (OUTPATIENT)
Dept: ADMINISTRATIVE | Facility: HOSPITAL | Age: 85
End: 2020-10-05

## 2020-10-06 ENCOUNTER — OFFICE VISIT (OUTPATIENT)
Dept: FAMILY MEDICINE | Facility: CLINIC | Age: 85
End: 2020-10-06
Payer: MEDICARE

## 2020-10-06 ENCOUNTER — HOSPITAL ENCOUNTER (OUTPATIENT)
Dept: RADIOLOGY | Facility: HOSPITAL | Age: 85
Discharge: HOME | End: 2020-10-06
Attending: NURSE PRACTITIONER
Payer: MEDICARE

## 2020-10-06 ENCOUNTER — OFFICE VISIT (OUTPATIENT)
Dept: CARDIOLOGY | Facility: CLINIC | Age: 85
End: 2020-10-06
Payer: MEDICARE

## 2020-10-06 VITALS
OXYGEN SATURATION: 98 % | SYSTOLIC BLOOD PRESSURE: 132 MMHG | BODY MASS INDEX: 29.68 KG/M2 | TEMPERATURE: 97 F | WEIGHT: 167.56 LBS | HEART RATE: 75 BPM | DIASTOLIC BLOOD PRESSURE: 88 MMHG

## 2020-10-06 VITALS — BODY MASS INDEX: 30.66 KG/M2 | HEIGHT: 63 IN | HEART RATE: 76 BPM | WEIGHT: 173.06 LBS

## 2020-10-06 DIAGNOSIS — I50.32 CHRONIC DIASTOLIC CONGESTIVE HEART FAILURE: ICD-10-CM

## 2020-10-06 DIAGNOSIS — I50.32 CHRONIC DIASTOLIC CONGESTIVE HEART FAILURE: Primary | ICD-10-CM

## 2020-10-06 DIAGNOSIS — Z95.2 S/P TAVR (TRANSCATHETER AORTIC VALVE REPLACEMENT): ICD-10-CM

## 2020-10-06 DIAGNOSIS — I25.10 CORONARY ARTERY DISEASE INVOLVING NATIVE CORONARY ARTERY OF NATIVE HEART WITHOUT ANGINA PECTORIS: ICD-10-CM

## 2020-10-06 DIAGNOSIS — H61.23 EXCESSIVE CERUMEN IN BOTH EAR CANALS: ICD-10-CM

## 2020-10-06 DIAGNOSIS — Z00.00 ANNUAL PHYSICAL EXAM: Primary | ICD-10-CM

## 2020-10-06 DIAGNOSIS — N18.30 STAGE 3 CHRONIC KIDNEY DISEASE, UNSPECIFIED WHETHER STAGE 3A OR 3B CKD: ICD-10-CM

## 2020-10-06 DIAGNOSIS — Z95.1 S/P CABG X 3: ICD-10-CM

## 2020-10-06 DIAGNOSIS — H91.93 BILATERAL HEARING LOSS, UNSPECIFIED HEARING LOSS TYPE: ICD-10-CM

## 2020-10-06 DIAGNOSIS — R06.02 SHORTNESS OF BREATH: ICD-10-CM

## 2020-10-06 DIAGNOSIS — I25.2 OLD MYOCARDIAL INFARCT: ICD-10-CM

## 2020-10-06 DIAGNOSIS — E11.21 TYPE 2 DIABETES MELLITUS WITH DIABETIC NEPHROPATHY, WITHOUT LONG-TERM CURRENT USE OF INSULIN: ICD-10-CM

## 2020-10-06 DIAGNOSIS — I10 ESSENTIAL HYPERTENSION: ICD-10-CM

## 2020-10-06 DIAGNOSIS — E66.3 OVERWEIGHT (BMI 25.0-29.9): ICD-10-CM

## 2020-10-06 PROBLEM — Z95.3 S/P TAVR (TRANSCATHETER AORTIC VALVE REPLACEMENT): Status: ACTIVE | Noted: 2020-10-06

## 2020-10-06 PROCEDURE — 71046 X-RAY EXAM CHEST 2 VIEWS: CPT | Mod: TC,FY,PO

## 2020-10-06 PROCEDURE — 99999 PR PBB SHADOW E&M-EST. PATIENT-LVL V: ICD-10-PCS | Mod: PBBFAC,,, | Performed by: INTERNAL MEDICINE

## 2020-10-06 PROCEDURE — 71046 X-RAY EXAM CHEST 2 VIEWS: CPT | Mod: 26,,, | Performed by: RADIOLOGY

## 2020-10-06 PROCEDURE — 99214 PR OFFICE/OUTPT VISIT, EST, LEVL IV, 30-39 MIN: ICD-10-PCS | Mod: S$PBB,,, | Performed by: NURSE PRACTITIONER

## 2020-10-06 PROCEDURE — 99215 OFFICE O/P EST HI 40 MIN: CPT | Mod: PBBFAC,25,PO | Performed by: NURSE PRACTITIONER

## 2020-10-06 PROCEDURE — 99999 PR PBB SHADOW E&M-EST. PATIENT-LVL V: CPT | Mod: PBBFAC,,, | Performed by: INTERNAL MEDICINE

## 2020-10-06 PROCEDURE — 71046 XR CHEST PA AND LATERAL: ICD-10-PCS | Mod: 26,,, | Performed by: RADIOLOGY

## 2020-10-06 PROCEDURE — 99999 PR PBB SHADOW E&M-EST. PATIENT-LVL V: CPT | Mod: PBBFAC,,, | Performed by: NURSE PRACTITIONER

## 2020-10-06 PROCEDURE — 99999 PR PBB SHADOW E&M-EST. PATIENT-LVL V: ICD-10-PCS | Mod: PBBFAC,,, | Performed by: NURSE PRACTITIONER

## 2020-10-06 PROCEDURE — 99215 OFFICE O/P EST HI 40 MIN: CPT | Mod: PBBFAC,25,27,PO | Performed by: INTERNAL MEDICINE

## 2020-10-06 PROCEDURE — 99214 PR OFFICE/OUTPT VISIT, EST, LEVL IV, 30-39 MIN: ICD-10-PCS | Mod: S$PBB,,, | Performed by: INTERNAL MEDICINE

## 2020-10-06 PROCEDURE — 99214 OFFICE O/P EST MOD 30 MIN: CPT | Mod: S$PBB,,, | Performed by: INTERNAL MEDICINE

## 2020-10-06 PROCEDURE — 99214 OFFICE O/P EST MOD 30 MIN: CPT | Mod: S$PBB,,, | Performed by: NURSE PRACTITIONER

## 2020-10-06 RX ORDER — IRBESARTAN 300 MG/1
300 TABLET ORAL NIGHTLY
Qty: 90 TABLET | Refills: 3 | Status: ON HOLD | OUTPATIENT
Start: 2020-10-06 | End: 2021-08-27 | Stop reason: HOSPADM

## 2020-10-06 RX ORDER — CARVEDILOL 12.5 MG/1
12.5 TABLET ORAL 2 TIMES DAILY
Qty: 60 TABLET | Refills: 11 | Status: SHIPPED | OUTPATIENT
Start: 2020-10-06 | End: 2020-10-08

## 2020-10-06 RX ORDER — CLOPIDOGREL BISULFATE 75 MG/1
75 TABLET ORAL DAILY
Qty: 90 TABLET | Refills: 3 | Status: ON HOLD | OUTPATIENT
Start: 2020-10-06 | End: 2021-01-07 | Stop reason: HOSPADM

## 2020-10-06 NOTE — PROGRESS NOTES
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 female who presents for follow-up of CHF.  She is accompanied by her grand-daughter. She reports an increase in shortness of breath over the past few months. It seems that the shortness of breath lasts longer. She is not as active as in the past. She developed acute renal failure last year.  She underwent TAVR for severe aortic stenosis with a 26 mm Jessica 3 valve in 2017.  She had several hospital in ER visits for heart failure prior to that.  The past 2 years she has not had any hospitalizations for heart failure.  Creatinine was 1.5 mg/dl in 2/2020.  Repeat labs are drawn today and are pending.        Review of Systems   Constitution: Negative for malaise/fatigue, weight gain and weight loss.   Cardiovascular: Positive for dyspnea on exertion and leg swelling. Negative for chest pain and palpitations.   Respiratory: Negative for cough and hemoptysis.    Hematologic/Lymphatic: Does not bruise/bleed easily.   Musculoskeletal: Negative for arthritis and muscle cramps.   Gastrointestinal: Negative for abdominal pain, heartburn and melena.   Genitourinary: Negative for hematuria and nocturia.   Neurological: Positive for excessive daytime sleepiness. Negative for headaches, light-headedness and seizures.   Psychiatric/Behavioral: Negative for depression. The patient is not nervous/anxious.        Review of patient's allergies indicates:   Allergen Reactions    Indocin [indomethacin sodium] Other (See Comments)     Either caused hot,burning body or caused madness per patient's grandson    Lotensin [benazepril] Other (See Comments)     Either caused hot ,burning body or caused madness per patient's grandson        Current Outpatient Medications   Medication Sig    acetaminophen (TYLENOL)  325 MG tablet Take 650 mg by mouth every 6 (six) hours as needed for Pain.    albuterol 90 mcg/actuation inhaler Inhale 1 puff into the lungs every 6 (six) hours as needed for Wheezing. 1 HFA Aerosol Inhaler Inhalation Twice a day    allopurinol (ZYLOPRIM) 100 MG tablet TAKE 1 TABLET BY MOUTH ONCE DAILY    aspirin 81 MG chewable tablet Take 81 mg by mouth. 1 Tablet, Chewable Oral Every day    blood sugar diagnostic Strp To check BG 1 times daily, to use with insurance preferred meter    blood-glucose meter (FREESTYLE SYSTEM KIT) kit Use as instructed    carvediloL (COREG) 12.5 MG tablet Take 1 tablet (12.5 mg total) by mouth 2 (two) times daily.    cloNIDine 0.3 mg/24 hr td ptwk (CATAPRES) 0.3 mg/24 hr APPLY 1 PATCH WEEKLY    clopidogreL (PLAVIX) 75 mg tablet Take 1 tablet (75 mg total) by mouth once daily.    erythromycin (ROMYCIN) ophthalmic ointment Place into the left eye every evening.    escitalopram oxalate (LEXAPRO) 20 MG tablet TAKE 1 TABLET BY MOUTH ONCE DAILY    furosemide (LASIX) 20 MG tablet Take 1 tablet (20 mg total) by mouth once daily.    hydrALAZINE (APRESOLINE) 100 MG tablet TAKE 1 TABLET (100 MG TOTAL) BY MOUTH EVERY 8 (EIGHT) HOURS.    irbesartan (AVAPRO) 300 MG tablet Take 1 tablet (300 mg total) by mouth every evening.    isosorbide mononitrate (IMDUR) 60 MG 24 hr tablet TAKE 1 TABLET (60 MG TOTAL) BY MOUTH ONCE DAILY.    lancets Misc To check BG 1 times daily, to use with insurance preferred meter    nitroGLYCERIN 0.4 MG/DOSE TL SPRY (NITROLINGUAL) 400 mcg/spray spray PLACE 1 SPRAY ONTO THE TONGUE EVERY 5 (FIVE) MINUTES AS NEEDED.    pravastatin (PRAVACHOL) 40 MG tablet TAKE 1 TABLET BY MOUTH EVERY EVENING    timolol maleate 0.5% (TIMOPTIC) 0.5 % Drop Place 1 drop into both eyes 2 (two) times daily.         Social history:  Krystina Washington  reports that she has never smoked. She has never used smokeless tobacco. She reports that she does not drink alcohol or use  "drugs.      Objective:   BP (!) (P) 164/82   Pulse 76   Ht 5' 3" (1.6 m)   Wt 78.5 kg (173 lb 1 oz)   LMP  (LMP Unknown)   SpO2 (P) 96%   BMI 30.66 kg/m²      Physical Exam   Constitutional: She is oriented to person, place, and time. She appears well-developed and well-nourished.   Neck: No JVD present.   Cardiovascular: Normal rate and regular rhythm.   Murmur heard.   Medium-pitched systolic murmur is present with a grade of 1/6 at the upper left sternal border.  Pulses:       Radial pulses are 2+ on the right side and 2+ on the left side.   Pulmonary/Chest: She has no decreased breath sounds. She has no wheezes. She has no rales. Chest wall is not dull to percussion.   Abdominal: Soft. Normal appearance. There is no splenomegaly or hepatomegaly. There is no abdominal tenderness.   Musculoskeletal:      Right lower leg: No edema.      Left lower leg: No edema.   Neurological: She is alert and oriented to person, place, and time.   Skin: Skin is warm. No bruising noted. Nails show no clubbing.   Psychiatric: Her speech is normal and behavior is normal. Cognition and memory are normal.             Lab Results   Component Value Date     (H) 02/22/2020    CREATININE 1.5 (H) 02/22/2020    BUN 44 (H) 02/22/2020     02/22/2020    K 4.1 02/22/2020     02/22/2020    CO2 28 02/22/2020             Assessment and Plan:       ICD-10-CM ICD-9-CM   1. Chronic diastolic congestive heart failure  I50.32 428.32     428.0   2. Shortness of breath  R06.02 786.05   3. S/P TAVR (transcatheter aortic valve replacement)  Z95.2 V43.3   4. S/P CABG x 3  Z95.1 V45.81   5. Stage 3 chronic kidney disease, unspecified whether stage 3a or 3b CKD  N18.30 585.3        There is no overt evidence of heart failure on exam today.  I reviewed the labs done earlier in the day. The results were not available when she left the office. BUN and creatinine are unchanged. NPA is elevated. She should take an extra dose for 2-3 days " and again whenever it seems that her shortness of breath is worse than baseline.  Will obtain echo now.  Low sodium diet emphasized.    Orders placed during this encounter:     Chronic diastolic congestive heart failure  -     Ambulatory referral/consult to Cardiology  -     Echo Color Flow Doppler? Yes; Future    Shortness of breath  -     Ambulatory referral/consult to Cardiology  -     Echo Color Flow Doppler? Yes; Future    S/P TAVR (transcatheter aortic valve replacement)  -     Echo Color Flow Doppler? Yes; Future    S/P CABG x 3    Stage 3 chronic kidney disease, unspecified whether stage 3a or 3b CKD         Follow up in about 9 months (around 7/6/2021).

## 2020-10-06 NOTE — PATIENT INSTRUCTIONS
- Take Lasix 20mg once daily for the next 3 days  - Take blood pressure daily for the next 3 days  - Follow up with Cardiology  - If she develops severe shortness of breath, chest pain, or difficulty breathing that is not corrected with inhaler and does not resolve in 1 minute, please call 911 or go to the nearest Emergency Department    Heart Failure: Making Changes to Your Diet  You have a condition called heart failure. When you have heart failure, excess fluid is more likely to build up in your body because your heart isn't working well. This makes the heart work harder to pump blood. Fluid buildup causes symptoms such as shortness of breath and swelling (edema). This is often referred to as congestive heart failure or CHF. Controlling the amount of salt (sodium) you eat may help stop fluid from building up. Your doctor may also tell you to reduce the amount of fluid you drink.  Reading food labels    Your healthcare provider will tell you how much sodium you can eat each day. Read food labels to keep track. Keep in mind that certain foods are high in salt. These include canned, frozen, and processed foods. Check the amount of sodium in each serving. Watch out for high-sodium ingredients. These include MSG (monosodium glutamate), baking soda, and sodium phosphate.   Eating less salt  Give yourself time to get used to eating less salt. It may take a little while. Here are some tips to help:  · Take the saltshaker off the table. Replace it with salt-free herb mixes and spices.  · Eat fresh or plain frozen vegetables. These have much less salt than canned vegetables.  · Choose low-sodium snacks like sodium-free pretzels, crackers, or air-popped popcorn.  · Dont add salt to your food when youre cooking. Instead, season your foods with pepper, lemon, garlic, or onion.  · When you eat out, ask that your food be cooked without added salt.  · Avoid eating fried foods as these often have a great deal of salt.  If  youre told to limit fluids  You may need to limit how much fluid you have to help prevent swelling. This includes anything that is liquid at room temperature, such as ice cream and soup. If your doctor tells you to limit fluid, try these tips:  · Measure drinks in a measuring cup before you drink them. This will help you meet daily goals.  · Chill drinks to make them more refreshing.  · Suck on frozen lemon wedges to quench thirst.  · Only drink when youre thirsty.  · Chew sugarless gum or suck on hard candy to keep your mouth moist.  · Weigh yourself daily to know if your body's fluid content is rising.  My sodium goal  Your healthcare provider may give you a sodium goal to meet each day. This includes sodium found in food as well as salt that you add. My goal is to eat no more than ___________ mg of sodium per day.     When to call your doctor  Call your doctor right away if you have any symptoms of worsening heart failure. These can include:  · Sudden weight gain  · Increased swelling of your legs or ankles  · Trouble breathing when youre resting or at night  · Increase in the number of pillows you have to sleep on  · Chest pain, pressure, discomfort, or pain in the jaw, neck, or back   Date Last Reviewed: 3/21/2016  © 4847-4345 [employer]. [address] [place] [address], [place] [address]. All rights reserved. This information is not intended as a substitute for professional medical care. Always follow your healthcare professional's instructions.        Heart Failure: Warning Signs of a Flare-Up  You have a condition called heart failure. Once you have heart failure, flare-ups can happen. Below are signs that can mean your heart failure is getting worse. If you notice any of these warning signs, call your healthcare provider.  Swelling    · Your feet, ankles, or lower legs get puffier.  · You notice skin changes on your lower legs.  · Your shoes feel too tight.  · Your clothes are tighter in the  waist.  · You have trouble getting rings on or off your fingers.  Shortness of breath  · You have to breathe harder even when youre doing your normal activities or when youre resting.  · You are short of breath walking up stairs or even short distances.  · You wake up at night short of breath or coughing.  · You need to use more pillows or sit up to sleep.  · You wake up tired or restless.  Other warning signs  · You feel weaker, dizzy, or more tired.  · You have chest pain or changes in your heartbeat.  · You have a cough that wont go away.  · You cant remember things or dont feel like eating.  Tracking your weight  Gaining weight is often the first warning sign that heart failure is getting worse. Gaining even a few pounds can be a sign that your body is retaining excess water and salt. Weighing yourself each day in the morning after you urinate and before you eat, is the best way to know if you're retaining water. Get a scale that is easy to read and make sure you wear the same clothes and use the same scale every time you weigh. Your healthcare provider will show you how to track your weight. Call your doctor if you gain more than 2 pounds in 1 day, 5 pounds in 1 week, or whatever weight gain you were told to report by your doctor. This is often a sign of worsening heart failure and needs to be evaluated and treated before it compromises your breathing. Your doctor will tell you what to do next.    Date Last Reviewed: 3/15/2016  © 4035-8883 [employer]. [address] [place] [address], [place] [address]. All rights reserved. This information is not intended as a substitute for professional medical care. Always follow your healthcare professional's instructions.

## 2020-10-06 NOTE — PATIENT INSTRUCTIONS
The heart failure blood test may not accurately reflect how much fluid you have in your lungs. Normal is <100 but the lowest that you have had is 600 w levels as high as 1100.  The level is also affected by kidney function.    Stay on a low sodium diet and the same dose of furosemide (Lasix) for now.    Take antibiotics prior to dental work. Use an antiseptic mouth wash to prevent infection in the heart valve.    The heart failure blood test was increased.  The kidney function was unchanged from earlier in the year.  You should take an extra dose of furosemide for 2-3 days and again whenever it seems that you shortness of breath is worse than baseline. Continue this way as long as you need this 1-2 times a month.

## 2020-10-06 NOTE — PROGRESS NOTES
Subjective:       Patient ID: Krystina Washington is a 88 y.o. female.    Chief Complaint: Medication Refill and Annual Exam    This is a 87 yo female patient of Dr. Bauer who is new to me. She presents today accompanied by her grandson Hermelindo Cross for an annual physical exam. PMH includes    Patient Active Problem List:     Hyperlipidemia     Chronic kidney disease, stage III (moderate)     Type II diabetes mellitus with renal manifestations     Proteinuria     Acquired cyst of kidney     Gout, unspecified     Coronary artery disease involving native coronary artery of native heart without angina pectoris     Old myocardial infarct     S/P CABG x 3     Glaucoma (increased eye pressure)     Primary open-angle glaucoma(365.11)     Pulmonary edema     Major depressive disorder, recurrent episode, moderate     Aortic stenosis, severe     Hypertensive heart disease with heart failure     Shortness of breath     Nonrheumatic aortic valve stenosis     Essential hypertension     Type 2 diabetes mellitus with diabetic chronic kidney disease     Chronic diastolic congestive heart failure     NSTEMI (non-ST elevated myocardial infarction)     Elevated troponin level     Pure hypercholesterolemia     Anemia of chronic disease     Macrocytic anemia     Acute kidney injury superimposed on CKD     Multiple complaints     Severe sepsis     Influenza     Leukocytosis     Entropion, left    VSS today. Patient reports she has been having continued SOB for the past 8 months that has not worsened or improved since onset. Has been using albuterol inhaler daily and offers some relief. Denies fever, chills, chest pain, or peripheral edema. Reports orthopnea and SOB at rest and worsens with exertion. Patient with chronic cardiac history and was last seen by Cardiology in 2018. Grandcordelia mentions she was told to alternate her furosemide from 20mg to 40 mg as needed, and to only take it every other day. She has been taking 20mg of furosemide  every other day for the past several days. Does not exercise. Grandcordelia mentions he does not cook with much salt and she mostly follows a heart-healthy diet. See more below.    Hypertension  This is a chronic problem. The current episode started more than 1 year ago. The problem is controlled. Associated symptoms include malaise/fatigue, orthopnea and shortness of breath. Pertinent negatives include no blurred vision, chest pain, headaches, palpitations or peripheral edema. Risk factors for coronary artery disease include diabetes mellitus, obesity, post-menopausal state and sedentary lifestyle. Past treatments include diuretics, beta blockers, angiotensin blockers, direct vasodilators, central alpha agonists and lifestyle changes. Compliance problems include exercise.  Hypertensive end-organ damage includes kidney disease, CAD/MI and heart failure. Identifiable causes of hypertension include chronic renal disease.   Shortness of Breath  This is a chronic problem. The current episode started more than 1 month ago. The problem occurs daily. The problem has been waxing and waning. Associated symptoms include orthopnea and wheezing. Pertinent negatives include no abdominal pain, chest pain, coryza, fever, headaches, leg swelling, sore throat or vomiting. The symptoms are aggravated by any activity. She has tried beta agonist inhalers for the symptoms. The treatment provided significant relief. Her past medical history is significant for CAD and a heart failure.     Review of Systems   Constitutional: Positive for fatigue and malaise/fatigue. Negative for chills and fever.   HENT: Positive for hearing loss. Negative for sore throat and trouble swallowing.    Eyes: Negative for blurred vision and visual disturbance.   Respiratory: Positive for shortness of breath and wheezing. Negative for cough and chest tightness.    Cardiovascular: Positive for orthopnea. Negative for chest pain, palpitations and leg swelling.    Gastrointestinal: Negative for abdominal pain, diarrhea, nausea and vomiting.   Genitourinary: Negative for difficulty urinating.   Neurological: Positive for weakness, disturbances in coordination and coordination difficulties. Negative for dizziness, light-headedness and headaches.         Objective:      Physical Exam  Constitutional:       General: She is awake. She is not in acute distress.     Appearance: Normal appearance. She is well-developed, well-groomed and overweight.   HENT:      Head: Normocephalic.      Right Ear: External ear normal. Decreased hearing noted.      Left Ear: External ear normal. Decreased hearing noted.      Ears:      Comments: Excessive cerumen noted to both ear canals, ear wash ordered  Eyes:      General:         Right eye: No discharge.         Left eye: No discharge.      Extraocular Movements: Extraocular movements intact.      Pupils: Pupils are equal, round, and reactive to light.   Neck:      Vascular: No carotid bruit.   Cardiovascular:      Rate and Rhythm: Normal rate and regular rhythm.      Pulses:           Carotid pulses are 2+ on the right side and 2+ on the left side.       Radial pulses are 2+ on the right side and 2+ on the left side.        Dorsalis pedis pulses are 2+ on the right side and 2+ on the left side.        Posterior tibial pulses are 1+ on the right side and 1+ on the left side.      Heart sounds: Murmur present. Systolic murmur present with a grade of 2/6.   Pulmonary:      Effort: Pulmonary effort is normal. No tachypnea, bradypnea, accessory muscle usage or respiratory distress.      Breath sounds: Decreased breath sounds and wheezing present. No rhonchi.   Abdominal:      General: Bowel sounds are normal. There is no distension.      Palpations: Abdomen is soft.      Tenderness: There is no abdominal tenderness.   Musculoskeletal:      Right lower leg: No edema.      Left lower leg: No edema.   Skin:     General: Skin is warm and dry.       Capillary Refill: Capillary refill takes less than 2 seconds.      Coloration: Skin is not pale.   Neurological:      Mental Status: She is alert and oriented to person, place, and time.      Coordination: Coordination abnormal.      Gait: Gait abnormal.      Comments: Using a wheeled walker to ambulate   Psychiatric:         Attention and Perception: Attention normal.         Mood and Affect: Mood and affect normal.         Speech: Speech normal.         Behavior: Behavior normal. Behavior is cooperative.         Assessment:       1. Annual physical exam    2. Chronic diastolic congestive heart failure    3. Shortness of breath    4. Essential hypertension    5. Coronary artery disease involving native coronary artery of native heart without angina pectoris    6. Stage 3 chronic kidney disease, unspecified whether stage 3a or 3b CKD    7. Old myocardial infarct    8. Type 2 diabetes mellitus with diabetic nephropathy, without long-term current use of insulin    9. Bilateral hearing loss, unspecified hearing loss type    10. Excessive cerumen in both ear canals    11. Overweight (BMI 25.0-29.9)        Plan:       Krystina was seen today for medication refill and annual exam.    Diagnoses and all orders for this visit:    Annual physical exam  -     CBC auto differential; Future  -     Comprehensive Metabolic Panel; Future  -     Lipid Panel; Future  -     TSH; Future  -     Urinalysis; Future  -     Hemoglobin A1C; Future    Chronic diastolic congestive heart failure  -     BNP; Future  -     X-Ray Chest PA And Lateral; Future  -     Ambulatory referral/consult to Cardiology; Future  -     clopidogreL (PLAVIX) 75 mg tablet; Take 1 tablet (75 mg total) by mouth once daily.    Shortness of breath  -     Ambulatory referral/consult to Cardiology; Future    Essential hypertension  -     CBC auto differential; Future  -     Comprehensive Metabolic Panel; Future  -     Lipid Panel; Future  -     TSH; Future  -      Urinalysis; Future  -     irbesartan (AVAPRO) 300 MG tablet; Take 1 tablet (300 mg total) by mouth every evening.  -     carvediloL (COREG) 12.5 MG tablet; Take 1 tablet (12.5 mg total) by mouth 2 (two) times daily.    Coronary artery disease involving native coronary artery of native heart without angina pectoris  -     clopidogreL (PLAVIX) 75 mg tablet; Take 1 tablet (75 mg total) by mouth once daily.    Stage 3 chronic kidney disease, unspecified whether stage 3a or 3b CKD    Old myocardial infarct  -     clopidogreL (PLAVIX) 75 mg tablet; Take 1 tablet (75 mg total) by mouth once daily.    Type 2 diabetes mellitus with diabetic nephropathy, without long-term current use of insulin  -     Hemoglobin A1C; Future  -     Ambulatory referral/consult to Podiatry; Future    Bilateral hearing loss, unspecified hearing loss type  -     Ambulatory referral/consult to ENT; Future  -     Ambulatory referral/consult to Audiology; Future    Excessive cerumen in both ear canals  -     Ear wax removal    Overweight (BMI 25.0-29.9)        - Fasting labs and imaging ordered today  - Requested refills sent to preferred pharmacy  - Instructed to take furosemide 20mg daily for the next 3 days and to monitor her BP at home during this time  - Continue to use albuterol inhaler as needed for SOB  - Follow up with Cardiology, Podiatry & ENT/Audiology  - Warning signs discussed at length with patient and grandson  - RTC in 3 months for a follow-up, or sooner if needed

## 2020-10-06 NOTE — LETTER
October 8, 2020      Brianna Anguiano, NP  [address]  [place] [address]           [facility]  [address] [facility].  [place] [address]  Phone: [phone]          Patient: Krystina Washington   MR Number: [id number]   YOB: 1932   Date of Visit: 10/6/2020       Dear Brianna Anguiano:    Thank you for referring Krystina Washington to me for evaluation. Attached you will find relevant portions of my assessment and plan of care.    If you have questions, please do not hesitate to call me. I look forward to following Krystina Washington along with you.    Sincerely,    Paty Golden MD    Enclosure  CC:  No Recipients    If you would like to receive this communication electronically, please contact [email or web address] or [phone] to request more information on [employer] access.    For providers and/or their staff who would like to refer a patient to [facility], please contact us through our one-stop-shop provider referral line, [facility], at [phone].    If you feel you have received this communication in error or would no longer like to receive these types of communications, please e-mail [email or web address]

## 2020-10-07 ENCOUNTER — TELEPHONE (OUTPATIENT)
Dept: FAMILY MEDICINE | Facility: CLINIC | Age: 85
End: 2020-10-07

## 2020-10-07 DIAGNOSIS — N30.01 ACUTE CYSTITIS WITH HEMATURIA: Primary | ICD-10-CM

## 2020-10-07 RX ORDER — NITROFURANTOIN (MACROCRYSTALS) 100 MG/1
100 CAPSULE ORAL EVERY 12 HOURS
Qty: 10 CAPSULE | Refills: 0 | Status: SHIPPED | OUTPATIENT
Start: 2020-10-07 | End: 2020-10-12

## 2020-10-07 NOTE — TELEPHONE ENCOUNTER
Antibiotic sent to listed pharmacy to treat UTI. Patient must come to clinic to provide urine sample for culture prior to beginning antibiotic. Left voicemail to return call to discuss further.    Brianna Anguiano NP

## 2020-10-08 ENCOUNTER — LAB VISIT (OUTPATIENT)
Dept: LAB | Facility: HOSPITAL | Age: 85
End: 2020-10-08
Attending: NURSE PRACTITIONER
Payer: MEDICARE

## 2020-10-08 ENCOUNTER — TELEPHONE (OUTPATIENT)
Dept: FAMILY MEDICINE | Facility: CLINIC | Age: 85
End: 2020-10-08

## 2020-10-08 DIAGNOSIS — N30.01 ACUTE CYSTITIS WITH HEMATURIA: ICD-10-CM

## 2020-10-08 DIAGNOSIS — N30.01 ACUTE CYSTITIS WITH HEMATURIA: Primary | ICD-10-CM

## 2020-10-08 PROCEDURE — 87088 URINE BACTERIA CULTURE: CPT

## 2020-10-08 PROCEDURE — 87077 CULTURE AEROBIC IDENTIFY: CPT | Mod: 59

## 2020-10-08 PROCEDURE — 87186 SC STD MICRODIL/AGAR DIL: CPT

## 2020-10-08 PROCEDURE — 87086 URINE CULTURE/COLONY COUNT: CPT

## 2020-10-08 NOTE — TELEPHONE ENCOUNTER
Discussed recent lab results with abimael Cross. Notified of U/A results and instructed to return to clinic to provide another urine sample for culture. Informed that empiric treatment for UTI was sent to listed pharmacy. Provided with administration instructions. Verbalized understanding. No other questions at this time.    Brianna Anguiano, NP

## 2020-10-11 LAB — BACTERIA UR CULT: ABNORMAL

## 2020-10-15 ENCOUNTER — CLINICAL SUPPORT (OUTPATIENT)
Dept: CARDIOLOGY | Facility: CLINIC | Age: 85
End: 2020-10-15
Attending: INTERNAL MEDICINE
Payer: MEDICARE

## 2020-10-15 VITALS
DIASTOLIC BLOOD PRESSURE: 64 MMHG | WEIGHT: 173 LBS | HEART RATE: 70 BPM | BODY MASS INDEX: 30.65 KG/M2 | HEIGHT: 63 IN | SYSTOLIC BLOOD PRESSURE: 132 MMHG

## 2020-10-15 DIAGNOSIS — Z95.2 S/P TAVR (TRANSCATHETER AORTIC VALVE REPLACEMENT): ICD-10-CM

## 2020-10-15 DIAGNOSIS — I50.32 CHRONIC DIASTOLIC CONGESTIVE HEART FAILURE: ICD-10-CM

## 2020-10-15 DIAGNOSIS — R06.02 SHORTNESS OF BREATH: ICD-10-CM

## 2020-10-15 LAB
ASCENDING AORTA: 3.73 CM
AV INDEX (PROSTH): 0.42
AV MEAN GRADIENT: 12 MMHG
AV PEAK GRADIENT: 20 MMHG
AV VALVE AREA: 2.18 CM2
AV VELOCITY RATIO: 0.39
BSA FOR ECHO PROCEDURE: 1.87 M2
CV ECHO LV RWT: 0.38 CM
DOP CALC AO PEAK VEL: 2.22 M/S
DOP CALC AO VTI: 46.64 CM
DOP CALC LVOT AREA: 5.1 CM2
DOP CALC LVOT DIAMETER: 2.56 CM
DOP CALC LVOT PEAK VEL: 0.87 M/S
DOP CALC LVOT STROKE VOLUME: 101.55 CM3
DOP CALCLVOT PEAK VEL VTI: 19.74 CM
E WAVE DECELERATION TIME: 247.99 MSEC
E/A RATIO: 0.91
E/E' RATIO: 25.33 M/S
ECHO LV POSTERIOR WALL: 1.01 CM (ref 0.6–1.1)
FRACTIONAL SHORTENING: 14 % (ref 28–44)
HR TR ECHO: 70 BPM
INTERVENTRICULAR SEPTUM: 1.01 CM (ref 0.6–1.1)
IVRT: 94.2 MSEC
LA MAJOR: 5.91 CM
LA MINOR: 5.47 CM
LA WIDTH: 5.04 CM
LEFT ATRIUM SIZE: 4.1 CM
LEFT ATRIUM VOLUME INDEX: 54.9 ML/M2
LEFT ATRIUM VOLUME: 99.79 CM3
LEFT INTERNAL DIMENSION IN SYSTOLE: 4.51 CM (ref 2.1–4)
LEFT VENTRICLE DIASTOLIC VOLUME INDEX: 72.87 ML/M2
LEFT VENTRICLE DIASTOLIC VOLUME: 132.5 ML
LEFT VENTRICLE MASS INDEX: 110 G/M2
LEFT VENTRICLE SYSTOLIC VOLUME INDEX: 51.1 ML/M2
LEFT VENTRICLE SYSTOLIC VOLUME: 92.89 ML
LEFT VENTRICULAR INTERNAL DIMENSION IN DIASTOLE: 5.25 CM (ref 3.5–6)
LEFT VENTRICULAR MASS: 199.9 G
LV LATERAL E/E' RATIO: 19 M/S
LV SEPTAL E/E' RATIO: 38 M/S
MV MEAN GRADIENT: 4 MMHG
MV PEAK A VEL: 1.25 M/S
MV PEAK E VEL: 1.14 M/S
MV STENOSIS PRESSURE HALF TIME: 71.92 MS
MV VALVE AREA P 1/2 METHOD: 3.06 CM2
PISA TR MAX VEL: 3.49 M/S
PULM VEIN S/D RATIO: 0.94
PV PEAK D VEL: 0.54 M/S
PV PEAK S VEL: 0.51 M/S
RA MAJOR: 5.33 CM
RA PRESSURE: 8 MMHG
RA WIDTH: 3.42 CM
RIGHT VENTRICULAR END-DIASTOLIC DIMENSION: 3.55 CM
SINUS: 3.12 CM
STJ: 2.55 CM
TDI LATERAL: 0.06 M/S
TDI SEPTAL: 0.03 M/S
TDI: 0.05 M/S
TR MAX PG: 49 MMHG
TRICUSPID ANNULAR PLANE SYSTOLIC EXCURSION: 1.45 CM
TV REST PULMONARY ARTERY PRESSURE: 57 MMHG

## 2020-10-15 PROCEDURE — 99212 OFFICE O/P EST SF 10 MIN: CPT | Mod: PBBFAC,PO

## 2020-10-15 PROCEDURE — 99999 PR PBB SHADOW E&M-EST. PATIENT-LVL II: ICD-10-PCS | Mod: PBBFAC,,,

## 2020-10-15 PROCEDURE — 99999 PR PBB SHADOW E&M-EST. PATIENT-LVL II: CPT | Mod: PBBFAC,,,

## 2020-10-15 PROCEDURE — 93306 TTE W/DOPPLER COMPLETE: CPT | Mod: PBBFAC,PO | Performed by: INTERNAL MEDICINE

## 2020-10-15 PROCEDURE — 93306 ECHO (CUPID ONLY): ICD-10-PCS | Mod: 26,S$PBB,, | Performed by: INTERNAL MEDICINE

## 2020-10-16 ENCOUNTER — TELEPHONE (OUTPATIENT)
Dept: CARDIOLOGY | Facility: CLINIC | Age: 85
End: 2020-10-16

## 2020-10-16 NOTE — TELEPHONE ENCOUNTER
Hermelindo Cross notified of results, states pt is taking Carvedilol 12.5mg bid, Irbesartan 300mg every evening.

## 2020-10-16 NOTE — TELEPHONE ENCOUNTER
----- Message from Paty Golden MD sent at 10/15/2020  7:40 PM CDT -----  Pl confirm pt is taking carvedilol and irbesartan. If patient was still taking carvedilol 12.5 mg bid and irbesartan 300 mg - she should continue both and not decrease the dose. I was under the incorrect impression that she was only taking hydralazine and isosorbide.

## 2020-10-16 NOTE — TELEPHONE ENCOUNTER
Hermelindo Cross notified of results, states pt is currently taking Carvedilol 12.5mg twice daily and Irbesartan 300mg every evening.

## 2020-10-20 ENCOUNTER — OFFICE VISIT (OUTPATIENT)
Dept: PODIATRY | Facility: CLINIC | Age: 85
End: 2020-10-20
Payer: MEDICARE

## 2020-10-20 VITALS
SYSTOLIC BLOOD PRESSURE: 153 MMHG | DIASTOLIC BLOOD PRESSURE: 89 MMHG | BODY MASS INDEX: 30.65 KG/M2 | WEIGHT: 173 LBS | HEART RATE: 76 BPM | HEIGHT: 63 IN

## 2020-10-20 DIAGNOSIS — E11.21 TYPE 2 DIABETES MELLITUS WITH DIABETIC NEPHROPATHY, WITHOUT LONG-TERM CURRENT USE OF INSULIN: ICD-10-CM

## 2020-10-20 DIAGNOSIS — B35.1 ONYCHOMYCOSIS DUE TO DERMATOPHYTE: Primary | ICD-10-CM

## 2020-10-20 DIAGNOSIS — E11.9 ENCOUNTER FOR DIABETIC FOOT EXAM: ICD-10-CM

## 2020-10-20 PROCEDURE — 99203 OFFICE O/P NEW LOW 30 MIN: CPT | Mod: 25,S$PBB,, | Performed by: STUDENT IN AN ORGANIZED HEALTH CARE EDUCATION/TRAINING PROGRAM

## 2020-10-20 PROCEDURE — 99999 PR PBB SHADOW E&M-EST. PATIENT-LVL V: CPT | Mod: PBBFAC,,, | Performed by: STUDENT IN AN ORGANIZED HEALTH CARE EDUCATION/TRAINING PROGRAM

## 2020-10-20 PROCEDURE — 99215 OFFICE O/P EST HI 40 MIN: CPT | Mod: PBBFAC,PN,25 | Performed by: STUDENT IN AN ORGANIZED HEALTH CARE EDUCATION/TRAINING PROGRAM

## 2020-10-20 PROCEDURE — 99203 PR OFFICE/OUTPT VISIT, NEW, LEVL III, 30-44 MIN: ICD-10-PCS | Mod: 25,S$PBB,, | Performed by: STUDENT IN AN ORGANIZED HEALTH CARE EDUCATION/TRAINING PROGRAM

## 2020-10-20 PROCEDURE — 11721 DEBRIDE NAIL 6 OR MORE: CPT | Mod: Q9,PBBFAC,PN | Performed by: STUDENT IN AN ORGANIZED HEALTH CARE EDUCATION/TRAINING PROGRAM

## 2020-10-20 PROCEDURE — 99999 PR PBB SHADOW E&M-EST. PATIENT-LVL V: ICD-10-PCS | Mod: PBBFAC,,, | Performed by: STUDENT IN AN ORGANIZED HEALTH CARE EDUCATION/TRAINING PROGRAM

## 2020-10-20 PROCEDURE — 11721 ROUTINE FOOT CARE: ICD-10-PCS | Mod: Q9,S$PBB,, | Performed by: STUDENT IN AN ORGANIZED HEALTH CARE EDUCATION/TRAINING PROGRAM

## 2020-10-20 NOTE — PROCEDURES
"Routine Foot Care    Date/Time: 10/20/2020 10:15 AM  Performed by: Yue Andrew DPM  Authorized by: Yue Andrew DPM     Time out: Immediately prior to procedure a "time out" was called to verify the correct patient, procedure, equipment, support staff and site/side marked as required.    Consent Done?:  Yes (Verbal)  Hyperkeratotic Skin Lesions?: No      Nail Care Type:  Debride  Location(s): All  (Left 1st Toe, Left 3rd Toe, Left 2nd Toe, Left 4th Toe, Left 5th Toe, Right 1st Toe, Right 2nd Toe, Right 3rd Toe, Right 4th Toe and Right 5th Toe)  Patient tolerance:  Patient tolerated the procedure well with no immediate complications      "

## 2020-10-20 NOTE — PROGRESS NOTES
Subjective:      Patient ID: Krystina Washington is a 88 y.o. female.    Chief Complaint: Diabetes Mellitus (Brianna Anguiano NP 10/6/2020) and Diabetic Foot Exam    Krystina is a 88 y.o. female who presents to the clinic for evaluation and treatment of high risk feet. Krystina has a past medical history of Arthritis, CHF (congestive heart failure), Coronary artery disease, Diabetes mellitus, Glaucoma, Gout, joint, CABG (9/21/10), Hyperlipidemia, Hypertension, NSTEMI (non-ST elevated myocardial infarction) (09/21/10), Stenosis of aortic and mitral valves, and Systolic heart failure (6/19/2015). The patient's chief complaint is long, thick toenails. This patient has documented high risk feet requiring routine maintenance secondary to diabetes mellitis and those secondary complications of diabetes, as mentioned..    PCP: Oniel Johnson MD    Date Last Seen by PCP: See above     Current shoe gear:  Affected Foot: Casual shoes     Unaffected Foot: Casual shoes    Last encounter in this department: Visit date not found    Hemoglobin A1C   Date Value Ref Range Status   10/06/2020 7.1 (H) 4.0 - 5.6 % Final     Comment:     ADA Screening Guidelines:  5.7-6.4%  Consistent with prediabetes  >or=6.5%  Consistent with diabetes  High levels of fetal hemoglobin interfere with the HbA1C  assay. Heterozygous hemoglobin variants (HbS, HgC, etc)do  not significantly interfere with this assay.   However, presence of multiple variants may affect accuracy.     07/23/2019 6.8 (H) 4.0 - 5.6 % Final     Comment:     ADA Screening Guidelines:  5.7-6.4%  Consistent with prediabetes  >or=6.5%  Consistent with diabetes  High levels of fetal hemoglobin interfere with the HbA1C  assay. Heterozygous hemoglobin variants (HbS, HgC, etc)do  not significantly interfere with this assay.   However, presence of multiple variants may affect accuracy.     02/08/2019 6.9 (H) 4.0 - 5.6 % Final     Comment:     ADA Screening Guidelines:  5.7-6.4%   Consistent with prediabetes  >or=6.5%  Consistent with diabetes  High levels of fetal hemoglobin interfere with the HbA1C  assay. Heterozygous hemoglobin variants (HbS, HgC, etc)do  not significantly interfere with this assay.   However, presence of multiple variants may affect accuracy.         Review of Systems   Constitution: Negative for chills, decreased appetite, diaphoresis and fever.   HENT: Positive for hearing loss.    Cardiovascular: Negative for chest pain, claudication, leg swelling and syncope.   Respiratory: Negative for cough and shortness of breath.    Skin: Positive for dry skin and nail changes. Negative for color change, flushing, itching, poor wound healing, rash, suspicious lesions and unusual hair distribution.   Musculoskeletal: Positive for joint pain. Negative for joint swelling and myalgias.   Gastrointestinal: Negative for nausea and vomiting.   Neurological: Positive for numbness. Negative for loss of balance, paresthesias and sensory change.   Psychiatric/Behavioral: Negative for altered mental status, suicidal ideas and thoughts of violence. The patient is not nervous/anxious.            Objective:      Physical Exam  Constitutional:       General: She is not in acute distress.     Appearance: She is well-developed. She is not diaphoretic.   Cardiovascular:      Comments: Skin temperature is within normal limits. Toes are cool to touch and feet are warm proximally. Hair growth is diminished. Skin is mildly atrophic and with mild hyperpigmentation. Mild edema noted, jyoti.   Musculoskeletal:         General: No tenderness.      Right foot: Decreased range of motion. Deformity present.      Left foot: Decreased range of motion. Deformity present.      Comments: Adequate joint range of motion without pain, limitation, nor crepitation to bilateral feet and ankle joints.Muscle strength is 4/5 in all groups bilaterally.       Feet:      Right foot:      Skin integrity: Dry skin present. No  blister, erythema or warmth.      Left foot:      Skin integrity: Dry skin present. No blister, erythema or warmth.   Lymphadenopathy:      Comments: Negative lymphadenopathy bilateral popliteal fossa and tarsal tunnel.    Skin:     General: Skin is warm and dry.      Coloration: Skin is not pale.      Findings: No abrasion, bruising, burn, erythema, laceration, petechiae or rash.      Nails: There is no clubbing.        Comments: Skin is warm and dry, bilaterally, no acute SOI noted, bilaterally, appears stable.     Nails are thickened by 2-4 mm's, dystrophic, and are darkened in coloration with subungual fungal debris.        Neurological:      Mental Status: She is alert and oriented to person, place, and time.      Sensory: No sensory deficit.      Motor: No abnormal muscle tone.      Comments: Vibratory sensation is diminished jyoti.    Psychiatric:         Behavior: Behavior normal.         Thought Content: Thought content normal.         Judgment: Judgment normal.               Assessment:       Encounter Diagnoses   Name Primary?    Type 2 diabetes mellitus with diabetic nephropathy, without long-term current use of insulin     Onychomycosis due to dermatophyte Yes    Encounter for diabetic foot exam          Plan:       Krystina was seen today for diabetes mellitus and diabetic foot exam.    Diagnoses and all orders for this visit:    Onychomycosis due to dermatophyte  -     Routine Foot Care    Type 2 diabetes mellitus with diabetic nephropathy, without long-term current use of insulin  -     Ambulatory referral/consult to Podiatry  -     DIABETIC SHOES FOR HOME USE  -     Routine Foot Care    Encounter for diabetic foot exam      I counseled the patient on her conditions, their implications and medical management.    Routine nail care per attached note.     Shoe inspection. Diabetic Foot Education. Patient reminded of the importance of good nutrition and blood sugar control to help prevent podiatric  complications of diabetes. Patient instructed on proper foot hygeine. We discussed wearing proper shoe gear, daily foot inspections, never walking without protective shoe gear, never putting sharp instruments to feet, routine podiatric nail visits      Discussed general foot care:  Wear comfortable, proper fitting shoes. Wash feet daily. Dry well. After drying, apply moisturizer to feet (no lotion to webspaces). Inspect feet daily for skin breaks, blisters, swelling, or redness. Wear cotton socks (preferably white)  Change socks every day. Do NOT walk barefoot. Do NOT use heating pads or warm/hot water soaks. I discussed with the  patient  signs and symptoms of infection including redness, drainage, purulence, odor, pain, elevated BS, streaking, fever, chills, etc . Patient is to seek medical attention (ER or urgent care) if these symptoms occur      RTC in 3-6 mo sooner PRN

## 2020-10-20 NOTE — LETTER
October 20, 2020      Brianna Anguiano, NP  [address]  [place] [address]           [place] [facility]  [address]  [place] [address]  Phone: [phone]  Fax: [phone]          Patient: Krystina Washington   MR Number: [id number]   YOB: 1932   Date of Visit: 10/20/2020       Dear Brianna Anguiano:    Thank you for referring Krystina Washington to me for evaluation. Attached you will find relevant portions of my assessment and plan of care.    If you have questions, please do not hesitate to call me. I look forward to following Krystina Washington along with you.    Sincerely,    Yue Andrew, ZENOBIA    Enclosure  CC:  No Recipients    If you would like to receive this communication electronically, please contact [email or web address] or [phone] to request more information on [employer] Link access.    For providers and/or their staff who would like to refer a patient to [facility], please contact us through our one-stop-shop provider referral line, [facility], at [phone].    If you feel you have received this communication in error or would no longer like to receive these types of communications, please e-mail [email or web address]

## 2020-10-21 ENCOUNTER — TELEPHONE (OUTPATIENT)
Dept: FAMILY MEDICINE | Facility: CLINIC | Age: 85
End: 2020-10-21

## 2020-10-21 NOTE — TELEPHONE ENCOUNTER
----- Message from Beatrice Gardner sent at 10/21/2020  2:43 PM CDT -----  Type:  Needs Medical Advice    Who Called: Brigid Lafleur (daughter)  Symptoms (please be specific): pt fell and bruised her left elbow, when daughter tries to get her to bend it she says it hurts her; pt is scheduled with Dr. Rajan but daughter still wanted to notify Dr. Bauer   How long has patient had these symptoms:  Sunday  Pharmacy name and phone #:  n/a  Would the patient rather a call back or a response via [facility]? Call back  Best Call Back Number:  [phone] (Tay, son)  Additional Information: none

## 2020-10-22 ENCOUNTER — TELEPHONE (OUTPATIENT)
Dept: INTERNAL MEDICINE | Facility: CLINIC | Age: 85
End: 2020-10-22

## 2020-10-22 ENCOUNTER — OFFICE VISIT (OUTPATIENT)
Dept: INTERNAL MEDICINE | Facility: CLINIC | Age: 85
End: 2020-10-22
Payer: MEDICARE

## 2020-10-22 ENCOUNTER — TELEPHONE (OUTPATIENT)
Dept: FAMILY MEDICINE | Facility: CLINIC | Age: 85
End: 2020-10-22

## 2020-10-22 ENCOUNTER — HOSPITAL ENCOUNTER (OUTPATIENT)
Dept: RADIOLOGY | Facility: HOSPITAL | Age: 85
Discharge: HOME | End: 2020-10-22
Attending: INTERNAL MEDICINE
Payer: MEDICARE

## 2020-10-22 VITALS
HEART RATE: 74 BPM | WEIGHT: 180.75 LBS | HEIGHT: 63 IN | SYSTOLIC BLOOD PRESSURE: 120 MMHG | OXYGEN SATURATION: 95 % | BODY MASS INDEX: 32.03 KG/M2 | DIASTOLIC BLOOD PRESSURE: 68 MMHG

## 2020-10-22 DIAGNOSIS — E11.22 TYPE 2 DIABETES MELLITUS WITH STAGE 3 CHRONIC KIDNEY DISEASE, UNSPECIFIED WHETHER LONG TERM INSULIN USE, UNSPECIFIED WHETHER STAGE 3A OR 3B CKD: ICD-10-CM

## 2020-10-22 DIAGNOSIS — M25.522 LEFT ELBOW PAIN: ICD-10-CM

## 2020-10-22 DIAGNOSIS — Z95.1 S/P CABG X 3: ICD-10-CM

## 2020-10-22 DIAGNOSIS — R60.0 LOCALIZED EDEMA: ICD-10-CM

## 2020-10-22 DIAGNOSIS — N18.30 TYPE 2 DIABETES MELLITUS WITH STAGE 3 CHRONIC KIDNEY DISEASE, UNSPECIFIED WHETHER LONG TERM INSULIN USE, UNSPECIFIED WHETHER STAGE 3A OR 3B CKD: ICD-10-CM

## 2020-10-22 DIAGNOSIS — I10 ESSENTIAL HYPERTENSION: ICD-10-CM

## 2020-10-22 DIAGNOSIS — M79.89 LEFT ARM SWELLING: Primary | ICD-10-CM

## 2020-10-22 DIAGNOSIS — N18.30 STAGE 3 CHRONIC KIDNEY DISEASE, UNSPECIFIED WHETHER STAGE 3A OR 3B CKD: ICD-10-CM

## 2020-10-22 DIAGNOSIS — M25.522 LEFT ELBOW PAIN: Primary | ICD-10-CM

## 2020-10-22 PROCEDURE — 73080 X-RAY EXAM OF ELBOW: CPT | Mod: TC,FY,PO,LT

## 2020-10-22 PROCEDURE — 99215 OFFICE O/P EST HI 40 MIN: CPT | Mod: PBBFAC,25,PO | Performed by: INTERNAL MEDICINE

## 2020-10-22 PROCEDURE — 99999 PR PBB SHADOW E&M-EST. PATIENT-LVL V: ICD-10-PCS | Mod: PBBFAC,,, | Performed by: INTERNAL MEDICINE

## 2020-10-22 PROCEDURE — 99214 OFFICE O/P EST MOD 30 MIN: CPT | Mod: S$PBB,,, | Performed by: INTERNAL MEDICINE

## 2020-10-22 PROCEDURE — 99999 PR PBB SHADOW E&M-EST. PATIENT-LVL V: CPT | Mod: PBBFAC,,, | Performed by: INTERNAL MEDICINE

## 2020-10-22 PROCEDURE — 99214 PR OFFICE/OUTPT VISIT, EST, LEVL IV, 30-39 MIN: ICD-10-PCS | Mod: S$PBB,,, | Performed by: INTERNAL MEDICINE

## 2020-10-22 PROCEDURE — 73080 XR ELBOW COMPLETE 3 VIEW LEFT: ICD-10-PCS | Mod: 26,LT,, | Performed by: RADIOLOGY

## 2020-10-22 PROCEDURE — 73080 X-RAY EXAM OF ELBOW: CPT | Mod: 26,LT,, | Performed by: RADIOLOGY

## 2020-10-22 NOTE — TELEPHONE ENCOUNTER
Spoke to patients family member Hermelindo Mackenzie, informed him that Xray did show small fracture. Advised patient to wear a wrap around area and take Tylenol. If no improvement in 2-3 weeks, we will cast arm (L). Mr. Casarez verbalized understanding.

## 2020-10-22 NOTE — PROGRESS NOTES
Greetings    The results of your xray  imaging test was all normal. Please continue to ice , tylenol and use the sling. If symptoms do not improve do not hesitate to see your PCP . Please let us know if you have any questions

## 2020-10-22 NOTE — TELEPHONE ENCOUNTER
"----- Message from Edward Rajan III, MD sent at 10/22/2020  5:15 PM CDT -----  Regarding: Xray  Hi  I messaged dr Lucero for this patient given she was in a lot of pain    He says " it looks like she has a small fracture through the bone spur at the triceps attachment. Those can be painful but should resolve without surgery. A soft wrap would help or if that doesnt work we could put a cast for 2-3 weeks."    Thanks!    "

## 2020-10-22 NOTE — PROGRESS NOTES
"Subjective:       Patient ID: Krystina Washington is a 88 y.o. female.    Chief Complaint: Fall    This patient is new to me    Arm pain  Patient reports that she had sudden onset of left-sided elbow pain.  Symptoms started on Tuesday.  Patient does report a fall on Sunday.  She reports she slept when she was walking from the bed.  She fell backwards.  She reports not having or head.  She states she had no palpitations, chest pain, shortness of breath prior to her fall.  She reports that this was taking care of with ice.  She has been taking Tylenol for pain.  This started noticing swelling  in the arm.  No numbness or weakness.  No erythema.  Mild bruising on the arm    HPI  Review of Systems   Constitutional:        See hpi   All other systems reviewed and are negative.        Objective:       /68 (BP Location: Right arm, Patient Position: Sitting, BP Method: Medium (Manual))   Pulse 74   Ht 5' 3" (1.6 m)   Wt 82 kg (180 lb 12.4 oz)   LMP  (LMP Unknown)   SpO2 95%   BMI 32.02 kg/m²     Physical Exam  Vitals signs and nursing note reviewed.   Constitutional:       General: She is not in acute distress.     Appearance: She is well-developed. She is not diaphoretic.   HENT:      Head: Normocephalic.      Nose: Nose normal.   Eyes:      General:         Right eye: No discharge.         Left eye: No discharge.      Conjunctiva/sclera: Conjunctivae normal.      Pupils: Pupils are equal, round, and reactive to light.   Neck:      Musculoskeletal: Normal range of motion.   Cardiovascular:      Rate and Rhythm: Normal rate and regular rhythm.      Heart sounds: Normal heart sounds. No murmur. No friction rub. No gallop.    Pulmonary:      Effort: Pulmonary effort is normal. No respiratory distress.   Abdominal:      General: Bowel sounds are normal. There is no distension.      Palpations: Abdomen is soft.   Musculoskeletal: Normal range of motion.         General: No deformity.      Comments: Swelling around the " left elbow. Lateral joint tenderness to palpaition. Faint eccymosis on the left arm   Skin:     General: Skin is warm.   Neurological:      General: No focal deficit present.      Mental Status: She is alert and oriented to person, place, and time.      Sensory: No sensory deficit.      Motor: No weakness.      Coordination: Coordination normal.           BMP  Lab Results   Component Value Date     10/06/2020    K 4.2 10/06/2020     10/06/2020    CO2 30 (H) 10/06/2020    BUN 30 (H) 10/06/2020    CREATININE 1.4 10/06/2020    CALCIUM 9.8 10/06/2020    ANIONGAP 10 10/06/2020    ESTGFRAFRICA 38.7 (A) 10/06/2020    EGFRNONAA 33.6 (A) 10/06/2020         Assessment:       1. Left elbow pain    2. Essential hypertension    3. Type 2 diabetes mellitus with stage 3 chronic kidney disease, unspecified whether long term insulin use, unspecified whether stage 3a or 3b CKD    4. S/P CABG x 3    5. Stage 3 chronic kidney disease, unspecified whether stage 3a or 3b CKD        Plan:       Krystina was seen today for fall.    Diagnoses and all orders for this visit:    Left elbow pain  New;uncontrolled  Unclear etiology but fx on the ddx given recent fally   history or pyhsical exam do not suggest VTE, cellutis,   I provided instruction on supportive care measures   Avoid NSaids given renal and CV dysfuntion  Check XR   reviewed signs and symptoms that should prompt return to provider or evaluation in the ED  -     X-Ray Elbow Complete 3 view Left; Future          Future Appointments   Date Time Provider Department Center   12/9/2020  8:30 AM LU Wheatley [facility]   12/9/2020  9:20 AM Niyah Ariza MD [facility]   1/6/2021 11:00 AM Oniel Johnson MD [facility]   1/20/2021 10:15 AM Yue Andrew DPM [facility]         Medication List with Changes/Refills   Current Medications    ACETAMINOPHEN (TYLENOL) 325 MG TABLET    Take 650 mg by  mouth every 6 (six) hours as needed for Pain.    ALBUTEROL 90 MCG/ACTUATION INHALER    Inhale 1 puff into the lungs every 6 (six) hours as needed for Wheezing. 1 HFA Aerosol Inhaler Inhalation Twice a day    ALLOPURINOL (ZYLOPRIM) 100 MG TABLET    TAKE 1 TABLET BY MOUTH ONCE DAILY    ASPIRIN 81 MG CHEWABLE TABLET    Take 81 mg by mouth. 1 Tablet, Chewable Oral Every day    BLOOD SUGAR DIAGNOSTIC STRP    To check BG 1 times daily, to use with insurance preferred meter    BLOOD-GLUCOSE METER (FREESTYLE SYSTEM KIT) KIT    Use as instructed    CARVEDILOL (COREG) 12.5 MG TABLET    TAKE 1 TABLET(12.5 MG) BY MOUTH TWICE DAILY    CLONIDINE 0.3 MG/24 HR TD PTWK (CATAPRES) 0.3 MG/24 HR    APPLY 1 PATCH WEEKLY    CLOPIDOGREL (PLAVIX) 75 MG TABLET    Take 1 tablet (75 mg total) by mouth once daily.    ERYTHROMYCIN (ROMYCIN) OPHTHALMIC OINTMENT    Place into the left eye every evening.    ESCITALOPRAM OXALATE (LEXAPRO) 20 MG TABLET    TAKE 1 TABLET BY MOUTH ONCE DAILY    FUROSEMIDE (LASIX) 20 MG TABLET    Take 1 tablet (20 mg total) by mouth once daily.    HYDRALAZINE (APRESOLINE) 100 MG TABLET    TAKE 1 TABLET (100 MG TOTAL) BY MOUTH EVERY 8 (EIGHT) HOURS.    IRBESARTAN (AVAPRO) 300 MG TABLET    Take 1 tablet (300 mg total) by mouth every evening.    ISOSORBIDE MONONITRATE (IMDUR) 60 MG 24 HR TABLET    TAKE 1 TABLET (60 MG TOTAL) BY MOUTH ONCE DAILY.    LANCETS MISC    To check BG 1 times daily, to use with insurance preferred meter    NITROGLYCERIN 0.4 MG/DOSE TL SPRY (NITROLINGUAL) 400 MCG/SPRAY SPRAY    PLACE 1 SPRAY ONTO THE TONGUE EVERY 5 (FIVE) MINUTES AS NEEDED.    PRAVASTATIN (PRAVACHOL) 40 MG TABLET    TAKE 1 TABLET BY MOUTH EVERY EVENING    TIMOLOL MALEATE 0.5% (TIMOPTIC) 0.5 % DROP    Place 1 drop into both eyes 2 (two) times daily.         Disclaimer:  This note has been generated using voice-recognition software. There may be grammatical or spelling errors that have been missed during proof-reading

## 2020-10-22 NOTE — PATIENT INSTRUCTIONS
We were happy to see you today    For your Testing  Please have your labs and imaging test done today      For your Medication   Continue icing  Tylenol  Get a sling for the arm       For more information about side effects please visit medlineplus.gov            Please return to clinic in    As needed

## 2020-10-22 NOTE — TELEPHONE ENCOUNTER
----- Message from Odette Lagos sent at 10/22/2020  3:20 PM CDT -----  Regarding: Call back  Contact: [phone]  Patients grandson is calling to talk to nurse in regards to patient is still having severe pain and would like pain medication to be sent to the pharmacy and also needs test results on her X-rays done today. [facility]/pharmacy #[id number] - [place] - [address] AT [facility] [phone] (Phone)  [phone] (Fax)

## 2020-12-02 ENCOUNTER — HOSPITAL ENCOUNTER (INPATIENT)
Facility: HOSPITAL | Age: 85
LOS: 2 days | Discharge: HOME HEALTH | DRG: 065 | End: 2020-12-05
Attending: EMERGENCY MEDICINE | Admitting: PSYCHIATRY & NEUROLOGY
Payer: MEDICARE

## 2020-12-02 DIAGNOSIS — R41.82 AMS (ALTERED MENTAL STATUS): ICD-10-CM

## 2020-12-02 DIAGNOSIS — I63.531 ACUTE ISCHEMIC RIGHT POSTERIOR CEREBRAL ARTERY (PCA) STROKE: ICD-10-CM

## 2020-12-02 DIAGNOSIS — I63.9 CEREBROVASCULAR ACCIDENT (CVA), UNSPECIFIED MECHANISM: ICD-10-CM

## 2020-12-02 DIAGNOSIS — I63.532 ACUTE ISCHEMIC LEFT POSTERIOR CEREBRAL ARTERY (PCA) STROKE: ICD-10-CM

## 2020-12-02 LAB
ALBUMIN SERPL BCP-MCNC: 2.9 G/DL (ref 3.5–5.2)
ALP SERPL-CCNC: 80 U/L (ref 55–135)
ALT SERPL W/O P-5'-P-CCNC: 11 U/L (ref 10–44)
AMPHET+METHAMPHET UR QL: NEGATIVE
ANION GAP SERPL CALC-SCNC: 8 MMOL/L (ref 8–16)
AST SERPL-CCNC: 24 U/L (ref 10–40)
BACTERIA #/AREA URNS AUTO: ABNORMAL /HPF
BARBITURATES UR QL SCN>200 NG/ML: NEGATIVE
BASOPHILS # BLD AUTO: 0.02 K/UL (ref 0–0.2)
BASOPHILS NFR BLD: 0.2 % (ref 0–1.9)
BENZODIAZ UR QL SCN>200 NG/ML: NEGATIVE
BILIRUB SERPL-MCNC: 0.2 MG/DL (ref 0.1–1)
BILIRUB UR QL STRIP: NEGATIVE
BNP SERPL-MCNC: 802 PG/ML (ref 0–99)
BUN SERPL-MCNC: 37 MG/DL (ref 8–23)
BZE UR QL SCN: NEGATIVE
CALCIUM SERPL-MCNC: 9.1 MG/DL (ref 8.7–10.5)
CANNABINOIDS UR QL SCN: NEGATIVE
CHLORIDE SERPL-SCNC: 105 MMOL/L (ref 95–110)
CLARITY UR REFRACT.AUTO: ABNORMAL
CO2 SERPL-SCNC: 26 MMOL/L (ref 23–29)
COLOR UR AUTO: YELLOW
CREAT SERPL-MCNC: 1.4 MG/DL (ref 0.5–1.4)
CREAT UR-MCNC: 44 MG/DL (ref 15–325)
CTP QC/QA: YES
DIFFERENTIAL METHOD: ABNORMAL
EOSINOPHIL # BLD AUTO: 0.3 K/UL (ref 0–0.5)
EOSINOPHIL NFR BLD: 2.9 % (ref 0–8)
ERYTHROCYTE [DISTWIDTH] IN BLOOD BY AUTOMATED COUNT: 14.9 % (ref 11.5–14.5)
EST. GFR  (AFRICAN AMERICAN): 38.7 ML/MIN/1.73 M^2
EST. GFR  (NON AFRICAN AMERICAN): 33.6 ML/MIN/1.73 M^2
ETHANOL SERPL-MCNC: <10 MG/DL
GLUCOSE SERPL-MCNC: 131 MG/DL (ref 70–110)
GLUCOSE UR QL STRIP: NEGATIVE
HCT VFR BLD AUTO: 33.1 % (ref 37–48.5)
HGB BLD-MCNC: 10.2 G/DL (ref 12–16)
HGB UR QL STRIP: NEGATIVE
HYALINE CASTS UR QL AUTO: 0 /LPF
IMM GRANULOCYTES # BLD AUTO: 0.05 K/UL (ref 0–0.04)
IMM GRANULOCYTES NFR BLD AUTO: 0.4 % (ref 0–0.5)
KETONES UR QL STRIP: NEGATIVE
LACTATE SERPL-SCNC: 0.9 MMOL/L (ref 0.5–2.2)
LEUKOCYTE ESTERASE UR QL STRIP: ABNORMAL
LIPASE SERPL-CCNC: 46 U/L (ref 4–60)
LYMPHOCYTES # BLD AUTO: 2.2 K/UL (ref 1–4.8)
LYMPHOCYTES NFR BLD: 19.5 % (ref 18–48)
MAGNESIUM SERPL-MCNC: 2 MG/DL (ref 1.6–2.6)
MCH RBC QN AUTO: 31.4 PG (ref 27–31)
MCHC RBC AUTO-ENTMCNC: 30.8 G/DL (ref 32–36)
MCV RBC AUTO: 102 FL (ref 82–98)
METHADONE UR QL SCN>300 NG/ML: NEGATIVE
MICROSCOPIC COMMENT: ABNORMAL
MONOCYTES # BLD AUTO: 0.9 K/UL (ref 0.3–1)
MONOCYTES NFR BLD: 8.4 % (ref 4–15)
NEUTROPHILS # BLD AUTO: 7.7 K/UL (ref 1.8–7.7)
NEUTROPHILS NFR BLD: 68.6 % (ref 38–73)
NITRITE UR QL STRIP: NEGATIVE
NRBC BLD-RTO: 0 /100 WBC
OPIATES UR QL SCN: NEGATIVE
PCP UR QL SCN>25 NG/ML: NEGATIVE
PH UR STRIP: 5 [PH] (ref 5–8)
PLATELET # BLD AUTO: 190 K/UL (ref 150–350)
PMV BLD AUTO: 12.8 FL (ref 9.2–12.9)
POCT GLUCOSE: 133 MG/DL (ref 70–110)
POTASSIUM SERPL-SCNC: 4.1 MMOL/L (ref 3.5–5.1)
PROT SERPL-MCNC: 7.1 G/DL (ref 6–8.4)
PROT UR QL STRIP: ABNORMAL
RBC # BLD AUTO: 3.25 M/UL (ref 4–5.4)
RBC #/AREA URNS AUTO: 1 /HPF (ref 0–4)
SARS-COV-2 RDRP RESP QL NAA+PROBE: NEGATIVE
SODIUM SERPL-SCNC: 139 MMOL/L (ref 136–145)
SP GR UR STRIP: 1.01 (ref 1–1.03)
SQUAMOUS #/AREA URNS AUTO: 7 /HPF
TOXICOLOGY INFORMATION: NORMAL
TROPONIN I SERPL DL<=0.01 NG/ML-MCNC: 0.06 NG/ML (ref 0–0.03)
TSH SERPL DL<=0.005 MIU/L-ACNC: 1.92 UIU/ML (ref 0.4–4)
URN SPEC COLLECT METH UR: ABNORMAL
WBC # BLD AUTO: 11.21 K/UL (ref 3.9–12.7)
WBC #/AREA URNS AUTO: 6 /HPF (ref 0–5)

## 2020-12-02 PROCEDURE — 80061 LIPID PANEL: CPT

## 2020-12-02 PROCEDURE — 81001 URINALYSIS AUTO W/SCOPE: CPT

## 2020-12-02 PROCEDURE — 99291 PR CRITICAL CARE, E/M 30-74 MINUTES: ICD-10-PCS | Mod: CS,,, | Performed by: PHYSICIAN ASSISTANT

## 2020-12-02 PROCEDURE — 80053 COMPREHEN METABOLIC PANEL: CPT

## 2020-12-02 PROCEDURE — 83880 ASSAY OF NATRIURETIC PEPTIDE: CPT

## 2020-12-02 PROCEDURE — 83735 ASSAY OF MAGNESIUM: CPT

## 2020-12-02 PROCEDURE — 82962 GLUCOSE BLOOD TEST: CPT

## 2020-12-02 PROCEDURE — 83036 HEMOGLOBIN GLYCOSYLATED A1C: CPT

## 2020-12-02 PROCEDURE — U0002 COVID-19 LAB TEST NON-CDC: HCPCS | Performed by: PHYSICIAN ASSISTANT

## 2020-12-02 PROCEDURE — 80320 DRUG SCREEN QUANTALCOHOLS: CPT

## 2020-12-02 PROCEDURE — 84443 ASSAY THYROID STIM HORMONE: CPT

## 2020-12-02 PROCEDURE — 84484 ASSAY OF TROPONIN QUANT: CPT

## 2020-12-02 PROCEDURE — 83690 ASSAY OF LIPASE: CPT

## 2020-12-02 PROCEDURE — 85025 COMPLETE CBC W/AUTO DIFF WBC: CPT

## 2020-12-02 PROCEDURE — 80307 DRUG TEST PRSMV CHEM ANLYZR: CPT

## 2020-12-02 PROCEDURE — 83605 ASSAY OF LACTIC ACID: CPT

## 2020-12-02 PROCEDURE — 87040 BLOOD CULTURE FOR BACTERIA: CPT | Mod: 59

## 2020-12-02 PROCEDURE — 93010 EKG 12-LEAD: ICD-10-PCS | Mod: ,,, | Performed by: INTERNAL MEDICINE

## 2020-12-02 PROCEDURE — 93010 ELECTROCARDIOGRAM REPORT: CPT | Mod: ,,, | Performed by: INTERNAL MEDICINE

## 2020-12-02 PROCEDURE — 93005 ELECTROCARDIOGRAM TRACING: CPT

## 2020-12-02 PROCEDURE — 99291 CRITICAL CARE FIRST HOUR: CPT | Mod: CS,,, | Performed by: PHYSICIAN ASSISTANT

## 2020-12-02 PROCEDURE — 99291 CRITICAL CARE FIRST HOUR: CPT | Mod: 25

## 2020-12-03 PROBLEM — A41.9 SEVERE SEPSIS: Status: RESOLVED | Noted: 2019-02-08 | Resolved: 2020-12-03

## 2020-12-03 PROBLEM — J11.1 INFLUENZA: Status: RESOLVED | Noted: 2019-02-08 | Resolved: 2020-12-03

## 2020-12-03 PROBLEM — I63.532 ACUTE ISCHEMIC LEFT POSTERIOR CEREBRAL ARTERY (PCA) STROKE: Status: ACTIVE | Noted: 2020-12-03

## 2020-12-03 PROBLEM — R65.20 SEVERE SEPSIS: Status: RESOLVED | Noted: 2019-02-08 | Resolved: 2020-12-03

## 2020-12-03 PROBLEM — N17.9 ACUTE KIDNEY INJURY SUPERIMPOSED ON CKD: Status: RESOLVED | Noted: 2018-09-07 | Resolved: 2020-12-03

## 2020-12-03 PROBLEM — D53.9 MACROCYTIC ANEMIA: Status: RESOLVED | Noted: 2018-08-03 | Resolved: 2020-12-03

## 2020-12-03 PROBLEM — I50.20 HFREF (HEART FAILURE WITH REDUCED EJECTION FRACTION): Status: ACTIVE | Noted: 2020-12-03

## 2020-12-03 PROBLEM — R68.89 MULTIPLE COMPLAINTS: Status: RESOLVED | Noted: 2019-02-08 | Resolved: 2020-12-03

## 2020-12-03 PROBLEM — N18.9 ACUTE KIDNEY INJURY SUPERIMPOSED ON CKD: Status: RESOLVED | Noted: 2018-09-07 | Resolved: 2020-12-03

## 2020-12-03 PROBLEM — H02.006: Status: RESOLVED | Noted: 2020-03-06 | Resolved: 2020-12-03

## 2020-12-03 PROBLEM — I21.4 NSTEMI (NON-ST ELEVATED MYOCARDIAL INFARCTION): Status: RESOLVED | Noted: 2017-01-19 | Resolved: 2020-12-03

## 2020-12-03 PROBLEM — D72.829 LEUKOCYTOSIS: Status: RESOLVED | Noted: 2019-07-26 | Resolved: 2020-12-03

## 2020-12-03 LAB
ALBUMIN SERPL BCP-MCNC: 3 G/DL (ref 3.5–5.2)
ALP SERPL-CCNC: 77 U/L (ref 55–135)
ALT SERPL W/O P-5'-P-CCNC: 11 U/L (ref 10–44)
ANION GAP SERPL CALC-SCNC: 8 MMOL/L (ref 8–16)
ANION GAP SERPL CALC-SCNC: 9 MMOL/L (ref 8–16)
APTT BLDCRRT: 30.4 SEC (ref 21–32)
AST SERPL-CCNC: 21 U/L (ref 10–40)
BASOPHILS # BLD AUTO: 0.03 K/UL (ref 0–0.2)
BASOPHILS NFR BLD: 0.3 % (ref 0–1.9)
BILIRUB SERPL-MCNC: 0.4 MG/DL (ref 0.1–1)
BUN SERPL-MCNC: 33 MG/DL (ref 8–23)
BUN SERPL-MCNC: 33 MG/DL (ref 8–23)
CALCIUM SERPL-MCNC: 9.2 MG/DL (ref 8.7–10.5)
CALCIUM SERPL-MCNC: 9.4 MG/DL (ref 8.7–10.5)
CHLORIDE SERPL-SCNC: 100 MMOL/L (ref 95–110)
CHLORIDE SERPL-SCNC: 104 MMOL/L (ref 95–110)
CHOLEST SERPL-MCNC: 116 MG/DL (ref 120–199)
CHOLEST/HDLC SERPL: 2.6 {RATIO} (ref 2–5)
CK MB SERPL-MCNC: 5.2 NG/ML (ref 0.1–6.5)
CK MB SERPL-RTO: 5 % (ref 0–5)
CK SERPL-CCNC: 104 U/L (ref 20–180)
CO2 SERPL-SCNC: 27 MMOL/L (ref 23–29)
CO2 SERPL-SCNC: 29 MMOL/L (ref 23–29)
CREAT SERPL-MCNC: 1.3 MG/DL (ref 0.5–1.4)
CREAT SERPL-MCNC: 1.4 MG/DL (ref 0.5–1.4)
DIFFERENTIAL METHOD: ABNORMAL
EOSINOPHIL # BLD AUTO: 0.3 K/UL (ref 0–0.5)
EOSINOPHIL NFR BLD: 2.8 % (ref 0–8)
ERYTHROCYTE [DISTWIDTH] IN BLOOD BY AUTOMATED COUNT: 14.8 % (ref 11.5–14.5)
EST. GFR  (AFRICAN AMERICAN): 38.7 ML/MIN/1.73 M^2
EST. GFR  (AFRICAN AMERICAN): 42.3 ML/MIN/1.73 M^2
EST. GFR  (NON AFRICAN AMERICAN): 33.6 ML/MIN/1.73 M^2
EST. GFR  (NON AFRICAN AMERICAN): 36.7 ML/MIN/1.73 M^2
ESTIMATED AVG GLUCOSE: 134 MG/DL (ref 68–131)
GLUCOSE SERPL-MCNC: 125 MG/DL (ref 70–110)
GLUCOSE SERPL-MCNC: 132 MG/DL (ref 70–110)
HBA1C MFR BLD HPLC: 6.3 % (ref 4–5.6)
HCT VFR BLD AUTO: 32.5 % (ref 37–48.5)
HDLC SERPL-MCNC: 44 MG/DL (ref 40–75)
HDLC SERPL: 37.9 % (ref 20–50)
HGB BLD-MCNC: 10.1 G/DL (ref 12–16)
IMM GRANULOCYTES # BLD AUTO: 0.04 K/UL (ref 0–0.04)
IMM GRANULOCYTES NFR BLD AUTO: 0.4 % (ref 0–0.5)
INR PPP: 1 (ref 0.8–1.2)
LDLC SERPL CALC-MCNC: 55.6 MG/DL (ref 63–159)
LYMPHOCYTES # BLD AUTO: 2 K/UL (ref 1–4.8)
LYMPHOCYTES NFR BLD: 20.2 % (ref 18–48)
MAGNESIUM SERPL-MCNC: 2 MG/DL (ref 1.6–2.6)
MCH RBC QN AUTO: 31.7 PG (ref 27–31)
MCHC RBC AUTO-ENTMCNC: 31.1 G/DL (ref 32–36)
MCV RBC AUTO: 102 FL (ref 82–98)
MONOCYTES # BLD AUTO: 0.8 K/UL (ref 0.3–1)
MONOCYTES NFR BLD: 7.8 % (ref 4–15)
NEUTROPHILS # BLD AUTO: 6.8 K/UL (ref 1.8–7.7)
NEUTROPHILS NFR BLD: 68.5 % (ref 38–73)
NONHDLC SERPL-MCNC: 72 MG/DL
NRBC BLD-RTO: 0 /100 WBC
PHOSPHATE SERPL-MCNC: 3.3 MG/DL (ref 2.7–4.5)
PLATELET # BLD AUTO: 201 K/UL (ref 150–350)
PMV BLD AUTO: 12.3 FL (ref 9.2–12.9)
POCT GLUCOSE: 116 MG/DL (ref 70–110)
POCT GLUCOSE: 143 MG/DL (ref 70–110)
POCT GLUCOSE: 147 MG/DL (ref 70–110)
POCT GLUCOSE: 149 MG/DL (ref 70–110)
POCT GLUCOSE: 95 MG/DL (ref 70–110)
POTASSIUM SERPL-SCNC: 3.5 MMOL/L (ref 3.5–5.1)
POTASSIUM SERPL-SCNC: 3.8 MMOL/L (ref 3.5–5.1)
PROT SERPL-MCNC: 7.1 G/DL (ref 6–8.4)
PROTHROMBIN TIME: 11.4 SEC (ref 9–12.5)
RBC # BLD AUTO: 3.19 M/UL (ref 4–5.4)
SODIUM SERPL-SCNC: 136 MMOL/L (ref 136–145)
SODIUM SERPL-SCNC: 141 MMOL/L (ref 136–145)
TRIGL SERPL-MCNC: 82 MG/DL (ref 30–150)
TROPONIN I SERPL DL<=0.01 NG/ML-MCNC: 0.06 NG/ML (ref 0–0.03)
TROPONIN I SERPL DL<=0.01 NG/ML-MCNC: 0.07 NG/ML (ref 0–0.03)
WBC # BLD AUTO: 9.95 K/UL (ref 3.9–12.7)

## 2020-12-03 PROCEDURE — 85730 THROMBOPLASTIN TIME PARTIAL: CPT

## 2020-12-03 PROCEDURE — 97802 MEDICAL NUTRITION INDIV IN: CPT

## 2020-12-03 PROCEDURE — 99231 SBSQ HOSP IP/OBS SF/LOW 25: CPT | Mod: GC,,, | Performed by: HOSPITALIST

## 2020-12-03 PROCEDURE — 97535 SELF CARE MNGMENT TRAINING: CPT

## 2020-12-03 PROCEDURE — 36415 COLL VENOUS BLD VENIPUNCTURE: CPT

## 2020-12-03 PROCEDURE — 84100 ASSAY OF PHOSPHORUS: CPT

## 2020-12-03 PROCEDURE — 25000003 PHARM REV CODE 250: Performed by: STUDENT IN AN ORGANIZED HEALTH CARE EDUCATION/TRAINING PROGRAM

## 2020-12-03 PROCEDURE — 85610 PROTHROMBIN TIME: CPT

## 2020-12-03 PROCEDURE — 99221 1ST HOSP IP/OBS SF/LOW 40: CPT | Mod: AI,ICN,CMP, | Performed by: PSYCHIATRY & NEUROLOGY

## 2020-12-03 PROCEDURE — 92610 EVALUATE SWALLOWING FUNCTION: CPT

## 2020-12-03 PROCEDURE — 97165 OT EVAL LOW COMPLEX 30 MIN: CPT

## 2020-12-03 PROCEDURE — 97530 THERAPEUTIC ACTIVITIES: CPT

## 2020-12-03 PROCEDURE — 97161 PT EVAL LOW COMPLEX 20 MIN: CPT

## 2020-12-03 PROCEDURE — 85025 COMPLETE CBC W/AUTO DIFF WBC: CPT

## 2020-12-03 PROCEDURE — 11000001 HC ACUTE MED/SURG PRIVATE ROOM

## 2020-12-03 PROCEDURE — 80053 COMPREHEN METABOLIC PANEL: CPT

## 2020-12-03 PROCEDURE — 25000003 PHARM REV CODE 250: Performed by: NURSE PRACTITIONER

## 2020-12-03 PROCEDURE — 82550 ASSAY OF CK (CPK): CPT

## 2020-12-03 PROCEDURE — 94761 N-INVAS EAR/PLS OXIMETRY MLT: CPT

## 2020-12-03 PROCEDURE — 83735 ASSAY OF MAGNESIUM: CPT

## 2020-12-03 PROCEDURE — 80048 BASIC METABOLIC PNL TOTAL CA: CPT

## 2020-12-03 PROCEDURE — 99231 PR SUBSEQUENT HOSPITAL CARE,LEVL I: ICD-10-PCS | Mod: GC,,, | Performed by: HOSPITALIST

## 2020-12-03 PROCEDURE — 63600175 PHARM REV CODE 636 W HCPCS: Performed by: NURSE PRACTITIONER

## 2020-12-03 PROCEDURE — 99221 PR INITIAL HOSPITAL CARE,LEVL I: ICD-10-PCS | Mod: AI,ICN,CMP, | Performed by: PSYCHIATRY & NEUROLOGY

## 2020-12-03 PROCEDURE — 84484 ASSAY OF TROPONIN QUANT: CPT

## 2020-12-03 PROCEDURE — 82553 CREATINE MB FRACTION: CPT

## 2020-12-03 RX ORDER — CLOPIDOGREL BISULFATE 75 MG/1
75 TABLET ORAL DAILY
Status: DISCONTINUED | OUTPATIENT
Start: 2020-12-03 | End: 2020-12-05 | Stop reason: HOSPADM

## 2020-12-03 RX ORDER — GLUCAGON 1 MG
1 KIT INJECTION
Status: DISCONTINUED | OUTPATIENT
Start: 2020-12-03 | End: 2020-12-05 | Stop reason: HOSPADM

## 2020-12-03 RX ORDER — INSULIN ASPART 100 [IU]/ML
0-5 INJECTION, SOLUTION INTRAVENOUS; SUBCUTANEOUS
Status: DISCONTINUED | OUTPATIENT
Start: 2020-12-03 | End: 2020-12-05 | Stop reason: HOSPADM

## 2020-12-03 RX ORDER — FUROSEMIDE 20 MG/1
20 TABLET ORAL DAILY
Status: DISCONTINUED | OUTPATIENT
Start: 2020-12-03 | End: 2020-12-04

## 2020-12-03 RX ORDER — ACETAMINOPHEN 325 MG/1
650 TABLET ORAL EVERY 6 HOURS PRN
Status: DISCONTINUED | OUTPATIENT
Start: 2020-12-03 | End: 2020-12-05 | Stop reason: HOSPADM

## 2020-12-03 RX ORDER — IBUPROFEN 200 MG
16 TABLET ORAL
Status: DISCONTINUED | OUTPATIENT
Start: 2020-12-03 | End: 2020-12-05 | Stop reason: HOSPADM

## 2020-12-03 RX ORDER — FUROSEMIDE 10 MG/ML
40 INJECTION INTRAMUSCULAR; INTRAVENOUS ONCE
Status: COMPLETED | OUTPATIENT
Start: 2020-12-03 | End: 2020-12-03

## 2020-12-03 RX ORDER — HYDRALAZINE HYDROCHLORIDE 50 MG/1
100 TABLET, FILM COATED ORAL EVERY 8 HOURS
Status: DISCONTINUED | OUTPATIENT
Start: 2020-12-03 | End: 2020-12-05 | Stop reason: HOSPADM

## 2020-12-03 RX ORDER — SODIUM CHLORIDE 0.9 % (FLUSH) 0.9 %
10 SYRINGE (ML) INJECTION
Status: DISCONTINUED | OUTPATIENT
Start: 2020-12-03 | End: 2020-12-05 | Stop reason: HOSPADM

## 2020-12-03 RX ORDER — CARVEDILOL 12.5 MG/1
12.5 TABLET ORAL 2 TIMES DAILY
Status: DISCONTINUED | OUTPATIENT
Start: 2020-12-03 | End: 2020-12-05 | Stop reason: HOSPADM

## 2020-12-03 RX ORDER — NAPROXEN SODIUM 220 MG/1
81 TABLET, FILM COATED ORAL DAILY
Status: DISCONTINUED | OUTPATIENT
Start: 2020-12-03 | End: 2020-12-05 | Stop reason: HOSPADM

## 2020-12-03 RX ORDER — ONDANSETRON 2 MG/ML
4 INJECTION INTRAMUSCULAR; INTRAVENOUS EVERY 12 HOURS PRN
Status: DISCONTINUED | OUTPATIENT
Start: 2020-12-03 | End: 2020-12-05 | Stop reason: HOSPADM

## 2020-12-03 RX ORDER — LABETALOL HCL 20 MG/4 ML
10 SYRINGE (ML) INTRAVENOUS EVERY 6 HOURS PRN
Status: DISCONTINUED | OUTPATIENT
Start: 2020-12-03 | End: 2020-12-05 | Stop reason: HOSPADM

## 2020-12-03 RX ORDER — ESCITALOPRAM OXALATE 20 MG/1
20 TABLET ORAL DAILY
Status: DISCONTINUED | OUTPATIENT
Start: 2020-12-03 | End: 2020-12-05 | Stop reason: HOSPADM

## 2020-12-03 RX ORDER — TIMOLOL MALEATE 5 MG/ML
1 SOLUTION/ DROPS OPHTHALMIC 2 TIMES DAILY
Status: DISCONTINUED | OUTPATIENT
Start: 2020-12-03 | End: 2020-12-05 | Stop reason: HOSPADM

## 2020-12-03 RX ORDER — ISOSORBIDE MONONITRATE 30 MG/1
60 TABLET, EXTENDED RELEASE ORAL DAILY
Status: DISCONTINUED | OUTPATIENT
Start: 2020-12-03 | End: 2020-12-05 | Stop reason: HOSPADM

## 2020-12-03 RX ORDER — IBUPROFEN 200 MG
24 TABLET ORAL
Status: DISCONTINUED | OUTPATIENT
Start: 2020-12-03 | End: 2020-12-05 | Stop reason: HOSPADM

## 2020-12-03 RX ORDER — IRBESARTAN 150 MG/1
300 TABLET ORAL NIGHTLY
Status: DISCONTINUED | OUTPATIENT
Start: 2020-12-03 | End: 2020-12-04

## 2020-12-03 RX ORDER — ENOXAPARIN SODIUM 100 MG/ML
30 INJECTION SUBCUTANEOUS EVERY 24 HOURS
Status: DISCONTINUED | OUTPATIENT
Start: 2020-12-03 | End: 2020-12-05 | Stop reason: HOSPADM

## 2020-12-03 RX ORDER — ATORVASTATIN CALCIUM 20 MG/1
40 TABLET, FILM COATED ORAL NIGHTLY
Status: DISCONTINUED | OUTPATIENT
Start: 2020-12-03 | End: 2020-12-04

## 2020-12-03 RX ADMIN — ACETAMINOPHEN 650 MG: 325 TABLET ORAL at 03:12

## 2020-12-03 RX ADMIN — HYDRALAZINE HYDROCHLORIDE 100 MG: 50 TABLET, FILM COATED ORAL at 09:12

## 2020-12-03 RX ADMIN — IRBESARTAN 300 MG: 150 TABLET, FILM COATED ORAL at 03:12

## 2020-12-03 RX ADMIN — IRBESARTAN 300 MG: 150 TABLET, FILM COATED ORAL at 08:12

## 2020-12-03 RX ADMIN — CLOPIDOGREL 75 MG: 75 TABLET, FILM COATED ORAL at 09:12

## 2020-12-03 RX ADMIN — ATORVASTATIN CALCIUM 40 MG: 20 TABLET, FILM COATED ORAL at 08:12

## 2020-12-03 RX ADMIN — TIMOLOL MALEATE 1 DROP: 5 SOLUTION/ DROPS OPHTHALMIC at 09:12

## 2020-12-03 RX ADMIN — ATORVASTATIN CALCIUM 40 MG: 20 TABLET, FILM COATED ORAL at 02:12

## 2020-12-03 RX ADMIN — ACETAMINOPHEN 650 MG: 325 TABLET ORAL at 01:12

## 2020-12-03 RX ADMIN — FUROSEMIDE 40 MG: 10 INJECTION, SOLUTION INTRAMUSCULAR; INTRAVENOUS at 03:12

## 2020-12-03 RX ADMIN — CARVEDILOL 12.5 MG: 12.5 TABLET, FILM COATED ORAL at 02:12

## 2020-12-03 RX ADMIN — HYDRALAZINE HYDROCHLORIDE 100 MG: 50 TABLET, FILM COATED ORAL at 05:12

## 2020-12-03 RX ADMIN — ESCITALOPRAM OXALATE 20 MG: 20 TABLET ORAL at 09:12

## 2020-12-03 RX ADMIN — HYDRALAZINE HYDROCHLORIDE 100 MG: 50 TABLET, FILM COATED ORAL at 01:12

## 2020-12-03 RX ADMIN — FUROSEMIDE 20 MG: 20 TABLET ORAL at 03:12

## 2020-12-03 RX ADMIN — ENOXAPARIN SODIUM 30 MG: 30 INJECTION SUBCUTANEOUS at 05:12

## 2020-12-03 RX ADMIN — ISOSORBIDE MONONITRATE 60 MG: 30 TABLET, EXTENDED RELEASE ORAL at 09:12

## 2020-12-03 RX ADMIN — TIMOLOL MALEATE 1 DROP: 5 SOLUTION/ DROPS OPHTHALMIC at 08:12

## 2020-12-03 RX ADMIN — CARVEDILOL 12.5 MG: 12.5 TABLET, FILM COATED ORAL at 09:12

## 2020-12-03 RX ADMIN — ASPIRIN 81 MG CHEWABLE TABLET 81 MG: 81 TABLET CHEWABLE at 09:12

## 2020-12-03 RX ADMIN — CARVEDILOL 12.5 MG: 12.5 TABLET, FILM COATED ORAL at 08:12

## 2020-12-03 NOTE — H&P
[facility]  Vascular Neurology  Comprehensive Stroke Center  History & Physical    Inpatient consult to Vascular (Stroke) Neurology  Consult performed by: Alicia Braun NP  Consult ordered by: Dino Keller PA-C        Assessment/Plan:     Patient is a 88 y.o. 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 The following therapy evaluations are needed: PT evaluate and treat, OT evaluate and treat, SLP evaluate and treat    Diagnostics ordered/pending: MRA head to assess vasculature, MRA neck/arch to assess vasculature    VTE prophylaxis: Enoxaparin (CrCl < 30 ml/min) 30 mg SQ every 24 hours  Mechanical prophylaxis: Place SCDs    BP parameters: Infarct: No intervention, SBP <220        Elevated troponin level  Initial trop 0.058 will trend    Chronic diastolic congestive heart failure  Stroke risk factor  EF 25% per Echo    Essential hypertension  Stroke risk factor  SBP <220    Coronary artery disease involving native coronary artery of native heart without angina pectoris  Stroke risk factor  Resume home meds    Type II diabetes mellitus with renal manifestations  Stroke risk factor  HgB A1C 6.3  Patient on no meds at home  Minimal SSI    Chronic kidney disease, stage III (moderate)  Stroke risk factor  Renal dose meds  Avoid nephrotoxins    Hyperlipidemia  Stroke risk factor  LDL 55.6  Lipitor 40 mg daily        STROKE DOCUMENTATION          NIH Scale:  1a. Level of Consciousness: 0-->Alert, keenly responsive  1b. LOC Questions: 0-->Answers both questions correctly  1c. LOC Commands:  0-->Performs both tasks correctly  2. Best Gaze: 0-->Normal  3. Visual: 0-->No visual loss  4. Facial Palsy: 0-->Normal symmetrical movements  5a. Motor Arm, Left: 1-->Drift, limb holds 90 (or 45) degrees, but drifts down before full 10 seconds, does not hit bed or other support  5b. Motor Arm, Right: 1-->Drift, limb holds 90 (or 45) degrees, but drifts down before full 10 secs, does not hit bed or other support  6a. Motor Leg, Left: 3-->No effort against gravity, leg falls to bed immediately  6b. Motor Leg, Right: 3-->No effort against gravity, leg falls to bed immediately  7. Limb Ataxia: 0-->Absent  8. Sensory: 0-->Normal, no sensory loss  9. Best Language: 1-->Mild-to-moderate aphasia, some obvious loss of fluency or facility of comprehension, without significant limitation on ideas expressed or form of expression. Reduction of speech and/or comprehension, however, makes conversation. . . (see row details)  10. Dysarthria: 0-->Normal  11. Extinction and Inattention (formerly Neglect): 0-->No abnormality  Total (NIH Stroke Scale): 9     Modified Law Score: 3  [place] Coma Scale:    ABCD2 Score:    [id number] Score:   HAS -BLED Score:   ICH Score:   [place] Classification:      Thrombolysis Candidate? No, Out of window     Delays to Thrombolysis?  No    Interventional Revascularization Candidate?   Is the patient eligible for mechanical endovascular reperfusion (KRISTYN)?  No; No significant neurological deficit    Hemorrhagic change of an Ischemic Stroke: Does this patient have an ischemic stroke with hemorrhagic changes? No         Subjective:     History of Present Illness:  89 y/o female who woke up from a nap and had AMS. Patient also c/o headache and was having difficulty seeing. They checked her BS and it was low so gave her some juice. EMS chaecked and it was 175. Patient is not on any hypoglycemics at this time.  Family report that she has had similar disoriented episode over the past 6 months when  waking up.     Patient has had increase in SOB over the past months and was seen in cardiology clinic for CHF and advised to add extra lasix for 2-3 days. Patient BNP is 802, Trop 0.058 will trend Trop.    CT scan head no acute process seen.    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 12/3/2020    Arthritis     CHF (congestive heart failure)     Coronary artery disease     Diabetes mellitus     Glaucoma     Gout, joint     Hx of CABG 9/21/10    Hyperlipidemia     Hypertension     NSTEMI (non-ST elevated myocardial infarction) 09/21/10    Stenosis of aortic and mitral valves     Systolic heart failure 6/19/2015     Past Surgical History:   Procedure Laterality Date    CARDIAC VALVE SURGERY      CATARACT EXTRACTION      CORONARY ARTERY BYPASS GRAFT  9/21/2010    ENTROPIAN REPAIR Left 3/6/2020    Procedure: REPAIR, ENTROPION;  Surgeon: Yulia Kincaid MD;  Location: [facility] OR [address] [place] [address];  Service: Ophthalmology;  Laterality: Left;    JOINT REPLACEMENT       Family History   Problem Relation Age of Onset    Diabetes Mother     Hypertension Father     Diabetes Father     Glaucoma Father     No Known Problems Sister     No Known Problems Brother     No Known Problems Maternal Aunt     No Known Problems Maternal Uncle     No Known Problems Paternal Aunt     No Known Problems Paternal Uncle     No Known Problems Maternal Grandmother     No Known Problems Maternal Grandfather     No Known Problems Paternal Grandmother     No Known Problems Paternal Grandfather      Social History     Tobacco Use    Smoking status: Never Smoker    Smokeless tobacco: Never Used   Substance Use Topics    Alcohol use: No    Drug use: No     Review of patient's allergies indicates:   Allergen  Reactions    Indocin [indomethacin sodium] Other (See Comments)     Either caused hot,burning body or caused madness per patient's grandson    Lotensin [benazepril] Other (See Comments)     Either caused hot ,burning body or caused madness per patient's grandson       Medications: I have reviewed the current medication administration record.    Medications Prior to Admission   Medication Sig Dispense Refill Last Dose    acetaminophen (TYLENOL) 325 MG tablet Take 650 mg by mouth every 6 (six) hours as needed for Pain.   12/2/2020    albuterol 90 mcg/actuation inhaler Inhale 1 puff into the lungs every 6 (six) hours as needed for Wheezing. 1 HFA Aerosol Inhaler Inhalation Twice a day 18 g 0 12/2/2020    allopurinol (ZYLOPRIM) 100 MG tablet TAKE 1 TABLET BY MOUTH ONCE DAILY 90 tablet 3 12/2/2020    aspirin 81 MG chewable tablet Take 81 mg by mouth. 1 Tablet, Chewable Oral Every day   12/2/2020    blood sugar diagnostic Strp To check BG 1 times daily, to use with insurance preferred meter 100 strip 6 12/2/2020    carvediloL (COREG) 12.5 MG tablet TAKE 1 TABLET(12.5 MG) BY MOUTH TWICE DAILY 180 tablet 0 12/2/2020    cloNIDine 0.3 mg/24 hr td ptwk (CATAPRES) 0.3 mg/24 hr APPLY 1 PATCH WEEKLY 4 patch 2 12/2/2020    clopidogreL (PLAVIX) 75 mg tablet Take 1 tablet (75 mg total) by mouth once daily. 90 tablet 3 12/2/2020    erythromycin (ROMYCIN) ophthalmic ointment Place into the left eye every evening. 1 Tube 2 12/2/2020    escitalopram oxalate (LEXAPRO) 20 MG tablet TAKE 1 TABLET BY MOUTH EVERY DAY 90 tablet 3 12/2/2020    furosemide (LASIX) 20 MG tablet Take 1 tablet (20 mg total) by mouth once daily. 90 tablet 3 12/2/2020    hydrALAZINE (APRESOLINE) 100 MG tablet TAKE 1 TABLET (100 MG TOTAL) BY MOUTH EVERY 8 (EIGHT) HOURS. 270 tablet 2 12/2/2020    irbesartan (AVAPRO) 300 MG tablet Take 1 tablet (300 mg total) by mouth every evening. 90 tablet 3 12/2/2020    isosorbide mononitrate (IMDUR) 60 MG 24 hr  tablet TAKE 1 TABLET (60 MG TOTAL) BY MOUTH ONCE DAILY. 90 tablet 1 12/2/2020    lancets Misc To check BG 1 times daily, to use with insurance preferred meter 100 each 6 12/2/2020    nitroGLYCERIN 0.4 MG/DOSE TL SPRY (NITROLINGUAL) 400 mcg/spray spray PLACE 1 SPRAY ONTO THE TONGUE EVERY 5 (FIVE) MINUTES AS NEEDED. 12 g 0 12/2/2020    pravastatin (PRAVACHOL) 40 MG tablet TAKE 1 TABLET BY MOUTH EVERY EVENING 90 tablet 3 12/2/2020    blood-glucose meter (FREESTYLE SYSTEM KIT) kit Use as instructed 1 each 0     timolol maleate 0.5% (TIMOPTIC) 0.5 % Drop Place 1 drop into both eyes 2 (two) times daily. 15 mL 3        Review of Systems   Constitutional: Negative for chills and fever.   HENT: Negative for ear discharge and ear pain.    Eyes: Negative for pain and redness.   Respiratory: Positive for shortness of breath. Negative for cough.    Cardiovascular: Positive for leg swelling. Negative for chest pain.   Gastrointestinal: Negative for abdominal distention and abdominal pain.   Genitourinary: Negative for dyspareunia and dysuria.   Musculoskeletal: Positive for arthralgias and back pain.   Skin: Negative for rash and wound.   Neurological: Positive for weakness.   Psychiatric/Behavioral: Positive for confusion.     Objective:     Vital Signs (Most Recent):  Temp: 98 °F (36.7 °C) (12/03/20 0246)  Pulse: 64 (12/03/20 0309)  Resp: 20 (12/03/20 0246)  BP: (!) 167/86 (12/03/20 0246)  SpO2: 97 % (12/03/20 0246)    Vital Signs Range (Last 24H):  Temp:  [98 °F (36.7 °C)-98.4 °F (36.9 °C)]   Pulse:  [63-71]   Resp:  [18-20]   BP: (160-195)/()   SpO2:  [95 %-100 %]     Physical Exam    Neurological Exam:   LOC: alert  Attention Span: Good   Language: No aphasia  Articulation: No dysarthria  Orientation: Not oriented to time  Visual Fields: Full  EOM (CN III, IV, VI): Full/intact  Pupils (CN II, III): PERRL  Facial Sensation (CN V): Normal  Facial Movement (CN VII): Symmetric facial expression    Gag Reflex:  present  Reflexes: flexor plantar responses bilaterally  Motor: Arm left  Paresis: 4/5  Leg left  Paresis: 4/5  Arm right  Paresis: 4/5  Leg right Paresis: 4/5  Cebellar: No evidence of appendicular or axial ataxia  Sensation: Intact to light touch, temperature and vibration  Tone: Normal tone throughout      Laboratory:  CMP:   Recent Labs   Lab 12/02/20 2124   CALCIUM 9.1   ALBUMIN 2.9*   PROT 7.1      K 4.1   CO2 26      BUN 37*   CREATININE 1.4   ALKPHOS 80   ALT 11   AST 24   BILITOT 0.2     CBC:   Recent Labs   Lab 12/02/20 2124   WBC 11.21   RBC 3.25*   HGB 10.2*   HCT 33.1*      *   MCH 31.4*   MCHC 30.8*     Lipid Panel:   Recent Labs   Lab 12/02/20 2124   CHOL 116*   LDLCALC 55.6*   HDL 44   TRIG 82     Coagulation: No results for input(s): PT, INR, APTT in the last 168 hours.  Hgb A1C:   Recent Labs   Lab 12/02/20 2124   HGBA1C 6.3*     TSH:   Recent Labs   Lab 12/02/20 2124   TSH 1.916       Diagnostic Results:      Brain imaging:  CT Head w/o contrast 12-2-20 results:  No acute abnormality such as hemorrhage or large vascular territory ischemia to suggest ischemia/infarction.     Periventricular, supratentorial decreased white matter attenuation suggestive for chronic ischemic microvascular changes.     Generalized cerebral volume loss with compensatory symmetric enlargement of the ventricular system similar to prior exams and appropriate for age.    MRI Brain w/o contrast 12-3-20-results:      Focus of diffusion restriction with ADC match in the right occipital lobe about the occipital horn suggestive for infarct.     Generalized cerebral volume loss with appropriate symmetric compensatory enlargement of the ventricular system unchanged from priors.     Confluent supratentorial T2 hyperintensities consistent with chronic ischemic microvascular changes.      Vessel Imaging:      Cardiac Evaluation:   2 D Echo 10-15-20 results:  · Severe left atrial enlargement.  · There is  mild left ventricular eccentric hypertrophy.  · The left ventricle is mildly enlarged with severely decreased systolic function. The estimated ejection fraction is 25%.  · There is severe left ventricular global hypokinesis.  · Grade II diastolic dysfunction.  · Moderate right atrial enlargement.  · Mildly reduced right ventricular systolic function.  · There is a 26 mm Rubalcava Jessica S3 transcutaneously-placed aortic bioprosthesis present. There is no aortic aortic insufficiency present. Prosthetic aortic valve is normal.  · Aortic valve area is 2.18 cm2; peak velocity is 2.22 m/s; mean gradient is 12 mm Hg. DI is 0.42.  · The mean diastolic gradient across the mitral valve is 4 mmHg at a heart rate of 70 bpm.  · Mild-to-moderate mitral regurgitation.  · Mild tricuspid regurgitation.  · There is pulmonary hypertension.  · Intermediate central venous pressure (8 mmHg).  · The estimated PA systolic pressure is 57 mmHg.             Alicia Braun NP  [facility]  Department of Vascular Neurology   [facility]

## 2020-12-03 NOTE — PLAN OF CARE
Problem: Occupational Therapy Goal  Goal: Occupational Therapy Goal  Description: Goals set on 12/3 with expiration date 12/17:  Patient will increase functional independence with ADLs by performing:    Supine <> Sit with Stand-by Assistance.  Feeding with Stand-by Assistance.  Grooming while standing at sink with Stand-by Assistance.  UB Dressing with Stand-by Assistance.  LB Dressing with Stand-by Assistance.  Step transfer with Stand-by Assistance with DME as needed.  Pt will demonstrate understanding of education provided regarding energy conservation and task modification through teach-back method.     Outcome: Ongoing, Progressing       Eval complete. Pt tolerated session well, limited by impaired activity tolerance and increased dizziness with mobility. WOB noted and RN informed. Initiate OT POC.  TIKI Wheatley  12/03/2020

## 2020-12-03 NOTE — ED PROVIDER NOTES
"Encounter Date: 12/2/2020       History     Chief Complaint   Patient presents with    Hypoglycemia     Pt arrives via EMS after family called c/o patient's BG being "low". Per EMS, pt was given juice and sweet tea PTA. BG now 175.     The history is provided by the patient, a relative and the EMS personnel. No  was used.      Krystina Washington is a 88 y.o. female with medical history of CAD s/p CABG on Plavix, CHF, T2DM, CKD presenting to the ED with the chief complaint of AMS. Patient noted to be disoriented by family today at 7:20pm after taking a nap. Last seen normal was at 6:30pm. Patient reported having a headache and that she "couldn't see." Patient's speech was slowed and her "left eye appeared to be lazy". She occasionally stutters which occurs at her baseline. Family does not note any specific weakness. Family checked her blood sugar to be "low" on her machine and subsequently gave her juice. She is not on DM medications currently. EMS notes her blood sugar to be 175 on their arrival. Family notes similar disoriented episodes after waking up for the past 6 months. She is able to have full conversations, bathe herself, and ambulates without difficulty at her baseline. No recent falls or trauma.     Patient oriented to person and place at the time of my exam. She complains of a headache. She has slowed responses to questioning. She intermittently follows commands. Patient was able to walk with EMS.         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 12/3/2020    Arthritis     CHF (congestive heart failure)     " Coronary artery disease     Diabetes mellitus     Glaucoma     Gout, joint     Hx of CABG 9/21/10    Hyperlipidemia     Hypertension     NSTEMI (non-ST elevated myocardial infarction) 09/21/10    Stenosis of aortic and mitral valves     Systolic heart failure 6/19/2015     Past Surgical History:   Procedure Laterality Date    CARDIAC VALVE SURGERY      CATARACT EXTRACTION      CORONARY ARTERY BYPASS GRAFT  9/21/2010    ENTROPIAN REPAIR Left 3/6/2020    Procedure: REPAIR, ENTROPION;  Surgeon: Yulia Kincaid MD;  Location: [facility] OR [address] [place] [address];  Service: Ophthalmology;  Laterality: Left;    JOINT REPLACEMENT       Family History   Problem Relation Age of Onset    Diabetes Mother     Hypertension Father     Diabetes Father     Glaucoma Father     No Known Problems Sister     No Known Problems Brother     No Known Problems Maternal Aunt     No Known Problems Maternal Uncle     No Known Problems Paternal Aunt     No Known Problems Paternal Uncle     No Known Problems Maternal Grandmother     No Known Problems Maternal Grandfather     No Known Problems Paternal Grandmother     No Known Problems Paternal Grandfather     Amblyopia Neg Hx     Blindness Neg Hx     Cancer Neg Hx     Cataracts Neg Hx     Macular degeneration Neg Hx     Retinal detachment Neg Hx     Strabismus Neg Hx     Stroke Neg Hx     Thyroid disease Neg Hx      Social History     Tobacco Use    Smoking status: Never Smoker    Smokeless tobacco: Never Used   Substance Use Topics    Alcohol use: No    Drug use: No     Review of Systems   Unable to perform ROS: Mental status change   Neurological: Positive for headaches.   Psychiatric/Behavioral: Positive for confusion.       Physical Exam     Initial Vitals [12/02/20 1955]   BP Pulse Resp Temp SpO2   (!) 160/90 67 18 98.4 °F (36.9 °C) 100 %      MAP       --         Physical Exam    Constitutional: She appears well-developed and well-nourished. She is not diaphoretic. No  distress.   HENT:   Head: Normocephalic and atraumatic.   Mouth/Throat: Oropharynx is clear and moist. No oropharyngeal exudate.   Edentulous   Eyes: EOM are normal. Pupils are equal, round, and reactive to light.   Neck: Normal range of motion. Neck supple.   Cardiovascular: Normal rate and regular rhythm.   Pulmonary/Chest: Breath sounds normal. No respiratory distress. She has no wheezes.   Abdominal: Soft. She exhibits no distension. There is no abdominal tenderness.   Musculoskeletal: Normal range of motion. No tenderness or edema.   Neurological: She is alert. She has normal strength. No cranial nerve deficit or sensory deficit.   Oriented to person and place. Stuttered speech. Difficult neuro exam, intermittently follows commands. No focal weakness to extremities.   Skin: Skin is warm and dry. No rash noted. No erythema.   Psychiatric: She is slowed. Cognition and memory are impaired.       ED Course   Critical Care    Date/Time: 12/3/2020 1:32 AM  Performed by: Dino Keller PA-C  Authorized by: Ramesh Bustamante MD   Direct patient critical care time: 15 minutes  Additional history critical care time: 4 minutes  Ordering / reviewing critical care time: 6 minutes  Documentation critical care time: 7 minutes  Consulting other physicians critical care time: 7 minutes  Total critical care time (exclusive of procedural time) : 39 minutes  Critical care was necessary to treat or prevent imminent or life-threatening deterioration of the following conditions: CNS failure or compromise.  Critical care was time spent personally by me on the following activities: discussions with consultants, evaluation of patient's response to treatment, examination of patient, obtaining history from patient or surrogate, ordering and performing treatments and interventions, ordering and review of laboratory studies, ordering and review of radiographic studies, re-evaluation of patient's condition and review of old  charts.        Labs Reviewed   CBC W/ AUTO DIFFERENTIAL - Abnormal; Notable for the following components:       Result Value    RBC 3.25 (*)     Hemoglobin 10.2 (*)     Hematocrit 33.1 (*)      (*)     MCH 31.4 (*)     MCHC 30.8 (*)     RDW 14.9 (*)     Immature Grans (Abs) 0.05 (*)     All other components within normal limits   COMPREHENSIVE METABOLIC PANEL - Abnormal; Notable for the following components:    Glucose 131 (*)     BUN 37 (*)     Albumin 2.9 (*)     eGFR if  38.7 (*)     eGFR if non  33.6 (*)     All other components within normal limits   URINALYSIS, REFLEX TO URINE CULTURE - Abnormal; Notable for the following components:    Appearance, UA Hazy (*)     Protein, UA 1+ (*)     Leukocytes, UA 1+ (*)     All other components within normal limits    Narrative:     Specimen Source->Urine   B-TYPE NATRIURETIC PEPTIDE - Abnormal; Notable for the following components:     (*)     All other components within normal limits   TROPONIN I - Abnormal; Notable for the following components:    Troponin I 0.058 (*)     All other components within normal limits   URINALYSIS MICROSCOPIC - Abnormal; Notable for the following components:    WBC, UA 6 (*)     Bacteria Moderate (*)     All other components within normal limits    Narrative:     Specimen Source->Urine   POCT GLUCOSE - Abnormal; Notable for the following components:    POCT Glucose 133 (*)     All other components within normal limits   CULTURE, BLOOD   CULTURE, BLOOD   LACTIC ACID, PLASMA   LIPASE   MAGNESIUM   TSH   ALCOHOL,MEDICAL (ETHANOL)   DRUG SCREEN PANEL, URINE EMERGENCY    Narrative:     Specimen Source->Urine   HEMOGLOBIN A1C   LIPID PANEL   TROPONIN I   CK-MB   APTT   PROTIME-INR   SARS-COV-2 RDRP GENE   POCT GLUCOSE MONITORING CONTINUOUS   POCT GLUCOSE MONITORING CONTINUOUS   POCT GLUCOSE MONITORING CONTINUOUS          Imaging Results          MRI Brain Without Contrast (Final result)  Result time  12/03/20 00:33:48    Final result by Jim Bourgeois MD (12/03/20 00:33:48)                 Narrative:    EXAMINATION:  MRI BRAIN WITHOUT CONTRAST    CLINICAL HISTORY:  Altered mental status;    TECHNIQUE:  Multiplanar multisequence MR imaging of the brain was performed, no IV gadolinium based contrast material was administered.    COMPARISON:  CT head 12/12/2020 at 22:01    CT head 12/15/2017, 03/01/2013    MRI brain 04/28/2011    FINDINGS:  Focus of diffusion restriction with ADC match in the right occipital lobe about the occipital horn suggestive for infarct.  In retrospect, there was an ill-defined hypodensity within this region on recent head CT.  No other areas of diffusion restriction noting the abnormal signal in the superior left lobe of the cerebellum which appeared similar in April 2011 MRI and is likely either chronic or artifactual in nature.    Symmetric ventricular dilatation which appears concordant with degree of cerebral volume loss, ventricular configuration is stable in unchanged from recent priors.  Confluent supratentorial, periventricular T2 hyperintensities suggestive for chronic ischemic microvascular changes.  Brain parenchyma is otherwise unremarkable, no evidence mass effect, mass lesion or hemorrhage.    Globes and retrobulbar structures are intact, symmetric and otherwise unremarkable.    Skull base and structures at the craniocervical junction reveal no abnormal findings.    The major vascular structures demonstrate no abnormalities in the upper cervical portion, at the skull base or in their demonstrated intracranial portions.    IMPRESSION  Focus of diffusion restriction with ADC match in the right occipital lobe about the occipital horn suggestive for infarct.    Generalized cerebral volume loss with appropriate symmetric compensatory enlargement of the ventricular system unchanged from priors.    Confluent supratentorial T2 hyperintensities consistent with chronic ischemic  microvascular changes.    Critical findings relayed toB Krishna PA by LISA Moura MD on 12/03/2020 at 00:24.    Electronically signed by resident: Timi Moura  Date:    12/03/2020  Time:    00:11    Electronically signed by: Jim Bourgeois MD  Date:    12/03/2020  Time:    00:33                             CT Head Without Contrast (Final result)  Result time 12/02/20 22:54:34    Final result by Jim Bourgeois MD (12/02/20 22:54:34)                 Impression:      No acute abnormality such as hemorrhage or large vascular territory ischemia to suggest ischemia/infarction.    Periventricular, supratentorial decreased white matter attenuation suggestive for chronic ischemic microvascular changes.    Generalized cerebral volume loss with compensatory symmetric enlargement of the ventricular system similar to prior exams and appropriate for age.    Electronically signed by resident: Timi Moura  Date:    12/02/2020  Time:    22:11    Electronically signed by: Jim Bourgeois MD  Date:    12/02/2020  Time:    22:54             Narrative:    EXAMINATION:  CT HEAD WITHOUT CONTRAST    CLINICAL HISTORY:  Altered mental status;    TECHNIQUE:  Low dose axial CT images obtained throughout the head without intravenous contrast. Sagittal and coronal reconstructions were performed.    COMPARISON:  CT head 12/15/17, 3/1/13    FINDINGS:  Generalized cerebral volume loss with compensatory enlargement of the ventricular system, symmetric in appearance and appropriate for age.  Findings similar to December 2017 CT head.    Confluent and patchy decreased attenuation of the supratentorial white matter which while nonspecific, likely represents chronic ischemic microvascular changes.  Brain parenchyma otherwise appears normal without evidence intra-axial or parenchymal hemorrhage, mass lesions or appreciable edema.  No large vascular territory hypoattenuation to suggest ischemia/infarction.  No midline shift or other evidence of mass  "effect.    No extra-axial blood or fluid collections are identified.    Calvarium is intact without displaced fracture.  Paranasal sinuses and mastoid air cells are clear.    Postoperative changes of the left lens, globes and retrobulbar structures are otherwise symmetric intact and unremarkable in appearance.                               X-Ray Chest AP Portable (Final result)  Result time 12/02/20 22:08:14    Final result by Shawn Vivar MD (12/02/20 22:08:14)                 Impression:      Postop changes with no acute radiographic abnormality.  No significant change.      Electronically signed by: Shawn Vivar  Date:    12/02/2020  Time:    22:08             Narrative:    EXAMINATION:  XR CHEST AP PORTABLE    CLINICAL HISTORY:  Altered mental status, unspecified    TECHNIQUE:  Single frontal view of the chest was performed.    COMPARISON:  10/06/2020    FINDINGS:  Cardiac silhouette is minimally enlarged.  Sternotomy wires are present.  Aortic valve replacement.    Aortic atherosclerosis.    No focal mass, infiltrate or consolidation.    No effusion or pneumothorax.    No significant change.                                 Medical Decision Making:   History:   Old Medical Records: I decided to obtain old medical records.  Old Records Summarized: records from clinic visits.  Clinical Tests:   Lab Tests: Ordered and Reviewed  Radiological Study: Ordered and Reviewed  Medical Tests: Ordered and Reviewed  Other:   I have discussed this case with another health care provider.       <> Summary of the Discussion: Vascular neurology       APC / Resident Notes:   88 y.o. female with medical history of CAD s/p CABG on Plavix, CHF, T2DM, CKD presenting to the ED c/o AMS. Noted to be disoriented with slowed speech at 7:20pm today after a nap. Glucose meter read "low" and she is not on DM medications currently. EMS reports  on their arrival. Patient alert and oriented x2 on my exam. Complaints of headache. " Difficulty neuro exam as she intermittently follows commands. No focal weakness to extremities. DDx includes but not limited to ICH, CVA, metabolic encephalopathy, medication side effect, brain mass, complex migraine, bacteremia, UTI, pneumonia.     9:44 PM - Dino Keller PA-C  Discussed with stroke team after initial evaluation. Will obtain CT head and pursue MRI brain if no significant findings seen. No stroke code activation.              ED Course as of Dec 03 0134   Thu Dec 03, 2020   0029 MRI concerning for R occipital horn infarction. Discussed with stroke team and they will come see the patient.     [BA]   0133 Patient admitted to stroke team for further management. I have discussed the care of this patient with my supervising physician.         [BA]      ED Course User Index  [BA] Dino Keller PA-C            Clinical Impression:     ICD-10-CM ICD-9-CM   1. Cerebrovascular accident (CVA), unspecified mechanism  I63.9 434.91   2. AMS (altered mental status)  R41.82 780.97   3. 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

## 2020-12-03 NOTE — ASSESSMENT & PLAN NOTE
ECHO 10/2020 with EF of 25%, grade II diastolic dysfunction, pul. Htn, severe left atrial enlargement  BNP on admit 802, decreased from previous admit  No signs of acute decompensated HF    Plan  - Continue Coreg 12.5mg BID, Irbesartan 300mg ohs  - Continue Hydralazine 100mg q8h, Imdur 30mg  - Resume home Lasix 20mg daily  - Daily weights  - Cardiac diet  - Strict I & O

## 2020-12-03 NOTE — ASSESSMENT & PLAN NOTE
87 y/o female with Right occipital infarct    Antithrombotics: DAPT    Statins : Lipitor 40 mg daily    Aggressive risk factor modification: HTN, DM, HLD, Diet, Exercise, Obesity, CAD     Rehab efforts: The patient has been evaluated by a stroke team provider and the therapy needs have been fully considered based off the presenting complaints and exam findings. The following therapy evaluations are needed: PT evaluate and treat, OT evaluate and treat, SLP evaluate and treat    Diagnostics ordered/pending: MRA head to assess vasculature, MRA neck/arch to assess vasculature    VTE prophylaxis: Enoxaparin (CrCl < 30 ml/min) 30 mg SQ every 24 hours  Mechanical prophylaxis: Place SCDs    BP parameters: Infarct: No intervention, SBP <220

## 2020-12-03 NOTE — PLAN OF CARE
POC reviewed with patient. VSS.  PRN medication given for headache. Cardiac monitor and SCD in place. Safety precautions maintained. Will continue to monitor.  Problem: Adult Inpatient Plan of Care  Goal: Plan of Care Review  Outcome: Ongoing, Progressing     Problem: Skin Injury Risk Increased  Goal: Skin Health and Integrity  Outcome: Ongoing, Progressing

## 2020-12-03 NOTE — PT/OT/SLP EVAL
Occupational Therapy   Co-Evaluation w PT    Name: Krystina Washington  MRN: [id number]  Admitting Diagnosis:  Acute ischemic left posterior cerebral artery (PCA) stroke    Length of Stay: 0 days    Recommendations:     Discharge Recommendations: rehabilitation facility  Discharge Equipment Recommendations:  other (see comments)(TBD)  Barriers to discharge:  Inaccessible home environment, Decreased caregiver support    Plan:     Patient to be seen 4 x/week to address the above listed problems via self-care/home management, therapeutic activities, therapeutic exercises, neuromuscular re-education, cognitive retraining, sensory integration  · Plan of Care Expires: 01/02/21  · Plan of Care Reviewed with: patient    Assessment:     Krystina Washington is a 88 y.o. female with a medical diagnosis of Acute ischemic left posterior cerebral artery (PCA) stroke.  She presents with the following performance deficits affecting function: weakness, impaired endurance, impaired self care skills, impaired functional mobilty, gait instability, impaired balance, decreased coordination, decreased upper extremity function, decreased lower extremity function, decreased safety awareness, impaired cardiopulmonary response to activity.      Patient presented this date with mild confusion, mild Lsided weakness, L eye nystagmus, impaired activity tolerance and increased work of breathing and dizziness with exertion.  Patient required max cues for deep breathing and was able to recover, vitals monitored and stable throughout session.  Because of patient's independence at [facility], patient would benefit from Inpatient Rehab to maximize return and safety and possibility of hospital readmission.     Rehab Prognosis: Good; patient would benefit from acute skilled OT services to address these deficits and reach maximum level of function.       Subjective   Communicated with: RN prior to session.  Patient found HOB elevated with bed alarm, blood pressure cuff,  "PureWick, peripheral IV, pulse ox (continuous), SCD, telemetry upon OT entry to room.    Chief Complaint: Hypoglycemia (Pt arrives via EMS after family called c/o patient's BG being "low". Per EMS, pt was given juice and sweet tea PTA. BG now 175.)    Patient/Family Comments/goals: "I'm just feeling more dizzy now" pt stated with visible increase in work of breathing seated up in chair.     Pain/Comfort:  · Pain Rating 1: other (see comments)(pt with increased work of breathing and dizziness following in-room mobility)  · Pain Rating Post-Intervention 1: other (see comments)(pt resting upon OT exit)    Patients cultural, spiritual, Presybeterian conflicts given the current situation: no    Occupational Profile:  Living Environment: Pt lives with adult grandson in [facility], [facility], tubshower   Prior Level of Function: Patient reports being Mod I using Rollator with mobility & with ADLs. Pt able to dress/bathe/complete ADLs prior to this.   Falls: pt states she's had 1-2 falls in past 3 months  Patient uses DME as follows: bedside commode, cane, straight, rollator.   DME owned (not currently used): none.  Roles/Repsonsibilities:   Hand Dominance: right   Work: no.   Drive: no.   Managing Medicines/Managing Home: yes, supervision from grandson.    Hobbies: none stated.  Equipment Used at Home:  bedside commode, cane, straight, rollator    Patient reports they will have limited assistance from family upon discharge.      Objective:     Patient found with: bed alarm, blood pressure cuff, PureWick, peripheral IV, pulse ox (continuous), SCD, telemetry   General Precautions: Standard, Cardiac aphasia, aspiration, fall, hearing impaired   Orthopedic Precautions:N/A   Braces: N/A   Oxygen Device: Room Air  Vitals: /69   Pulse 60   Temp 97.9 °F (36.6 °C)   Resp 15   Ht 5' 3" (1.6 m)   Wt 84.2 kg (185 lb 10 oz)   LMP  (LMP Unknown)   SpO2 95%   Breastfeeding No   BMI 32.88 kg/m²     Cognitive and Psychosocial Function: "   · AxOx4 -- Person, Place, Time and Situation   · Follows Commands/attention:follows one-step commands  · Communication:  clear/fluent  · Memory: no deficits noted  · Safety awareness/insight to disability: impaired - cues required  · Mood/Affect/Coping skills/emotional control: Cooperative and Pleasant    Hearing: Impaired: [place]    Vision: L eye nystagmus, impaired smooth pursuit, impaired convergence, impaired tolerance for Lward gaze    Physical Exam:  Postural examination/scapula alignment:    -       Rounded shoulders  -       Forward head  -       Posterior pelvic tilt  -       Kyphosis  Skin integrity: Visible skin intact and Thin     Left UE Right UE   UE Edema absent absent   UE ROM AROM WFL AROM WFL   UE Strength 4/5 5/5    Strength grasp WFL grasp WNL   Sensation LUE INTACT:WFL RUE INTACT: WFL   Fine Motor Coordination:  LUE INTACT: WFL RUE INTACT: WFL   Gross Motor Coordination: LUE IMPAIRED: WFL, limited by fatigue and impaired functional endurance RUE IMPAIRED:WFL, limited by fatigue and impaired functional endurance     Occupational Performance:  Bed Mobility:    · Patient completed Rolling/Turning to Left with  contact guard assistance  · Patient completed Supine to Sit with minimum assistance on L side of bed  · Scooting anteriorly to EOB to have both feet planted on floor: contact guard assistance  · Patient completed Sit to Supine with minimum assistance on R side of bed    Functional Mobility/Transfers:   Static Sitting EOB: SBA   Dynamic Sitting EOB: CGA, cues for rest break required during seated tasks   Patient completed Sit <> Stand Transfer with minimum assistance  with  hand-held assist    Static Standing Balance: Min A with HH assist, limited tolerance   Dynamic Standing Balance: Min A with HH assist, limited tolerance, cues for rest break required   Patient completed Bed <> Chair Transfer using Step Transfer technique with minimum assistance with hand-held assist      Activities  of Daily Living:  · Lower Body Dressing: maximal assistance patient unable to complete task 2* increased work of breathing noted, patient given cues for rest break  · Toileting: minimum assistance pt urinated on bed pan set on chair, patient required assistance with dynamic standing, but was able to complete az hygeine.       AMPA 6 Click ADL:  AMPAC Total Score: 12    Treatment & Education:  -Pt education on OT role and POC   -Importance of E/OOB activity with staff assistance, emphasis on daily participation  -Multiple self-care tasks and functional mobility completed -- assistance level noted above  -Safety during functional transfer and mobility ensured  -Intro deep-breathing technique provided as needed for pain management/MM relaxation, and for internal self-regulation in conjunction w/ education on importance of monitoring breathing/work of breathing as indicated for recovery of effective respiration during functional tasks, and to bolster proper circulation.   -Education provided reviewed, questions answered within OT scope of practice.   -Updated communication board with level of assist required (Min A, HH assist 1 person) & educated RN/patient that pt is appropriate for Stand pivot with RN/PCT.       Patient left with bed in chair position with all lines intact, call button in reach, bed alarm on and RN notified    GOALS:   Multidisciplinary Problems     Occupational Therapy Goals        Problem: Occupational Therapy Goal    Goal Priority Disciplines Outcome Interventions   Occupational Therapy Goal     OT, PT/OT Ongoing, Progressing    Description: Goals set on 12/3 with expiration date 12/17:  Patient will increase functional independence with ADLs by performing:    Supine <> Sit with Stand-by Assistance.  Feeding with Stand-by Assistance.  Grooming while standing at sink with Stand-by Assistance.  UB Dressing with Stand-by Assistance.  LB Dressing with Stand-by Assistance.  Step transfer with  Stand-by Assistance with DME as needed.  Pt will demonstrate understanding of education provided regarding energy conservation and task modification through teach-back method.                      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 OR [address] [place] [address];  Service: Ophthalmology;  Laterality: Left;    JOINT REPLACEMENT         Time Tracking:       OT Date of Treatment: 12/03/20  OT Start Time: 0920  OT Stop Time: 0948  OT Total Time (min): 28 min  Additional staff present: PT    Co-Eval with PT due to medical complexity of pt and need for skilled hands for safe intervention.      Billable Minutes:Evaluation 14  Self Care/Home Management 14      TIKI Wheatley  12/3/2020

## 2020-12-03 NOTE — PT/OT/SLP EVAL
"Speech Language Pathology Evaluation  Bedside Swallow    Patient Name:  Krystina Washington   MRN:  [id number]  Admitting Diagnosis: Acute ischemic left posterior cerebral artery (PCA) stroke    Recommendations:                 General Recommendations:  Dysphagia therapy, Speech language evaluation and Cognitive-linguistic evaluation  Diet recommendations:  Dental Soft, Thin   Aspiration Precautions: 1 bite/sip at a time, HOB to 90 degrees, Monitor for s/s of aspiration and Standard aspiration precautions   General Precautions: Standard, fall, hearing impaired, aspiration  Communication strategies:  pt very hard of hearing    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 12/3/2020    Arthritis     CHF (congestive heart failure)     Coronary artery disease     Diabetes mellitus     Glaucoma     Gout, joint     Hx of CABG 9/21/10    Hyperlipidemia     Hypertension     NSTEMI (non-ST elevated myocardial infarction) 09/21/10    Stenosis of aortic and mitral valves     Systolic heart failure 6/19/2015       Past Surgical History:   Procedure Laterality Date    CARDIAC VALVE SURGERY      CATARACT EXTRACTION      CORONARY ARTERY BYPASS GRAFT  9/21/2010    ENTROPIAN REPAIR Left 3/6/2020    Procedure: REPAIR, ENTROPION;  Surgeon: Yulia Kincaid MD;  Location: [address];  Service: Ophthalmology;  Laterality: Left;    JOINT REPLACEMENT         Prior Intubation HX:  none    Modified Barium Swallow: none      Prior diet: pt reported avoiding some meat due to difficulty chewing      Subjective     " I can take them all" referring to her pills  Patient goals: did not state    Pain/Comfort:  · Pain Rating 1: 0/10  · Pain Rating Post-Intervention 1: 0/10    Objective:     Oral Musculature Evaluation  · Oral Musculature: WFL  · Dentition: scattered dentition  · Mucosal Quality: good  · Mandibular Strength and Mobility: WFL  · Oral Labial Strength " and Mobility: WFL  · Lingual Strength and Mobility: WFL  · Volitional Cough: adequate  · Voice Prior to PO Intake: clear    Bedside Swallow Eval:   Consistencies Assessed:  · Thin liquids cup and straw sips x2 cups  · Puree 5 teaspoons of pudding  · Solids 1/2 cracker     Oral Phase:   · Prolonged mastication    Pharyngeal Phase:   · no overt clinical signs/symptoms of pharyngeal dysphagia    Compensatory Strategies  · None    Treatment: Observed pt taking all pills at one time without difficulty. Pt reported she does not wear upper dentures and has scattered dentition on the bottom. She reported she avoids some meats. Education provided re: universal swallowing precautions and s/s of aspiration. Also discussed role of slp and poc. Pt expressed understanding. White board updated.     Assessment:     Krystina Washington is a 88 y.o. female with an SLP diagnosis of Dysphagia.  She presents with mild oral dysphagia.    Goals:   Multidisciplinary Problems     SLP Goals        Problem: SLP Goal    Goal Priority Disciplines Outcome   SLP Goal     SLP    Description: Goals to be met 12/10  1 pt will tolerate ds/thin liquids  2 pt will participate in sle                   Plan:     · Patient to be seen:  4 x/week   · Plan of Care expires:     · Plan of Care reviewed with:  patient   · SLP Follow-Up:  Yes       Discharge recommendations:  rehabilitation facility   Barriers to Discharge:  None    Time Tracking:     SLP Treatment Date:   12/03/20  Speech Start Time:  0856  Speech Stop Time:  0912     Speech Total Time (min):  16 min    Billable Minutes: Eval Swallow and Oral Function 8 and Seld Care/Home Management Training 8    MADELINE Rose, CCC-SLP  12/03/2020

## 2020-12-03 NOTE — PLAN OF CARE
Plan of Care:  Discharge Recommendation: Rehab.  Please continue Progressive Mobility Protocol as appropriate.  Appropriate transfer level with nursing staff: Safe to transfer to bedside chair/bedside commode: stand pivot with 1 person with RW.    Sofía Moya PT, DPT  12/3/2020  Pager: [phone]    Physical Therapy Treatment Goals:  Multidisciplinary Problems       Physical Therapy Goals          Problem: Physical Therapy Goal    Goal Priority Disciplines Outcome Goal Variances Interventions   Physical Therapy Goal     PT, PT/OT Ongoing, Progressing     Description: Goals to be met by: 2020    Patient will increase functional independence with mobility by performin. Supine <> sit with Supervision.  2. Sit <> stand transfer with Supervision using Rolling Walker.  3. Bed <> chair transfer via Stand Pivot with Contact Guard Assistance using Rolling Walker.  4. Gait  x 150 feet with Contact Guard Assistance using Rolling Walker to prepare for community ambulation and endurance activities.  5. Able to tolerate exercise for 15-20 reps with independence.

## 2020-12-03 NOTE — PLAN OF CARE
CM met with patient and grandson Hermelindo Mackenzie in room for Discharge Planning Assessment.  Per grandson,  patient lives with Hermelindo Cross in a(n) SS with 1 steps to enter.   Per grandson, patient was independent with ADLS and used rolling walker for ambulation.  Per grandson, the patient will have assistance from family upon discharge.  Discharge Plan A Home with family and HH and Discharge Plan B IRF.  Discharge Planning Booklet given to patient/family and discussed.  All questions addressed.     PCP:  Oniel Johnson MD      Pharmacy:    [id number] - [place] - [address] AT [facility] [address]  [address]  [place] [address]  Phone: [phone] Fax: [phone]        Emergency Contacts:  Extended Emergency Contact Information  Primary Emergency Contact: Hermelindo Mackenzie  Address: [address] [place] [address]           [place] [address] [facility]  Home Phone: [phone]  Relation: Grandchild  Secondary Emergency Contact: Eliz Mackenzie   [place]  Mobile Phone: [phone]  Relation: Grandchild      Insurance:    Payor: MEDICARE / Plan: MEDICARE PART A & B / Product Type: [facility] /        12/03/20 1509   Discharge Assessment   Assessment Type Discharge Planning Assessment   Confirmed/corrected address and phone number on facesheet? Yes   Assessment information obtained from? Caregiver  (Grandcordelia Cross)   Communicated expected length of stay with patient/caregiver yes   Prior to hospitilization cognitive status: Alert/Oriented   Prior to hospitalization functional status: Assistive Equipment   Current cognitive status: Alert/Oriented   Current Functional Status: Needs Assistance;Assistive Equipment   Lives With grandchild(ever)   Able to Return to Prior Arrangements yes   Is patient able to care for self after discharge? Unable to determine at this time (comments)   Who are your caregiver(s) and their phone number(s)? Hermelindo Cross  Gurdeep grandson [phone]; Eliz Mackenzie granddaughter [phone]   Patient's perception of discharge disposition rehab facility   Readmission Within the Last 30 Days no previous admission in last 30 days   Patient currently being followed by outpatient case management? No   Patient currently receives any other outside agency services? No   Equipment Currently Used at Home shower chair;grab bar;glucometer;walker, rolling;raised toilet   Do you have any problems affording any of your prescribed medications? TBD   Is the patient taking medications as prescribed? yes   Does the patient have transportation home? Yes   Transportation Anticipated family or friend will provide  (grandcordelia)   Dialysis Name and Scheduled days na   Does the patient receive services at the [facility]madin Clinic? No   Discharge Plan A Home with family;Home Health   Discharge Plan B Rehab   DME Needed Upon Discharge  none   Patient/Family in Agreement with Plan yes       Martha Palomino RN  Case Management  Ext: [phone]

## 2020-12-03 NOTE — ED NOTES
Telemetry Verification   Patient placed on Telemetry Box  Verified with War Room  Box # [id number]   Monitor Tech    Rate 66   Rhythm NSR

## 2020-12-03 NOTE — PROGRESS NOTES
Pharmacist Renal Dose Adjustment Note    Krystina Washington is a 88 y.o. female being treated with the medication enoxaparin.    Patient Data:    Vital Signs (Most Recent):  Temp: 98.4 °F (36.9 °C) (12/02/20 1955)  Pulse: 63 (12/03/20 0102)  Resp: 19 (12/03/20 0043)  BP: (!) 182/90 (12/03/20 0102)  SpO2: 97 % (12/03/20 0102)   Vital Signs (72h Range):  Temp:  [98.4 °F (36.9 °C)]   Pulse:  [63-69]   Resp:  [18-20]   BP: (160-195)/()   SpO2:  [95 %-100 %]      Recent Labs   Lab 12/02/20 2124   CREATININE 1.4     Serum creatinine: 1.4 mg/dL 12/02/20 2124  Estimated creatinine clearance: 28.1 mL/min    Medication:enoxaparin dose: 40 mg frequency q24h will be changed to medication:enoxaparin dose:30 mg frequency:q24h.    Pharmacist's Name: Kay Andrade

## 2020-12-03 NOTE — ASSESSMENT & PLAN NOTE
ECHO 10/2020 with EF of 25%, grade II diastolic dysfunction, pul. Htn, severe left atrial enlargement  BNP on admit 802, decreased from previous admit  No signs of acute decompensated HF    Plan  - Continue Coreg 12.5mg BID,   - Hold Irbesartan 300mg, lasix 20 mg due to MARGARET  - Continue Hydralazine 100mg q8h, Imdur 30mg  - Daily weights  - Cardiac diet  - Strict I & O   - Will need f/u with cardiology, likely repeat ECHO in a month to follow EF for further intervention I.e lifevest vs ICD

## 2020-12-03 NOTE — CONSULTS
Food & Nutrition  Education    Diet Education: stroke  Time Spent: 1 minute  Learners: patient    Nutrition Education provided with handouts: Stroke nutrition therapy    Comments:  Pt resting in bed, covers over head. She did not awake or listen for nutrition education. KASH intake PTA or nutrition hx. Provided handouts to bedside for pt to review. Will reinforce on f/u.    All questions and concerns answered. Dietitian's contact information provided.     Follow-Up: yes    Please Re-consult as needed    Thanks!

## 2020-12-03 NOTE — HPI
87 y/o female who woke up from a nap and had AMS. Patient also c/o headache and was having difficulty seeing. They checked her BS and it was low so gave her some juice. EMS chaecked and it was 175. Patient is not on any hypoglycemics at this time.  Family report that she has had similar disoriented episode over the past 6 months when waking up.     Patient has had increase in SOB over the past months and was seen in cardiology clinic for CHF and advised to add extra lasix for 2-3 days. Patient BNP is 802, Trop 0.058 will trend Trop.    CT scan head no acute process seen.    MRI brain w/o contrast  reveals right occipital lobe about the occipital horn  Infarct    Risk factors CHF, HTN, HLP, CKD, obesity, valvular disease

## 2020-12-03 NOTE — ASSESSMENT & PLAN NOTE
BUN/Cr 33/1.3 Baseline 1.9  Patient with improved renal function compared to previous admissions or clinic visit    Plan  - Patient does not have an MARGARET  - Continue to monitor renal function  - Avoid nephrotoxic agents

## 2020-12-03 NOTE — ASSESSMENT & PLAN NOTE
- Continue home medications: Coreg 12.5mg BID, Irbesartan 300mg, Hydralazine 100mg q8h, Imdur 30mg

## 2020-12-03 NOTE — PLAN OF CARE
Swallow eval completed. Pt safe for dental soft diet and thin liquids.     MADELINE Rose, CCC-SLP  12/3/2020

## 2020-12-03 NOTE — SUBJECTIVE & OBJECTIVE
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 12/3/2020    Arthritis     CHF (congestive heart failure)     Coronary artery disease     Diabetes mellitus     Glaucoma     Gout, joint     Hx of CABG 9/21/10    Hyperlipidemia     Hypertension     NSTEMI (non-ST elevated myocardial infarction) 09/21/10    Stenosis of aortic and mitral valves     Systolic heart failure 6/19/2015     Past Surgical History:   Procedure Laterality Date    CARDIAC VALVE SURGERY      CATARACT EXTRACTION      CORONARY ARTERY BYPASS GRAFT  9/21/2010    ENTROPIAN REPAIR Left 3/6/2020    Procedure: REPAIR, ENTROPION;  Surgeon: Yulia Kincaid MD;  Location: [facility] OR [address] [place] [address];  Service: Ophthalmology;  Laterality: Left;    JOINT REPLACEMENT       Family History   Problem Relation Age of Onset    Diabetes Mother     Hypertension Father     Diabetes Father     Glaucoma Father     No Known Problems Sister     No Known Problems Brother     No Known Problems Maternal Aunt     No Known Problems Maternal Uncle     No Known Problems Paternal Aunt     No Known Problems Paternal Uncle     No Known Problems Maternal Grandmother     No Known Problems Maternal Grandfather     No Known Problems Paternal Grandmother     No Known Problems Paternal Grandfather      Social History     Tobacco Use    Smoking status: Never Smoker    Smokeless tobacco: Never Used   Substance Use Topics    Alcohol use: No    Drug use: No     Review of patient's allergies indicates:   Allergen Reactions    Indocin [indomethacin sodium] Other (See Comments)     Either caused hot,burning body or caused madness per patient's grandson    Lotensin [benazepril] Other (See Comments)     Either caused hot ,burning body or caused madness per patient's grandson       Medications: I have reviewed the current medication administration record.    Medications Prior to Admission   Medication Sig Dispense  Refill Last Dose    acetaminophen (TYLENOL) 325 MG tablet Take 650 mg by mouth every 6 (six) hours as needed for Pain.   12/2/2020    albuterol 90 mcg/actuation inhaler Inhale 1 puff into the lungs every 6 (six) hours as needed for Wheezing. 1 HFA Aerosol Inhaler Inhalation Twice a day 18 g 0 12/2/2020    allopurinol (ZYLOPRIM) 100 MG tablet TAKE 1 TABLET BY MOUTH ONCE DAILY 90 tablet 3 12/2/2020    aspirin 81 MG chewable tablet Take 81 mg by mouth. 1 Tablet, Chewable Oral Every day   12/2/2020    blood sugar diagnostic Strp To check BG 1 times daily, to use with insurance preferred meter 100 strip 6 12/2/2020    carvediloL (COREG) 12.5 MG tablet TAKE 1 TABLET(12.5 MG) BY MOUTH TWICE DAILY 180 tablet 0 12/2/2020    cloNIDine 0.3 mg/24 hr td ptwk (CATAPRES) 0.3 mg/24 hr APPLY 1 PATCH WEEKLY 4 patch 2 12/2/2020    clopidogreL (PLAVIX) 75 mg tablet Take 1 tablet (75 mg total) by mouth once daily. 90 tablet 3 12/2/2020    erythromycin (ROMYCIN) ophthalmic ointment Place into the left eye every evening. 1 Tube 2 12/2/2020    escitalopram oxalate (LEXAPRO) 20 MG tablet TAKE 1 TABLET BY MOUTH EVERY DAY 90 tablet 3 12/2/2020    furosemide (LASIX) 20 MG tablet Take 1 tablet (20 mg total) by mouth once daily. 90 tablet 3 12/2/2020    hydrALAZINE (APRESOLINE) 100 MG tablet TAKE 1 TABLET (100 MG TOTAL) BY MOUTH EVERY 8 (EIGHT) HOURS. 270 tablet 2 12/2/2020    irbesartan (AVAPRO) 300 MG tablet Take 1 tablet (300 mg total) by mouth every evening. 90 tablet 3 12/2/2020    isosorbide mononitrate (IMDUR) 60 MG 24 hr tablet TAKE 1 TABLET (60 MG TOTAL) BY MOUTH ONCE DAILY. 90 tablet 1 12/2/2020    lancets Misc To check BG 1 times daily, to use with insurance preferred meter 100 each 6 12/2/2020    nitroGLYCERIN 0.4 MG/DOSE TL SPRY (NITROLINGUAL) 400 mcg/spray spray PLACE 1 SPRAY ONTO THE TONGUE EVERY 5 (FIVE) MINUTES AS NEEDED. 12 g 0 12/2/2020    pravastatin (PRAVACHOL) 40 MG tablet TAKE 1 TABLET BY MOUTH EVERY  EVENING 90 tablet 3 12/2/2020    blood-glucose meter (FREESTYLE SYSTEM KIT) kit Use as instructed 1 each 0     timolol maleate 0.5% (TIMOPTIC) 0.5 % Drop Place 1 drop into both eyes 2 (two) times daily. 15 mL 3        Review of Systems   Constitutional: Negative for chills and fever.   HENT: Negative for ear discharge and ear pain.    Eyes: Negative for pain and redness.   Respiratory: Positive for shortness of breath. Negative for cough.    Cardiovascular: Positive for leg swelling. Negative for chest pain.   Gastrointestinal: Negative for abdominal distention and abdominal pain.   Genitourinary: Negative for dyspareunia and dysuria.   Musculoskeletal: Positive for arthralgias and back pain.   Skin: Negative for rash and wound.   Neurological: Positive for weakness.   Psychiatric/Behavioral: Positive for confusion.     Objective:     Vital Signs (Most Recent):  Temp: 98 °F (36.7 °C) (12/03/20 0246)  Pulse: 64 (12/03/20 0309)  Resp: 20 (12/03/20 0246)  BP: (!) 167/86 (12/03/20 0246)  SpO2: 97 % (12/03/20 0246)    Vital Signs Range (Last 24H):  Temp:  [98 °F (36.7 °C)-98.4 °F (36.9 °C)]   Pulse:  [63-71]   Resp:  [18-20]   BP: (160-195)/()   SpO2:  [95 %-100 %]     Physical Exam    Neurological Exam:   LOC: alert  Attention Span: Good   Language: No aphasia  Articulation: No dysarthria  Orientation: Not oriented to time  Visual Fields: Full  EOM (CN III, IV, VI): Full/intact  Pupils (CN II, III): PERRL  Facial Sensation (CN V): Normal  Facial Movement (CN VII): Symmetric facial expression    Gag Reflex: present  Reflexes: flexor plantar responses bilaterally  Motor: Arm left  Paresis: 4/5  Leg left  Paresis: 4/5  Arm right  Paresis: 4/5  Leg right Paresis: 4/5  Cebellar: No evidence of appendicular or axial ataxia  Sensation: Intact to light touch, temperature and vibration  Tone: Normal tone throughout      Laboratory:  CMP:   Recent Labs   Lab 12/02/20 2124   CALCIUM 9.1   ALBUMIN 2.9*   PROT 7.1       K 4.1   CO2 26      BUN 37*   CREATININE 1.4   ALKPHOS 80   ALT 11   AST 24   BILITOT 0.2     CBC:   Recent Labs   Lab 12/02/20 2124   WBC 11.21   RBC 3.25*   HGB 10.2*   HCT 33.1*      *   MCH 31.4*   MCHC 30.8*     Lipid Panel:   Recent Labs   Lab 12/02/20 2124   CHOL 116*   LDLCALC 55.6*   HDL 44   TRIG 82     Coagulation: No results for input(s): PT, INR, APTT in the last 168 hours.  Hgb A1C:   Recent Labs   Lab 12/02/20 2124   HGBA1C 6.3*     TSH:   Recent Labs   Lab 12/02/20 2124   TSH 1.916       Diagnostic Results:      Brain imaging:  CT Head w/o contrast 12-2-20 results:  No acute abnormality such as hemorrhage or large vascular territory ischemia to suggest ischemia/infarction.     Periventricular, supratentorial decreased white matter attenuation suggestive for chronic ischemic microvascular changes.     Generalized cerebral volume loss with compensatory symmetric enlargement of the ventricular system similar to prior exams and appropriate for age.    MRI Brain w/o contrast 12-3-20-results:      Focus of diffusion restriction with ADC match in the right occipital lobe about the occipital horn suggestive for infarct.     Generalized cerebral volume loss with appropriate symmetric compensatory enlargement of the ventricular system unchanged from priors.     Confluent supratentorial T2 hyperintensities consistent with chronic ischemic microvascular changes.      Vessel Imaging:      Cardiac Evaluation:   2 D Echo 10-15-20 results:  · Severe left atrial enlargement.  · There is mild left ventricular eccentric hypertrophy.  · The left ventricle is mildly enlarged with severely decreased systolic function. The estimated ejection fraction is 25%.  · There is severe left ventricular global hypokinesis.  · Grade II diastolic dysfunction.  · Moderate right atrial enlargement.  · Mildly reduced right ventricular systolic function.  · There is a 26 mm Rubalcava Jessica S3  transcutaneously-placed aortic bioprosthesis present. There is no aortic aortic insufficiency present. Prosthetic aortic valve is normal.  · Aortic valve area is 2.18 cm2; peak velocity is 2.22 m/s; mean gradient is 12 mm Hg. DI is 0.42.  · The mean diastolic gradient across the mitral valve is 4 mmHg at a heart rate of 70 bpm.  · Mild-to-moderate mitral regurgitation.  · Mild tricuspid regurgitation.  · There is pulmonary hypertension.  · Intermediate central venous pressure (8 mmHg).  · The estimated PA systolic pressure is 57 mmHg.

## 2020-12-03 NOTE — SUBJECTIVE & OBJECTIVE
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 12/3/2020    Arthritis     CHF (congestive heart failure)     Coronary artery disease     Diabetes mellitus     Glaucoma     Gout, joint     Hx of CABG 9/21/10    Hyperlipidemia     Hypertension     NSTEMI (non-ST elevated myocardial infarction) 09/21/10    Stenosis of aortic and mitral valves     Systolic heart failure 6/19/2015       Past Surgical History:   Procedure Laterality Date    CARDIAC VALVE SURGERY      CATARACT EXTRACTION      CORONARY ARTERY BYPASS GRAFT  9/21/2010    ENTROPIAN REPAIR Left 3/6/2020    Procedure: REPAIR, ENTROPION;  Surgeon: Yulia Kincaid MD;  Location: [facility] OR [address] [place] [address];  Service: Ophthalmology;  Laterality: Left;    JOINT REPLACEMENT         Review of patient's allergies indicates:   Allergen Reactions    Indocin [indomethacin sodium] Other (See Comments)     Either caused hot,burning body or caused madness per patient's grandson    Lotensin [benazepril] Other (See Comments)     Either caused hot ,burning body or caused madness per patient's grandson       No current facility-administered medications on file prior to encounter.      Current Outpatient Medications on File Prior to Encounter   Medication Sig    acetaminophen (TYLENOL) 325 MG tablet Take 650 mg by mouth every 6 (six) hours as needed for Pain.    albuterol 90 mcg/actuation inhaler Inhale 1 puff into the lungs every 6 (six) hours as needed for Wheezing. 1 HFA Aerosol Inhaler Inhalation Twice a day    allopurinol (ZYLOPRIM) 100 MG tablet TAKE 1 TABLET BY MOUTH ONCE DAILY    aspirin 81 MG chewable tablet Take 81 mg by mouth. 1 Tablet, Chewable Oral Every day    blood sugar diagnostic Strp To check BG 1 times daily, to use with insurance preferred meter    carvediloL (COREG) 12.5 MG tablet TAKE 1 TABLET(12.5 MG) BY MOUTH TWICE DAILY    cloNIDine 0.3 mg/24 hr td ptwk (CATAPRES) 0.3 mg/24 hr APPLY 1  PATCH WEEKLY    clopidogreL (PLAVIX) 75 mg tablet Take 1 tablet (75 mg total) by mouth once daily.    erythromycin (ROMYCIN) ophthalmic ointment Place into the left eye every evening.    escitalopram oxalate (LEXAPRO) 20 MG tablet TAKE 1 TABLET BY MOUTH EVERY DAY    furosemide (LASIX) 20 MG tablet Take 1 tablet (20 mg total) by mouth once daily.    hydrALAZINE (APRESOLINE) 100 MG tablet TAKE 1 TABLET (100 MG TOTAL) BY MOUTH EVERY 8 (EIGHT) HOURS.    irbesartan (AVAPRO) 300 MG tablet Take 1 tablet (300 mg total) by mouth every evening.    isosorbide mononitrate (IMDUR) 60 MG 24 hr tablet TAKE 1 TABLET (60 MG TOTAL) BY MOUTH ONCE DAILY.    lancets Misc To check BG 1 times daily, to use with insurance preferred meter    nitroGLYCERIN 0.4 MG/DOSE TL SPRY (NITROLINGUAL) 400 mcg/spray spray PLACE 1 SPRAY ONTO THE TONGUE EVERY 5 (FIVE) MINUTES AS NEEDED.    pravastatin (PRAVACHOL) 40 MG tablet TAKE 1 TABLET BY MOUTH EVERY EVENING    blood-glucose meter (FREESTYLE SYSTEM KIT) kit Use as instructed    timolol maleate 0.5% (TIMOPTIC) 0.5 % Drop Place 1 drop into both eyes 2 (two) times daily.     Family History     Problem Relation (Age of Onset)    Diabetes Mother, Father    Glaucoma Father    Hypertension Father    No Known Problems Sister, Brother, Maternal Aunt, Maternal Uncle, Paternal Aunt, Paternal Uncle, Maternal Grandmother, Maternal Grandfather, Paternal Grandmother, Paternal Grandfather        Tobacco Use    Smoking status: Never Smoker    Smokeless tobacco: Never Used   Substance and Sexual Activity    Alcohol use: No    Drug use: No    Sexual activity: Never     Review of Systems   Unable to perform ROS: Mental status change     Objective:     Vital Signs (Most Recent):  Temp: 97.9 °F (36.6 °C) (12/03/20 1130)  Pulse: 60 (12/03/20 1130)  Resp: 15 (12/03/20 1130)  BP: 124/69 (12/03/20 1130)  SpO2: 95 % (12/03/20 1130) Vital Signs (24h Range):  Temp:  [97.9 °F (36.6 °C)-98.4 °F (36.9 °C)] 97.9 °F  (36.6 °C)  Pulse:  [60-71] 60  Resp:  [15-24] 15  SpO2:  [95 %-100 %] 95 %  BP: (124-195)/() 124/69     Weight: 84.2 kg (185 lb 10 oz)  Body mass index is 32.88 kg/m².    Physical Exam  Constitutional:       General: She is not in acute distress.  HENT:      Head: Normocephalic and atraumatic.      Nose: Nose normal.   Cardiovascular:      Rate and Rhythm: Normal rate and regular rhythm.      Pulses: Normal pulses.   Pulmonary:      Effort: Pulmonary effort is normal.      Breath sounds: Normal breath sounds.   Abdominal:      General: Abdomen is flat.   Musculoskeletal:      Right lower leg: No edema.      Left lower leg: No edema.   Skin:     General: Skin is warm and dry.   Neurological:      Mental Status: She is alert.   Psychiatric:         Mood and Affect: Mood normal.         Significant Labs:   CBC:   Recent Labs   Lab 12/02/20 2124 12/03/20  0859   WBC 11.21 9.95   HGB 10.2* 10.1*   HCT 33.1* 32.5*    201     CMP:   Recent Labs   Lab 12/02/20 2124 12/03/20  0859 12/03/20  1037    141 136   K 4.1 3.8 3.5    104 100   CO2 26 29 27   * 125* 132*   BUN 37* 33* 33*   CREATININE 1.4 1.4 1.3   CALCIUM 9.1 9.4 9.2   PROT 7.1 7.1  --    ALBUMIN 2.9* 3.0*  --    BILITOT 0.2 0.4  --    ALKPHOS 80 77  --    AST 24 21  --    ALT 11 11  --    ANIONGAP 8 8 9   EGFRNONAA 33.6* 33.6* 36.7*       Significant Imaging: I have reviewed all pertinent imaging results/findings within the past 24 hours.

## 2020-12-03 NOTE — PT/OT/SLP EVAL
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  She presents with the following impairments/functional limitations: decreased functional mobility, balance and cardiorespiratory endurance. Pt with c/o of dizziness with all changes in position. Pt required increased time for all mobility due to SOB. Pt is not at PLOF, would benefit from continued therapy to hasten recovery. Krystina Washington's deficits effect their ability to safely and independently participate in self-care tasks and functional mobility which increases their caregiver burden. Due to her physical therapy diagnosis of debility and deconditioning, they continue to benefit from acute PT services to address the following limitations: high fall risk, weakness, instability, and the need for supervised instruction of exercise. These deficits effect their roles and responsibilities in which they were able to complete prior to admit. Pt would benefit from an intensive and collaborative PT/OT/SLP therapy program to improve quality of life and focus on recovery of impairments.     Problem List: weakness, impaired endurance, impaired self care skills, impaired functional  "mobilty, gait instability, impaired balance, impaired cardiopulmonary response to activity  Rehab Prognosis: Good; patient would benefit from acute skilled PT services to address these deficits and reach maximum level of function.      GOALS:   Multidisciplinary Problems     Physical Therapy Goals        Problem: Physical Therapy Goal    Goal Priority Disciplines Outcome Goal Variances Interventions   Physical Therapy Goal     PT, PT/OT Ongoing, Progressing     Description: Goals to be met by: 2020    Patient will increase functional independence with mobility by performin. Supine <> sit with Supervision.  2. Sit <> stand transfer with Supervision using Rolling Walker.  3. Bed <> chair transfer via Stand Pivot with Contact Guard Assistance using Rolling Walker.  4. Gait  x 150 feet with Contact Guard Assistance using Rolling Walker to prepare for community ambulation and endurance activities.  5. Able to tolerate exercise for 15-20 reps with independence.                       Subjective     Communicated with RN prior to session.  Patient found supine upon PT entry to room, agreeable to evaluation. Krystina Washington's alone present during session.    Chief Complaint: Hypoglycemia (Pt arrives via EMS after family called c/o patient's BG being "low". Per EMS, pt was given juice and sweet tea PTA. BG now 175.)    Patient/Family Comments/goals: "I just feel dizzy."  Pain/Comfort:  · Pain Rating 1: (pt c/o HA, however did not grade)    Social History:  Residence: lives with her grandson 1-story house  Support available: family  Equipment Used: rollator, shower chair  Equipment Owned (not using): None  Prior level of function: modified independent  Hand Dominance: right.   Work: Retired.   Drive: no.   Managing Medicines/Managing Home: no.   Hobbies: none stated    Objective:   Patient found with: telemetry, [employer]     General Precautions: Standard, Cardiac fall, hearing impaired, aspiration   Orthopedic " "Precautions:N/A   Braces: N/A   Oxygen Device: Room Air  Vitals: /69   Pulse 60   Temp 97.9 °F (36.6 °C)   Resp 15   Ht 5' 3" (1.6 m)   Wt 84.2 kg (185 lb 10 oz)   LMP  (LMP Unknown)   SpO2 95%   Breastfeeding No   BMI 32.88 kg/m²     Exams:  · Cognition:   · Alert and Cooperative  · AxOx4  · Command following: Follows one-step verbal commands  · Fluency: clear/fluent  · Hearing: hard of hearing  · Vision:  Intact visual fields, difficulty with left ocular movements     Left LE Right LE   Edema absent absent   ROM AROM WFL AROM WFL   Modified Price Scale 0: No increase in muscle tone 0: No increase in muscle tone   Strength 4/5 4/5   Sensation intact to light touch intact to light touch   Coordination normal normal     Outcome Measures:  AM-PAC 6 CLICK MOBILITY  Turning over in bed (including adjusting bedclothes, sheets and blankets)?: 3  Sitting down on and standing up from a chair with arms (e.g., wheelchair, bedside commode, etc.): 2  Moving from lying on back to sitting on the side of the bed?: 2  Moving to and from a bed to a chair (including a wheelchair)?: 2  Need to walk in hospital room?: 2  Climbing 3-5 steps with a railing?: 1  Basic Mobility Total Score: 12     Functional Mobility:  Additional staff present: OT  Bed Mobility:  · Supine to Sit: minimum assistance; HOB elevated and from left side of bed  · Scooting anteriorly to EOB to have both feet planted on floor: moderate assistance  · Sit to Supine: minimum assistance; HOB flat and to left side of bed    Transfers:   · Sit <> Stand Transfer: minimum assistance with b/l hand-held assist   · Stand <> Sit Transfer: minimum assistance with b/l  hand-held assist   · [id number] from EOB and [id number] from bedside chair   · C/o dizziness with standing      Gait:   · Patient ambulated: 15ft within room   · Patient required: minimal assist  · Patient used: hand-held assist  · Gait Pattern observed: 2-point gait  · Gait Deviation(s): unsteady " gait, wide base of support, flexed posture and decreased yvonne  · Impairments due to: impaired balance, decreased strength and decreased endurance  · Comments: pt with increased RR and SOB after ambulation trial. Pt c/o dizziness, subsided after rest break    Therapeutic Activities & Exercises:   Education:   Patient provided with daily orientation and goals of this PT session.   Patient educated to transfer to bedside chair/bedside commode/bathroom with RN/PCT present.    Patient educated on Fall risk, home safety, Home exercise program and plan of care by explanation.    Patient Verbalized Understanding.   Time provided for therapeutic counseling and discussion of current health disposition. All questions answered to satisfaction, within scope of PT practice; voiced no other concerns. White board updated in patient's room, RN notified of session.    Patient left supine, with head in midline, neutral pelvis & heels floated for skin protection with all lines intact, call button in reach and RN notified.    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 12/3/2020    Arthritis     CHF (congestive heart failure)     Coronary artery disease     Diabetes mellitus     Glaucoma     Gout, joint     Hx of CABG 9/21/10    Hyperlipidemia     Hypertension     NSTEMI (non-ST elevated myocardial infarction) 09/21/10    Stenosis of aortic and mitral valves     Systolic heart failure 6/19/2015       Past Surgical History:   Procedure Laterality Date    CARDIAC VALVE SURGERY      CATARACT EXTRACTION      CORONARY ARTERY BYPASS GRAFT  9/21/2010    ENTROPIAN REPAIR Left 3/6/2020    Procedure: REPAIR, ENTROPION;  Surgeon: Yulia Kincaid MD;  Location: [facility] OR [address] [place] [address];  Service: Ophthalmology;  Laterality: Left;    JOINT REPLACEMENT         Family History   Problem Relation Age of Onset    Diabetes Mother     Hypertension Father     Diabetes  Father     Glaucoma Father     No Known Problems Sister     No Known Problems Brother     No Known Problems Maternal Aunt     No Known Problems Maternal Uncle     No Known Problems Paternal Aunt     No Known Problems Paternal Uncle     No Known Problems Maternal Grandmother     No Known Problems Maternal Grandfather     No Known Problems Paternal Grandmother     No Known Problems Paternal Grandfather        Social History     Socioeconomic History    Marital status:      Spouse name: Not on file    Number of children: Not on file    Years of education: Not on file    Highest education level: Not on file   Occupational History    Not on file   Social Needs    Financial resource strain: Not on file    Food insecurity     Worry: Not on file     Inability: Not on file    Transportation needs     Medical: Not on file     Non-medical: Not on file   Tobacco Use    Smoking status: Never Smoker    Smokeless tobacco: Never Used   Substance and Sexual Activity    Alcohol use: No    Drug use: No    Sexual activity: Never   Lifestyle    Physical activity     Days per week: Not on file     Minutes per session: Not on file    Stress: Not on file   Relationships    Social connections     Talks on phone: Not on file     Gets together: Not on file     Attends Sikh service: Not on file     Active member of club or organization: Not on file     Attends meetings of clubs or organizations: Not on file     Relationship status: Not on file   Other Topics Concern    Not on file   Social History Narrative    Not on file     Time Tracking:     PT Received On: 12/03/20  PT Start Time: 0921     PT Stop Time: 0945  PT Total Time (min): 24 min   Co-treat with OT due to medical complexity of pt and need for skilled hands for safe intervention.    Billable Minutes: Evaluation 12 and Therapeutic Activity 12    Sofía Moya PT, DPT  12/3/2020  Pager: [phone]

## 2020-12-03 NOTE — CONSULTS
"[facility] Medicine  Consult Note    Patient Name: Krystina Washington  MRN: [id number]  Admission Date: 12/2/2020  Hospital Length of Stay: 0 days  Attending Physician: Dhaval Virgen MD   Primary Care Provider: Oniel Johnson MD           Patient information was obtained from past medical records and ER records.     Inpatient consult to Hospital Medicine - Stroke Comanagement  Consult performed by: Emily Iglesias DO  Consult ordered by: Alicia Braun NP        Subjective:     Principal Problem: Acute ischemic left posterior cerebral artery (PCA) stroke    Chief Complaint:   Chief Complaint   Patient presents with    Hypoglycemia     Pt arrives via EMS after family called c/o patient's BG being "low". Per EMS, pt was given juice and sweet tea PTA. BG now 175.        HPI: Krystina Washington is a 88 y.o. female with a hx of HFrEF, HTN, T2DM, CAD (s/p CABG in 2010), AS (s/p TAVR in 2017) who presented to the ED via EMS with AMS. Per chart review, family noticed patient was confused yesterday after waking up from, a nap, she was also noted to have low blood glucose for which she was given juice. Family called EMS for altered mental status, BG in field was 175. In ED, there was concern for a stroke, CTH was negative for intracranial bleed, however MRI brain with finding of an occipital lobe infarct. Patient was admitted to the vascular neurology/stroke service, hospital medicine was consulted for CHF/MARGARET.     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 12/3/2020    Arthritis     CHF (congestive heart failure)     Coronary artery disease     Diabetes mellitus     Glaucoma     Gout, joint     Hx of CABG 9/21/10    Hyperlipidemia     Hypertension     NSTEMI (non-ST elevated myocardial infarction) 09/21/10    Stenosis of aortic and mitral valves     Systolic heart failure 6/19/2015       Past Surgical History: "   Procedure Laterality Date    CARDIAC VALVE SURGERY      CATARACT EXTRACTION      CORONARY ARTERY BYPASS GRAFT  9/21/2010    ENTROPIAN REPAIR Left 3/6/2020    Procedure: REPAIR, ENTROPION;  Surgeon: Yulia Kincaid MD;  Location: [facility] OR [address] [place] [address];  Service: Ophthalmology;  Laterality: Left;    JOINT REPLACEMENT         Review of patient's allergies indicates:   Allergen Reactions    Indocin [indomethacin sodium] Other (See Comments)     Either caused hot,burning body or caused madness per patient's grandson    Lotensin [benazepril] Other (See Comments)     Either caused hot ,burning body or caused madness per patient's grandson       No current facility-administered medications on file prior to encounter.      Current Outpatient Medications on File Prior to Encounter   Medication Sig    acetaminophen (TYLENOL) 325 MG tablet Take 650 mg by mouth every 6 (six) hours as needed for Pain.    albuterol 90 mcg/actuation inhaler Inhale 1 puff into the lungs every 6 (six) hours as needed for Wheezing. 1 HFA Aerosol Inhaler Inhalation Twice a day    allopurinol (ZYLOPRIM) 100 MG tablet TAKE 1 TABLET BY MOUTH ONCE DAILY    aspirin 81 MG chewable tablet Take 81 mg by mouth. 1 Tablet, Chewable Oral Every day    blood sugar diagnostic Strp To check BG 1 times daily, to use with insurance preferred meter    carvediloL (COREG) 12.5 MG tablet TAKE 1 TABLET(12.5 MG) BY MOUTH TWICE DAILY    cloNIDine 0.3 mg/24 hr td ptwk (CATAPRES) 0.3 mg/24 hr APPLY 1 PATCH WEEKLY    clopidogreL (PLAVIX) 75 mg tablet Take 1 tablet (75 mg total) by mouth once daily.    erythromycin (ROMYCIN) ophthalmic ointment Place into the left eye every evening.    escitalopram oxalate (LEXAPRO) 20 MG tablet TAKE 1 TABLET BY MOUTH EVERY DAY    furosemide (LASIX) 20 MG tablet Take 1 tablet (20 mg total) by mouth once daily.    hydrALAZINE (APRESOLINE) 100 MG tablet TAKE 1 TABLET (100 MG TOTAL) BY MOUTH EVERY 8 (EIGHT) HOURS.    irbesartan  (AVAPRO) 300 MG tablet Take 1 tablet (300 mg total) by mouth every evening.    isosorbide mononitrate (IMDUR) 60 MG 24 hr tablet TAKE 1 TABLET (60 MG TOTAL) BY MOUTH ONCE DAILY.    lancets Misc To check BG 1 times daily, to use with insurance preferred meter    nitroGLYCERIN 0.4 MG/DOSE TL SPRY (NITROLINGUAL) 400 mcg/spray spray PLACE 1 SPRAY ONTO THE TONGUE EVERY 5 (FIVE) MINUTES AS NEEDED.    pravastatin (PRAVACHOL) 40 MG tablet TAKE 1 TABLET BY MOUTH EVERY EVENING    blood-glucose meter (FREESTYLE SYSTEM KIT) kit Use as instructed    timolol maleate 0.5% (TIMOPTIC) 0.5 % Drop Place 1 drop into both eyes 2 (two) times daily.     Family History     Problem Relation (Age of Onset)    Diabetes Mother, Father    Glaucoma Father    Hypertension Father    No Known Problems Sister, Brother, Maternal Aunt, Maternal Uncle, Paternal Aunt, Paternal Uncle, Maternal Grandmother, Maternal Grandfather, Paternal Grandmother, Paternal Grandfather        Tobacco Use    Smoking status: Never Smoker    Smokeless tobacco: Never Used   Substance and Sexual Activity    Alcohol use: No    Drug use: No    Sexual activity: Never     Review of Systems   Unable to perform ROS: Mental status change     Objective:     Vital Signs (Most Recent):  Temp: 97.9 °F (36.6 °C) (12/03/20 1130)  Pulse: 60 (12/03/20 1130)  Resp: 15 (12/03/20 1130)  BP: 124/69 (12/03/20 1130)  SpO2: 95 % (12/03/20 1130) Vital Signs (24h Range):  Temp:  [97.9 °F (36.6 °C)-98.4 °F (36.9 °C)] 97.9 °F (36.6 °C)  Pulse:  [60-71] 60  Resp:  [15-24] 15  SpO2:  [95 %-100 %] 95 %  BP: (124-195)/() 124/69     Weight: 84.2 kg (185 lb 10 oz)  Body mass index is 32.88 kg/m².    Physical Exam  Constitutional:       General: She is not in acute distress.  HENT:      Head: Normocephalic and atraumatic.      Nose: Nose normal.   Cardiovascular:      Rate and Rhythm: Normal rate and regular rhythm.      Pulses: Normal pulses.   Pulmonary:      Effort: Pulmonary effort is  normal.      Breath sounds: Normal breath sounds.   Abdominal:      General: Abdomen is flat.   Musculoskeletal:      Right lower leg: No edema.      Left lower leg: No edema.   Skin:     General: Skin is warm and dry.   Neurological:      Mental Status: She is alert.   Psychiatric:         Mood and Affect: Mood normal.         Significant Labs:   CBC:   Recent Labs   Lab 12/02/20 2124 12/03/20  0859   WBC 11.21 9.95   HGB 10.2* 10.1*   HCT 33.1* 32.5*    201     CMP:   Recent Labs   Lab 12/02/20 2124 12/03/20  0859 12/03/20  1037    141 136   K 4.1 3.8 3.5    104 100   CO2 26 29 27   * 125* 132*   BUN 37* 33* 33*   CREATININE 1.4 1.4 1.3   CALCIUM 9.1 9.4 9.2   PROT 7.1 7.1  --    ALBUMIN 2.9* 3.0*  --    BILITOT 0.2 0.4  --    ALKPHOS 80 77  --    AST 24 21  --    ALT 11 11  --    ANIONGAP 8 8 9   EGFRNONAA 33.6* 33.6* 36.7*       Significant Imaging: I have reviewed all pertinent imaging results/findings within the past 24 hours.    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 Htn, severe left atrial enlargement  BNP on admit 802, decreased from previous admit  No signs of acute decompensated HF    Plan  - Continue Coreg 12.5mg BID, Irbesartan 300mg ohs  - Continue Hydralazine 100mg q8h, Imdur 30mg  - Resume home Lasix 20mg daily  - Daily weights  - Cardiac diet  - Strict I & O   - Will need f/u with cardiology, likely repeat ECHO in a month to follow EF for further intervention I.e lifevest vs ICD      Essential hypertension  - Continue home medications: Coreg 12.5mg BID, Irbesartan 300mg, Hydralazine 100mg q8h, Imdur 30mg    Coronary artery disease involving native coronary artery of native heart without angina pectoris  - Continue ASA 81mg  - Lipitor 40mg qhs      Type II diabetes mellitus with  renal manifestations  HGBA1C 6.3  - [facility]  - Avoid hypoglycemia          Chronic kidney disease, stage III (moderate)  BUN/Cr 33/1.3 Baseline 1.9  Patient with improved renal function compared to previous admissions or clinic visit    Plan  - Patient does not have an MARGARET  - Continue to monitor renal function  - Avoid nephrotoxic agents      Hyperlipidemia  -Continue Lipitor 40mg      VTE Risk Mitigation (From admission, onward)         Ordered     enoxaparin injection 30 mg  Every 24 hours      12/03/20 0128     IP VTE HIGH RISK PATIENT  Once      12/03/20 0128     Place sequential compression device  Until discontinued      12/03/20 0128                    Thank you for your consult. I will follow-up with patient. Please contact us if you have any additional questions.    Emily Iglesias DO  Department of Hospital Medicine   [facility]

## 2020-12-03 NOTE — NURSING
Pt arrived to MRI and transferred to MRI table without difficulty. MRI safe cardiac monitor and O2 monitor applied to pt. No acute distress noted. Will continue to monitor pt and to monitor vital signs during duration of exam.                                        HR                                 60                                    SpO2                                 94% RA                                    BP

## 2020-12-03 NOTE — HPI
Krystina Washington is a 88 y.o. female with a hx of HFrEF, HTN, T2DM, CAD (s/p CABG in 2010), AS (s/p TAVR in 2017) who presented to the ED via EMS with AMS. Per chart review, family noticed patient was confused yesterday after waking up from, a nap, she was also noted to have low blood glucose for which she was given juice. Family called EMS for altered mental status, BG in field was 175. In ED, there was concern for a stroke, CTH was negative for intracranial bleed, however MRI brain with finding of an occipital lobe infarct. Patient was admitted to the vascular neurology/stroke service, hospital medicine was consulted for CHF/MARGARET.

## 2020-12-04 PROBLEM — I63.531 ACUTE ISCHEMIC RIGHT POSTERIOR CEREBRAL ARTERY (PCA) STROKE: Status: ACTIVE | Noted: 2020-12-03

## 2020-12-04 PROBLEM — N17.9 AKI (ACUTE KIDNEY INJURY): Status: ACTIVE | Noted: 2020-12-04

## 2020-12-04 PROBLEM — G93.6 CYTOTOXIC CEREBRAL EDEMA: Status: ACTIVE | Noted: 2020-12-04

## 2020-12-04 LAB
ALBUMIN SERPL BCP-MCNC: 2.8 G/DL (ref 3.5–5.2)
ALP SERPL-CCNC: 73 U/L (ref 55–135)
ALT SERPL W/O P-5'-P-CCNC: 11 U/L (ref 10–44)
ANION GAP SERPL CALC-SCNC: 8 MMOL/L (ref 8–16)
AST SERPL-CCNC: 21 U/L (ref 10–40)
BASOPHILS # BLD AUTO: 0.03 K/UL (ref 0–0.2)
BASOPHILS NFR BLD: 0.3 % (ref 0–1.9)
BILIRUB SERPL-MCNC: 0.3 MG/DL (ref 0.1–1)
BUN SERPL-MCNC: 42 MG/DL (ref 8–23)
CALCIUM SERPL-MCNC: 9 MG/DL (ref 8.7–10.5)
CHLORIDE SERPL-SCNC: 103 MMOL/L (ref 95–110)
CO2 SERPL-SCNC: 31 MMOL/L (ref 23–29)
CREAT SERPL-MCNC: 1.7 MG/DL (ref 0.5–1.4)
DIFFERENTIAL METHOD: ABNORMAL
EOSINOPHIL # BLD AUTO: 0.3 K/UL (ref 0–0.5)
EOSINOPHIL NFR BLD: 2.7 % (ref 0–8)
ERYTHROCYTE [DISTWIDTH] IN BLOOD BY AUTOMATED COUNT: 15 % (ref 11.5–14.5)
EST. GFR  (AFRICAN AMERICAN): 30.6 ML/MIN/1.73 M^2
EST. GFR  (NON AFRICAN AMERICAN): 26.5 ML/MIN/1.73 M^2
GLUCOSE SERPL-MCNC: 121 MG/DL (ref 70–110)
HCT VFR BLD AUTO: 31.8 % (ref 37–48.5)
HGB BLD-MCNC: 9.5 G/DL (ref 12–16)
IMM GRANULOCYTES # BLD AUTO: 0.05 K/UL (ref 0–0.04)
IMM GRANULOCYTES NFR BLD AUTO: 0.4 % (ref 0–0.5)
LYMPHOCYTES # BLD AUTO: 2.2 K/UL (ref 1–4.8)
LYMPHOCYTES NFR BLD: 19.5 % (ref 18–48)
MAGNESIUM SERPL-MCNC: 1.9 MG/DL (ref 1.6–2.6)
MCH RBC QN AUTO: 30.7 PG (ref 27–31)
MCHC RBC AUTO-ENTMCNC: 29.9 G/DL (ref 32–36)
MCV RBC AUTO: 103 FL (ref 82–98)
MONOCYTES # BLD AUTO: 1 K/UL (ref 0.3–1)
MONOCYTES NFR BLD: 8.5 % (ref 4–15)
NEUTROPHILS # BLD AUTO: 7.8 K/UL (ref 1.8–7.7)
NEUTROPHILS NFR BLD: 68.6 % (ref 38–73)
NRBC BLD-RTO: 0 /100 WBC
PHOSPHATE SERPL-MCNC: 3.9 MG/DL (ref 2.7–4.5)
PLATELET # BLD AUTO: 194 K/UL (ref 150–350)
PMV BLD AUTO: 12.7 FL (ref 9.2–12.9)
POCT GLUCOSE: 123 MG/DL (ref 70–110)
POCT GLUCOSE: 128 MG/DL (ref 70–110)
POCT GLUCOSE: 183 MG/DL (ref 70–110)
POTASSIUM SERPL-SCNC: 3.7 MMOL/L (ref 3.5–5.1)
PROT SERPL-MCNC: 6.6 G/DL (ref 6–8.4)
RBC # BLD AUTO: 3.09 M/UL (ref 4–5.4)
SODIUM SERPL-SCNC: 142 MMOL/L (ref 136–145)
WBC # BLD AUTO: 11.34 K/UL (ref 3.9–12.7)

## 2020-12-04 PROCEDURE — 25000003 PHARM REV CODE 250: Performed by: NURSE PRACTITIONER

## 2020-12-04 PROCEDURE — 83735 ASSAY OF MAGNESIUM: CPT

## 2020-12-04 PROCEDURE — 80053 COMPREHEN METABOLIC PANEL: CPT

## 2020-12-04 PROCEDURE — 25000003 PHARM REV CODE 250: Performed by: STUDENT IN AN ORGANIZED HEALTH CARE EDUCATION/TRAINING PROGRAM

## 2020-12-04 PROCEDURE — 97535 SELF CARE MNGMENT TRAINING: CPT

## 2020-12-04 PROCEDURE — 97168 OT RE-EVAL EST PLAN CARE: CPT

## 2020-12-04 PROCEDURE — 63600175 PHARM REV CODE 636 W HCPCS: Performed by: NURSE PRACTITIONER

## 2020-12-04 PROCEDURE — 99233 SBSQ HOSP IP/OBS HIGH 50: CPT | Mod: GC,,, | Performed by: PSYCHIATRY & NEUROLOGY

## 2020-12-04 PROCEDURE — 36415 COLL VENOUS BLD VENIPUNCTURE: CPT

## 2020-12-04 PROCEDURE — 85025 COMPLETE CBC W/AUTO DIFF WBC: CPT

## 2020-12-04 PROCEDURE — 11000001 HC ACUTE MED/SURG PRIVATE ROOM

## 2020-12-04 PROCEDURE — 92523 SPEECH SOUND LANG COMPREHEN: CPT

## 2020-12-04 PROCEDURE — 99233 PR SUBSEQUENT HOSPITAL CARE,LEVL III: ICD-10-PCS | Mod: GC,,, | Performed by: PSYCHIATRY & NEUROLOGY

## 2020-12-04 PROCEDURE — 84100 ASSAY OF PHOSPHORUS: CPT

## 2020-12-04 RX ORDER — CLONIDINE 0.3 MG/24H
1 PATCH, EXTENDED RELEASE TRANSDERMAL WEEKLY
Status: DISCONTINUED | OUTPATIENT
Start: 2020-12-04 | End: 2020-12-05 | Stop reason: HOSPADM

## 2020-12-04 RX ORDER — PRAVASTATIN SODIUM 40 MG/1
40 TABLET ORAL NIGHTLY
Status: DISCONTINUED | OUTPATIENT
Start: 2020-12-05 | End: 2020-12-05 | Stop reason: HOSPADM

## 2020-12-04 RX ADMIN — ISOSORBIDE MONONITRATE 60 MG: 30 TABLET, EXTENDED RELEASE ORAL at 08:12

## 2020-12-04 RX ADMIN — HYDRALAZINE HYDROCHLORIDE 100 MG: 50 TABLET, FILM COATED ORAL at 01:12

## 2020-12-04 RX ADMIN — ESCITALOPRAM OXALATE 20 MG: 20 TABLET ORAL at 08:12

## 2020-12-04 RX ADMIN — HYDRALAZINE HYDROCHLORIDE 100 MG: 50 TABLET, FILM COATED ORAL at 05:12

## 2020-12-04 RX ADMIN — TIMOLOL MALEATE 1 DROP: 5 SOLUTION/ DROPS OPHTHALMIC at 08:12

## 2020-12-04 RX ADMIN — CLOPIDOGREL 75 MG: 75 TABLET, FILM COATED ORAL at 08:12

## 2020-12-04 RX ADMIN — ASPIRIN 81 MG CHEWABLE TABLET 81 MG: 81 TABLET CHEWABLE at 08:12

## 2020-12-04 RX ADMIN — FUROSEMIDE 20 MG: 20 TABLET ORAL at 08:12

## 2020-12-04 RX ADMIN — CARVEDILOL 12.5 MG: 12.5 TABLET, FILM COATED ORAL at 08:12

## 2020-12-04 RX ADMIN — HYDRALAZINE HYDROCHLORIDE 100 MG: 50 TABLET, FILM COATED ORAL at 08:12

## 2020-12-04 RX ADMIN — ENOXAPARIN SODIUM 30 MG: 30 INJECTION SUBCUTANEOUS at 05:12

## 2020-12-04 RX ADMIN — CLONIDINE 1 PATCH: 0.3 PATCH TRANSDERMAL at 11:12

## 2020-12-04 NOTE — SUBJECTIVE & OBJECTIVE
Interval History: No acute events overnight. Pt denies headache, nausea, abdominal pain, vomiting.     Review of Systems  Objective:     Vital Signs (Most Recent):  Temp: 98.4 °F (36.9 °C) (12/04/20 0552)  Pulse: 65 (12/04/20 0812)  Resp: (!) 21 (12/04/20 0552)  BP: (!) 182/92 (12/04/20 0552)  SpO2: (!) 92 % (12/04/20 0552) Vital Signs (24h Range):  Temp:  [97.9 °F (36.6 °C)-99.2 °F (37.3 °C)] 98.4 °F (36.9 °C)  Pulse:  [60-80] 65  Resp:  [15-24] 21  SpO2:  [92 %-97 %] 92 %  BP: (124-182)/(69-92) 182/92     Weight: 84.2 kg (185 lb 10 oz)  Body mass index is 32.88 kg/m².    Intake/Output Summary (Last 24 hours) at 12/4/2020 1019  Last data filed at 12/4/2020 0000  Gross per 24 hour   Intake 240 ml   Output 500 ml   Net -260 ml      Physical Exam  Constitutional:       General: She is not in acute distress.  HENT:      Head: Normocephalic and atraumatic.      Nose: Nose normal.   Cardiovascular:      Rate and Rhythm: Normal rate and regular rhythm.      Pulses: Normal pulses.   Pulmonary:      Effort: Pulmonary effort is normal.      Breath sounds: Normal breath sounds.   Abdominal:      General: Abdomen is flat.   Musculoskeletal:      Right lower leg: No edema.      Left lower leg: No edema.   Skin:     General: Skin is warm and dry.   Neurological:      Mental Status: She is alert.   Psychiatric:         Mood and Affect: Mood normal.         Significant Labs:   CBC:   Recent Labs   Lab 12/02/20 2124 12/03/20  0859 12/04/20  0334   WBC 11.21 9.95 11.34   HGB 10.2* 10.1* 9.5*   HCT 33.1* 32.5* 31.8*    201 194     CMP:   Recent Labs   Lab 12/02/20 2124 12/03/20  0859 12/03/20  1037 12/04/20  0334    141 136 142   K 4.1 3.8 3.5 3.7    104 100 103   CO2 26 29 27 31*   * 125* 132* 121*   BUN 37* 33* 33* 42*   CREATININE 1.4 1.4 1.3 1.7*   CALCIUM 9.1 9.4 9.2 9.0   PROT 7.1 7.1  --  6.6   ALBUMIN 2.9* 3.0*  --  2.8*   BILITOT 0.2 0.4  --  0.3   ALKPHOS 80 77  --  73   AST 24 21  --  21   ALT  11 11  --  11   ANIONGAP 8 8 9 8   EGFRNONAA 33.6* 33.6* 36.7* 26.5*       Significant Imaging: n/a

## 2020-12-04 NOTE — PT/OT/SLP PROGRESS
Physical Therapy  Pt Not Seen    Patient Name:  Krystina Washington   MRN:  [id number]    2:59 pm    Patient not seen today secondary to pt Unavailable (Comment)(Pt eating lunch.  PTA unable to return for 2nd attempt). Will follow-up on next scheduled visit.    Marianne Brandt, PTA  12/4/2020

## 2020-12-04 NOTE — PLAN OF CARE
Problem: Adjustment to Illness (Stroke, Ischemic/Transient Ischemic Attack)  Goal: Optimal Coping  Outcome: Ongoing, Progressing  Intervention: Support Patient/Family Psychosocial Response to Stroke  Flowsheets (Taken 12/3/2020 1808)  Supportive Measures:   active listening utilized   relaxation techniques promoted   verbalization of feelings encouraged  Family/Support System Care:   caregiver stress acknowledged   involvement promoted   presence promoted   support provided     Problem: Cerebral Tissue Perfusion Risk (Stroke, Ischemic/Transient Ischemic Attack)  Goal: Optimal Cerebral Tissue Perfusion  Outcome: Ongoing, Progressing  Intervention: Protect and Optimize Cerebral Perfusion  Flowsheets (Taken 12/3/2020 1808)  Sensory Stimulation Regulation: auditory stimulation minimized  Cerebral Perfusion Promotion: blood pressure monitored  Intervention: Optimize Oxygenation and Ventilation  Flowsheets (Taken 12/3/2020 1808)  Head of Bed (HOB): HOB at 45 degrees   POC reviewed with Pt and grandson. Grandson verbalized understanding of POC. All comments and concerns addressed. Pt progressing towards goals. Bed locked in lowest position with bed alarm set. VS and assessment in flowsheets. Occasional headache responds to cool washcloth and quiet environment. No acute events to report this shift. Will continue to monitor for changes to POC and clinical condition.

## 2020-12-04 NOTE — PT/OT/SLP RE-EVAL
"Occupational Therapy Re-Assessment / Treatment    Patient Name:  Krystina Washington   MRN:  [id number]  Admit Date: 12/2/2020  Admitting Diagnosis:  Acute ischemic left posterior cerebral artery (PCA) stroke   Length of Stay: 1 days  Recent Surgery: * No surgery found *      Hospital Course:  12/2: Admit date  12/3: OT initial evaluation  Re-Eval complete 2* significant improvement in functional status. Pt safe to dc home with HHOT    OT recommendation:  HH OT, 24 hr assist  Please refer to OT initial evaluation for PLOF, OP and social history.  Recommendations:     Discharge Recommendations: home, home health OT  Discharge Equipment Recommendations:  walker, rolling  Barriers to discharge:  Inaccessible home environment, Decreased caregiver support    Assessment:     Krystina Washington is a 88 y.o. female with a medical diagnosis of Acute ischemic left posterior cerebral artery (PCA) stroke.  She presents with performance deficits affecting function are weakness, impaired endurance, impaired cardiopulmonary response to activity.      Performance deficits affecting function: weakness, impaired endurance, impaired cardiopulmonary response to activity    Rehab Prognosis: Good; patient would benefit from acute skilled OT services to address these deficits and reach maximum level of function.       Plan:     Patient to be seen 4 x/week to address the above listed problems via self-care/home management, therapeutic activities  · Plan of Care Expires: 01/02/21  · Plan of Care Reviewed with: patient, other (see comments)(grandson (caregiver))    Subjective     Communicated with RN prior to session.  Patient found HOB elevated upon OT entry to room, agreeable to evaluation.     Chief Complaint: Hypoglycemia (Pt arrives via EMS after family called c/o patient's BG being "low". Per EMS, pt was given juice and sweet tea PTA. BG now 175.)    Patient comments/goals: "I want to go to the bathroom if you have a minute?" "can I brush my " "teeth?"     Pain/Comfort:  · Pain Rating 1: 0/10  · Pain Rating Post-Intervention 1: 0/10  · Pain Rating 2: 0/10  · Pain Rating Post-Intervention 2: 0/10    Objective:   Patient found with: bed alarm, PureWick, telemetry     General Precautions: Standard, Cardiac aspiration, fall, hearing impaired   Orthopedic Precautions:N/A   Braces: N/A   Oxygen Device: Room Air  Vitals: BP (!) 151/92   Pulse 75   Temp 98.5 °F (36.9 °C)   Resp 17   Ht 5' 3" (1.6 m)   Wt 84.2 kg (185 lb 10 oz)   LMP  (LMP Unknown)   SpO2 96%   Breastfeeding No   BMI 32.88 kg/m²     Cognitive and Psychosocial Function:   § AxOx4 -- Person, Place, Time and Situation   § Follows Commands/attention:follows one-step commands  § Communication:  clear/fluent  § Memory: no deficits noted  § Safety awareness/insight to disability: impaired - cues required  § Mood/Affect/Coping skills/emotional control: Cooperative and Pleasant     Hearing: Impaired: [place]     Vision: improved, mild L eye nystagmus, impaired smooth pursuit, impaired convergence, impaired tolerance for Lward gaze     Physical Exam:  Postural examination/scapula alignment:    -       Rounded shoulders  -       Forward head  -       Posterior pelvic tilt  -       Kyphosis  Skin integrity: Visible skin intact and Thin   Left UE Right UE   UE Edema absent absent   UE ROM AROM WFL AROM WFL   UE Strength 4+/5 5/5    Strength grasp WFL grasp WNL   Sensation LUE INTACT:WFL RUE INTACT: WFL   Fine Motor Coordination:  LUE INTACT: WFL RUE INTACT: WFL   Gross Motor Coordination: LUE IMPAIRED: WFL, limited by fatigue and impaired functional endurance RUE IMPAIRED:WFL, limited by fatigue and impaired functional endurance         Occupational Performance:  Bed Mobility:    · Patient completed Rolling/Turning to Left with  stand by assistance  · Patient completed Rolling/Turning to Right with stand by assistance  · Scooting to HOB in supine: stand by assistance  · Patient completed Supine to Sit " with stand by assistance on R side of bed  · Scooting anteriorly to EOB to have both feet planted on floor: stand by assistance    Functional Mobility/Transfers:   Static Sitting EOB: SBA   Dynamic Sitting EOB: SBA   Patient completed Sit <> Stand Transfer with stand by assistance  with  hand-held assist    Static Standing Balance: CGA with HH assist   Dynamic Standing Balance: CGA with HH assist   Patient completed Bed <> Chair Transfer using Step Transfer technique with contact guard assistance with hand-held assist   Patient completed Toilet Transfer Step Transfer technique with contact guard assistance with  hand-held assist      Activities of Daily Living:  · Feeding:  stand by assistance seated up in chair  · Grooming: stand by assistance set up required, pt performed grooming in standing at sink   · Toileting: contact guard assistance toileting at toilet level, pt able to complete az hygeine in standing.       AMPAC 6 Click ADL:  AMPAC Total Score: 21    Treatment & Education:  -Pt education on OT role and POC   -Importance of E/OOB activity with staff assistance, emphasis on daily participation  -Multiple self-care tasks and functional mobility completed -- assistance level noted above  -Safety during functional transfer and mobility ensured  -Patient provided with education on importance of Bilateral UB/LB integration during functional tasks for neuro pathway redevelopment needs.   -Education provided reviewed, questions answered within OT scope of practice.   -Updated communication board with level of assist required (CGA with HH assist) & educated RN/patient that pt is appropriate for Step Transfer with RN/PCT.       Patient left up in chair with all lines intact, call button in reach, chair alarm on and RN notified    GOALS:   Multidisciplinary Problems     Occupational Therapy Goals        Problem: Occupational Therapy Goal    Goal Priority Disciplines Outcome Interventions   Occupational  Therapy Goal     OT, PT/OT Ongoing, Progressing    Description: Goals set on 12/3 with expiration date 12/17:  Patient will increase functional independence with ADLs by performing:    Supine <> Sit with Stand-by Assistance.  Feeding with Stand-by Assistance.  Grooming while standing at sink with Stand-by Assistance.  UB Dressing with Stand-by Assistance.  LB Dressing with Stand-by Assistance.  Step transfer with Stand-by Assistance with DME as needed.  Pt will demonstrate understanding of education provided regarding energy conservation and task modification through teach-back method.                      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 12/3/2020    Arthritis     CHF (congestive heart failure)     Coronary artery disease     Diabetes mellitus     Glaucoma     Gout, joint     Hx of CABG 9/21/10    Hyperlipidemia     Hypertension     NSTEMI (non-ST elevated myocardial infarction) 09/21/10    Stenosis of aortic and mitral valves     Systolic heart failure 6/19/2015       Past Surgical History:   Procedure Laterality Date    CARDIAC VALVE SURGERY      CATARACT EXTRACTION      CORONARY ARTERY BYPASS GRAFT  9/21/2010    ENTROPIAN REPAIR Left 3/6/2020    Procedure: REPAIR, ENTROPION;  Surgeon: Yulia Kincaid MD;  Location: [address];  Service: Ophthalmology;  Laterality: Left;    JOINT REPLACEMENT            Time Tracking:     OT Date of Treatment: 12/04/20  OT Start Time: 1022  OT Stop Time: 1045  OT Total Time (min): 23 min  Additional staff present: N/A      Billable Minutes:Re-reena 10  Self Care/Home Management 13      TIKI Wheatley  12/4/2020

## 2020-12-04 NOTE — PROGRESS NOTES
[facility]  Vascular Neurology  Comprehensive Stroke Center  Progress Note    Assessment/Plan:     * Acute ischemic left posterior cerebral artery (PCA) stroke  87 y/o female with PMH HTN, HLD, CHF, CAD (s/p CABG 2010), AS (s/p TAVR 2017), valvular disease, CKD. Woke up with confusion (Per family baseline x 6 months), vision difficulty and headaches. MRI brain acute right occipital infarct. MRA brain and neck, no LVO or high grade stenosis. No acute interventions. TTE 10/2020 with EF 25 %, diastolic dysfunction, severe left atrial enlargement, and diastolic dysfunction. Etiology cardio embolic.     Antithrombotics: DAPT (home regimen)    Statins : home pravastatin 40 mg, LDL < 70    Aggressive risk factor modification: HTN, DM, HLD, Diet, Exercise, Obesity, CAD     Rehab efforts: The patient has been evaluated by a stroke team provider and the therapy needs have been fully considered based off the presenting complaints and exam findings. The following therapy evaluations are needed: PT evaluate and treat, OT evaluate and treat, SLP evaluate and treat - upgraded to home.     Diagnostics ordered/pending: None     VTE prophylaxis: Enoxaparin (CrCl < 30 ml/min) 30 mg SQ every 24 hours  Mechanical prophylaxis: Place SCDs    BP parameters: Infarct: No intervention, SBP <220        Cytotoxic cerebral edema  Area of cytotoxic cerebral edema identified when reviewing brain imaging in the territory of the right posterior cerebral artery. There is no mass effect associated with it. We will continue to monitor the patients clinical exam for any worsening of symptoms which may indicate expansion of the stroke or the area of the edema resulting in the clinical change. The pattern is suggestive of embolic etiology.        MARGARET (acute kidney injury)  CKD III, renal function at baseline  Avoid nephrotoxic agents     HFrEF (heart failure with reduced ejection fraction)  Resumed home [facility]  recommend cardiology follow up and possible repeat echo in 1 month  DC likely today. Will resume home lasix on discharge.    Elevated troponin level  Trop 0.058 > 0.056    Chronic diastolic congestive heart failure  Stroke risk factor  EF 25% per Echo Oct. 2020   recommend repeat echo in 1 month, outpatient cardiology follow up    Essential hypertension  Stroke risk factor  SBP <220  Resume home meds: coreg, irbesartan, hydralazine, clonidine    Coronary artery disease involving native coronary artery of native heart without angina pectoris  Stroke risk factor  Resume home meds    Type II diabetes mellitus with renal manifestations  Stroke risk factor  HgB A1C 6.3  Patient on no meds at home  Minimal SSI    Chronic kidney disease, stage III (moderate)  Stroke risk factor  Renal dose meds  Avoid nephrotoxins    Hyperlipidemia  Stroke risk factor  LDL 55.6  Continue home Pravastatin 40 mg          MRI brain with acute right occipital lobe infarct. DAPT + statin. HM following, renal function at baseline, recommend outpatient cardiology follow up. No visual filed cuts appreciated on exam, alertness improved. Therapy recommendations originally rehab however upgraded to home 12/4 as patient improved.     STROKE DOCUMENTATION        NIH Scale:  1a. Level of Consciousness: 0-->Alert, keenly responsive  1b. LOC Questions: 0-->Answers both questions correctly  1c. LOC Commands: 0-->Performs both tasks correctly  2. Best Gaze: 0-->Normal  3. Visual: 0-->No visual loss  4. Facial Palsy: 0-->Normal symmetrical movements  5a. Motor Arm, Left: 0-->No drift, limb holds 90 (or 45) degrees for full 10 secs  5b. Motor Arm, Right: 0-->No drift, limb holds 90 (or 45) degrees for full 10 secs  6a. Motor Leg, Left: 1-->Drift, leg falls by the end of the 5-sec period but does not hit bed  6b. Motor Leg, Right: 1-->Drift, leg falls by the end of the 5-sec period but does not hit bed  7. Limb Ataxia: 0-->Absent  8. Sensory: 0-->Normal, no  sensory loss  9. Best Language: 0-->No aphasia, normal  10. Dysarthria: 0-->Normal  11. Extinction and Inattention (formerly Neglect): 0-->No abnormality  Total (NIH Stroke Scale): 2       Modified Law Score: 3  Starford Coma Scale:    ABCD2 Score:    [id number] Score:   HAS -BLED Score:   ICH Score:   Hunt & [place] Classification:      Hemorrhagic change of an Ischemic Stroke: Does this patient have an ischemic stroke with hemorrhagic changes? No     Neurologic Chief Complaint: headache, vision changes     Subjective:     Interval History: Patient is seen for follow-up neurological assessment and treatment recommendations:   following, renal function at baseline, recommend outpatient cardiology follow up. No visual filed cuts appreciated on exam, alertness improved. Therapy recommendations upgraded to home as patient improved.     HPI, Past Medical, Family, and Social History remains the same as documented in the initial encounter.     Review of Systems   Constitutional: Negative for chills and fever.   HENT: Positive for hearing loss.    Respiratory: Negative for shortness of breath.    Cardiovascular: Negative for chest pain.   Gastrointestinal: Negative for diarrhea and vomiting.   Neurological: Negative for facial asymmetry, speech difficulty, weakness and numbness.   Psychiatric/Behavioral: Negative for confusion.     Scheduled Meds:   aspirin  81 mg Oral Daily    atorvastatin  40 mg Oral QHS    carvediloL  12.5 mg Oral BID    cloNIDine 0.3 mg/24 hr td ptwk  1 patch Transdermal Weekly    clopidogreL  75 mg Oral Daily    enoxaparin  30 mg Subcutaneous Q24H    escitalopram oxalate  20 mg Oral Daily    hydrALAZINE  100 mg Oral Q8H    isosorbide mononitrate  60 mg Oral Daily    timolol maleate 0.5%  1 drop Both Eyes BID     Continuous Infusions:   sodium chloride 0.9%       PRN Meds:acetaminophen, dextrose 50%, dextrose 50%, glucagon (human recombinant), glucose, glucose, insulin aspart U-100,  labetaloL, ondansetron, sodium chloride 0.9%, sodium chloride 0.9%    Objective:     Vital Signs (Most Recent):  Temp: 98.4 °F (36.9 °C) (12/04/20 0552)  Pulse: 78 (12/04/20 1100)  Resp: (!) 21 (12/04/20 0552)  BP: (!) 182/92 (12/04/20 0552)  SpO2: (!) 92 % (12/04/20 0552)  BP Location: Left arm    Vital Signs Range (Last 24H):  Temp:  [98.4 °F (36.9 °C)-99.2 °F (37.3 °C)]   Pulse:  [61-80]   Resp:  [16-21]   BP: (144-182)/(76-92)   SpO2:  [92 %-97 %]   BP Location: Left arm    Physical Exam  Vitals signs reviewed.   HENT:      Head: Normocephalic.   Eyes:      General: No visual field deficit.     Extraocular Movements: Extraocular movements intact.   Cardiovascular:      Rate and Rhythm: Normal rate.   Pulmonary:      Effort: Pulmonary effort is normal.   Abdominal:      Palpations: Abdomen is soft.   Neurological:      Mental Status: She is alert and oriented to person, place, and time.      GCS: GCS eye subscore is 4. GCS verbal subscore is 5. GCS motor subscore is 6.      Cranial Nerves: No dysarthria or facial asymmetry.         Neurological Exam:   LOC: alert  Attention Span: Good   Language: No aphasia  Articulation: No dysarthria  Orientation: Person, Place, Time   Visual Fields: Full  EOM (CN III, IV, VI): Full/intact  Facial Movement (CN VII): Symmetric facial expression    Motor: Arm left  Normal 5/5  Leg left  Paresis: 4/5  Arm right  Normal 5/5  Leg right Paresis: 4/5  Tone: Normal tone throughout    Laboratory:  CMP:   Recent Labs   Lab 12/04/20  0334   CALCIUM 9.0   ALBUMIN 2.8*   PROT 6.6      K 3.7   CO2 31*      BUN 42*   CREATININE 1.7*   ALKPHOS 73   ALT 11   AST 21   BILITOT 0.3     CBC:   Recent Labs   Lab 12/04/20  0334   WBC 11.34   RBC 3.09*   HGB 9.5*   HCT 31.8*      *   MCH 30.7   MCHC 29.9*     Lipid Panel:   Recent Labs   Lab 12/02/20 2124   CHOL 116*   LDLCALC 55.6*   HDL 44   TRIG 82     Hgb A1C:   Recent Labs   Lab 12/02/20 2124   HGBA1C 6.3*     TSH:    Recent Labs   Lab 12/02/20  2124   TSH 1.916       Diagnostic Results     Diagnostic Results:        Brain imaging:  CT Head w/o contrast 12-2-20 results:  No acute abnormality such as hemorrhage or large vascular territory ischemia to suggest ischemia/infarction.     Periventricular, supratentorial decreased white matter attenuation suggestive for chronic ischemic microvascular changes.     Generalized cerebral volume loss with compensatory symmetric enlargement of the ventricular system similar to prior exams and appropriate for age.     MRI Brain w/o contrast 12-3-20-results:      Focus of diffusion restriction with ADC match in the right occipital lobe about the occipital horn suggestive for infarct.     Generalized cerebral volume loss with appropriate symmetric compensatory enlargement of the ventricular system unchanged from priors.     Confluent supratentorial T2 hyperintensities consistent with chronic ischemic microvascular changes.        Vessel Imaging:   MRA brain and neck 12/3/20  1. MRA neck is within normal limits without evidence of hemodynamically significant stenosis or occlusion.  2. MRA brain is grossly within normal limits noting a mild focal region of luminal narrowing involving the distal left vertebral artery near the vertebrobasilar junction.  No evidence of proximal large vessel occlusion.        Cardiac Evaluation:   2 D Echo 10-15-20 results:  · Severe left atrial enlargement.  · There is mild left ventricular eccentric hypertrophy.  · The left ventricle is mildly enlarged with severely decreased systolic function. The estimated ejection fraction is 25%.  · There is severe left ventricular global hypokinesis.  · Grade II diastolic dysfunction.  · Moderate right atrial enlargement.  · Mildly reduced right ventricular systolic function.  · There is a 26 mm Rubalcava Jessica S3 transcutaneously-placed aortic bioprosthesis present. There is no aortic aortic insufficiency present. Prosthetic  aortic valve is normal.  · Aortic valve area is 2.18 cm2; peak velocity is 2.22 m/s; mean gradient is 12 mm Hg. DI is 0.42.  · The mean diastolic gradient across the mitral valve is 4 mmHg at a heart rate of 70 bpm.  · Mild-to-moderate mitral regurgitation.  · Mild tricuspid regurgitation.  · There is pulmonary hypertension.  · Intermediate central venous pressure (8 mmHg).  · The estimated PA systolic pressure is 57 mmHg.   /      Ángel Valentine NP  [facility]  Department of Vascular Neurology   [facility]

## 2020-12-04 NOTE — PLAN OF CARE
Problem: Occupational Therapy Goal  Goal: Occupational Therapy Goal  Description: Goals set on 12/3 with expiration date 12/17:  Patient will increase functional independence with ADLs by performing:    Supine <> Sit with Stand-by Assistance.  Feeding with Stand-by Assistance.  Grooming while standing at sink with Stand-by Assistance.  UB Dressing with Stand-by Assistance.  LB Dressing with Stand-by Assistance.  Step transfer with Stand-by Assistance with DME as needed.  Pt will demonstrate understanding of education provided regarding energy conservation and task modification through teach-back method.     Outcome: Ongoing, Progressing       ReEval complete. Pt tolerated session well. Initiate OT POC.    Patient with significant improvement in strength, cognition, activity tolerance and demeanor.   Patient safe to d/c home with HH when medically stable.  Family at bedside verbalized understanding and agreement with plan.       TIKI Wheatley  12/04/2020

## 2020-12-04 NOTE — PLAN OF CARE
Problem: SLP Goal  Goal: SLP Goal  Description: Goals to be met 12/10  1 pt will tolerate ds/thin liquids  2 pt will participate in sle  Outcome: Met

## 2020-12-04 NOTE — SUBJECTIVE & OBJECTIVE
Neurologic Chief Complaint: headache, vision changes     Subjective:     Interval History: Patient is seen for follow-up neurological assessment and treatment recommendations:  HM following, renal function at baseline, recommend outpatient cardiology follow up. No visual filed cuts appreciated on exam, alertness improved. Therapy recommendations upgraded to home as patient improved.     HPI, Past Medical, Family, and Social History remains the same as documented in the initial encounter.     Review of Systems   Constitutional: Negative for chills and fever.   HENT: Positive for hearing loss.    Respiratory: Negative for shortness of breath.    Cardiovascular: Negative for chest pain.   Gastrointestinal: Negative for diarrhea and vomiting.   Neurological: Negative for facial asymmetry, speech difficulty, weakness and numbness.   Psychiatric/Behavioral: Negative for confusion.     Scheduled Meds:   aspirin  81 mg Oral Daily    atorvastatin  40 mg Oral QHS    carvediloL  12.5 mg Oral BID    cloNIDine 0.3 mg/24 hr td ptwk  1 patch Transdermal Weekly    clopidogreL  75 mg Oral Daily    enoxaparin  30 mg Subcutaneous Q24H    escitalopram oxalate  20 mg Oral Daily    hydrALAZINE  100 mg Oral Q8H    isosorbide mononitrate  60 mg Oral Daily    timolol maleate 0.5%  1 drop Both Eyes BID     Continuous Infusions:   sodium chloride 0.9%       PRN Meds:acetaminophen, dextrose 50%, dextrose 50%, glucagon (human recombinant), glucose, glucose, insulin aspart U-100, labetaloL, ondansetron, sodium chloride 0.9%, sodium chloride 0.9%    Objective:     Vital Signs (Most Recent):  Temp: 98.4 °F (36.9 °C) (12/04/20 0552)  Pulse: 78 (12/04/20 1100)  Resp: (!) 21 (12/04/20 0552)  BP: (!) 182/92 (12/04/20 0552)  SpO2: (!) 92 % (12/04/20 0552)  BP Location: Left arm    Vital Signs Range (Last 24H):  Temp:  [98.4 °F (36.9 °C)-99.2 °F (37.3 °C)]   Pulse:  [61-80]   Resp:  [16-21]   BP: (144-182)/(76-92)   SpO2:  [92 %-97 %]   BP  Location: Left arm    Physical Exam  Vitals signs reviewed.   HENT:      Head: Normocephalic.   Eyes:      General: No visual field deficit.     Extraocular Movements: Extraocular movements intact.   Cardiovascular:      Rate and Rhythm: Normal rate.   Pulmonary:      Effort: Pulmonary effort is normal.   Abdominal:      Palpations: Abdomen is soft.   Neurological:      Mental Status: She is alert and oriented to person, place, and time.      GCS: GCS eye subscore is 4. GCS verbal subscore is 5. GCS motor subscore is 6.      Cranial Nerves: No dysarthria or facial asymmetry.         Neurological Exam:   LOC: alert  Attention Span: Good   Language: No aphasia  Articulation: No dysarthria  Orientation: Person, Place, Time   Visual Fields: Full  EOM (CN III, IV, VI): Full/intact  Facial Movement (CN VII): Symmetric facial expression    Motor: Arm left  Normal 5/5  Leg left  Paresis: 4/5  Arm right  Normal 5/5  Leg right Paresis: 4/5  Tone: Normal tone throughout    Laboratory:  CMP:   Recent Labs   Lab 12/04/20  0334   CALCIUM 9.0   ALBUMIN 2.8*   PROT 6.6      K 3.7   CO2 31*      BUN 42*   CREATININE 1.7*   ALKPHOS 73   ALT 11   AST 21   BILITOT 0.3     CBC:   Recent Labs   Lab 12/04/20 0334   WBC 11.34   RBC 3.09*   HGB 9.5*   HCT 31.8*      *   MCH 30.7   MCHC 29.9*     Lipid Panel:   Recent Labs   Lab 12/02/20 2124   CHOL 116*   LDLCALC 55.6*   HDL 44   TRIG 82     Hgb A1C:   Recent Labs   Lab 12/02/20 2124   HGBA1C 6.3*     TSH:   Recent Labs   Lab 12/02/20 2124   TSH 1.916       Diagnostic Results     Diagnostic Results:        Brain imaging:  CT Head w/o contrast 12-2-20 results:  No acute abnormality such as hemorrhage or large vascular territory ischemia to suggest ischemia/infarction.     Periventricular, supratentorial decreased white matter attenuation suggestive for chronic ischemic microvascular changes.     Generalized cerebral volume loss with compensatory symmetric  enlargement of the ventricular system similar to prior exams and appropriate for age.     MRI Brain w/o contrast 12-3-20-results:      Focus of diffusion restriction with ADC match in the right occipital lobe about the occipital horn suggestive for infarct.     Generalized cerebral volume loss with appropriate symmetric compensatory enlargement of the ventricular system unchanged from priors.     Confluent supratentorial T2 hyperintensities consistent with chronic ischemic microvascular changes.        Vessel Imaging:   MRA brain and neck 12/3/20  1. MRA neck is within normal limits without evidence of hemodynamically significant stenosis or occlusion.  2. MRA brain is grossly within normal limits noting a mild focal region of luminal narrowing involving the distal left vertebral artery near the vertebrobasilar junction.  No evidence of proximal large vessel occlusion.        Cardiac Evaluation:   2 D Echo 10-15-20 results:  · Severe left atrial enlargement.  · There is mild left ventricular eccentric hypertrophy.  · The left ventricle is mildly enlarged with severely decreased systolic function. The estimated ejection fraction is 25%.  · There is severe left ventricular global hypokinesis.  · Grade II diastolic dysfunction.  · Moderate right atrial enlargement.  · Mildly reduced right ventricular systolic function.  · There is a 26 mm Rubalcava Jessica S3 transcutaneously-placed aortic bioprosthesis present. There is no aortic aortic insufficiency present. Prosthetic aortic valve is normal.  · Aortic valve area is 2.18 cm2; peak velocity is 2.22 m/s; mean gradient is 12 mm Hg. DI is 0.42.  · The mean diastolic gradient across the mitral valve is 4 mmHg at a heart rate of 70 bpm.  · Mild-to-moderate mitral regurgitation.  · Mild tricuspid regurgitation.  · There is pulmonary hypertension.  · Intermediate central venous pressure (8 mmHg).  · The estimated PA systolic pressure is 57 mmHg.   /

## 2020-12-04 NOTE — ASSESSMENT & PLAN NOTE
Patient with MARGARET today. Cr increased to 1.7 from 1.3 in 24.hrs  Suspect prerenal azotemia.    Plan  - F/u UA, urine lytes  - Encourage PO intake  - Hold Irbesartan and lasix   - Avoid overt use of IVF due to EF of 25%  - Avoid nephrotoxic medications  - Renally dose medications to GFR

## 2020-12-04 NOTE — ASSESSMENT & PLAN NOTE
- Continue home medications: Coreg 12.5mg BID,Hydralazine 100mg q8h, Imdur 30mg  - Resume home clonidine patch 0.3mg/24hr  - Hold Irbesartan

## 2020-12-04 NOTE — PLAN OF CARE
POC reviewed with patient. VSS. No complaints or acute events during shift. SCD and cardiac monitor in place. Safety measures in place. Suction at the bedside. Bed locked in lowest position, side rails up x 2, bed alarm activated and call light within reach. Will continue to monitor.    Problem: Adult Inpatient Plan of Care  Goal: Plan of Care Review  Outcome: Ongoing, Progressing     Problem: Skin Injury Risk Increased  Goal: Skin Health and Integrity  Outcome: Ongoing, Progressing

## 2020-12-04 NOTE — PT/OT/SLP EVAL
"Speech Language Pathology Evaluation  Cognitive Communication    Patient Name:  Krystina Washington   MRN:  [id number]  Admitting Diagnosis: Acute ischemic left posterior cerebral artery (PCA) stroke    Recommendations:     Recommendations:                General Recommendations:  Follow-up not indicated  Diet recommendations:  Dental Soft, Thin   Aspiration Precautions: Standard aspiration precautions   General Precautions: Standard, vision impaired, other (see comments)(very [place])  Communication strategies:  PT> very [place]    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 12/3/2020    Arthritis     CHF (congestive heart failure)     Coronary artery disease     Diabetes mellitus     Glaucoma     Gout, joint     Hx of CABG 9/21/10    Hyperlipidemia     Hypertension     NSTEMI (non-ST elevated myocardial infarction) 09/21/10    Stenosis of aortic and mitral valves     Systolic heart failure 6/19/2015       Past Surgical History:   Procedure Laterality Date    CARDIAC VALVE SURGERY      CATARACT EXTRACTION      CORONARY ARTERY BYPASS GRAFT  9/21/2010    ENTROPIAN REPAIR Left 3/6/2020    Procedure: REPAIR, ENTROPION;  Surgeon: Yulia Kincaid MD;  Location: [address];  Service: Ophthalmology;  Laterality: Left;    JOINT REPLACEMENT         Social History: Patient lives with grandson.      Prior diet: regular with thin    Occupation/hobbies/homemaking: cooking    Subjective     "I got so messed up with these covers last night" per pt  Patient goals:home    Pain/Comfort:  · Pain Rating 1: 0/10  · Pain Rating Post-Intervention 1: 0/10    Objective:   Cognitive Status:    Pt. Alert though very [place].  Pt. was oriented x3 with good recall of recent and remote temporal and general information.  Immediate/delayed verbal recall was deferred to due hearing imparment   Responses to hypothetical verbal problem solving tasks were accurate and complete.  " Pt. Compared and contrasted objects and generated multiple solutions to problems.  Thought organization and categorization skills were wfl with pt. Naming 14 animals given one minute when 15-20 are wnl.  INCREASED vocal intensity needed with repetition     Receptive Language:   Comprehension:   With increased vocal intensity and repetition , pt. Followed 2 step commands and responded appropriately to questions in conversation    Pragmatics:    wfl    Expressive Language:  Verbal:    Verbal language skills were wfl with no evidence of aphasia.  Pt. Expressed their thoughts coherently in conversation with no evidence of confusion or word finding deficits    Motor Speech:  wfl    Voice:   wfl    Visual-Spatial:  wfl - pt. Read clock on wall    Reading:   Pt. Read short sentences with 100% acc     Treatment: education provided re plan of care    Assessment:   Krystina Washington is a 88 y.o. female with speech language and cognitive skills wfl for daily living needs.    Goals:   Multidisciplinary Problems     SLP Goals     Not on file          Multidisciplinary Problems (Resolved)        Problem: SLP Goal    Goal Priority Disciplines Outcome   SLP Goal   (Resolved)     SLP Met   Description: Goals to be met 12/10  1 pt will tolerate ds/thin liquids  2 pt will participate in sle                   Plan:   · Patient to be seen:  4 x/week   · Plan of Care expires:     · Plan of Care reviewed with:  patient   · SLP Follow-Up:  No       Discharge recommendations:  Discharge Facility/Level of Care Needs: rehabilitation facility   Barriers to Discharge:  None    Time Tracking:   SLP Treatment Date:   12/04/20  Speech Start Time:  0730  Speech Stop Time:  0750     Speech Total Time (min):  20 min    Billable Minutes: Eval 12  and [facility]/Home Management Training 8    Gretchen Chairez MA, CCC-SLP  12/04/2020

## 2020-12-04 NOTE — ASSESSMENT & PLAN NOTE
Area of cytotoxic cerebral edema identified when reviewing brain imaging in the territory of the right posterior cerebral artery. There is no mass effect associated with it. We will continue to monitor the patients clinical exam for any worsening of symptoms which may indicate expansion of the stroke or the area of the edema resulting in the clinical change. The pattern is suggestive of embolic etiology.

## 2020-12-04 NOTE — PROGRESS NOTES
[facility] Hospital Medicine  Progress Note    Patient Name: Krystina Washington  MRN: [id number]  Patient Class: IP- Inpatient   Admission Date: 12/2/2020  Length of Stay: 1 days  Attending Physician: Jb Rojas MD  Primary Care Provider: Oniel Johnson MD        Subjective:     Principal Problem:Acute ischemic left posterior cerebral artery (PCA) stroke        HPI:  Krystina Washington is a 88 y.o. female with a hx of HFrEF, HTN, T2DM, CAD (s/p CABG in 2010), AS (s/p TAVR in 2017) who presented to the ED via EMS with AMS. Per chart review, family noticed patient was confused yesterday after waking up from, a nap, she was also noted to have low blood glucose for which she was given juice. Family called EMS for altered mental status, BG in field was 175. In ED, there was concern for a stroke, CTH was negative for intracranial bleed, however MRI brain with finding of an occipital lobe infarct. Patient was admitted to the vascular neurology/stroke service, hospital medicine was consulted for CHF/MARGARET.     Overview/Hospital Course:  No notes on file    Interval History: No acute events overnight. Pt denies headache, nausea, abdominal pain, vomiting.     Review of Systems  Objective:     Vital Signs (Most Recent):  Temp: 98.4 °F (36.9 °C) (12/04/20 0552)  Pulse: 65 (12/04/20 0812)  Resp: (!) 21 (12/04/20 0552)  BP: (!) 182/92 (12/04/20 0552)  SpO2: (!) 92 % (12/04/20 0552) Vital Signs (24h Range):  Temp:  [97.9 °F (36.6 °C)-99.2 °F (37.3 °C)] 98.4 °F (36.9 °C)  Pulse:  [60-80] 65  Resp:  [15-24] 21  SpO2:  [92 %-97 %] 92 %  BP: (124-182)/(69-92) 182/92     Weight: 84.2 kg (185 lb 10 oz)  Body mass index is 32.88 kg/m².    Intake/Output Summary (Last 24 hours) at 12/4/2020 1019  Last data filed at 12/4/2020 0000  Gross per 24 hour   Intake 240 ml   Output 500 ml   Net -260 ml      Physical Exam  Constitutional:       General: She is not in acute distress.  HENT:      Head: Normocephalic and  atraumatic.      Nose: Nose normal.   Cardiovascular:      Rate and Rhythm: Normal rate and regular rhythm.      Pulses: Normal pulses.   Pulmonary:      Effort: Pulmonary effort is normal.      Breath sounds: Normal breath sounds.   Abdominal:      General: Abdomen is flat.   Musculoskeletal:      Right lower leg: No edema.      Left lower leg: No edema.   Skin:     General: Skin is warm and dry.   Neurological:      Mental Status: She is alert.   Psychiatric:         Mood and Affect: Mood normal.         Significant Labs:   CBC:   Recent Labs   Lab 12/02/20 2124 12/03/20  0859 12/04/20  0334   WBC 11.21 9.95 11.34   HGB 10.2* 10.1* 9.5*   HCT 33.1* 32.5* 31.8*    201 194     CMP:   Recent Labs   Lab 12/02/20 2124 12/03/20 0859 12/03/20  1037 12/04/20  0334    141 136 142   K 4.1 3.8 3.5 3.7    104 100 103   CO2 26 29 27 31*   * 125* 132* 121*   BUN 37* 33* 33* 42*   CREATININE 1.4 1.4 1.3 1.7*   CALCIUM 9.1 9.4 9.2 9.0   PROT 7.1 7.1  --  6.6   ALBUMIN 2.9* 3.0*  --  2.8*   BILITOT 0.2 0.4  --  0.3   ALKPHOS 80 77  --  73   AST 24 21  --  21   ALT 11 11  --  11   ANIONGAP 8 8 9 8   EGFRNONAA 33.6* 33.6* 36.7* 26.5*       Significant Imaging: n/a      Assessment/Plan:      * Acute ischemic left posterior cerebral artery (PCA) stroke  Management per primary team      MARGARET (acute kidney injury)  Patient with MARGARET today. Cr increased to 1.7 from 1.3 in 24.hrs  Suspect prerenal azotemia.    Plan  - F/u UA, urine lytes  - Encourage PO intake  - Hold Irbesartan and lasix   - Avoid overt use of IVF due to EF of 25%  - Avoid nephrotoxic medications  - Renally dose medications to GFR      HFrEF (heart failure with reduced ejection fraction)  ECHO 10/2020 with EF of 25%, grade II diastolic dysfunction, pul. Htn, severe left atrial enlargement  BNP on admit 802, decreased from previous admit  No signs of acute decompensated HF    Plan  - Continue Coreg 12.5mg BID,   - Hold Irbesartan 300mg, lasix 20  mg due to MARGARET  - Continue Hydralazine 100mg q8h, Imdur 30mg  - Daily weights  - Cardiac diet  - Strict I & O   - Will need f/u with cardiology, likely repeat ECHO in a month to follow EF for further intervention I.e lifevest vs ICD      Essential hypertension  - Continue home medications: Coreg 12.5mg BID,Hydralazine 100mg q8h, Imdur 30mg  - Resume home clonidine patch 0.3mg/24hr  - Hold Irbesartan    Coronary artery disease involving native coronary artery of native heart without angina pectoris  - Continue ASA 81mg  - Lipitor 40mg qhs      Type II diabetes mellitus with renal manifestations  HGBA1C 6.3  - [facility]  - Avoid hypoglycemia          Chronic kidney disease, stage III (moderate)  See note on MARGARET    Hyperlipidemia  -Continue Lipitor 40mg        VTE Risk Mitigation (From admission, onward)         Ordered     enoxaparin injection 30 mg  Every 24 hours      12/03/20 0128     IP VTE HIGH RISK PATIENT  Once      12/03/20 0128     Place sequential compression device  Until discontinued      12/03/20 0128                Discharge Planning   KALEY: 12/7/2020     Code Status: Full Code   Is the patient medically ready for discharge?: Yes    Reason for patient still in hospital (select all that apply): Pending disposition  Discharge Plan A: Rehab   Discharge Delays: None known at this time              Emily Iglesias DO  Department of Hospital Medicine   [facility]

## 2020-12-04 NOTE — ASSESSMENT & PLAN NOTE
87 y/o female with PMH HTN, HLD, CHF, CAD (s/p CABG 2010), AS (s/p TAVR 2017), valvular disease, CKD. Woke up with confusion (Per family baseline x 6 months), vision difficulty and headaches. MRI brain acute right occipital infarct. MRA brain and neck, no LVO or high grade stenosis. No acute interventions. TTE 10/2020 with EF 25 %, diastolic dysfunction, severe left atrial enlargement, and diastolic dysfunction. Etiology cardio embolic.     Antithrombotics: DAPT (home regimen)    Statins : home pravastatin 40 mg, LDL < 70    Aggressive risk factor modification: HTN, DM, HLD, Diet, Exercise, Obesity, CAD     Rehab efforts: The patient has been evaluated by a stroke team provider and the therapy needs have been fully considered based off the presenting complaints and exam findings. The following therapy evaluations are needed: PT evaluate and treat, OT evaluate and treat, SLP evaluate and treat - upgraded to home.     Diagnostics ordered/pending: None     VTE prophylaxis: Enoxaparin (CrCl < 30 ml/min) 30 mg SQ every 24 hours  Mechanical prophylaxis: Place SCDs    BP parameters: Infarct: No intervention, SBP <220

## 2020-12-04 NOTE — HOSPITAL COURSE
MRI brain with acute right occipital lobe infarct. DAPT + statin. HM following, renal function at baseline, recommend outpatient cardiology follow up. No visual filed cuts appreciated on exam, alertness improved. Therapy recommendations originally rehab however upgraded to home 12/4 as patient improved. Patient discharged home on 12/5 with outpatient stroke follow up.

## 2020-12-04 NOTE — ASSESSMENT & PLAN NOTE
Stroke risk factor  EF 25% per Echo Oct. 2020   recommend repeat echo in 1 month, outpatient cardiology follow up

## 2020-12-04 NOTE — ASSESSMENT & PLAN NOTE
Resumed home GDMT  Hospital medicine recommend cardiology follow up and possible repeat echo in 1 month  DC likely today. Will resume home lasix on discharge.

## 2020-12-04 NOTE — PLAN OF CARE
12/04/20 1028   Post-Acute Status   Post-Acute Authorization Placement   Post-Acute Placement Status Referrals Sent   Discharge Delays None known at this time   Discharge Plan   Discharge Plan A Rehab   Discharge Plan B Rehab       Per phone conversation with patients son he is agreeable with referring patient to [facility].    Martha Palomino RN  Case Management  Ext: [phone]

## 2020-12-05 VITALS
TEMPERATURE: 99 F | HEART RATE: 69 BPM | RESPIRATION RATE: 23 BRPM | DIASTOLIC BLOOD PRESSURE: 67 MMHG | OXYGEN SATURATION: 97 % | SYSTOLIC BLOOD PRESSURE: 113 MMHG | BODY MASS INDEX: 32.89 KG/M2 | WEIGHT: 185.63 LBS | HEIGHT: 63 IN

## 2020-12-05 LAB
ALBUMIN SERPL BCP-MCNC: 2.9 G/DL (ref 3.5–5.2)
ALP SERPL-CCNC: 73 U/L (ref 55–135)
ALT SERPL W/O P-5'-P-CCNC: 9 U/L (ref 10–44)
ANION GAP SERPL CALC-SCNC: 10 MMOL/L (ref 8–16)
AST SERPL-CCNC: 23 U/L (ref 10–40)
BASOPHILS # BLD AUTO: 0.03 K/UL (ref 0–0.2)
BASOPHILS NFR BLD: 0.3 % (ref 0–1.9)
BILIRUB SERPL-MCNC: 0.4 MG/DL (ref 0.1–1)
BUN SERPL-MCNC: 36 MG/DL (ref 8–23)
CALCIUM SERPL-MCNC: 9.1 MG/DL (ref 8.7–10.5)
CHLORIDE SERPL-SCNC: 105 MMOL/L (ref 95–110)
CO2 SERPL-SCNC: 29 MMOL/L (ref 23–29)
CREAT SERPL-MCNC: 1.5 MG/DL (ref 0.5–1.4)
DIFFERENTIAL METHOD: ABNORMAL
EOSINOPHIL # BLD AUTO: 0.2 K/UL (ref 0–0.5)
EOSINOPHIL NFR BLD: 2 % (ref 0–8)
ERYTHROCYTE [DISTWIDTH] IN BLOOD BY AUTOMATED COUNT: 15 % (ref 11.5–14.5)
EST. GFR  (AFRICAN AMERICAN): 35.6 ML/MIN/1.73 M^2
EST. GFR  (NON AFRICAN AMERICAN): 30.9 ML/MIN/1.73 M^2
GLUCOSE SERPL-MCNC: 118 MG/DL (ref 70–110)
HCT VFR BLD AUTO: 31.9 % (ref 37–48.5)
HGB BLD-MCNC: 9.8 G/DL (ref 12–16)
IMM GRANULOCYTES # BLD AUTO: 0.05 K/UL (ref 0–0.04)
IMM GRANULOCYTES NFR BLD AUTO: 0.4 % (ref 0–0.5)
LYMPHOCYTES # BLD AUTO: 2.1 K/UL (ref 1–4.8)
LYMPHOCYTES NFR BLD: 19 % (ref 18–48)
MAGNESIUM SERPL-MCNC: 1.8 MG/DL (ref 1.6–2.6)
MCH RBC QN AUTO: 31.7 PG (ref 27–31)
MCHC RBC AUTO-ENTMCNC: 30.7 G/DL (ref 32–36)
MCV RBC AUTO: 103 FL (ref 82–98)
MONOCYTES # BLD AUTO: 0.9 K/UL (ref 0.3–1)
MONOCYTES NFR BLD: 7.9 % (ref 4–15)
NEUTROPHILS # BLD AUTO: 7.9 K/UL (ref 1.8–7.7)
NEUTROPHILS NFR BLD: 70.4 % (ref 38–73)
NRBC BLD-RTO: 0 /100 WBC
PHOSPHATE SERPL-MCNC: 3.6 MG/DL (ref 2.7–4.5)
PLATELET # BLD AUTO: 204 K/UL (ref 150–350)
PMV BLD AUTO: 12.6 FL (ref 9.2–12.9)
POCT GLUCOSE: 123 MG/DL (ref 70–110)
POCT GLUCOSE: 186 MG/DL (ref 70–110)
POTASSIUM SERPL-SCNC: 3.6 MMOL/L (ref 3.5–5.1)
PROT SERPL-MCNC: 6.9 G/DL (ref 6–8.4)
RBC # BLD AUTO: 3.09 M/UL (ref 4–5.4)
SODIUM SERPL-SCNC: 144 MMOL/L (ref 136–145)
WBC # BLD AUTO: 11.23 K/UL (ref 3.9–12.7)

## 2020-12-05 PROCEDURE — 99238 HOSP IP/OBS DSCHRG MGMT 30/<: CPT | Mod: ,,, | Performed by: PSYCHIATRY & NEUROLOGY

## 2020-12-05 PROCEDURE — 83735 ASSAY OF MAGNESIUM: CPT

## 2020-12-05 PROCEDURE — 84100 ASSAY OF PHOSPHORUS: CPT

## 2020-12-05 PROCEDURE — 25000003 PHARM REV CODE 250: Performed by: NURSE PRACTITIONER

## 2020-12-05 PROCEDURE — 80053 COMPREHEN METABOLIC PANEL: CPT

## 2020-12-05 PROCEDURE — 99238 PR HOSPITAL DISCHARGE DAY,<30 MIN: ICD-10-PCS | Mod: ,,, | Performed by: PSYCHIATRY & NEUROLOGY

## 2020-12-05 PROCEDURE — 85025 COMPLETE CBC W/AUTO DIFF WBC: CPT

## 2020-12-05 PROCEDURE — 36415 COLL VENOUS BLD VENIPUNCTURE: CPT

## 2020-12-05 RX ADMIN — HYDRALAZINE HYDROCHLORIDE 100 MG: 50 TABLET, FILM COATED ORAL at 05:12

## 2020-12-05 RX ADMIN — ESCITALOPRAM OXALATE 20 MG: 20 TABLET ORAL at 08:12

## 2020-12-05 RX ADMIN — CLOPIDOGREL 75 MG: 75 TABLET, FILM COATED ORAL at 08:12

## 2020-12-05 RX ADMIN — ASPIRIN 81 MG CHEWABLE TABLET 81 MG: 81 TABLET CHEWABLE at 08:12

## 2020-12-05 RX ADMIN — CARVEDILOL 12.5 MG: 12.5 TABLET, FILM COATED ORAL at 08:12

## 2020-12-05 RX ADMIN — TIMOLOL MALEATE 1 DROP: 5 SOLUTION/ DROPS OPHTHALMIC at 08:12

## 2020-12-05 RX ADMIN — ISOSORBIDE MONONITRATE 60 MG: 30 TABLET, EXTENDED RELEASE ORAL at 08:12

## 2020-12-05 NOTE — PROGRESS NOTES
[facility] Hospital Medicine  Progress Note    Patient Name: Krystina Washington  MRN: [id number]  Patient Class: IP- Inpatient   Admission Date: 12/2/2020  Length of Stay: 2 days  Attending Physician: No att. providers found  Primary Care Provider: Oniel Johnson MD        Subjective:     Principal Problem:Acute ischemic right posterior cerebral artery (PCA) stroke        HPI:  Krystina Washington is a 88 y.o. female with a hx of HFrEF, HTN, T2DM, CAD (s/p CABG in 2010), AS (s/p TAVR in 2017) who presented to the ED via EMS with AMS. Per chart review, family noticed patient was confused yesterday after waking up from, a nap, she was also noted to have low blood glucose for which she was given juice. Family called EMS for altered mental status, BG in field was 175. In ED, there was concern for a stroke, CTH was negative for intracranial bleed, however MRI brain with finding of an occipital lobe infarct. Patient was admitted to the vascular neurology/stroke service, hospital medicine was consulted for CHF/MARGARET.     Overview/Hospital Course:  No notes on file    Interval History: No acute events overnight. Pt denies headache, nausea, abdominal pain, vomiting. Patient to be discharged today/    Review of Systems    Objective:     Vital Signs (Most Recent):  Temp: 98.7 °F (37.1 °C) (12/05/20 1134)  Pulse: 69 (12/05/20 1134)  Resp: (!) 23 (12/05/20 1134)  BP: 113/67 (12/05/20 1134)  SpO2: 97 % (12/05/20 1134) Vital Signs (24h Range):  Temp:  [97.4 °F (36.3 °C)-99.5 °F (37.5 °C)] 98.7 °F (37.1 °C)  Pulse:  [61-80] 69  Resp:  [17-24] 23  SpO2:  [92 %-98 %] 97 %  BP: (113-158)/(67-94) 113/67     Weight: 84.2 kg (185 lb 10 oz)  Body mass index is 32.88 kg/m².    Intake/Output Summary (Last 24 hours) at 12/5/2020 1335  Last data filed at 12/5/2020 0900  Gross per 24 hour   Intake 360 ml   Output --   Net 360 ml      Physical Exam  Constitutional:       General: She is not in acute distress.  HENT:       Head: Normocephalic and atraumatic.      Nose: Nose normal.   Cardiovascular:      Rate and Rhythm: Normal rate and regular rhythm.      Pulses: Normal pulses.   Pulmonary:      Effort: Pulmonary effort is normal.      Breath sounds: Normal breath sounds.   Abdominal:      General: Abdomen is flat.   Musculoskeletal:      Right lower leg: No edema.      Left lower leg: No edema.   Skin:     General: Skin is warm and dry.   Neurological:      Mental Status: She is alert.   Psychiatric:         Mood and Affect: Mood normal.         Significant Labs:   CBC:   Recent Labs   Lab 12/04/20  0334 12/05/20  0540   WBC 11.34 11.23   HGB 9.5* 9.8*   HCT 31.8* 31.9*    204     CMP:   Recent Labs   Lab 12/04/20 0334 12/05/20  0540    144   K 3.7 3.6    105   CO2 31* 29   * 118*   BUN 42* 36*   CREATININE 1.7* 1.5*   CALCIUM 9.0 9.1   PROT 6.6 6.9   ALBUMIN 2.8* 2.9*   BILITOT 0.3 0.4   ALKPHOS 73 73   AST 21 23   ALT 11 9*   ANIONGAP 8 10   EGFRNONAA 26.5* 30.9*       Significant Imaging: n/a      Assessment/Plan:      * Acute ischemic right posterior cerebral artery (PCA) stroke  Management per primary team      MARGARET (acute kidney injury)  Patient with MARGARET today. Cr increased to 1.7 from 1.3 in 24.hrs  Suspect prerenal azotemia. Patient's Cr improved since yesterday.    Plan  - F/u UA, urine lytes  - Encourage PO intake  - Hold Irbesartan and lasix. Resume tomorrow with follow up labs in 1 week.  - Avoid overt use of IVF due to EF of 25%  - Avoid nephrotoxic medications  - Renally dose medications to GFR      HFrEF (heart failure with reduced ejection fraction)  ECHO 10/2020 with EF of 25%, grade II diastolic dysfunction, pul. Htn, severe left atrial enlargement  BNP on admit 802, decreased from previous admit  No signs of acute decompensated HF    Plan  - Continue Coreg 12.5mg BID,   - Hold Irbesartan 300mg, lasix 20 mg due to MARGARET  - Continue Hydralazine 100mg q8h, Imdur 30mg  - Daily weights  -  Cardiac diet  - Strict I & O   - Will need f/u with cardiology, likely repeat ECHO in a month to follow EF for further intervention I.e lifevest vs ICD      Essential hypertension  - Continue home medications: Coreg 12.5mg BID,Hydralazine 100mg q8h, Imdur 30mg  - Resume home clonidine patch 0.3mg/24hr  - Hold Irbesartan    Coronary artery disease involving native coronary artery of native heart without angina pectoris  - Continue ASA 81mg  - Lipitor 40mg qhs      Type II diabetes mellitus with renal manifestations  HGBA1C 6.3  - [facility]  - Avoid hypoglycemia          Chronic kidney disease, stage III (moderate)  See note on MARGARET    Hyperlipidemia  -Continue Lipitor 40mg        VTE Risk Mitigation (From admission, onward)         Ordered     enoxaparin injection 30 mg  Every 24 hours      12/03/20 0128     IP VTE HIGH RISK PATIENT  Once      12/03/20 0128     Place sequential compression device  Until discontinued      12/03/20 0128                Discharge Planning   KALEY: 12/5/2020     Code Status: Full Code   Is the patient medically ready for discharge?: No    Reason for patient still in hospital (select all that apply): Pending disposition  Discharge Plan A: Rehab   Discharge Delays: None known at this time              Asad Timmons MD  Department of Hospital Medicine   [facility]

## 2020-12-05 NOTE — NURSING
Pt discharged today to home with grandson. HH planned by AURY. IV removed with catheter intact. Telemetry removed. Previous vitals stable.

## 2020-12-05 NOTE — ASSESSMENT & PLAN NOTE
Patient with MARGARET today. Cr increased to 1.7 from 1.3 in 24.hrs  Suspect prerenal azotemia. Patient's Cr improved since yesterday.    Plan  - F/u UA, urine lytes  - Encourage PO intake  - Hold Irbesartan and lasix. Resume tomorrow with follow up labs in 1 week.  - Avoid overt use of IVF due to EF of 25%  - Avoid nephrotoxic medications  - Renally dose medications to GFR

## 2020-12-05 NOTE — PLAN OF CARE
[facility] will be seen tomorrow.      12/05/20 0950   Post-Acute Status   Post-Acute Authorization Home Health   Post-Acute Placement Status Set-up Complete     Janene Fountain LMSW  [facility]

## 2020-12-05 NOTE — SUBJECTIVE & OBJECTIVE
Interval History: No acute events overnight. Pt denies headache, nausea, abdominal pain, vomiting. Patient to be discharged today/    Review of Systems    Objective:     Vital Signs (Most Recent):  Temp: 98.7 °F (37.1 °C) (12/05/20 1134)  Pulse: 69 (12/05/20 1134)  Resp: (!) 23 (12/05/20 1134)  BP: 113/67 (12/05/20 1134)  SpO2: 97 % (12/05/20 1134) Vital Signs (24h Range):  Temp:  [97.4 °F (36.3 °C)-99.5 °F (37.5 °C)] 98.7 °F (37.1 °C)  Pulse:  [61-80] 69  Resp:  [17-24] 23  SpO2:  [92 %-98 %] 97 %  BP: (113-158)/(67-94) 113/67     Weight: 84.2 kg (185 lb 10 oz)  Body mass index is 32.88 kg/m².    Intake/Output Summary (Last 24 hours) at 12/5/2020 1335  Last data filed at 12/5/2020 0900  Gross per 24 hour   Intake 360 ml   Output --   Net 360 ml      Physical Exam  Constitutional:       General: She is not in acute distress.  HENT:      Head: Normocephalic and atraumatic.      Nose: Nose normal.   Cardiovascular:      Rate and Rhythm: Normal rate and regular rhythm.      Pulses: Normal pulses.   Pulmonary:      Effort: Pulmonary effort is normal.      Breath sounds: Normal breath sounds.   Abdominal:      General: Abdomen is flat.   Musculoskeletal:      Right lower leg: No edema.      Left lower leg: No edema.   Skin:     General: Skin is warm and dry.   Neurological:      Mental Status: She is alert.   Psychiatric:         Mood and Affect: Mood normal.         Significant Labs:   CBC:   Recent Labs   Lab 12/04/20  0334 12/05/20  0540   WBC 11.34 11.23   HGB 9.5* 9.8*   HCT 31.8* 31.9*    204     CMP:   Recent Labs   Lab 12/04/20  0334 12/05/20  0540    144   K 3.7 3.6    105   CO2 31* 29   * 118*   BUN 42* 36*   CREATININE 1.7* 1.5*   CALCIUM 9.0 9.1   PROT 6.6 6.9   ALBUMIN 2.8* 2.9*   BILITOT 0.3 0.4   ALKPHOS 73 73   AST 21 23   ALT 11 9*   ANIONGAP 8 10   EGFRNONAA 26.5* 30.9*       Significant Imaging: n/a

## 2020-12-05 NOTE — PLAN OF CARE
[facility]    HOME HEALTH ORDERS  FACE TO FACE ENCOUNTER    Patient Name: Krystina Washington  YOB: 1932    PCP: Oniel Johnson MD   PCP Address: [address] / [place] [address]  PCP Phone Number: [phone]  PCP Fax: [phone]    Encounter Date: 12/05/2020    Admit to Home Health    Diagnoses:  Active Hospital Problems    Diagnosis  POA    *Acute ischemic right posterior cerebral artery (PCA) stroke [I63.531]  Yes    MARGARET (acute kidney injury) [N17.9]  Unknown    Cytotoxic cerebral edema [G93.6]  Yes    HFrEF (heart failure with reduced ejection fraction) [I50.20]  Yes    Elevated troponin level [R77.8]  Yes    Chronic diastolic congestive heart failure [I50.32]  Yes    Essential hypertension [I10]  Yes    Coronary artery disease involving native coronary artery of native heart without angina pectoris [I25.10]  Yes     NSTEMI 09/02/2010                                                  CABG x 3 09/21/2010         Chronic kidney disease, stage III (moderate) [N18.30]  Yes    Type II diabetes mellitus with renal manifestations [E11.29]  Yes    Hyperlipidemia [E78.5]  Yes      Resolved Hospital Problems   No resolved problems to display.       Future Appointments   Date Time Provider Department Center   12/9/2020  8:30 AM LU Wheatley [facility]   12/9/2020  9:20 AM Niyah Ariza MD [facility]   1/6/2021 11:00 AM Oniel Johnson MD [facility]   1/20/2021 10:15 AM Yue Andrew DPM [facility]     Follow-up Information     Oniel Johnson MD In 1 week.    Specialty: Family Medicine  Why: Follow up with primary within 7 - 10 days of discharge.  Contact information:  [address] [place] [address]  [phone]             [facility] VASCULAR NEUROLOGY In 4 weeks.    Specialty: Vascular Neurology  Why: Someone will call to schedule stroke follow up in 4-6 weeks. If you  do not receive a call within 1 week please call number listed.  Contact information:  [facility]  [facility] [id number] [phone]           [facility] 3rd Floor.    Specialty: Cardiology  Why: Follow up with known cardiologist. Ask about repeating echo.   Contact information:  [place] [facility]  [facility] [address] [phone]  Additional information:  [facility]iology [facility] Clin[facility] - 3rd floor                   I have seen and examined this patient face to face today. My clinical findings that support the need for the home health skilled services and home bound status are the following:  Weakness/numbness causing balance and gait disturbance due to Stroke making it taxing to leave home.    Allergies:  Review of patient's allergies indicates:   Allergen Reactions    Indocin [indomethacin sodium] Other (See Comments)     Either caused hot,burning body or caused madness per patient's grandson    Lotensin [benazepril] Other (See Comments)     Either caused hot ,burning body or caused madness per patient's grandson       Diet: cardiac diet, diabetic diet: 1800 calorie, soft diet and fluid consistency thin    Activities: activity as tolerated    Nursing:   SN to complete comprehensive assessment including routine vital signs. Instruct on disease process and s/s of complications to report to MD. Review/verify medication list sent home with the patient at time of discharge  and instruct patient/caregiver as needed. Frequency may be adjusted depending on start of care date.    Notify MD if SBP > 160 or < 90; DBP > 90 or < 50; HR > 120 or < 50; Temp > 101; Other:        CONSULTS:    Physical Therapy to evaluate and treat. Evaluate for home safety and equipment needs; Establish/upgrade home exercise program. Perform / instruct on therapeutic exercises, gait training, transfer training, and Range of Motion.  Occupational Therapy to evaluate and treat. Evaluate home environment for  safety and equipment needs. Perform/Instruct on transfers, ADL training, ROM, and therapeutic exercises.   to evaluate for community resources/long-range planning.    MISCELLANEOUS CARE:  N/A    WOUND CARE ORDERS  n/a      Medications: Review discharge medications with patient and family and provide education.      Current Discharge Medication List      CONTINUE these medications which have NOT CHANGED    Details   acetaminophen (TYLENOL) 325 MG tablet Take 650 mg by mouth every 6 (six) hours as needed for Pain.      albuterol 90 mcg/actuation inhaler Inhale 1 puff into the lungs every 6 (six) hours as needed for Wheezing. 1 HFA Aerosol Inhaler Inhalation Twice a day  Qty: 18 g, Refills: 0      allopurinol (ZYLOPRIM) 100 MG tablet TAKE 1 TABLET BY MOUTH ONCE DAILY  Qty: 90 tablet, Refills: 3      aspirin 81 MG chewable tablet Take 81 mg by mouth. 1 Tablet, Chewable Oral Every day      blood sugar diagnostic Strp To check BG 1 times daily, to use with insurance preferred meter  Qty: 100 strip, Refills: 6    Associated Diagnoses: Type 2 diabetes mellitus with hyperglycemia, without long-term current use of insulin      carvediloL (COREG) 12.5 MG tablet TAKE 1 TABLET(12.5 MG) BY MOUTH TWICE DAILY  Qty: 180 tablet, Refills: 0    Comments: **Patient requests 90 days supply**  Associated Diagnoses: Essential hypertension      cloNIDine 0.3 mg/24 hr td ptwk (CATAPRES) 0.3 mg/24 hr APPLY 1 PATCH WEEKLY  Qty: 4 patch, Refills: 2      clopidogreL (PLAVIX) 75 mg tablet Take 1 tablet (75 mg total) by mouth once daily.  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  Qty: 1 Tube, Refills: 2      escitalopram oxalate (LEXAPRO) 20 MG tablet TAKE 1 TABLET BY MOUTH  EVERY DAY  Qty: 90 tablet, Refills: 3      furosemide (LASIX) 20 MG tablet Take 1 tablet (20 mg total) by mouth once daily.  Qty: 90 tablet, Refills: 3    Associated Diagnoses: CKD (chronic kidney disease) stage 4, GFR 15-29 ml/min; Chronic diastolic congestive heart failure; SOB (shortness of breath)      hydrALAZINE (APRESOLINE) 100 MG tablet TAKE 1 TABLET (100 MG TOTAL) BY MOUTH EVERY 8 (EIGHT) HOURS.  Qty: 270 tablet, Refills: 2    Comments: PLEASE SEND NEW RX THANK YOU!!  Associated Diagnoses: Systolic heart failure, chronic; Chronic diastolic heart failure; Aortic valve stenosis, unspecified etiology; Coronary artery disease due to lipid rich plaque; Renovascular hypertension; Chronic kidney disease, stage III (moderate); S/P CABG x 3; Hyperlipidemia, unspecified hyperlipidemia type      irbesartan (AVAPRO) 300 MG tablet Take 1 tablet (300 mg total) by mouth every evening.  Qty: 90 tablet, Refills: 3    Comments: .  Associated Diagnoses: Essential hypertension      isosorbide mononitrate (IMDUR) 60 MG 24 hr tablet TAKE 1 TABLET (60 MG TOTAL) BY MOUTH ONCE DAILY.  Qty: 90 tablet, Refills: 1      lancets Misc To check BG 1 times daily, to use with insurance preferred meter  Qty: 100 each, Refills: 6    Associated Diagnoses: Type 2 diabetes mellitus with hyperglycemia, without long-term current use of insulin      nitroGLYCERIN 0.4 MG/DOSE TL SPRY (NITROLINGUAL) 400 mcg/spray spray PLACE 1 SPRAY ONTO THE TONGUE EVERY 5 (FIVE) MINUTES AS NEEDED.  Qty: 12 g, Refills: 0      pravastatin (PRAVACHOL) 40 MG tablet TAKE 1 TABLET BY MOUTH EVERY EVENING  Qty: 90 tablet, Refills: 3      blood-glucose meter (FREESTYLE SYSTEM KIT) kit Use as instructed  Qty: 1 each, Refills: 0      timolol maleate 0.5% (TIMOPTIC) 0.5 % Drop Place 1 drop into both eyes 2 (two) times daily.  Qty: 15 mL, Refills: 3             I certify that this patient is confined to her home and needs intermittent skilled nursing care, physical therapy and  occupational therapy.

## 2020-12-05 NOTE — PLAN OF CARE
Problem: Fall Injury Risk  Goal: Absence of Fall and Fall-Related Injury  Outcome: Ongoing, Progressing  Intervention: Promote Injury-Free Environment  Flowsheets (Taken 12/5/2020 0231)  Safety Promotion/Fall Prevention:   assistive device/personal item within reach   bed alarm set   lighting adjusted   medications reviewed   nonskid shoes/socks when out of bed   side rails raised x 2   supervised activity  Environmental Safety Modification:   assistive device/personal items within reach   clutter free environment maintained   lighting adjusted     Problem: Functional Ability Impaired (Stroke, Ischemic/Transient Ischemic Attack)  Goal: Optimal Functional Ability  Outcome: Ongoing, Progressing  Intervention: Optimize Functional Ability  Flowsheets (Taken 12/5/2020 0231)  Self-Care Promotion:   independence encouraged   BADL personal objects within reach   BADL personal routines maintained  Activity Management: rolling - L1     Problem: Adult Inpatient Plan of Care  Goal: Plan of Care Review  Outcome: Ongoing, Progressing  Flowsheets (Taken 12/5/2020 0231)  Plan of Care Reviewed With: patient     POC reviewed with patient. All questions and concerns reviewed. Fall/safety precautions implemented and maintained. Blood glucose monitored. No acute events noted this shift. Stroke education book at . Reviewed s/s of stroke and when to seek attention. Please see flowsheet for full assessment and vitals. Bed locked in lowest position. Call bell within reach. Will continue to monitor.

## 2020-12-05 NOTE — PLAN OF CARE
AURY spoke with pt's grandson Hermelindo to inform of discharge today with home health. Hermelindo would like pt to have [facility]. Referral sent via [id number]. Hermelindo states that he will be here within the hour to  patient to transport home. RN aware.     Janene Fountain, BIANCA  [facility]

## 2020-12-07 ENCOUNTER — PATIENT OUTREACH (OUTPATIENT)
Dept: ADMINISTRATIVE | Facility: OTHER | Age: 85
End: 2020-12-07

## 2020-12-07 LAB
BACTERIA BLD CULT: NORMAL
BACTERIA BLD CULT: NORMAL

## 2020-12-08 ENCOUNTER — TELEPHONE (OUTPATIENT)
Dept: NEUROLOGY | Facility: CLINIC | Age: 85
End: 2020-12-08

## 2020-12-08 NOTE — PLAN OF CARE
Patient medically ready for discharge to Home with family and [facility]. Any necessary transport setup by SW. Family to schedule follow up appointments.    Future Appointments   Date Time Provider Department Center   12/9/2020  8:30 AM LU Wheatley [facility]   12/9/2020  9:20 AM Niyah Ariza MD [facility] [place] [facility]   1/6/2021 11:00 AM Oniel Johnson MD [facility]   1/20/2021 10:15 AM Yue Andrew DPM [facility]        12/08/20 0852   Final Note   Assessment Type Final Discharge Note   Anticipated Discharge Disposition Home-Health   Hospital Follow Up  Appt(s) scheduled? No   Discharge plans and expectations educations in teach back method with documentation complete? Yes   Right Care Referral Info   Post Acute Recommendation Home-care   Facility Name [facility] Bonnie West, RN  Case Management  Ext: [phone]  12/08/2020

## 2020-12-09 DIAGNOSIS — R06.02 SOB (SHORTNESS OF BREATH): ICD-10-CM

## 2020-12-09 DIAGNOSIS — I50.32 CHRONIC DIASTOLIC CONGESTIVE HEART FAILURE: ICD-10-CM

## 2020-12-09 DIAGNOSIS — N18.4 CKD (CHRONIC KIDNEY DISEASE) STAGE 4, GFR 15-29 ML/MIN: ICD-10-CM

## 2020-12-09 RX ORDER — FUROSEMIDE 20 MG/1
20 TABLET ORAL DAILY
Qty: 90 TABLET | Refills: 3 | Status: SHIPPED | OUTPATIENT
Start: 2020-12-09 | End: 2021-08-09

## 2020-12-11 ENCOUNTER — PATIENT MESSAGE (OUTPATIENT)
Dept: OTHER | Facility: OTHER | Age: 85
End: 2020-12-11

## 2020-12-13 PROCEDURE — G0180 PR HOME HEALTH MD CERTIFICATION: ICD-10-PCS | Mod: ,,, | Performed by: PSYCHIATRY & NEUROLOGY

## 2020-12-13 PROCEDURE — G0180 MD CERTIFICATION HHA PATIENT: HCPCS | Mod: ,,, | Performed by: PSYCHIATRY & NEUROLOGY

## 2020-12-14 DIAGNOSIS — E11.65 TYPE 2 DIABETES MELLITUS WITH HYPERGLYCEMIA, WITHOUT LONG-TERM CURRENT USE OF INSULIN: ICD-10-CM

## 2020-12-14 RX ORDER — INSULIN PUMP SYRINGE, 3 ML
EACH MISCELLANEOUS
Qty: 1 EACH | Refills: 0 | Status: ON HOLD | OUTPATIENT
Start: 2020-12-14 | End: 2021-11-16 | Stop reason: SDUPTHER

## 2020-12-14 RX ORDER — ISOSORBIDE MONONITRATE 60 MG/1
60 TABLET, EXTENDED RELEASE ORAL DAILY
Qty: 90 TABLET | Refills: 3 | Status: SHIPPED | OUTPATIENT
Start: 2020-12-14 | End: 2021-03-15

## 2020-12-14 RX ORDER — ALLOPURINOL 100 MG/1
100 TABLET ORAL DAILY
Qty: 90 TABLET | Refills: 3 | Status: SHIPPED | OUTPATIENT
Start: 2020-12-14 | End: 2020-12-17

## 2020-12-22 NOTE — DISCHARGE SUMMARY
[facility]  Vascular Neurology  Comprehensive Stroke Center  Discharge Summary     Summary:     Admit Date: 12/2/2020  8:56 PM    Discharge Date and Time: 12/5/2020 12:19 PM    Attending Physician: No att. providers found     Discharge Provider: Saeid Butt MD    History of Present Illness: 87 y/o female who woke up from a nap and had AMS. Patient also c/o headache and was having difficulty seeing. They checked her BS and it was low so gave her some juice. EMS chaecked and it was 175. Patient is not on any hypoglycemics at this time.  Family report that she has had similar disoriented episode over the past 6 months when waking up.     Patient has had increase in SOB over the past months and was seen in cardiology clinic for CHF and advised to add extra lasix for 2-3 days. Patient BNP is 802, Trop 0.058 will trend Trop.    CT scan head no acute process seen.    MRI brain w/o contrast  reveals right occipital lobe about the occipital horn  Infarct    Risk factors CHF, HTN, HLP, CKD, obesity, valvular disease          Hospital Course (synopsis of major diagnoses, care, treatment, and services provided during the course of the hospital stay): MRI brain with acute right occipital lobe infarct. DAPT + statin. HM following, renal function at baseline, recommend outpatient cardiology follow up. No visual filed cuts appreciated on exam, alertness improved. Therapy recommendations originally rehab however upgraded to home 12/4 as patient improved. Patient discharged home on 12/5 with outpatient stroke follow up.     Stroke Etiology: Possible Intracranial Supra-Aortic Large Artery Atherosclerosis (DARWIN)    STROKE DOCUMENTATION         NIH Scale: 2           Modified Law Score: 3  Mendoza Coma Scale:    ABCD2 Score:    [id number] Score:   HAS -BLED Score:   ICH Score:   Hunt & [place] Classification:       Review of Systems   Constitutional: Negative for chills, fever and malaise/fatigue.   HENT:  Negative for congestion and sore throat.    Respiratory: Negative for cough, hemoptysis, sputum production and shortness of breath.    Cardiovascular: Negative for chest pain, palpitations, orthopnea, leg swelling and PND.   Gastrointestinal: Negative for abdominal pain, constipation, diarrhea, heartburn, nausea and vomiting.   Genitourinary: Negative for dysuria and hematuria.   Musculoskeletal: Negative for back pain, falls, joint pain and myalgias.   Skin: Negative for rash.   Neurological: Negative for dizziness, tingling, tremors, sensory change, weakness and headaches.   Psychiatric/Behavioral: Negative for depression. The patient is not nervous/anxious and does not have insomnia.      Vitals:    12/05/20 1134   BP: 113/67   Pulse: 69   Resp: (!) 23   Temp: 98.7 °F (37.1 °C)      Physical Exam  Vitals signs reviewed.   HENT:      Head: Normocephalic.   Eyes:      General: No visual field deficit.     Extraocular Movements: Extraocular movements intact.   Cardiovascular:      Rate and Rhythm: Normal rate.   Pulmonary:      Effort: Pulmonary effort is normal.   Abdominal:      Palpations: Abdomen is soft.   Neurological:      Mental Status: She is alert and oriented to person, place, and time.      GCS: GCS eye subscore is 4. GCS verbal subscore is 5. GCS motor subscore is 6.      Cranial Nerves: No dysarthria or facial asymmetry.      Assessment/Plan:     Diagnostic Results:    Brain imaging:  CT Head w/o contrast 12-2-20 results:  No acute abnormality such as hemorrhage or large vascular territory ischemia to suggest ischemia/infarction.     Periventricular, supratentorial decreased white matter attenuation suggestive for chronic ischemic microvascular changes.     Generalized cerebral volume loss with compensatory symmetric enlargement of the ventricular system similar to prior exams and appropriate for age.     MRI Brain w/o contrast 12-3-20-results:      Focus of diffusion restriction with ADC match in the  right occipital lobe about the occipital horn suggestive for infarct.     Generalized cerebral volume loss with appropriate symmetric compensatory enlargement of the ventricular system unchanged from priors.     Confluent supratentorial T2 hyperintensities consistent with chronic ischemic microvascular changes.        Vessel Imaging:   MRA brain and neck 12/3/20  1. MRA neck is within normal limits without evidence of hemodynamically significant stenosis or occlusion.  2. MRA brain is grossly within normal limits noting a mild focal region of luminal narrowing involving the distal left vertebral artery near the vertebrobasilar junction.  No evidence of proximal large vessel occlusion.        Cardiac Evaluation:   2 D Echo 10-15-20 results:  · Severe left atrial enlargement.  · There is mild left ventricular eccentric hypertrophy.  · The left ventricle is mildly enlarged with severely decreased systolic function. The estimated ejection fraction is 25%.  · There is severe left ventricular global hypokinesis.  · Grade II diastolic dysfunction.  · Moderate right atrial enlargement.  · Mildly reduced right ventricular systolic function.  · There is a 26 mm Rubalcava Jessica S3 transcutaneously-placed aortic bioprosthesis present. There is no aortic aortic insufficiency present. Prosthetic aortic valve is normal.  · Aortic valve area is 2.18 cm2; peak velocity is 2.22 m/s; mean gradient is 12 mm Hg. DI is 0.42.  · The mean diastolic gradient across the mitral valve is 4 mmHg at a heart rate of 70 bpm.  · Mild-to-moderate mitral regurgitation.  · Mild tricuspid regurgitation.  · There is pulmonary hypertension.  · Intermediate central venous pressure (8 mmHg).  · The estimated PA systolic pressure is 57 mmHg.  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  [I63.531] 12/03/2020 Yes    MARGARET (acute kidney injury) [N17.9] 12/04/2020 No    Cytotoxic cerebral edema [G93.6] 12/04/2020 Yes    HFrEF (heart failure with reduced ejection fraction) [I50.20] 12/03/2020 Yes    Elevated troponin level [R77.8]  Yes    Chronic diastolic congestive heart failure [I50.32] 01/18/2017 Yes    Essential hypertension [I10] 11/29/2015 Yes    Coronary artery disease involving native coronary artery of native heart without angina pectoris [I25.10] 04/09/2013 Yes    Chronic kidney disease, stage III (moderate) [N18.30] 09/26/2012 Yes    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  Service: Vascular Neurology      Recommendations:     Post-discharge complication risks: Falls    Stroke Education given to: patient and family    Follow-up in Stroke Clinic in 28 days with Echo results (last Ef 25%).   Follow up in Cardiology in 2 weeks and get an Echo    Discharge Plan:  Antithrombotics: Aspirin 81 mg, Clopidogrel 75mg  Statin: Pravastatin 40 mg  Aggresive risk factor modification:  Hypertension  Diabetes  High Cholesterol  Diet  Exercise  Obesity  Carotid Artery disease    Follow Up:  Follow-up Information     Oniel Johnson MD In 1 week.    Specialty: Family Medicine  Why: Follow up with primary within 7 - 10 days of discharge.  Contact information:  [address] [facility]  [place] [address] [phone]             [facility] VASCULAR NEUROLOGY In 4 weeks.    Specialty: Vascular Neurology  Why: Someone will call to schedule stroke follow up in 4-6 weeks. If you do not receive a call within 1 week please call number listed.  Contact information:  [address]  [facility] [address]  [phone]           [facility] 3rd Floor.     Specialty: Cardiology  Why: Follow up with known cardiologist. Ask about repeating echo.   Contact information:  [address]  [facility] [address] [phone]  Additional information:  Cardiology Services Clinics - 3rd floor                 Patient Instructions:      Ambulatory referral/consult to Vascular Neurology   Standing Status: Future   Referral Priority: Routine Referral Type: Consultation   Referral Reason: Specialty Services Required   Requested Specialty: Vascular Neurology   Number of Visits Requested: 1     Diet Cardiac   Order Comments: See Stroke Patient Education Guide Booklet for details.     Call 911 for any of the following:   Order Comments: Call 911  right away if any of the following warning signs come on suddenly, even if the symptoms only last for a few minutes. With stroke, timing is very important.   - Warning Signs of Stroke:  - Weakness: You may feel a sudden weakness, tingling or loss of feeling on one side of your face or body.  - Vision Problems: You may have sudden double vision or trouble seeing in one or both eyes.  - Speech Problems: You may have sudden trouble talking, slured speech, or problems understanding others.  - Headache: You may have sudden, severe headache.  - Movement Problems: You may experience dizziness, a feeling of spinning, a loss of balance, a feeling of falling or blackouts.     Activity as tolerated       Medications:  Reconciled Home Medications:      Medication List      CONTINUE taking these medications    acetaminophen 325 MG tablet  Commonly known as: TYLENOL  Take 650 mg by mouth every 6 (six) hours as needed for Pain.     albuterol 90 mcg/actuation inhaler  Commonly known as: PROVENTIL/VENTOLIN HFA  Inhale 1 puff into the lungs every 6 (six) hours as needed for Wheezing. 1 HFA Aerosol Inhaler Inhalation Twice a day     aspirin 81 MG Chew  Take 81 mg by mouth. 1 Tablet, Chewable Oral Every day     carvediloL 12.5 MG tablet  Commonly  known as: COREG  TAKE 1 TABLET(12.5 MG) BY MOUTH TWICE DAILY     cloNIDine 0.3 mg/24 hr td ptwk 0.3 mg/24 hr  Commonly known as: CATAPRES  APPLY 1 PATCH WEEKLY     clopidogreL 75 mg tablet  Commonly known as: PLAVIX  Take 1 tablet (75 mg total) by mouth once daily.     erythromycin ophthalmic ointment  Commonly known as: ROMYCIN  Place into the left eye every evening.     escitalopram oxalate 20 MG tablet  Commonly known as: LEXAPRO  TAKE 1 TABLET BY MOUTH EVERY DAY     hydrALAZINE 100 MG tablet  Commonly known as: APRESOLINE  TAKE 1 TABLET (100 MG TOTAL) BY MOUTH EVERY 8 (EIGHT) HOURS.     irbesartan 300 MG tablet  Commonly known as: AVAPRO  Take 1 tablet (300 mg total) by mouth every evening.     lancets Misc  To check BG 1 times daily, to use with insurance preferred meter     nitroGLYCERIN 0.4 MG/DOSE TL SPRY 400 mcg/spray spray  Commonly known as: NITROLINGUAL  PLACE 1 SPRAY ONTO THE TONGUE EVERY 5 (FIVE) MINUTES AS NEEDED.     pravastatin 40 MG tablet  Commonly known as: PRAVACHOL  TAKE 1 TABLET BY MOUTH EVERY EVENING     timolol maleate 0.5% 0.5 % Drop  Commonly known as: TIMOPTIC  Place 1 drop into both eyes 2 (two) times daily.            Saeid Butt MD  Comprehensive Stroke Center  Department of Vascular Neurology   [facility]

## 2020-12-24 ENCOUNTER — EXTERNAL HOME HEALTH (OUTPATIENT)
Dept: HOME HEALTH SERVICES | Facility: HOSPITAL | Age: 85
End: 2020-12-24
Payer: MEDICARE

## 2021-01-06 ENCOUNTER — HOSPITAL ENCOUNTER (OUTPATIENT)
Facility: HOSPITAL | Age: 86
Discharge: HOME | End: 2021-01-08
Attending: EMERGENCY MEDICINE | Admitting: EMERGENCY MEDICINE
Payer: MEDICARE

## 2021-01-06 DIAGNOSIS — I63.9 STROKE: ICD-10-CM

## 2021-01-06 DIAGNOSIS — N18.30 TYPE 2 DIABETES MELLITUS WITH STAGE 3 CHRONIC KIDNEY DISEASE, WITHOUT LONG-TERM CURRENT USE OF INSULIN, UNSPECIFIED WHETHER STAGE 3A OR 3B CKD: ICD-10-CM

## 2021-01-06 DIAGNOSIS — E11.22 TYPE 2 DIABETES MELLITUS WITH STAGE 3 CHRONIC KIDNEY DISEASE, WITHOUT LONG-TERM CURRENT USE OF INSULIN, UNSPECIFIED WHETHER STAGE 3A OR 3B CKD: ICD-10-CM

## 2021-01-06 DIAGNOSIS — E78.5 HYPERLIPIDEMIA, UNSPECIFIED HYPERLIPIDEMIA TYPE: ICD-10-CM

## 2021-01-06 DIAGNOSIS — H53.462 HOMONYMOUS HEMIANOPIA, LEFT: ICD-10-CM

## 2021-01-06 DIAGNOSIS — G45.9 TIA (TRANSIENT ISCHEMIC ATTACK): Primary | ICD-10-CM

## 2021-01-06 DIAGNOSIS — I25.10 CORONARY ARTERY DISEASE INVOLVING NATIVE CORONARY ARTERY OF NATIVE HEART WITHOUT ANGINA PECTORIS: ICD-10-CM

## 2021-01-06 DIAGNOSIS — M79.606 LEG PAIN: ICD-10-CM

## 2021-01-06 DIAGNOSIS — I10 ESSENTIAL HYPERTENSION: ICD-10-CM

## 2021-01-06 PROBLEM — I50.42 CHRONIC COMBINED SYSTOLIC AND DIASTOLIC CONGESTIVE HEART FAILURE: Status: ACTIVE | Noted: 2017-01-18

## 2021-01-06 LAB
ALBUMIN SERPL BCP-MCNC: 3.2 G/DL (ref 3.5–5.2)
ALLENS TEST: ABNORMAL
ALLENS TEST: ABNORMAL
ALP SERPL-CCNC: 80 U/L (ref 55–135)
ALT SERPL W/O P-5'-P-CCNC: 10 U/L (ref 10–44)
ANION GAP SERPL CALC-SCNC: 6 MMOL/L (ref 8–16)
APTT BLDCRRT: 30.4 SEC (ref 21–32)
AST SERPL-CCNC: 20 U/L (ref 10–40)
BASOPHILS # BLD AUTO: 0.04 K/UL (ref 0–0.2)
BASOPHILS NFR BLD: 0.3 % (ref 0–1.9)
BILIRUB SERPL-MCNC: 0.2 MG/DL (ref 0.1–1)
BNP SERPL-MCNC: 2417 PG/ML (ref 0–99)
BUN SERPL-MCNC: 44 MG/DL (ref 8–23)
CALCIUM SERPL-MCNC: 9.5 MG/DL (ref 8.7–10.5)
CHLORIDE SERPL-SCNC: 104 MMOL/L (ref 95–110)
CHOLEST SERPL-MCNC: 115 MG/DL (ref 120–199)
CHOLEST/HDLC SERPL: 2.7 {RATIO} (ref 2–5)
CO2 SERPL-SCNC: 31 MMOL/L (ref 23–29)
CREAT SERPL-MCNC: 1.4 MG/DL (ref 0.5–1.4)
CREAT SERPL-MCNC: 1.5 MG/DL (ref 0.5–1.4)
CTP QC/QA: YES
DELSYS: ABNORMAL
DELSYS: ABNORMAL
DIFFERENTIAL METHOD: ABNORMAL
EOSINOPHIL # BLD AUTO: 0.3 K/UL (ref 0–0.5)
EOSINOPHIL NFR BLD: 2.8 % (ref 0–8)
ERYTHROCYTE [DISTWIDTH] IN BLOOD BY AUTOMATED COUNT: 14.2 % (ref 11.5–14.5)
EST. GFR  (AFRICAN AMERICAN): 39 ML/MIN/1.73 M^2
EST. GFR  (NON AFRICAN AMERICAN): 34 ML/MIN/1.73 M^2
ESTIMATED AVG GLUCOSE: 131 MG/DL (ref 68–131)
GLUCOSE SERPL-MCNC: 122 MG/DL (ref 70–110)
GLUCOSE SERPL-MCNC: 141 MG/DL (ref 70–110)
HBA1C MFR BLD HPLC: 6.2 % (ref 4–5.6)
HCT VFR BLD AUTO: 33.4 % (ref 37–48.5)
HDLC SERPL-MCNC: 43 MG/DL (ref 40–75)
HDLC SERPL: 37.4 % (ref 20–50)
HGB BLD-MCNC: 10.6 G/DL (ref 12–16)
IMM GRANULOCYTES # BLD AUTO: 0.04 K/UL (ref 0–0.04)
IMM GRANULOCYTES NFR BLD AUTO: 0.3 % (ref 0–0.5)
INR PPP: 1.1 (ref 0.8–1.2)
LDLC SERPL CALC-MCNC: 54.4 MG/DL (ref 63–159)
LYMPHOCYTES # BLD AUTO: 2.1 K/UL (ref 1–4.8)
LYMPHOCYTES NFR BLD: 17.9 % (ref 18–48)
MCH RBC QN AUTO: 32.4 PG (ref 27–31)
MCHC RBC AUTO-ENTMCNC: 31.7 G/DL (ref 32–36)
MCV RBC AUTO: 102 FL (ref 82–98)
MONOCYTES # BLD AUTO: 0.8 K/UL (ref 0.3–1)
MONOCYTES NFR BLD: 6.5 % (ref 4–15)
NEUTROPHILS # BLD AUTO: 8.5 K/UL (ref 1.8–7.7)
NEUTROPHILS NFR BLD: 72.2 % (ref 38–73)
NONHDLC SERPL-MCNC: 72 MG/DL
NRBC BLD-RTO: 0 /100 WBC
PLATELET # BLD AUTO: 162 K/UL (ref 150–350)
PMV BLD AUTO: 12.3 FL (ref 9.2–12.9)
POC PTINR: 1.2 (ref 0.9–1.2)
POC PTWBT: 14.7 SEC (ref 9.7–14.3)
POCT GLUCOSE: 122 MG/DL (ref 70–110)
POTASSIUM SERPL-SCNC: 3.8 MMOL/L (ref 3.5–5.1)
PROT SERPL-MCNC: 7.1 G/DL (ref 6–8.4)
PROTHROMBIN TIME: 11.4 SEC (ref 9–12.5)
RBC # BLD AUTO: 3.27 M/UL (ref 4–5.4)
SAMPLE: ABNORMAL
SAMPLE: ABNORMAL
SARS-COV-2 RDRP RESP QL NAA+PROBE: NEGATIVE
SITE: ABNORMAL
SITE: ABNORMAL
SODIUM SERPL-SCNC: 141 MMOL/L (ref 136–145)
TRIGL SERPL-MCNC: 88 MG/DL (ref 30–150)
TROPONIN I SERPL DL<=0.01 NG/ML-MCNC: 0.05 NG/ML (ref 0–0.03)
TSH SERPL DL<=0.005 MIU/L-ACNC: 1.86 UIU/ML (ref 0.4–4)
WBC # BLD AUTO: 11.75 K/UL (ref 3.9–12.7)

## 2021-01-06 PROCEDURE — 84443 ASSAY THYROID STIM HORMONE: CPT

## 2021-01-06 PROCEDURE — 85610 PROTHROMBIN TIME: CPT

## 2021-01-06 PROCEDURE — 82565 ASSAY OF CREATININE: CPT | Mod: 59

## 2021-01-06 PROCEDURE — 80053 COMPREHEN METABOLIC PANEL: CPT

## 2021-01-06 PROCEDURE — 99291 CRITICAL CARE FIRST HOUR: CPT

## 2021-01-06 PROCEDURE — 63600175 PHARM REV CODE 636 W HCPCS: Performed by: HOSPITALIST

## 2021-01-06 PROCEDURE — 83880 ASSAY OF NATRIURETIC PEPTIDE: CPT

## 2021-01-06 PROCEDURE — 85025 COMPLETE CBC W/AUTO DIFF WBC: CPT

## 2021-01-06 PROCEDURE — 93010 ELECTROCARDIOGRAM REPORT: CPT | Mod: ,,, | Performed by: INTERNAL MEDICINE

## 2021-01-06 PROCEDURE — U0002 COVID-19 LAB TEST NON-CDC: HCPCS | Performed by: EMERGENCY MEDICINE

## 2021-01-06 PROCEDURE — G0426 INPT/ED TELECONSULT50: HCPCS | Mod: 95,,, | Performed by: PSYCHIATRY & NEUROLOGY

## 2021-01-06 PROCEDURE — G0426 PR INPT TELEHEALTH CONSULT 50M: ICD-10-PCS | Mod: 95,,, | Performed by: PSYCHIATRY & NEUROLOGY

## 2021-01-06 PROCEDURE — 85730 THROMBOPLASTIN TIME PARTIAL: CPT

## 2021-01-06 PROCEDURE — 93005 ELECTROCARDIOGRAM TRACING: CPT

## 2021-01-06 PROCEDURE — 80061 LIPID PANEL: CPT

## 2021-01-06 PROCEDURE — G0378 HOSPITAL OBSERVATION PER HR: HCPCS

## 2021-01-06 PROCEDURE — 84484 ASSAY OF TROPONIN QUANT: CPT

## 2021-01-06 PROCEDURE — 96372 THER/PROPH/DIAG INJ SC/IM: CPT | Mod: 59 | Performed by: EMERGENCY MEDICINE

## 2021-01-06 PROCEDURE — 83036 HEMOGLOBIN GLYCOSYLATED A1C: CPT

## 2021-01-06 PROCEDURE — 93010 EKG 12-LEAD: ICD-10-PCS | Mod: ,,, | Performed by: INTERNAL MEDICINE

## 2021-01-06 RX ORDER — CLOPIDOGREL BISULFATE 75 MG/1
75 TABLET ORAL DAILY
Status: DISCONTINUED | OUTPATIENT
Start: 2021-01-07 | End: 2021-01-08

## 2021-01-06 RX ORDER — ALLOPURINOL 100 MG/1
100 TABLET ORAL DAILY
Status: DISCONTINUED | OUTPATIENT
Start: 2021-01-07 | End: 2021-01-08 | Stop reason: HOSPADM

## 2021-01-06 RX ORDER — SODIUM CHLORIDE 0.9 % (FLUSH) 0.9 %
10 SYRINGE (ML) INJECTION
Status: CANCELLED | OUTPATIENT
Start: 2021-01-06

## 2021-01-06 RX ORDER — TIMOLOL MALEATE 5 MG/ML
1 SOLUTION/ DROPS OPHTHALMIC 2 TIMES DAILY
Status: DISCONTINUED | OUTPATIENT
Start: 2021-01-06 | End: 2021-01-08 | Stop reason: HOSPADM

## 2021-01-06 RX ORDER — ATORVASTATIN CALCIUM 40 MG/1
40 TABLET, FILM COATED ORAL DAILY
Status: DISCONTINUED | OUTPATIENT
Start: 2021-01-07 | End: 2021-01-08 | Stop reason: HOSPADM

## 2021-01-06 RX ORDER — FUROSEMIDE 20 MG/1
20 TABLET ORAL DAILY
Status: DISCONTINUED | OUTPATIENT
Start: 2021-01-07 | End: 2021-01-08 | Stop reason: HOSPADM

## 2021-01-06 RX ORDER — TALC
6 POWDER (GRAM) TOPICAL NIGHTLY PRN
Status: CANCELLED | OUTPATIENT
Start: 2021-01-06

## 2021-01-06 RX ORDER — ISOSORBIDE MONONITRATE 30 MG/1
60 TABLET, EXTENDED RELEASE ORAL
Status: DISCONTINUED | OUTPATIENT
Start: 2021-01-06 | End: 2021-01-06

## 2021-01-06 RX ORDER — HYDRALAZINE HYDROCHLORIDE 25 MG/1
100 TABLET, FILM COATED ORAL
Status: DISCONTINUED | OUTPATIENT
Start: 2021-01-06 | End: 2021-01-06

## 2021-01-06 RX ORDER — ESCITALOPRAM OXALATE 10 MG/1
20 TABLET ORAL DAILY
Status: DISCONTINUED | OUTPATIENT
Start: 2021-01-07 | End: 2021-01-08 | Stop reason: HOSPADM

## 2021-01-06 RX ORDER — LABETALOL HYDROCHLORIDE 5 MG/ML
10 INJECTION, SOLUTION INTRAVENOUS
Status: DISCONTINUED | OUTPATIENT
Start: 2021-01-06 | End: 2021-01-08 | Stop reason: HOSPADM

## 2021-01-06 RX ORDER — ALBUTEROL SULFATE 90 UG/1
1 AEROSOL, METERED RESPIRATORY (INHALATION) EVERY 6 HOURS PRN
Status: DISCONTINUED | OUTPATIENT
Start: 2021-01-06 | End: 2021-01-08 | Stop reason: HOSPADM

## 2021-01-06 RX ORDER — ENOXAPARIN SODIUM 100 MG/ML
40 INJECTION SUBCUTANEOUS EVERY 24 HOURS
Status: DISCONTINUED | OUTPATIENT
Start: 2021-01-06 | End: 2021-01-08

## 2021-01-06 RX ORDER — SODIUM CHLORIDE 0.9 % (FLUSH) 0.9 %
10 SYRINGE (ML) INJECTION
Status: DISCONTINUED | OUTPATIENT
Start: 2021-01-06 | End: 2021-01-08 | Stop reason: HOSPADM

## 2021-01-06 RX ORDER — ASPIRIN 81 MG/1
81 TABLET ORAL DAILY
Status: DISCONTINUED | OUTPATIENT
Start: 2021-01-07 | End: 2021-01-08 | Stop reason: HOSPADM

## 2021-01-06 RX ADMIN — ENOXAPARIN SODIUM 40 MG: 40 INJECTION SUBCUTANEOUS at 08:01

## 2021-01-07 PROBLEM — H53.462 HOMONYMOUS HEMIANOPIA, LEFT: Status: ACTIVE | Noted: 2021-01-07

## 2021-01-07 PROBLEM — G45.9 TIA (TRANSIENT ISCHEMIC ATTACK): Status: ACTIVE | Noted: 2021-01-07

## 2021-01-07 LAB
ALBUMIN SERPL BCP-MCNC: 3.3 G/DL (ref 3.5–5.2)
ALP SERPL-CCNC: 84 U/L (ref 55–135)
ALT SERPL W/O P-5'-P-CCNC: 10 U/L (ref 10–44)
ANION GAP SERPL CALC-SCNC: 12 MMOL/L (ref 8–16)
AORTIC ROOT ANNULUS: 3.08 CM
APTT BLDCRRT: 32.5 SEC (ref 21–32)
AST SERPL-CCNC: 22 U/L (ref 10–40)
AV INDEX (PROSTH): 0.31
AV MEAN GRADIENT: 10 MMHG
AV PEAK GRADIENT: 17 MMHG
AV VALVE AREA: 1.04 CM2
AV VELOCITY RATIO: 0.33
BACTERIA #/AREA URNS HPF: ABNORMAL /HPF
BASOPHILS # BLD AUTO: 0.04 K/UL (ref 0–0.2)
BASOPHILS NFR BLD: 0.4 % (ref 0–1.9)
BILIRUB SERPL-MCNC: 0.4 MG/DL (ref 0.1–1)
BILIRUB UR QL STRIP: NEGATIVE
BSA FOR ECHO PROCEDURE: 2.11 M2
BUN SERPL-MCNC: 40 MG/DL (ref 8–23)
CALCIUM SERPL-MCNC: 9.7 MG/DL (ref 8.7–10.5)
CHLORIDE SERPL-SCNC: 104 MMOL/L (ref 95–110)
CK MB SERPL-MCNC: 4.9 NG/ML (ref 0.1–6.5)
CK MB SERPL-RTO: 6 % (ref 0–5)
CK SERPL-CCNC: 81 U/L (ref 20–180)
CLARITY UR: CLEAR
CO2 SERPL-SCNC: 27 MMOL/L (ref 23–29)
COLOR UR: YELLOW
CREAT SERPL-MCNC: 1.5 MG/DL (ref 0.5–1.4)
CV ECHO LV RWT: 0.55 CM
DIFFERENTIAL METHOD: ABNORMAL
DOP CALC AO PEAK VEL: 2.06 M/S
DOP CALC AO VTI: 50.61 CM
DOP CALC LVOT AREA: 3.3 CM2
DOP CALC LVOT DIAMETER: 2.05 CM
DOP CALC LVOT PEAK VEL: 0.69 M/S
DOP CALC LVOT STROKE VOLUME: 52.45 CM3
DOP CALCLVOT PEAK VEL VTI: 15.9 CM
E WAVE DECELERATION TIME: 229.94 MSEC
E/A RATIO: 0.94
E/E' RATIO: 26 M/S
ECHO LV POSTERIOR WALL: 1.42 CM (ref 0.6–1.1)
EOSINOPHIL # BLD AUTO: 0.3 K/UL (ref 0–0.5)
EOSINOPHIL NFR BLD: 2.9 % (ref 0–8)
ERYTHROCYTE [DISTWIDTH] IN BLOOD BY AUTOMATED COUNT: 14.4 % (ref 11.5–14.5)
EST. GFR  (AFRICAN AMERICAN): 36 ML/MIN/1.73 M^2
EST. GFR  (NON AFRICAN AMERICAN): 31 ML/MIN/1.73 M^2
FRACTIONAL SHORTENING: 14 % (ref 28–44)
GLUCOSE SERPL-MCNC: 164 MG/DL (ref 70–110)
GLUCOSE UR QL STRIP: NEGATIVE
HCT VFR BLD AUTO: 35.2 % (ref 37–48.5)
HGB BLD-MCNC: 11 G/DL (ref 12–16)
HGB UR QL STRIP: ABNORMAL
IMM GRANULOCYTES # BLD AUTO: 0.05 K/UL (ref 0–0.04)
IMM GRANULOCYTES NFR BLD AUTO: 0.5 % (ref 0–0.5)
INR PPP: 1.1 (ref 0.8–1.2)
INTERVENTRICULAR SEPTUM: 1.42 CM (ref 0.6–1.1)
KETONES UR QL STRIP: NEGATIVE
LA MAJOR: 5.6 CM
LA MINOR: 5.62 CM
LA WIDTH: 4.71 CM
LEFT ATRIUM SIZE: 4.1 CM
LEFT ATRIUM VOLUME INDEX MOD: 35.1 ML/M2
LEFT ATRIUM VOLUME INDEX: 45.7 ML/M2
LEFT ATRIUM VOLUME MOD: 70.62 CM3
LEFT ATRIUM VOLUME: 92.08 CM3
LEFT INTERNAL DIMENSION IN SYSTOLE: 4.47 CM (ref 2.1–4)
LEFT VENTRICLE DIASTOLIC VOLUME INDEX: 64.31 ML/M2
LEFT VENTRICLE DIASTOLIC VOLUME: 129.5 ML
LEFT VENTRICLE MASS INDEX: 157 G/M2
LEFT VENTRICLE SYSTOLIC VOLUME INDEX: 45.3 ML/M2
LEFT VENTRICLE SYSTOLIC VOLUME: 91.23 ML
LEFT VENTRICULAR INTERNAL DIMENSION IN DIASTOLE: 5.2 CM (ref 3.5–6)
LEFT VENTRICULAR MASS: 316.02 G
LEUKOCYTE ESTERASE UR QL STRIP: ABNORMAL
LV LATERAL E/E' RATIO: 19.5 M/S
LV SEPTAL E/E' RATIO: 39 M/S
LYMPHOCYTES # BLD AUTO: 1.5 K/UL (ref 1–4.8)
LYMPHOCYTES NFR BLD: 14.2 % (ref 18–48)
MAGNESIUM SERPL-MCNC: 2 MG/DL (ref 1.6–2.6)
MCH RBC QN AUTO: 32.4 PG (ref 27–31)
MCHC RBC AUTO-ENTMCNC: 31.3 G/DL (ref 32–36)
MCV RBC AUTO: 104 FL (ref 82–98)
MICROSCOPIC COMMENT: ABNORMAL
MONOCYTES # BLD AUTO: 0.6 K/UL (ref 0.3–1)
MONOCYTES NFR BLD: 5.4 % (ref 4–15)
MV A" WAVE DURATION": 12.18 MSEC
MV PEAK A VEL: 1.25 M/S
MV PEAK E VEL: 1.17 M/S
NEUTROPHILS # BLD AUTO: 8.1 K/UL (ref 1.8–7.7)
NEUTROPHILS NFR BLD: 76.6 % (ref 38–73)
NITRITE UR QL STRIP: NEGATIVE
NRBC BLD-RTO: 0 /100 WBC
PH UR STRIP: 6 [PH] (ref 5–8)
PHOSPHATE SERPL-MCNC: 3.4 MG/DL (ref 2.7–4.5)
PISA TR MAX VEL: 3.3 M/S
PLATELET # BLD AUTO: 166 K/UL (ref 150–350)
PMV BLD AUTO: 12.5 FL (ref 9.2–12.9)
POTASSIUM SERPL-SCNC: 4.2 MMOL/L (ref 3.5–5.1)
PROT SERPL-MCNC: 7.4 G/DL (ref 6–8.4)
PROT UR QL STRIP: ABNORMAL
PROTHROMBIN TIME: 11.5 SEC (ref 9–12.5)
PULM VEIN S/D RATIO: 1.16
PV PEAK D VEL: 0.44 M/S
PV PEAK S VEL: 0.51 M/S
PV PEAK VELOCITY: 0.86 CM/S
RA MAJOR: 5.07 CM
RA WIDTH: 4.16 CM
RBC # BLD AUTO: 3.4 M/UL (ref 4–5.4)
RBC #/AREA URNS HPF: 2 /HPF (ref 0–4)
RIGHT VENTRICULAR END-DIASTOLIC DIMENSION: 3.37 CM
RV TISSUE DOPPLER FREE WALL SYSTOLIC VELOCITY 1 (APICAL 4 CHAMBER VIEW): 4.57 CM/S
SODIUM SERPL-SCNC: 143 MMOL/L (ref 136–145)
SP GR UR STRIP: 1.02 (ref 1–1.03)
SQUAMOUS #/AREA URNS HPF: 16 /HPF
TDI LATERAL: 0.06 M/S
TDI SEPTAL: 0.03 M/S
TDI: 0.05 M/S
TR MAX PG: 44 MMHG
TRICUSPID ANNULAR PLANE SYSTOLIC EXCURSION: 1.12 CM
TROPONIN I SERPL DL<=0.01 NG/ML-MCNC: 0.05 NG/ML (ref 0–0.03)
URN SPEC COLLECT METH UR: ABNORMAL
UROBILINOGEN UR STRIP-ACNC: NEGATIVE EU/DL
WBC # BLD AUTO: 10.6 K/UL (ref 3.9–12.7)
WBC #/AREA URNS HPF: 19 /HPF (ref 0–5)
WBC CLUMPS URNS QL MICRO: ABNORMAL

## 2021-01-07 PROCEDURE — 96365 THER/PROPH/DIAG IV INF INIT: CPT | Mod: 59 | Performed by: EMERGENCY MEDICINE

## 2021-01-07 PROCEDURE — 83735 ASSAY OF MAGNESIUM: CPT

## 2021-01-07 PROCEDURE — 25000003 PHARM REV CODE 250: Performed by: HOSPITALIST

## 2021-01-07 PROCEDURE — 63600175 PHARM REV CODE 636 W HCPCS: Performed by: HOSPITALIST

## 2021-01-07 PROCEDURE — G0408 INPT/TELE FOLLOW UP 35: HCPCS | Mod: 95,,, | Performed by: NURSE PRACTITIONER

## 2021-01-07 PROCEDURE — G0378 HOSPITAL OBSERVATION PER HR: HCPCS

## 2021-01-07 PROCEDURE — 84484 ASSAY OF TROPONIN QUANT: CPT

## 2021-01-07 PROCEDURE — 97803 MED NUTRITION INDIV SUBSEQ: CPT | Mod: 59

## 2021-01-07 PROCEDURE — 85025 COMPLETE CBC W/AUTO DIFF WBC: CPT

## 2021-01-07 PROCEDURE — 82607 VITAMIN B-12: CPT

## 2021-01-07 PROCEDURE — 97535 SELF CARE MNGMENT TRAINING: CPT

## 2021-01-07 PROCEDURE — 80053 COMPREHEN METABOLIC PANEL: CPT

## 2021-01-07 PROCEDURE — 84100 ASSAY OF PHOSPHORUS: CPT

## 2021-01-07 PROCEDURE — 82553 CREATINE MB FRACTION: CPT

## 2021-01-07 PROCEDURE — 92610 EVALUATE SWALLOWING FUNCTION: CPT

## 2021-01-07 PROCEDURE — 25500020 PHARM REV CODE 255: Performed by: INTERNAL MEDICINE

## 2021-01-07 PROCEDURE — 85610 PROTHROMBIN TIME: CPT

## 2021-01-07 PROCEDURE — 85730 THROMBOPLASTIN TIME PARTIAL: CPT

## 2021-01-07 PROCEDURE — 97161 PT EVAL LOW COMPLEX 20 MIN: CPT | Performed by: PHYSICAL THERAPIST

## 2021-01-07 PROCEDURE — 97165 OT EVAL LOW COMPLEX 30 MIN: CPT

## 2021-01-07 PROCEDURE — 94761 N-INVAS EAR/PLS OXIMETRY MLT: CPT

## 2021-01-07 PROCEDURE — 96372 THER/PROPH/DIAG INJ SC/IM: CPT | Mod: 59 | Performed by: EMERGENCY MEDICINE

## 2021-01-07 PROCEDURE — 99900035 HC TECH TIME PER 15 MIN (STAT)

## 2021-01-07 PROCEDURE — G0408 PR TELHEALTH INPT CONSULT 35MIN: ICD-10-PCS | Mod: 95,,, | Performed by: NURSE PRACTITIONER

## 2021-01-07 PROCEDURE — 81000 URINALYSIS NONAUTO W/SCOPE: CPT

## 2021-01-07 PROCEDURE — 82550 ASSAY OF CK (CPK): CPT

## 2021-01-07 RX ORDER — ATORVASTATIN CALCIUM 40 MG/1
40 TABLET, FILM COATED ORAL DAILY
Qty: 90 TABLET | Refills: 3 | Status: SHIPPED | OUTPATIENT
Start: 2021-01-08 | End: 2022-01-01

## 2021-01-07 RX ADMIN — TIMOLOL MALEATE 1 DROP: 5 SOLUTION OPHTHALMIC at 09:01

## 2021-01-07 RX ADMIN — CLOPIDOGREL 75 MG: 75 TABLET, FILM COATED ORAL at 08:01

## 2021-01-07 RX ADMIN — ALLOPURINOL 100 MG: 100 TABLET ORAL at 08:01

## 2021-01-07 RX ADMIN — ATORVASTATIN CALCIUM 40 MG: 40 TABLET, FILM COATED ORAL at 08:01

## 2021-01-07 RX ADMIN — ESCITALOPRAM OXALATE 20 MG: 10 TABLET ORAL at 08:01

## 2021-01-07 RX ADMIN — HUMAN ALBUMIN MICROSPHERES AND PERFLUTREN 0.11 MG: 10; .22 INJECTION, SOLUTION INTRAVENOUS at 08:01

## 2021-01-07 RX ADMIN — TIMOLOL MALEATE 1 DROP: 5 SOLUTION OPHTHALMIC at 08:01

## 2021-01-07 RX ADMIN — ASPIRIN 81 MG: 81 TABLET, COATED ORAL at 08:01

## 2021-01-07 RX ADMIN — ENOXAPARIN SODIUM 40 MG: 40 INJECTION SUBCUTANEOUS at 06:01

## 2021-01-07 RX ADMIN — FUROSEMIDE 20 MG: 20 TABLET ORAL at 08:01

## 2021-01-07 RX ADMIN — CEFTRIAXONE 1 G: 1 INJECTION, SOLUTION INTRAVENOUS at 08:01

## 2021-01-08 VITALS
TEMPERATURE: 98 F | HEIGHT: 63 IN | RESPIRATION RATE: 16 BRPM | DIASTOLIC BLOOD PRESSURE: 80 MMHG | OXYGEN SATURATION: 97 % | HEART RATE: 69 BPM | SYSTOLIC BLOOD PRESSURE: 137 MMHG | BODY MASS INDEX: 38.98 KG/M2 | WEIGHT: 220 LBS

## 2021-01-08 LAB
ALBUMIN SERPL BCP-MCNC: 3.1 G/DL (ref 3.5–5.2)
ALP SERPL-CCNC: 81 U/L (ref 55–135)
ALT SERPL W/O P-5'-P-CCNC: 10 U/L (ref 10–44)
ANION GAP SERPL CALC-SCNC: 9 MMOL/L (ref 8–16)
AST SERPL-CCNC: 21 U/L (ref 10–40)
BASOPHILS # BLD AUTO: 0.04 K/UL (ref 0–0.2)
BASOPHILS NFR BLD: 0.3 % (ref 0–1.9)
BILIRUB SERPL-MCNC: 0.3 MG/DL (ref 0.1–1)
BUN SERPL-MCNC: 41 MG/DL (ref 8–23)
CALCIUM SERPL-MCNC: 9.5 MG/DL (ref 8.7–10.5)
CHLORIDE SERPL-SCNC: 105 MMOL/L (ref 95–110)
CO2 SERPL-SCNC: 29 MMOL/L (ref 23–29)
CREAT SERPL-MCNC: 1.4 MG/DL (ref 0.5–1.4)
DIFFERENTIAL METHOD: ABNORMAL
EOSINOPHIL # BLD AUTO: 0.4 K/UL (ref 0–0.5)
EOSINOPHIL NFR BLD: 3 % (ref 0–8)
ERYTHROCYTE [DISTWIDTH] IN BLOOD BY AUTOMATED COUNT: 14.1 % (ref 11.5–14.5)
EST. GFR  (AFRICAN AMERICAN): 39 ML/MIN/1.73 M^2
EST. GFR  (NON AFRICAN AMERICAN): 34 ML/MIN/1.73 M^2
GLUCOSE SERPL-MCNC: 95 MG/DL (ref 70–110)
HCT VFR BLD AUTO: 32.9 % (ref 37–48.5)
HGB BLD-MCNC: 10.4 G/DL (ref 12–16)
IMM GRANULOCYTES # BLD AUTO: 0.04 K/UL (ref 0–0.04)
IMM GRANULOCYTES NFR BLD AUTO: 0.3 % (ref 0–0.5)
LYMPHOCYTES # BLD AUTO: 2.2 K/UL (ref 1–4.8)
LYMPHOCYTES NFR BLD: 18.5 % (ref 18–48)
MCH RBC QN AUTO: 32.4 PG (ref 27–31)
MCHC RBC AUTO-ENTMCNC: 31.6 G/DL (ref 32–36)
MCV RBC AUTO: 103 FL (ref 82–98)
MONOCYTES # BLD AUTO: 0.8 K/UL (ref 0.3–1)
MONOCYTES NFR BLD: 6.5 % (ref 4–15)
NEUTROPHILS # BLD AUTO: 8.4 K/UL (ref 1.8–7.7)
NEUTROPHILS NFR BLD: 71.4 % (ref 38–73)
NRBC BLD-RTO: 0 /100 WBC
PLATELET # BLD AUTO: 172 K/UL (ref 150–350)
PMV BLD AUTO: 12.6 FL (ref 9.2–12.9)
POTASSIUM SERPL-SCNC: 4.4 MMOL/L (ref 3.5–5.1)
PROT SERPL-MCNC: 7 G/DL (ref 6–8.4)
RBC # BLD AUTO: 3.21 M/UL (ref 4–5.4)
SODIUM SERPL-SCNC: 143 MMOL/L (ref 136–145)
VIT B12 SERPL-MCNC: 1269 PG/ML (ref 210–950)
WBC # BLD AUTO: 11.77 K/UL (ref 3.9–12.7)

## 2021-01-08 PROCEDURE — 36415 COLL VENOUS BLD VENIPUNCTURE: CPT

## 2021-01-08 PROCEDURE — 85025 COMPLETE CBC W/AUTO DIFF WBC: CPT

## 2021-01-08 PROCEDURE — G0378 HOSPITAL OBSERVATION PER HR: HCPCS

## 2021-01-08 PROCEDURE — 96376 TX/PRO/DX INJ SAME DRUG ADON: CPT | Performed by: EMERGENCY MEDICINE

## 2021-01-08 PROCEDURE — 94761 N-INVAS EAR/PLS OXIMETRY MLT: CPT

## 2021-01-08 PROCEDURE — 25000003 PHARM REV CODE 250: Performed by: HOSPITALIST

## 2021-01-08 PROCEDURE — 63600175 PHARM REV CODE 636 W HCPCS: Performed by: HOSPITALIST

## 2021-01-08 PROCEDURE — 80053 COMPREHEN METABOLIC PANEL: CPT

## 2021-01-08 RX ORDER — CARVEDILOL 12.5 MG/1
12.5 TABLET ORAL 2 TIMES DAILY
Status: DISCONTINUED | OUTPATIENT
Start: 2021-01-08 | End: 2021-01-08

## 2021-01-08 RX ORDER — CARVEDILOL 6.25 MG/1
6.25 TABLET ORAL 2 TIMES DAILY
Status: DISCONTINUED | OUTPATIENT
Start: 2021-01-08 | End: 2021-01-08 | Stop reason: HOSPADM

## 2021-01-08 RX ORDER — LOSARTAN POTASSIUM 25 MG/1
25 TABLET ORAL DAILY
Status: DISCONTINUED | OUTPATIENT
Start: 2021-01-08 | End: 2021-01-08 | Stop reason: HOSPADM

## 2021-01-08 RX ORDER — ISOSORBIDE MONONITRATE 30 MG/1
60 TABLET, EXTENDED RELEASE ORAL DAILY
Status: DISCONTINUED | OUTPATIENT
Start: 2021-01-08 | End: 2021-01-08 | Stop reason: HOSPADM

## 2021-01-08 RX ORDER — HYDRALAZINE HYDROCHLORIDE 25 MG/1
100 TABLET, FILM COATED ORAL EVERY 8 HOURS
Status: DISCONTINUED | OUTPATIENT
Start: 2021-01-08 | End: 2021-01-08 | Stop reason: HOSPADM

## 2021-01-08 RX ADMIN — ESCITALOPRAM OXALATE 20 MG: 10 TABLET ORAL at 08:01

## 2021-01-08 RX ADMIN — ASPIRIN 81 MG: 81 TABLET, COATED ORAL at 08:01

## 2021-01-08 RX ADMIN — HYDRALAZINE HYDROCHLORIDE 100 MG: 25 TABLET, FILM COATED ORAL at 03:01

## 2021-01-08 RX ADMIN — ALLOPURINOL 100 MG: 100 TABLET ORAL at 08:01

## 2021-01-08 RX ADMIN — APIXABAN 2.5 MG: 2.5 TABLET, FILM COATED ORAL at 08:01

## 2021-01-08 RX ADMIN — TIMOLOL MALEATE 1 DROP: 5 SOLUTION OPHTHALMIC at 08:01

## 2021-01-08 RX ADMIN — ATORVASTATIN CALCIUM 40 MG: 40 TABLET, FILM COATED ORAL at 09:01

## 2021-01-08 RX ADMIN — FUROSEMIDE 20 MG: 20 TABLET ORAL at 08:01

## 2021-01-08 RX ADMIN — LOSARTAN POTASSIUM 25 MG: 25 TABLET, FILM COATED ORAL at 08:01

## 2021-01-08 RX ADMIN — CEFTRIAXONE 1 G: 1 INJECTION, SOLUTION INTRAVENOUS at 09:01

## 2021-01-08 RX ADMIN — ISOSORBIDE MONONITRATE 60 MG: 30 TABLET, EXTENDED RELEASE ORAL at 08:01

## 2021-01-10 ENCOUNTER — TELEPHONE (OUTPATIENT)
Dept: INTERNAL MEDICINE | Facility: CLINIC | Age: 86
End: 2021-01-10

## 2021-01-12 ENCOUNTER — DOCUMENT SCAN (OUTPATIENT)
Dept: HOME HEALTH SERVICES | Facility: HOSPITAL | Age: 86
End: 2021-01-12
Payer: MEDICARE

## 2021-01-12 LAB — POCT GLUCOSE: 148 MG/DL (ref 70–110)

## 2021-01-14 ENCOUNTER — OFFICE VISIT (OUTPATIENT)
Dept: CARDIOLOGY | Facility: CLINIC | Age: 86
End: 2021-01-14
Payer: MEDICARE

## 2021-01-14 ENCOUNTER — PATIENT OUTREACH (OUTPATIENT)
Dept: ADMINISTRATIVE | Facility: OTHER | Age: 86
End: 2021-01-14

## 2021-01-14 ENCOUNTER — TELEPHONE (OUTPATIENT)
Dept: OPTOMETRY | Facility: CLINIC | Age: 86
End: 2021-01-14

## 2021-01-14 VITALS
BODY MASS INDEX: 30.56 KG/M2 | DIASTOLIC BLOOD PRESSURE: 68 MMHG | SYSTOLIC BLOOD PRESSURE: 122 MMHG | WEIGHT: 172.5 LBS | HEART RATE: 69 BPM | HEIGHT: 63 IN

## 2021-01-14 DIAGNOSIS — E11.22 TYPE 2 DIABETES MELLITUS WITH STAGE 3B CHRONIC KIDNEY DISEASE, WITHOUT LONG-TERM CURRENT USE OF INSULIN: ICD-10-CM

## 2021-01-14 DIAGNOSIS — Z79.01 CHRONIC ANTICOAGULATION: ICD-10-CM

## 2021-01-14 DIAGNOSIS — G45.9 TIA (TRANSIENT ISCHEMIC ATTACK): ICD-10-CM

## 2021-01-14 DIAGNOSIS — I50.22 CHRONIC SYSTOLIC CONGESTIVE HEART FAILURE: ICD-10-CM

## 2021-01-14 DIAGNOSIS — N18.32 TYPE 2 DIABETES MELLITUS WITH STAGE 3B CHRONIC KIDNEY DISEASE, WITHOUT LONG-TERM CURRENT USE OF INSULIN: ICD-10-CM

## 2021-01-14 DIAGNOSIS — I63.531 ACUTE ISCHEMIC RIGHT POSTERIOR CEREBRAL ARTERY (PCA) STROKE: Primary | ICD-10-CM

## 2021-01-14 DIAGNOSIS — Z95.2 S/P TAVR (TRANSCATHETER AORTIC VALVE REPLACEMENT): ICD-10-CM

## 2021-01-14 DIAGNOSIS — Z71.89 EDUCATED ABOUT COVID-19 VIRUS INFECTION: ICD-10-CM

## 2021-01-14 DIAGNOSIS — I10 ESSENTIAL HYPERTENSION: ICD-10-CM

## 2021-01-14 DIAGNOSIS — E78.00 PURE HYPERCHOLESTEROLEMIA: ICD-10-CM

## 2021-01-14 PROCEDURE — 99215 OFFICE O/P EST HI 40 MIN: CPT | Mod: PBBFAC,PO | Performed by: INTERNAL MEDICINE

## 2021-01-14 PROCEDURE — 99999 PR PBB SHADOW E&M-EST. PATIENT-LVL V: CPT | Mod: PBBFAC,,, | Performed by: INTERNAL MEDICINE

## 2021-01-14 PROCEDURE — 99214 OFFICE O/P EST MOD 30 MIN: CPT | Mod: S$PBB,,, | Performed by: INTERNAL MEDICINE

## 2021-01-14 PROCEDURE — 99214 PR OFFICE/OUTPT VISIT, EST, LEVL IV, 30-39 MIN: ICD-10-PCS | Mod: S$PBB,,, | Performed by: INTERNAL MEDICINE

## 2021-01-14 PROCEDURE — 99999 PR PBB SHADOW E&M-EST. PATIENT-LVL V: ICD-10-PCS | Mod: PBBFAC,,, | Performed by: INTERNAL MEDICINE

## 2021-01-28 ENCOUNTER — DOCUMENT SCAN (OUTPATIENT)
Dept: HOME HEALTH SERVICES | Facility: HOSPITAL | Age: 86
End: 2021-01-28
Payer: MEDICARE

## 2021-02-01 ENCOUNTER — DOCUMENT SCAN (OUTPATIENT)
Dept: HOME HEALTH SERVICES | Facility: HOSPITAL | Age: 86
End: 2021-02-01
Payer: MEDICARE

## 2021-02-02 ENCOUNTER — OFFICE VISIT (OUTPATIENT)
Dept: PODIATRY | Facility: CLINIC | Age: 86
End: 2021-02-02
Payer: MEDICARE

## 2021-02-02 VITALS — BODY MASS INDEX: 30.59 KG/M2 | HEIGHT: 63 IN | WEIGHT: 172.63 LBS

## 2021-02-02 DIAGNOSIS — B35.1 ONYCHOMYCOSIS DUE TO DERMATOPHYTE: ICD-10-CM

## 2021-02-02 DIAGNOSIS — E11.21 TYPE 2 DIABETES MELLITUS WITH DIABETIC NEPHROPATHY, WITHOUT LONG-TERM CURRENT USE OF INSULIN: Primary | ICD-10-CM

## 2021-02-02 PROCEDURE — 99999 PR PBB SHADOW E&M-EST. PATIENT-LVL III: CPT | Mod: PBBFAC,,, | Performed by: STUDENT IN AN ORGANIZED HEALTH CARE EDUCATION/TRAINING PROGRAM

## 2021-02-02 PROCEDURE — 99499 NO LOS: ICD-10-PCS | Mod: S$PBB,,, | Performed by: STUDENT IN AN ORGANIZED HEALTH CARE EDUCATION/TRAINING PROGRAM

## 2021-02-02 PROCEDURE — 99213 OFFICE O/P EST LOW 20 MIN: CPT | Mod: PBBFAC,PN,25 | Performed by: STUDENT IN AN ORGANIZED HEALTH CARE EDUCATION/TRAINING PROGRAM

## 2021-02-02 PROCEDURE — 11721 ROUTINE FOOT CARE: ICD-10-PCS | Mod: Q8,S$PBB,, | Performed by: STUDENT IN AN ORGANIZED HEALTH CARE EDUCATION/TRAINING PROGRAM

## 2021-02-02 PROCEDURE — 99999 PR PBB SHADOW E&M-EST. PATIENT-LVL III: ICD-10-PCS | Mod: PBBFAC,,, | Performed by: STUDENT IN AN ORGANIZED HEALTH CARE EDUCATION/TRAINING PROGRAM

## 2021-02-02 PROCEDURE — 11721 DEBRIDE NAIL 6 OR MORE: CPT | Mod: PBBFAC,PN | Performed by: STUDENT IN AN ORGANIZED HEALTH CARE EDUCATION/TRAINING PROGRAM

## 2021-02-02 PROCEDURE — 99499 UNLISTED E&M SERVICE: CPT | Mod: S$PBB,,, | Performed by: STUDENT IN AN ORGANIZED HEALTH CARE EDUCATION/TRAINING PROGRAM

## 2021-02-04 ENCOUNTER — HOME CARE VISIT (OUTPATIENT)
Dept: NEUROLOGY | Facility: HOSPITAL | Age: 86
End: 2021-02-04

## 2021-02-04 VITALS
DIASTOLIC BLOOD PRESSURE: 62 MMHG | SYSTOLIC BLOOD PRESSURE: 128 MMHG | OXYGEN SATURATION: 98 % | WEIGHT: 165 LBS | HEART RATE: 68 BPM | BODY MASS INDEX: 29.23 KG/M2 | RESPIRATION RATE: 20 BRPM

## 2021-03-04 ENCOUNTER — OFFICE VISIT (OUTPATIENT)
Dept: NEUROLOGY | Facility: CLINIC | Age: 86
End: 2021-03-04
Payer: MEDICARE

## 2021-03-04 ENCOUNTER — HOSPITAL ENCOUNTER (OUTPATIENT)
Dept: CARDIOLOGY | Facility: CLINIC | Age: 86
Discharge: HOME | End: 2021-03-04
Payer: MEDICARE

## 2021-03-04 DIAGNOSIS — G45.9 TIA (TRANSIENT ISCHEMIC ATTACK): ICD-10-CM

## 2021-03-04 DIAGNOSIS — I25.10 CORONARY ARTERY DISEASE INVOLVING NATIVE CORONARY ARTERY OF NATIVE HEART WITHOUT ANGINA PECTORIS: ICD-10-CM

## 2021-03-04 DIAGNOSIS — I50.20 HFREF (HEART FAILURE WITH REDUCED EJECTION FRACTION): ICD-10-CM

## 2021-03-04 DIAGNOSIS — E11.22 TYPE 2 DIABETES MELLITUS WITH STAGE 3 CHRONIC KIDNEY DISEASE, WITHOUT LONG-TERM CURRENT USE OF INSULIN, UNSPECIFIED WHETHER STAGE 3A OR 3B CKD: ICD-10-CM

## 2021-03-04 DIAGNOSIS — E78.5 HYPERLIPIDEMIA, UNSPECIFIED HYPERLIPIDEMIA TYPE: ICD-10-CM

## 2021-03-04 DIAGNOSIS — N18.30 TYPE 2 DIABETES MELLITUS WITH STAGE 3 CHRONIC KIDNEY DISEASE, WITHOUT LONG-TERM CURRENT USE OF INSULIN, UNSPECIFIED WHETHER STAGE 3A OR 3B CKD: ICD-10-CM

## 2021-03-04 DIAGNOSIS — I10 ESSENTIAL HYPERTENSION: ICD-10-CM

## 2021-03-04 DIAGNOSIS — I63.431 EMBOLIC STROKE INVOLVING RIGHT POSTERIOR CEREBRAL ARTERY: ICD-10-CM

## 2021-03-04 PROCEDURE — 99999 PR PBB SHADOW E&M-EST. PATIENT-LVL I: CPT | Mod: PBBFAC,GC,, | Performed by: STUDENT IN AN ORGANIZED HEALTH CARE EDUCATION/TRAINING PROGRAM

## 2021-03-04 PROCEDURE — 99214 PR OFFICE/OUTPT VISIT, EST, LEVL IV, 30-39 MIN: ICD-10-PCS | Mod: S$PBB,,, | Performed by: PSYCHIATRY & NEUROLOGY

## 2021-03-04 PROCEDURE — 99214 OFFICE O/P EST MOD 30 MIN: CPT | Mod: S$PBB,,, | Performed by: PSYCHIATRY & NEUROLOGY

## 2021-03-04 PROCEDURE — 93010 ELECTROCARDIOGRAM REPORT: CPT | Mod: S$PBB,,, | Performed by: INTERNAL MEDICINE

## 2021-03-04 PROCEDURE — 99999 PR PBB SHADOW E&M-EST. PATIENT-LVL I: ICD-10-PCS | Mod: PBBFAC,GC,, | Performed by: STUDENT IN AN ORGANIZED HEALTH CARE EDUCATION/TRAINING PROGRAM

## 2021-03-04 PROCEDURE — 93010 EKG 12-LEAD: ICD-10-PCS | Mod: S$PBB,,, | Performed by: INTERNAL MEDICINE

## 2021-03-04 PROCEDURE — 99211 OFF/OP EST MAY X REQ PHY/QHP: CPT | Mod: PBBFAC,25 | Performed by: STUDENT IN AN ORGANIZED HEALTH CARE EDUCATION/TRAINING PROGRAM

## 2021-03-04 PROCEDURE — 93005 ELECTROCARDIOGRAM TRACING: CPT | Mod: PBBFAC | Performed by: INTERNAL MEDICINE

## 2021-03-05 PROBLEM — I63.431 EMBOLIC STROKE INVOLVING RIGHT POSTERIOR CEREBRAL ARTERY: Status: ACTIVE | Noted: 2021-01-06

## 2021-03-15 DIAGNOSIS — I10 ESSENTIAL HYPERTENSION: ICD-10-CM

## 2021-03-15 RX ORDER — CARVEDILOL 12.5 MG/1
12.5 TABLET ORAL 2 TIMES DAILY
Qty: 180 TABLET | Refills: 0 | Status: SHIPPED | OUTPATIENT
Start: 2021-03-15 | End: 2021-06-21

## 2021-03-17 ENCOUNTER — HOME CARE VISIT (OUTPATIENT)
Dept: NEUROLOGY | Facility: HOSPITAL | Age: 86
End: 2021-03-17

## 2021-03-17 VITALS
DIASTOLIC BLOOD PRESSURE: 58 MMHG | HEART RATE: 85 BPM | OXYGEN SATURATION: 96 % | RESPIRATION RATE: 20 BRPM | SYSTOLIC BLOOD PRESSURE: 118 MMHG

## 2021-03-25 ENCOUNTER — PES CALL (OUTPATIENT)
Dept: ADMINISTRATIVE | Facility: CLINIC | Age: 86
End: 2021-03-25

## 2021-05-03 ENCOUNTER — HOME CARE VISIT (OUTPATIENT)
Dept: NEUROLOGY | Facility: HOSPITAL | Age: 86
End: 2021-05-03

## 2021-05-03 ENCOUNTER — PATIENT OUTREACH (OUTPATIENT)
Dept: ADMINISTRATIVE | Facility: OTHER | Age: 86
End: 2021-05-03

## 2021-05-04 ENCOUNTER — OFFICE VISIT (OUTPATIENT)
Dept: PODIATRY | Facility: CLINIC | Age: 86
End: 2021-05-04
Payer: MEDICARE

## 2021-05-04 VITALS — BODY MASS INDEX: 29.21 KG/M2 | HEIGHT: 63 IN | WEIGHT: 164.88 LBS

## 2021-05-04 DIAGNOSIS — E11.21 TYPE 2 DIABETES MELLITUS WITH DIABETIC NEPHROPATHY, WITHOUT LONG-TERM CURRENT USE OF INSULIN: Primary | ICD-10-CM

## 2021-05-04 DIAGNOSIS — B35.1 ONYCHOMYCOSIS DUE TO DERMATOPHYTE: ICD-10-CM

## 2021-05-04 PROCEDURE — 99999 PR PBB SHADOW E&M-EST. PATIENT-LVL III: CPT | Mod: PBBFAC,,, | Performed by: STUDENT IN AN ORGANIZED HEALTH CARE EDUCATION/TRAINING PROGRAM

## 2021-05-04 PROCEDURE — 99213 OFFICE O/P EST LOW 20 MIN: CPT | Mod: PBBFAC,PN | Performed by: STUDENT IN AN ORGANIZED HEALTH CARE EDUCATION/TRAINING PROGRAM

## 2021-05-04 PROCEDURE — 99214 PR OFFICE/OUTPT VISIT, EST, LEVL IV, 30-39 MIN: ICD-10-PCS | Mod: 25,S$PBB,, | Performed by: STUDENT IN AN ORGANIZED HEALTH CARE EDUCATION/TRAINING PROGRAM

## 2021-05-04 PROCEDURE — 99999 PR PBB SHADOW E&M-EST. PATIENT-LVL III: ICD-10-PCS | Mod: PBBFAC,,, | Performed by: STUDENT IN AN ORGANIZED HEALTH CARE EDUCATION/TRAINING PROGRAM

## 2021-05-04 PROCEDURE — 11721 ROUTINE FOOT CARE: ICD-10-PCS | Mod: Q8,S$PBB,, | Performed by: STUDENT IN AN ORGANIZED HEALTH CARE EDUCATION/TRAINING PROGRAM

## 2021-05-04 PROCEDURE — 11721 DEBRIDE NAIL 6 OR MORE: CPT | Mod: PBBFAC,PN | Performed by: STUDENT IN AN ORGANIZED HEALTH CARE EDUCATION/TRAINING PROGRAM

## 2021-05-04 PROCEDURE — 99214 OFFICE O/P EST MOD 30 MIN: CPT | Mod: 25,S$PBB,, | Performed by: STUDENT IN AN ORGANIZED HEALTH CARE EDUCATION/TRAINING PROGRAM

## 2021-05-10 ENCOUNTER — TELEPHONE (OUTPATIENT)
Dept: OPHTHALMOLOGY | Facility: CLINIC | Age: 86
End: 2021-05-10

## 2021-05-20 ENCOUNTER — OFFICE VISIT (OUTPATIENT)
Dept: HOME HEALTH SERVICES | Facility: CLINIC | Age: 86
End: 2021-05-20
Payer: MEDICARE

## 2021-05-20 VITALS
TEMPERATURE: 98 F | SYSTOLIC BLOOD PRESSURE: 106 MMHG | OXYGEN SATURATION: 97 % | BODY MASS INDEX: 28.7 KG/M2 | HEIGHT: 63 IN | WEIGHT: 162 LBS | DIASTOLIC BLOOD PRESSURE: 60 MMHG | HEART RATE: 65 BPM

## 2021-05-20 DIAGNOSIS — R26.9 ABNORMALITY OF GAIT AND MOBILITY: ICD-10-CM

## 2021-05-20 DIAGNOSIS — I25.118 CORONARY ARTERY DISEASE OF NATIVE ARTERY OF NATIVE HEART WITH STABLE ANGINA PECTORIS: ICD-10-CM

## 2021-05-20 DIAGNOSIS — E78.5 HYPERLIPIDEMIA, UNSPECIFIED HYPERLIPIDEMIA TYPE: ICD-10-CM

## 2021-05-20 DIAGNOSIS — Z00.00 ENCOUNTER FOR PREVENTIVE HEALTH EXAMINATION: Primary | ICD-10-CM

## 2021-05-20 DIAGNOSIS — E11.22 TYPE 2 DIABETES MELLITUS WITH STAGE 3B CHRONIC KIDNEY DISEASE, WITHOUT LONG-TERM CURRENT USE OF INSULIN: ICD-10-CM

## 2021-05-20 DIAGNOSIS — I70.0 AORTIC ATHEROSCLEROSIS: ICD-10-CM

## 2021-05-20 DIAGNOSIS — N18.32 TYPE 2 DIABETES MELLITUS WITH STAGE 3B CHRONIC KIDNEY DISEASE, WITHOUT LONG-TERM CURRENT USE OF INSULIN: ICD-10-CM

## 2021-05-20 DIAGNOSIS — I11.0 HYPERTENSIVE HEART DISEASE WITH HEART FAILURE: ICD-10-CM

## 2021-05-20 DIAGNOSIS — Z91.81 HISTORY OF FALLING: ICD-10-CM

## 2021-05-20 DIAGNOSIS — N25.81 HYPERPARATHYROIDISM DUE TO RENAL INSUFFICIENCY: ICD-10-CM

## 2021-05-20 DIAGNOSIS — Z99.89 DEPENDENCE ON OTHER ENABLING MACHINES AND DEVICES: ICD-10-CM

## 2021-05-20 DIAGNOSIS — Z95.2 S/P TAVR (TRANSCATHETER AORTIC VALVE REPLACEMENT): ICD-10-CM

## 2021-05-20 DIAGNOSIS — I50.42 CHRONIC COMBINED SYSTOLIC AND DIASTOLIC CONGESTIVE HEART FAILURE: ICD-10-CM

## 2021-05-20 PROBLEM — N17.9 AKI (ACUTE KIDNEY INJURY): Status: RESOLVED | Noted: 2020-12-04 | Resolved: 2021-05-20

## 2021-05-20 PROCEDURE — G0439 PPPS, SUBSEQ VISIT: HCPCS | Mod: S$GLB,,, | Performed by: NURSE PRACTITIONER

## 2021-05-20 PROCEDURE — G0439 PR MEDICARE ANNUAL WELLNESS SUBSEQUENT VISIT: ICD-10-PCS | Mod: S$GLB,,, | Performed by: NURSE PRACTITIONER

## 2021-07-14 ENCOUNTER — LAB VISIT (OUTPATIENT)
Dept: LAB | Facility: HOSPITAL | Age: 86
End: 2021-07-14
Payer: MEDICARE

## 2021-07-14 DIAGNOSIS — Z79.01 CHRONIC ANTICOAGULATION: ICD-10-CM

## 2021-07-14 DIAGNOSIS — I25.10 CORONARY ARTERY DISEASE INVOLVING NATIVE CORONARY ARTERY OF NATIVE HEART WITHOUT ANGINA PECTORIS: ICD-10-CM

## 2021-07-14 DIAGNOSIS — I50.32 CHRONIC DIASTOLIC CONGESTIVE HEART FAILURE: ICD-10-CM

## 2021-07-14 DIAGNOSIS — Z95.1 S/P CABG X 3: ICD-10-CM

## 2021-07-14 LAB
ERYTHROCYTE [DISTWIDTH] IN BLOOD BY AUTOMATED COUNT: 14.6 % (ref 11.5–14.5)
HCT VFR BLD AUTO: 29.1 % (ref 37–48.5)
HGB BLD-MCNC: 9.2 G/DL (ref 12–16)
MCH RBC QN AUTO: 33.3 PG (ref 27–31)
MCHC RBC AUTO-ENTMCNC: 31.6 G/DL (ref 32–36)
MCV RBC AUTO: 105 FL (ref 82–98)
PLATELET # BLD AUTO: 159 K/UL (ref 150–450)
PMV BLD AUTO: 12.9 FL (ref 9.2–12.9)
RBC # BLD AUTO: 2.76 M/UL (ref 4–5.4)
WBC # BLD AUTO: 10.45 K/UL (ref 3.9–12.7)

## 2021-07-14 PROCEDURE — 36415 COLL VENOUS BLD VENIPUNCTURE: CPT | Mod: PO | Performed by: INTERNAL MEDICINE

## 2021-07-14 PROCEDURE — 80061 LIPID PANEL: CPT | Performed by: INTERNAL MEDICINE

## 2021-07-14 PROCEDURE — 85027 COMPLETE CBC AUTOMATED: CPT | Performed by: INTERNAL MEDICINE

## 2021-07-14 PROCEDURE — 80053 COMPREHEN METABOLIC PANEL: CPT | Performed by: INTERNAL MEDICINE

## 2021-07-15 LAB
ALBUMIN SERPL BCP-MCNC: 3 G/DL (ref 3.5–5.2)
ALP SERPL-CCNC: 88 U/L (ref 55–135)
ALT SERPL W/O P-5'-P-CCNC: 7 U/L (ref 10–44)
ANION GAP SERPL CALC-SCNC: 10 MMOL/L (ref 8–16)
AST SERPL-CCNC: 22 U/L (ref 10–40)
BILIRUB SERPL-MCNC: 0.3 MG/DL (ref 0.1–1)
BUN SERPL-MCNC: 60 MG/DL (ref 8–23)
CALCIUM SERPL-MCNC: 9.9 MG/DL (ref 8.7–10.5)
CHLORIDE SERPL-SCNC: 103 MMOL/L (ref 95–110)
CHOLEST SERPL-MCNC: 105 MG/DL (ref 120–199)
CHOLEST/HDLC SERPL: 2.7 {RATIO} (ref 2–5)
CO2 SERPL-SCNC: 26 MMOL/L (ref 23–29)
CREAT SERPL-MCNC: 2.2 MG/DL (ref 0.5–1.4)
EST. GFR  (AFRICAN AMERICAN): 22.4 ML/MIN/1.73 M^2
EST. GFR  (NON AFRICAN AMERICAN): 19.4 ML/MIN/1.73 M^2
GLUCOSE SERPL-MCNC: 130 MG/DL (ref 70–110)
HDLC SERPL-MCNC: 39 MG/DL (ref 40–75)
HDLC SERPL: 37.1 % (ref 20–50)
LDLC SERPL CALC-MCNC: 48.8 MG/DL (ref 63–159)
NONHDLC SERPL-MCNC: 66 MG/DL
POTASSIUM SERPL-SCNC: 4.4 MMOL/L (ref 3.5–5.1)
PROT SERPL-MCNC: 6.9 G/DL (ref 6–8.4)
SODIUM SERPL-SCNC: 139 MMOL/L (ref 136–145)
TRIGL SERPL-MCNC: 86 MG/DL (ref 30–150)

## 2021-07-20 ENCOUNTER — PATIENT OUTREACH (OUTPATIENT)
Dept: ADMINISTRATIVE | Facility: OTHER | Age: 86
End: 2021-07-20

## 2021-07-20 DIAGNOSIS — E11.22 TYPE 2 DIABETES MELLITUS WITH STAGE 3 CHRONIC KIDNEY DISEASE, UNSPECIFIED WHETHER LONG TERM INSULIN USE, UNSPECIFIED WHETHER STAGE 3A OR 3B CKD: Primary | ICD-10-CM

## 2021-07-20 DIAGNOSIS — N18.30 TYPE 2 DIABETES MELLITUS WITH STAGE 3 CHRONIC KIDNEY DISEASE, UNSPECIFIED WHETHER LONG TERM INSULIN USE, UNSPECIFIED WHETHER STAGE 3A OR 3B CKD: Primary | ICD-10-CM

## 2021-07-21 ENCOUNTER — HOSPITAL ENCOUNTER (OUTPATIENT)
Dept: RADIOLOGY | Facility: HOSPITAL | Age: 86
Discharge: HOME | End: 2021-07-21
Attending: NURSE PRACTITIONER
Payer: MEDICARE

## 2021-07-21 ENCOUNTER — PATIENT MESSAGE (OUTPATIENT)
Dept: CARDIOLOGY | Facility: CLINIC | Age: 86
End: 2021-07-21

## 2021-07-21 ENCOUNTER — OFFICE VISIT (OUTPATIENT)
Dept: CARDIOLOGY | Facility: CLINIC | Age: 86
End: 2021-07-21
Payer: MEDICARE

## 2021-07-21 VITALS
HEART RATE: 72 BPM | HEIGHT: 63 IN | BODY MASS INDEX: 29.77 KG/M2 | WEIGHT: 168 LBS | DIASTOLIC BLOOD PRESSURE: 72 MMHG | SYSTOLIC BLOOD PRESSURE: 114 MMHG | OXYGEN SATURATION: 93 %

## 2021-07-21 DIAGNOSIS — I50.42 CHRONIC COMBINED SYSTOLIC AND DIASTOLIC CONGESTIVE HEART FAILURE: ICD-10-CM

## 2021-07-21 DIAGNOSIS — I35.0 AORTIC STENOSIS, SEVERE: ICD-10-CM

## 2021-07-21 DIAGNOSIS — I10 ESSENTIAL HYPERTENSION: ICD-10-CM

## 2021-07-21 DIAGNOSIS — N17.9 AKI (ACUTE KIDNEY INJURY): ICD-10-CM

## 2021-07-21 DIAGNOSIS — I25.118 CORONARY ARTERY DISEASE OF NATIVE ARTERY OF NATIVE HEART WITH STABLE ANGINA PECTORIS: Primary | ICD-10-CM

## 2021-07-21 DIAGNOSIS — D53.9 MACROCYTIC ANEMIA: ICD-10-CM

## 2021-07-21 DIAGNOSIS — Z95.1 S/P CABG X 3: ICD-10-CM

## 2021-07-21 DIAGNOSIS — I70.0 AORTIC ATHEROSCLEROSIS: ICD-10-CM

## 2021-07-21 DIAGNOSIS — I63.431 EMBOLIC STROKE INVOLVING RIGHT POSTERIOR CEREBRAL ARTERY: ICD-10-CM

## 2021-07-21 DIAGNOSIS — Z95.2 S/P TAVR (TRANSCATHETER AORTIC VALVE REPLACEMENT): ICD-10-CM

## 2021-07-21 PROBLEM — I50.20 HFREF (HEART FAILURE WITH REDUCED EJECTION FRACTION): Status: RESOLVED | Noted: 2020-12-03 | Resolved: 2021-07-21

## 2021-07-21 PROCEDURE — 71046 XR CHEST PA AND LATERAL: ICD-10-PCS | Mod: 26,,, | Performed by: RADIOLOGY

## 2021-07-21 PROCEDURE — 99999 PR PBB SHADOW E&M-EST. PATIENT-LVL V: ICD-10-PCS | Mod: PBBFAC,,, | Performed by: NURSE PRACTITIONER

## 2021-07-21 PROCEDURE — 71046 X-RAY EXAM CHEST 2 VIEWS: CPT | Mod: TC,PO

## 2021-07-21 PROCEDURE — 71046 X-RAY EXAM CHEST 2 VIEWS: CPT | Mod: 26,,, | Performed by: RADIOLOGY

## 2021-07-21 PROCEDURE — 99999 PR PBB SHADOW E&M-EST. PATIENT-LVL V: CPT | Mod: PBBFAC,,, | Performed by: NURSE PRACTITIONER

## 2021-07-21 PROCEDURE — 99214 OFFICE O/P EST MOD 30 MIN: CPT | Mod: S$PBB,,, | Performed by: NURSE PRACTITIONER

## 2021-07-21 PROCEDURE — 99214 PR OFFICE/OUTPT VISIT, EST, LEVL IV, 30-39 MIN: ICD-10-PCS | Mod: S$PBB,,, | Performed by: NURSE PRACTITIONER

## 2021-07-21 PROCEDURE — 99215 OFFICE O/P EST HI 40 MIN: CPT | Mod: PBBFAC,PO | Performed by: NURSE PRACTITIONER

## 2021-07-22 ENCOUNTER — TELEPHONE (OUTPATIENT)
Dept: CARDIOLOGY | Facility: CLINIC | Age: 86
End: 2021-07-22

## 2021-07-23 ENCOUNTER — TELEPHONE (OUTPATIENT)
Dept: CARDIOLOGY | Facility: CLINIC | Age: 86
End: 2021-07-23

## 2021-08-03 ENCOUNTER — OFFICE VISIT (OUTPATIENT)
Dept: OPTOMETRY | Facility: CLINIC | Age: 86
End: 2021-08-03
Payer: MEDICARE

## 2021-08-03 DIAGNOSIS — N18.32 TYPE 2 DIABETES MELLITUS WITH STAGE 3B CHRONIC KIDNEY DISEASE, WITHOUT LONG-TERM CURRENT USE OF INSULIN: ICD-10-CM

## 2021-08-03 DIAGNOSIS — Z96.1 PSEUDOPHAKIA OF BOTH EYES: ICD-10-CM

## 2021-08-03 DIAGNOSIS — E11.9 TYPE 2 DIABETES MELLITUS WITHOUT RETINOPATHY: ICD-10-CM

## 2021-08-03 DIAGNOSIS — H02.036: ICD-10-CM

## 2021-08-03 DIAGNOSIS — H47.012 ISCHEMIC OPTIC NEUROPATHY OF LEFT EYE: ICD-10-CM

## 2021-08-03 DIAGNOSIS — H40.1121 PRIMARY OPEN ANGLE GLAUCOMA (POAG) OF LEFT EYE, MILD STAGE: Primary | ICD-10-CM

## 2021-08-03 DIAGNOSIS — E11.22 TYPE 2 DIABETES MELLITUS WITH STAGE 3B CHRONIC KIDNEY DISEASE, WITHOUT LONG-TERM CURRENT USE OF INSULIN: ICD-10-CM

## 2021-08-03 PROCEDURE — 99999 PR PBB SHADOW E&M-EST. PATIENT-LVL III: CPT | Mod: PBBFAC,,, | Performed by: OPTOMETRIST

## 2021-08-03 PROCEDURE — 92014 COMPRE OPH EXAM EST PT 1/>: CPT | Mod: S$PBB,,, | Performed by: OPTOMETRIST

## 2021-08-03 PROCEDURE — 99999 PR PBB SHADOW E&M-EST. PATIENT-LVL III: ICD-10-PCS | Mod: PBBFAC,,, | Performed by: OPTOMETRIST

## 2021-08-03 PROCEDURE — 92014 PR EYE EXAM, EST PATIENT,COMPREHESV: ICD-10-PCS | Mod: S$PBB,,, | Performed by: OPTOMETRIST

## 2021-08-03 PROCEDURE — 99213 OFFICE O/P EST LOW 20 MIN: CPT | Mod: PBBFAC,PO | Performed by: OPTOMETRIST

## 2021-08-03 RX ORDER — TIMOLOL MALEATE 5 MG/ML
1 SOLUTION/ DROPS OPHTHALMIC 2 TIMES DAILY
Qty: 15 ML | Refills: 3 | Status: SHIPPED | OUTPATIENT
Start: 2021-08-03 | End: 2023-12-22

## 2021-08-04 ENCOUNTER — PATIENT MESSAGE (OUTPATIENT)
Dept: ADMINISTRATIVE | Facility: HOSPITAL | Age: 86
End: 2021-08-04

## 2021-08-05 ENCOUNTER — TELEPHONE (OUTPATIENT)
Dept: CARDIOLOGY | Facility: CLINIC | Age: 86
End: 2021-08-05

## 2021-08-06 ENCOUNTER — LAB VISIT (OUTPATIENT)
Dept: LAB | Facility: HOSPITAL | Age: 86
End: 2021-08-06
Attending: NURSE PRACTITIONER
Payer: MEDICARE

## 2021-08-06 DIAGNOSIS — I50.42 CHRONIC COMBINED SYSTOLIC AND DIASTOLIC CONGESTIVE HEART FAILURE: ICD-10-CM

## 2021-08-06 DIAGNOSIS — N17.9 AKI (ACUTE KIDNEY INJURY): ICD-10-CM

## 2021-08-06 LAB
ANION GAP SERPL CALC-SCNC: 12 MMOL/L (ref 8–16)
BUN SERPL-MCNC: 53 MG/DL (ref 8–23)
CALCIUM SERPL-MCNC: 9.7 MG/DL (ref 8.7–10.5)
CHLORIDE SERPL-SCNC: 100 MMOL/L (ref 95–110)
CO2 SERPL-SCNC: 26 MMOL/L (ref 23–29)
CREAT SERPL-MCNC: 2 MG/DL (ref 0.5–1.4)
EST. GFR  (AFRICAN AMERICAN): 25.1 ML/MIN/1.73 M^2
EST. GFR  (NON AFRICAN AMERICAN): 21.8 ML/MIN/1.73 M^2
GLUCOSE SERPL-MCNC: 208 MG/DL (ref 70–110)
POTASSIUM SERPL-SCNC: 4 MMOL/L (ref 3.5–5.1)
SODIUM SERPL-SCNC: 138 MMOL/L (ref 136–145)

## 2021-08-06 PROCEDURE — 80048 BASIC METABOLIC PNL TOTAL CA: CPT | Performed by: NURSE PRACTITIONER

## 2021-08-06 PROCEDURE — 36415 COLL VENOUS BLD VENIPUNCTURE: CPT | Mod: PO | Performed by: NURSE PRACTITIONER

## 2021-08-09 ENCOUNTER — TELEPHONE (OUTPATIENT)
Dept: CARDIOLOGY | Facility: CLINIC | Age: 86
End: 2021-08-09

## 2021-08-09 DIAGNOSIS — I50.42 CHRONIC COMBINED SYSTOLIC AND DIASTOLIC CONGESTIVE HEART FAILURE: Primary | ICD-10-CM

## 2021-08-09 DIAGNOSIS — N18.4 CKD (CHRONIC KIDNEY DISEASE) STAGE 4, GFR 15-29 ML/MIN: ICD-10-CM

## 2021-08-09 DIAGNOSIS — I50.32 CHRONIC DIASTOLIC CONGESTIVE HEART FAILURE: ICD-10-CM

## 2021-08-09 DIAGNOSIS — R06.02 SOB (SHORTNESS OF BREATH): ICD-10-CM

## 2021-08-09 RX ORDER — FUROSEMIDE 20 MG/1
20 TABLET ORAL EVERY OTHER DAY
Qty: 90 TABLET | Refills: 3 | Status: SHIPPED | OUTPATIENT
Start: 2021-08-09 | End: 2021-11-23

## 2021-08-23 ENCOUNTER — PATIENT OUTREACH (OUTPATIENT)
Dept: ADMINISTRATIVE | Facility: OTHER | Age: 86
End: 2021-08-23

## 2021-08-23 ENCOUNTER — TELEPHONE (OUTPATIENT)
Dept: CARDIOLOGY | Facility: CLINIC | Age: 86
End: 2021-08-23

## 2021-08-23 PROBLEM — I63.531 ACUTE ISCHEMIC RIGHT POSTERIOR CEREBRAL ARTERY (PCA) STROKE: Status: RESOLVED | Noted: 2020-12-03 | Resolved: 2021-08-23

## 2021-08-24 ENCOUNTER — OFFICE VISIT (OUTPATIENT)
Dept: CARDIOLOGY | Facility: CLINIC | Age: 86
DRG: 682 | End: 2021-08-24
Payer: MEDICARE

## 2021-08-24 ENCOUNTER — HOSPITAL ENCOUNTER (INPATIENT)
Facility: HOSPITAL | Age: 86
LOS: 2 days | Discharge: HOME | DRG: 682 | End: 2021-08-27
Attending: EMERGENCY MEDICINE | Admitting: HOSPITALIST
Payer: MEDICARE

## 2021-08-24 VITALS
HEART RATE: 67 BPM | BODY MASS INDEX: 28.7 KG/M2 | HEIGHT: 63 IN | DIASTOLIC BLOOD PRESSURE: 49 MMHG | WEIGHT: 162 LBS | SYSTOLIC BLOOD PRESSURE: 83 MMHG

## 2021-08-24 DIAGNOSIS — I15.0 RENOVASCULAR HYPERTENSION: ICD-10-CM

## 2021-08-24 DIAGNOSIS — I95.9 HYPOTENSION, UNSPECIFIED HYPOTENSION TYPE: ICD-10-CM

## 2021-08-24 DIAGNOSIS — I95.9 HYPOTENSION: ICD-10-CM

## 2021-08-24 DIAGNOSIS — I50.32 CHRONIC DIASTOLIC HEART FAILURE: ICD-10-CM

## 2021-08-24 DIAGNOSIS — Z95.1 S/P CABG X 3: ICD-10-CM

## 2021-08-24 DIAGNOSIS — N18.30 CHRONIC KIDNEY DISEASE, STAGE III (MODERATE): ICD-10-CM

## 2021-08-24 DIAGNOSIS — I35.0 AORTIC STENOSIS, SEVERE: ICD-10-CM

## 2021-08-24 DIAGNOSIS — I50.42 CHRONIC COMBINED SYSTOLIC AND DIASTOLIC CONGESTIVE HEART FAILURE: Primary | ICD-10-CM

## 2021-08-24 DIAGNOSIS — Z95.2 S/P TAVR (TRANSCATHETER AORTIC VALVE REPLACEMENT): ICD-10-CM

## 2021-08-24 DIAGNOSIS — I25.83 CORONARY ARTERY DISEASE DUE TO LIPID RICH PLAQUE: ICD-10-CM

## 2021-08-24 DIAGNOSIS — I63.431 EMBOLIC STROKE INVOLVING RIGHT POSTERIOR CEREBRAL ARTERY: ICD-10-CM

## 2021-08-24 DIAGNOSIS — N17.9 AKI (ACUTE KIDNEY INJURY): ICD-10-CM

## 2021-08-24 DIAGNOSIS — I11.0 HYPERTENSIVE HEART DISEASE WITH HEART FAILURE: ICD-10-CM

## 2021-08-24 DIAGNOSIS — I25.118 CORONARY ARTERY DISEASE OF NATIVE ARTERY OF NATIVE HEART WITH STABLE ANGINA PECTORIS: ICD-10-CM

## 2021-08-24 DIAGNOSIS — I50.42 CHRONIC COMBINED SYSTOLIC AND DIASTOLIC HEART FAILURE: ICD-10-CM

## 2021-08-24 DIAGNOSIS — E11.22 TYPE 2 DIABETES MELLITUS WITH STAGE 4 CHRONIC KIDNEY DISEASE, UNSPECIFIED WHETHER LONG TERM INSULIN USE: ICD-10-CM

## 2021-08-24 DIAGNOSIS — I35.0 AORTIC VALVE STENOSIS, UNSPECIFIED ETIOLOGY: ICD-10-CM

## 2021-08-24 DIAGNOSIS — I50.42 CHRONIC COMBINED SYSTOLIC AND DIASTOLIC CONGESTIVE HEART FAILURE: ICD-10-CM

## 2021-08-24 DIAGNOSIS — I95.9 HYPOTENSION, UNSPECIFIED HYPOTENSION TYPE: Primary | ICD-10-CM

## 2021-08-24 DIAGNOSIS — E78.5 HYPERLIPIDEMIA, UNSPECIFIED HYPERLIPIDEMIA TYPE: ICD-10-CM

## 2021-08-24 DIAGNOSIS — N18.4 TYPE 2 DIABETES MELLITUS WITH STAGE 4 CHRONIC KIDNEY DISEASE, UNSPECIFIED WHETHER LONG TERM INSULIN USE: ICD-10-CM

## 2021-08-24 DIAGNOSIS — I25.10 CORONARY ARTERY DISEASE DUE TO LIPID RICH PLAQUE: ICD-10-CM

## 2021-08-24 DIAGNOSIS — I50.22 SYSTOLIC HEART FAILURE, CHRONIC: ICD-10-CM

## 2021-08-24 LAB
ALBUMIN SERPL BCP-MCNC: 3 G/DL (ref 3.5–5.2)
ALP SERPL-CCNC: 89 U/L (ref 55–135)
ALT SERPL W/O P-5'-P-CCNC: 10 U/L (ref 10–44)
ANION GAP SERPL CALC-SCNC: 8 MMOL/L (ref 8–16)
AST SERPL-CCNC: 36 U/L (ref 10–40)
BACTERIA #/AREA URNS AUTO: ABNORMAL /HPF
BASOPHILS # BLD AUTO: 0.02 K/UL (ref 0–0.2)
BASOPHILS NFR BLD: 0.2 % (ref 0–1.9)
BILIRUB SERPL-MCNC: 0.4 MG/DL (ref 0.1–1)
BILIRUB UR QL STRIP: NEGATIVE
BNP SERPL-MCNC: 350 PG/ML (ref 0–99)
BUN SERPL-MCNC: 75 MG/DL (ref 8–23)
CALCIUM SERPL-MCNC: 10.2 MG/DL (ref 8.7–10.5)
CHLORIDE SERPL-SCNC: 96 MMOL/L (ref 95–110)
CLARITY UR REFRACT.AUTO: ABNORMAL
CO2 SERPL-SCNC: 28 MMOL/L (ref 23–29)
COLOR UR AUTO: ABNORMAL
CREAT SERPL-MCNC: 3.4 MG/DL (ref 0.5–1.4)
CTP QC/QA: YES
DIFFERENTIAL METHOD: ABNORMAL
EOSINOPHIL # BLD AUTO: 0.2 K/UL (ref 0–0.5)
EOSINOPHIL NFR BLD: 1.7 % (ref 0–8)
ERYTHROCYTE [DISTWIDTH] IN BLOOD BY AUTOMATED COUNT: 13.7 % (ref 11.5–14.5)
EST. GFR  (AFRICAN AMERICAN): 13.1 ML/MIN/1.73 M^2
EST. GFR  (NON AFRICAN AMERICAN): 11.4 ML/MIN/1.73 M^2
GLUCOSE SERPL-MCNC: 226 MG/DL (ref 70–110)
GLUCOSE UR QL STRIP: NEGATIVE
HCT VFR BLD AUTO: 28.8 % (ref 37–48.5)
HGB BLD-MCNC: 9.4 G/DL (ref 12–16)
HGB UR QL STRIP: NEGATIVE
HYALINE CASTS UR QL AUTO: 3 /LPF
IMM GRANULOCYTES # BLD AUTO: 0.04 K/UL (ref 0–0.04)
IMM GRANULOCYTES NFR BLD AUTO: 0.3 % (ref 0–0.5)
KETONES UR QL STRIP: NEGATIVE
LACTATE SERPL-SCNC: 1.9 MMOL/L (ref 0.5–2.2)
LEUKOCYTE ESTERASE UR QL STRIP: ABNORMAL
LYMPHOCYTES # BLD AUTO: 1.7 K/UL (ref 1–4.8)
LYMPHOCYTES NFR BLD: 13.7 % (ref 18–48)
MCH RBC QN AUTO: 33.2 PG (ref 27–31)
MCHC RBC AUTO-ENTMCNC: 32.6 G/DL (ref 32–36)
MCV RBC AUTO: 102 FL (ref 82–98)
MICROSCOPIC COMMENT: ABNORMAL
MONOCYTES # BLD AUTO: 0.7 K/UL (ref 0.3–1)
MONOCYTES NFR BLD: 5.3 % (ref 4–15)
NEUTROPHILS # BLD AUTO: 10 K/UL (ref 1.8–7.7)
NEUTROPHILS NFR BLD: 78.8 % (ref 38–73)
NITRITE UR QL STRIP: NEGATIVE
NRBC BLD-RTO: 0 /100 WBC
PH UR STRIP: 5 [PH] (ref 5–8)
PLATELET # BLD AUTO: 182 K/UL (ref 150–450)
PMV BLD AUTO: 12.4 FL (ref 9.2–12.9)
POCT GLUCOSE: 206 MG/DL (ref 70–110)
POTASSIUM SERPL-SCNC: 4.9 MMOL/L (ref 3.5–5.1)
PROT SERPL-MCNC: 7.3 G/DL (ref 6–8.4)
PROT UR QL STRIP: NEGATIVE
RBC # BLD AUTO: 2.83 M/UL (ref 4–5.4)
RBC #/AREA URNS AUTO: 0 /HPF (ref 0–4)
SARS-COV-2 RDRP RESP QL NAA+PROBE: NEGATIVE
SODIUM SERPL-SCNC: 132 MMOL/L (ref 136–145)
SP GR UR STRIP: 1.01 (ref 1–1.03)
SQUAMOUS #/AREA URNS AUTO: 1 /HPF
TROPONIN I SERPL DL<=0.01 NG/ML-MCNC: 0.05 NG/ML (ref 0–0.03)
URN SPEC COLLECT METH UR: ABNORMAL
WBC # BLD AUTO: 12.65 K/UL (ref 3.9–12.7)
WBC #/AREA URNS AUTO: 5 /HPF (ref 0–5)

## 2021-08-24 PROCEDURE — 99999 PR PBB SHADOW E&M-EST. PATIENT-LVL III: CPT | Mod: PBBFAC,,, | Performed by: NURSE PRACTITIONER

## 2021-08-24 PROCEDURE — 87040 BLOOD CULTURE FOR BACTERIA: CPT | Performed by: PHYSICIAN ASSISTANT

## 2021-08-24 PROCEDURE — 87077 CULTURE AEROBIC IDENTIFY: CPT | Performed by: PHYSICIAN ASSISTANT

## 2021-08-24 PROCEDURE — 80053 COMPREHEN METABOLIC PANEL: CPT | Performed by: PHYSICIAN ASSISTANT

## 2021-08-24 PROCEDURE — 87088 URINE BACTERIA CULTURE: CPT | Performed by: PHYSICIAN ASSISTANT

## 2021-08-24 PROCEDURE — 81001 URINALYSIS AUTO W/SCOPE: CPT | Performed by: PHYSICIAN ASSISTANT

## 2021-08-24 PROCEDURE — 99285 PR EMERGENCY DEPT VISIT,LEVEL V: ICD-10-PCS | Mod: CS,,, | Performed by: PHYSICIAN ASSISTANT

## 2021-08-24 PROCEDURE — 93005 ELECTROCARDIOGRAM TRACING: CPT

## 2021-08-24 PROCEDURE — 96372 THER/PROPH/DIAG INJ SC/IM: CPT | Mod: 59 | Performed by: EMERGENCY MEDICINE

## 2021-08-24 PROCEDURE — 93010 ELECTROCARDIOGRAM REPORT: CPT | Mod: 76,,, | Performed by: INTERNAL MEDICINE

## 2021-08-24 PROCEDURE — 63600175 PHARM REV CODE 636 W HCPCS: Performed by: PHYSICIAN ASSISTANT

## 2021-08-24 PROCEDURE — 85025 COMPLETE CBC W/AUTO DIFF WBC: CPT | Performed by: PHYSICIAN ASSISTANT

## 2021-08-24 PROCEDURE — 87186 SC STD MICRODIL/AGAR DIL: CPT | Performed by: PHYSICIAN ASSISTANT

## 2021-08-24 PROCEDURE — G0378 HOSPITAL OBSERVATION PER HR: HCPCS

## 2021-08-24 PROCEDURE — 83880 ASSAY OF NATRIURETIC PEPTIDE: CPT | Performed by: PHYSICIAN ASSISTANT

## 2021-08-24 PROCEDURE — 99285 EMERGENCY DEPT VISIT HI MDM: CPT | Mod: 25,27

## 2021-08-24 PROCEDURE — 99999 PR PBB SHADOW E&M-EST. PATIENT-LVL III: ICD-10-PCS | Mod: PBBFAC,,, | Performed by: NURSE PRACTITIONER

## 2021-08-24 PROCEDURE — 63600175 PHARM REV CODE 636 W HCPCS: Performed by: STUDENT IN AN ORGANIZED HEALTH CARE EDUCATION/TRAINING PROGRAM

## 2021-08-24 PROCEDURE — 96374 THER/PROPH/DIAG INJ IV PUSH: CPT

## 2021-08-24 PROCEDURE — 99215 OFFICE O/P EST HI 40 MIN: CPT | Mod: S$PBB,,, | Performed by: NURSE PRACTITIONER

## 2021-08-24 PROCEDURE — 99285 EMERGENCY DEPT VISIT HI MDM: CPT | Mod: CS,,, | Performed by: PHYSICIAN ASSISTANT

## 2021-08-24 PROCEDURE — 99213 OFFICE O/P EST LOW 20 MIN: CPT | Mod: PBBFAC,PO | Performed by: NURSE PRACTITIONER

## 2021-08-24 PROCEDURE — 93010 EKG 12-LEAD: ICD-10-PCS | Mod: ,,, | Performed by: INTERNAL MEDICINE

## 2021-08-24 PROCEDURE — 87086 URINE CULTURE/COLONY COUNT: CPT | Performed by: PHYSICIAN ASSISTANT

## 2021-08-24 PROCEDURE — 96361 HYDRATE IV INFUSION ADD-ON: CPT

## 2021-08-24 PROCEDURE — 94761 N-INVAS EAR/PLS OXIMETRY MLT: CPT

## 2021-08-24 PROCEDURE — 93010 ELECTROCARDIOGRAM REPORT: CPT | Mod: ,,, | Performed by: INTERNAL MEDICINE

## 2021-08-24 PROCEDURE — 99215 PR OFFICE/OUTPT VISIT, EST, LEVL V, 40-54 MIN: ICD-10-PCS | Mod: S$PBB,,, | Performed by: NURSE PRACTITIONER

## 2021-08-24 PROCEDURE — 25000003 PHARM REV CODE 250: Performed by: STUDENT IN AN ORGANIZED HEALTH CARE EDUCATION/TRAINING PROGRAM

## 2021-08-24 PROCEDURE — U0002 COVID-19 LAB TEST NON-CDC: HCPCS | Performed by: EMERGENCY MEDICINE

## 2021-08-24 PROCEDURE — 83605 ASSAY OF LACTIC ACID: CPT | Performed by: PHYSICIAN ASSISTANT

## 2021-08-24 PROCEDURE — 84484 ASSAY OF TROPONIN QUANT: CPT | Performed by: PHYSICIAN ASSISTANT

## 2021-08-24 RX ORDER — GLUCAGON 1 MG
1 KIT INJECTION
Status: DISCONTINUED | OUTPATIENT
Start: 2021-08-24 | End: 2021-08-27 | Stop reason: HOSPADM

## 2021-08-24 RX ORDER — IBUPROFEN 200 MG
16 TABLET ORAL
Status: DISCONTINUED | OUTPATIENT
Start: 2021-08-24 | End: 2021-08-27 | Stop reason: HOSPADM

## 2021-08-24 RX ORDER — TIMOLOL MALEATE 5 MG/ML
1 SOLUTION/ DROPS OPHTHALMIC 2 TIMES DAILY
Status: DISCONTINUED | OUTPATIENT
Start: 2021-08-24 | End: 2021-08-27 | Stop reason: HOSPADM

## 2021-08-24 RX ORDER — SODIUM CHLORIDE 0.9 % (FLUSH) 0.9 %
10 SYRINGE (ML) INJECTION
Status: DISCONTINUED | OUTPATIENT
Start: 2021-08-24 | End: 2021-08-27 | Stop reason: HOSPADM

## 2021-08-24 RX ORDER — CEFTRIAXONE 1 G/1
1 INJECTION, POWDER, FOR SOLUTION INTRAMUSCULAR; INTRAVENOUS
Status: COMPLETED | OUTPATIENT
Start: 2021-08-24 | End: 2021-08-24

## 2021-08-24 RX ORDER — CARVEDILOL 12.5 MG/1
12.5 TABLET ORAL 2 TIMES DAILY
Status: DISCONTINUED | OUTPATIENT
Start: 2021-08-25 | End: 2021-08-25

## 2021-08-24 RX ORDER — INSULIN ASPART 100 [IU]/ML
0-5 INJECTION, SOLUTION INTRAVENOUS; SUBCUTANEOUS
Status: DISCONTINUED | OUTPATIENT
Start: 2021-08-24 | End: 2021-08-27 | Stop reason: HOSPADM

## 2021-08-24 RX ORDER — IBUPROFEN 200 MG
24 TABLET ORAL
Status: DISCONTINUED | OUTPATIENT
Start: 2021-08-24 | End: 2021-08-27 | Stop reason: HOSPADM

## 2021-08-24 RX ORDER — CEFTRIAXONE 1 G/1
1 INJECTION, POWDER, FOR SOLUTION INTRAMUSCULAR; INTRAVENOUS
Status: DISCONTINUED | OUTPATIENT
Start: 2021-08-25 | End: 2021-08-27

## 2021-08-24 RX ORDER — ATORVASTATIN CALCIUM 20 MG/1
40 TABLET, FILM COATED ORAL DAILY
Status: DISCONTINUED | OUTPATIENT
Start: 2021-08-25 | End: 2021-08-27 | Stop reason: HOSPADM

## 2021-08-24 RX ORDER — ISOSORBIDE MONONITRATE 60 MG/1
60 TABLET, EXTENDED RELEASE ORAL DAILY
Status: DISCONTINUED | OUTPATIENT
Start: 2021-08-25 | End: 2021-08-25

## 2021-08-24 RX ORDER — ESCITALOPRAM OXALATE 20 MG/1
20 TABLET ORAL DAILY
Status: DISCONTINUED | OUTPATIENT
Start: 2021-08-25 | End: 2021-08-27 | Stop reason: HOSPADM

## 2021-08-24 RX ORDER — ALBUTEROL SULFATE 90 UG/1
1 AEROSOL, METERED RESPIRATORY (INHALATION) EVERY 6 HOURS PRN
Status: DISCONTINUED | OUTPATIENT
Start: 2021-08-24 | End: 2021-08-27 | Stop reason: HOSPADM

## 2021-08-24 RX ADMIN — TIMOLOL MALEATE 1 DROP: 5 SOLUTION OPHTHALMIC at 11:08

## 2021-08-24 RX ADMIN — CEFTRIAXONE 1 G: 1 INJECTION, POWDER, FOR SOLUTION INTRAMUSCULAR; INTRAVENOUS at 07:08

## 2021-08-24 RX ADMIN — SODIUM CHLORIDE, SODIUM LACTATE, POTASSIUM CHLORIDE, AND CALCIUM CHLORIDE 250 ML: .6; .31; .03; .02 INJECTION, SOLUTION INTRAVENOUS at 07:08

## 2021-08-24 RX ADMIN — INSULIN ASPART 2 UNITS: 100 INJECTION, SOLUTION INTRAVENOUS; SUBCUTANEOUS at 11:08

## 2021-08-25 PROBLEM — Z02.9 DISCHARGE PLANNING ISSUES: Status: ACTIVE | Noted: 2021-08-25

## 2021-08-25 PROBLEM — Z75.8 DISCHARGE PLANNING ISSUES: Status: ACTIVE | Noted: 2021-08-25

## 2021-08-25 LAB
ALBUMIN SERPL BCP-MCNC: 2.8 G/DL (ref 3.5–5.2)
ALP SERPL-CCNC: 89 U/L (ref 55–135)
ALT SERPL W/O P-5'-P-CCNC: 9 U/L (ref 10–44)
ANION GAP SERPL CALC-SCNC: 9 MMOL/L (ref 8–16)
ASCENDING AORTA: 3.5 CM
AST SERPL-CCNC: 20 U/L (ref 10–40)
AV INDEX (PROSTH): 0.55
AV MEAN GRADIENT: 8 MMHG
AV PEAK GRADIENT: 14 MMHG
AV VALVE AREA: 1.81 CM2
AV VELOCITY RATIO: 0.51
BASOPHILS # BLD AUTO: 0.04 K/UL (ref 0–0.2)
BASOPHILS NFR BLD: 0.3 % (ref 0–1.9)
BILIRUB SERPL-MCNC: 0.3 MG/DL (ref 0.1–1)
BSA FOR ECHO PROCEDURE: 1.84 M2
BUN SERPL-MCNC: 70 MG/DL (ref 8–23)
CALCIUM SERPL-MCNC: 10 MG/DL (ref 8.7–10.5)
CHLORIDE SERPL-SCNC: 101 MMOL/L (ref 95–110)
CO2 SERPL-SCNC: 28 MMOL/L (ref 23–29)
CREAT SERPL-MCNC: 2.8 MG/DL (ref 0.5–1.4)
CV ECHO LV RWT: 0.47 CM
DIFFERENTIAL METHOD: ABNORMAL
DOP CALC AO PEAK VEL: 1.85 M/S
DOP CALC AO VTI: 37.04 CM
DOP CALC LVOT AREA: 3.3 CM2
DOP CALC LVOT DIAMETER: 2.05 CM
DOP CALC LVOT PEAK VEL: 0.94 M/S
DOP CALC LVOT STROKE VOLUME: 67.17 CM3
DOP CALCLVOT PEAK VEL VTI: 20.36 CM
E WAVE DECELERATION TIME: 403.29 MSEC
E/A RATIO: 0.44
E/E' RATIO: 14.5 M/S
ECHO LV POSTERIOR WALL: 0.97 CM (ref 0.6–1.1)
EJECTION FRACTION: 25 %
EOSINOPHIL # BLD AUTO: 0.3 K/UL (ref 0–0.5)
EOSINOPHIL NFR BLD: 2.7 % (ref 0–8)
ERYTHROCYTE [DISTWIDTH] IN BLOOD BY AUTOMATED COUNT: 13.6 % (ref 11.5–14.5)
EST. GFR  (AFRICAN AMERICAN): 16.6 ML/MIN/1.73 M^2
EST. GFR  (NON AFRICAN AMERICAN): 14.4 ML/MIN/1.73 M^2
FERRITIN SERPL-MCNC: 114 NG/ML (ref 20–300)
FOLATE SERPL-MCNC: 35.6 NG/ML (ref 4–24)
FRACTIONAL SHORTENING: 9 % (ref 28–44)
GLUCOSE SERPL-MCNC: 117 MG/DL (ref 70–110)
HCT VFR BLD AUTO: 27.6 % (ref 37–48.5)
HGB BLD-MCNC: 8.8 G/DL (ref 12–16)
IMM GRANULOCYTES # BLD AUTO: 0.05 K/UL (ref 0–0.04)
IMM GRANULOCYTES NFR BLD AUTO: 0.4 % (ref 0–0.5)
INTERVENTRICULAR SEPTUM: 1.05 CM (ref 0.6–1.1)
IRON SERPL-MCNC: 42 UG/DL (ref 30–160)
LA MAJOR: 4.92 CM
LA MINOR: 4.97 CM
LA WIDTH: 3.59 CM
LEFT ATRIUM SIZE: 3.08 CM
LEFT ATRIUM VOLUME INDEX MOD: 27.6 ML/M2
LEFT ATRIUM VOLUME INDEX: 25.7 ML/M2
LEFT ATRIUM VOLUME MOD: 49.98 CM3
LEFT ATRIUM VOLUME: 46.47 CM3
LEFT INTERNAL DIMENSION IN SYSTOLE: 3.78 CM (ref 2.1–4)
LEFT VENTRICLE DIASTOLIC VOLUME INDEX: 42.35 ML/M2
LEFT VENTRICLE DIASTOLIC VOLUME: 76.65 ML
LEFT VENTRICLE MASS INDEX: 76 G/M2
LEFT VENTRICLE SYSTOLIC VOLUME INDEX: 33.8 ML/M2
LEFT VENTRICLE SYSTOLIC VOLUME: 61.21 ML
LEFT VENTRICULAR INTERNAL DIMENSION IN DIASTOLE: 4.16 CM (ref 3.5–6)
LEFT VENTRICULAR MASS: 137.08 G
LV LATERAL E/E' RATIO: 14.5 M/S
LV SEPTAL E/E' RATIO: 14.5 M/S
LYMPHOCYTES # BLD AUTO: 2.8 K/UL (ref 1–4.8)
LYMPHOCYTES NFR BLD: 23.9 % (ref 18–48)
MAGNESIUM SERPL-MCNC: 2.3 MG/DL (ref 1.6–2.6)
MCH RBC QN AUTO: 32.6 PG (ref 27–31)
MCHC RBC AUTO-ENTMCNC: 31.9 G/DL (ref 32–36)
MCV RBC AUTO: 102 FL (ref 82–98)
MONOCYTES # BLD AUTO: 0.8 K/UL (ref 0.3–1)
MONOCYTES NFR BLD: 6.6 % (ref 4–15)
MV PEAK A VEL: 1.32 M/S
MV PEAK E VEL: 0.58 M/S
MV STENOSIS PRESSURE HALF TIME: 116.95 MS
MV VALVE AREA P 1/2 METHOD: 1.88 CM2
NEUTROPHILS # BLD AUTO: 7.8 K/UL (ref 1.8–7.7)
NEUTROPHILS NFR BLD: 66.1 % (ref 38–73)
NRBC BLD-RTO: 0 /100 WBC
PHOSPHATE SERPL-MCNC: 3.5 MG/DL (ref 2.7–4.5)
PISA TR MAX VEL: 2.7 M/S
PLATELET # BLD AUTO: 180 K/UL (ref 150–450)
PMV BLD AUTO: 12.6 FL (ref 9.2–12.9)
POCT GLUCOSE: 154 MG/DL (ref 70–110)
POCT GLUCOSE: 182 MG/DL (ref 70–110)
POTASSIUM SERPL-SCNC: 3.8 MMOL/L (ref 3.5–5.1)
PROT SERPL-MCNC: 6.5 G/DL (ref 6–8.4)
RA MAJOR: 5.22 CM
RA WIDTH: 3.33 CM
RBC # BLD AUTO: 2.7 M/UL (ref 4–5.4)
RIGHT VENTRICULAR END-DIASTOLIC DIMENSION: 3.59 CM
RV TISSUE DOPPLER FREE WALL SYSTOLIC VELOCITY 1 (APICAL 4 CHAMBER VIEW): 5.27 CM/S
SATURATED IRON: 21 % (ref 20–50)
SINUS: 2.72 CM
SODIUM SERPL-SCNC: 138 MMOL/L (ref 136–145)
STJ: 2.44 CM
TDI LATERAL: 0.04 M/S
TDI SEPTAL: 0.04 M/S
TDI: 0.04 M/S
TOTAL IRON BINDING CAPACITY: 200 UG/DL (ref 250–450)
TR MAX PG: 29 MMHG
TRANSFERRIN SERPL-MCNC: 135 MG/DL (ref 200–375)
TRICUSPID ANNULAR PLANE SYSTOLIC EXCURSION: 1.36 CM
TSH SERPL DL<=0.005 MIU/L-ACNC: 1.93 UIU/ML (ref 0.4–4)
VIT B12 SERPL-MCNC: 1482 PG/ML (ref 210–950)
WBC # BLD AUTO: 11.74 K/UL (ref 3.9–12.7)

## 2021-08-25 PROCEDURE — 97535 SELF CARE MNGMENT TRAINING: CPT

## 2021-08-25 PROCEDURE — 36415 COLL VENOUS BLD VENIPUNCTURE: CPT | Performed by: STUDENT IN AN ORGANIZED HEALTH CARE EDUCATION/TRAINING PROGRAM

## 2021-08-25 PROCEDURE — 97161 PT EVAL LOW COMPLEX 20 MIN: CPT

## 2021-08-25 PROCEDURE — 20600001 HC STEP DOWN PRIVATE ROOM

## 2021-08-25 PROCEDURE — 63600175 PHARM REV CODE 636 W HCPCS: Performed by: STUDENT IN AN ORGANIZED HEALTH CARE EDUCATION/TRAINING PROGRAM

## 2021-08-25 PROCEDURE — 97530 THERAPEUTIC ACTIVITIES: CPT

## 2021-08-25 PROCEDURE — 84443 ASSAY THYROID STIM HORMONE: CPT | Performed by: STUDENT IN AN ORGANIZED HEALTH CARE EDUCATION/TRAINING PROGRAM

## 2021-08-25 PROCEDURE — 84100 ASSAY OF PHOSPHORUS: CPT | Performed by: STUDENT IN AN ORGANIZED HEALTH CARE EDUCATION/TRAINING PROGRAM

## 2021-08-25 PROCEDURE — 99219 PR INITIAL OBSERVATION CARE,LEVL II: ICD-10-PCS | Mod: ,,, | Performed by: STUDENT IN AN ORGANIZED HEALTH CARE EDUCATION/TRAINING PROGRAM

## 2021-08-25 PROCEDURE — 25000003 PHARM REV CODE 250: Performed by: STUDENT IN AN ORGANIZED HEALTH CARE EDUCATION/TRAINING PROGRAM

## 2021-08-25 PROCEDURE — 82746 ASSAY OF FOLIC ACID SERUM: CPT | Performed by: STUDENT IN AN ORGANIZED HEALTH CARE EDUCATION/TRAINING PROGRAM

## 2021-08-25 PROCEDURE — 80053 COMPREHEN METABOLIC PANEL: CPT | Performed by: STUDENT IN AN ORGANIZED HEALTH CARE EDUCATION/TRAINING PROGRAM

## 2021-08-25 PROCEDURE — 85025 COMPLETE CBC W/AUTO DIFF WBC: CPT | Performed by: STUDENT IN AN ORGANIZED HEALTH CARE EDUCATION/TRAINING PROGRAM

## 2021-08-25 PROCEDURE — 83735 ASSAY OF MAGNESIUM: CPT | Performed by: STUDENT IN AN ORGANIZED HEALTH CARE EDUCATION/TRAINING PROGRAM

## 2021-08-25 PROCEDURE — 97116 GAIT TRAINING THERAPY: CPT

## 2021-08-25 PROCEDURE — 84466 ASSAY OF TRANSFERRIN: CPT | Performed by: STUDENT IN AN ORGANIZED HEALTH CARE EDUCATION/TRAINING PROGRAM

## 2021-08-25 PROCEDURE — 99219 PR INITIAL OBSERVATION CARE,LEVL II: CPT | Mod: ,,, | Performed by: STUDENT IN AN ORGANIZED HEALTH CARE EDUCATION/TRAINING PROGRAM

## 2021-08-25 PROCEDURE — 82728 ASSAY OF FERRITIN: CPT | Performed by: STUDENT IN AN ORGANIZED HEALTH CARE EDUCATION/TRAINING PROGRAM

## 2021-08-25 PROCEDURE — 96376 TX/PRO/DX INJ SAME DRUG ADON: CPT | Performed by: EMERGENCY MEDICINE

## 2021-08-25 PROCEDURE — 82607 VITAMIN B-12: CPT | Performed by: STUDENT IN AN ORGANIZED HEALTH CARE EDUCATION/TRAINING PROGRAM

## 2021-08-25 RX ORDER — HYDRALAZINE HYDROCHLORIDE 10 MG/1
10 TABLET, FILM COATED ORAL EVERY 8 HOURS
Status: DISCONTINUED | OUTPATIENT
Start: 2021-08-25 | End: 2021-08-26

## 2021-08-25 RX ORDER — ISOSORBIDE MONONITRATE 30 MG/1
30 TABLET, EXTENDED RELEASE ORAL DAILY
Status: DISCONTINUED | OUTPATIENT
Start: 2021-08-25 | End: 2021-08-27 | Stop reason: HOSPADM

## 2021-08-25 RX ADMIN — ALLOPURINOL 100 MG: 300 TABLET ORAL at 09:08

## 2021-08-25 RX ADMIN — ESCITALOPRAM OXALATE 20 MG: 20 TABLET ORAL at 09:08

## 2021-08-25 RX ADMIN — APIXABAN 2.5 MG: 2.5 TABLET, FILM COATED ORAL at 09:08

## 2021-08-25 RX ADMIN — HYDRALAZINE HYDROCHLORIDE 10 MG: 10 TABLET, FILM COATED ORAL at 09:08

## 2021-08-25 RX ADMIN — CEFTRIAXONE 1 G: 1 INJECTION, POWDER, FOR SOLUTION INTRAMUSCULAR; INTRAVENOUS at 09:08

## 2021-08-25 RX ADMIN — TIMOLOL MALEATE 1 DROP: 5 SOLUTION OPHTHALMIC at 09:08

## 2021-08-25 RX ADMIN — ATORVASTATIN CALCIUM 40 MG: 20 TABLET, FILM COATED ORAL at 09:08

## 2021-08-25 RX ADMIN — ISOSORBIDE MONONITRATE 30 MG: 30 TABLET, EXTENDED RELEASE ORAL at 05:08

## 2021-08-26 PROBLEM — N30.90 CYSTITIS: Status: ACTIVE | Noted: 2021-08-26

## 2021-08-26 LAB
ALBUMIN SERPL BCP-MCNC: 2.7 G/DL (ref 3.5–5.2)
ALP SERPL-CCNC: 85 U/L (ref 55–135)
ALT SERPL W/O P-5'-P-CCNC: 9 U/L (ref 10–44)
ANION GAP SERPL CALC-SCNC: 9 MMOL/L (ref 8–16)
AST SERPL-CCNC: 20 U/L (ref 10–40)
BASOPHILS # BLD AUTO: 0.05 K/UL (ref 0–0.2)
BASOPHILS NFR BLD: 0.4 % (ref 0–1.9)
BILIRUB SERPL-MCNC: 0.3 MG/DL (ref 0.1–1)
BUN SERPL-MCNC: 74 MG/DL (ref 8–23)
CALCIUM SERPL-MCNC: 9.6 MG/DL (ref 8.7–10.5)
CHLORIDE SERPL-SCNC: 100 MMOL/L (ref 95–110)
CO2 SERPL-SCNC: 29 MMOL/L (ref 23–29)
CREAT SERPL-MCNC: 2.5 MG/DL (ref 0.5–1.4)
DIFFERENTIAL METHOD: ABNORMAL
EOSINOPHIL # BLD AUTO: 0.4 K/UL (ref 0–0.5)
EOSINOPHIL NFR BLD: 3.2 % (ref 0–8)
ERYTHROCYTE [DISTWIDTH] IN BLOOD BY AUTOMATED COUNT: 13.5 % (ref 11.5–14.5)
EST. GFR  (AFRICAN AMERICAN): 19.1 ML/MIN/1.73 M^2
EST. GFR  (NON AFRICAN AMERICAN): 16.5 ML/MIN/1.73 M^2
GLUCOSE SERPL-MCNC: 117 MG/DL (ref 70–110)
HCT VFR BLD AUTO: 26.3 % (ref 37–48.5)
HGB BLD-MCNC: 8.3 G/DL (ref 12–16)
IMM GRANULOCYTES # BLD AUTO: 0.1 K/UL (ref 0–0.04)
IMM GRANULOCYTES NFR BLD AUTO: 0.7 % (ref 0–0.5)
LYMPHOCYTES # BLD AUTO: 3.5 K/UL (ref 1–4.8)
LYMPHOCYTES NFR BLD: 25.9 % (ref 18–48)
MAGNESIUM SERPL-MCNC: 2.2 MG/DL (ref 1.6–2.6)
MCH RBC QN AUTO: 32 PG (ref 27–31)
MCHC RBC AUTO-ENTMCNC: 31.6 G/DL (ref 32–36)
MCV RBC AUTO: 102 FL (ref 82–98)
MONOCYTES # BLD AUTO: 0.9 K/UL (ref 0.3–1)
MONOCYTES NFR BLD: 6.5 % (ref 4–15)
NEUTROPHILS # BLD AUTO: 8.5 K/UL (ref 1.8–7.7)
NEUTROPHILS NFR BLD: 63.3 % (ref 38–73)
NRBC BLD-RTO: 0 /100 WBC
PHOSPHATE SERPL-MCNC: 3.7 MG/DL (ref 2.7–4.5)
PLATELET # BLD AUTO: 188 K/UL (ref 150–450)
PMV BLD AUTO: 12.5 FL (ref 9.2–12.9)
POCT GLUCOSE: 141 MG/DL (ref 70–110)
POCT GLUCOSE: 144 MG/DL (ref 70–110)
POCT GLUCOSE: 145 MG/DL (ref 70–110)
POCT GLUCOSE: 277 MG/DL (ref 70–110)
POTASSIUM SERPL-SCNC: 4.1 MMOL/L (ref 3.5–5.1)
PROT SERPL-MCNC: 6.4 G/DL (ref 6–8.4)
RBC # BLD AUTO: 2.59 M/UL (ref 4–5.4)
SODIUM SERPL-SCNC: 138 MMOL/L (ref 136–145)
WBC # BLD AUTO: 13.5 K/UL (ref 3.9–12.7)

## 2021-08-26 PROCEDURE — 85025 COMPLETE CBC W/AUTO DIFF WBC: CPT | Performed by: STUDENT IN AN ORGANIZED HEALTH CARE EDUCATION/TRAINING PROGRAM

## 2021-08-26 PROCEDURE — 96376 TX/PRO/DX INJ SAME DRUG ADON: CPT | Performed by: EMERGENCY MEDICINE

## 2021-08-26 PROCEDURE — 25000003 PHARM REV CODE 250: Performed by: STUDENT IN AN ORGANIZED HEALTH CARE EDUCATION/TRAINING PROGRAM

## 2021-08-26 PROCEDURE — 36415 COLL VENOUS BLD VENIPUNCTURE: CPT | Performed by: STUDENT IN AN ORGANIZED HEALTH CARE EDUCATION/TRAINING PROGRAM

## 2021-08-26 PROCEDURE — 96372 THER/PROPH/DIAG INJ SC/IM: CPT | Mod: 59 | Performed by: EMERGENCY MEDICINE

## 2021-08-26 PROCEDURE — 84100 ASSAY OF PHOSPHORUS: CPT | Performed by: STUDENT IN AN ORGANIZED HEALTH CARE EDUCATION/TRAINING PROGRAM

## 2021-08-26 PROCEDURE — 99232 PR SUBSEQUENT HOSPITAL CARE,LEVL II: ICD-10-PCS | Mod: GC,,, | Performed by: HOSPITALIST

## 2021-08-26 PROCEDURE — 63600175 PHARM REV CODE 636 W HCPCS: Performed by: STUDENT IN AN ORGANIZED HEALTH CARE EDUCATION/TRAINING PROGRAM

## 2021-08-26 PROCEDURE — 20600001 HC STEP DOWN PRIVATE ROOM

## 2021-08-26 PROCEDURE — 83735 ASSAY OF MAGNESIUM: CPT | Performed by: STUDENT IN AN ORGANIZED HEALTH CARE EDUCATION/TRAINING PROGRAM

## 2021-08-26 PROCEDURE — 80053 COMPREHEN METABOLIC PANEL: CPT | Performed by: STUDENT IN AN ORGANIZED HEALTH CARE EDUCATION/TRAINING PROGRAM

## 2021-08-26 PROCEDURE — 99232 SBSQ HOSP IP/OBS MODERATE 35: CPT | Mod: GC,,, | Performed by: HOSPITALIST

## 2021-08-26 RX ORDER — POLYETHYLENE GLYCOL 3350 17 G/17G
17 POWDER, FOR SOLUTION ORAL DAILY
Status: DISCONTINUED | OUTPATIENT
Start: 2021-08-26 | End: 2021-08-27

## 2021-08-26 RX ORDER — AMOXICILLIN 250 MG
1 CAPSULE ORAL DAILY
Status: DISCONTINUED | OUTPATIENT
Start: 2021-08-26 | End: 2021-08-27

## 2021-08-26 RX ORDER — NAPROXEN SODIUM 220 MG/1
81 TABLET, FILM COATED ORAL DAILY
Status: DISCONTINUED | OUTPATIENT
Start: 2021-08-26 | End: 2021-08-27 | Stop reason: HOSPADM

## 2021-08-26 RX ADMIN — TIMOLOL MALEATE 1 DROP: 5 SOLUTION OPHTHALMIC at 08:08

## 2021-08-26 RX ADMIN — ISOSORBIDE MONONITRATE 30 MG: 30 TABLET, EXTENDED RELEASE ORAL at 08:08

## 2021-08-26 RX ADMIN — APIXABAN 2.5 MG: 2.5 TABLET, FILM COATED ORAL at 08:08

## 2021-08-26 RX ADMIN — ALLOPURINOL 100 MG: 300 TABLET ORAL at 08:08

## 2021-08-26 RX ADMIN — CEFTRIAXONE 1 G: 1 INJECTION, POWDER, FOR SOLUTION INTRAMUSCULAR; INTRAVENOUS at 08:08

## 2021-08-26 RX ADMIN — ASPIRIN 81 MG: 81 TABLET, CHEWABLE ORAL at 02:08

## 2021-08-26 RX ADMIN — DOCUSATE SODIUM 50 MG AND SENNOSIDES 8.6 MG 1 TABLET: 8.6; 5 TABLET, FILM COATED ORAL at 10:08

## 2021-08-26 RX ADMIN — ESCITALOPRAM OXALATE 20 MG: 20 TABLET ORAL at 08:08

## 2021-08-26 RX ADMIN — ATORVASTATIN CALCIUM 40 MG: 20 TABLET, FILM COATED ORAL at 08:08

## 2021-08-26 RX ADMIN — HYDRALAZINE HYDROCHLORIDE 10 MG: 10 TABLET, FILM COATED ORAL at 06:08

## 2021-08-26 RX ADMIN — INSULIN ASPART 3 UNITS: 100 INJECTION, SOLUTION INTRAVENOUS; SUBCUTANEOUS at 04:08

## 2021-08-26 RX ADMIN — POLYETHYLENE GLYCOL 3350 17 G: 17 POWDER, FOR SOLUTION ORAL at 10:08

## 2021-08-27 VITALS
OXYGEN SATURATION: 96 % | HEIGHT: 65 IN | RESPIRATION RATE: 14 BRPM | DIASTOLIC BLOOD PRESSURE: 67 MMHG | TEMPERATURE: 98 F | BODY MASS INDEX: 26.99 KG/M2 | SYSTOLIC BLOOD PRESSURE: 147 MMHG | HEART RATE: 62 BPM | WEIGHT: 162 LBS

## 2021-08-27 LAB
ALBUMIN SERPL BCP-MCNC: 3 G/DL (ref 3.5–5.2)
ALP SERPL-CCNC: 96 U/L (ref 55–135)
ALT SERPL W/O P-5'-P-CCNC: 9 U/L (ref 10–44)
ANION GAP SERPL CALC-SCNC: 11 MMOL/L (ref 8–16)
AST SERPL-CCNC: 22 U/L (ref 10–40)
BACTERIA UR CULT: ABNORMAL
BASOPHILS # BLD AUTO: 0.05 K/UL (ref 0–0.2)
BASOPHILS NFR BLD: 0.4 % (ref 0–1.9)
BILIRUB SERPL-MCNC: 0.3 MG/DL (ref 0.1–1)
BUN SERPL-MCNC: 72 MG/DL (ref 8–23)
CALCIUM SERPL-MCNC: 9.6 MG/DL (ref 8.7–10.5)
CHLORIDE SERPL-SCNC: 101 MMOL/L (ref 95–110)
CO2 SERPL-SCNC: 27 MMOL/L (ref 23–29)
CREAT SERPL-MCNC: 2.2 MG/DL (ref 0.5–1.4)
DIFFERENTIAL METHOD: ABNORMAL
EOSINOPHIL # BLD AUTO: 0.5 K/UL (ref 0–0.5)
EOSINOPHIL NFR BLD: 3.4 % (ref 0–8)
ERYTHROCYTE [DISTWIDTH] IN BLOOD BY AUTOMATED COUNT: 13.5 % (ref 11.5–14.5)
EST. GFR  (AFRICAN AMERICAN): 22.2 ML/MIN/1.73 M^2
EST. GFR  (NON AFRICAN AMERICAN): 19.3 ML/MIN/1.73 M^2
GLUCOSE SERPL-MCNC: 141 MG/DL (ref 70–110)
HCT VFR BLD AUTO: 29.2 % (ref 37–48.5)
HGB BLD-MCNC: 9.2 G/DL (ref 12–16)
IMM GRANULOCYTES # BLD AUTO: 0.07 K/UL (ref 0–0.04)
IMM GRANULOCYTES NFR BLD AUTO: 0.5 % (ref 0–0.5)
LYMPHOCYTES # BLD AUTO: 3.2 K/UL (ref 1–4.8)
LYMPHOCYTES NFR BLD: 24 % (ref 18–48)
MAGNESIUM SERPL-MCNC: 2.2 MG/DL (ref 1.6–2.6)
MCH RBC QN AUTO: 32.7 PG (ref 27–31)
MCHC RBC AUTO-ENTMCNC: 31.5 G/DL (ref 32–36)
MCV RBC AUTO: 104 FL (ref 82–98)
MONOCYTES # BLD AUTO: 0.7 K/UL (ref 0.3–1)
MONOCYTES NFR BLD: 5.4 % (ref 4–15)
NEUTROPHILS # BLD AUTO: 8.8 K/UL (ref 1.8–7.7)
NEUTROPHILS NFR BLD: 66.3 % (ref 38–73)
NRBC BLD-RTO: 0 /100 WBC
PHOSPHATE SERPL-MCNC: 3.2 MG/DL (ref 2.7–4.5)
PLATELET # BLD AUTO: 202 K/UL (ref 150–450)
PMV BLD AUTO: 12.7 FL (ref 9.2–12.9)
POCT GLUCOSE: 130 MG/DL (ref 70–110)
POCT GLUCOSE: 219 MG/DL (ref 70–110)
POTASSIUM SERPL-SCNC: 3.8 MMOL/L (ref 3.5–5.1)
PROT SERPL-MCNC: 7.1 G/DL (ref 6–8.4)
RBC # BLD AUTO: 2.81 M/UL (ref 4–5.4)
SODIUM SERPL-SCNC: 139 MMOL/L (ref 136–145)
WBC # BLD AUTO: 13.27 K/UL (ref 3.9–12.7)

## 2021-08-27 PROCEDURE — 99238 HOSP IP/OBS DSCHRG MGMT 30/<: CPT | Mod: GC,,, | Performed by: HOSPITALIST

## 2021-08-27 PROCEDURE — 25000003 PHARM REV CODE 250: Performed by: PHYSICIAN ASSISTANT

## 2021-08-27 PROCEDURE — 80053 COMPREHEN METABOLIC PANEL: CPT | Performed by: STUDENT IN AN ORGANIZED HEALTH CARE EDUCATION/TRAINING PROGRAM

## 2021-08-27 PROCEDURE — 97116 GAIT TRAINING THERAPY: CPT | Mod: CQ

## 2021-08-27 PROCEDURE — 99238 PR HOSPITAL DISCHARGE DAY,<30 MIN: ICD-10-PCS | Mod: GC,,, | Performed by: HOSPITALIST

## 2021-08-27 PROCEDURE — 36415 COLL VENOUS BLD VENIPUNCTURE: CPT | Performed by: STUDENT IN AN ORGANIZED HEALTH CARE EDUCATION/TRAINING PROGRAM

## 2021-08-27 PROCEDURE — 84100 ASSAY OF PHOSPHORUS: CPT | Performed by: STUDENT IN AN ORGANIZED HEALTH CARE EDUCATION/TRAINING PROGRAM

## 2021-08-27 PROCEDURE — 85025 COMPLETE CBC W/AUTO DIFF WBC: CPT | Performed by: STUDENT IN AN ORGANIZED HEALTH CARE EDUCATION/TRAINING PROGRAM

## 2021-08-27 PROCEDURE — 83735 ASSAY OF MAGNESIUM: CPT | Performed by: STUDENT IN AN ORGANIZED HEALTH CARE EDUCATION/TRAINING PROGRAM

## 2021-08-27 PROCEDURE — 97535 SELF CARE MNGMENT TRAINING: CPT

## 2021-08-27 PROCEDURE — 97530 THERAPEUTIC ACTIVITIES: CPT | Mod: CQ

## 2021-08-27 PROCEDURE — 25000003 PHARM REV CODE 250: Performed by: STUDENT IN AN ORGANIZED HEALTH CARE EDUCATION/TRAINING PROGRAM

## 2021-08-27 RX ORDER — ISOSORBIDE MONONITRATE 30 MG/1
30 TABLET, EXTENDED RELEASE ORAL DAILY
Qty: 60 TABLET | Refills: 0 | Status: SHIPPED | OUTPATIENT
Start: 2021-08-27 | End: 2021-12-22 | Stop reason: SDUPTHER

## 2021-08-27 RX ORDER — HYDRALAZINE HYDROCHLORIDE 10 MG/1
10 TABLET, FILM COATED ORAL EVERY 8 HOURS
Status: DISCONTINUED | OUTPATIENT
Start: 2021-08-27 | End: 2021-08-27 | Stop reason: HOSPADM

## 2021-08-27 RX ORDER — ACETAMINOPHEN 325 MG/1
650 TABLET ORAL EVERY 6 HOURS PRN
Status: DISCONTINUED | OUTPATIENT
Start: 2021-08-27 | End: 2021-08-27 | Stop reason: HOSPADM

## 2021-08-27 RX ORDER — AMOXICILLIN AND CLAVULANATE POTASSIUM 500; 125 MG/1; MG/1
1 TABLET, FILM COATED ORAL 2 TIMES DAILY
Qty: 3 TABLET | Refills: 0 | Status: SHIPPED | OUTPATIENT
Start: 2021-08-27 | End: 2021-08-29

## 2021-08-27 RX ORDER — HYDRALAZINE HYDROCHLORIDE 10 MG/1
10 TABLET, FILM COATED ORAL EVERY 8 HOURS
Qty: 90 TABLET | Refills: 1 | Status: SHIPPED | OUTPATIENT
Start: 2021-08-27 | End: 2021-09-14

## 2021-08-27 RX ORDER — AMOXICILLIN AND CLAVULANATE POTASSIUM 500; 125 MG/1; MG/1
1 TABLET, FILM COATED ORAL 2 TIMES DAILY
Status: DISCONTINUED | OUTPATIENT
Start: 2021-08-27 | End: 2021-08-27 | Stop reason: HOSPADM

## 2021-08-27 RX ORDER — ALUMINUM HYDROXIDE, MAGNESIUM HYDROXIDE, AND SIMETHICONE 2400; 240; 2400 MG/30ML; MG/30ML; MG/30ML
30 SUSPENSION ORAL EVERY 6 HOURS PRN
Status: DISCONTINUED | OUTPATIENT
Start: 2021-08-27 | End: 2021-08-27 | Stop reason: HOSPADM

## 2021-08-27 RX ADMIN — HYDRALAZINE HYDROCHLORIDE 10 MG: 10 TABLET, FILM COATED ORAL at 09:08

## 2021-08-27 RX ADMIN — AMOXICILLIN AND CLAVULANATE POTASSIUM 500 MG: 500; 125 TABLET, FILM COATED ORAL at 09:08

## 2021-08-27 RX ADMIN — ATORVASTATIN CALCIUM 40 MG: 20 TABLET, FILM COATED ORAL at 09:08

## 2021-08-27 RX ADMIN — TIMOLOL MALEATE 1 DROP: 5 SOLUTION OPHTHALMIC at 09:08

## 2021-08-27 RX ADMIN — ACETAMINOPHEN 650 MG: 325 TABLET ORAL at 02:08

## 2021-08-27 RX ADMIN — ISOSORBIDE MONONITRATE 30 MG: 30 TABLET, EXTENDED RELEASE ORAL at 09:08

## 2021-08-27 RX ADMIN — DOCUSATE SODIUM 50 MG AND SENNOSIDES 8.6 MG 1 TABLET: 8.6; 5 TABLET, FILM COATED ORAL at 09:08

## 2021-08-27 RX ADMIN — ALLOPURINOL 100 MG: 300 TABLET ORAL at 09:08

## 2021-08-27 RX ADMIN — APIXABAN 2.5 MG: 2.5 TABLET, FILM COATED ORAL at 09:08

## 2021-08-27 RX ADMIN — ESCITALOPRAM OXALATE 20 MG: 20 TABLET ORAL at 09:08

## 2021-08-27 RX ADMIN — ASPIRIN 81 MG: 81 TABLET, CHEWABLE ORAL at 09:08

## 2021-08-29 LAB
BACTERIA BLD CULT: NORMAL
BACTERIA BLD CULT: NORMAL

## 2021-09-10 ENCOUNTER — HOME CARE VISIT (OUTPATIENT)
Dept: NEUROLOGY | Facility: HOSPITAL | Age: 86
End: 2021-09-10
Payer: MEDICARE

## 2021-09-13 RX ORDER — ALLOPURINOL 100 MG/1
100 TABLET ORAL DAILY
Qty: 90 TABLET | Refills: 3 | Status: SHIPPED | OUTPATIENT
Start: 2021-09-13 | End: 2022-12-12 | Stop reason: SDUPTHER

## 2021-09-14 ENCOUNTER — OFFICE VISIT (OUTPATIENT)
Dept: CARDIOLOGY | Facility: CLINIC | Age: 86
End: 2021-09-14
Payer: MEDICARE

## 2021-09-14 ENCOUNTER — LAB VISIT (OUTPATIENT)
Dept: LAB | Facility: HOSPITAL | Age: 86
End: 2021-09-14
Payer: MEDICARE

## 2021-09-14 VITALS
BODY MASS INDEX: 28.82 KG/M2 | SYSTOLIC BLOOD PRESSURE: 170 MMHG | HEIGHT: 63 IN | WEIGHT: 162.69 LBS | DIASTOLIC BLOOD PRESSURE: 86 MMHG | HEART RATE: 84 BPM

## 2021-09-14 DIAGNOSIS — E11.22 TYPE 2 DIABETES MELLITUS WITH STAGE 3 CHRONIC KIDNEY DISEASE, UNSPECIFIED WHETHER LONG TERM INSULIN USE, UNSPECIFIED WHETHER STAGE 3A OR 3B CKD: ICD-10-CM

## 2021-09-14 DIAGNOSIS — K92.1 HEMATOCHEZIA: ICD-10-CM

## 2021-09-14 DIAGNOSIS — Z95.1 S/P CABG X 3: ICD-10-CM

## 2021-09-14 DIAGNOSIS — I50.22 CHRONIC SYSTOLIC HEART FAILURE: ICD-10-CM

## 2021-09-14 DIAGNOSIS — Z95.2 S/P TAVR (TRANSCATHETER AORTIC VALVE REPLACEMENT): ICD-10-CM

## 2021-09-14 DIAGNOSIS — I50.22 CHRONIC SYSTOLIC HEART FAILURE: Primary | ICD-10-CM

## 2021-09-14 DIAGNOSIS — N18.30 ACUTE RENAL FAILURE WITH ACUTE TUBULAR NECROSIS SUPERIMPOSED ON STAGE 3 CHRONIC KIDNEY DISEASE, UNSPECIFIED WHETHER STAGE 3A OR 3B CKD: ICD-10-CM

## 2021-09-14 DIAGNOSIS — N18.30 TYPE 2 DIABETES MELLITUS WITH STAGE 3 CHRONIC KIDNEY DISEASE, UNSPECIFIED WHETHER LONG TERM INSULIN USE, UNSPECIFIED WHETHER STAGE 3A OR 3B CKD: ICD-10-CM

## 2021-09-14 DIAGNOSIS — N17.0 ACUTE RENAL FAILURE WITH ACUTE TUBULAR NECROSIS SUPERIMPOSED ON STAGE 3 CHRONIC KIDNEY DISEASE, UNSPECIFIED WHETHER STAGE 3A OR 3B CKD: ICD-10-CM

## 2021-09-14 DIAGNOSIS — I25.10 CORONARY ARTERY DISEASE INVOLVING NATIVE CORONARY ARTERY OF NATIVE HEART WITHOUT ANGINA PECTORIS: ICD-10-CM

## 2021-09-14 LAB
ALBUMIN SERPL BCP-MCNC: 2.9 G/DL (ref 3.5–5.2)
ALP SERPL-CCNC: 93 U/L (ref 55–135)
ALT SERPL W/O P-5'-P-CCNC: 12 U/L (ref 10–44)
ANION GAP SERPL CALC-SCNC: 11 MMOL/L (ref 8–16)
AST SERPL-CCNC: 25 U/L (ref 10–40)
BILIRUB SERPL-MCNC: 0.3 MG/DL (ref 0.1–1)
BUN SERPL-MCNC: 32 MG/DL (ref 8–23)
CALCIUM SERPL-MCNC: 9.3 MG/DL (ref 8.7–10.5)
CHLORIDE SERPL-SCNC: 104 MMOL/L (ref 95–110)
CO2 SERPL-SCNC: 25 MMOL/L (ref 23–29)
CREAT SERPL-MCNC: 1.4 MG/DL (ref 0.5–1.4)
ERYTHROCYTE [DISTWIDTH] IN BLOOD BY AUTOMATED COUNT: 15 % (ref 11.5–14.5)
EST. GFR  (AFRICAN AMERICAN): 38.4 ML/MIN/1.73 M^2
EST. GFR  (NON AFRICAN AMERICAN): 33.3 ML/MIN/1.73 M^2
ESTIMATED AVG GLUCOSE: 126 MG/DL (ref 68–131)
GLUCOSE SERPL-MCNC: 163 MG/DL (ref 70–110)
HBA1C MFR BLD: 6 % (ref 4–5.6)
HCT VFR BLD AUTO: 28.7 % (ref 37–48.5)
HGB BLD-MCNC: 9.4 G/DL (ref 12–16)
MCH RBC QN AUTO: 33.5 PG (ref 27–31)
MCHC RBC AUTO-ENTMCNC: 32.8 G/DL (ref 32–36)
MCV RBC AUTO: 102 FL (ref 82–98)
PLATELET # BLD AUTO: 177 K/UL (ref 150–450)
PMV BLD AUTO: 12.8 FL (ref 9.2–12.9)
POTASSIUM SERPL-SCNC: 3.7 MMOL/L (ref 3.5–5.1)
PROT SERPL-MCNC: 6.7 G/DL (ref 6–8.4)
RBC # BLD AUTO: 2.81 M/UL (ref 4–5.4)
SODIUM SERPL-SCNC: 140 MMOL/L (ref 136–145)
WBC # BLD AUTO: 13.37 K/UL (ref 3.9–12.7)

## 2021-09-14 PROCEDURE — 80053 COMPREHEN METABOLIC PANEL: CPT | Performed by: INTERNAL MEDICINE

## 2021-09-14 PROCEDURE — 99214 PR OFFICE/OUTPT VISIT, EST, LEVL IV, 30-39 MIN: ICD-10-PCS | Mod: S$PBB,,, | Performed by: INTERNAL MEDICINE

## 2021-09-14 PROCEDURE — 99999 PR PBB SHADOW E&M-EST. PATIENT-LVL IV: CPT | Mod: PBBFAC,,, | Performed by: INTERNAL MEDICINE

## 2021-09-14 PROCEDURE — 83036 HEMOGLOBIN GLYCOSYLATED A1C: CPT | Performed by: FAMILY MEDICINE

## 2021-09-14 PROCEDURE — 99214 OFFICE O/P EST MOD 30 MIN: CPT | Mod: S$PBB,,, | Performed by: INTERNAL MEDICINE

## 2021-09-14 PROCEDURE — 99214 OFFICE O/P EST MOD 30 MIN: CPT | Mod: PBBFAC | Performed by: INTERNAL MEDICINE

## 2021-09-14 PROCEDURE — 99999 PR PBB SHADOW E&M-EST. PATIENT-LVL IV: ICD-10-PCS | Mod: PBBFAC,,, | Performed by: INTERNAL MEDICINE

## 2021-09-14 PROCEDURE — 36415 COLL VENOUS BLD VENIPUNCTURE: CPT | Performed by: INTERNAL MEDICINE

## 2021-09-14 PROCEDURE — 85027 COMPLETE CBC AUTOMATED: CPT | Performed by: INTERNAL MEDICINE

## 2021-09-14 RX ORDER — HYDRALAZINE HYDROCHLORIDE 25 MG/1
25 TABLET, FILM COATED ORAL EVERY 8 HOURS
Qty: 270 TABLET | Refills: 3 | Status: SHIPPED | OUTPATIENT
Start: 2021-09-14 | End: 2022-09-26

## 2021-09-17 PROCEDURE — G0180 MD CERTIFICATION HHA PATIENT: HCPCS | Mod: ,,, | Performed by: HOSPITALIST

## 2021-09-17 PROCEDURE — G0180 PR HOME HEALTH MD CERTIFICATION: ICD-10-PCS | Mod: ,,, | Performed by: HOSPITALIST

## 2021-09-26 RX ORDER — FAMOTIDINE 20 MG/1
20 TABLET, FILM COATED ORAL 2 TIMES DAILY
Qty: 60 TABLET | Refills: 11 | Status: ON HOLD | OUTPATIENT
Start: 2021-09-26 | End: 2021-10-24 | Stop reason: SDUPTHER

## 2021-09-27 ENCOUNTER — TELEPHONE (OUTPATIENT)
Dept: CARDIOLOGY | Facility: CLINIC | Age: 86
End: 2021-09-27

## 2021-09-28 ENCOUNTER — LAB VISIT (OUTPATIENT)
Dept: LAB | Facility: HOSPITAL | Age: 86
End: 2021-09-28
Attending: FAMILY MEDICINE
Payer: MEDICARE

## 2021-09-28 ENCOUNTER — TELEPHONE (OUTPATIENT)
Dept: FAMILY MEDICINE | Facility: CLINIC | Age: 86
End: 2021-09-28

## 2021-09-28 DIAGNOSIS — M00.80 ARTHRITIS DUE TO E. COLI: Primary | ICD-10-CM

## 2021-09-28 DIAGNOSIS — B96.20 ARTHRITIS DUE TO E. COLI: Primary | ICD-10-CM

## 2021-09-28 DIAGNOSIS — N39.0 URINARY TRACT INFECTION WITHOUT HEMATURIA, SITE UNSPECIFIED: ICD-10-CM

## 2021-09-28 DIAGNOSIS — N39.0 URINARY TRACT INFECTION WITHOUT HEMATURIA, SITE UNSPECIFIED: Primary | ICD-10-CM

## 2021-09-28 PROCEDURE — 81001 URINALYSIS AUTO W/SCOPE: CPT | Performed by: FAMILY MEDICINE

## 2021-09-28 PROCEDURE — 87086 URINE CULTURE/COLONY COUNT: CPT | Performed by: FAMILY MEDICINE

## 2021-09-28 RX ORDER — CIPROFLOXACIN 500 MG/1
500 TABLET ORAL 2 TIMES DAILY
Qty: 14 TABLET | Refills: 0 | Status: SHIPPED | OUTPATIENT
Start: 2021-09-28 | End: 2021-10-05

## 2021-09-29 LAB
BILIRUB UR QL STRIP: NEGATIVE
CLARITY UR REFRACT.AUTO: ABNORMAL
COLOR UR AUTO: YELLOW
GLUCOSE UR QL STRIP: NEGATIVE
HGB UR QL STRIP: NEGATIVE
HYALINE CASTS UR QL AUTO: 15 /LPF
KETONES UR QL STRIP: NEGATIVE
LEUKOCYTE ESTERASE UR QL STRIP: ABNORMAL
MICROSCOPIC COMMENT: ABNORMAL
NITRITE UR QL STRIP: NEGATIVE
NON-SQ EPI CELLS #/AREA URNS AUTO: 1 /HPF
PH UR STRIP: 5 [PH] (ref 5–8)
PROT UR QL STRIP: NEGATIVE
RBC #/AREA URNS AUTO: 1 /HPF (ref 0–4)
SP GR UR STRIP: 1.01 (ref 1–1.03)
SQUAMOUS #/AREA URNS AUTO: 43 /HPF
URN SPEC COLLECT METH UR: ABNORMAL
WBC #/AREA URNS AUTO: 24 /HPF (ref 0–5)

## 2021-09-30 LAB
BACTERIA UR CULT: NORMAL
BACTERIA UR CULT: NORMAL

## 2021-10-04 ENCOUNTER — EXTERNAL HOME HEALTH (OUTPATIENT)
Dept: HOME HEALTH SERVICES | Facility: HOSPITAL | Age: 86
End: 2021-10-04
Payer: MEDICARE

## 2021-10-05 ENCOUNTER — DOCUMENT SCAN (OUTPATIENT)
Dept: HOME HEALTH SERVICES | Facility: HOSPITAL | Age: 86
End: 2021-10-05
Payer: MEDICARE

## 2021-10-16 ENCOUNTER — HOSPITAL ENCOUNTER (INPATIENT)
Facility: HOSPITAL | Age: 86
LOS: 11 days | Discharge: NURSING, REHAB OR LONG-TERM CARE | DRG: 480 | End: 2021-10-27
Attending: EMERGENCY MEDICINE | Admitting: EMERGENCY MEDICINE
Payer: MEDICARE

## 2021-10-16 ENCOUNTER — ANESTHESIA EVENT (OUTPATIENT)
Dept: SURGERY | Facility: HOSPITAL | Age: 86
DRG: 480 | End: 2021-10-16
Payer: MEDICARE

## 2021-10-16 DIAGNOSIS — I25.118 CORONARY ARTERY DISEASE OF NATIVE ARTERY OF NATIVE HEART WITH STABLE ANGINA PECTORIS: ICD-10-CM

## 2021-10-16 DIAGNOSIS — F33.1 MAJOR DEPRESSIVE DISORDER, RECURRENT EPISODE, MODERATE: ICD-10-CM

## 2021-10-16 DIAGNOSIS — S72.001A CLOSED FRACTURE OF RIGHT HIP, INITIAL ENCOUNTER: ICD-10-CM

## 2021-10-16 DIAGNOSIS — N18.32 ANEMIA IN STAGE 3B CHRONIC KIDNEY DISEASE: ICD-10-CM

## 2021-10-16 DIAGNOSIS — E11.22 TYPE 2 DIABETES MELLITUS WITH STAGE 3B CHRONIC KIDNEY DISEASE, WITHOUT LONG-TERM CURRENT USE OF INSULIN: ICD-10-CM

## 2021-10-16 DIAGNOSIS — E87.5 HYPERKALEMIA: ICD-10-CM

## 2021-10-16 DIAGNOSIS — U07.1 COVID-19: ICD-10-CM

## 2021-10-16 DIAGNOSIS — D63.1 ANEMIA IN STAGE 3B CHRONIC KIDNEY DISEASE: ICD-10-CM
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
## 2021-10-16 DIAGNOSIS — N18.32 STAGE 3B CHRONIC KIDNEY DISEASE: ICD-10-CM

## 2021-10-16 DIAGNOSIS — I35.0 AORTIC STENOSIS, SEVERE: ICD-10-CM

## 2021-10-16 DIAGNOSIS — I50.42 CHRONIC COMBINED SYSTOLIC AND DIASTOLIC HEART FAILURE: ICD-10-CM

## 2021-10-16 DIAGNOSIS — S72.21XD CLOSED DISPLACED SUBTROCHANTERIC FRACTURE OF RIGHT FEMUR WITH ROUTINE HEALING, SUBSEQUENT ENCOUNTER: Primary | ICD-10-CM

## 2021-10-16 DIAGNOSIS — I10 ESSENTIAL HYPERTENSION: ICD-10-CM

## 2021-10-16 DIAGNOSIS — N18.32 TYPE 2 DIABETES MELLITUS WITH STAGE 3B CHRONIC KIDNEY DISEASE, WITHOUT LONG-TERM CURRENT USE OF INSULIN: ICD-10-CM

## 2021-10-16 DIAGNOSIS — R33.9 URINARY RETENTION: ICD-10-CM

## 2021-10-16 DIAGNOSIS — Z95.2 S/P TAVR (TRANSCATHETER AORTIC VALVE REPLACEMENT): ICD-10-CM

## 2021-10-16 DIAGNOSIS — D62 ACUTE BLOOD LOSS AS CAUSE OF POSTOPERATIVE ANEMIA: ICD-10-CM

## 2021-10-16 DIAGNOSIS — E78.00 PURE HYPERCHOLESTEROLEMIA: ICD-10-CM

## 2021-10-16 DIAGNOSIS — G93.40 ACUTE ENCEPHALOPATHY: ICD-10-CM

## 2021-10-16 DIAGNOSIS — D72.829 LEUKOCYTOSIS, UNSPECIFIED TYPE: ICD-10-CM

## 2021-10-16 PROBLEM — S72.21XA CLOSED DISPLACED SUBTROCHANTERIC FRACTURE OF RIGHT FEMUR: Status: ACTIVE | Noted: 2021-10-16

## 2021-10-16 LAB
ABO + RH BLD: NORMAL
ALBUMIN SERPL BCP-MCNC: 2.9 G/DL (ref 3.5–5.2)
ALP SERPL-CCNC: 100 U/L (ref 55–135)
ALT SERPL W/O P-5'-P-CCNC: 9 U/L (ref 10–44)
ANION GAP SERPL CALC-SCNC: 12 MMOL/L (ref 8–16)
AST SERPL-CCNC: 24 U/L (ref 10–40)
BACTERIA #/AREA URNS AUTO: NORMAL /HPF
BASOPHILS # BLD AUTO: 0.05 K/UL (ref 0–0.2)
BASOPHILS NFR BLD: 0.3 % (ref 0–1.9)
BILIRUB SERPL-MCNC: 0.3 MG/DL (ref 0.1–1)
BILIRUB UR QL STRIP: NEGATIVE
BLD GP AB SCN CELLS X3 SERPL QL: NORMAL
BUN SERPL-MCNC: 32 MG/DL (ref 8–23)
CALCIUM SERPL-MCNC: 9.1 MG/DL (ref 8.7–10.5)
CHLORIDE SERPL-SCNC: 104 MMOL/L (ref 95–110)
CK SERPL-CCNC: 124 U/L (ref 20–180)
CLARITY UR REFRACT.AUTO: CLEAR
CO2 SERPL-SCNC: 22 MMOL/L (ref 23–29)
COLOR UR AUTO: YELLOW
CREAT SERPL-MCNC: 1.5 MG/DL (ref 0.5–1.4)
CTP QC/QA: YES
DIFFERENTIAL METHOD: ABNORMAL
EOSINOPHIL # BLD AUTO: 0.4 K/UL (ref 0–0.5)
EOSINOPHIL NFR BLD: 2.5 % (ref 0–8)
ERYTHROCYTE [DISTWIDTH] IN BLOOD BY AUTOMATED COUNT: 14.6 % (ref 11.5–14.5)
EST. GFR  (AFRICAN AMERICAN): 35.4 ML/MIN/1.73 M^2
EST. GFR  (NON AFRICAN AMERICAN): 30.7 ML/MIN/1.73 M^2
GLUCOSE SERPL-MCNC: 141 MG/DL (ref 70–110)
GLUCOSE UR QL STRIP: NEGATIVE
HCT VFR BLD AUTO: 31.1 % (ref 37–48.5)
HGB BLD-MCNC: 9.7 G/DL (ref 12–16)
HGB UR QL STRIP: NEGATIVE
HYALINE CASTS UR QL AUTO: 1 /LPF
IMM GRANULOCYTES # BLD AUTO: 0.13 K/UL (ref 0–0.04)
IMM GRANULOCYTES NFR BLD AUTO: 0.8 % (ref 0–0.5)
INR PPP: 1.1 (ref 0.8–1.2)
KETONES UR QL STRIP: NEGATIVE
LEUKOCYTE ESTERASE UR QL STRIP: NEGATIVE
LYMPHOCYTES # BLD AUTO: 2.5 K/UL (ref 1–4.8)
LYMPHOCYTES NFR BLD: 16.3 % (ref 18–48)
MAGNESIUM SERPL-MCNC: 2 MG/DL (ref 1.6–2.6)
MCH RBC QN AUTO: 31.8 PG (ref 27–31)
MCHC RBC AUTO-ENTMCNC: 31.2 G/DL (ref 32–36)
MCV RBC AUTO: 102 FL (ref 82–98)
MICROSCOPIC COMMENT: NORMAL
MONOCYTES # BLD AUTO: 1 K/UL (ref 0.3–1)
MONOCYTES NFR BLD: 6.3 % (ref 4–15)
NEUTROPHILS # BLD AUTO: 11.3 K/UL (ref 1.8–7.7)
NEUTROPHILS NFR BLD: 73.8 % (ref 38–73)
NITRITE UR QL STRIP: NEGATIVE
NON-SQ EPI CELLS #/AREA URNS AUTO: <1 /HPF
NRBC BLD-RTO: 0 /100 WBC
PH UR STRIP: 6 [PH] (ref 5–8)
PHOSPHATE SERPL-MCNC: 3.8 MG/DL (ref 2.7–4.5)
PLATELET # BLD AUTO: 204 K/UL (ref 150–450)
PMV BLD AUTO: 12.6 FL (ref 9.2–12.9)
POTASSIUM SERPL-SCNC: 3.8 MMOL/L (ref 3.5–5.1)
PREALB SERPL-MCNC: 22 MG/DL (ref 20–43)
PROT SERPL-MCNC: 6.7 G/DL (ref 6–8.4)
PROT UR QL STRIP: NEGATIVE
PROTHROMBIN TIME: 11.9 SEC (ref 9–12.5)
RBC # BLD AUTO: 3.05 M/UL (ref 4–5.4)
RBC #/AREA URNS AUTO: 2 /HPF (ref 0–4)
SARS-COV-2 RDRP RESP QL NAA+PROBE: POSITIVE
SODIUM SERPL-SCNC: 138 MMOL/L (ref 136–145)
SP GR UR STRIP: 1.01 (ref 1–1.03)
TRANSFERRIN SERPL-MCNC: 169 MG/DL (ref 200–375)
URN SPEC COLLECT METH UR: NORMAL
WBC # BLD AUTO: 15.36 K/UL (ref 3.9–12.7)
WBC #/AREA URNS AUTO: 1 /HPF (ref 0–5)

## 2021-10-16 PROCEDURE — 63600175 PHARM REV CODE 636 W HCPCS

## 2021-10-16 PROCEDURE — 99223 1ST HOSP IP/OBS HIGH 75: CPT | Mod: ,,, | Performed by: STUDENT IN AN ORGANIZED HEALTH CARE EDUCATION/TRAINING PROGRAM

## 2021-10-16 PROCEDURE — 85610 PROTHROMBIN TIME: CPT | Performed by: EMERGENCY MEDICINE

## 2021-10-16 PROCEDURE — 25000003 PHARM REV CODE 250: Performed by: STUDENT IN AN ORGANIZED HEALTH CARE EDUCATION/TRAINING PROGRAM

## 2021-10-16 PROCEDURE — 99285 EMERGENCY DEPT VISIT HI MDM: CPT | Mod: CR,CS,, | Performed by: EMERGENCY MEDICINE

## 2021-10-16 PROCEDURE — 93010 ELECTROCARDIOGRAM REPORT: CPT | Mod: ,,, | Performed by: INTERNAL MEDICINE

## 2021-10-16 PROCEDURE — 20000000 HC ICU ROOM

## 2021-10-16 PROCEDURE — 83735 ASSAY OF MAGNESIUM: CPT | Performed by: EMERGENCY MEDICINE

## 2021-10-16 PROCEDURE — 63600175 PHARM REV CODE 636 W HCPCS: Performed by: EMERGENCY MEDICINE

## 2021-10-16 PROCEDURE — 86900 BLOOD TYPING SEROLOGIC ABO: CPT | Performed by: STUDENT IN AN ORGANIZED HEALTH CARE EDUCATION/TRAINING PROGRAM

## 2021-10-16 PROCEDURE — 27000207 HC ISOLATION

## 2021-10-16 PROCEDURE — 84134 ASSAY OF PREALBUMIN: CPT | Performed by: STUDENT IN AN ORGANIZED HEALTH CARE EDUCATION/TRAINING PROGRAM

## 2021-10-16 PROCEDURE — 99223 PR INITIAL HOSPITAL CARE,LEVL III: ICD-10-PCS | Mod: ,,, | Performed by: STUDENT IN AN ORGANIZED HEALTH CARE EDUCATION/TRAINING PROGRAM

## 2021-10-16 PROCEDURE — 99285 PR EMERGENCY DEPT VISIT,LEVEL V: ICD-10-PCS | Mod: CR,CS,, | Performed by: EMERGENCY MEDICINE

## 2021-10-16 PROCEDURE — 99285 EMERGENCY DEPT VISIT HI MDM: CPT | Mod: 25

## 2021-10-16 PROCEDURE — 84466 ASSAY OF TRANSFERRIN: CPT | Performed by: EMERGENCY MEDICINE

## 2021-10-16 PROCEDURE — 93010 EKG 12-LEAD: ICD-10-PCS | Mod: ,,, | Performed by: INTERNAL MEDICINE

## 2021-10-16 PROCEDURE — 81001 URINALYSIS AUTO W/SCOPE: CPT | Performed by: EMERGENCY MEDICINE

## 2021-10-16 PROCEDURE — 96374 THER/PROPH/DIAG INJ IV PUSH: CPT

## 2021-10-16 PROCEDURE — 93005 ELECTROCARDIOGRAM TRACING: CPT

## 2021-10-16 PROCEDURE — 86920 COMPATIBILITY TEST SPIN: CPT | Performed by: STUDENT IN AN ORGANIZED HEALTH CARE EDUCATION/TRAINING PROGRAM

## 2021-10-16 PROCEDURE — 82550 ASSAY OF CK (CPK): CPT | Performed by: EMERGENCY MEDICINE

## 2021-10-16 PROCEDURE — U0002 COVID-19 LAB TEST NON-CDC: HCPCS | Performed by: EMERGENCY MEDICINE

## 2021-10-16 PROCEDURE — 84100 ASSAY OF PHOSPHORUS: CPT | Performed by: EMERGENCY MEDICINE

## 2021-10-16 PROCEDURE — 85025 COMPLETE CBC W/AUTO DIFF WBC: CPT | Performed by: EMERGENCY MEDICINE

## 2021-10-16 PROCEDURE — 80053 COMPREHEN METABOLIC PANEL: CPT | Performed by: EMERGENCY MEDICINE

## 2021-10-16 RX ORDER — ACETAMINOPHEN 325 MG/1
650 TABLET ORAL ONCE AS NEEDED
Status: DISCONTINUED | OUTPATIENT
Start: 2021-10-16 | End: 2021-10-19

## 2021-10-16 RX ORDER — ALLOPURINOL 100 MG/1
100 TABLET ORAL DAILY
Status: DISCONTINUED | OUTPATIENT
Start: 2021-10-16 | End: 2021-10-27 | Stop reason: HOSPADM

## 2021-10-16 RX ORDER — ACETAMINOPHEN 500 MG
1000 TABLET ORAL EVERY 8 HOURS
Status: DISPENSED | OUTPATIENT
Start: 2021-10-16 | End: 2021-10-17

## 2021-10-16 RX ORDER — DIPHENHYDRAMINE HYDROCHLORIDE 50 MG/ML
25 INJECTION INTRAMUSCULAR; INTRAVENOUS ONCE AS NEEDED
Status: DISCONTINUED | OUTPATIENT
Start: 2021-10-16 | End: 2021-10-18

## 2021-10-16 RX ORDER — MORPHINE SULFATE 2 MG/ML
0.05 INJECTION, SOLUTION INTRAMUSCULAR; INTRAVENOUS
Status: DISCONTINUED | OUTPATIENT
Start: 2021-10-16 | End: 2021-10-16

## 2021-10-16 RX ORDER — ALBUTEROL SULFATE 90 UG/1
2 AEROSOL, METERED RESPIRATORY (INHALATION)
Status: DISCONTINUED | OUTPATIENT
Start: 2021-10-16 | End: 2021-10-18

## 2021-10-16 RX ORDER — LIDOCAINE HYDROCHLORIDE 10 MG/ML
1 INJECTION, SOLUTION EPIDURAL; INFILTRATION; INTRACAUDAL; PERINEURAL
Status: CANCELLED | OUTPATIENT
Start: 2021-10-16

## 2021-10-16 RX ORDER — BISACODYL 10 MG
10 SUPPOSITORY, RECTAL RECTAL DAILY PRN
Status: DISCONTINUED | OUTPATIENT
Start: 2021-10-16 | End: 2021-10-27 | Stop reason: HOSPADM

## 2021-10-16 RX ORDER — OXYCODONE HYDROCHLORIDE 5 MG/1
5 TABLET ORAL
Status: DISCONTINUED | OUTPATIENT
Start: 2021-10-16 | End: 2021-10-21

## 2021-10-16 RX ORDER — SODIUM CHLORIDE 0.9 % (FLUSH) 0.9 %
10 SYRINGE (ML) INJECTION
Status: DISCONTINUED | OUTPATIENT
Start: 2021-10-16 | End: 2021-10-17

## 2021-10-16 RX ORDER — ONDANSETRON 2 MG/ML
4 INJECTION INTRAMUSCULAR; INTRAVENOUS EVERY 12 HOURS PRN
Status: DISCONTINUED | OUTPATIENT
Start: 2021-10-16 | End: 2021-10-18

## 2021-10-16 RX ORDER — ACETAMINOPHEN 500 MG
1000 TABLET ORAL
Status: ACTIVE | OUTPATIENT
Start: 2021-10-16 | End: 2021-10-16

## 2021-10-16 RX ORDER — SODIUM CHLORIDE 0.9 % (FLUSH) 0.9 %
10 SYRINGE (ML) INJECTION
Status: DISCONTINUED | OUTPATIENT
Start: 2021-10-16 | End: 2021-10-27 | Stop reason: HOSPADM

## 2021-10-16 RX ORDER — FUROSEMIDE 20 MG/1
20 TABLET ORAL EVERY OTHER DAY
Status: DISCONTINUED | OUTPATIENT
Start: 2021-10-17 | End: 2021-10-22

## 2021-10-16 RX ORDER — OXYCODONE HYDROCHLORIDE 10 MG/1
10 TABLET ORAL
Status: DISCONTINUED | OUTPATIENT
Start: 2021-10-16 | End: 2021-10-21

## 2021-10-16 RX ORDER — CHOLECALCIFEROL (VITAMIN D3) 25 MCG
2000 TABLET ORAL DAILY
Status: DISCONTINUED | OUTPATIENT
Start: 2021-10-16 | End: 2021-10-27 | Stop reason: HOSPADM

## 2021-10-16 RX ORDER — MUPIROCIN 20 MG/G
OINTMENT TOPICAL
Status: CANCELLED | OUTPATIENT
Start: 2021-10-16

## 2021-10-16 RX ORDER — TRANEXAMIC ACID 100 MG/ML
1000 INJECTION, SOLUTION INTRAVENOUS
Status: CANCELLED | OUTPATIENT
Start: 2021-10-16

## 2021-10-16 RX ORDER — LIDOCAINE HYDROCHLORIDE 10 MG/ML
20 INJECTION INFILTRATION; PERINEURAL ONCE
Status: DISCONTINUED | OUTPATIENT
Start: 2021-10-16 | End: 2021-10-19

## 2021-10-16 RX ORDER — MORPHINE SULFATE 2 MG/ML
INJECTION, SOLUTION INTRAMUSCULAR; INTRAVENOUS
Status: COMPLETED
Start: 2021-10-16 | End: 2021-10-16

## 2021-10-16 RX ORDER — TALC
6 POWDER (GRAM) TOPICAL NIGHTLY PRN
Status: CANCELLED | OUTPATIENT
Start: 2021-10-16

## 2021-10-16 RX ORDER — ONDANSETRON 4 MG/1
4 TABLET, ORALLY DISINTEGRATING ORAL ONCE AS NEEDED
Status: DISCONTINUED | OUTPATIENT
Start: 2021-10-16 | End: 2021-10-18

## 2021-10-16 RX ORDER — HYDROCODONE BITARTRATE AND ACETAMINOPHEN 500; 5 MG/1; MG/1
TABLET ORAL
Status: DISCONTINUED | OUTPATIENT
Start: 2021-10-16 | End: 2021-10-17

## 2021-10-16 RX ORDER — MIDAZOLAM HYDROCHLORIDE 1 MG/ML
0.5 INJECTION INTRAMUSCULAR; INTRAVENOUS
Status: CANCELLED | OUTPATIENT
Start: 2021-10-16

## 2021-10-16 RX ORDER — FAMOTIDINE 20 MG/1
20 TABLET, FILM COATED ORAL 2 TIMES DAILY
Status: DISCONTINUED | OUTPATIENT
Start: 2021-10-16 | End: 2021-10-18

## 2021-10-16 RX ORDER — ISOSORBIDE MONONITRATE 30 MG/1
30 TABLET, EXTENDED RELEASE ORAL DAILY
Status: DISCONTINUED | OUTPATIENT
Start: 2021-10-16 | End: 2021-10-16

## 2021-10-16 RX ORDER — HYDRALAZINE HYDROCHLORIDE 25 MG/1
25 TABLET, FILM COATED ORAL EVERY 8 HOURS
Status: DISCONTINUED | OUTPATIENT
Start: 2021-10-16 | End: 2021-10-16

## 2021-10-16 RX ORDER — CEFAZOLIN SODIUM 1 G/3ML
2 INJECTION, POWDER, FOR SOLUTION INTRAMUSCULAR; INTRAVENOUS
Status: CANCELLED | OUTPATIENT
Start: 2021-10-16

## 2021-10-16 RX ORDER — MORPHINE SULFATE 4 MG/ML
4 INJECTION, SOLUTION INTRAMUSCULAR; INTRAVENOUS
Status: COMPLETED | OUTPATIENT
Start: 2021-10-16 | End: 2021-10-16

## 2021-10-16 RX ORDER — MUPIROCIN 20 MG/G
1 OINTMENT TOPICAL
Status: CANCELLED | OUTPATIENT
Start: 2021-10-16

## 2021-10-16 RX ORDER — CARVEDILOL 12.5 MG/1
12.5 TABLET ORAL 2 TIMES DAILY
Status: DISCONTINUED | OUTPATIENT
Start: 2021-10-16 | End: 2021-10-18

## 2021-10-16 RX ORDER — FENTANYL CITRATE 50 UG/ML
25 INJECTION, SOLUTION INTRAMUSCULAR; INTRAVENOUS EVERY 5 MIN PRN
Status: CANCELLED | OUTPATIENT
Start: 2021-10-16

## 2021-10-16 RX ORDER — ATORVASTATIN CALCIUM 10 MG/1
40 TABLET, FILM COATED ORAL DAILY
Status: DISCONTINUED | OUTPATIENT
Start: 2021-10-16 | End: 2021-10-27 | Stop reason: HOSPADM

## 2021-10-16 RX ORDER — SODIUM CHLORIDE 0.9 % (FLUSH) 0.9 %
10 SYRINGE (ML) INJECTION
Status: CANCELLED | OUTPATIENT
Start: 2021-10-16

## 2021-10-16 RX ORDER — SODIUM CHLORIDE 9 MG/ML
INJECTION, SOLUTION INTRAVENOUS CONTINUOUS
Status: DISCONTINUED | OUTPATIENT
Start: 2021-10-16 | End: 2021-10-16

## 2021-10-16 RX ORDER — SODIUM CHLORIDE 0.9 % (FLUSH) 0.9 %
10 SYRINGE (ML) INJECTION
Status: DISCONTINUED | OUTPATIENT
Start: 2021-10-16 | End: 2021-10-18

## 2021-10-16 RX ORDER — TALC
6 POWDER (GRAM) TOPICAL NIGHTLY PRN
Status: DISCONTINUED | OUTPATIENT
Start: 2021-10-16 | End: 2021-10-27 | Stop reason: HOSPADM

## 2021-10-16 RX ORDER — EPINEPHRINE 0.3 MG/.3ML
0.3 INJECTION SUBCUTANEOUS
Status: DISCONTINUED | OUTPATIENT
Start: 2021-10-16 | End: 2021-10-18

## 2021-10-16 RX ORDER — MORPHINE SULFATE 2 MG/ML
2 INJECTION, SOLUTION INTRAMUSCULAR; INTRAVENOUS
Status: DISCONTINUED | OUTPATIENT
Start: 2021-10-16 | End: 2021-10-21

## 2021-10-16 RX ORDER — ESCITALOPRAM OXALATE 20 MG/1
20 TABLET ORAL DAILY
Status: DISCONTINUED | OUTPATIENT
Start: 2021-10-16 | End: 2021-10-27 | Stop reason: HOSPADM

## 2021-10-16 RX ORDER — TIMOLOL MALEATE 5 MG/ML
1 SOLUTION/ DROPS OPHTHALMIC 2 TIMES DAILY
Status: DISCONTINUED | OUTPATIENT
Start: 2021-10-16 | End: 2021-10-27 | Stop reason: HOSPADM

## 2021-10-16 RX ORDER — ALBUTEROL SULFATE 90 UG/1
1 AEROSOL, METERED RESPIRATORY (INHALATION) EVERY 6 HOURS PRN
Status: DISCONTINUED | OUTPATIENT
Start: 2021-10-16 | End: 2021-10-27 | Stop reason: HOSPADM

## 2021-10-16 RX ADMIN — ACETAMINOPHEN 1000 MG: 500 TABLET ORAL at 09:10

## 2021-10-16 RX ADMIN — FAMOTIDINE 20 MG: 20 TABLET ORAL at 09:10

## 2021-10-16 RX ADMIN — PREGABALIN 75 MG: 75 CAPSULE ORAL at 09:10

## 2021-10-16 RX ADMIN — Medication 2000 UNITS: at 01:10

## 2021-10-16 RX ADMIN — MORPHINE SULFATE 4 MG: 4 INJECTION INTRAVENOUS at 09:10

## 2021-10-16 RX ADMIN — ACETAMINOPHEN 1000 MG: 500 TABLET ORAL at 01:10

## 2021-10-16 RX ADMIN — TIMOLOL MALEATE 1 DROP: 5 SOLUTION OPHTHALMIC at 09:10

## 2021-10-16 RX ADMIN — CARVEDILOL 12.5 MG: 12.5 TABLET, FILM COATED ORAL at 09:10

## 2021-10-16 RX ADMIN — FAMOTIDINE 20 MG: 20 TABLET ORAL at 01:10

## 2021-10-16 RX ADMIN — MORPHINE SULFATE 2 MG: 2 INJECTION, SOLUTION INTRAMUSCULAR; INTRAVENOUS at 12:10

## 2021-10-17 ENCOUNTER — ANESTHESIA (OUTPATIENT)
Dept: SURGERY | Facility: HOSPITAL | Age: 86
DRG: 480 | End: 2021-10-17
Payer: MEDICARE

## 2021-10-17 LAB
ALBUMIN SERPL BCP-MCNC: 2.3 G/DL (ref 3.5–5.2)
ALLENS TEST: ABNORMAL
ALP SERPL-CCNC: 70 U/L (ref 55–135)
ALT SERPL W/O P-5'-P-CCNC: 12 U/L (ref 10–44)
ANION GAP SERPL CALC-SCNC: 12 MMOL/L (ref 8–16)
ANION GAP SERPL CALC-SCNC: 16 MMOL/L (ref 8–16)
ANISOCYTOSIS BLD QL SMEAR: SLIGHT
APTT BLDCRRT: 29.6 SEC (ref 21–32)
AST SERPL-CCNC: 29 U/L (ref 10–40)
BASOPHILS # BLD AUTO: 0.05 K/UL (ref 0–0.2)
BASOPHILS # BLD AUTO: 0.06 K/UL (ref 0–0.2)
BASOPHILS NFR BLD: 0.2 % (ref 0–1.9)
BASOPHILS NFR BLD: 0.3 % (ref 0–1.9)
BILIRUB SERPL-MCNC: 0.3 MG/DL (ref 0.1–1)
BUN SERPL-MCNC: 41 MG/DL (ref 8–23)
BUN SERPL-MCNC: 48 MG/DL (ref 8–23)
CALCIUM SERPL-MCNC: 8.3 MG/DL (ref 8.7–10.5)
CALCIUM SERPL-MCNC: 9.6 MG/DL (ref 8.7–10.5)
CHLORIDE SERPL-SCNC: 102 MMOL/L (ref 95–110)
CHLORIDE SERPL-SCNC: 102 MMOL/L (ref 95–110)
CO2 SERPL-SCNC: 17 MMOL/L (ref 23–29)
CO2 SERPL-SCNC: 20 MMOL/L (ref 23–29)
CREAT SERPL-MCNC: 1.9 MG/DL (ref 0.5–1.4)
CREAT SERPL-MCNC: 2.3 MG/DL (ref 0.5–1.4)
CRP SERPL-MCNC: 35.5 MG/L (ref 0–8.2)
D DIMER PPP IA.FEU-MCNC: 2.91 MG/L FEU
DELSYS: ABNORMAL
DIFFERENTIAL METHOD: ABNORMAL
DIFFERENTIAL METHOD: ABNORMAL
DOHLE BOD BLD QL SMEAR: PRESENT
EOSINOPHIL # BLD AUTO: 0.1 K/UL (ref 0–0.5)
EOSINOPHIL # BLD AUTO: 0.2 K/UL (ref 0–0.5)
EOSINOPHIL NFR BLD: 0.3 % (ref 0–8)
EOSINOPHIL NFR BLD: 1 % (ref 0–8)
ERYTHROCYTE [DISTWIDTH] IN BLOOD BY AUTOMATED COUNT: 14.7 % (ref 11.5–14.5)
ERYTHROCYTE [DISTWIDTH] IN BLOOD BY AUTOMATED COUNT: 14.9 % (ref 11.5–14.5)
EST. GFR  (AFRICAN AMERICAN): 21.1 ML/MIN/1.73 M^2
EST. GFR  (AFRICAN AMERICAN): 26.6 ML/MIN/1.73 M^2
EST. GFR  (NON AFRICAN AMERICAN): 18.3 ML/MIN/1.73 M^2
EST. GFR  (NON AFRICAN AMERICAN): 23 ML/MIN/1.73 M^2
FERRITIN SERPL-MCNC: 144 NG/ML (ref 20–300)
FIO2: 21
GLUCOSE SERPL-MCNC: 111 MG/DL (ref 70–110)
GLUCOSE SERPL-MCNC: 243 MG/DL (ref 70–110)
HCT VFR BLD AUTO: 22.5 % (ref 37–48.5)
HCT VFR BLD AUTO: 30 % (ref 37–48.5)
HGB BLD-MCNC: 7.1 G/DL (ref 12–16)
HGB BLD-MCNC: 9.3 G/DL (ref 12–16)
HYPOCHROMIA BLD QL SMEAR: ABNORMAL
IMM GRANULOCYTES # BLD AUTO: 0.07 K/UL (ref 0–0.04)
IMM GRANULOCYTES # BLD AUTO: 0.22 K/UL (ref 0–0.04)
IMM GRANULOCYTES NFR BLD AUTO: 0.5 % (ref 0–0.5)
IMM GRANULOCYTES NFR BLD AUTO: 0.8 % (ref 0–0.5)
INR PPP: 1.1 (ref 0.8–1.2)
LACTATE SERPL-SCNC: 4.5 MMOL/L (ref 0.5–2.2)
LDH SERPL L TO P-CCNC: 348 U/L (ref 110–260)
LDH SERPL L TO P-CCNC: 4.79 MMOL/L (ref 0.36–1.25)
LYMPHOCYTES # BLD AUTO: 2.5 K/UL (ref 1–4.8)
LYMPHOCYTES # BLD AUTO: 2.5 K/UL (ref 1–4.8)
LYMPHOCYTES NFR BLD: 16.8 % (ref 18–48)
LYMPHOCYTES NFR BLD: 8.8 % (ref 18–48)
MAGNESIUM SERPL-MCNC: 2.2 MG/DL (ref 1.6–2.6)
MAGNESIUM SERPL-MCNC: 2.4 MG/DL (ref 1.6–2.6)
MCH RBC QN AUTO: 32.4 PG (ref 27–31)
MCH RBC QN AUTO: 32.7 PG (ref 27–31)
MCHC RBC AUTO-ENTMCNC: 31 G/DL (ref 32–36)
MCHC RBC AUTO-ENTMCNC: 31.6 G/DL (ref 32–36)
MCV RBC AUTO: 104 FL (ref 82–98)
MCV RBC AUTO: 105 FL (ref 82–98)
MODE: ABNORMAL
MONOCYTES # BLD AUTO: 1 K/UL (ref 0.3–1)
MONOCYTES # BLD AUTO: 1.3 K/UL (ref 0.3–1)
MONOCYTES NFR BLD: 3.7 % (ref 4–15)
MONOCYTES NFR BLD: 9 % (ref 4–15)
NEUTROPHILS # BLD AUTO: 10.6 K/UL (ref 1.8–7.7)
NEUTROPHILS # BLD AUTO: 24.1 K/UL (ref 1.8–7.7)
NEUTROPHILS NFR BLD: 72.4 % (ref 38–73)
NEUTROPHILS NFR BLD: 86.2 % (ref 38–73)
NRBC BLD-RTO: 0 /100 WBC
NRBC BLD-RTO: 0 /100 WBC
OVALOCYTES BLD QL SMEAR: ABNORMAL
PHOSPHATE SERPL-MCNC: 5.3 MG/DL (ref 2.7–4.5)
PHOSPHATE SERPL-MCNC: 6 MG/DL (ref 2.7–4.5)
PLATELET # BLD AUTO: 182 K/UL (ref 150–450)
PLATELET # BLD AUTO: 185 K/UL (ref 150–450)
PLATELET BLD QL SMEAR: ABNORMAL
PMV BLD AUTO: 12.5 FL (ref 9.2–12.9)
PMV BLD AUTO: 12.9 FL (ref 9.2–12.9)
POCT GLUCOSE: 133 MG/DL (ref 70–110)
POCT GLUCOSE: 279 MG/DL (ref 70–110)
POCT GLUCOSE: 317 MG/DL (ref 70–110)
POIKILOCYTOSIS BLD QL SMEAR: SLIGHT
POTASSIUM SERPL-SCNC: 4.7 MMOL/L (ref 3.5–5.1)
POTASSIUM SERPL-SCNC: 4.7 MMOL/L (ref 3.5–5.1)
PROT SERPL-MCNC: 5.4 G/DL (ref 6–8.4)
PROTHROMBIN TIME: 12.4 SEC (ref 9–12.5)
RBC # BLD AUTO: 2.17 M/UL (ref 4–5.4)
RBC # BLD AUTO: 2.87 M/UL (ref 4–5.4)
SAMPLE: ABNORMAL
SARS-COV-2 RNA RESP QL NAA+PROBE: NOT DETECTED
SCHISTOCYTES BLD QL SMEAR: ABNORMAL
SITE: ABNORMAL
SODIUM SERPL-SCNC: 134 MMOL/L (ref 136–145)
SODIUM SERPL-SCNC: 135 MMOL/L (ref 136–145)
TOXIC GRANULES BLD QL SMEAR: PRESENT
WBC # BLD AUTO: 14.63 K/UL (ref 3.9–12.7)
WBC # BLD AUTO: 27.97 K/UL (ref 3.9–12.7)
WBC TOXIC VACUOLES BLD QL SMEAR: PRESENT

## 2021-10-17 PROCEDURE — 85610 PROTHROMBIN TIME: CPT | Performed by: STUDENT IN AN ORGANIZED HEALTH CARE EDUCATION/TRAINING PROGRAM

## 2021-10-17 PROCEDURE — 25000003 PHARM REV CODE 250: Performed by: STUDENT IN AN ORGANIZED HEALTH CARE EDUCATION/TRAINING PROGRAM

## 2021-10-17 PROCEDURE — 20000000 HC ICU ROOM

## 2021-10-17 PROCEDURE — 27201423 OPTIME MED/SURG SUP & DEVICES STERILE SUPPLY: Performed by: STUDENT IN AN ORGANIZED HEALTH CARE EDUCATION/TRAINING PROGRAM

## 2021-10-17 PROCEDURE — 63600175 PHARM REV CODE 636 W HCPCS: Performed by: STUDENT IN AN ORGANIZED HEALTH CARE EDUCATION/TRAINING PROGRAM

## 2021-10-17 PROCEDURE — 25000003 PHARM REV CODE 250: Performed by: NURSE ANESTHETIST, CERTIFIED REGISTERED

## 2021-10-17 PROCEDURE — 85025 COMPLETE CBC W/AUTO DIFF WBC: CPT | Mod: 91 | Performed by: STUDENT IN AN ORGANIZED HEALTH CARE EDUCATION/TRAINING PROGRAM

## 2021-10-17 PROCEDURE — 83735 ASSAY OF MAGNESIUM: CPT | Mod: 91 | Performed by: STUDENT IN AN ORGANIZED HEALTH CARE EDUCATION/TRAINING PROGRAM

## 2021-10-17 PROCEDURE — 27245 TREAT THIGH FRACTURE: CPT | Mod: 22,RT,GC, | Performed by: STUDENT IN AN ORGANIZED HEALTH CARE EDUCATION/TRAINING PROGRAM

## 2021-10-17 PROCEDURE — C1769 GUIDE WIRE: HCPCS | Performed by: STUDENT IN AN ORGANIZED HEALTH CARE EDUCATION/TRAINING PROGRAM

## 2021-10-17 PROCEDURE — 84132 ASSAY OF SERUM POTASSIUM: CPT

## 2021-10-17 PROCEDURE — 99233 SBSQ HOSP IP/OBS HIGH 50: CPT | Mod: ,,, | Performed by: STUDENT IN AN ORGANIZED HEALTH CARE EDUCATION/TRAINING PROGRAM

## 2021-10-17 PROCEDURE — D9220A PRA ANESTHESIA: Mod: CRNA,,, | Performed by: NURSE ANESTHETIST, CERTIFIED REGISTERED

## 2021-10-17 PROCEDURE — 83735 ASSAY OF MAGNESIUM: CPT | Performed by: STUDENT IN AN ORGANIZED HEALTH CARE EDUCATION/TRAINING PROGRAM

## 2021-10-17 PROCEDURE — U0005 INFEC AGEN DETEC AMPLI PROBE: HCPCS | Performed by: STUDENT IN AN ORGANIZED HEALTH CARE EDUCATION/TRAINING PROGRAM

## 2021-10-17 PROCEDURE — 99233 PR SUBSEQUENT HOSPITAL CARE,LEVL III: ICD-10-PCS | Mod: ,,, | Performed by: STUDENT IN AN ORGANIZED HEALTH CARE EDUCATION/TRAINING PROGRAM

## 2021-10-17 PROCEDURE — 27245 PR OPEN FIX INTER/SUBTROCH FX,IMPLNT: ICD-10-PCS | Mod: 22,RT,GC, | Performed by: STUDENT IN AN ORGANIZED HEALTH CARE EDUCATION/TRAINING PROGRAM

## 2021-10-17 PROCEDURE — P9021 RED BLOOD CELLS UNIT: HCPCS | Performed by: STUDENT IN AN ORGANIZED HEALTH CARE EDUCATION/TRAINING PROGRAM

## 2021-10-17 PROCEDURE — 84100 ASSAY OF PHOSPHORUS: CPT | Mod: 91 | Performed by: STUDENT IN AN ORGANIZED HEALTH CARE EDUCATION/TRAINING PROGRAM

## 2021-10-17 PROCEDURE — 85730 THROMBOPLASTIN TIME PARTIAL: CPT | Performed by: STUDENT IN AN ORGANIZED HEALTH CARE EDUCATION/TRAINING PROGRAM

## 2021-10-17 PROCEDURE — 84100 ASSAY OF PHOSPHORUS: CPT | Performed by: STUDENT IN AN ORGANIZED HEALTH CARE EDUCATION/TRAINING PROGRAM

## 2021-10-17 PROCEDURE — M0243 CASIRIVI AND IMDEVI INFUSION: HCPCS | Performed by: EMERGENCY MEDICINE

## 2021-10-17 PROCEDURE — 36620 ARTERIAL: ICD-10-PCS | Mod: 59,,, | Performed by: ANESTHESIOLOGY

## 2021-10-17 PROCEDURE — 36430 TRANSFUSION BLD/BLD COMPNT: CPT

## 2021-10-17 PROCEDURE — 83605 ASSAY OF LACTIC ACID: CPT | Performed by: STUDENT IN AN ORGANIZED HEALTH CARE EDUCATION/TRAINING PROGRAM

## 2021-10-17 PROCEDURE — D9220A PRA ANESTHESIA: ICD-10-PCS | Mod: ANES,,, | Performed by: ANESTHESIOLOGY

## 2021-10-17 PROCEDURE — 80053 COMPREHEN METABOLIC PANEL: CPT | Performed by: STUDENT IN AN ORGANIZED HEALTH CARE EDUCATION/TRAINING PROGRAM

## 2021-10-17 PROCEDURE — 80048 BASIC METABOLIC PNL TOTAL CA: CPT | Performed by: STUDENT IN AN ORGANIZED HEALTH CARE EDUCATION/TRAINING PROGRAM

## 2021-10-17 PROCEDURE — 36000710: Performed by: STUDENT IN AN ORGANIZED HEALTH CARE EDUCATION/TRAINING PROGRAM

## 2021-10-17 PROCEDURE — C1713 ANCHOR/SCREW BN/BN,TIS/BN: HCPCS | Performed by: STUDENT IN AN ORGANIZED HEALTH CARE EDUCATION/TRAINING PROGRAM

## 2021-10-17 PROCEDURE — 85014 HEMATOCRIT: CPT

## 2021-10-17 PROCEDURE — D9220A PRA ANESTHESIA: Mod: ANES,,, | Performed by: ANESTHESIOLOGY

## 2021-10-17 PROCEDURE — 84295 ASSAY OF SERUM SODIUM: CPT

## 2021-10-17 PROCEDURE — U0003 INFECTIOUS AGENT DETECTION BY NUCLEIC ACID (DNA OR RNA); SEVERE ACUTE RESPIRATORY SYNDROME CORONAVIRUS 2 (SARS-COV-2) (CORONAVIRUS DISEASE [COVID-19]), AMPLIFIED PROBE TECHNIQUE, MAKING USE OF HIGH THROUGHPUT TECHNOLOGIES AS DESCRIBED BY CMS-2020-01-R: HCPCS | Performed by: STUDENT IN AN ORGANIZED HEALTH CARE EDUCATION/TRAINING PROGRAM

## 2021-10-17 PROCEDURE — 63600175 PHARM REV CODE 636 W HCPCS: Performed by: ANESTHESIOLOGY

## 2021-10-17 PROCEDURE — 82803 BLOOD GASES ANY COMBINATION: CPT

## 2021-10-17 PROCEDURE — 36415 COLL VENOUS BLD VENIPUNCTURE: CPT | Performed by: STUDENT IN AN ORGANIZED HEALTH CARE EDUCATION/TRAINING PROGRAM

## 2021-10-17 PROCEDURE — 37799 UNLISTED PX VASCULAR SURGERY: CPT

## 2021-10-17 PROCEDURE — 94761 N-INVAS EAR/PLS OXIMETRY MLT: CPT

## 2021-10-17 PROCEDURE — D9220A PRA ANESTHESIA: ICD-10-PCS | Mod: CRNA,,, | Performed by: NURSE ANESTHETIST, CERTIFIED REGISTERED

## 2021-10-17 PROCEDURE — 85379 FIBRIN DEGRADATION QUANT: CPT | Performed by: STUDENT IN AN ORGANIZED HEALTH CARE EDUCATION/TRAINING PROGRAM

## 2021-10-17 PROCEDURE — 82728 ASSAY OF FERRITIN: CPT | Performed by: STUDENT IN AN ORGANIZED HEALTH CARE EDUCATION/TRAINING PROGRAM

## 2021-10-17 PROCEDURE — 27000221 HC OXYGEN, UP TO 24 HOURS

## 2021-10-17 PROCEDURE — 63600175 PHARM REV CODE 636 W HCPCS: Performed by: EMERGENCY MEDICINE

## 2021-10-17 PROCEDURE — 37000009 HC ANESTHESIA EA ADD 15 MINS: Performed by: STUDENT IN AN ORGANIZED HEALTH CARE EDUCATION/TRAINING PROGRAM

## 2021-10-17 PROCEDURE — 83615 LACTATE (LD) (LDH) ENZYME: CPT | Performed by: STUDENT IN AN ORGANIZED HEALTH CARE EDUCATION/TRAINING PROGRAM

## 2021-10-17 PROCEDURE — 36000711: Performed by: STUDENT IN AN ORGANIZED HEALTH CARE EDUCATION/TRAINING PROGRAM

## 2021-10-17 PROCEDURE — 36620 INSERTION CATHETER ARTERY: CPT | Mod: 59,,, | Performed by: ANESTHESIOLOGY

## 2021-10-17 PROCEDURE — 27000207 HC ISOLATION

## 2021-10-17 PROCEDURE — 99900035 HC TECH TIME PER 15 MIN (STAT)

## 2021-10-17 PROCEDURE — 86140 C-REACTIVE PROTEIN: CPT | Performed by: STUDENT IN AN ORGANIZED HEALTH CARE EDUCATION/TRAINING PROGRAM

## 2021-10-17 PROCEDURE — 63600175 PHARM REV CODE 636 W HCPCS: Performed by: NURSE ANESTHETIST, CERTIFIED REGISTERED

## 2021-10-17 PROCEDURE — 37000008 HC ANESTHESIA 1ST 15 MINUTES: Performed by: STUDENT IN AN ORGANIZED HEALTH CARE EDUCATION/TRAINING PROGRAM

## 2021-10-17 PROCEDURE — 82330 ASSAY OF CALCIUM: CPT

## 2021-10-17 PROCEDURE — 83605 ASSAY OF LACTIC ACID: CPT

## 2021-10-17 DEVICE — SCREW LAG TITANIUM 10.5X90: Type: IMPLANTABLE DEVICE | Site: FEMUR | Status: FUNCTIONAL

## 2021-10-17 DEVICE — IMPLANTABLE DEVICE: Type: IMPLANTABLE DEVICE | Site: FEMUR | Status: FUNCTIONAL

## 2021-10-17 DEVICE — SCREW LOCKING T2 F/T 5X52.5MM: Type: IMPLANTABLE DEVICE | Site: FEMUR | Status: FUNCTIONAL

## 2021-10-17 DEVICE — SCREW LOCKING T2 F/T 5X47.5MM: Type: IMPLANTABLE DEVICE | Site: FEMUR | Status: FUNCTIONAL

## 2021-10-17 RX ORDER — POLYETHYLENE GLYCOL 3350 17 G/17G
17 POWDER, FOR SOLUTION ORAL DAILY
Status: DISCONTINUED | OUTPATIENT
Start: 2021-10-18 | End: 2021-10-27 | Stop reason: HOSPADM

## 2021-10-17 RX ORDER — ROCURONIUM BROMIDE 10 MG/ML
INJECTION, SOLUTION INTRAVENOUS
Status: DISCONTINUED | OUTPATIENT
Start: 2021-10-17 | End: 2021-10-17

## 2021-10-17 RX ORDER — CEFAZOLIN SODIUM 1 G/3ML
INJECTION, POWDER, FOR SOLUTION INTRAMUSCULAR; INTRAVENOUS
Status: DISCONTINUED | OUTPATIENT
Start: 2021-10-17 | End: 2021-10-17

## 2021-10-17 RX ORDER — LANOLIN ALCOHOL/MO/W.PET/CERES
800 CREAM (GRAM) TOPICAL
Status: DISCONTINUED | OUTPATIENT
Start: 2021-10-17 | End: 2021-10-17

## 2021-10-17 RX ORDER — POTASSIUM CHLORIDE 20 MEQ/1
40 TABLET, EXTENDED RELEASE ORAL
Status: DISCONTINUED | OUTPATIENT
Start: 2021-10-17 | End: 2021-10-18

## 2021-10-17 RX ORDER — AMOXICILLIN 250 MG
1 CAPSULE ORAL 2 TIMES DAILY
Status: DISCONTINUED | OUTPATIENT
Start: 2021-10-17 | End: 2021-10-27 | Stop reason: HOSPADM

## 2021-10-17 RX ORDER — GLUCAGON 1 MG
1 KIT INJECTION
Status: DISCONTINUED | OUTPATIENT
Start: 2021-10-17 | End: 2021-10-18

## 2021-10-17 RX ORDER — VANCOMYCIN HYDROCHLORIDE 1 G/20ML
INJECTION, POWDER, LYOPHILIZED, FOR SOLUTION INTRAVENOUS
Status: DISCONTINUED | OUTPATIENT
Start: 2021-10-17 | End: 2021-10-17 | Stop reason: HOSPADM

## 2021-10-17 RX ORDER — SODIUM,POTASSIUM PHOSPHATES 280-250MG
2 POWDER IN PACKET (EA) ORAL
Status: DISCONTINUED | OUTPATIENT
Start: 2021-10-17 | End: 2021-10-17

## 2021-10-17 RX ORDER — LIDOCAINE HYDROCHLORIDE 20 MG/ML
INJECTION INTRAVENOUS
Status: DISCONTINUED | OUTPATIENT
Start: 2021-10-17 | End: 2021-10-17

## 2021-10-17 RX ORDER — MUPIROCIN 20 MG/G
1 OINTMENT TOPICAL 2 TIMES DAILY
Status: COMPLETED | OUTPATIENT
Start: 2021-10-18 | End: 2021-10-22

## 2021-10-17 RX ORDER — LANOLIN ALCOHOL/MO/W.PET/CERES
800 CREAM (GRAM) TOPICAL
Status: DISCONTINUED | OUTPATIENT
Start: 2021-10-17 | End: 2021-10-18

## 2021-10-17 RX ORDER — EPINEPHRINE 0.1 MG/ML
INJECTION INTRAVENOUS
Status: DISCONTINUED | OUTPATIENT
Start: 2021-10-17 | End: 2021-10-17

## 2021-10-17 RX ORDER — FENTANYL CITRATE 50 UG/ML
25 INJECTION, SOLUTION INTRAMUSCULAR; INTRAVENOUS EVERY 5 MIN PRN
Status: DISCONTINUED | OUTPATIENT
Start: 2021-10-17 | End: 2021-10-17

## 2021-10-17 RX ORDER — SODIUM,POTASSIUM PHOSPHATES 280-250MG
2 POWDER IN PACKET (EA) ORAL
Status: DISCONTINUED | OUTPATIENT
Start: 2021-10-17 | End: 2021-10-18

## 2021-10-17 RX ORDER — FENTANYL CITRATE 50 UG/ML
25 INJECTION, SOLUTION INTRAMUSCULAR; INTRAVENOUS EVERY 5 MIN PRN
Status: DISCONTINUED | OUTPATIENT
Start: 2021-10-17 | End: 2021-10-18

## 2021-10-17 RX ORDER — ENOXAPARIN SODIUM 100 MG/ML
30 INJECTION SUBCUTANEOUS EVERY 24 HOURS
Status: DISCONTINUED | OUTPATIENT
Start: 2021-10-18 | End: 2021-10-18

## 2021-10-17 RX ORDER — ONDANSETRON 8 MG/1
8 TABLET, ORALLY DISINTEGRATING ORAL EVERY 8 HOURS PRN
Status: DISCONTINUED | OUTPATIENT
Start: 2021-10-17 | End: 2021-10-18

## 2021-10-17 RX ORDER — HYDROCODONE BITARTRATE AND ACETAMINOPHEN 500; 5 MG/1; MG/1
TABLET ORAL
Status: DISCONTINUED | OUTPATIENT
Start: 2021-10-17 | End: 2021-10-19

## 2021-10-17 RX ORDER — CEFAZOLIN SODIUM 1 G/3ML
2 INJECTION, POWDER, FOR SOLUTION INTRAMUSCULAR; INTRAVENOUS
Status: DISPENSED | OUTPATIENT
Start: 2021-10-17 | End: 2021-10-18

## 2021-10-17 RX ORDER — ONDANSETRON 2 MG/ML
INJECTION INTRAMUSCULAR; INTRAVENOUS
Status: DISCONTINUED | OUTPATIENT
Start: 2021-10-17 | End: 2021-10-17

## 2021-10-17 RX ORDER — ENOXAPARIN SODIUM 100 MG/ML
40 INJECTION SUBCUTANEOUS EVERY 24 HOURS
Status: DISCONTINUED | OUTPATIENT
Start: 2021-10-18 | End: 2021-10-17

## 2021-10-17 RX ORDER — METHOCARBAMOL 500 MG/1
500 TABLET, FILM COATED ORAL EVERY 6 HOURS PRN
Status: DISCONTINUED | OUTPATIENT
Start: 2021-10-17 | End: 2021-10-27 | Stop reason: HOSPADM

## 2021-10-17 RX ORDER — INSULIN ASPART 100 [IU]/ML
0-5 INJECTION, SOLUTION INTRAVENOUS; SUBCUTANEOUS EVERY 6 HOURS PRN
Status: DISCONTINUED | OUTPATIENT
Start: 2021-10-17 | End: 2021-10-18

## 2021-10-17 RX ORDER — ACETAMINOPHEN 500 MG
1000 TABLET ORAL EVERY 6 HOURS
Status: DISPENSED | OUTPATIENT
Start: 2021-10-17 | End: 2021-10-19

## 2021-10-17 RX ORDER — FENTANYL CITRATE 50 UG/ML
INJECTION, SOLUTION INTRAMUSCULAR; INTRAVENOUS
Status: DISCONTINUED | OUTPATIENT
Start: 2021-10-17 | End: 2021-10-17

## 2021-10-17 RX ORDER — HYDROMORPHONE HYDROCHLORIDE 1 MG/ML
0.2 INJECTION, SOLUTION INTRAMUSCULAR; INTRAVENOUS; SUBCUTANEOUS EVERY 4 HOURS PRN
Status: DISCONTINUED | OUTPATIENT
Start: 2021-10-17 | End: 2021-10-18

## 2021-10-17 RX ORDER — DEXAMETHASONE SODIUM PHOSPHATE 4 MG/ML
INJECTION, SOLUTION INTRA-ARTICULAR; INTRALESIONAL; INTRAMUSCULAR; INTRAVENOUS; SOFT TISSUE
Status: DISCONTINUED | OUTPATIENT
Start: 2021-10-17 | End: 2021-10-17

## 2021-10-17 RX ORDER — ROPIVACAINE HYDROCHLORIDE 2 MG/ML
10 INJECTION, SOLUTION EPIDURAL; INFILTRATION; PERINEURAL CONTINUOUS
Status: DISCONTINUED | OUTPATIENT
Start: 2021-10-17 | End: 2021-10-17

## 2021-10-17 RX ORDER — ETOMIDATE 2 MG/ML
INJECTION INTRAVENOUS
Status: DISCONTINUED | OUTPATIENT
Start: 2021-10-17 | End: 2021-10-17

## 2021-10-17 RX ORDER — ONDANSETRON 2 MG/ML
4 INJECTION INTRAMUSCULAR; INTRAVENOUS ONCE AS NEEDED
Status: DISCONTINUED | OUTPATIENT
Start: 2021-10-17 | End: 2021-10-27 | Stop reason: HOSPADM

## 2021-10-17 RX ORDER — HYDROMORPHONE HYDROCHLORIDE 1 MG/ML
0.2 INJECTION, SOLUTION INTRAMUSCULAR; INTRAVENOUS; SUBCUTANEOUS EVERY 5 MIN PRN
Status: DISCONTINUED | OUTPATIENT
Start: 2021-10-17 | End: 2021-10-17

## 2021-10-17 RX ORDER — TRANEXAMIC ACID 100 MG/ML
INJECTION, SOLUTION INTRAVENOUS
Status: DISCONTINUED | OUTPATIENT
Start: 2021-10-17 | End: 2021-10-17

## 2021-10-17 RX ADMIN — ACETAMINOPHEN 1000 MG: 500 TABLET ORAL at 11:10

## 2021-10-17 RX ADMIN — EPINEPHRINE 10 MCG: 0.1 INJECTION, SOLUTION ENDOTRACHEAL; INTRACARDIAC; INTRAVENOUS at 03:10

## 2021-10-17 RX ADMIN — CEFAZOLIN 2 G: 330 INJECTION, POWDER, FOR SOLUTION INTRAMUSCULAR; INTRAVENOUS at 03:10

## 2021-10-17 RX ADMIN — DEXAMETHASONE SODIUM PHOSPHATE 8 MG: 4 INJECTION, SOLUTION INTRAMUSCULAR; INTRAVENOUS at 03:10

## 2021-10-17 RX ADMIN — SUGAMMADEX 200 MG: 100 INJECTION, SOLUTION INTRAVENOUS at 06:10

## 2021-10-17 RX ADMIN — EPINEPHRINE 4 MCG: 0.1 INJECTION, SOLUTION ENDOTRACHEAL; INTRACARDIAC; INTRAVENOUS at 02:10

## 2021-10-17 RX ADMIN — EPINEPHRINE 5 MCG: 0.1 INJECTION, SOLUTION ENDOTRACHEAL; INTRACARDIAC; INTRAVENOUS at 03:10

## 2021-10-17 RX ADMIN — TRANEXAMIC ACID 1000 MG: 100 INJECTION, SOLUTION INTRAVENOUS at 03:10

## 2021-10-17 RX ADMIN — ETOMIDATE 14 MG: 2 INJECTION, SOLUTION INTRAVENOUS at 02:10

## 2021-10-17 RX ADMIN — PREGABALIN 75 MG: 75 CAPSULE ORAL at 10:10

## 2021-10-17 RX ADMIN — EPINEPHRINE 5 MCG: 0.1 INJECTION, SOLUTION ENDOTRACHEAL; INTRACARDIAC; INTRAVENOUS at 05:10

## 2021-10-17 RX ADMIN — ROCURONIUM BROMIDE 40 MG: 10 INJECTION, SOLUTION INTRAVENOUS at 02:10

## 2021-10-17 RX ADMIN — FAMOTIDINE 20 MG: 20 TABLET ORAL at 10:10

## 2021-10-17 RX ADMIN — ONDANSETRON 4 MG: 2 INJECTION INTRAMUSCULAR; INTRAVENOUS at 03:10

## 2021-10-17 RX ADMIN — FENTANYL CITRATE 50 MCG: 50 INJECTION, SOLUTION INTRAMUSCULAR; INTRAVENOUS at 03:10

## 2021-10-17 RX ADMIN — FENTANYL CITRATE 25 MCG: 50 INJECTION, SOLUTION INTRAMUSCULAR; INTRAVENOUS at 06:10

## 2021-10-17 RX ADMIN — OXYCODONE 5 MG: 5 TABLET ORAL at 04:10

## 2021-10-17 RX ADMIN — LIDOCAINE HYDROCHLORIDE 100 MG: 20 INJECTION, SOLUTION INTRAVENOUS at 02:10

## 2021-10-17 RX ADMIN — EPINEPHRINE 5 MCG: 0.1 INJECTION, SOLUTION ENDOTRACHEAL; INTRACARDIAC; INTRAVENOUS at 04:10

## 2021-10-17 RX ADMIN — DOCUSATE SODIUM 50 MG AND SENNOSIDES 8.6 MG 1 TABLET: 8.6; 5 TABLET, FILM COATED ORAL at 10:10

## 2021-10-17 RX ADMIN — TIMOLOL MALEATE 1 DROP: 5 SOLUTION OPHTHALMIC at 11:10

## 2021-10-17 RX ADMIN — SODIUM CHLORIDE, SODIUM LACTATE, POTASSIUM CHLORIDE, AND CALCIUM CHLORIDE 500 ML: .6; .31; .03; .02 INJECTION, SOLUTION INTRAVENOUS at 11:10

## 2021-10-17 RX ADMIN — EPINEPHRINE 0.02 MCG/KG/MIN: 1 INJECTION INTRAMUSCULAR; INTRAVENOUS; SUBCUTANEOUS at 03:10

## 2021-10-17 RX ADMIN — CASIRIVIMAB AND IMDEVIMAB 600 MG: 600; 600 INJECTION, SOLUTION, CONCENTRATE INTRAVENOUS at 12:10

## 2021-10-17 RX ADMIN — CARVEDILOL 12.5 MG: 12.5 TABLET, FILM COATED ORAL at 10:10

## 2021-10-17 RX ADMIN — INSULIN ASPART 4 UNITS: 100 INJECTION, SOLUTION INTRAVENOUS; SUBCUTANEOUS at 09:10

## 2021-10-18 ENCOUNTER — PATIENT MESSAGE (OUTPATIENT)
Dept: ADMINISTRATIVE | Facility: HOSPITAL | Age: 86
End: 2021-10-18
Payer: MEDICARE

## 2021-10-18 LAB
ALLENS TEST: ABNORMAL
ANION GAP SERPL CALC-SCNC: 13 MMOL/L (ref 8–16)
BASOPHILS # BLD AUTO: 0.01 K/UL (ref 0–0.2)
BASOPHILS # BLD AUTO: 0.02 K/UL (ref 0–0.2)
BASOPHILS # BLD AUTO: 0.02 K/UL (ref 0–0.2)
BASOPHILS NFR BLD: 0.1 % (ref 0–1.9)
BUN SERPL-MCNC: 51 MG/DL (ref 8–23)
CALCIUM SERPL-MCNC: 8.8 MG/DL (ref 8.7–10.5)
CHLORIDE SERPL-SCNC: 101 MMOL/L (ref 95–110)
CO2 SERPL-SCNC: 19 MMOL/L (ref 23–29)
CREAT SERPL-MCNC: 2.5 MG/DL (ref 0.5–1.4)
DELSYS: ABNORMAL
DIFFERENTIAL METHOD: ABNORMAL
EOSINOPHIL # BLD AUTO: 0 K/UL (ref 0–0.5)
EOSINOPHIL NFR BLD: 0 % (ref 0–8)
ERYTHROCYTE [DISTWIDTH] IN BLOOD BY AUTOMATED COUNT: 15.1 % (ref 11.5–14.5)
ERYTHROCYTE [DISTWIDTH] IN BLOOD BY AUTOMATED COUNT: 15.3 % (ref 11.5–14.5)
ERYTHROCYTE [DISTWIDTH] IN BLOOD BY AUTOMATED COUNT: 15.3 % (ref 11.5–14.5)
EST. GFR  (AFRICAN AMERICAN): 19.1 ML/MIN/1.73 M^2
EST. GFR  (NON AFRICAN AMERICAN): 16.5 ML/MIN/1.73 M^2
FIO2: 21
FIO2: 28
FIO2: 28
FLOW: 1
FLOW: 1
FLOW: 2
FLOW: 2
GLUCOSE SERPL-MCNC: 162 MG/DL (ref 70–110)
HCO3 UR-SCNC: 23 MMOL/L (ref 24–28)
HCO3 UR-SCNC: 23.2 MMOL/L (ref 24–28)
HCO3 UR-SCNC: 25.8 MMOL/L (ref 24–28)
HCT VFR BLD AUTO: 22 % (ref 37–48.5)
HCT VFR BLD AUTO: 23 % (ref 37–48.5)
HCT VFR BLD AUTO: 24.8 % (ref 37–48.5)
HCT VFR BLD CALC: 21 %PCV (ref 36–54)
HCT VFR BLD CALC: 22 %PCV (ref 36–54)
HGB BLD-MCNC: 7 G/DL (ref 12–16)
HGB BLD-MCNC: 7.3 G/DL (ref 12–16)
HGB BLD-MCNC: 7.9 G/DL (ref 12–16)
IMM GRANULOCYTES # BLD AUTO: 0.07 K/UL (ref 0–0.04)
IMM GRANULOCYTES # BLD AUTO: 0.08 K/UL (ref 0–0.04)
IMM GRANULOCYTES # BLD AUTO: 0.12 K/UL (ref 0–0.04)
IMM GRANULOCYTES NFR BLD AUTO: 0.4 % (ref 0–0.5)
IMM GRANULOCYTES NFR BLD AUTO: 0.4 % (ref 0–0.5)
IMM GRANULOCYTES NFR BLD AUTO: 0.6 % (ref 0–0.5)
LDH SERPL L TO P-CCNC: 1.95 MMOL/L (ref 0.36–1.25)
LDH SERPL L TO P-CCNC: 2.02 MMOL/L (ref 0.36–1.25)
LYMPHOCYTES # BLD AUTO: 0.8 K/UL (ref 1–4.8)
LYMPHOCYTES # BLD AUTO: 1.2 K/UL (ref 1–4.8)
LYMPHOCYTES # BLD AUTO: 1.3 K/UL (ref 1–4.8)
LYMPHOCYTES NFR BLD: 4.4 % (ref 18–48)
LYMPHOCYTES NFR BLD: 5.8 % (ref 18–48)
LYMPHOCYTES NFR BLD: 6.4 % (ref 18–48)
MAGNESIUM SERPL-MCNC: 2.2 MG/DL (ref 1.6–2.6)
MCH RBC QN AUTO: 30.8 PG (ref 27–31)
MCH RBC QN AUTO: 31.9 PG (ref 27–31)
MCH RBC QN AUTO: 31.9 PG (ref 27–31)
MCHC RBC AUTO-ENTMCNC: 31.7 G/DL (ref 32–36)
MCHC RBC AUTO-ENTMCNC: 31.8 G/DL (ref 32–36)
MCHC RBC AUTO-ENTMCNC: 31.9 G/DL (ref 32–36)
MCV RBC AUTO: 100 FL (ref 82–98)
MCV RBC AUTO: 100 FL (ref 82–98)
MCV RBC AUTO: 97 FL (ref 82–98)
MODE: ABNORMAL
MONOCYTES # BLD AUTO: 0.9 K/UL (ref 0.3–1)
MONOCYTES # BLD AUTO: 1.2 K/UL (ref 0.3–1)
MONOCYTES # BLD AUTO: 1.5 K/UL (ref 0.3–1)
MONOCYTES NFR BLD: 4.9 % (ref 4–15)
MONOCYTES NFR BLD: 6.2 % (ref 4–15)
MONOCYTES NFR BLD: 7.2 % (ref 4–15)
NEUTROPHILS # BLD AUTO: 17 K/UL (ref 1.8–7.7)
NEUTROPHILS # BLD AUTO: 17 K/UL (ref 1.8–7.7)
NEUTROPHILS # BLD AUTO: 18.3 K/UL (ref 1.8–7.7)
NEUTROPHILS NFR BLD: 86.3 % (ref 38–73)
NEUTROPHILS NFR BLD: 86.9 % (ref 38–73)
NEUTROPHILS NFR BLD: 90.2 % (ref 38–73)
NRBC BLD-RTO: 0 /100 WBC
PCO2 BLDA: 42.6 MMHG (ref 35–45)
PCO2 BLDA: 53 MMHG (ref 35–45)
PCO2 BLDA: 55.4 MMHG (ref 35–45)
PH SMN: 7.22 [PH] (ref 7.35–7.45)
PH SMN: 7.29 [PH] (ref 7.35–7.45)
PH SMN: 7.34 [PH] (ref 7.35–7.45)
PHOSPHATE SERPL-MCNC: 6.1 MG/DL (ref 2.7–4.5)
PLATELET # BLD AUTO: 141 K/UL (ref 150–450)
PLATELET # BLD AUTO: 152 K/UL (ref 150–450)
PLATELET # BLD AUTO: 159 K/UL (ref 150–450)
PMV BLD AUTO: 12.3 FL (ref 9.2–12.9)
PMV BLD AUTO: 12.5 FL (ref 9.2–12.9)
PMV BLD AUTO: 12.8 FL (ref 9.2–12.9)
PO2 BLDA: 118 MMHG (ref 80–100)
PO2 BLDA: 67 MMHG (ref 80–100)
PO2 BLDA: 78 MMHG (ref 80–100)
POC BE: -1 MMOL/L
POC BE: -2 MMOL/L
POC BE: -5 MMOL/L
POC IONIZED CALCIUM: 1.18 MMOL/L (ref 1.06–1.42)
POC IONIZED CALCIUM: 1.19 MMOL/L (ref 1.06–1.42)
POC SATURATED O2: 88 % (ref 95–100)
POC SATURATED O2: 95 % (ref 95–100)
POC SATURATED O2: 98 % (ref 95–100)
POC TCO2: 25 MMOL/L (ref 23–27)
POC TCO2: 25 MMOL/L (ref 23–27)
POC TCO2: 27 MMOL/L (ref 23–27)
POCT GLUCOSE: 169 MG/DL (ref 70–110)
POCT GLUCOSE: 206 MG/DL (ref 70–110)
POCT GLUCOSE: 235 MG/DL (ref 70–110)
POCT GLUCOSE: 238 MG/DL (ref 70–110)
POTASSIUM BLD-SCNC: 4.5 MMOL/L (ref 3.5–5.1)
POTASSIUM BLD-SCNC: 5.1 MMOL/L (ref 3.5–5.1)
POTASSIUM SERPL-SCNC: 4.9 MMOL/L (ref 3.5–5.1)
RBC # BLD AUTO: 2.27 M/UL (ref 4–5.4)
RBC # BLD AUTO: 2.29 M/UL (ref 4–5.4)
RBC # BLD AUTO: 2.48 M/UL (ref 4–5.4)
SAMPLE: ABNORMAL
SITE: ABNORMAL
SODIUM BLD-SCNC: 136 MMOL/L (ref 136–145)
SODIUM BLD-SCNC: 138 MMOL/L (ref 136–145)
SODIUM SERPL-SCNC: 133 MMOL/L (ref 136–145)
SP02: 97
SP02: 97
WBC # BLD AUTO: 18.8 K/UL (ref 3.9–12.7)
WBC # BLD AUTO: 19.53 K/UL (ref 3.9–12.7)
WBC # BLD AUTO: 21.21 K/UL (ref 3.9–12.7)

## 2021-10-18 PROCEDURE — 25000003 PHARM REV CODE 250: Performed by: STUDENT IN AN ORGANIZED HEALTH CARE EDUCATION/TRAINING PROGRAM

## 2021-10-18 PROCEDURE — 85014 HEMATOCRIT: CPT

## 2021-10-18 PROCEDURE — 20000000 HC ICU ROOM

## 2021-10-18 PROCEDURE — 80048 BASIC METABOLIC PNL TOTAL CA: CPT | Performed by: STUDENT IN AN ORGANIZED HEALTH CARE EDUCATION/TRAINING PROGRAM

## 2021-10-18 PROCEDURE — 84132 ASSAY OF SERUM POTASSIUM: CPT

## 2021-10-18 PROCEDURE — 63600175 PHARM REV CODE 636 W HCPCS: Performed by: STUDENT IN AN ORGANIZED HEALTH CARE EDUCATION/TRAINING PROGRAM

## 2021-10-18 PROCEDURE — 97530 THERAPEUTIC ACTIVITIES: CPT

## 2021-10-18 PROCEDURE — 85025 COMPLETE CBC W/AUTO DIFF WBC: CPT | Performed by: STUDENT IN AN ORGANIZED HEALTH CARE EDUCATION/TRAINING PROGRAM

## 2021-10-18 PROCEDURE — 84295 ASSAY OF SERUM SODIUM: CPT

## 2021-10-18 PROCEDURE — 99291 PR CRITICAL CARE, E/M 30-74 MINUTES: ICD-10-PCS | Mod: GC,,, | Performed by: SURGERY

## 2021-10-18 PROCEDURE — 97166 OT EVAL MOD COMPLEX 45 MIN: CPT

## 2021-10-18 PROCEDURE — 25000003 PHARM REV CODE 250: Performed by: SURGERY

## 2021-10-18 PROCEDURE — 85025 COMPLETE CBC W/AUTO DIFF WBC: CPT | Mod: 91 | Performed by: STUDENT IN AN ORGANIZED HEALTH CARE EDUCATION/TRAINING PROGRAM

## 2021-10-18 PROCEDURE — 97163 PT EVAL HIGH COMPLEX 45 MIN: CPT

## 2021-10-18 PROCEDURE — 99900035 HC TECH TIME PER 15 MIN (STAT)

## 2021-10-18 PROCEDURE — 63600175 PHARM REV CODE 636 W HCPCS: Performed by: SURGERY

## 2021-10-18 PROCEDURE — 82803 BLOOD GASES ANY COMBINATION: CPT

## 2021-10-18 PROCEDURE — 94761 N-INVAS EAR/PLS OXIMETRY MLT: CPT

## 2021-10-18 PROCEDURE — 84100 ASSAY OF PHOSPHORUS: CPT | Performed by: STUDENT IN AN ORGANIZED HEALTH CARE EDUCATION/TRAINING PROGRAM

## 2021-10-18 PROCEDURE — 37799 UNLISTED PX VASCULAR SURGERY: CPT

## 2021-10-18 PROCEDURE — 82330 ASSAY OF CALCIUM: CPT

## 2021-10-18 PROCEDURE — 27000207 HC ISOLATION

## 2021-10-18 PROCEDURE — 27000221 HC OXYGEN, UP TO 24 HOURS

## 2021-10-18 PROCEDURE — 99291 CRITICAL CARE FIRST HOUR: CPT | Mod: GC,,, | Performed by: SURGERY

## 2021-10-18 PROCEDURE — 83605 ASSAY OF LACTIC ACID: CPT

## 2021-10-18 PROCEDURE — 83735 ASSAY OF MAGNESIUM: CPT | Performed by: STUDENT IN AN ORGANIZED HEALTH CARE EDUCATION/TRAINING PROGRAM

## 2021-10-18 RX ORDER — INSULIN ASPART 100 [IU]/ML
1-10 INJECTION, SOLUTION INTRAVENOUS; SUBCUTANEOUS EVERY 6 HOURS PRN
Status: DISCONTINUED | OUTPATIENT
Start: 2021-10-18 | End: 2021-10-27 | Stop reason: HOSPADM

## 2021-10-18 RX ORDER — FAMOTIDINE 20 MG/1
20 TABLET, FILM COATED ORAL DAILY
Status: DISCONTINUED | OUTPATIENT
Start: 2021-10-18 | End: 2021-10-20

## 2021-10-18 RX ORDER — HEPARIN SODIUM 5000 [USP'U]/ML
5000 INJECTION, SOLUTION INTRAVENOUS; SUBCUTANEOUS EVERY 8 HOURS
Status: DISCONTINUED | OUTPATIENT
Start: 2021-10-18 | End: 2021-10-20

## 2021-10-18 RX ORDER — FUROSEMIDE 20 MG/1
20 TABLET ORAL ONCE
Status: COMPLETED | OUTPATIENT
Start: 2021-10-18 | End: 2021-10-18

## 2021-10-18 RX ORDER — NOREPINEPHRINE BITARTRATE/D5W 4MG/250ML
0-3 PLASTIC BAG, INJECTION (ML) INTRAVENOUS CONTINUOUS
Status: DISCONTINUED | OUTPATIENT
Start: 2021-10-18 | End: 2021-10-19

## 2021-10-18 RX ORDER — GLUCAGON 1 MG
1 KIT INJECTION
Status: DISCONTINUED | OUTPATIENT
Start: 2021-10-18 | End: 2021-10-27 | Stop reason: HOSPADM

## 2021-10-18 RX ADMIN — MUPIROCIN 1 G: 20 OINTMENT TOPICAL at 09:10

## 2021-10-18 RX ADMIN — HEPARIN SODIUM 5000 UNITS: 5000 INJECTION INTRAVENOUS; SUBCUTANEOUS at 02:10

## 2021-10-18 RX ADMIN — ESCITALOPRAM OXALATE 20 MG: 5 TABLET, FILM COATED ORAL at 08:10

## 2021-10-18 RX ADMIN — Medication 0.4 MCG/KG/MIN: at 03:10

## 2021-10-18 RX ADMIN — PREGABALIN 75 MG: 75 CAPSULE ORAL at 09:10

## 2021-10-18 RX ADMIN — INSULIN ASPART 1 UNITS: 100 INJECTION, SOLUTION INTRAVENOUS; SUBCUTANEOUS at 12:10

## 2021-10-18 RX ADMIN — TIMOLOL MALEATE 1 DROP: 5 SOLUTION OPHTHALMIC at 09:10

## 2021-10-18 RX ADMIN — SODIUM CHLORIDE, SODIUM LACTATE, POTASSIUM CHLORIDE, AND CALCIUM CHLORIDE 500 ML: .6; .31; .03; .02 INJECTION, SOLUTION INTRAVENOUS at 03:10

## 2021-10-18 RX ADMIN — ALLOPURINOL 100 MG: 100 TABLET ORAL at 08:10

## 2021-10-18 RX ADMIN — MUPIROCIN 1 G: 20 OINTMENT TOPICAL at 08:10

## 2021-10-18 RX ADMIN — FUROSEMIDE 20 MG: 20 TABLET ORAL at 08:10

## 2021-10-18 RX ADMIN — EPINEPHRINE 0.06 MCG/KG/MIN: 1 INJECTION INTRAMUSCULAR; INTRAVENOUS; SUBCUTANEOUS at 04:10

## 2021-10-18 RX ADMIN — ATORVASTATIN CALCIUM 40 MG: 10 TABLET, FILM COATED ORAL at 08:10

## 2021-10-18 RX ADMIN — ACETAMINOPHEN 1000 MG: 500 TABLET ORAL at 11:10

## 2021-10-18 RX ADMIN — CEFAZOLIN 2 G: 330 INJECTION, POWDER, FOR SOLUTION INTRAMUSCULAR; INTRAVENOUS at 01:10

## 2021-10-18 RX ADMIN — FAMOTIDINE 20 MG: 20 TABLET ORAL at 08:10

## 2021-10-18 RX ADMIN — DOCUSATE SODIUM 50 MG AND SENNOSIDES 8.6 MG 1 TABLET: 8.6; 5 TABLET, FILM COATED ORAL at 08:10

## 2021-10-18 RX ADMIN — DOCUSATE SODIUM 50 MG AND SENNOSIDES 8.6 MG 1 TABLET: 8.6; 5 TABLET, FILM COATED ORAL at 09:10

## 2021-10-18 RX ADMIN — ACETAMINOPHEN 1000 MG: 500 TABLET ORAL at 06:10

## 2021-10-18 RX ADMIN — HEPARIN SODIUM 5000 UNITS: 5000 INJECTION INTRAVENOUS; SUBCUTANEOUS at 09:10

## 2021-10-18 RX ADMIN — Medication 2000 UNITS: at 08:10

## 2021-10-18 RX ADMIN — SODIUM CHLORIDE, SODIUM LACTATE, POTASSIUM CHLORIDE, AND CALCIUM CHLORIDE 500 ML: .6; .31; .03; .02 INJECTION, SOLUTION INTRAVENOUS at 04:10

## 2021-10-18 RX ADMIN — INSULIN ASPART 2 UNITS: 100 INJECTION, SOLUTION INTRAVENOUS; SUBCUTANEOUS at 05:10

## 2021-10-18 RX ADMIN — INSULIN ASPART 2 UNITS: 100 INJECTION, SOLUTION INTRAVENOUS; SUBCUTANEOUS at 11:10

## 2021-10-18 RX ADMIN — MUPIROCIN 1 G: 20 OINTMENT TOPICAL at 12:10

## 2021-10-18 RX ADMIN — SODIUM CHLORIDE, SODIUM LACTATE, POTASSIUM CHLORIDE, AND CALCIUM CHLORIDE 500 ML: .6; .31; .03; .02 INJECTION, SOLUTION INTRAVENOUS at 07:10

## 2021-10-18 RX ADMIN — INSULIN ASPART 4 UNITS: 100 INJECTION, SOLUTION INTRAVENOUS; SUBCUTANEOUS at 05:10

## 2021-10-18 RX ADMIN — TIMOLOL MALEATE 1 DROP: 5 SOLUTION OPHTHALMIC at 08:10

## 2021-10-19 LAB
ANION GAP SERPL CALC-SCNC: 6 MMOL/L (ref 8–16)
ANISOCYTOSIS BLD QL SMEAR: SLIGHT
BASOPHILS # BLD AUTO: 0.01 K/UL (ref 0–0.2)
BASOPHILS # BLD AUTO: 0.01 K/UL (ref 0–0.2)
BASOPHILS NFR BLD: 0.1 % (ref 0–1.9)
BASOPHILS NFR BLD: 0.1 % (ref 0–1.9)
BLD PROD TYP BPU: NORMAL
BLD PROD TYP BPU: NORMAL
BLOOD UNIT EXPIRATION DATE: NORMAL
BLOOD UNIT EXPIRATION DATE: NORMAL
BLOOD UNIT TYPE CODE: 5100
BLOOD UNIT TYPE CODE: 5100
BLOOD UNIT TYPE: NORMAL
BLOOD UNIT TYPE: NORMAL
BUN SERPL-MCNC: 61 MG/DL (ref 8–23)
CALCIUM SERPL-MCNC: 8.2 MG/DL (ref 8.7–10.5)
CHLORIDE SERPL-SCNC: 103 MMOL/L (ref 95–110)
CO2 SERPL-SCNC: 25 MMOL/L (ref 23–29)
CODING SYSTEM: NORMAL
CODING SYSTEM: NORMAL
CREAT SERPL-MCNC: 2.7 MG/DL (ref 0.5–1.4)
DIFFERENTIAL METHOD: ABNORMAL
DIFFERENTIAL METHOD: ABNORMAL
DISPENSE STATUS: NORMAL
DISPENSE STATUS: NORMAL
EOSINOPHIL # BLD AUTO: 0 K/UL (ref 0–0.5)
EOSINOPHIL # BLD AUTO: 0 K/UL (ref 0–0.5)
EOSINOPHIL NFR BLD: 0 % (ref 0–8)
EOSINOPHIL NFR BLD: 0.1 % (ref 0–8)
ERYTHROCYTE [DISTWIDTH] IN BLOOD BY AUTOMATED COUNT: 14.8 % (ref 11.5–14.5)
ERYTHROCYTE [DISTWIDTH] IN BLOOD BY AUTOMATED COUNT: 14.9 % (ref 11.5–14.5)
EST. GFR  (AFRICAN AMERICAN): 17.4 ML/MIN/1.73 M^2
EST. GFR  (NON AFRICAN AMERICAN): 15.1 ML/MIN/1.73 M^2
GLUCOSE SERPL-MCNC: 178 MG/DL (ref 70–110)
HCT VFR BLD AUTO: 21.5 % (ref 37–48.5)
HCT VFR BLD AUTO: 25.1 % (ref 37–48.5)
HGB BLD-MCNC: 6.7 G/DL (ref 12–16)
HGB BLD-MCNC: 8 G/DL (ref 12–16)
HYPOCHROMIA BLD QL SMEAR: ABNORMAL
IMM GRANULOCYTES # BLD AUTO: 0.12 K/UL (ref 0–0.04)
IMM GRANULOCYTES # BLD AUTO: 0.15 K/UL (ref 0–0.04)
IMM GRANULOCYTES NFR BLD AUTO: 0.6 % (ref 0–0.5)
IMM GRANULOCYTES NFR BLD AUTO: 0.8 % (ref 0–0.5)
LYMPHOCYTES # BLD AUTO: 0.6 K/UL (ref 1–4.8)
LYMPHOCYTES # BLD AUTO: 0.7 K/UL (ref 1–4.8)
LYMPHOCYTES NFR BLD: 3.3 % (ref 18–48)
LYMPHOCYTES NFR BLD: 3.9 % (ref 18–48)
MAGNESIUM SERPL-MCNC: 2.2 MG/DL (ref 1.6–2.6)
MCH RBC QN AUTO: 30.7 PG (ref 27–31)
MCH RBC QN AUTO: 31.4 PG (ref 27–31)
MCHC RBC AUTO-ENTMCNC: 31.2 G/DL (ref 32–36)
MCHC RBC AUTO-ENTMCNC: 31.9 G/DL (ref 32–36)
MCV RBC AUTO: 98 FL (ref 82–98)
MCV RBC AUTO: 99 FL (ref 82–98)
MONOCYTES # BLD AUTO: 0.7 K/UL (ref 0.3–1)
MONOCYTES # BLD AUTO: 1.1 K/UL (ref 0.3–1)
MONOCYTES NFR BLD: 3.8 % (ref 4–15)
MONOCYTES NFR BLD: 5.8 % (ref 4–15)
NEUTROPHILS # BLD AUTO: 16.5 K/UL (ref 1.8–7.7)
NEUTROPHILS # BLD AUTO: 17.6 K/UL (ref 1.8–7.7)
NEUTROPHILS NFR BLD: 89.3 % (ref 38–73)
NEUTROPHILS NFR BLD: 92.2 % (ref 38–73)
NRBC BLD-RTO: 0 /100 WBC
NRBC BLD-RTO: 0 /100 WBC
OVALOCYTES BLD QL SMEAR: ABNORMAL
PHOSPHATE SERPL-MCNC: 4.5 MG/DL (ref 2.7–4.5)
PLATELET # BLD AUTO: 147 K/UL (ref 150–450)
PLATELET # BLD AUTO: 156 K/UL (ref 150–450)
PMV BLD AUTO: 12.7 FL (ref 9.2–12.9)
PMV BLD AUTO: 13.1 FL (ref 9.2–12.9)
POCT GLUCOSE: 148 MG/DL (ref 70–110)
POCT GLUCOSE: 168 MG/DL (ref 70–110)
POCT GLUCOSE: 169 MG/DL (ref 70–110)
POCT GLUCOSE: 203 MG/DL (ref 70–110)
POCT GLUCOSE: 203 MG/DL (ref 70–110)
POIKILOCYTOSIS BLD QL SMEAR: SLIGHT
POLYCHROMASIA BLD QL SMEAR: ABNORMAL
POTASSIUM SERPL-SCNC: 4.8 MMOL/L (ref 3.5–5.1)
RBC # BLD AUTO: 2.18 M/UL (ref 4–5.4)
RBC # BLD AUTO: 2.55 M/UL (ref 4–5.4)
SODIUM SERPL-SCNC: 134 MMOL/L (ref 136–145)
TRANS ERYTHROCYTES VOL PATIENT: NORMAL ML
TRANS ERYTHROCYTES VOL PATIENT: NORMAL ML
WBC # BLD AUTO: 18.49 K/UL (ref 3.9–12.7)
WBC # BLD AUTO: 19.03 K/UL (ref 3.9–12.7)

## 2021-10-19 PROCEDURE — 97535 SELF CARE MNGMENT TRAINING: CPT

## 2021-10-19 PROCEDURE — 25000003 PHARM REV CODE 250: Performed by: STUDENT IN AN ORGANIZED HEALTH CARE EDUCATION/TRAINING PROGRAM

## 2021-10-19 PROCEDURE — 85025 COMPLETE CBC W/AUTO DIFF WBC: CPT | Mod: 91 | Performed by: STUDENT IN AN ORGANIZED HEALTH CARE EDUCATION/TRAINING PROGRAM

## 2021-10-19 PROCEDURE — 99233 SBSQ HOSP IP/OBS HIGH 50: CPT | Mod: GC,,, | Performed by: SURGERY

## 2021-10-19 PROCEDURE — P9021 RED BLOOD CELLS UNIT: HCPCS | Performed by: STUDENT IN AN ORGANIZED HEALTH CARE EDUCATION/TRAINING PROGRAM

## 2021-10-19 PROCEDURE — 97530 THERAPEUTIC ACTIVITIES: CPT

## 2021-10-19 PROCEDURE — 80048 BASIC METABOLIC PNL TOTAL CA: CPT | Performed by: STUDENT IN AN ORGANIZED HEALTH CARE EDUCATION/TRAINING PROGRAM

## 2021-10-19 PROCEDURE — 25000003 PHARM REV CODE 250: Performed by: SURGERY

## 2021-10-19 PROCEDURE — 84100 ASSAY OF PHOSPHORUS: CPT | Performed by: STUDENT IN AN ORGANIZED HEALTH CARE EDUCATION/TRAINING PROGRAM

## 2021-10-19 PROCEDURE — 27000221 HC OXYGEN, UP TO 24 HOURS

## 2021-10-19 PROCEDURE — 99900035 HC TECH TIME PER 15 MIN (STAT)

## 2021-10-19 PROCEDURE — 63600175 PHARM REV CODE 636 W HCPCS: Performed by: SURGERY

## 2021-10-19 PROCEDURE — 27000207 HC ISOLATION

## 2021-10-19 PROCEDURE — 63600175 PHARM REV CODE 636 W HCPCS: Performed by: STUDENT IN AN ORGANIZED HEALTH CARE EDUCATION/TRAINING PROGRAM

## 2021-10-19 PROCEDURE — 99233 PR SUBSEQUENT HOSPITAL CARE,LEVL III: ICD-10-PCS | Mod: GC,,, | Performed by: SURGERY

## 2021-10-19 PROCEDURE — 11000001 HC ACUTE MED/SURG PRIVATE ROOM

## 2021-10-19 PROCEDURE — 94761 N-INVAS EAR/PLS OXIMETRY MLT: CPT

## 2021-10-19 PROCEDURE — 36430 TRANSFUSION BLD/BLD COMPNT: CPT

## 2021-10-19 PROCEDURE — 97112 NEUROMUSCULAR REEDUCATION: CPT

## 2021-10-19 PROCEDURE — 83735 ASSAY OF MAGNESIUM: CPT | Performed by: STUDENT IN AN ORGANIZED HEALTH CARE EDUCATION/TRAINING PROGRAM

## 2021-10-19 RX ORDER — HYDROCODONE BITARTRATE AND ACETAMINOPHEN 500; 5 MG/1; MG/1
TABLET ORAL
Status: DISCONTINUED | OUTPATIENT
Start: 2021-10-19 | End: 2021-10-23

## 2021-10-19 RX ORDER — CARVEDILOL 12.5 MG/1
12.5 TABLET ORAL 2 TIMES DAILY WITH MEALS
Status: DISCONTINUED | OUTPATIENT
Start: 2021-10-19 | End: 2021-10-27 | Stop reason: HOSPADM

## 2021-10-19 RX ORDER — ATROPINE SULFATE 0.1 MG/ML
INJECTION INTRAVENOUS
Status: DISPENSED
Start: 2021-10-19 | End: 2021-10-19

## 2021-10-19 RX ADMIN — HEPARIN SODIUM 5000 UNITS: 5000 INJECTION INTRAVENOUS; SUBCUTANEOUS at 05:10

## 2021-10-19 RX ADMIN — HEPARIN SODIUM 5000 UNITS: 5000 INJECTION INTRAVENOUS; SUBCUTANEOUS at 09:10

## 2021-10-19 RX ADMIN — HEPARIN SODIUM 5000 UNITS: 5000 INJECTION INTRAVENOUS; SUBCUTANEOUS at 02:10

## 2021-10-19 RX ADMIN — HYDROCORTISONE SODIUM SUCCINATE 50 MG: 100 INJECTION, POWDER, FOR SOLUTION INTRAMUSCULAR; INTRAVENOUS at 05:10

## 2021-10-19 RX ADMIN — DOCUSATE SODIUM 50 MG AND SENNOSIDES 8.6 MG 1 TABLET: 8.6; 5 TABLET, FILM COATED ORAL at 09:10

## 2021-10-19 RX ADMIN — INSULIN ASPART 2 UNITS: 100 INJECTION, SOLUTION INTRAVENOUS; SUBCUTANEOUS at 09:10

## 2021-10-19 RX ADMIN — TIMOLOL MALEATE 1 DROP: 5 SOLUTION OPHTHALMIC at 09:10

## 2021-10-19 RX ADMIN — MUPIROCIN 1 G: 20 OINTMENT TOPICAL at 09:10

## 2021-10-19 RX ADMIN — CARVEDILOL 12.5 MG: 12.5 TABLET, FILM COATED ORAL at 05:10

## 2021-10-19 RX ADMIN — ATORVASTATIN CALCIUM 40 MG: 10 TABLET, FILM COATED ORAL at 09:10

## 2021-10-19 RX ADMIN — CARVEDILOL 12.5 MG: 12.5 TABLET, FILM COATED ORAL at 11:10

## 2021-10-19 RX ADMIN — Medication 2000 UNITS: at 09:10

## 2021-10-19 RX ADMIN — ESCITALOPRAM OXALATE 20 MG: 5 TABLET, FILM COATED ORAL at 09:10

## 2021-10-19 RX ADMIN — ACETAMINOPHEN 1000 MG: 500 TABLET ORAL at 12:10

## 2021-10-19 RX ADMIN — FUROSEMIDE 20 MG: 20 TABLET ORAL at 09:10

## 2021-10-19 RX ADMIN — ALLOPURINOL 100 MG: 100 TABLET ORAL at 09:10

## 2021-10-19 RX ADMIN — FAMOTIDINE 20 MG: 20 TABLET ORAL at 09:10

## 2021-10-19 RX ADMIN — POLYETHYLENE GLYCOL 3350 17 G: 17 POWDER, FOR SOLUTION ORAL at 09:10

## 2021-10-19 RX ADMIN — INSULIN ASPART 4 UNITS: 100 INJECTION, SOLUTION INTRAVENOUS; SUBCUTANEOUS at 12:10

## 2021-10-19 RX ADMIN — HYDROCORTISONE SODIUM SUCCINATE 50 MG: 100 INJECTION, POWDER, FOR SOLUTION INTRAMUSCULAR; INTRAVENOUS at 12:10

## 2021-10-20 LAB
ANION GAP SERPL CALC-SCNC: 8 MMOL/L (ref 8–16)
BASOPHILS # BLD AUTO: 0.02 K/UL (ref 0–0.2)
BASOPHILS NFR BLD: 0.1 % (ref 0–1.9)
BUN SERPL-MCNC: 72 MG/DL (ref 8–23)
CALCIUM SERPL-MCNC: 8.6 MG/DL (ref 8.7–10.5)
CHLORIDE SERPL-SCNC: 107 MMOL/L (ref 95–110)
CO2 SERPL-SCNC: 25 MMOL/L (ref 23–29)
CREAT SERPL-MCNC: 2.3 MG/DL (ref 0.5–1.4)
DIFFERENTIAL METHOD: ABNORMAL
EOSINOPHIL # BLD AUTO: 0.1 K/UL (ref 0–0.5)
EOSINOPHIL NFR BLD: 0.7 % (ref 0–8)
ERYTHROCYTE [DISTWIDTH] IN BLOOD BY AUTOMATED COUNT: 15.1 % (ref 11.5–14.5)
EST. GFR  (AFRICAN AMERICAN): 21.1 ML/MIN/1.73 M^2
EST. GFR  (NON AFRICAN AMERICAN): 18.3 ML/MIN/1.73 M^2
GLUCOSE SERPL-MCNC: 143 MG/DL (ref 70–110)
HCT VFR BLD AUTO: 24.9 % (ref 37–48.5)
HGB BLD-MCNC: 7.8 G/DL (ref 12–16)
IMM GRANULOCYTES # BLD AUTO: 0.12 K/UL (ref 0–0.04)
IMM GRANULOCYTES NFR BLD AUTO: 0.8 % (ref 0–0.5)
LYMPHOCYTES # BLD AUTO: 1.6 K/UL (ref 1–4.8)
LYMPHOCYTES NFR BLD: 10.4 % (ref 18–48)
MAGNESIUM SERPL-MCNC: 2.5 MG/DL (ref 1.6–2.6)
MCH RBC QN AUTO: 31 PG (ref 27–31)
MCHC RBC AUTO-ENTMCNC: 31.3 G/DL (ref 32–36)
MCV RBC AUTO: 99 FL (ref 82–98)
MONOCYTES # BLD AUTO: 1.2 K/UL (ref 0.3–1)
MONOCYTES NFR BLD: 7.7 % (ref 4–15)
NEUTROPHILS # BLD AUTO: 12.3 K/UL (ref 1.8–7.7)
NEUTROPHILS NFR BLD: 80.3 % (ref 38–73)
NRBC BLD-RTO: 0 /100 WBC
PHOSPHATE SERPL-MCNC: 3.1 MG/DL (ref 2.7–4.5)
PLATELET # BLD AUTO: 145 K/UL (ref 150–450)
PMV BLD AUTO: 13.4 FL (ref 9.2–12.9)
POCT GLUCOSE: 137 MG/DL (ref 70–110)
POCT GLUCOSE: 149 MG/DL (ref 70–110)
POCT GLUCOSE: 149 MG/DL (ref 70–110)
POTASSIUM SERPL-SCNC: 4.2 MMOL/L (ref 3.5–5.1)
RBC # BLD AUTO: 2.52 M/UL (ref 4–5.4)
SODIUM SERPL-SCNC: 140 MMOL/L (ref 136–145)
WBC # BLD AUTO: 15.35 K/UL (ref 3.9–12.7)

## 2021-10-20 PROCEDURE — 97530 THERAPEUTIC ACTIVITIES: CPT

## 2021-10-20 PROCEDURE — 25000003 PHARM REV CODE 250: Performed by: STUDENT IN AN ORGANIZED HEALTH CARE EDUCATION/TRAINING PROGRAM

## 2021-10-20 PROCEDURE — 11000001 HC ACUTE MED/SURG PRIVATE ROOM

## 2021-10-20 PROCEDURE — 97535 SELF CARE MNGMENT TRAINING: CPT

## 2021-10-20 PROCEDURE — 83735 ASSAY OF MAGNESIUM: CPT | Performed by: STUDENT IN AN ORGANIZED HEALTH CARE EDUCATION/TRAINING PROGRAM

## 2021-10-20 PROCEDURE — 99233 SBSQ HOSP IP/OBS HIGH 50: CPT | Mod: ,,, | Performed by: STUDENT IN AN ORGANIZED HEALTH CARE EDUCATION/TRAINING PROGRAM

## 2021-10-20 PROCEDURE — 99233 PR SUBSEQUENT HOSPITAL CARE,LEVL III: ICD-10-PCS | Mod: ,,, | Performed by: STUDENT IN AN ORGANIZED HEALTH CARE EDUCATION/TRAINING PROGRAM

## 2021-10-20 PROCEDURE — 84100 ASSAY OF PHOSPHORUS: CPT | Performed by: STUDENT IN AN ORGANIZED HEALTH CARE EDUCATION/TRAINING PROGRAM

## 2021-10-20 PROCEDURE — 85025 COMPLETE CBC W/AUTO DIFF WBC: CPT | Performed by: STUDENT IN AN ORGANIZED HEALTH CARE EDUCATION/TRAINING PROGRAM

## 2021-10-20 PROCEDURE — 80048 BASIC METABOLIC PNL TOTAL CA: CPT | Performed by: STUDENT IN AN ORGANIZED HEALTH CARE EDUCATION/TRAINING PROGRAM

## 2021-10-20 PROCEDURE — 27000207 HC ISOLATION

## 2021-10-20 PROCEDURE — 63600175 PHARM REV CODE 636 W HCPCS: Performed by: SURGERY

## 2021-10-20 PROCEDURE — 25000003 PHARM REV CODE 250: Performed by: SURGERY

## 2021-10-20 PROCEDURE — 36415 COLL VENOUS BLD VENIPUNCTURE: CPT | Performed by: STUDENT IN AN ORGANIZED HEALTH CARE EDUCATION/TRAINING PROGRAM

## 2021-10-20 PROCEDURE — 63600175 PHARM REV CODE 636 W HCPCS: Performed by: STUDENT IN AN ORGANIZED HEALTH CARE EDUCATION/TRAINING PROGRAM

## 2021-10-20 RX ADMIN — TIMOLOL MALEATE 1 DROP: 5 SOLUTION OPHTHALMIC at 09:10

## 2021-10-20 RX ADMIN — OXYCODONE 5 MG: 5 TABLET ORAL at 07:10

## 2021-10-20 RX ADMIN — CARVEDILOL 12.5 MG: 12.5 TABLET, FILM COATED ORAL at 05:10

## 2021-10-20 RX ADMIN — MORPHINE SULFATE 2 MG: 2 INJECTION, SOLUTION INTRAMUSCULAR; INTRAVENOUS at 05:10

## 2021-10-20 RX ADMIN — DOCUSATE SODIUM 50 MG AND SENNOSIDES 8.6 MG 1 TABLET: 8.6; 5 TABLET, FILM COATED ORAL at 09:10

## 2021-10-20 RX ADMIN — ESCITALOPRAM OXALATE 20 MG: 5 TABLET, FILM COATED ORAL at 09:10

## 2021-10-20 RX ADMIN — MUPIROCIN 1 G: 20 OINTMENT TOPICAL at 09:10

## 2021-10-20 RX ADMIN — POLYETHYLENE GLYCOL 3350 17 G: 17 POWDER, FOR SOLUTION ORAL at 09:10

## 2021-10-20 RX ADMIN — HEPARIN SODIUM 5000 UNITS: 5000 INJECTION INTRAVENOUS; SUBCUTANEOUS at 05:10

## 2021-10-20 RX ADMIN — CARVEDILOL 12.5 MG: 12.5 TABLET, FILM COATED ORAL at 09:10

## 2021-10-20 RX ADMIN — OXYCODONE 5 MG: 5 TABLET ORAL at 02:10

## 2021-10-20 RX ADMIN — MORPHINE SULFATE 2 MG: 2 INJECTION, SOLUTION INTRAMUSCULAR; INTRAVENOUS at 09:10

## 2021-10-20 RX ADMIN — DOCUSATE SODIUM 50 MG AND SENNOSIDES 8.6 MG 1 TABLET: 8.6; 5 TABLET, FILM COATED ORAL at 08:10

## 2021-10-20 RX ADMIN — OXYCODONE 5 MG: 5 TABLET ORAL at 09:10

## 2021-10-20 RX ADMIN — MORPHINE SULFATE 2 MG: 2 INJECTION, SOLUTION INTRAMUSCULAR; INTRAVENOUS at 12:10

## 2021-10-20 RX ADMIN — ATORVASTATIN CALCIUM 40 MG: 10 TABLET, FILM COATED ORAL at 09:10

## 2021-10-20 RX ADMIN — ALLOPURINOL 100 MG: 100 TABLET ORAL at 09:10

## 2021-10-20 RX ADMIN — FAMOTIDINE 20 MG: 20 TABLET ORAL at 09:10

## 2021-10-20 RX ADMIN — APIXABAN 2.5 MG: 2.5 TABLET, FILM COATED ORAL at 08:10

## 2021-10-20 RX ADMIN — Medication 2000 UNITS: at 09:10

## 2021-10-21 PROBLEM — G93.6 CYTOTOXIC CEREBRAL EDEMA: Status: RESOLVED | Noted: 2020-12-04 | Resolved: 2021-10-21

## 2021-10-21 PROBLEM — N18.32 STAGE 3B CHRONIC KIDNEY DISEASE: Status: ACTIVE | Noted: 2021-10-21

## 2021-10-21 PROBLEM — N18.32 ANEMIA IN STAGE 3B CHRONIC KIDNEY DISEASE: Status: ACTIVE | Noted: 2017-03-08

## 2021-10-21 PROBLEM — D63.1 ANEMIA IN STAGE 3B CHRONIC KIDNEY DISEASE: Status: ACTIVE | Noted: 2017-03-08

## 2021-10-21 PROBLEM — G45.9 TIA (TRANSIENT ISCHEMIC ATTACK): Status: RESOLVED | Noted: 2021-01-07 | Resolved: 2021-10-21

## 2021-10-21 PROBLEM — N17.9 AKI (ACUTE KIDNEY INJURY): Status: RESOLVED | Noted: 2020-12-04 | Resolved: 2021-10-21

## 2021-10-21 PROBLEM — N30.90 CYSTITIS: Status: RESOLVED | Noted: 2021-08-26 | Resolved: 2021-10-21

## 2021-10-21 PROBLEM — H53.462 HOMONYMOUS HEMIANOPIA, LEFT: Status: RESOLVED | Noted: 2021-01-07 | Resolved: 2021-10-21

## 2021-10-21 PROBLEM — I95.9 HYPOTENSION: Status: RESOLVED | Noted: 2021-08-24 | Resolved: 2021-10-21

## 2021-10-21 PROBLEM — M79.606 LEG PAIN: Status: RESOLVED | Noted: 2021-01-06 | Resolved: 2021-10-21

## 2021-10-21 PROBLEM — Z75.8 DISCHARGE PLANNING ISSUES: Status: RESOLVED | Noted: 2021-08-25 | Resolved: 2021-10-21

## 2021-10-21 PROBLEM — D62 ACUTE BLOOD LOSS AS CAUSE OF POSTOPERATIVE ANEMIA: Status: ACTIVE | Noted: 2021-10-21

## 2021-10-21 PROBLEM — G93.40 ACUTE ENCEPHALOPATHY: Status: ACTIVE | Noted: 2021-10-21

## 2021-10-21 PROBLEM — D53.9 MACROCYTIC ANEMIA: Status: RESOLVED | Noted: 2018-08-03 | Resolved: 2021-10-21

## 2021-10-21 PROBLEM — Z02.9 DISCHARGE PLANNING ISSUES: Status: RESOLVED | Noted: 2021-08-25 | Resolved: 2021-10-21

## 2021-10-21 LAB
ANION GAP SERPL CALC-SCNC: 9 MMOL/L (ref 8–16)
BASOPHILS # BLD AUTO: 0.05 K/UL (ref 0–0.2)
BASOPHILS NFR BLD: 0.3 % (ref 0–1.9)
BUN SERPL-MCNC: 72 MG/DL (ref 8–23)
CALCIUM SERPL-MCNC: 8.2 MG/DL (ref 8.7–10.5)
CHLORIDE SERPL-SCNC: 110 MMOL/L (ref 95–110)
CO2 SERPL-SCNC: 19 MMOL/L (ref 23–29)
CREAT SERPL-MCNC: 2.2 MG/DL (ref 0.5–1.4)
DIFFERENTIAL METHOD: ABNORMAL
EOSINOPHIL # BLD AUTO: 0.3 K/UL (ref 0–0.5)
EOSINOPHIL NFR BLD: 1.7 % (ref 0–8)
ERYTHROCYTE [DISTWIDTH] IN BLOOD BY AUTOMATED COUNT: 15 % (ref 11.5–14.5)
EST. GFR  (AFRICAN AMERICAN): 22.2 ML/MIN/1.73 M^2
EST. GFR  (NON AFRICAN AMERICAN): 19.3 ML/MIN/1.73 M^2
GLUCOSE SERPL-MCNC: 148 MG/DL (ref 70–110)
HCT VFR BLD AUTO: 21.7 % (ref 37–48.5)
HGB BLD-MCNC: 7.1 G/DL (ref 12–16)
IMM GRANULOCYTES # BLD AUTO: 0.59 K/UL (ref 0–0.04)
IMM GRANULOCYTES NFR BLD AUTO: 3 % (ref 0–0.5)
LYMPHOCYTES # BLD AUTO: 2.1 K/UL (ref 1–4.8)
LYMPHOCYTES NFR BLD: 10.8 % (ref 18–48)
MAGNESIUM SERPL-MCNC: 2.3 MG/DL (ref 1.6–2.6)
MCH RBC QN AUTO: 32.1 PG (ref 27–31)
MCHC RBC AUTO-ENTMCNC: 32.7 G/DL (ref 32–36)
MCV RBC AUTO: 98 FL (ref 82–98)
MONOCYTES # BLD AUTO: 1.9 K/UL (ref 0.3–1)
MONOCYTES NFR BLD: 9.6 % (ref 4–15)
NEUTROPHILS # BLD AUTO: 14.6 K/UL (ref 1.8–7.7)
NEUTROPHILS NFR BLD: 74.6 % (ref 38–73)
NRBC BLD-RTO: 1 /100 WBC
PHOSPHATE SERPL-MCNC: 3.7 MG/DL (ref 2.7–4.5)
PLATELET # BLD AUTO: 148 K/UL (ref 150–450)
PMV BLD AUTO: 13.6 FL (ref 9.2–12.9)
POCT GLUCOSE: 190 MG/DL (ref 70–110)
POCT GLUCOSE: 200 MG/DL (ref 70–110)
POTASSIUM SERPL-SCNC: 5.3 MMOL/L (ref 3.5–5.1)
POTASSIUM SERPL-SCNC: 7.1 MMOL/L (ref 3.5–5.1)
RBC # BLD AUTO: 2.21 M/UL (ref 4–5.4)
SODIUM SERPL-SCNC: 138 MMOL/L (ref 136–145)
WBC # BLD AUTO: 19.6 K/UL (ref 3.9–12.7)

## 2021-10-21 PROCEDURE — 99233 SBSQ HOSP IP/OBS HIGH 50: CPT | Mod: CR,,, | Performed by: INTERNAL MEDICINE

## 2021-10-21 PROCEDURE — 99233 PR SUBSEQUENT HOSPITAL CARE,LEVL III: ICD-10-PCS | Mod: CR,,, | Performed by: INTERNAL MEDICINE

## 2021-10-21 PROCEDURE — 25000003 PHARM REV CODE 250: Performed by: STUDENT IN AN ORGANIZED HEALTH CARE EDUCATION/TRAINING PROGRAM

## 2021-10-21 PROCEDURE — 80048 BASIC METABOLIC PNL TOTAL CA: CPT | Performed by: STUDENT IN AN ORGANIZED HEALTH CARE EDUCATION/TRAINING PROGRAM

## 2021-10-21 PROCEDURE — 27000207 HC ISOLATION

## 2021-10-21 PROCEDURE — 63600175 PHARM REV CODE 636 W HCPCS: Performed by: STUDENT IN AN ORGANIZED HEALTH CARE EDUCATION/TRAINING PROGRAM

## 2021-10-21 PROCEDURE — 11000001 HC ACUTE MED/SURG PRIVATE ROOM

## 2021-10-21 PROCEDURE — 36415 COLL VENOUS BLD VENIPUNCTURE: CPT | Performed by: INTERNAL MEDICINE

## 2021-10-21 PROCEDURE — 83735 ASSAY OF MAGNESIUM: CPT | Performed by: STUDENT IN AN ORGANIZED HEALTH CARE EDUCATION/TRAINING PROGRAM

## 2021-10-21 PROCEDURE — 84132 ASSAY OF SERUM POTASSIUM: CPT | Performed by: INTERNAL MEDICINE

## 2021-10-21 PROCEDURE — 85025 COMPLETE CBC W/AUTO DIFF WBC: CPT | Performed by: STUDENT IN AN ORGANIZED HEALTH CARE EDUCATION/TRAINING PROGRAM

## 2021-10-21 PROCEDURE — 36415 COLL VENOUS BLD VENIPUNCTURE: CPT | Performed by: STUDENT IN AN ORGANIZED HEALTH CARE EDUCATION/TRAINING PROGRAM

## 2021-10-21 PROCEDURE — 84100 ASSAY OF PHOSPHORUS: CPT | Performed by: STUDENT IN AN ORGANIZED HEALTH CARE EDUCATION/TRAINING PROGRAM

## 2021-10-21 PROCEDURE — 25000003 PHARM REV CODE 250: Performed by: SURGERY

## 2021-10-21 RX ORDER — ACETAMINOPHEN 325 MG/1
650 TABLET ORAL EVERY 6 HOURS
Status: DISCONTINUED | OUTPATIENT
Start: 2021-10-21 | End: 2021-10-27 | Stop reason: HOSPADM

## 2021-10-21 RX ORDER — OXYCODONE HYDROCHLORIDE 5 MG/1
5 TABLET ORAL EVERY 6 HOURS PRN
Status: DISCONTINUED | OUTPATIENT
Start: 2021-10-21 | End: 2021-10-27 | Stop reason: HOSPADM

## 2021-10-21 RX ORDER — OXYCODONE HYDROCHLORIDE 5 MG/1
5 TABLET ORAL EVERY 4 HOURS PRN
Status: DISCONTINUED | OUTPATIENT
Start: 2021-10-21 | End: 2021-10-21

## 2021-10-21 RX ADMIN — Medication 6 MG: at 01:10

## 2021-10-21 RX ADMIN — APIXABAN 2.5 MG: 2.5 TABLET, FILM COATED ORAL at 09:10

## 2021-10-21 RX ADMIN — OXYCODONE 5 MG: 5 TABLET ORAL at 09:10

## 2021-10-21 RX ADMIN — MORPHINE SULFATE 2 MG: 2 INJECTION, SOLUTION INTRAMUSCULAR; INTRAVENOUS at 02:10

## 2021-10-21 RX ADMIN — DOCUSATE SODIUM 50 MG AND SENNOSIDES 8.6 MG 1 TABLET: 8.6; 5 TABLET, FILM COATED ORAL at 09:10

## 2021-10-21 RX ADMIN — POLYETHYLENE GLYCOL 3350 17 G: 17 POWDER, FOR SOLUTION ORAL at 09:10

## 2021-10-21 RX ADMIN — CARVEDILOL 12.5 MG: 12.5 TABLET, FILM COATED ORAL at 09:10

## 2021-10-21 RX ADMIN — ATORVASTATIN CALCIUM 40 MG: 10 TABLET, FILM COATED ORAL at 09:10

## 2021-10-21 RX ADMIN — Medication 2000 UNITS: at 09:10

## 2021-10-21 RX ADMIN — ESCITALOPRAM OXALATE 20 MG: 5 TABLET, FILM COATED ORAL at 09:10

## 2021-10-21 RX ADMIN — FUROSEMIDE 20 MG: 20 TABLET ORAL at 09:10

## 2021-10-21 RX ADMIN — OXYCODONE 5 MG: 5 TABLET ORAL at 06:10

## 2021-10-21 RX ADMIN — ALLOPURINOL 100 MG: 100 TABLET ORAL at 09:10

## 2021-10-21 RX ADMIN — MUPIROCIN 1 G: 20 OINTMENT TOPICAL at 09:10

## 2021-10-21 RX ADMIN — OXYCODONE 5 MG: 5 TABLET ORAL at 01:10

## 2021-10-21 RX ADMIN — TIMOLOL MALEATE 1 DROP: 5 SOLUTION OPHTHALMIC at 09:10

## 2021-10-22 LAB
ABO + RH BLD: NORMAL
ANION GAP SERPL CALC-SCNC: 9 MMOL/L (ref 8–16)
ANISOCYTOSIS BLD QL SMEAR: SLIGHT
BASOPHILS # BLD AUTO: 0.05 K/UL (ref 0–0.2)
BASOPHILS NFR BLD: 0.3 % (ref 0–1.9)
BLD GP AB SCN CELLS X3 SERPL QL: NORMAL
BLD PROD TYP BPU: NORMAL
BLD PROD TYP BPU: NORMAL
BLOOD UNIT EXPIRATION DATE: NORMAL
BLOOD UNIT EXPIRATION DATE: NORMAL
BLOOD UNIT TYPE CODE: 5100
BLOOD UNIT TYPE CODE: 5100
BLOOD UNIT TYPE: NORMAL
BLOOD UNIT TYPE: NORMAL
BUN SERPL-MCNC: 88 MG/DL (ref 8–23)
CALCIUM SERPL-MCNC: 9.2 MG/DL (ref 8.7–10.5)
CHLORIDE SERPL-SCNC: 103 MMOL/L (ref 95–110)
CO2 SERPL-SCNC: 24 MMOL/L (ref 23–29)
CODING SYSTEM: NORMAL
CODING SYSTEM: NORMAL
CREAT SERPL-MCNC: 2.9 MG/DL (ref 0.5–1.4)
DIFFERENTIAL METHOD: ABNORMAL
DISPENSE STATUS: NORMAL
DISPENSE STATUS: NORMAL
EOSINOPHIL # BLD AUTO: 0.2 K/UL (ref 0–0.5)
EOSINOPHIL NFR BLD: 1.1 % (ref 0–8)
ERYTHROCYTE [DISTWIDTH] IN BLOOD BY AUTOMATED COUNT: 15.2 % (ref 11.5–14.5)
EST. GFR  (AFRICAN AMERICAN): 15.9 ML/MIN/1.73 M^2
EST. GFR  (NON AFRICAN AMERICAN): 13.8 ML/MIN/1.73 M^2
GLUCOSE SERPL-MCNC: 172 MG/DL (ref 70–110)
HCT VFR BLD AUTO: 22.6 % (ref 37–48.5)
HGB BLD-MCNC: 6.9 G/DL (ref 12–16)
HYPOCHROMIA BLD QL SMEAR: ABNORMAL
IMM GRANULOCYTES # BLD AUTO: 0.47 K/UL (ref 0–0.04)
IMM GRANULOCYTES NFR BLD AUTO: 2.4 % (ref 0–0.5)
LYMPHOCYTES # BLD AUTO: 1.8 K/UL (ref 1–4.8)
LYMPHOCYTES NFR BLD: 9.2 % (ref 18–48)
MAGNESIUM SERPL-MCNC: 2.5 MG/DL (ref 1.6–2.6)
MCH RBC QN AUTO: 30.5 PG (ref 27–31)
MCHC RBC AUTO-ENTMCNC: 30.5 G/DL (ref 32–36)
MCV RBC AUTO: 100 FL (ref 82–98)
MONOCYTES # BLD AUTO: 1.8 K/UL (ref 0.3–1)
MONOCYTES NFR BLD: 9.1 % (ref 4–15)
NEUTROPHILS # BLD AUTO: 15.4 K/UL (ref 1.8–7.7)
NEUTROPHILS NFR BLD: 77.9 % (ref 38–73)
NRBC BLD-RTO: 1 /100 WBC
NUM UNITS TRANS PACKED RBC: NORMAL
NUM UNITS TRANS PACKED RBC: NORMAL
OVALOCYTES BLD QL SMEAR: ABNORMAL
PHOSPHATE SERPL-MCNC: 4.6 MG/DL (ref 2.7–4.5)
PLATELET # BLD AUTO: 181 K/UL (ref 150–450)
PMV BLD AUTO: 12.3 FL (ref 9.2–12.9)
POCT GLUCOSE: 161 MG/DL (ref 70–110)
POCT GLUCOSE: 170 MG/DL (ref 70–110)
POCT GLUCOSE: 206 MG/DL (ref 70–110)
POCT GLUCOSE: 213 MG/DL (ref 70–110)
POIKILOCYTOSIS BLD QL SMEAR: SLIGHT
POLYCHROMASIA BLD QL SMEAR: ABNORMAL
POTASSIUM SERPL-SCNC: 5.4 MMOL/L (ref 3.5–5.1)
RBC # BLD AUTO: 2.26 M/UL (ref 4–5.4)
SODIUM SERPL-SCNC: 136 MMOL/L (ref 136–145)
SPHEROCYTES BLD QL SMEAR: ABNORMAL
WBC # BLD AUTO: 19.79 K/UL (ref 3.9–12.7)

## 2021-10-22 PROCEDURE — 99232 SBSQ HOSP IP/OBS MODERATE 35: CPT | Mod: CR,,, | Performed by: INTERNAL MEDICINE

## 2021-10-22 PROCEDURE — 25000003 PHARM REV CODE 250: Performed by: STUDENT IN AN ORGANIZED HEALTH CARE EDUCATION/TRAINING PROGRAM

## 2021-10-22 PROCEDURE — 86920 COMPATIBILITY TEST SPIN: CPT | Performed by: INTERNAL MEDICINE

## 2021-10-22 PROCEDURE — 97530 THERAPEUTIC ACTIVITIES: CPT | Mod: CQ

## 2021-10-22 PROCEDURE — 36430 TRANSFUSION BLD/BLD COMPNT: CPT

## 2021-10-22 PROCEDURE — P9016 RBC LEUKOCYTES REDUCED: HCPCS | Performed by: INTERNAL MEDICINE

## 2021-10-22 PROCEDURE — 11000001 HC ACUTE MED/SURG PRIVATE ROOM

## 2021-10-22 PROCEDURE — 83735 ASSAY OF MAGNESIUM: CPT | Performed by: STUDENT IN AN ORGANIZED HEALTH CARE EDUCATION/TRAINING PROGRAM

## 2021-10-22 PROCEDURE — 80048 BASIC METABOLIC PNL TOTAL CA: CPT | Performed by: STUDENT IN AN ORGANIZED HEALTH CARE EDUCATION/TRAINING PROGRAM

## 2021-10-22 PROCEDURE — 36415 COLL VENOUS BLD VENIPUNCTURE: CPT | Performed by: INTERNAL MEDICINE

## 2021-10-22 PROCEDURE — 97535 SELF CARE MNGMENT TRAINING: CPT

## 2021-10-22 PROCEDURE — 27000207 HC ISOLATION

## 2021-10-22 PROCEDURE — 85025 COMPLETE CBC W/AUTO DIFF WBC: CPT | Performed by: STUDENT IN AN ORGANIZED HEALTH CARE EDUCATION/TRAINING PROGRAM

## 2021-10-22 PROCEDURE — 86900 BLOOD TYPING SEROLOGIC ABO: CPT | Performed by: INTERNAL MEDICINE

## 2021-10-22 PROCEDURE — 99232 PR SUBSEQUENT HOSPITAL CARE,LEVL II: ICD-10-PCS | Mod: CR,,, | Performed by: INTERNAL MEDICINE

## 2021-10-22 PROCEDURE — 84100 ASSAY OF PHOSPHORUS: CPT | Performed by: STUDENT IN AN ORGANIZED HEALTH CARE EDUCATION/TRAINING PROGRAM

## 2021-10-22 PROCEDURE — 97530 THERAPEUTIC ACTIVITIES: CPT

## 2021-10-22 PROCEDURE — 36415 COLL VENOUS BLD VENIPUNCTURE: CPT | Performed by: STUDENT IN AN ORGANIZED HEALTH CARE EDUCATION/TRAINING PROGRAM

## 2021-10-22 PROCEDURE — 25000003 PHARM REV CODE 250: Performed by: INTERNAL MEDICINE

## 2021-10-22 RX ORDER — HYDROCODONE BITARTRATE AND ACETAMINOPHEN 500; 5 MG/1; MG/1
TABLET ORAL
Status: DISCONTINUED | OUTPATIENT
Start: 2021-10-22 | End: 2021-10-23

## 2021-10-22 RX ADMIN — POLYETHYLENE GLYCOL 3350 17 G: 17 POWDER, FOR SOLUTION ORAL at 08:10

## 2021-10-22 RX ADMIN — INSULIN ASPART 4 UNITS: 100 INJECTION, SOLUTION INTRAVENOUS; SUBCUTANEOUS at 08:10

## 2021-10-22 RX ADMIN — ACETAMINOPHEN 650 MG: 325 TABLET ORAL at 05:10

## 2021-10-22 RX ADMIN — Medication 2000 UNITS: at 08:10

## 2021-10-22 RX ADMIN — ACETAMINOPHEN 650 MG: 325 TABLET ORAL at 06:10

## 2021-10-22 RX ADMIN — ESCITALOPRAM OXALATE 20 MG: 5 TABLET, FILM COATED ORAL at 08:10

## 2021-10-22 RX ADMIN — INSULIN ASPART 4 UNITS: 100 INJECTION, SOLUTION INTRAVENOUS; SUBCUTANEOUS at 11:10

## 2021-10-22 RX ADMIN — ATORVASTATIN CALCIUM 40 MG: 10 TABLET, FILM COATED ORAL at 08:10

## 2021-10-22 RX ADMIN — ACETAMINOPHEN 650 MG: 325 TABLET ORAL at 11:10

## 2021-10-22 RX ADMIN — CARVEDILOL 12.5 MG: 12.5 TABLET, FILM COATED ORAL at 06:10

## 2021-10-22 RX ADMIN — APIXABAN 2.5 MG: 2.5 TABLET, FILM COATED ORAL at 08:10

## 2021-10-22 RX ADMIN — TIMOLOL MALEATE 1 DROP: 5 SOLUTION OPHTHALMIC at 08:10

## 2021-10-22 RX ADMIN — SODIUM CHLORIDE 500 ML: 0.9 INJECTION, SOLUTION INTRAVENOUS at 10:10

## 2021-10-22 RX ADMIN — MUPIROCIN 1 G: 20 OINTMENT TOPICAL at 08:10

## 2021-10-22 RX ADMIN — CARVEDILOL 12.5 MG: 12.5 TABLET, FILM COATED ORAL at 08:10

## 2021-10-22 RX ADMIN — ALLOPURINOL 100 MG: 100 TABLET ORAL at 08:10

## 2021-10-23 PROBLEM — E87.5 HYPERKALEMIA: Status: ACTIVE | Noted: 2021-10-23

## 2021-10-23 PROBLEM — D72.829 LEUKOCYTOSIS: Status: ACTIVE | Noted: 2021-10-23

## 2021-10-23 LAB
ANION GAP SERPL CALC-SCNC: 10 MMOL/L (ref 8–16)
ANISOCYTOSIS BLD QL SMEAR: SLIGHT
BASOPHILS # BLD AUTO: ABNORMAL K/UL (ref 0–0.2)
BASOPHILS NFR BLD: 1 % (ref 0–1.9)
BILIRUB UR QL STRIP: NEGATIVE
BUN SERPL-MCNC: 93 MG/DL (ref 8–23)
BURR CELLS BLD QL SMEAR: ABNORMAL
CALCIUM SERPL-MCNC: 9.1 MG/DL (ref 8.7–10.5)
CHLORIDE SERPL-SCNC: 104 MMOL/L (ref 95–110)
CLARITY UR REFRACT.AUTO: CLEAR
CO2 SERPL-SCNC: 20 MMOL/L (ref 23–29)
COLOR UR AUTO: YELLOW
CREAT SERPL-MCNC: 2.6 MG/DL (ref 0.5–1.4)
DIFFERENTIAL METHOD: ABNORMAL
EOSINOPHIL # BLD AUTO: ABNORMAL K/UL (ref 0–0.5)
EOSINOPHIL NFR BLD: 2 % (ref 0–8)
ERYTHROCYTE [DISTWIDTH] IN BLOOD BY AUTOMATED COUNT: 14.8 % (ref 11.5–14.5)
EST. GFR  (AFRICAN AMERICAN): 18.2 ML/MIN/1.73 M^2
EST. GFR  (NON AFRICAN AMERICAN): 15.8 ML/MIN/1.73 M^2
GIANT PLATELETS BLD QL SMEAR: PRESENT
GLUCOSE SERPL-MCNC: 159 MG/DL (ref 70–110)
GLUCOSE UR QL STRIP: NEGATIVE
HCT VFR BLD AUTO: 27.8 % (ref 37–48.5)
HGB BLD-MCNC: 8.8 G/DL (ref 12–16)
HGB UR QL STRIP: NEGATIVE
HYPOCHROMIA BLD QL SMEAR: ABNORMAL
IMM GRANULOCYTES # BLD AUTO: ABNORMAL K/UL (ref 0–0.04)
IMM GRANULOCYTES NFR BLD AUTO: ABNORMAL % (ref 0–0.5)
KETONES UR QL STRIP: NEGATIVE
LEUKOCYTE ESTERASE UR QL STRIP: NEGATIVE
LYMPHOCYTES # BLD AUTO: ABNORMAL K/UL (ref 1–4.8)
LYMPHOCYTES NFR BLD: 9 % (ref 18–48)
MAGNESIUM SERPL-MCNC: 2.5 MG/DL (ref 1.6–2.6)
MCH RBC QN AUTO: 31.5 PG (ref 27–31)
MCHC RBC AUTO-ENTMCNC: 31.7 G/DL (ref 32–36)
MCV RBC AUTO: 100 FL (ref 82–98)
METAMYELOCYTES NFR BLD MANUAL: 2 %
MONOCYTES # BLD AUTO: ABNORMAL K/UL (ref 0.3–1)
MONOCYTES NFR BLD: 11 % (ref 4–15)
MYELOCYTES NFR BLD MANUAL: 2 %
NEUTROPHILS NFR BLD: 67 % (ref 38–73)
NEUTS BAND NFR BLD MANUAL: 6 %
NITRITE UR QL STRIP: NEGATIVE
NRBC BLD-RTO: 1 /100 WBC
OVALOCYTES BLD QL SMEAR: ABNORMAL
PH UR STRIP: 5 [PH] (ref 5–8)
PHOSPHATE SERPL-MCNC: 4.6 MG/DL (ref 2.7–4.5)
PLATELET # BLD AUTO: 188 K/UL (ref 150–450)
PLATELET BLD QL SMEAR: ABNORMAL
PMV BLD AUTO: 12.3 FL (ref 9.2–12.9)
POCT GLUCOSE: 148 MG/DL (ref 70–110)
POCT GLUCOSE: 157 MG/DL (ref 70–110)
POCT GLUCOSE: 206 MG/DL (ref 70–110)
POCT GLUCOSE: 212 MG/DL (ref 70–110)
POIKILOCYTOSIS BLD QL SMEAR: SLIGHT
POLYCHROMASIA BLD QL SMEAR: ABNORMAL
POTASSIUM SERPL-SCNC: 5.5 MMOL/L (ref 3.5–5.1)
PROT UR QL STRIP: NEGATIVE
RBC # BLD AUTO: 2.79 M/UL (ref 4–5.4)
SCHISTOCYTES BLD QL SMEAR: ABNORMAL
SODIUM SERPL-SCNC: 134 MMOL/L (ref 136–145)
SP GR UR STRIP: 1.01 (ref 1–1.03)
URN SPEC COLLECT METH UR: NORMAL
WBC # BLD AUTO: 24.5 K/UL (ref 3.9–12.7)

## 2021-10-23 PROCEDURE — 36415 COLL VENOUS BLD VENIPUNCTURE: CPT | Performed by: STUDENT IN AN ORGANIZED HEALTH CARE EDUCATION/TRAINING PROGRAM

## 2021-10-23 PROCEDURE — 81003 URINALYSIS AUTO W/O SCOPE: CPT | Performed by: INTERNAL MEDICINE

## 2021-10-23 PROCEDURE — 85027 COMPLETE CBC AUTOMATED: CPT | Performed by: STUDENT IN AN ORGANIZED HEALTH CARE EDUCATION/TRAINING PROGRAM

## 2021-10-23 PROCEDURE — 99232 PR SUBSEQUENT HOSPITAL CARE,LEVL II: ICD-10-PCS | Mod: CR,,, | Performed by: INTERNAL MEDICINE

## 2021-10-23 PROCEDURE — 83735 ASSAY OF MAGNESIUM: CPT | Performed by: STUDENT IN AN ORGANIZED HEALTH CARE EDUCATION/TRAINING PROGRAM

## 2021-10-23 PROCEDURE — 11000001 HC ACUTE MED/SURG PRIVATE ROOM

## 2021-10-23 PROCEDURE — 85007 BL SMEAR W/DIFF WBC COUNT: CPT | Performed by: STUDENT IN AN ORGANIZED HEALTH CARE EDUCATION/TRAINING PROGRAM

## 2021-10-23 PROCEDURE — 25000003 PHARM REV CODE 250: Performed by: STUDENT IN AN ORGANIZED HEALTH CARE EDUCATION/TRAINING PROGRAM

## 2021-10-23 PROCEDURE — 97110 THERAPEUTIC EXERCISES: CPT | Mod: CQ

## 2021-10-23 PROCEDURE — 80048 BASIC METABOLIC PNL TOTAL CA: CPT | Performed by: STUDENT IN AN ORGANIZED HEALTH CARE EDUCATION/TRAINING PROGRAM

## 2021-10-23 PROCEDURE — 27000207 HC ISOLATION

## 2021-10-23 PROCEDURE — 99232 SBSQ HOSP IP/OBS MODERATE 35: CPT | Mod: CR,,, | Performed by: INTERNAL MEDICINE

## 2021-10-23 PROCEDURE — 97112 NEUROMUSCULAR REEDUCATION: CPT | Mod: CQ

## 2021-10-23 PROCEDURE — 97535 SELF CARE MNGMENT TRAINING: CPT

## 2021-10-23 PROCEDURE — 84100 ASSAY OF PHOSPHORUS: CPT | Performed by: STUDENT IN AN ORGANIZED HEALTH CARE EDUCATION/TRAINING PROGRAM

## 2021-10-23 PROCEDURE — 97530 THERAPEUTIC ACTIVITIES: CPT

## 2021-10-23 PROCEDURE — 25000003 PHARM REV CODE 250: Performed by: INTERNAL MEDICINE

## 2021-10-23 RX ADMIN — APIXABAN 2.5 MG: 2.5 TABLET, FILM COATED ORAL at 09:10

## 2021-10-23 RX ADMIN — DOCUSATE SODIUM 50 MG AND SENNOSIDES 8.6 MG 1 TABLET: 8.6; 5 TABLET, FILM COATED ORAL at 09:10

## 2021-10-23 RX ADMIN — Medication 2000 UNITS: at 09:10

## 2021-10-23 RX ADMIN — ACETAMINOPHEN 650 MG: 325 TABLET ORAL at 10:10

## 2021-10-23 RX ADMIN — OXYCODONE 5 MG: 5 TABLET ORAL at 02:10

## 2021-10-23 RX ADMIN — ACETAMINOPHEN 650 MG: 325 TABLET ORAL at 12:10

## 2021-10-23 RX ADMIN — APIXABAN 2.5 MG: 2.5 TABLET, FILM COATED ORAL at 10:10

## 2021-10-23 RX ADMIN — POLYETHYLENE GLYCOL 3350 17 G: 17 POWDER, FOR SOLUTION ORAL at 09:10

## 2021-10-23 RX ADMIN — CARVEDILOL 12.5 MG: 12.5 TABLET, FILM COATED ORAL at 05:10

## 2021-10-23 RX ADMIN — CARVEDILOL 12.5 MG: 12.5 TABLET, FILM COATED ORAL at 09:10

## 2021-10-23 RX ADMIN — ACETAMINOPHEN 650 MG: 325 TABLET ORAL at 05:10

## 2021-10-23 RX ADMIN — TIMOLOL MALEATE 1 DROP: 5 SOLUTION OPHTHALMIC at 09:10

## 2021-10-23 RX ADMIN — ALLOPURINOL 100 MG: 100 TABLET ORAL at 09:10

## 2021-10-23 RX ADMIN — INSULIN ASPART 4 UNITS: 100 INJECTION, SOLUTION INTRAVENOUS; SUBCUTANEOUS at 09:10

## 2021-10-23 RX ADMIN — INSULIN ASPART 4 UNITS: 100 INJECTION, SOLUTION INTRAVENOUS; SUBCUTANEOUS at 12:10

## 2021-10-23 RX ADMIN — ESCITALOPRAM OXALATE 20 MG: 5 TABLET, FILM COATED ORAL at 09:10

## 2021-10-23 RX ADMIN — ATORVASTATIN CALCIUM 40 MG: 10 TABLET, FILM COATED ORAL at 09:10

## 2021-10-23 RX ADMIN — SODIUM ZIRCONIUM CYCLOSILICATE 10 G: 10 POWDER, FOR SUSPENSION ORAL at 12:10

## 2021-10-24 PROBLEM — R33.9 URINARY RETENTION: Status: ACTIVE | Noted: 2021-10-24

## 2021-10-24 PROBLEM — E87.5 HYPERKALEMIA: Status: RESOLVED | Noted: 2021-10-23 | Resolved: 2021-10-24

## 2021-10-24 PROBLEM — G93.40 ACUTE ENCEPHALOPATHY: Status: RESOLVED | Noted: 2021-10-21 | Resolved: 2021-10-24

## 2021-10-24 LAB
ANION GAP SERPL CALC-SCNC: 9 MMOL/L (ref 8–16)
BASOPHILS # BLD AUTO: ABNORMAL K/UL (ref 0–0.2)
BASOPHILS NFR BLD: 1 % (ref 0–1.9)
BUN SERPL-MCNC: 86 MG/DL (ref 8–23)
CALCIUM SERPL-MCNC: 8.7 MG/DL (ref 8.7–10.5)
CHLORIDE SERPL-SCNC: 106 MMOL/L (ref 95–110)
CO2 SERPL-SCNC: 23 MMOL/L (ref 23–29)
CREAT SERPL-MCNC: 2.3 MG/DL (ref 0.5–1.4)
DIFFERENTIAL METHOD: ABNORMAL
EOSINOPHIL # BLD AUTO: ABNORMAL K/UL (ref 0–0.5)
EOSINOPHIL NFR BLD: 4 % (ref 0–8)
ERYTHROCYTE [DISTWIDTH] IN BLOOD BY AUTOMATED COUNT: 15.3 % (ref 11.5–14.5)
EST. GFR  (AFRICAN AMERICAN): 21.1 ML/MIN/1.73 M^2
EST. GFR  (NON AFRICAN AMERICAN): 18.3 ML/MIN/1.73 M^2
GLUCOSE SERPL-MCNC: 124 MG/DL (ref 70–110)
HCT VFR BLD AUTO: 27.3 % (ref 37–48.5)
HGB BLD-MCNC: 8.6 G/DL (ref 12–16)
IMM GRANULOCYTES # BLD AUTO: ABNORMAL K/UL (ref 0–0.04)
IMM GRANULOCYTES NFR BLD AUTO: ABNORMAL % (ref 0–0.5)
LYMPHOCYTES # BLD AUTO: ABNORMAL K/UL (ref 1–4.8)
LYMPHOCYTES NFR BLD: 16 % (ref 18–48)
MAGNESIUM SERPL-MCNC: 2.4 MG/DL (ref 1.6–2.6)
MCH RBC QN AUTO: 31.2 PG (ref 27–31)
MCHC RBC AUTO-ENTMCNC: 31.5 G/DL (ref 32–36)
MCV RBC AUTO: 99 FL (ref 82–98)
MONOCYTES # BLD AUTO: ABNORMAL K/UL (ref 0.3–1)
MONOCYTES NFR BLD: 6 % (ref 4–15)
MYELOCYTES NFR BLD MANUAL: 3 %
NEUTROPHILS NFR BLD: 61 % (ref 38–73)
NEUTS BAND NFR BLD MANUAL: 9 %
NRBC BLD-RTO: 1 /100 WBC
PHOSPHATE SERPL-MCNC: 3.7 MG/DL (ref 2.7–4.5)
PLATELET # BLD AUTO: 183 K/UL (ref 150–450)
PLATELET BLD QL SMEAR: ABNORMAL
PMV BLD AUTO: 12.1 FL (ref 9.2–12.9)
POCT GLUCOSE: 132 MG/DL (ref 70–110)
POCT GLUCOSE: 135 MG/DL (ref 70–110)
POCT GLUCOSE: 155 MG/DL (ref 70–110)
POCT GLUCOSE: 157 MG/DL (ref 70–110)
POTASSIUM SERPL-SCNC: 4.8 MMOL/L (ref 3.5–5.1)
RBC # BLD AUTO: 2.76 M/UL (ref 4–5.4)
SODIUM SERPL-SCNC: 138 MMOL/L (ref 136–145)
WBC # BLD AUTO: 13.67 K/UL (ref 3.9–12.7)

## 2021-10-24 PROCEDURE — 25000003 PHARM REV CODE 250: Performed by: STUDENT IN AN ORGANIZED HEALTH CARE EDUCATION/TRAINING PROGRAM

## 2021-10-24 PROCEDURE — 85027 COMPLETE CBC AUTOMATED: CPT | Performed by: STUDENT IN AN ORGANIZED HEALTH CARE EDUCATION/TRAINING PROGRAM

## 2021-10-24 PROCEDURE — 99232 SBSQ HOSP IP/OBS MODERATE 35: CPT | Mod: CR,,, | Performed by: INTERNAL MEDICINE

## 2021-10-24 PROCEDURE — 36415 COLL VENOUS BLD VENIPUNCTURE: CPT | Performed by: STUDENT IN AN ORGANIZED HEALTH CARE EDUCATION/TRAINING PROGRAM

## 2021-10-24 PROCEDURE — 25000003 PHARM REV CODE 250: Performed by: INTERNAL MEDICINE

## 2021-10-24 PROCEDURE — 84100 ASSAY OF PHOSPHORUS: CPT | Performed by: STUDENT IN AN ORGANIZED HEALTH CARE EDUCATION/TRAINING PROGRAM

## 2021-10-24 PROCEDURE — 80048 BASIC METABOLIC PNL TOTAL CA: CPT | Performed by: STUDENT IN AN ORGANIZED HEALTH CARE EDUCATION/TRAINING PROGRAM

## 2021-10-24 PROCEDURE — 27000207 HC ISOLATION

## 2021-10-24 PROCEDURE — 85007 BL SMEAR W/DIFF WBC COUNT: CPT | Performed by: STUDENT IN AN ORGANIZED HEALTH CARE EDUCATION/TRAINING PROGRAM

## 2021-10-24 PROCEDURE — 11000001 HC ACUTE MED/SURG PRIVATE ROOM

## 2021-10-24 PROCEDURE — 99232 PR SUBSEQUENT HOSPITAL CARE,LEVL II: ICD-10-PCS | Mod: CR,,, | Performed by: INTERNAL MEDICINE

## 2021-10-24 PROCEDURE — 83735 ASSAY OF MAGNESIUM: CPT | Performed by: STUDENT IN AN ORGANIZED HEALTH CARE EDUCATION/TRAINING PROGRAM

## 2021-10-24 RX ORDER — METHOCARBAMOL 500 MG/1
500 TABLET, FILM COATED ORAL EVERY 6 HOURS PRN
Start: 2021-10-24 | End: 2021-11-03

## 2021-10-24 RX ORDER — FAMOTIDINE 20 MG/1
20 TABLET, FILM COATED ORAL DAILY
Start: 2021-10-24 | End: 2023-08-21

## 2021-10-24 RX ORDER — HYDRALAZINE HYDROCHLORIDE 25 MG/1
25 TABLET, FILM COATED ORAL EVERY 8 HOURS
Status: DISCONTINUED | OUTPATIENT
Start: 2021-10-25 | End: 2021-10-27 | Stop reason: HOSPADM

## 2021-10-24 RX ORDER — CHOLECALCIFEROL (VITAMIN D3) 25 MCG
2000 TABLET ORAL DAILY
Status: ON HOLD
Start: 2021-10-25 | End: 2023-08-13

## 2021-10-24 RX ORDER — ISOSORBIDE MONONITRATE 30 MG/1
30 TABLET, EXTENDED RELEASE ORAL DAILY
Status: DISCONTINUED | OUTPATIENT
Start: 2021-10-24 | End: 2021-10-27 | Stop reason: HOSPADM

## 2021-10-24 RX ORDER — FUROSEMIDE 20 MG/1
20 TABLET ORAL EVERY OTHER DAY
Status: DISCONTINUED | OUTPATIENT
Start: 2021-10-25 | End: 2021-10-27 | Stop reason: HOSPADM

## 2021-10-24 RX ADMIN — ISOSORBIDE MONONITRATE 30 MG: 30 TABLET, EXTENDED RELEASE ORAL at 11:10

## 2021-10-24 RX ADMIN — ALLOPURINOL 100 MG: 100 TABLET ORAL at 09:10

## 2021-10-24 RX ADMIN — ACETAMINOPHEN 650 MG: 325 TABLET ORAL at 06:10

## 2021-10-24 RX ADMIN — DOCUSATE SODIUM 50 MG AND SENNOSIDES 8.6 MG 1 TABLET: 8.6; 5 TABLET, FILM COATED ORAL at 09:10

## 2021-10-24 RX ADMIN — TIMOLOL MALEATE 1 DROP: 5 SOLUTION OPHTHALMIC at 09:10

## 2021-10-24 RX ADMIN — CARVEDILOL 12.5 MG: 12.5 TABLET, FILM COATED ORAL at 05:10

## 2021-10-24 RX ADMIN — APIXABAN 2.5 MG: 2.5 TABLET, FILM COATED ORAL at 09:10

## 2021-10-24 RX ADMIN — ACETAMINOPHEN 650 MG: 325 TABLET ORAL at 05:10

## 2021-10-24 RX ADMIN — ACETAMINOPHEN 650 MG: 325 TABLET ORAL at 11:10

## 2021-10-24 RX ADMIN — ESCITALOPRAM OXALATE 20 MG: 5 TABLET, FILM COATED ORAL at 09:10

## 2021-10-24 RX ADMIN — Medication 2000 UNITS: at 09:10

## 2021-10-24 RX ADMIN — ATORVASTATIN CALCIUM 40 MG: 10 TABLET, FILM COATED ORAL at 09:10

## 2021-10-24 RX ADMIN — CARVEDILOL 12.5 MG: 12.5 TABLET, FILM COATED ORAL at 09:10

## 2021-10-25 ENCOUNTER — PATIENT OUTREACH (OUTPATIENT)
Dept: ADMINISTRATIVE | Facility: OTHER | Age: 86
End: 2021-10-25
Payer: MEDICARE

## 2021-10-25 LAB
ANION GAP SERPL CALC-SCNC: 8 MMOL/L (ref 8–16)
ANISOCYTOSIS BLD QL SMEAR: SLIGHT
BASOPHILS NFR BLD: 1 % (ref 0–1.9)
BUN SERPL-MCNC: 80 MG/DL (ref 8–23)
CALCIUM SERPL-MCNC: 8.8 MG/DL (ref 8.7–10.5)
CHLORIDE SERPL-SCNC: 108 MMOL/L (ref 95–110)
CO2 SERPL-SCNC: 24 MMOL/L (ref 23–29)
CREAT SERPL-MCNC: 1.7 MG/DL (ref 0.5–1.4)
DIFFERENTIAL METHOD: ABNORMAL
EOSINOPHIL NFR BLD: 2 % (ref 0–8)
ERYTHROCYTE [DISTWIDTH] IN BLOOD BY AUTOMATED COUNT: 15.3 % (ref 11.5–14.5)
EST. GFR  (AFRICAN AMERICAN): 30.4 ML/MIN/1.73 M^2
EST. GFR  (NON AFRICAN AMERICAN): 26.4 ML/MIN/1.73 M^2
GLUCOSE SERPL-MCNC: 107 MG/DL (ref 70–110)
HCT VFR BLD AUTO: 24.5 % (ref 37–48.5)
HGB BLD-MCNC: 7.5 G/DL (ref 12–16)
HYPOCHROMIA BLD QL SMEAR: ABNORMAL
IMM GRANULOCYTES # BLD AUTO: ABNORMAL K/UL (ref 0–0.04)
IMM GRANULOCYTES NFR BLD AUTO: ABNORMAL % (ref 0–0.5)
LYMPHOCYTES NFR BLD: 7 % (ref 18–48)
MAGNESIUM SERPL-MCNC: 2.3 MG/DL (ref 1.6–2.6)
MCH RBC QN AUTO: 31 PG (ref 27–31)
MCHC RBC AUTO-ENTMCNC: 30.6 G/DL (ref 32–36)
MCV RBC AUTO: 101 FL (ref 82–98)
MONOCYTES NFR BLD: 3 % (ref 4–15)
MYELOCYTES NFR BLD MANUAL: 1 %
NEUTROPHILS NFR BLD: 84 % (ref 38–73)
NEUTS BAND NFR BLD MANUAL: 2 %
NRBC BLD-RTO: 0 /100 WBC
OVALOCYTES BLD QL SMEAR: ABNORMAL
PHOSPHATE SERPL-MCNC: 3.2 MG/DL (ref 2.7–4.5)
PLATELET # BLD AUTO: 204 K/UL (ref 150–450)
PLATELET BLD QL SMEAR: ABNORMAL
PMV BLD AUTO: 11.5 FL (ref 9.2–12.9)
POCT GLUCOSE: 131 MG/DL (ref 70–110)
POCT GLUCOSE: 135 MG/DL (ref 70–110)
POCT GLUCOSE: 154 MG/DL (ref 70–110)
POCT GLUCOSE: 231 MG/DL (ref 70–110)
POIKILOCYTOSIS BLD QL SMEAR: SLIGHT
POLYCHROMASIA BLD QL SMEAR: ABNORMAL
POTASSIUM SERPL-SCNC: 4.5 MMOL/L (ref 3.5–5.1)
RBC # BLD AUTO: 2.42 M/UL (ref 4–5.4)
SODIUM SERPL-SCNC: 140 MMOL/L (ref 136–145)
WBC # BLD AUTO: 17.29 K/UL (ref 3.9–12.7)

## 2021-10-25 PROCEDURE — 85027 COMPLETE CBC AUTOMATED: CPT | Performed by: STUDENT IN AN ORGANIZED HEALTH CARE EDUCATION/TRAINING PROGRAM

## 2021-10-25 PROCEDURE — 11000001 HC ACUTE MED/SURG PRIVATE ROOM

## 2021-10-25 PROCEDURE — 36415 COLL VENOUS BLD VENIPUNCTURE: CPT | Performed by: STUDENT IN AN ORGANIZED HEALTH CARE EDUCATION/TRAINING PROGRAM

## 2021-10-25 PROCEDURE — 97535 SELF CARE MNGMENT TRAINING: CPT

## 2021-10-25 PROCEDURE — 25000003 PHARM REV CODE 250: Performed by: STUDENT IN AN ORGANIZED HEALTH CARE EDUCATION/TRAINING PROGRAM

## 2021-10-25 PROCEDURE — 83735 ASSAY OF MAGNESIUM: CPT | Performed by: STUDENT IN AN ORGANIZED HEALTH CARE EDUCATION/TRAINING PROGRAM

## 2021-10-25 PROCEDURE — 99232 SBSQ HOSP IP/OBS MODERATE 35: CPT | Mod: CR,,, | Performed by: INTERNAL MEDICINE

## 2021-10-25 PROCEDURE — 97530 THERAPEUTIC ACTIVITIES: CPT | Mod: CQ

## 2021-10-25 PROCEDURE — 80048 BASIC METABOLIC PNL TOTAL CA: CPT | Performed by: STUDENT IN AN ORGANIZED HEALTH CARE EDUCATION/TRAINING PROGRAM

## 2021-10-25 PROCEDURE — 85007 BL SMEAR W/DIFF WBC COUNT: CPT | Performed by: STUDENT IN AN ORGANIZED HEALTH CARE EDUCATION/TRAINING PROGRAM

## 2021-10-25 PROCEDURE — 27000207 HC ISOLATION

## 2021-10-25 PROCEDURE — 25000003 PHARM REV CODE 250: Performed by: INTERNAL MEDICINE

## 2021-10-25 PROCEDURE — 99232 PR SUBSEQUENT HOSPITAL CARE,LEVL II: ICD-10-PCS | Mod: CR,,, | Performed by: INTERNAL MEDICINE

## 2021-10-25 PROCEDURE — 84100 ASSAY OF PHOSPHORUS: CPT | Performed by: STUDENT IN AN ORGANIZED HEALTH CARE EDUCATION/TRAINING PROGRAM

## 2021-10-25 PROCEDURE — 97530 THERAPEUTIC ACTIVITIES: CPT

## 2021-10-25 RX ADMIN — INSULIN ASPART 2 UNITS: 100 INJECTION, SOLUTION INTRAVENOUS; SUBCUTANEOUS at 04:10

## 2021-10-25 RX ADMIN — CARVEDILOL 12.5 MG: 12.5 TABLET, FILM COATED ORAL at 04:10

## 2021-10-25 RX ADMIN — ALLOPURINOL 100 MG: 100 TABLET ORAL at 10:10

## 2021-10-25 RX ADMIN — TIMOLOL MALEATE 1 DROP: 5 SOLUTION OPHTHALMIC at 09:10

## 2021-10-25 RX ADMIN — Medication 2000 UNITS: at 10:10

## 2021-10-25 RX ADMIN — APIXABAN 2.5 MG: 2.5 TABLET, FILM COATED ORAL at 09:10

## 2021-10-25 RX ADMIN — ACETAMINOPHEN 650 MG: 325 TABLET ORAL at 06:10

## 2021-10-25 RX ADMIN — HYDRALAZINE HYDROCHLORIDE 25 MG: 25 TABLET, FILM COATED ORAL at 06:10

## 2021-10-25 RX ADMIN — HYDRALAZINE HYDROCHLORIDE 25 MG: 25 TABLET, FILM COATED ORAL at 09:10

## 2021-10-25 RX ADMIN — INSULIN ASPART 4 UNITS: 100 INJECTION, SOLUTION INTRAVENOUS; SUBCUTANEOUS at 11:10

## 2021-10-25 RX ADMIN — CARVEDILOL 12.5 MG: 12.5 TABLET, FILM COATED ORAL at 10:10

## 2021-10-25 RX ADMIN — DOCUSATE SODIUM 50 MG AND SENNOSIDES 8.6 MG 1 TABLET: 8.6; 5 TABLET, FILM COATED ORAL at 10:10

## 2021-10-25 RX ADMIN — ATORVASTATIN CALCIUM 40 MG: 10 TABLET, FILM COATED ORAL at 10:10

## 2021-10-25 RX ADMIN — POLYETHYLENE GLYCOL 3350 17 G: 17 POWDER, FOR SOLUTION ORAL at 10:10

## 2021-10-25 RX ADMIN — HYDRALAZINE HYDROCHLORIDE 25 MG: 25 TABLET, FILM COATED ORAL at 03:10

## 2021-10-25 RX ADMIN — ESCITALOPRAM OXALATE 20 MG: 5 TABLET, FILM COATED ORAL at 09:10

## 2021-10-25 RX ADMIN — FUROSEMIDE 20 MG: 20 TABLET ORAL at 10:10

## 2021-10-25 RX ADMIN — ISOSORBIDE MONONITRATE 30 MG: 30 TABLET, EXTENDED RELEASE ORAL at 10:10

## 2021-10-25 RX ADMIN — APIXABAN 2.5 MG: 2.5 TABLET, FILM COATED ORAL at 10:10

## 2021-10-26 PROBLEM — R33.9 URINARY RETENTION: Status: RESOLVED | Noted: 2021-10-24 | Resolved: 2021-10-26

## 2021-10-26 LAB
ANION GAP SERPL CALC-SCNC: 6 MMOL/L (ref 8–16)
ANISOCYTOSIS BLD QL SMEAR: SLIGHT
BASOPHILS # BLD AUTO: ABNORMAL K/UL (ref 0–0.2)
BASOPHILS NFR BLD: 0 % (ref 0–1.9)
BUN SERPL-MCNC: 67 MG/DL (ref 8–23)
CALCIUM SERPL-MCNC: 9.4 MG/DL (ref 8.7–10.5)
CHLORIDE SERPL-SCNC: 107 MMOL/L (ref 95–110)
CO2 SERPL-SCNC: 26 MMOL/L (ref 23–29)
CREAT SERPL-MCNC: 1.7 MG/DL (ref 0.5–1.4)
DIFFERENTIAL METHOD: ABNORMAL
EOSINOPHIL # BLD AUTO: ABNORMAL K/UL (ref 0–0.5)
EOSINOPHIL NFR BLD: 1 % (ref 0–8)
ERYTHROCYTE [DISTWIDTH] IN BLOOD BY AUTOMATED COUNT: 15.5 % (ref 11.5–14.5)
EST. GFR  (AFRICAN AMERICAN): 30.4 ML/MIN/1.73 M^2
EST. GFR  (NON AFRICAN AMERICAN): 26.4 ML/MIN/1.73 M^2
GLUCOSE SERPL-MCNC: 131 MG/DL (ref 70–110)
HCT VFR BLD AUTO: 24.4 % (ref 37–48.5)
HGB BLD-MCNC: 7.7 G/DL (ref 12–16)
HYPOCHROMIA BLD QL SMEAR: ABNORMAL
IMM GRANULOCYTES # BLD AUTO: ABNORMAL K/UL (ref 0–0.04)
IMM GRANULOCYTES NFR BLD AUTO: ABNORMAL % (ref 0–0.5)
LYMPHOCYTES # BLD AUTO: ABNORMAL K/UL (ref 1–4.8)
LYMPHOCYTES NFR BLD: 5 % (ref 18–48)
MAGNESIUM SERPL-MCNC: 2.1 MG/DL (ref 1.6–2.6)
MCH RBC QN AUTO: 31.4 PG (ref 27–31)
MCHC RBC AUTO-ENTMCNC: 31.6 G/DL (ref 32–36)
MCV RBC AUTO: 100 FL (ref 82–98)
MONOCYTES # BLD AUTO: ABNORMAL K/UL (ref 0.3–1)
MONOCYTES NFR BLD: 1 % (ref 4–15)
MYELOCYTES NFR BLD MANUAL: 1 %
NEUTROPHILS NFR BLD: 92 % (ref 38–73)
NRBC BLD-RTO: 0 /100 WBC
OVALOCYTES BLD QL SMEAR: ABNORMAL
PHOSPHATE SERPL-MCNC: 2.6 MG/DL (ref 2.7–4.5)
PLATELET # BLD AUTO: 221 K/UL (ref 150–450)
PLATELET BLD QL SMEAR: ABNORMAL
PMV BLD AUTO: 11.4 FL (ref 9.2–12.9)
POCT GLUCOSE: 139 MG/DL (ref 70–110)
POCT GLUCOSE: 152 MG/DL (ref 70–110)
POCT GLUCOSE: 167 MG/DL (ref 70–110)
POIKILOCYTOSIS BLD QL SMEAR: SLIGHT
POLYCHROMASIA BLD QL SMEAR: ABNORMAL
POTASSIUM SERPL-SCNC: 4.1 MMOL/L (ref 3.5–5.1)
RBC # BLD AUTO: 2.45 M/UL (ref 4–5.4)
SARS-COV-2 RNA RESP QL NAA+PROBE: NOT DETECTED
SODIUM SERPL-SCNC: 139 MMOL/L (ref 136–145)
WBC # BLD AUTO: 21.18 K/UL (ref 3.9–12.7)

## 2021-10-26 PROCEDURE — 36415 COLL VENOUS BLD VENIPUNCTURE: CPT | Performed by: STUDENT IN AN ORGANIZED HEALTH CARE EDUCATION/TRAINING PROGRAM

## 2021-10-26 PROCEDURE — 25000003 PHARM REV CODE 250: Performed by: STUDENT IN AN ORGANIZED HEALTH CARE EDUCATION/TRAINING PROGRAM

## 2021-10-26 PROCEDURE — U0005 INFEC AGEN DETEC AMPLI PROBE: HCPCS | Performed by: INTERNAL MEDICINE

## 2021-10-26 PROCEDURE — 99232 SBSQ HOSP IP/OBS MODERATE 35: CPT | Mod: CR,,, | Performed by: INTERNAL MEDICINE

## 2021-10-26 PROCEDURE — 85027 COMPLETE CBC AUTOMATED: CPT | Performed by: STUDENT IN AN ORGANIZED HEALTH CARE EDUCATION/TRAINING PROGRAM

## 2021-10-26 PROCEDURE — 85007 BL SMEAR W/DIFF WBC COUNT: CPT | Performed by: STUDENT IN AN ORGANIZED HEALTH CARE EDUCATION/TRAINING PROGRAM

## 2021-10-26 PROCEDURE — 11000001 HC ACUTE MED/SURG PRIVATE ROOM

## 2021-10-26 PROCEDURE — 97535 SELF CARE MNGMENT TRAINING: CPT

## 2021-10-26 PROCEDURE — 25000003 PHARM REV CODE 250: Performed by: INTERNAL MEDICINE

## 2021-10-26 PROCEDURE — 83735 ASSAY OF MAGNESIUM: CPT | Performed by: STUDENT IN AN ORGANIZED HEALTH CARE EDUCATION/TRAINING PROGRAM

## 2021-10-26 PROCEDURE — 80048 BASIC METABOLIC PNL TOTAL CA: CPT | Performed by: STUDENT IN AN ORGANIZED HEALTH CARE EDUCATION/TRAINING PROGRAM

## 2021-10-26 PROCEDURE — 99232 PR SUBSEQUENT HOSPITAL CARE,LEVL II: ICD-10-PCS | Mod: CR,,, | Performed by: INTERNAL MEDICINE

## 2021-10-26 PROCEDURE — U0003 INFECTIOUS AGENT DETECTION BY NUCLEIC ACID (DNA OR RNA); SEVERE ACUTE RESPIRATORY SYNDROME CORONAVIRUS 2 (SARS-COV-2) (CORONAVIRUS DISEASE [COVID-19]), AMPLIFIED PROBE TECHNIQUE, MAKING USE OF HIGH THROUGHPUT TECHNOLOGIES AS DESCRIBED BY CMS-2020-01-R: HCPCS | Performed by: INTERNAL MEDICINE

## 2021-10-26 PROCEDURE — 86580 TB INTRADERMAL TEST: CPT | Performed by: INTERNAL MEDICINE

## 2021-10-26 PROCEDURE — 97530 THERAPEUTIC ACTIVITIES: CPT | Mod: CQ

## 2021-10-26 PROCEDURE — 30200315 PPD INTRADERMAL TEST REV CODE 302: Performed by: INTERNAL MEDICINE

## 2021-10-26 PROCEDURE — 84100 ASSAY OF PHOSPHORUS: CPT | Performed by: STUDENT IN AN ORGANIZED HEALTH CARE EDUCATION/TRAINING PROGRAM

## 2021-10-26 PROCEDURE — 27000207 HC ISOLATION

## 2021-10-26 RX ADMIN — TIMOLOL MALEATE 1 DROP: 5 SOLUTION OPHTHALMIC at 09:10

## 2021-10-26 RX ADMIN — INSULIN ASPART 2 UNITS: 100 INJECTION, SOLUTION INTRAVENOUS; SUBCUTANEOUS at 11:10

## 2021-10-26 RX ADMIN — TUBERCULIN PURIFIED PROTEIN DERIVATIVE 5 UNITS: 5 INJECTION, SOLUTION INTRADERMAL at 11:10

## 2021-10-26 RX ADMIN — APIXABAN 2.5 MG: 2.5 TABLET, FILM COATED ORAL at 09:10

## 2021-10-26 RX ADMIN — ACETAMINOPHEN 650 MG: 325 TABLET ORAL at 06:10

## 2021-10-26 RX ADMIN — INSULIN ASPART 2 UNITS: 100 INJECTION, SOLUTION INTRAVENOUS; SUBCUTANEOUS at 05:10

## 2021-10-26 RX ADMIN — ATORVASTATIN CALCIUM 40 MG: 10 TABLET, FILM COATED ORAL at 09:10

## 2021-10-26 RX ADMIN — HYDRALAZINE HYDROCHLORIDE 25 MG: 25 TABLET, FILM COATED ORAL at 09:10

## 2021-10-26 RX ADMIN — CARVEDILOL 12.5 MG: 12.5 TABLET, FILM COATED ORAL at 05:10

## 2021-10-26 RX ADMIN — ALLOPURINOL 100 MG: 100 TABLET ORAL at 09:10

## 2021-10-26 RX ADMIN — HYDRALAZINE HYDROCHLORIDE 25 MG: 25 TABLET, FILM COATED ORAL at 06:10

## 2021-10-26 RX ADMIN — ESCITALOPRAM OXALATE 20 MG: 5 TABLET, FILM COATED ORAL at 09:10

## 2021-10-26 RX ADMIN — CARVEDILOL 12.5 MG: 12.5 TABLET, FILM COATED ORAL at 07:10

## 2021-10-26 RX ADMIN — Medication 2000 UNITS: at 09:10

## 2021-10-26 RX ADMIN — ISOSORBIDE MONONITRATE 30 MG: 30 TABLET, EXTENDED RELEASE ORAL at 09:10

## 2021-10-26 RX ADMIN — HYDRALAZINE HYDROCHLORIDE 25 MG: 25 TABLET, FILM COATED ORAL at 03:10

## 2021-10-27 VITALS
SYSTOLIC BLOOD PRESSURE: 169 MMHG | OXYGEN SATURATION: 96 % | RESPIRATION RATE: 18 BRPM | HEIGHT: 63 IN | BODY MASS INDEX: 28.9 KG/M2 | HEART RATE: 68 BPM | DIASTOLIC BLOOD PRESSURE: 67 MMHG | TEMPERATURE: 99 F | WEIGHT: 163.13 LBS

## 2021-10-27 PROBLEM — U07.1 COVID-19: Status: RESOLVED | Noted: 2021-10-16 | Resolved: 2021-10-27

## 2021-10-27 LAB
ANION GAP SERPL CALC-SCNC: 9 MMOL/L (ref 8–16)
ANISOCYTOSIS BLD QL SMEAR: SLIGHT
BASOPHILS # BLD AUTO: ABNORMAL K/UL (ref 0–0.2)
BASOPHILS NFR BLD: 0 % (ref 0–1.9)
BUN SERPL-MCNC: 58 MG/DL (ref 8–23)
CALCIUM SERPL-MCNC: 9 MG/DL (ref 8.7–10.5)
CHLORIDE SERPL-SCNC: 107 MMOL/L (ref 95–110)
CO2 SERPL-SCNC: 25 MMOL/L (ref 23–29)
CREAT SERPL-MCNC: 1.5 MG/DL (ref 0.5–1.4)
DIFFERENTIAL METHOD: ABNORMAL
EOSINOPHIL # BLD AUTO: ABNORMAL K/UL (ref 0–0.5)
EOSINOPHIL NFR BLD: 2 % (ref 0–8)
ERYTHROCYTE [DISTWIDTH] IN BLOOD BY AUTOMATED COUNT: 15.5 % (ref 11.5–14.5)
EST. GFR  (AFRICAN AMERICAN): 35.4 ML/MIN/1.73 M^2
EST. GFR  (NON AFRICAN AMERICAN): 30.7 ML/MIN/1.73 M^2
GLUCOSE SERPL-MCNC: 85 MG/DL (ref 70–110)
HCT VFR BLD AUTO: 24.3 % (ref 37–48.5)
HGB BLD-MCNC: 7.5 G/DL (ref 12–16)
IMM GRANULOCYTES # BLD AUTO: ABNORMAL K/UL (ref 0–0.04)
IMM GRANULOCYTES NFR BLD AUTO: ABNORMAL % (ref 0–0.5)
LYMPHOCYTES # BLD AUTO: ABNORMAL K/UL (ref 1–4.8)
LYMPHOCYTES NFR BLD: 6 % (ref 18–48)
MAGNESIUM SERPL-MCNC: 1.9 MG/DL (ref 1.6–2.6)
MCH RBC QN AUTO: 30.9 PG (ref 27–31)
MCHC RBC AUTO-ENTMCNC: 30.9 G/DL (ref 32–36)
MCV RBC AUTO: 100 FL (ref 82–98)
MONOCYTES # BLD AUTO: ABNORMAL K/UL (ref 0.3–1)
MONOCYTES NFR BLD: 1 % (ref 4–15)
NEUTROPHILS NFR BLD: 91 % (ref 38–73)
NRBC BLD-RTO: 0 /100 WBC
OVALOCYTES BLD QL SMEAR: ABNORMAL
PHOSPHATE SERPL-MCNC: 2.8 MG/DL (ref 2.7–4.5)
PLATELET # BLD AUTO: 219 K/UL (ref 150–450)
PMV BLD AUTO: 11.7 FL (ref 9.2–12.9)
POCT GLUCOSE: 100 MG/DL (ref 70–110)
POCT GLUCOSE: 163 MG/DL (ref 70–110)
POCT GLUCOSE: 202 MG/DL (ref 70–110)
POIKILOCYTOSIS BLD QL SMEAR: SLIGHT
POLYCHROMASIA BLD QL SMEAR: ABNORMAL
POTASSIUM SERPL-SCNC: 3.6 MMOL/L (ref 3.5–5.1)
RBC # BLD AUTO: 2.43 M/UL (ref 4–5.4)
SODIUM SERPL-SCNC: 141 MMOL/L (ref 136–145)
WBC # BLD AUTO: 21.73 K/UL (ref 3.9–12.7)

## 2021-10-27 PROCEDURE — 99239 PR HOSPITAL DISCHARGE DAY,>30 MIN: ICD-10-PCS | Mod: CR,,, | Performed by: INTERNAL MEDICINE

## 2021-10-27 PROCEDURE — 83735 ASSAY OF MAGNESIUM: CPT | Performed by: STUDENT IN AN ORGANIZED HEALTH CARE EDUCATION/TRAINING PROGRAM

## 2021-10-27 PROCEDURE — 25000003 PHARM REV CODE 250: Performed by: STUDENT IN AN ORGANIZED HEALTH CARE EDUCATION/TRAINING PROGRAM

## 2021-10-27 PROCEDURE — 84100 ASSAY OF PHOSPHORUS: CPT | Performed by: STUDENT IN AN ORGANIZED HEALTH CARE EDUCATION/TRAINING PROGRAM

## 2021-10-27 PROCEDURE — 36415 COLL VENOUS BLD VENIPUNCTURE: CPT | Performed by: STUDENT IN AN ORGANIZED HEALTH CARE EDUCATION/TRAINING PROGRAM

## 2021-10-27 PROCEDURE — 97110 THERAPEUTIC EXERCISES: CPT | Mod: CQ

## 2021-10-27 PROCEDURE — 97530 THERAPEUTIC ACTIVITIES: CPT

## 2021-10-27 PROCEDURE — 85007 BL SMEAR W/DIFF WBC COUNT: CPT | Performed by: STUDENT IN AN ORGANIZED HEALTH CARE EDUCATION/TRAINING PROGRAM

## 2021-10-27 PROCEDURE — 25000003 PHARM REV CODE 250: Performed by: INTERNAL MEDICINE

## 2021-10-27 PROCEDURE — 97530 THERAPEUTIC ACTIVITIES: CPT | Mod: CQ

## 2021-10-27 PROCEDURE — 85027 COMPLETE CBC AUTOMATED: CPT | Performed by: STUDENT IN AN ORGANIZED HEALTH CARE EDUCATION/TRAINING PROGRAM

## 2021-10-27 PROCEDURE — 99239 HOSP IP/OBS DSCHRG MGMT >30: CPT | Mod: CR,,, | Performed by: INTERNAL MEDICINE

## 2021-10-27 PROCEDURE — 80048 BASIC METABOLIC PNL TOTAL CA: CPT | Performed by: STUDENT IN AN ORGANIZED HEALTH CARE EDUCATION/TRAINING PROGRAM

## 2021-10-27 PROCEDURE — 97535 SELF CARE MNGMENT TRAINING: CPT

## 2021-10-27 RX ADMIN — ESCITALOPRAM OXALATE 20 MG: 5 TABLET, FILM COATED ORAL at 09:10

## 2021-10-27 RX ADMIN — HYDRALAZINE HYDROCHLORIDE 25 MG: 25 TABLET, FILM COATED ORAL at 01:10

## 2021-10-27 RX ADMIN — ISOSORBIDE MONONITRATE 30 MG: 30 TABLET, EXTENDED RELEASE ORAL at 09:10

## 2021-10-27 RX ADMIN — INSULIN ASPART 4 UNITS: 100 INJECTION, SOLUTION INTRAVENOUS; SUBCUTANEOUS at 11:10

## 2021-10-27 RX ADMIN — ATORVASTATIN CALCIUM 40 MG: 10 TABLET, FILM COATED ORAL at 09:10

## 2021-10-27 RX ADMIN — ACETAMINOPHEN 650 MG: 325 TABLET ORAL at 12:10

## 2021-10-27 RX ADMIN — CARVEDILOL 12.5 MG: 12.5 TABLET, FILM COATED ORAL at 09:10

## 2021-10-27 RX ADMIN — APIXABAN 2.5 MG: 2.5 TABLET, FILM COATED ORAL at 09:10

## 2021-10-27 RX ADMIN — HYDRALAZINE HYDROCHLORIDE 25 MG: 25 TABLET, FILM COATED ORAL at 10:10

## 2021-10-27 RX ADMIN — CARVEDILOL 12.5 MG: 12.5 TABLET, FILM COATED ORAL at 05:10

## 2021-10-27 RX ADMIN — HYDRALAZINE HYDROCHLORIDE 25 MG: 25 TABLET, FILM COATED ORAL at 05:10

## 2021-10-27 RX ADMIN — INSULIN ASPART 2 UNITS: 100 INJECTION, SOLUTION INTRAVENOUS; SUBCUTANEOUS at 04:10

## 2021-10-27 RX ADMIN — Medication 2000 UNITS: at 09:10

## 2021-10-27 RX ADMIN — ALLOPURINOL 100 MG: 100 TABLET ORAL at 09:10

## 2021-10-27 RX ADMIN — TIMOLOL MALEATE 1 DROP: 5 SOLUTION OPHTHALMIC at 10:10

## 2021-10-28 ENCOUNTER — TELEPHONE (OUTPATIENT)
Dept: SPORTS MEDICINE | Facility: CLINIC | Age: 86
End: 2021-10-28
Payer: MEDICARE

## 2021-11-08 ENCOUNTER — TELEPHONE (OUTPATIENT)
Dept: SPORTS MEDICINE | Facility: CLINIC | Age: 86
End: 2021-11-08
Payer: MEDICARE

## 2021-11-11 ENCOUNTER — OFFICE VISIT (OUTPATIENT)
Dept: SPORTS MEDICINE | Facility: CLINIC | Age: 86
End: 2021-11-11
Payer: MEDICARE

## 2021-11-11 ENCOUNTER — HOSPITAL ENCOUNTER (OUTPATIENT)
Dept: RADIOLOGY | Facility: HOSPITAL | Age: 86
Discharge: HOME | End: 2021-11-11
Attending: STUDENT IN AN ORGANIZED HEALTH CARE EDUCATION/TRAINING PROGRAM
Payer: MEDICARE

## 2021-11-11 VITALS
DIASTOLIC BLOOD PRESSURE: 74 MMHG | HEART RATE: 69 BPM | SYSTOLIC BLOOD PRESSURE: 133 MMHG | BODY MASS INDEX: 28.9 KG/M2 | HEIGHT: 63 IN

## 2021-11-11 DIAGNOSIS — M89.8X5 PAIN IN RIGHT FEMUR: ICD-10-CM

## 2021-11-11 DIAGNOSIS — S72.21XD CLOSED DISPLACED SUBTROCHANTERIC FRACTURE OF RIGHT FEMUR WITH ROUTINE HEALING, SUBSEQUENT ENCOUNTER: Primary | ICD-10-CM

## 2021-11-11 PROCEDURE — 99024 PR POST-OP FOLLOW-UP VISIT: ICD-10-PCS | Mod: POP,,, | Performed by: STUDENT IN AN ORGANIZED HEALTH CARE EDUCATION/TRAINING PROGRAM

## 2021-11-11 PROCEDURE — 99999 PR PBB SHADOW E&M-EST. PATIENT-LVL IV: ICD-10-PCS | Mod: PBBFAC,,, | Performed by: STUDENT IN AN ORGANIZED HEALTH CARE EDUCATION/TRAINING PROGRAM

## 2021-11-11 PROCEDURE — 99999 PR PBB SHADOW E&M-EST. PATIENT-LVL IV: CPT | Mod: PBBFAC,,, | Performed by: STUDENT IN AN ORGANIZED HEALTH CARE EDUCATION/TRAINING PROGRAM

## 2021-11-11 PROCEDURE — 99024 POSTOP FOLLOW-UP VISIT: CPT | Mod: POP,,, | Performed by: STUDENT IN AN ORGANIZED HEALTH CARE EDUCATION/TRAINING PROGRAM

## 2021-11-11 PROCEDURE — 73552 X-RAY EXAM OF FEMUR 2/>: CPT | Mod: TC,RT

## 2021-11-11 PROCEDURE — 73552 XR FEMUR 2 VIEW RIGHT: ICD-10-PCS | Mod: 26,RT,, | Performed by: RADIOLOGY

## 2021-11-11 PROCEDURE — 73552 X-RAY EXAM OF FEMUR 2/>: CPT | Mod: 26,RT,, | Performed by: RADIOLOGY

## 2021-11-11 PROCEDURE — 99214 OFFICE O/P EST MOD 30 MIN: CPT | Mod: PBBFAC | Performed by: STUDENT IN AN ORGANIZED HEALTH CARE EDUCATION/TRAINING PROGRAM

## 2021-11-13 ENCOUNTER — HOSPITAL ENCOUNTER (OUTPATIENT)
Facility: HOSPITAL | Age: 86
Discharge: NURSING, REHAB OR LONG-TERM CARE | End: 2021-11-16
Attending: EMERGENCY MEDICINE | Admitting: INTERNAL MEDICINE
Payer: MEDICARE

## 2021-11-13 DIAGNOSIS — S72.21XD CLOSED DISPLACED SUBTROCHANTERIC FRACTURE OF RIGHT FEMUR WITH ROUTINE HEALING, SUBSEQUENT ENCOUNTER: ICD-10-CM

## 2021-11-13 DIAGNOSIS — I50.43 ACUTE ON CHRONIC COMBINED SYSTOLIC AND DIASTOLIC HEART FAILURE: ICD-10-CM

## 2021-11-13 DIAGNOSIS — E11.65 TYPE 2 DIABETES MELLITUS WITH HYPERGLYCEMIA, WITHOUT LONG-TERM CURRENT USE OF INSULIN: ICD-10-CM

## 2021-11-13 DIAGNOSIS — D64.9 ANEMIA, UNSPECIFIED TYPE: Primary | ICD-10-CM

## 2021-11-13 DIAGNOSIS — I50.9 CHF (CONGESTIVE HEART FAILURE): ICD-10-CM

## 2021-11-13 DIAGNOSIS — R06.02 SHORTNESS OF BREATH: ICD-10-CM

## 2021-11-13 PROBLEM — D62 ACUTE BLOOD LOSS AS CAUSE OF POSTOPERATIVE ANEMIA: Status: RESOLVED | Noted: 2021-10-21 | Resolved: 2021-11-13

## 2021-11-13 PROBLEM — D72.829 LEUKOCYTOSIS: Status: RESOLVED | Noted: 2021-10-23 | Resolved: 2021-11-13

## 2021-11-13 PROBLEM — I13.0 BENIGN HYPERTENSIVE HEART AND KIDNEY DISEASE WITH HF AND CKD: Status: ACTIVE | Noted: 2021-11-13

## 2021-11-13 LAB
ABO + RH BLD: NORMAL
ALBUMIN SERPL BCP-MCNC: 2 G/DL (ref 3.5–5.2)
ALP SERPL-CCNC: 90 U/L (ref 55–135)
ALT SERPL W/O P-5'-P-CCNC: 10 U/L (ref 10–44)
ANION GAP SERPL CALC-SCNC: 7 MMOL/L (ref 8–16)
AST SERPL-CCNC: 22 U/L (ref 10–40)
BASOPHILS # BLD AUTO: 0.03 K/UL (ref 0–0.2)
BASOPHILS NFR BLD: 0.4 % (ref 0–1.9)
BILIRUB SERPL-MCNC: 0.4 MG/DL (ref 0.1–1)
BLD GP AB SCN CELLS X3 SERPL QL: NORMAL
BLD PROD TYP BPU: NORMAL
BLOOD UNIT EXPIRATION DATE: NORMAL
BLOOD UNIT TYPE CODE: 5100
BLOOD UNIT TYPE: NORMAL
BNP SERPL-MCNC: 773 PG/ML (ref 0–99)
BUN SERPL-MCNC: 45 MG/DL (ref 8–23)
CALCIUM SERPL-MCNC: 9.3 MG/DL (ref 8.7–10.5)
CHLORIDE SERPL-SCNC: 103 MMOL/L (ref 95–110)
CO2 SERPL-SCNC: 28 MMOL/L (ref 23–29)
CODING SYSTEM: NORMAL
CREAT SERPL-MCNC: 1.6 MG/DL (ref 0.5–1.4)
CTP QC/QA: YES
DIFFERENTIAL METHOD: ABNORMAL
DISPENSE STATUS: NORMAL
EOSINOPHIL # BLD AUTO: 0.4 K/UL (ref 0–0.5)
EOSINOPHIL NFR BLD: 5.3 % (ref 0–8)
ERYTHROCYTE [DISTWIDTH] IN BLOOD BY AUTOMATED COUNT: 16.5 % (ref 11.5–14.5)
EST. GFR  (AFRICAN AMERICAN): 32.7 ML/MIN/1.73 M^2
EST. GFR  (NON AFRICAN AMERICAN): 28.4 ML/MIN/1.73 M^2
GLUCOSE SERPL-MCNC: 109 MG/DL (ref 70–110)
HCT VFR BLD AUTO: 23 % (ref 37–48.5)
HGB BLD-MCNC: 6.9 G/DL (ref 12–16)
IMM GRANULOCYTES # BLD AUTO: 0.06 K/UL (ref 0–0.04)
IMM GRANULOCYTES NFR BLD AUTO: 0.8 % (ref 0–0.5)
LYMPHOCYTES # BLD AUTO: 1.7 K/UL (ref 1–4.8)
LYMPHOCYTES NFR BLD: 23.1 % (ref 18–48)
MAGNESIUM SERPL-MCNC: 1.8 MG/DL (ref 1.6–2.6)
MCH RBC QN AUTO: 30 PG (ref 27–31)
MCHC RBC AUTO-ENTMCNC: 30 G/DL (ref 32–36)
MCV RBC AUTO: 100 FL (ref 82–98)
MONOCYTES # BLD AUTO: 0.7 K/UL (ref 0.3–1)
MONOCYTES NFR BLD: 9.5 % (ref 4–15)
NEUTROPHILS # BLD AUTO: 4.6 K/UL (ref 1.8–7.7)
NEUTROPHILS NFR BLD: 60.9 % (ref 38–73)
NRBC BLD-RTO: 0 /100 WBC
NUM UNITS TRANS PACKED RBC: NORMAL
PLATELET # BLD AUTO: 284 K/UL (ref 150–450)
PMV BLD AUTO: 11 FL (ref 9.2–12.9)
POTASSIUM SERPL-SCNC: 4.2 MMOL/L (ref 3.5–5.1)
PROT SERPL-MCNC: 6.1 G/DL (ref 6–8.4)
RBC # BLD AUTO: 2.3 M/UL (ref 4–5.4)
SARS-COV-2 RDRP RESP QL NAA+PROBE: NEGATIVE
SODIUM SERPL-SCNC: 138 MMOL/L (ref 136–145)
TROPONIN I SERPL DL<=0.01 NG/ML-MCNC: 0.03 NG/ML (ref 0–0.03)
TROPONIN I SERPL DL<=0.01 NG/ML-MCNC: 0.05 NG/ML (ref 0–0.03)
WBC # BLD AUTO: 7.49 K/UL (ref 3.9–12.7)

## 2021-11-13 PROCEDURE — 80053 COMPREHEN METABOLIC PANEL: CPT

## 2021-11-13 PROCEDURE — 99285 EMERGENCY DEPT VISIT HI MDM: CPT | Mod: GC,CS,, | Performed by: EMERGENCY MEDICINE

## 2021-11-13 PROCEDURE — P9016 RBC LEUKOCYTES REDUCED: HCPCS

## 2021-11-13 PROCEDURE — 63600175 PHARM REV CODE 636 W HCPCS

## 2021-11-13 PROCEDURE — 93010 ELECTROCARDIOGRAM REPORT: CPT | Mod: ,,, | Performed by: INTERNAL MEDICINE

## 2021-11-13 PROCEDURE — U0002 COVID-19 LAB TEST NON-CDC: HCPCS

## 2021-11-13 PROCEDURE — 99285 PR EMERGENCY DEPT VISIT,LEVEL V: ICD-10-PCS | Mod: GC,CS,, | Performed by: EMERGENCY MEDICINE

## 2021-11-13 PROCEDURE — 36430 TRANSFUSION BLD/BLD COMPNT: CPT

## 2021-11-13 PROCEDURE — 99220 PR INITIAL OBSERVATION CARE,LEVL III: ICD-10-PCS | Mod: ,,, | Performed by: HOSPITALIST

## 2021-11-13 PROCEDURE — 86920 COMPATIBILITY TEST SPIN: CPT

## 2021-11-13 PROCEDURE — 96374 THER/PROPH/DIAG INJ IV PUSH: CPT

## 2021-11-13 PROCEDURE — 99285 EMERGENCY DEPT VISIT HI MDM: CPT | Mod: 25,CS

## 2021-11-13 PROCEDURE — 99220 PR INITIAL OBSERVATION CARE,LEVL III: CPT | Mod: ,,, | Performed by: HOSPITALIST

## 2021-11-13 PROCEDURE — G0378 HOSPITAL OBSERVATION PER HR: HCPCS

## 2021-11-13 PROCEDURE — 86900 BLOOD TYPING SEROLOGIC ABO: CPT

## 2021-11-13 PROCEDURE — 84484 ASSAY OF TROPONIN QUANT: CPT

## 2021-11-13 PROCEDURE — 25000003 PHARM REV CODE 250: Performed by: HOSPITALIST

## 2021-11-13 PROCEDURE — 93010 EKG 12-LEAD: ICD-10-PCS | Mod: ,,, | Performed by: INTERNAL MEDICINE

## 2021-11-13 PROCEDURE — 83735 ASSAY OF MAGNESIUM: CPT

## 2021-11-13 PROCEDURE — 93005 ELECTROCARDIOGRAM TRACING: CPT

## 2021-11-13 PROCEDURE — 85025 COMPLETE CBC W/AUTO DIFF WBC: CPT

## 2021-11-13 PROCEDURE — 83880 ASSAY OF NATRIURETIC PEPTIDE: CPT

## 2021-11-13 RX ORDER — TALC
6 POWDER (GRAM) TOPICAL NIGHTLY PRN
Status: DISCONTINUED | OUTPATIENT
Start: 2021-11-13 | End: 2021-11-16 | Stop reason: HOSPADM

## 2021-11-13 RX ORDER — ALLOPURINOL 100 MG/1
100 TABLET ORAL DAILY
Status: DISCONTINUED | OUTPATIENT
Start: 2021-11-14 | End: 2021-11-16 | Stop reason: HOSPADM

## 2021-11-13 RX ORDER — ACETAMINOPHEN 500 MG
1000 TABLET ORAL EVERY 6 HOURS PRN
Status: DISCONTINUED | OUTPATIENT
Start: 2021-11-13 | End: 2021-11-16 | Stop reason: HOSPADM

## 2021-11-13 RX ORDER — FUROSEMIDE 10 MG/ML
60 INJECTION INTRAMUSCULAR; INTRAVENOUS ONCE
Status: COMPLETED | OUTPATIENT
Start: 2021-11-13 | End: 2021-11-13

## 2021-11-13 RX ORDER — CHOLECALCIFEROL (VITAMIN D3) 25 MCG
2000 TABLET ORAL DAILY
Status: DISCONTINUED | OUTPATIENT
Start: 2021-11-14 | End: 2021-11-16 | Stop reason: HOSPADM

## 2021-11-13 RX ORDER — FUROSEMIDE 10 MG/ML
80 INJECTION INTRAMUSCULAR; INTRAVENOUS 2 TIMES DAILY
Status: DISCONTINUED | OUTPATIENT
Start: 2021-11-14 | End: 2021-11-14

## 2021-11-13 RX ORDER — ISOSORBIDE MONONITRATE 30 MG/1
30 TABLET, EXTENDED RELEASE ORAL DAILY
Status: DISCONTINUED | OUTPATIENT
Start: 2021-11-14 | End: 2021-11-16 | Stop reason: HOSPADM

## 2021-11-13 RX ORDER — HYDRALAZINE HYDROCHLORIDE 25 MG/1
25 TABLET, FILM COATED ORAL EVERY 8 HOURS
Status: DISCONTINUED | OUTPATIENT
Start: 2021-11-13 | End: 2021-11-16 | Stop reason: HOSPADM

## 2021-11-13 RX ORDER — ACETAMINOPHEN 325 MG/1
650 TABLET ORAL EVERY 4 HOURS PRN
Status: DISCONTINUED | OUTPATIENT
Start: 2021-11-13 | End: 2021-11-16 | Stop reason: HOSPADM

## 2021-11-13 RX ORDER — ONDANSETRON 2 MG/ML
8 INJECTION INTRAMUSCULAR; INTRAVENOUS EVERY 6 HOURS PRN
Status: DISCONTINUED | OUTPATIENT
Start: 2021-11-13 | End: 2021-11-16 | Stop reason: HOSPADM

## 2021-11-13 RX ORDER — ATORVASTATIN CALCIUM 40 MG/1
40 TABLET, FILM COATED ORAL DAILY
Status: DISCONTINUED | OUTPATIENT
Start: 2021-11-14 | End: 2021-11-16 | Stop reason: HOSPADM

## 2021-11-13 RX ORDER — SODIUM CHLORIDE 0.9 % (FLUSH) 0.9 %
5 SYRINGE (ML) INJECTION
Status: DISCONTINUED | OUTPATIENT
Start: 2021-11-13 | End: 2021-11-16 | Stop reason: HOSPADM

## 2021-11-13 RX ORDER — PROCHLORPERAZINE EDISYLATE 5 MG/ML
5 INJECTION INTRAMUSCULAR; INTRAVENOUS EVERY 6 HOURS PRN
Status: DISCONTINUED | OUTPATIENT
Start: 2021-11-13 | End: 2021-11-16 | Stop reason: HOSPADM

## 2021-11-13 RX ORDER — ESCITALOPRAM OXALATE 20 MG/1
20 TABLET ORAL DAILY
Status: DISCONTINUED | OUTPATIENT
Start: 2021-11-14 | End: 2021-11-16 | Stop reason: HOSPADM

## 2021-11-13 RX ORDER — TIMOLOL MALEATE 5 MG/ML
1 SOLUTION/ DROPS OPHTHALMIC 2 TIMES DAILY
Status: DISCONTINUED | OUTPATIENT
Start: 2021-11-13 | End: 2021-11-16 | Stop reason: HOSPADM

## 2021-11-13 RX ORDER — ACETAMINOPHEN 500 MG
1000 TABLET ORAL
Status: COMPLETED | OUTPATIENT
Start: 2021-11-13 | End: 2021-11-13

## 2021-11-13 RX ORDER — FAMOTIDINE 20 MG/1
20 TABLET, FILM COATED ORAL DAILY
Status: DISCONTINUED | OUTPATIENT
Start: 2021-11-14 | End: 2021-11-16 | Stop reason: HOSPADM

## 2021-11-13 RX ORDER — HYDROCODONE BITARTRATE AND ACETAMINOPHEN 500; 5 MG/1; MG/1
TABLET ORAL
Status: DISCONTINUED | OUTPATIENT
Start: 2021-11-13 | End: 2021-11-16 | Stop reason: HOSPADM

## 2021-11-13 RX ORDER — SODIUM CHLORIDE 0.9 % (FLUSH) 0.9 %
10 SYRINGE (ML) INJECTION
Status: DISCONTINUED | OUTPATIENT
Start: 2021-11-13 | End: 2021-11-16 | Stop reason: HOSPADM

## 2021-11-13 RX ORDER — CARVEDILOL 12.5 MG/1
12.5 TABLET ORAL 2 TIMES DAILY
Status: DISCONTINUED | OUTPATIENT
Start: 2021-11-13 | End: 2021-11-14

## 2021-11-13 RX ORDER — POLYETHYLENE GLYCOL 3350 17 G/17G
17 POWDER, FOR SOLUTION ORAL DAILY PRN
Status: DISCONTINUED | OUTPATIENT
Start: 2021-11-13 | End: 2021-11-16 | Stop reason: HOSPADM

## 2021-11-13 RX ADMIN — ACETAMINOPHEN 1000 MG: 500 TABLET ORAL at 06:11

## 2021-11-13 RX ADMIN — HYDRALAZINE HYDROCHLORIDE 25 MG: 25 TABLET, FILM COATED ORAL at 10:11

## 2021-11-13 RX ADMIN — FUROSEMIDE 60 MG: 20 INJECTION, SOLUTION INTRAMUSCULAR; INTRAVENOUS at 03:11

## 2021-11-13 RX ADMIN — CARVEDILOL 12.5 MG: 12.5 TABLET, FILM COATED ORAL at 10:11

## 2021-11-13 RX ADMIN — TIMOLOL MALEATE 1 DROP: 5 SOLUTION OPHTHALMIC at 10:11

## 2021-11-13 RX ADMIN — APIXABAN 2.5 MG: 2.5 TABLET, FILM COATED ORAL at 10:11

## 2021-11-14 LAB
ALBUMIN SERPL BCP-MCNC: 2.2 G/DL (ref 3.5–5.2)
ALP SERPL-CCNC: 94 U/L (ref 55–135)
ALT SERPL W/O P-5'-P-CCNC: 8 U/L (ref 10–44)
ANION GAP SERPL CALC-SCNC: 7 MMOL/L (ref 8–16)
ASCENDING AORTA: 3.3 CM
AST SERPL-CCNC: 20 U/L (ref 10–40)
AV INDEX (PROSTH): 0.73
AV MEAN GRADIENT: 12 MMHG
AV PEAK GRADIENT: 19 MMHG
AV VALVE AREA: 2.28 CM2
AV VELOCITY RATIO: 0.62
BASOPHILS # BLD AUTO: 0.04 K/UL (ref 0–0.2)
BASOPHILS NFR BLD: 0.5 % (ref 0–1.9)
BILIRUB SERPL-MCNC: 0.7 MG/DL (ref 0.1–1)
BSA FOR ECHO PROCEDURE: 1.88 M2
BUN SERPL-MCNC: 46 MG/DL (ref 8–23)
CALCIUM SERPL-MCNC: 9.4 MG/DL (ref 8.7–10.5)
CHLORIDE SERPL-SCNC: 100 MMOL/L (ref 95–110)
CO2 SERPL-SCNC: 31 MMOL/L (ref 23–29)
CREAT SERPL-MCNC: 1.7 MG/DL (ref 0.5–1.4)
CV ECHO LV RWT: 0.29 CM
DIFFERENTIAL METHOD: ABNORMAL
DOP CALC AO PEAK VEL: 2.19 M/S
DOP CALC AO VTI: 41.06 CM
DOP CALC LVOT AREA: 3.1 CM2
DOP CALC LVOT DIAMETER: 2 CM
DOP CALC LVOT PEAK VEL: 1.36 M/S
DOP CALC LVOT STROKE VOLUME: 93.67 CM3
DOP CALCLVOT PEAK VEL VTI: 29.83 CM
E WAVE DECELERATION TIME: 301.59 MSEC
E/A RATIO: 0.65
E/E' RATIO: 18.8 M/S
ECHO LV POSTERIOR WALL: 0.73 CM (ref 0.6–1.1)
EJECTION FRACTION: 25 %
EOSINOPHIL # BLD AUTO: 0.4 K/UL (ref 0–0.5)
EOSINOPHIL NFR BLD: 4.7 % (ref 0–8)
ERYTHROCYTE [DISTWIDTH] IN BLOOD BY AUTOMATED COUNT: 16.6 % (ref 11.5–14.5)
EST. GFR  (AFRICAN AMERICAN): 30.4 ML/MIN/1.73 M^2
EST. GFR  (NON AFRICAN AMERICAN): 26.4 ML/MIN/1.73 M^2
FRACTIONAL SHORTENING: 10 % (ref 28–44)
GLUCOSE SERPL-MCNC: 102 MG/DL (ref 70–110)
HCT VFR BLD AUTO: 26.4 % (ref 37–48.5)
HGB BLD-MCNC: 8.1 G/DL (ref 12–16)
IMM GRANULOCYTES # BLD AUTO: 0.07 K/UL (ref 0–0.04)
IMM GRANULOCYTES NFR BLD AUTO: 0.9 % (ref 0–0.5)
INTERVENTRICULAR SEPTUM: 0.92 CM (ref 0.6–1.1)
IVRT: 114.18 MSEC
LA MAJOR: 7.04 CM
LA MINOR: 7.01 CM
LA WIDTH: 4.59 CM
LEFT ATRIUM SIZE: 3.92 CM
LEFT ATRIUM VOLUME INDEX MOD: 43.1 ML/M2
LEFT ATRIUM VOLUME INDEX: 58.7 ML/M2
LEFT ATRIUM VOLUME MOD: 78.8 CM3
LEFT ATRIUM VOLUME: 107.44 CM3
LEFT INTERNAL DIMENSION IN SYSTOLE: 4.6 CM (ref 2.1–4)
LEFT VENTRICLE DIASTOLIC VOLUME INDEX: 61.2 ML/M2
LEFT VENTRICLE DIASTOLIC VOLUME: 112 ML
LEFT VENTRICLE MASS INDEX: 80 G/M2
LEFT VENTRICLE SYSTOLIC VOLUME INDEX: 41.9 ML/M2
LEFT VENTRICLE SYSTOLIC VOLUME: 76.63 ML
LEFT VENTRICULAR INTERNAL DIMENSION IN DIASTOLE: 5.1 CM (ref 3.5–6)
LEFT VENTRICULAR MASS: 146.11 G
LV LATERAL E/E' RATIO: 13.43 M/S
LV SEPTAL E/E' RATIO: 31.33 M/S
LYMPHOCYTES # BLD AUTO: 1.7 K/UL (ref 1–4.8)
LYMPHOCYTES NFR BLD: 22.3 % (ref 18–48)
MAGNESIUM SERPL-MCNC: 1.8 MG/DL (ref 1.6–2.6)
MCH RBC QN AUTO: 30.7 PG (ref 27–31)
MCHC RBC AUTO-ENTMCNC: 30.7 G/DL (ref 32–36)
MCV RBC AUTO: 100 FL (ref 82–98)
MONOCYTES # BLD AUTO: 0.8 K/UL (ref 0.3–1)
MONOCYTES NFR BLD: 10.6 % (ref 4–15)
MV PEAK A VEL: 1.44 M/S
MV PEAK E VEL: 0.94 M/S
MV STENOSIS PRESSURE HALF TIME: 87.46 MS
MV VALVE AREA P 1/2 METHOD: 2.52 CM2
NEUTROPHILS # BLD AUTO: 4.7 K/UL (ref 1.8–7.7)
NEUTROPHILS NFR BLD: 61 % (ref 38–73)
NRBC BLD-RTO: 0 /100 WBC
PHOSPHATE SERPL-MCNC: 3.7 MG/DL (ref 2.7–4.5)
PISA TR MAX VEL: 3.47 M/S
PLATELET # BLD AUTO: 293 K/UL (ref 150–450)
PMV BLD AUTO: 11 FL (ref 9.2–12.9)
POTASSIUM SERPL-SCNC: 4 MMOL/L (ref 3.5–5.1)
PROT SERPL-MCNC: 5.9 G/DL (ref 6–8.4)
RA MAJOR: 4.98 CM
RA PRESSURE: 3 MMHG
RA WIDTH: 3.71 CM
RBC # BLD AUTO: 2.64 M/UL (ref 4–5.4)
RIGHT VENTRICULAR END-DIASTOLIC DIMENSION: 3.2 CM
RV TISSUE DOPPLER FREE WALL SYSTOLIC VELOCITY 1 (APICAL 4 CHAMBER VIEW): 4.03 CM/S
SINUS: 2.59 CM
SODIUM SERPL-SCNC: 138 MMOL/L (ref 136–145)
STJ: 2.29 CM
TDI LATERAL: 0.07 M/S
TDI SEPTAL: 0.03 M/S
TDI: 0.05 M/S
TR MAX PG: 48 MMHG
TRICUSPID ANNULAR PLANE SYSTOLIC EXCURSION: 1.24 CM
TV REST PULMONARY ARTERY PRESSURE: 51 MMHG
WBC # BLD AUTO: 7.71 K/UL (ref 3.9–12.7)

## 2021-11-14 PROCEDURE — 84100 ASSAY OF PHOSPHORUS: CPT | Performed by: HOSPITALIST

## 2021-11-14 PROCEDURE — G0378 HOSPITAL OBSERVATION PER HR: HCPCS

## 2021-11-14 PROCEDURE — 25500020 PHARM REV CODE 255: Performed by: INTERNAL MEDICINE

## 2021-11-14 PROCEDURE — 25000003 PHARM REV CODE 250: Performed by: HOSPITALIST

## 2021-11-14 PROCEDURE — 25000003 PHARM REV CODE 250

## 2021-11-14 PROCEDURE — 83735 ASSAY OF MAGNESIUM: CPT | Performed by: HOSPITALIST

## 2021-11-14 PROCEDURE — 63600175 PHARM REV CODE 636 W HCPCS: Performed by: HOSPITALIST

## 2021-11-14 PROCEDURE — 97530 THERAPEUTIC ACTIVITIES: CPT

## 2021-11-14 PROCEDURE — 97161 PT EVAL LOW COMPLEX 20 MIN: CPT

## 2021-11-14 PROCEDURE — 99226 PR SUBSEQUENT OBSERVATION CARE,LEVEL III: CPT | Mod: ,,,

## 2021-11-14 PROCEDURE — 85025 COMPLETE CBC W/AUTO DIFF WBC: CPT | Performed by: HOSPITALIST

## 2021-11-14 PROCEDURE — 97112 NEUROMUSCULAR REEDUCATION: CPT

## 2021-11-14 PROCEDURE — 36415 COLL VENOUS BLD VENIPUNCTURE: CPT | Performed by: HOSPITALIST

## 2021-11-14 PROCEDURE — 97165 OT EVAL LOW COMPLEX 30 MIN: CPT

## 2021-11-14 PROCEDURE — 99226 PR SUBSEQUENT OBSERVATION CARE,LEVEL III: ICD-10-PCS | Mod: ,,,

## 2021-11-14 PROCEDURE — 80053 COMPREHEN METABOLIC PANEL: CPT | Performed by: HOSPITALIST

## 2021-11-14 RX ORDER — LOSARTAN POTASSIUM 25 MG/1
25 TABLET ORAL DAILY
Status: DISCONTINUED | OUTPATIENT
Start: 2021-11-14 | End: 2021-11-16 | Stop reason: HOSPADM

## 2021-11-14 RX ORDER — CARVEDILOL 25 MG/1
25 TABLET ORAL 2 TIMES DAILY
Status: DISCONTINUED | OUTPATIENT
Start: 2021-11-14 | End: 2021-11-16 | Stop reason: HOSPADM

## 2021-11-14 RX ADMIN — ATORVASTATIN CALCIUM 40 MG: 40 TABLET, FILM COATED ORAL at 09:11

## 2021-11-14 RX ADMIN — HUMAN ALBUMIN MICROSPHERES AND PERFLUTREN 0.66 MG: 10; .22 INJECTION, SOLUTION INTRAVENOUS at 08:11

## 2021-11-14 RX ADMIN — ESCITALOPRAM OXALATE 20 MG: 20 TABLET ORAL at 09:11

## 2021-11-14 RX ADMIN — HYDRALAZINE HYDROCHLORIDE 25 MG: 25 TABLET, FILM COATED ORAL at 01:11

## 2021-11-14 RX ADMIN — HYDRALAZINE HYDROCHLORIDE 25 MG: 25 TABLET, FILM COATED ORAL at 05:11

## 2021-11-14 RX ADMIN — ALLOPURINOL 100 MG: 100 TABLET ORAL at 09:11

## 2021-11-14 RX ADMIN — HYDRALAZINE HYDROCHLORIDE 25 MG: 25 TABLET, FILM COATED ORAL at 09:11

## 2021-11-14 RX ADMIN — FAMOTIDINE 20 MG: 20 TABLET ORAL at 09:11

## 2021-11-14 RX ADMIN — TIMOLOL MALEATE 1 DROP: 5 SOLUTION OPHTHALMIC at 09:11

## 2021-11-14 RX ADMIN — Medication 2000 UNITS: at 09:11

## 2021-11-14 RX ADMIN — APIXABAN 2.5 MG: 2.5 TABLET, FILM COATED ORAL at 09:11

## 2021-11-14 RX ADMIN — CARVEDILOL 25 MG: 25 TABLET, FILM COATED ORAL at 09:11

## 2021-11-14 RX ADMIN — ISOSORBIDE MONONITRATE 30 MG: 30 TABLET, EXTENDED RELEASE ORAL at 09:11

## 2021-11-14 RX ADMIN — LOSARTAN POTASSIUM 25 MG: 25 TABLET, FILM COATED ORAL at 01:11

## 2021-11-15 DIAGNOSIS — I50.43 ACUTE ON CHRONIC COMBINED SYSTOLIC AND DIASTOLIC HEART FAILURE: Primary | ICD-10-CM

## 2021-11-15 LAB
ALBUMIN SERPL BCP-MCNC: 2.3 G/DL (ref 3.5–5.2)
ALP SERPL-CCNC: 94 U/L (ref 55–135)
ALT SERPL W/O P-5'-P-CCNC: 11 U/L (ref 10–44)
ANION GAP SERPL CALC-SCNC: 8 MMOL/L (ref 8–16)
AST SERPL-CCNC: 22 U/L (ref 10–40)
BASOPHILS # BLD AUTO: 0.03 K/UL (ref 0–0.2)
BASOPHILS NFR BLD: 0.4 % (ref 0–1.9)
BILIRUB SERPL-MCNC: 0.7 MG/DL (ref 0.1–1)
BUN SERPL-MCNC: 43 MG/DL (ref 8–23)
CALCIUM SERPL-MCNC: 9.4 MG/DL (ref 8.7–10.5)
CHLORIDE SERPL-SCNC: 103 MMOL/L (ref 95–110)
CO2 SERPL-SCNC: 30 MMOL/L (ref 23–29)
CREAT SERPL-MCNC: 1.5 MG/DL (ref 0.5–1.4)
DIFFERENTIAL METHOD: ABNORMAL
EOSINOPHIL # BLD AUTO: 0.3 K/UL (ref 0–0.5)
EOSINOPHIL NFR BLD: 3.1 % (ref 0–8)
ERYTHROCYTE [DISTWIDTH] IN BLOOD BY AUTOMATED COUNT: 16.5 % (ref 11.5–14.5)
EST. GFR  (AFRICAN AMERICAN): 35.4 ML/MIN/1.73 M^2
EST. GFR  (NON AFRICAN AMERICAN): 30.7 ML/MIN/1.73 M^2
GLUCOSE SERPL-MCNC: 102 MG/DL (ref 70–110)
HCT VFR BLD AUTO: 28.2 % (ref 37–48.5)
HGB BLD-MCNC: 8.6 G/DL (ref 12–16)
IMM GRANULOCYTES # BLD AUTO: 0.11 K/UL (ref 0–0.04)
IMM GRANULOCYTES NFR BLD AUTO: 1.3 % (ref 0–0.5)
LYMPHOCYTES # BLD AUTO: 1.8 K/UL (ref 1–4.8)
LYMPHOCYTES NFR BLD: 21.5 % (ref 18–48)
MAGNESIUM SERPL-MCNC: 1.9 MG/DL (ref 1.6–2.6)
MCH RBC QN AUTO: 31 PG (ref 27–31)
MCHC RBC AUTO-ENTMCNC: 30.5 G/DL (ref 32–36)
MCV RBC AUTO: 102 FL (ref 82–98)
MONOCYTES # BLD AUTO: 0.9 K/UL (ref 0.3–1)
MONOCYTES NFR BLD: 10.4 % (ref 4–15)
NEUTROPHILS # BLD AUTO: 5.4 K/UL (ref 1.8–7.7)
NEUTROPHILS NFR BLD: 63.3 % (ref 38–73)
NRBC BLD-RTO: 0 /100 WBC
PHOSPHATE SERPL-MCNC: 3.4 MG/DL (ref 2.7–4.5)
PLATELET # BLD AUTO: 309 K/UL (ref 150–450)
PMV BLD AUTO: 11.4 FL (ref 9.2–12.9)
POTASSIUM SERPL-SCNC: 4.1 MMOL/L (ref 3.5–5.1)
PROT SERPL-MCNC: 6.1 G/DL (ref 6–8.4)
RBC # BLD AUTO: 2.77 M/UL (ref 4–5.4)
SARS-COV-2 RNA RESP QL NAA+PROBE: NOT DETECTED
SODIUM SERPL-SCNC: 141 MMOL/L (ref 136–145)
WBC # BLD AUTO: 8.46 K/UL (ref 3.9–12.7)

## 2021-11-15 PROCEDURE — 36415 COLL VENOUS BLD VENIPUNCTURE: CPT | Performed by: HOSPITALIST

## 2021-11-15 PROCEDURE — 25000003 PHARM REV CODE 250

## 2021-11-15 PROCEDURE — U0005 INFEC AGEN DETEC AMPLI PROBE: HCPCS | Performed by: INTERNAL MEDICINE

## 2021-11-15 PROCEDURE — 84100 ASSAY OF PHOSPHORUS: CPT | Performed by: HOSPITALIST

## 2021-11-15 PROCEDURE — 83735 ASSAY OF MAGNESIUM: CPT | Performed by: HOSPITALIST

## 2021-11-15 PROCEDURE — 99226 PR SUBSEQUENT OBSERVATION CARE,LEVEL III: CPT | Mod: ,,,

## 2021-11-15 PROCEDURE — 25000003 PHARM REV CODE 250: Performed by: HOSPITALIST

## 2021-11-15 PROCEDURE — 80053 COMPREHEN METABOLIC PANEL: CPT | Performed by: HOSPITALIST

## 2021-11-15 PROCEDURE — 85025 COMPLETE CBC W/AUTO DIFF WBC: CPT | Performed by: HOSPITALIST

## 2021-11-15 PROCEDURE — 99226 PR SUBSEQUENT OBSERVATION CARE,LEVEL III: ICD-10-PCS | Mod: ,,,

## 2021-11-15 PROCEDURE — G0378 HOSPITAL OBSERVATION PER HR: HCPCS

## 2021-11-15 PROCEDURE — U0003 INFECTIOUS AGENT DETECTION BY NUCLEIC ACID (DNA OR RNA); SEVERE ACUTE RESPIRATORY SYNDROME CORONAVIRUS 2 (SARS-COV-2) (CORONAVIRUS DISEASE [COVID-19]), AMPLIFIED PROBE TECHNIQUE, MAKING USE OF HIGH THROUGHPUT TECHNOLOGIES AS DESCRIBED BY CMS-2020-01-R: HCPCS | Performed by: INTERNAL MEDICINE

## 2021-11-15 RX ORDER — LOSARTAN POTASSIUM 25 MG/1
25 TABLET ORAL DAILY
Qty: 90 TABLET | Refills: 3
Start: 2021-11-15 | End: 2021-11-23 | Stop reason: ALTCHOICE

## 2021-11-15 RX ORDER — FUROSEMIDE 20 MG/1
20 TABLET ORAL DAILY
Status: DISCONTINUED | OUTPATIENT
Start: 2021-11-15 | End: 2021-11-16 | Stop reason: HOSPADM

## 2021-11-15 RX ADMIN — ISOSORBIDE MONONITRATE 30 MG: 30 TABLET, EXTENDED RELEASE ORAL at 09:11

## 2021-11-15 RX ADMIN — ESCITALOPRAM OXALATE 20 MG: 20 TABLET ORAL at 09:11

## 2021-11-15 RX ADMIN — LOSARTAN POTASSIUM 25 MG: 25 TABLET, FILM COATED ORAL at 09:11

## 2021-11-15 RX ADMIN — FUROSEMIDE 20 MG: 20 TABLET ORAL at 09:11

## 2021-11-15 RX ADMIN — TIMOLOL MALEATE 1 DROP: 5 SOLUTION OPHTHALMIC at 09:11

## 2021-11-15 RX ADMIN — HYDRALAZINE HYDROCHLORIDE 25 MG: 25 TABLET, FILM COATED ORAL at 09:11

## 2021-11-15 RX ADMIN — APIXABAN 2.5 MG: 2.5 TABLET, FILM COATED ORAL at 09:11

## 2021-11-15 RX ADMIN — CARVEDILOL 25 MG: 25 TABLET, FILM COATED ORAL at 09:11

## 2021-11-15 RX ADMIN — ATORVASTATIN CALCIUM 40 MG: 40 TABLET, FILM COATED ORAL at 09:11

## 2021-11-15 RX ADMIN — HYDRALAZINE HYDROCHLORIDE 25 MG: 25 TABLET, FILM COATED ORAL at 01:11

## 2021-11-15 RX ADMIN — FAMOTIDINE 20 MG: 20 TABLET ORAL at 09:11

## 2021-11-15 RX ADMIN — Medication 2000 UNITS: at 09:11

## 2021-11-15 RX ADMIN — ALLOPURINOL 100 MG: 100 TABLET ORAL at 09:11

## 2021-11-15 RX ADMIN — HYDRALAZINE HYDROCHLORIDE 25 MG: 25 TABLET, FILM COATED ORAL at 05:11

## 2021-11-16 VITALS
HEIGHT: 63 IN | HEART RATE: 71 BPM | WEIGHT: 175 LBS | RESPIRATION RATE: 20 BRPM | BODY MASS INDEX: 31.01 KG/M2 | OXYGEN SATURATION: 100 % | DIASTOLIC BLOOD PRESSURE: 60 MMHG | SYSTOLIC BLOOD PRESSURE: 126 MMHG | TEMPERATURE: 99 F

## 2021-11-16 LAB
ALBUMIN SERPL BCP-MCNC: 2.3 G/DL (ref 3.5–5.2)
ALP SERPL-CCNC: 102 U/L (ref 55–135)
ALT SERPL W/O P-5'-P-CCNC: 9 U/L (ref 10–44)
ANION GAP SERPL CALC-SCNC: 10 MMOL/L (ref 8–16)
AST SERPL-CCNC: 25 U/L (ref 10–40)
BASOPHILS # BLD AUTO: 0.03 K/UL (ref 0–0.2)
BASOPHILS NFR BLD: 0.3 % (ref 0–1.9)
BILIRUB SERPL-MCNC: 0.6 MG/DL (ref 0.1–1)
BUN SERPL-MCNC: 42 MG/DL (ref 8–23)
CALCIUM SERPL-MCNC: 9.3 MG/DL (ref 8.7–10.5)
CHLORIDE SERPL-SCNC: 105 MMOL/L (ref 95–110)
CO2 SERPL-SCNC: 26 MMOL/L (ref 23–29)
CREAT SERPL-MCNC: 1.5 MG/DL (ref 0.5–1.4)
DIFFERENTIAL METHOD: ABNORMAL
EOSINOPHIL # BLD AUTO: 0.3 K/UL (ref 0–0.5)
EOSINOPHIL NFR BLD: 2.8 % (ref 0–8)
ERYTHROCYTE [DISTWIDTH] IN BLOOD BY AUTOMATED COUNT: 17 % (ref 11.5–14.5)
EST. GFR  (AFRICAN AMERICAN): 35.4 ML/MIN/1.73 M^2
EST. GFR  (NON AFRICAN AMERICAN): 30.7 ML/MIN/1.73 M^2
GLUCOSE SERPL-MCNC: 110 MG/DL (ref 70–110)
HCT VFR BLD AUTO: 29.7 % (ref 37–48.5)
HGB BLD-MCNC: 8.6 G/DL (ref 12–16)
IMM GRANULOCYTES # BLD AUTO: 0.11 K/UL (ref 0–0.04)
IMM GRANULOCYTES NFR BLD AUTO: 1.2 % (ref 0–0.5)
LYMPHOCYTES # BLD AUTO: 1.7 K/UL (ref 1–4.8)
LYMPHOCYTES NFR BLD: 18.8 % (ref 18–48)
MAGNESIUM SERPL-MCNC: 1.8 MG/DL (ref 1.6–2.6)
MCH RBC QN AUTO: 31.3 PG (ref 27–31)
MCHC RBC AUTO-ENTMCNC: 29 G/DL (ref 32–36)
MCV RBC AUTO: 108 FL (ref 82–98)
MONOCYTES # BLD AUTO: 0.9 K/UL (ref 0.3–1)
MONOCYTES NFR BLD: 10.3 % (ref 4–15)
NEUTROPHILS # BLD AUTO: 5.9 K/UL (ref 1.8–7.7)
NEUTROPHILS NFR BLD: 66.6 % (ref 38–73)
NRBC BLD-RTO: 0 /100 WBC
PHOSPHATE SERPL-MCNC: 3.2 MG/DL (ref 2.7–4.5)
PLATELET # BLD AUTO: 261 K/UL (ref 150–450)
PMV BLD AUTO: 10.6 FL (ref 9.2–12.9)
POTASSIUM SERPL-SCNC: 4.2 MMOL/L (ref 3.5–5.1)
PROT SERPL-MCNC: 6.1 G/DL (ref 6–8.4)
RBC # BLD AUTO: 2.75 M/UL (ref 4–5.4)
SODIUM SERPL-SCNC: 141 MMOL/L (ref 136–145)
WBC # BLD AUTO: 8.9 K/UL (ref 3.9–12.7)

## 2021-11-16 PROCEDURE — 99217 PR OBSERVATION CARE DISCHARGE: CPT | Mod: ,,,

## 2021-11-16 PROCEDURE — 83735 ASSAY OF MAGNESIUM: CPT | Performed by: HOSPITALIST

## 2021-11-16 PROCEDURE — 25000003 PHARM REV CODE 250

## 2021-11-16 PROCEDURE — 99217 PR OBSERVATION CARE DISCHARGE: ICD-10-PCS | Mod: ,,,

## 2021-11-16 PROCEDURE — 25000003 PHARM REV CODE 250: Performed by: HOSPITALIST

## 2021-11-16 PROCEDURE — 36415 COLL VENOUS BLD VENIPUNCTURE: CPT | Performed by: HOSPITALIST

## 2021-11-16 PROCEDURE — 80053 COMPREHEN METABOLIC PANEL: CPT | Performed by: HOSPITALIST

## 2021-11-16 PROCEDURE — G0378 HOSPITAL OBSERVATION PER HR: HCPCS

## 2021-11-16 PROCEDURE — 85025 COMPLETE CBC W/AUTO DIFF WBC: CPT | Performed by: HOSPITALIST

## 2021-11-16 PROCEDURE — 84100 ASSAY OF PHOSPHORUS: CPT | Performed by: HOSPITALIST

## 2021-11-16 RX ORDER — LANCETS
EACH MISCELLANEOUS
Qty: 100 EACH | Refills: 6
Start: 2021-11-16 | End: 2023-10-09 | Stop reason: SDUPTHER

## 2021-11-16 RX ORDER — INSULIN PUMP SYRINGE, 3 ML
EACH MISCELLANEOUS
Qty: 1 EACH | Refills: 0
Start: 2021-11-16 | End: 2023-10-09 | Stop reason: SDUPTHER

## 2021-11-16 RX ADMIN — LOSARTAN POTASSIUM 25 MG: 25 TABLET, FILM COATED ORAL at 08:11

## 2021-11-16 RX ADMIN — ISOSORBIDE MONONITRATE 30 MG: 30 TABLET, EXTENDED RELEASE ORAL at 08:11

## 2021-11-16 RX ADMIN — ALLOPURINOL 100 MG: 100 TABLET ORAL at 08:11

## 2021-11-16 RX ADMIN — APIXABAN 2.5 MG: 2.5 TABLET, FILM COATED ORAL at 08:11

## 2021-11-16 RX ADMIN — FAMOTIDINE 20 MG: 20 TABLET ORAL at 08:11

## 2021-11-16 RX ADMIN — CARVEDILOL 25 MG: 25 TABLET, FILM COATED ORAL at 08:11

## 2021-11-16 RX ADMIN — TIMOLOL MALEATE 1 DROP: 5 SOLUTION OPHTHALMIC at 08:11

## 2021-11-16 RX ADMIN — ACETAMINOPHEN 1000 MG: 500 TABLET ORAL at 04:11

## 2021-11-16 RX ADMIN — ESCITALOPRAM OXALATE 20 MG: 20 TABLET ORAL at 08:11

## 2021-11-16 RX ADMIN — HYDRALAZINE HYDROCHLORIDE 25 MG: 25 TABLET, FILM COATED ORAL at 06:11

## 2021-11-16 RX ADMIN — ATORVASTATIN CALCIUM 40 MG: 40 TABLET, FILM COATED ORAL at 08:11

## 2021-11-16 RX ADMIN — FUROSEMIDE 20 MG: 20 TABLET ORAL at 08:11

## 2021-11-16 RX ADMIN — Medication 2000 UNITS: at 08:11

## 2021-11-17 ENCOUNTER — PATIENT MESSAGE (OUTPATIENT)
Dept: SPORTS MEDICINE | Facility: CLINIC | Age: 86
End: 2021-11-17
Payer: MEDICARE

## 2021-11-17 ENCOUNTER — TELEPHONE (OUTPATIENT)
Dept: TRANSPLANT | Facility: CLINIC | Age: 86
End: 2021-11-17
Payer: MEDICARE

## 2021-11-17 DIAGNOSIS — I50.43 ACUTE ON CHRONIC COMBINED SYSTOLIC AND DIASTOLIC HEART FAILURE: Primary | ICD-10-CM

## 2021-11-22 ENCOUNTER — TELEPHONE (OUTPATIENT)
Dept: TRANSPLANT | Facility: CLINIC | Age: 86
End: 2021-11-22
Payer: MEDICARE

## 2021-11-23 ENCOUNTER — CLINICAL SUPPORT (OUTPATIENT)
Dept: CARDIOLOGY | Facility: CLINIC | Age: 86
End: 2021-11-23
Payer: MEDICARE

## 2021-11-23 ENCOUNTER — OFFICE VISIT (OUTPATIENT)
Dept: CARDIOLOGY | Facility: CLINIC | Age: 86
End: 2021-11-23
Payer: MEDICARE

## 2021-11-23 ENCOUNTER — LAB VISIT (OUTPATIENT)
Dept: LAB | Facility: HOSPITAL | Age: 86
End: 2021-11-23
Payer: MEDICARE

## 2021-11-23 VITALS
SYSTOLIC BLOOD PRESSURE: 137 MMHG | DIASTOLIC BLOOD PRESSURE: 88 MMHG | OXYGEN SATURATION: 95 % | WEIGHT: 176 LBS | HEART RATE: 69 BPM | BODY MASS INDEX: 31.18 KG/M2

## 2021-11-23 DIAGNOSIS — I50.42 CHRONIC COMBINED SYSTOLIC AND DIASTOLIC HEART FAILURE: Primary | ICD-10-CM

## 2021-11-23 DIAGNOSIS — I10 ESSENTIAL HYPERTENSION: ICD-10-CM

## 2021-11-23 DIAGNOSIS — N18.32 STAGE 3B CHRONIC KIDNEY DISEASE: ICD-10-CM

## 2021-11-23 DIAGNOSIS — Z95.2 S/P TAVR (TRANSCATHETER AORTIC VALVE REPLACEMENT): ICD-10-CM

## 2021-11-23 DIAGNOSIS — R06.02 SOB (SHORTNESS OF BREATH): ICD-10-CM

## 2021-11-23 DIAGNOSIS — Z95.1 S/P CABG X 3: ICD-10-CM

## 2021-11-23 DIAGNOSIS — I25.118 CORONARY ARTERY DISEASE OF NATIVE ARTERY OF NATIVE HEART WITH STABLE ANGINA PECTORIS: ICD-10-CM

## 2021-11-23 DIAGNOSIS — I50.43 ACUTE ON CHRONIC COMBINED SYSTOLIC AND DIASTOLIC HEART FAILURE: ICD-10-CM

## 2021-11-23 LAB
ALBUMIN SERPL BCP-MCNC: 2.5 G/DL (ref 3.5–5.2)
ALP SERPL-CCNC: 119 U/L (ref 55–135)
ALT SERPL W/O P-5'-P-CCNC: 12 U/L (ref 10–44)
ANION GAP SERPL CALC-SCNC: 7 MMOL/L (ref 8–16)
AST SERPL-CCNC: 29 U/L (ref 10–40)
BASOPHILS # BLD AUTO: 0.04 K/UL (ref 0–0.2)
BASOPHILS NFR BLD: 0.4 % (ref 0–1.9)
BILIRUB SERPL-MCNC: 0.6 MG/DL (ref 0.1–1)
BNP SERPL-MCNC: 658 PG/ML (ref 0–99)
BUN SERPL-MCNC: 39 MG/DL (ref 8–23)
CALCIUM SERPL-MCNC: 9.3 MG/DL (ref 8.7–10.5)
CHLORIDE SERPL-SCNC: 102 MMOL/L (ref 95–110)
CO2 SERPL-SCNC: 30 MMOL/L (ref 23–29)
CREAT SERPL-MCNC: 1.6 MG/DL (ref 0.5–1.4)
DIFFERENTIAL METHOD: ABNORMAL
EOSINOPHIL # BLD AUTO: 0.2 K/UL (ref 0–0.5)
EOSINOPHIL NFR BLD: 2.1 % (ref 0–8)
ERYTHROCYTE [DISTWIDTH] IN BLOOD BY AUTOMATED COUNT: 17.3 % (ref 11.5–14.5)
EST. GFR  (AFRICAN AMERICAN): 32.7 ML/MIN/1.73 M^2
EST. GFR  (NON AFRICAN AMERICAN): 28.4 ML/MIN/1.73 M^2
GLUCOSE SERPL-MCNC: 165 MG/DL (ref 70–110)
HCT VFR BLD AUTO: 30 % (ref 37–48.5)
HGB BLD-MCNC: 9.3 G/DL (ref 12–16)
IMM GRANULOCYTES # BLD AUTO: 0.1 K/UL (ref 0–0.04)
IMM GRANULOCYTES NFR BLD AUTO: 0.9 % (ref 0–0.5)
LYMPHOCYTES # BLD AUTO: 1.7 K/UL (ref 1–4.8)
LYMPHOCYTES NFR BLD: 15.6 % (ref 18–48)
MAGNESIUM SERPL-MCNC: 1.8 MG/DL (ref 1.6–2.6)
MCH RBC QN AUTO: 32 PG (ref 27–31)
MCHC RBC AUTO-ENTMCNC: 31 G/DL (ref 32–36)
MCV RBC AUTO: 103 FL (ref 82–98)
MONOCYTES # BLD AUTO: 0.8 K/UL (ref 0.3–1)
MONOCYTES NFR BLD: 7.5 % (ref 4–15)
NEUTROPHILS # BLD AUTO: 8 K/UL (ref 1.8–7.7)
NEUTROPHILS NFR BLD: 73.5 % (ref 38–73)
NRBC BLD-RTO: 0 /100 WBC
PHOSPHATE SERPL-MCNC: 3.4 MG/DL (ref 2.7–4.5)
PLATELET # BLD AUTO: 143 K/UL (ref 150–450)
PMV BLD AUTO: 11.9 FL (ref 9.2–12.9)
POTASSIUM SERPL-SCNC: 4.3 MMOL/L (ref 3.5–5.1)
PROT SERPL-MCNC: 6.4 G/DL (ref 6–8.4)
RBC # BLD AUTO: 2.91 M/UL (ref 4–5.4)
SODIUM SERPL-SCNC: 139 MMOL/L (ref 136–145)
WBC # BLD AUTO: 10.88 K/UL (ref 3.9–12.7)

## 2021-11-23 PROCEDURE — 99214 OFFICE O/P EST MOD 30 MIN: CPT | Mod: S$PBB,,, | Performed by: INTERNAL MEDICINE

## 2021-11-23 PROCEDURE — 99214 OFFICE O/P EST MOD 30 MIN: CPT | Mod: PBBFAC

## 2021-11-23 PROCEDURE — 99999 PR PBB SHADOW E&M-EST. PATIENT-LVL IV: ICD-10-PCS | Mod: PBBFAC,,,

## 2021-11-23 PROCEDURE — 84100 ASSAY OF PHOSPHORUS: CPT

## 2021-11-23 PROCEDURE — 99214 PR OFFICE/OUTPT VISIT, EST, LEVL IV, 30-39 MIN: ICD-10-PCS | Mod: S$PBB,,, | Performed by: INTERNAL MEDICINE

## 2021-11-23 PROCEDURE — 36415 COLL VENOUS BLD VENIPUNCTURE: CPT

## 2021-11-23 PROCEDURE — 85025 COMPLETE CBC W/AUTO DIFF WBC: CPT

## 2021-11-23 PROCEDURE — 83735 ASSAY OF MAGNESIUM: CPT

## 2021-11-23 PROCEDURE — 83880 ASSAY OF NATRIURETIC PEPTIDE: CPT

## 2021-11-23 PROCEDURE — 80053 COMPREHEN METABOLIC PANEL: CPT

## 2021-11-23 PROCEDURE — 99999 PR PBB SHADOW E&M-EST. PATIENT-LVL IV: CPT | Mod: PBBFAC,,,

## 2021-11-23 RX ORDER — CARVEDILOL 12.5 MG/1
12.5 TABLET ORAL 2 TIMES DAILY
Qty: 180 TABLET | Refills: 3
Start: 2021-11-23 | End: 2021-12-07 | Stop reason: SDUPTHER

## 2021-11-23 RX ORDER — FUROSEMIDE 20 MG/1
20 TABLET ORAL DAILY
Qty: 30 TABLET | Refills: 3 | Status: SHIPPED | OUTPATIENT
Start: 2021-11-23 | End: 2022-08-08

## 2021-11-23 RX ORDER — ESCITALOPRAM OXALATE 20 MG/1
20 TABLET ORAL DAILY
Qty: 90 TABLET | Refills: 3
Start: 2021-11-23 | End: 2022-12-09

## 2021-11-23 RX ORDER — SACUBITRIL AND VALSARTAN 24; 26 MG/1; MG/1
1 TABLET, FILM COATED ORAL 2 TIMES DAILY
Qty: 60 TABLET | Refills: 3 | Status: SHIPPED | OUTPATIENT
Start: 2021-11-23 | End: 2021-12-08 | Stop reason: SDUPTHER

## 2021-11-24 ENCOUNTER — TELEPHONE (OUTPATIENT)
Dept: SPORTS MEDICINE | Facility: CLINIC | Age: 86
End: 2021-11-24
Payer: MEDICARE

## 2021-11-30 ENCOUNTER — TELEPHONE (OUTPATIENT)
Dept: SPORTS MEDICINE | Facility: CLINIC | Age: 86
End: 2021-11-30
Payer: MEDICARE

## 2021-12-02 ENCOUNTER — CLINICAL SUPPORT (OUTPATIENT)
Dept: CARDIOLOGY | Facility: CLINIC | Age: 86
End: 2021-12-02
Payer: MEDICARE

## 2021-12-02 ENCOUNTER — PATIENT MESSAGE (OUTPATIENT)
Dept: CARDIOLOGY | Facility: CLINIC | Age: 86
End: 2021-12-02

## 2021-12-02 VITALS
BODY MASS INDEX: 29.32 KG/M2 | SYSTOLIC BLOOD PRESSURE: 119 MMHG | DIASTOLIC BLOOD PRESSURE: 81 MMHG | HEIGHT: 65 IN | HEART RATE: 81 BPM | WEIGHT: 176 LBS

## 2021-12-02 DIAGNOSIS — N18.32 STAGE 3B CHRONIC KIDNEY DISEASE: ICD-10-CM

## 2021-12-02 DIAGNOSIS — I50.42 CHRONIC COMBINED SYSTOLIC AND DIASTOLIC HEART FAILURE: Primary | ICD-10-CM

## 2021-12-02 DIAGNOSIS — Z95.2 S/P TAVR (TRANSCATHETER AORTIC VALVE REPLACEMENT): ICD-10-CM

## 2021-12-02 DIAGNOSIS — Z95.1 S/P CABG X 3: ICD-10-CM

## 2021-12-02 DIAGNOSIS — I25.118 CORONARY ARTERY DISEASE OF NATIVE ARTERY OF NATIVE HEART WITH STABLE ANGINA PECTORIS: ICD-10-CM

## 2021-12-02 DIAGNOSIS — I10 ESSENTIAL HYPERTENSION: ICD-10-CM

## 2021-12-02 PROCEDURE — 99214 PR OFFICE/OUTPT VISIT, EST, LEVL IV, 30-39 MIN: ICD-10-PCS | Mod: 95,,, | Performed by: INTERNAL MEDICINE

## 2021-12-02 PROCEDURE — 99214 OFFICE O/P EST MOD 30 MIN: CPT | Mod: 95,,, | Performed by: INTERNAL MEDICINE

## 2021-12-07 ENCOUNTER — TELEPHONE (OUTPATIENT)
Dept: FAMILY MEDICINE | Facility: CLINIC | Age: 86
End: 2021-12-07
Payer: MEDICARE

## 2021-12-07 DIAGNOSIS — I10 ESSENTIAL HYPERTENSION: ICD-10-CM

## 2021-12-07 RX ORDER — SACUBITRIL AND VALSARTAN 24; 26 MG/1; MG/1
1 TABLET, FILM COATED ORAL 2 TIMES DAILY
Qty: 60 TABLET | Refills: 3 | Status: CANCELLED | OUTPATIENT
Start: 2021-12-07

## 2021-12-08 RX ORDER — SACUBITRIL AND VALSARTAN 24; 26 MG/1; MG/1
1 TABLET, FILM COATED ORAL 2 TIMES DAILY
Qty: 60 TABLET | Refills: 3 | Status: SHIPPED | OUTPATIENT
Start: 2021-12-08 | End: 2022-03-29

## 2021-12-08 RX ORDER — SACUBITRIL AND VALSARTAN 24; 26 MG/1; MG/1
1 TABLET, FILM COATED ORAL 2 TIMES DAILY
Qty: 60 TABLET | Refills: 3 | Status: SHIPPED | OUTPATIENT
Start: 2021-12-08 | End: 2021-12-08 | Stop reason: SDUPTHER

## 2021-12-09 ENCOUNTER — TELEPHONE (OUTPATIENT)
Dept: SPORTS MEDICINE | Facility: CLINIC | Age: 86
End: 2021-12-09
Payer: MEDICARE

## 2021-12-10 ENCOUNTER — TELEPHONE (OUTPATIENT)
Dept: SPORTS MEDICINE | Facility: CLINIC | Age: 86
End: 2021-12-10
Payer: MEDICARE

## 2021-12-10 DIAGNOSIS — S72.21XD CLOSED DISPLACED SUBTROCHANTERIC FRACTURE OF RIGHT FEMUR WITH ROUTINE HEALING, SUBSEQUENT ENCOUNTER: Primary | ICD-10-CM

## 2021-12-13 ENCOUNTER — TELEPHONE (OUTPATIENT)
Dept: FAMILY MEDICINE | Facility: CLINIC | Age: 86
End: 2021-12-13
Payer: MEDICARE

## 2021-12-13 ENCOUNTER — TELEPHONE (OUTPATIENT)
Dept: SPORTS MEDICINE | Facility: CLINIC | Age: 86
End: 2021-12-13
Payer: MEDICARE

## 2021-12-13 ENCOUNTER — OFFICE VISIT (OUTPATIENT)
Dept: SPORTS MEDICINE | Facility: CLINIC | Age: 86
End: 2021-12-13
Payer: MEDICARE

## 2021-12-13 VITALS — HEART RATE: 72 BPM | TEMPERATURE: 98 F | DIASTOLIC BLOOD PRESSURE: 88 MMHG | SYSTOLIC BLOOD PRESSURE: 147 MMHG

## 2021-12-13 DIAGNOSIS — R54 FRAIL ELDERLY: ICD-10-CM

## 2021-12-13 DIAGNOSIS — I25.2 OLD MYOCARDIAL INFARCT: Primary | ICD-10-CM

## 2021-12-13 DIAGNOSIS — S72.21XD CLOSED DISPLACED SUBTROCHANTERIC FRACTURE OF RIGHT FEMUR WITH ROUTINE HEALING, SUBSEQUENT ENCOUNTER: Primary | ICD-10-CM

## 2021-12-13 PROCEDURE — 99999 PR PBB SHADOW E&M-EST. PATIENT-LVL II: CPT | Mod: PBBFAC,,, | Performed by: STUDENT IN AN ORGANIZED HEALTH CARE EDUCATION/TRAINING PROGRAM

## 2021-12-13 PROCEDURE — 99024 POSTOP FOLLOW-UP VISIT: CPT | Mod: POP,,, | Performed by: STUDENT IN AN ORGANIZED HEALTH CARE EDUCATION/TRAINING PROGRAM

## 2021-12-13 PROCEDURE — 99212 OFFICE O/P EST SF 10 MIN: CPT | Mod: PBBFAC,PN | Performed by: STUDENT IN AN ORGANIZED HEALTH CARE EDUCATION/TRAINING PROGRAM

## 2021-12-13 PROCEDURE — 99024 PR POST-OP FOLLOW-UP VISIT: ICD-10-PCS | Mod: POP,,, | Performed by: STUDENT IN AN ORGANIZED HEALTH CARE EDUCATION/TRAINING PROGRAM

## 2021-12-13 PROCEDURE — 99999 PR PBB SHADOW E&M-EST. PATIENT-LVL II: ICD-10-PCS | Mod: PBBFAC,,, | Performed by: STUDENT IN AN ORGANIZED HEALTH CARE EDUCATION/TRAINING PROGRAM

## 2021-12-19 RX ORDER — CARVEDILOL 12.5 MG/1
12.5 TABLET ORAL 2 TIMES DAILY
Qty: 180 TABLET | Refills: 3
Start: 2021-12-19 | End: 2022-12-27

## 2021-12-20 ENCOUNTER — DOCUMENTATION ONLY (OUTPATIENT)
Dept: TRANSPLANT | Facility: CLINIC | Age: 86
End: 2021-12-20
Payer: MEDICARE

## 2021-12-20 ENCOUNTER — TELEPHONE (OUTPATIENT)
Dept: TRANSPLANT | Facility: CLINIC | Age: 86
End: 2021-12-20
Payer: MEDICARE

## 2021-12-22 RX ORDER — ISOSORBIDE MONONITRATE 30 MG/1
30 TABLET, EXTENDED RELEASE ORAL DAILY
Qty: 60 TABLET | Refills: 0 | Status: SHIPPED | OUTPATIENT
Start: 2021-12-22 | End: 2022-02-17

## 2021-12-22 RX ORDER — ISOSORBIDE MONONITRATE 30 MG/1
TABLET, EXTENDED RELEASE ORAL
Qty: 90 TABLET | OUTPATIENT
Start: 2021-12-22

## 2021-12-22 RX ORDER — ISOSORBIDE MONONITRATE 30 MG/1
30 TABLET, EXTENDED RELEASE ORAL DAILY
Qty: 60 TABLET | Refills: 0 | OUTPATIENT
Start: 2021-12-22

## 2021-12-23 ENCOUNTER — TELEPHONE (OUTPATIENT)
Dept: INTERNAL MEDICINE | Facility: CLINIC | Age: 86
End: 2021-12-23
Payer: MEDICARE

## 2022-01-01 NOTE — ANESTHESIA PREPROCEDURE EVALUATION
2017  Krystina Washington is a 84 y.o., female.    Pre-operative evaluation for Procedure(s) (LRB):  INSERTION-PACEMAKER-DUAL (Left)    Krystina Washington is a 84 y.o. female w/ PMHx of severe AS s/p TAVR 3/6/17, HTN, CKD III, DM II, gout, MDD víctor evaluated for the above procedure.    Pt responding appropriately to questions, but still lethargic, consents completed w/ grandson. Grandson said they may not do the PPM if her heart continues to function well.     Bradycardia. BP elevated (SBP: 170-190s). H.9. 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    S/P CABG x 3    Glaucoma (increased eye pressure)    Primary open-angle glaucoma(365.11)    Pulmonary edema    Major depressive disorder, recurrent episode, moderate    Iatrogenic hypotension    Chest pain    Aortic stenosis, severe    Hypertensive heart disease with heart failure    Shortness of breath    Nonrheumatic aortic valve stenosis    Essential hypertension    Type 2 diabetes mellitus with diabetic chronic kidney disease    Chronic diastolic heart failure    Acute on chronic renal failure    Acute on chronic diastolic congestive heart failure    NSTEMI (non-ST elevated myocardial infarction)    Elevated troponin level    Pure hypercholesterolemia    Aortic valve stenosis       Review of patient's allergies indicates:   Allergen Reactions    Indocin  [indomethacin sodium] Other (See Comments)    Lotensin [benazepril] Other (See Comments)        No current facility-administered medications on file prior to encounter.      Current Outpatient Prescriptions on File Prior to Encounter   Medication Sig Dispense Refill    allopurinol (ZYLOPRIM) 100 MG tablet TAKE 1 TABLET BY MOUTH ONCE DAILY. 90 tablet 2    aspirin 81 MG chewable tablet Take 81 mg by mouth. 1 Tablet, Chewable Oral Every day      carvedilol (COREG) 25 MG tablet TAKE 1 TABLET BY MOUTH TWICE A DAY With FOOD. 60 tablet 11    cetirizine (ZYRTEC) 10 MG tablet Take 1 tablet (10 mg total) by mouth once daily. (Patient taking differently: Take 10 mg by mouth as needed. ) 30 tablet 0    cholecalciferol, vitamin D3, 50,000 unit capsule Take 1 capsule (50,000 Units total) by mouth once a week. 1 Capsule Oral Weekly 12 capsule 0    cloNIDine 0.1 mg/24 hr td ptwk (CATAPRES) 0.1 mg/24 hr PLACE 1 PATCH ONTO THE SKIN EVERY 7 DAYS. 4 patch 6    clopidogrel (PLAVIX) 75 mg tablet Take 1 tablet (75 mg total) by mouth once daily. 30 tablet 0    escitalopram oxalate (LEXAPRO) 20 MG tablet Take 1 tablet (20 mg total) by mouth once daily. 30 tablet 11    hydrALAZINE (APRESOLINE) 100 MG tablet TAKE 1 TABLET (100 MG TOTAL) BY MOUTH EVERY 8 (EIGHT) HOURS. 90 tablet 11    irbesartan (AVAPRO) 300 MG tablet TAKE 1 TABLET (300 MG TOTAL) BY MOUTH EVERY EVENING. 30 tablet 11    pravastatin (PRAVACHOL) 40 MG tablet TAKE 1 TABLET BY MOUTH EVERY EVENING 90 tablet 2    acetaminophen (TYLENOL) 325 MG tablet Take 650 mg by mouth every 6 (six) hours as needed for Pain.      albuterol 90 mcg/actuation inhaler Inhale 1 puff into the lungs every 6 (six) hours as needed for Wheezing. 1 HFA Aerosol Inhaler Inhalation Twice a day 18 g 0    inhalation device (AEROCHAMBER PLUS FLOW-VU) Use as directed for inhalation. 1 Device 0    nitroglycerin 400 mcg/spray SprA Place 1 spray onto the tongue every 5 (five) minutes as needed. 8.5 g 0        Past Surgical History:   Procedure Laterality Date    CATARACT EXTRACTION      CORONARY ARTERY BYPASS GRAFT  9/21/2010    JOINT REPLACEMENT         Social History     Social History    Marital status:      Spouse name: N/A    Number of children: N/A    Years of education: N/A     Occupational History    Not on file.     Social History Main Topics    Smoking status: Never Smoker    Smokeless tobacco: Never Used    Alcohol use No    Drug use: No    Sexual activity: No     Other Topics Concern    Not on file     Social History Narrative         Vital Signs Range (Last 24H):  Temp:  [36.6 °C (97.8 °F)]   Pulse:  [52-70]   Resp:  [17-20]   BP: (174-185)/(73-76)   SpO2:  [94 %-97 %]   Arterial Line BP: (100-146)/(48-63)       CBC:   Recent Labs      03/06/17   1324  03/07/17   1250   WBC  11.48  9.64   RBC  2.88*  2.49*   HGB  9.0*  7.9*   HCT  29.3*  25.2*   PLT  228  184   MCV  102*  101*   MCH  31.3*  31.7*   MCHC  30.7*  31.3*       CMP:   Recent Labs      03/06/17   1324  03/07/17   1250   NA  139  140   K  5.1  4.6   CL  107  109   CO2  27  25   BUN  37*  34*   CREATININE  1.7*  1.5*   GLU  180*  166*   CALCIUM  9.6  8.4*       INR  Recent Labs      03/06/17   1324  03/07/17   1250   INR  1.1  1.2   APTT  28.7  109.6*           Diagnostic Studies:      EKG:  Sinus bradycardia with sinus arrhythmia  Nonspecific intraventricular block  Possible Lateral infarct ,age undetermined  Abnormal ECG  When compared with ECG of 07-MAR-2017 12:17,  Sinus rhythm has replaced Electronic ventricular pacemaker    2D Echo (12/16/16):  1 - Eccentric hypertrophy.     2 - Normal left ventricular systolic function (EF 60-65%).     3 - Normal right ventricular systolic function .     4 - Grade II left ventricular diastolic dysfunction.     5 - Moderate left atrial enlargement.     6 - Mild tricuspid regurgitation.     7 - Mild aortic regurgitation.     8 - Severe aortic stenosis, MICHAEL = 0.8 cm2, peak velocity =  4.1 m/s, mean gradient = 40 mmHg.     9 - Pulmonary hypertension. The estimated PA systolic pressure is 52 mmHg.     10 - Intermediate central venous pressure.         [facility] Anesthesia Evaluation    I have reviewed the Patient Summary Reports.     I have reviewed the Medications.     Review of Systems  Anesthesia Hx:  No problems with previous Anesthesia Denies Hx of Anesthetic complications  History of prior surgery of interest to airway management or planning: Previous anesthesia: MAC  Denies Personal Hx of Anesthesia complications.   Hematology/Oncology:     Oncology Normal    -- Anemia:   EENT/Dental:EENT/Dental Normal   Cardiovascular:   Hypertension Valvular problems/Murmurs, AS Past MI CAD   CHF    Pulmonary:   Shortness of breath    Renal/:   Chronic Renal Disease, CRI    Hepatic/GI:  Hepatic/GI Normal    Musculoskeletal:  Musculoskeletal Normal    Neurological:  Neurology Normal    Endocrine:   Diabetes    Dermatological:  Skin Normal    Psych:   Psychiatric History depression          Physical Exam  General:  Well nourished    Airway/Jaw/Neck:  Airway Findings: Mouth Opening: Normal Tongue: Normal  General Airway Assessment: Adult  Mallampati: II  TM Distance: Normal, at least 6 cm  Jaw/Neck Findings:  Neck ROM: Normal ROM     Eyes/Ears/Nose:  EYES/EARS/NOSE FINDINGS: Normal   Dental:  Dental Findings:   Chest/Lungs:  Chest/Lungs Findings: Clear to auscultation, Normal Respiratory Rate     Heart/Vascular:  Heart Findings: Rate: Bradycardia     Abdomen:  Abdomen Findings: Normal    Musculoskeletal:  Musculoskeletal Findings: Normal   Skin:  Skin Findings: Normal    Mental Status:  Mental Status Findings:  Cooperative, Lethargic         Anesthesia Plan  Type of Anesthesia, risks & benefits discussed:  Anesthesia Type:  general, MAC  Patient's Preference:   Intra-op Monitoring Plan: arterial line and standard ASA monitors  Intra-op Monitoring Plan Comments:   Post Op Pain Control Plan:   Post Op Pain Control  Plan Comments:   Induction:   IV  Beta Blocker:  Patient is on a Beta-Blocker and has received one dose within the past 24 hours (No further documentation required).       Informed Consent: Patient representative understands risks and agrees with Anesthesia plan.  Questions answered. Anesthesia consent signed with patient representative.  ASA Score: 4     Day of Surgery Review of History & Physical:  There are no significant changes.      Anesthesia Plan Notes: Follow-up AM H/H.         Ready For Surgery From Anesthesia Perspective.        yes

## 2022-01-04 ENCOUNTER — PATIENT OUTREACH (OUTPATIENT)
Dept: ADMINISTRATIVE | Facility: OTHER | Age: 87
End: 2022-01-04
Payer: MEDICARE

## 2022-01-14 ENCOUNTER — OFFICE VISIT (OUTPATIENT)
Dept: CARDIOLOGY | Facility: CLINIC | Age: 87
End: 2022-01-14
Payer: MEDICARE

## 2022-01-14 VITALS
WEIGHT: 165 LBS | HEART RATE: 70 BPM | BODY MASS INDEX: 29.23 KG/M2 | DIASTOLIC BLOOD PRESSURE: 75 MMHG | SYSTOLIC BLOOD PRESSURE: 128 MMHG | HEIGHT: 63 IN

## 2022-01-14 DIAGNOSIS — I25.118 CORONARY ARTERY DISEASE OF NATIVE ARTERY OF NATIVE HEART WITH STABLE ANGINA PECTORIS: Primary | ICD-10-CM

## 2022-01-14 DIAGNOSIS — Z95.2 S/P TAVR (TRANSCATHETER AORTIC VALVE REPLACEMENT): ICD-10-CM

## 2022-01-14 DIAGNOSIS — I50.22 CHRONIC SYSTOLIC HEART FAILURE: ICD-10-CM

## 2022-01-14 DIAGNOSIS — N18.32 STAGE 3B CHRONIC KIDNEY DISEASE: ICD-10-CM

## 2022-01-14 PROCEDURE — 99213 PR OFFICE/OUTPT VISIT, EST, LEVL III, 20-29 MIN: ICD-10-PCS | Mod: 95,,, | Performed by: INTERNAL MEDICINE

## 2022-01-14 PROCEDURE — 99213 OFFICE O/P EST LOW 20 MIN: CPT | Mod: 95,,, | Performed by: INTERNAL MEDICINE

## 2022-01-14 NOTE — ASSESSMENT & PLAN NOTE
26 mm Rubalcava Jessica TAVR without aortic regurgitation.  Peak velocity 2.2 m/sec, mean gradient 12 mm Hg.

## 2022-01-14 NOTE — PROGRESS NOTES
The patient location is: Home, in [place].  The chief complaint leading to consultation is: CHF  Visit type: audiovisual  Total time spent with patient: 18 minutes. After about 10 minutes we lost the video connection and called back on the phone to finish the visit.  Each patient to whom he or she provides medical services by telemedicine is:  (1) informed of the relationship between the physician and patient and the respective role of any other health care provider with respect to management of the patient; and (2) notified that he or she may decline to receive medical services by telemedicine and may withdraw from such care at any time.   Subjective:     Problem List:  HFrEF, EF 25%  TAVR 26 S3 2017 for severe aortic stenosis    CAD   S/P CABG x3  9/10  Old MI   HTN   Hyperlipidemia   T2D - long standing   CKD 3  Stroke 12/2020  TIA 1/2021    HPI:   Krystina Washington is a 89 y.o. female who presents for follow-up of Coronary Artery Disease, Congestive Heart Failure, and s/p TAVR  She has mild shortness of breath with exertion.  She is not as mobile as she used to be.  She has occasional swelling.  She remains on low-dose furosemide.  Creatinine has been 1.5-1.7 mg/dl.  Echo 11/2021:  LVEF 25%, 26 mm Rubalcava Jessica TAVR without regurgitation.  Peak velocity 2.2 m/sec, mean gradient 12 mm Hg.  She does not report bleeding or excessive bruising.  She remains on apixaban for stroke prevention.        Review of patient's allergies indicates:   Allergen Reactions    Indocin [indomethacin sodium] Other (See Comments)     Either caused hot,burning body or caused madness per patient's grandson    Lotensin [benazepril] Other (See Comments)     Either caused hot ,burning body or caused madness per patient's grandson        Current Outpatient Medications   Medication Sig    acetaminophen (TYLENOL) 325 MG tablet Take 325 mg by mouth every 6 (six) hours as needed for Pain.    albuterol 90 mcg/actuation inhaler Inhale 1  puff into the lungs every 6 (six) hours as needed for Wheezing. 1 HFA Aerosol Inhaler Inhalation Twice a day    allopurinoL (ZYLOPRIM) 100 MG tablet Take 1 tablet (100 mg total) by mouth once daily.    apixaban (ELIQUIS) 2.5 mg Tab Take 1 tablet (2.5 mg total) by mouth 2 (two) times daily.    atorvastatin (LIPITOR) 40 MG tablet TAKE 1 TABLET BY MOUTH EVERY DAY    blood sugar diagnostic Strp Check BG daily prn if glucose found to be elevated on BMP, per facility protocol.    blood-glucose meter (FREESTYLE SYSTEM KIT) kit Use prn is blood glucose found to be elevated on routine BMP, per facility protocol.    carvediloL (COREG) 12.5 MG tablet Take 1 tablet (12.5 mg total) by mouth 2 (two) times a day.    EScitalopram oxalate (LEXAPRO) 20 MG tablet Take 1 tablet (20 mg total) by mouth once daily.    famotidine (PEPCID) 20 MG tablet Take 1 tablet (20 mg total) by mouth once daily. (Patient taking differently: Take 20 mg by mouth 2 (two) times daily.)    furosemide (LASIX) 20 MG tablet Take 1 tablet (20 mg total) by mouth once daily.    hydrALAZINE (APRESOLINE) 25 MG tablet Take 1 tablet (25 mg total) by mouth every 8 (eight) hours.    isosorbide mononitrate (IMDUR) 30 MG 24 hr tablet Take 1 tablet (30 mg total) by mouth once daily.    lancets Misc Use daily prn if blood glucose found to be elevated on routine BMP, per facility protocol.    nitroGLYCERIN 0.4 MG/DOSE TL SPRY (NITROLINGUAL) 400 mcg/spray spray PLACE 1 SPRAY ONTO THE TONGUE EVERY 5 (FIVE) MINUTES AS NEEDED.    sacubitriL-valsartan (ENTRESTO) 24-26 mg per tablet Take 1 tablet by mouth 2 (two) times daily.    timolol maleate 0.5% (TIMOPTIC) 0.5 % Drop Place 1 drop into both eyes 2 (two) times daily.    vitamin D (VITAMIN D3) 1000 units Tab Take 2 tablets (2,000 Units total) by mouth once daily.     No current facility-administered medications for this visit.       Social history:  Krystina Washington  reports that she has never smoked. She has  "never used smokeless tobacco. She reports that she does not drink alcohol and does not use drugs.      Objective:       Physical Exam  /75   Pulse 70   Ht 5' 3" (1.6 m)   Wt 74.8 kg (165 lb)   LMP  (LMP Unknown)   BMI 29.23 kg/m²      Lab Results   Component Value Date    CHOL 96 (L) 03/22/2022    HDL 33 (L) 03/22/2022    LDLCALC 49.2 (L) 03/22/2022    TRIG 69 03/22/2022    CHOLHDL 34.4 03/22/2022     Lab Results   Component Value Date     (H) 03/22/2022    CREATININE 1.5 (H) 03/22/2022    BUN 37 (H) 03/22/2022     03/22/2022    K 3.8 03/22/2022     03/22/2022    CO2 27 03/22/2022     Lab Results   Component Value Date    ALT 9 (L) 03/22/2022    AST 21 03/22/2022    ALKPHOS 103 03/22/2022    BILITOT 0.5 03/22/2022       Results for orders placed during the hospital encounter of 11/13/21    Echo    Interpretation Summary  · Severe left atrial enlargement.  · The left ventricle is normal in size with severely decreased systolic function.  · There is severe left ventricular global hypokinesis. The estimated ejection fraction is 25%.  · Mild right atrial enlargement.  · Normal right ventricular size with mildly reduced right ventricular systolic function.  · Grade I left ventricular diastolic dysfunction.  · There is a 26 mm Rubalcava Jessica transcutaneously-placed aortic bioprosthesis present. There is no aortic aortic insufficiency present. Aortic valve area is 2.28 cm2; peak velocity is 2.19 m/s; mean gradient is 12 mmHg. The dimensionless index is 0.73. The LVOT diameter was 2.0 cm.  · Aortic valve area is 2.28 cm2; peak velocity is 2.19 m/s; mean gradient is 12 mmHg. The dimensionless index is 0.73.  · Mild to moderate tricuspid regurgitation.  · There is pulmonary hypertension. The estimated PA systolic pressure is 51 mmHg.  · Normal central venous pressure (3 mmHg).       No valid procedures specified.        Assessment and Plan:       ICD-10-CM ICD-9-CM   1. Coronary artery disease of " native artery of native heart with stable angina pectoris  I25.118 414.01     413.9   2. Chronic systolic heart failure  I50.22 428.22   3. S/P TAVR (transcatheter aortic valve replacement)  Z95.2 V43.3   4. Stage 3b chronic kidney disease  N18.32 585.3        Coronary artery disease of native artery of native heart with stable angina pectoris  STABLE.  NO ANGINA.    Chronic systolic heart failure  LVEF 25%, NYHA class 2 stable symptoms.  Salt restriction.  Continue same dose of furosemide.    S/P TAVR (transcatheter aortic valve replacement)  26 mm Rubalcava Jessica TAVR without aortic regurgitation.  Peak velocity 2.2 m/sec, mean gradient 12 mm Hg.    Stage 3b chronic kidney disease  Creatinine 1.5-1.7 mg/dl       Orders placed during this encounter:     Coronary artery disease of native artery of native heart with stable angina pectoris    Chronic systolic heart failure    S/P TAVR (transcatheter aortic valve replacement)    Stage 3b chronic kidney disease         Follow up in about 6 months (around 7/14/2022).

## 2022-01-27 ENCOUNTER — HOSPITAL ENCOUNTER (OUTPATIENT)
Dept: RADIOLOGY | Facility: HOSPITAL | Age: 87
Discharge: HOME | End: 2022-01-27
Attending: STUDENT IN AN ORGANIZED HEALTH CARE EDUCATION/TRAINING PROGRAM
Payer: MEDICARE

## 2022-01-27 ENCOUNTER — OFFICE VISIT (OUTPATIENT)
Dept: SPORTS MEDICINE | Facility: CLINIC | Age: 87
End: 2022-01-27
Payer: MEDICARE

## 2022-01-27 VITALS
HEART RATE: 73 BPM | DIASTOLIC BLOOD PRESSURE: 68 MMHG | HEIGHT: 63 IN | BODY MASS INDEX: 31.01 KG/M2 | SYSTOLIC BLOOD PRESSURE: 126 MMHG | WEIGHT: 175 LBS

## 2022-01-27 DIAGNOSIS — M89.8X5 PAIN IN RIGHT FEMUR: ICD-10-CM

## 2022-01-27 DIAGNOSIS — S72.21XD CLOSED DISPLACED SUBTROCHANTERIC FRACTURE OF RIGHT FEMUR WITH ROUTINE HEALING, SUBSEQUENT ENCOUNTER: Primary | ICD-10-CM

## 2022-01-27 PROCEDURE — 73552 X-RAY EXAM OF FEMUR 2/>: CPT | Mod: TC,RT

## 2022-01-27 PROCEDURE — 73552 XR FEMUR 2 VIEW RIGHT: ICD-10-PCS | Mod: 26,RT,, | Performed by: RADIOLOGY

## 2022-01-27 PROCEDURE — 99214 OFFICE O/P EST MOD 30 MIN: CPT | Mod: PBBFAC | Performed by: STUDENT IN AN ORGANIZED HEALTH CARE EDUCATION/TRAINING PROGRAM

## 2022-01-27 PROCEDURE — 73552 X-RAY EXAM OF FEMUR 2/>: CPT | Mod: 26,RT,, | Performed by: RADIOLOGY

## 2022-01-27 PROCEDURE — 99999 PR PBB SHADOW E&M-EST. PATIENT-LVL IV: CPT | Mod: PBBFAC,,, | Performed by: STUDENT IN AN ORGANIZED HEALTH CARE EDUCATION/TRAINING PROGRAM

## 2022-01-27 PROCEDURE — 99024 POSTOP FOLLOW-UP VISIT: CPT | Mod: POP,,, | Performed by: STUDENT IN AN ORGANIZED HEALTH CARE EDUCATION/TRAINING PROGRAM

## 2022-01-27 PROCEDURE — 99024 PR POST-OP FOLLOW-UP VISIT: ICD-10-PCS | Mod: POP,,, | Performed by: STUDENT IN AN ORGANIZED HEALTH CARE EDUCATION/TRAINING PROGRAM

## 2022-01-27 PROCEDURE — 99999 PR PBB SHADOW E&M-EST. PATIENT-LVL IV: ICD-10-PCS | Mod: PBBFAC,,, | Performed by: STUDENT IN AN ORGANIZED HEALTH CARE EDUCATION/TRAINING PROGRAM

## 2022-01-27 NOTE — PROGRESS NOTES
CC: Post-Op    DOS: 10/17/2021    Procedures Performed:  PROCEDURE PERFORMED:   1. Open reduction of right subtrochanteric femur fracture  2. Cephalomedullary nailing of right femur  3. +22 modifier for increased complexity given the extensive fracture pattern and open nature needed to complete the reduction        Subjective    HPI:   Krystina Washington is here today for post op evaluation of her right femur fracture.  She is doing okay, her pain has improved significantly since her last evaluation.  She is weight-bearing as tolerated, which she has only time that does cause pain.  The pain is located on the proximal lateral aspect of the thigh.      Denies numbness, paresthesias to operative extremity.    Denies nausea, vomiting, chest pain, shortness of breath, calf pain.    Objective   Vitals:    01/27/22 1414   BP: 126/68   Pulse: 73     A&Ox3, NAD  Good respiratory effort, breathing unlabored    Right lower extremity:  Incisions well healed.  No tenderness to palpation.  No pain with passive hip motion.  Neurovascularly intact.  No calf pain.      Imaging:  Right femur x-rays including AP and lateral obtained 01/27/2022 and personally reviewed by me on that day.  The previous subtroch femur fracture still present, there is interval healing, however is not completely healed yet.    Assessment   Krystina Washington is a 89 y.o. female s/p above and doing well.    Plan :  Continue weight-bearing as tolerated.  However for to the fragility fracture clinic for evaluation.  Return to clinic in 4-6 weeks for re-evaluation with repeat x-rays of the right femur.    All of their questions were answered.  They will call the clinic with any questions or concerns in the interim.    Should the patient's symptoms worsen, persist, or fail to improve they should return for reevaluation and I would be happy to see them back anytime.        Dino Rutledge M.D.     Please be aware that this note has been generated with the assistance  of MModal voice-to-text.  Please excuse any spelling or grammatical errors.    Thank you for choosing Dr. Dino Rutledge for your sports medicine care. It is our goal to provide you with exceptional care that will help keep you healthy, active, and get you back in the game.     If you felt that you received exemplary care today, please consider leaving feedback for Dr. Rutledge on [email or web address] at [email or web address].    Please do not hesitate to reach out to us via email, phone, or MyChart with any questions, concerns, or feedback.

## 2022-03-09 ENCOUNTER — PATIENT OUTREACH (OUTPATIENT)
Dept: ADMINISTRATIVE | Facility: OTHER | Age: 87
End: 2022-03-09
Payer: MEDICARE

## 2022-03-09 NOTE — PROGRESS NOTES
Care Everywhere: updated  Immunization: updated  Health Maintenance: updated  Media Review:   Legacy Review:   DIS:  Order placed:   Upcoming appts:  EFAX:  Task Tickets:  Referrals:

## 2022-03-10 ENCOUNTER — HOSPITAL ENCOUNTER (OUTPATIENT)
Dept: RADIOLOGY | Facility: HOSPITAL | Age: 87
Discharge: HOME | End: 2022-03-10
Attending: STUDENT IN AN ORGANIZED HEALTH CARE EDUCATION/TRAINING PROGRAM
Payer: MEDICARE

## 2022-03-10 ENCOUNTER — OFFICE VISIT (OUTPATIENT)
Dept: SPORTS MEDICINE | Facility: CLINIC | Age: 87
End: 2022-03-10
Payer: MEDICARE

## 2022-03-10 VITALS
WEIGHT: 175 LBS | DIASTOLIC BLOOD PRESSURE: 92 MMHG | HEART RATE: 69 BPM | BODY MASS INDEX: 31.01 KG/M2 | SYSTOLIC BLOOD PRESSURE: 160 MMHG | HEIGHT: 63 IN

## 2022-03-10 DIAGNOSIS — S72.21XD CLOSED DISPLACED SUBTROCHANTERIC FRACTURE OF RIGHT FEMUR WITH ROUTINE HEALING, SUBSEQUENT ENCOUNTER: Primary | ICD-10-CM

## 2022-03-10 DIAGNOSIS — M89.8X5 PAIN IN RIGHT FEMUR: ICD-10-CM

## 2022-03-10 PROCEDURE — 99213 PR OFFICE/OUTPT VISIT, EST, LEVL III, 20-29 MIN: ICD-10-PCS | Mod: S$PBB,,, | Performed by: STUDENT IN AN ORGANIZED HEALTH CARE EDUCATION/TRAINING PROGRAM

## 2022-03-10 PROCEDURE — 99214 OFFICE O/P EST MOD 30 MIN: CPT | Mod: PBBFAC | Performed by: STUDENT IN AN ORGANIZED HEALTH CARE EDUCATION/TRAINING PROGRAM

## 2022-03-10 PROCEDURE — 99999 PR PBB SHADOW E&M-EST. PATIENT-LVL IV: CPT | Mod: PBBFAC,,, | Performed by: STUDENT IN AN ORGANIZED HEALTH CARE EDUCATION/TRAINING PROGRAM

## 2022-03-10 PROCEDURE — 99999 PR PBB SHADOW E&M-EST. PATIENT-LVL IV: ICD-10-PCS | Mod: PBBFAC,,, | Performed by: STUDENT IN AN ORGANIZED HEALTH CARE EDUCATION/TRAINING PROGRAM

## 2022-03-10 PROCEDURE — 73552 XR FEMUR 2 VIEW RIGHT: ICD-10-PCS | Mod: 26,RT,, | Performed by: RADIOLOGY

## 2022-03-10 PROCEDURE — 99213 OFFICE O/P EST LOW 20 MIN: CPT | Mod: S$PBB,,, | Performed by: STUDENT IN AN ORGANIZED HEALTH CARE EDUCATION/TRAINING PROGRAM

## 2022-03-10 PROCEDURE — 73552 X-RAY EXAM OF FEMUR 2/>: CPT | Mod: TC,RT

## 2022-03-10 PROCEDURE — 73552 X-RAY EXAM OF FEMUR 2/>: CPT | Mod: 26,RT,, | Performed by: RADIOLOGY

## 2022-03-11 NOTE — PROGRESS NOTES
CC: Post-Op    DOS: 10/17/2021    Procedures Performed:  PROCEDURE PERFORMED:   1. Open reduction of right subtrochanteric femur fracture  2. Cephalomedullary nailing of right femur  3. +22 modifier for increased complexity given the extensive fracture pattern and open nature needed to complete the reduction        Subjective    HPI:   Krystina Washington is here today for post op evaluation of her right femur fracture.  She is doing better since her last visit.  She has been weight-bearing as tolerated making some progress with this, however home therapy has been discontinued.  She is working with her family on mobilization and they feel like she is still making improvement.  She says she really has no pain anymore, occasional soreness after she does her exercises.      Denies numbness, paresthesias to operative extremity.    Denies nausea, vomiting, chest pain, shortness of breath, calf pain.    Objective   Vitals:    03/10/22 1353   BP: (!) 160/92   Pulse: 69     A&Ox3, NAD  Good respiratory effort, breathing unlabored    Right lower extremity:  Incisions well healed.  No tenderness to palpation.  No pain with passive hip motion.  Neurovascularly intact.  No calf pain.      Imaging:  Right femur x-rays including AP and lateral obtained 03/10/2022 and personally reviewed by me on that day.  These were compared to previous x-rays.  Fracture alignment is unchanged.  Interval callus formation around the fracture of the subtrochanteric region.    Assessment   Krystina Washington is a 89 y.o. female s/p above and doing well.    Plan :  She is doing well.  We will continue with her home exercise program with hip motion and ambulation for strength building.  She return to clinic in 2-3 months for repeat evaluation with x-rays are I    All of their questions were answered.  They will call the clinic with any questions or concerns in the interim.    Should the patient's symptoms worsen, persist, or fail to improve they should  return for reevaluation and I would be happy to see them back anytime.        Dino Rutledge M.D.     Please be aware that this note has been generated with the assistance of MM[employer] voice-to-text.  Please excuse any spelling or grammatical errors.    Thank you for choosing Dr. Dino Rutledge for your sports medicine care. It is our goal to provide you with exceptional care that will help keep you healthy, active, and get you back in the game.     If you felt that you received exemplary care today, please consider leaving feedback for Dr. Rutledge on [email or web address] at [email or web address].    Please do not hesitate to reach out to us via email, phone, or MyChart with any questions, concerns, or feedback.

## 2022-03-22 ENCOUNTER — LAB VISIT (OUTPATIENT)
Dept: LAB | Facility: HOSPITAL | Age: 87
End: 2022-03-22
Attending: INTERNAL MEDICINE
Payer: MEDICARE

## 2022-03-22 DIAGNOSIS — I25.10 CORONARY ARTERY DISEASE INVOLVING NATIVE CORONARY ARTERY OF NATIVE HEART WITHOUT ANGINA PECTORIS: ICD-10-CM

## 2022-03-22 DIAGNOSIS — I50.22 CHRONIC SYSTOLIC HEART FAILURE: ICD-10-CM

## 2022-03-22 LAB
ALBUMIN SERPL BCP-MCNC: 3.1 G/DL (ref 3.5–5.2)
ALP SERPL-CCNC: 103 U/L (ref 55–135)
ALT SERPL W/O P-5'-P-CCNC: 9 U/L (ref 10–44)
ANION GAP SERPL CALC-SCNC: 12 MMOL/L (ref 8–16)
AST SERPL-CCNC: 21 U/L (ref 10–40)
BILIRUB SERPL-MCNC: 0.5 MG/DL (ref 0.1–1)
BUN SERPL-MCNC: 37 MG/DL (ref 8–23)
CALCIUM SERPL-MCNC: 10 MG/DL (ref 8.7–10.5)
CHLORIDE SERPL-SCNC: 105 MMOL/L (ref 95–110)
CHOLEST SERPL-MCNC: 96 MG/DL (ref 120–199)
CHOLEST/HDLC SERPL: 2.9 {RATIO} (ref 2–5)
CO2 SERPL-SCNC: 27 MMOL/L (ref 23–29)
CREAT SERPL-MCNC: 1.5 MG/DL (ref 0.5–1.4)
EST. GFR  (AFRICAN AMERICAN): 35.4 ML/MIN/1.73 M^2
EST. GFR  (NON AFRICAN AMERICAN): 30.7 ML/MIN/1.73 M^2
GLUCOSE SERPL-MCNC: 126 MG/DL (ref 70–110)
HDLC SERPL-MCNC: 33 MG/DL (ref 40–75)
HDLC SERPL: 34.4 % (ref 20–50)
LDLC SERPL CALC-MCNC: 49.2 MG/DL (ref 63–159)
NONHDLC SERPL-MCNC: 63 MG/DL
POTASSIUM SERPL-SCNC: 3.8 MMOL/L (ref 3.5–5.1)
PROT SERPL-MCNC: 6.8 G/DL (ref 6–8.4)
SODIUM SERPL-SCNC: 144 MMOL/L (ref 136–145)
TRIGL SERPL-MCNC: 69 MG/DL (ref 30–150)

## 2022-03-22 PROCEDURE — 80053 COMPREHEN METABOLIC PANEL: CPT | Performed by: INTERNAL MEDICINE

## 2022-03-22 PROCEDURE — 80061 LIPID PANEL: CPT | Performed by: INTERNAL MEDICINE

## 2022-03-22 PROCEDURE — 36415 COLL VENOUS BLD VENIPUNCTURE: CPT | Mod: PO | Performed by: INTERNAL MEDICINE

## 2022-03-29 ENCOUNTER — OFFICE VISIT (OUTPATIENT)
Dept: CARDIOLOGY | Facility: CLINIC | Age: 87
End: 2022-03-29
Payer: MEDICARE

## 2022-03-29 VITALS
HEART RATE: 70 BPM | SYSTOLIC BLOOD PRESSURE: 154 MMHG | HEIGHT: 63 IN | WEIGHT: 175.25 LBS | DIASTOLIC BLOOD PRESSURE: 79 MMHG | BODY MASS INDEX: 31.05 KG/M2 | OXYGEN SATURATION: 99 %

## 2022-03-29 DIAGNOSIS — N18.32 STAGE 3B CHRONIC KIDNEY DISEASE: ICD-10-CM

## 2022-03-29 DIAGNOSIS — Z95.2 S/P TAVR (TRANSCATHETER AORTIC VALVE REPLACEMENT): ICD-10-CM

## 2022-03-29 DIAGNOSIS — I10 ESSENTIAL HYPERTENSION: ICD-10-CM

## 2022-03-29 DIAGNOSIS — I35.0 AORTIC STENOSIS, SEVERE: ICD-10-CM

## 2022-03-29 DIAGNOSIS — N18.32 TYPE 2 DIABETES MELLITUS WITH STAGE 3B CHRONIC KIDNEY DISEASE, WITHOUT LONG-TERM CURRENT USE OF INSULIN: ICD-10-CM

## 2022-03-29 DIAGNOSIS — Z86.73 HISTORY OF EMBOLIC STROKE: ICD-10-CM

## 2022-03-29 DIAGNOSIS — I25.118 CORONARY ARTERY DISEASE OF NATIVE ARTERY OF NATIVE HEART WITH STABLE ANGINA PECTORIS: Primary | ICD-10-CM

## 2022-03-29 DIAGNOSIS — Z79.01 CHRONIC ANTICOAGULATION: ICD-10-CM

## 2022-03-29 DIAGNOSIS — I70.0 AORTIC ATHEROSCLEROSIS: ICD-10-CM

## 2022-03-29 DIAGNOSIS — E78.00 PURE HYPERCHOLESTEROLEMIA: ICD-10-CM

## 2022-03-29 DIAGNOSIS — Z95.1 S/P CABG X 3: ICD-10-CM

## 2022-03-29 DIAGNOSIS — I50.22 CHRONIC SYSTOLIC HEART FAILURE: ICD-10-CM

## 2022-03-29 DIAGNOSIS — E11.22 TYPE 2 DIABETES MELLITUS WITH STAGE 3B CHRONIC KIDNEY DISEASE, WITHOUT LONG-TERM CURRENT USE OF INSULIN: ICD-10-CM

## 2022-03-29 PROCEDURE — 99214 OFFICE O/P EST MOD 30 MIN: CPT | Mod: S$PBB,,, | Performed by: NURSE PRACTITIONER

## 2022-03-29 PROCEDURE — 99999 PR PBB SHADOW E&M-EST. PATIENT-LVL IV: ICD-10-PCS | Mod: PBBFAC,,, | Performed by: NURSE PRACTITIONER

## 2022-03-29 PROCEDURE — 99214 PR OFFICE/OUTPT VISIT, EST, LEVL IV, 30-39 MIN: ICD-10-PCS | Mod: S$PBB,,, | Performed by: NURSE PRACTITIONER

## 2022-03-29 PROCEDURE — 99999 PR PBB SHADOW E&M-EST. PATIENT-LVL IV: CPT | Mod: PBBFAC,,, | Performed by: NURSE PRACTITIONER

## 2022-03-29 PROCEDURE — 99214 OFFICE O/P EST MOD 30 MIN: CPT | Mod: PBBFAC,PO | Performed by: NURSE PRACTITIONER

## 2022-03-29 RX ORDER — SACUBITRIL AND VALSARTAN 49; 51 MG/1; MG/1
1 TABLET, FILM COATED ORAL 2 TIMES DAILY
Qty: 60 TABLET | Refills: 11 | Status: SHIPPED | OUTPATIENT
Start: 2022-03-29 | End: 2023-05-02 | Stop reason: SDUPTHER

## 2022-03-29 NOTE — PROGRESS NOTES
Ms. Washington is a patient of Dr. Golden and was last seen in [facility] 1/14/2022.      Subjective:   Patient ID:  Krystina Washington is a 89 y.o. female who presents for follow-up of Congestive Heart Failure    Problem List:  HFrEF, EF 25%  TAVR 26 S3 2017 for severe aortic stenosis    CAD   S/P CABG x3  9/10  Old MI   HTN   Hyperlipidemia   T2D - long standing   CKD 3  Stroke 12/2020  TIA 1/2021    HPI:   Krystina Washington is in clinic today for HF follow up.  She is having a lot of pain in her hip, leg, neck and shoulder.  Patient denies chest pain with exertion or at rest, palpitations, SOB, DA SILVA, dizziness, syncope, edema, orthopnea, PND, or claudication.  Reports no routine exercise d/t pain.  Patient is taking apixaban 2.5 mg BID for thromboembolic prophylaxis.  Denies bleeding gums, epistaxis, hemoptysis, hematuria, and hematochezia.      Review of Systems   Constitutional: Negative for decreased appetite, diaphoresis, malaise/fatigue, weight gain and weight loss.   Eyes: Negative for visual disturbance.   Cardiovascular: Positive for dyspnea on exertion. Negative for chest pain, claudication, irregular heartbeat, leg swelling, near-syncope, orthopnea, palpitations, paroxysmal nocturnal dyspnea and syncope.        Denies chest pressure   Respiratory: Positive for shortness of breath. Negative for cough, hemoptysis, sleep disturbances due to breathing and snoring.    Endocrine: Negative for cold intolerance and heat intolerance.   Hematologic/Lymphatic: Negative for bleeding problem. Does not bruise/bleed easily.   Musculoskeletal: Positive for arthritis, falls and joint pain. Negative for myalgias.   Gastrointestinal: Negative for bloating, abdominal pain, anorexia, change in bowel habit, constipation, diarrhea, nausea and vomiting.   Neurological: Negative for difficulty with concentration, disturbances in coordination, excessive daytime sleepiness, dizziness, headaches, light-headedness, loss of balance,  numbness and weakness.   Psychiatric/Behavioral: The patient does not have insomnia.        Allergies and current medications updated and reviewed:  Review of patient's allergies indicates:   Allergen Reactions    Indocin [indomethacin sodium] Other (See Comments)     Either caused hot,burning body or caused madness per patient's grandson    Lotensin [benazepril] Other (See Comments)     Either caused hot ,burning body or caused madness per patient's grandson     Current Outpatient Medications   Medication Sig    acetaminophen (TYLENOL) 325 MG tablet Take 325 mg by mouth every 6 (six) hours as needed for Pain.    albuterol 90 mcg/actuation inhaler Inhale 1 puff into the lungs every 6 (six) hours as needed for Wheezing. 1 HFA Aerosol Inhaler Inhalation Twice a day    allopurinoL (ZYLOPRIM) 100 MG tablet Take 1 tablet (100 mg total) by mouth once daily.    apixaban (ELIQUIS) 2.5 mg Tab Take 1 tablet (2.5 mg total) by mouth 2 (two) times daily.    atorvastatin (LIPITOR) 40 MG tablet TAKE 1 TABLET BY MOUTH EVERY DAY    blood sugar diagnostic Strp Check BG daily prn if glucose found to be elevated on BMP, per facility protocol.    blood-glucose meter (FREESTYLE SYSTEM KIT) kit Use prn is blood glucose found to be elevated on routine BMP, per facility protocol.    carvediloL (COREG) 12.5 MG tablet Take 1 tablet (12.5 mg total) by mouth 2 (two) times a day.    EScitalopram oxalate (LEXAPRO) 20 MG tablet Take 1 tablet (20 mg total) by mouth once daily.    famotidine (PEPCID) 20 MG tablet Take 1 tablet (20 mg total) by mouth once daily. (Patient taking differently: Take 20 mg by mouth 2 (two) times daily.)    furosemide (LASIX) 20 MG tablet Take 1 tablet (20 mg total) by mouth once daily.    hydrALAZINE (APRESOLINE) 25 MG tablet Take 1 tablet (25 mg total) by mouth every 8 (eight) hours.    isosorbide mononitrate (IMDUR) 30 MG 24 hr tablet TAKE 1 TABLET(30 MG) BY MOUTH EVERY DAY    lancets Misc Use daily prn  "if blood glucose found to be elevated on routine BMP, per facility protocol.    nitroGLYCERIN 0.4 MG/DOSE TL SPRY (NITROLINGUAL) 400 mcg/spray spray PLACE 1 SPRAY ONTO THE TONGUE EVERY 5 (FIVE) MINUTES AS NEEDED.    sacubitriL-valsartan (ENTRESTO) 24-26 mg per tablet Take 1 tablet by mouth 2 (two) times daily.    timolol maleate 0.5% (TIMOPTIC) 0.5 % Drop Place 1 drop into both eyes 2 (two) times daily.    vitamin D (VITAMIN D3) 1000 units Tab Take 2 tablets (2,000 Units total) by mouth once daily.     No current facility-administered medications for this visit.       Objective:        BP (!) 154/79   Pulse 70   Ht 5' 3" (1.6 m)   Wt 79.5 kg (175 lb 4.3 oz)   LMP  (LMP Unknown)   SpO2 99%   BMI 31.05 kg/m²       Physical Exam  Vitals and nursing note reviewed.   Constitutional:       General: She is not in acute distress.     Appearance: Normal appearance. She is well-developed. She is not diaphoretic.   HENT:      Head: Normocephalic and atraumatic.   Eyes:      General: Lids are normal. No scleral icterus.     Conjunctiva/sclera: Conjunctivae normal.   Neck:      Vascular: Normal carotid pulses. No carotid bruit, hepatojugular reflux or JVD.   Cardiovascular:      Rate and Rhythm: Normal rate and regular rhythm.      Chest Wall: PMI is not displaced.      Pulses: Intact distal pulses.           Carotid pulses are 2+ on the right side and 2+ on the left side.       Radial pulses are 2+ on the right side and 2+ on the left side.        Dorsalis pedis pulses are 2+ on the right side and 2+ on the left side.        Posterior tibial pulses are 1+ on the right side and 1+ on the left side.      Heart sounds: S1 normal and S2 normal. No murmur heard.    No friction rub. No gallop.   Pulmonary:      Effort: Pulmonary effort is normal. No respiratory distress.      Breath sounds: Normal breath sounds. No decreased breath sounds, wheezing, rhonchi or rales.   Chest:      Chest wall: No tenderness.   Abdominal:     "  General: Bowel sounds are normal. There is no distension or abdominal bruit.      Palpations: Abdomen is soft. There is no fluid wave or pulsatile mass.      Tenderness: There is no abdominal tenderness.   Musculoskeletal:      Cervical back: Neck supple.   Skin:     General: Skin is warm and dry.      Findings: No rash.      Nails: There is no clubbing.   Neurological:      Mental Status: She is alert and oriented to person, place, and time.      Gait: Gait normal.   Psychiatric:         Speech: Speech normal.         Behavior: Behavior normal.         Thought Content: Thought content normal.         Judgment: Judgment normal.         Chemistry        Component Value Date/Time     03/22/2022 0928    K 3.8 03/22/2022 0928     03/22/2022 0928    CO2 27 03/22/2022 0928    BUN 37 (H) 03/22/2022 0928    CREATININE 1.5 (H) 03/22/2022 0928     (H) 03/22/2022 0928        Component Value Date/Time    CALCIUM 10.0 03/22/2022 0928    ALKPHOS 103 03/22/2022 0928    AST 21 03/22/2022 0928    ALT 9 (L) 03/22/2022 0928    BILITOT 0.5 03/22/2022 0928    ESTGFRAFRICA 35.4 (A) 03/22/2022 0928    EGFRNONAA 30.7 (A) 03/22/2022 0928        Lab Results   Component Value Date    HGBA1C 6.0 (H) 09/14/2021     Recent Labs   Lab 08/24/21  1821 08/25/21  0524 08/26/21  0353 11/13/21  1451 11/14/21  0453 11/23/21  1006 03/22/22  0928   WBC  --  11.74   < > 7.49   < > 10.88  --    Hemoglobin  --  8.8 L   < > 6.9 L   < > 9.3 L  --    POC Hematocrit  --   --    < >  --   --   --   --    Hematocrit  --  27.6 L   < > 23.0 L   < > 30.0 L  --    MCV  --  102 H   < > 100 H   < > 103 H  --    Platelets  --  180   < > 284   < > 143 L  --     H  --   --  773 H  --  658 H  --    TSH  --  1.934  --   --   --   --   --    Cholesterol  --   --   --   --   --   --  96 L   HDL  --   --   --   --   --   --  33 L   LDL Cholesterol  --   --   --   --   --   --  49.2 L   Triglycerides  --   --   --   --   --   --  69   HDL/Cholesterol  Ratio  --   --   --   --   --   --  34.4    < > = values in this interval not displayed.       Recent Labs   Lab 01/06/21  1628 01/07/21  1145 10/16/21  0756 10/17/21  1935   INR 1.1 1.1 1.1 1.1        Test(s) Reviewed  I have reviewed the following in detail:  [] Stress test   [] Angiography   [x] Echocardiogram   [x] Labs   [] Other:         Assessment/Plan:   1. Coronary artery disease of native artery of native heart with stable angina pectoris  Asymptomatic. No changes.       2. S/P CABG x 3      3. Pure hypercholesterolemia  LDL at goal <70. No changes      4. Aortic stenosis, severe  DI >0.25 and valve functioning well on last TTE on 11/14/21. Monitor.     5. S/P TAVR (transcatheter aortic valve replacement)      6. Essential hypertension  BP not at goal <140/90.  Suspect pain response as she is having some problems with her leg and hip following fx.  Increase entresto for HF benefits.  Monitor bp.     7. Chronic systolic heart failure  Well compensated. NYHA class II. Increase entresto to 49-51 mg BID.  BMP in 1 month.  [place] [facility] [place].     - sacubitriL-valsartan (ENTRESTO) 49-51 mg per tablet; Take 1 tablet by mouth 2 (two) times daily.  Dispense: 60 tablet; Refill: 11  - Basic metabolic panel; Future    8. Aortic atherosclerosis      9. Stage 3b chronic kidney disease  Stable Cr, 1.5-1.6. Monitor.     10. Type 2 diabetes mellitus with stage 3b chronic kidney disease, without long-term current use of insulin  Lab Results   Component Value Date    HGBA1C 6.0 (H) 09/14/2021     A1C at goal <7. F/U with PCP as planned    11. 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  She was subsequently switched to anticoagulation therapy.     - apixaban (ELIQUIS) 2.5 mg Tab; Take 1 tablet (2.5 mg total) by mouth 2 (two) times daily.  Dispense: 60 tablet; Refill: 11    12. Chronic  anticoagulation  Denies bleeding.  Discussed when to seek immediate medical attention.            A copy of this note will be forwarded to Dr. Golden and Dr. Johnson.     Follow up in about 3 months (around 6/29/2022).

## 2022-04-06 ENCOUNTER — PATIENT MESSAGE (OUTPATIENT)
Dept: PHARMACY | Facility: CLINIC | Age: 87
End: 2022-04-06
Payer: MEDICARE

## 2022-04-06 ENCOUNTER — TELEPHONE (OUTPATIENT)
Dept: FAMILY MEDICINE | Facility: CLINIC | Age: 87
End: 2022-04-06
Payer: MEDICARE

## 2022-04-06 ENCOUNTER — TELEPHONE (OUTPATIENT)
Dept: PHARMACY | Facility: CLINIC | Age: 87
End: 2022-04-06
Payer: MEDICARE

## 2022-04-06 DIAGNOSIS — G89.29 CHRONIC PAIN OF RIGHT KNEE: ICD-10-CM

## 2022-04-06 DIAGNOSIS — M25.561 CHRONIC PAIN OF RIGHT KNEE: ICD-10-CM

## 2022-04-06 DIAGNOSIS — M25.551 HIP PAIN, CHRONIC, RIGHT: Primary | ICD-10-CM

## 2022-04-06 DIAGNOSIS — G89.29 HIP PAIN, CHRONIC, RIGHT: Primary | ICD-10-CM

## 2022-04-06 NOTE — TELEPHONE ENCOUNTER
----- Message from Kenyatta Wynn NP sent at 3/29/2022  3:01 PM CDT -----  Regarding: pain management  Hi Dr. Johnson,    She is still having quite a bit of pain in her right hip/thigh into her knee since her surgery.  I don't know if you could send her to the pain clinic or see her to treat it but she can barely stand to get on the scale.   Thanks,  Kenyatta

## 2022-04-06 NOTE — TELEPHONE ENCOUNTER
----- Message from Myranda Gaxiola sent at 3/30/2022 10:09 AM CDT -----  Regarding: FW: Entresto and Eliquis    ----- Message -----  From: Prisca St PharmD  Sent: 3/29/2022   4:13 PM CDT  To: Pharmacy Patient Assistance Team  Subject: Entresto and Eliquis                             Good afternoon,     The provider is looking for assistance for both Eliquis and Entresto for patient. She is using free coupons for both medications. Thank you!

## 2022-04-06 NOTE — TELEPHONE ENCOUNTER
----- Message from Huong Solomon MA sent at 4/6/2022  8:32 AM CDT -----  Regarding: Please advise    ----- Message -----  From: Renata Vargas  Sent: 4/6/2022   8:18 AM CDT  To: [facility] Staff    Good morning, I called patient to schedule Physical therapy.  Grandson answered and he declined to schedule.  He explained to me that patient is close to 89 years old.  That she is having a terrible amount of pain, they don't think she can do Physical Therapy.  They are looking for a medication to help her.   Can you please ask Dr Bauer for advice.  Thank you  Renata

## 2022-04-14 ENCOUNTER — PATIENT OUTREACH (OUTPATIENT)
Dept: ADMINISTRATIVE | Facility: OTHER | Age: 87
End: 2022-04-14
Payer: MEDICARE

## 2022-04-14 DIAGNOSIS — N18.32 TYPE 2 DIABETES MELLITUS WITH STAGE 3B CHRONIC KIDNEY DISEASE, WITHOUT LONG-TERM CURRENT USE OF INSULIN: Primary | ICD-10-CM

## 2022-04-14 DIAGNOSIS — E11.22 TYPE 2 DIABETES MELLITUS WITH STAGE 3B CHRONIC KIDNEY DISEASE, WITHOUT LONG-TERM CURRENT USE OF INSULIN: Primary | ICD-10-CM

## 2022-04-14 NOTE — PROGRESS NOTES
Health Maintenance Due   Topic Date Due    COVID-19 Vaccine (1) Never done    Shingles Vaccine (1 of 2) Never done    Diabetes Urine Screening  12/18/2010    TETANUS VACCINE  10/30/2017    Pneumococcal Vaccines (Age 65+) (3 - PPSV23 or PCV20) 08/29/2019    Hemoglobin A1c  04/16/2022    Foot Exam  05/20/2022     Updates were requested from care everywhere.  Chart was reviewed for overdue Proactive [facility] Encounters (ALIYAH) topics (CRS, Breast Cancer Screening, Eye exam)  Health Maintenance has been updated.  LINKS immunization registry triggered.  Immunizations were reconciled.  Order placed for A1c and linked to upcoming lab appointment.

## 2022-04-21 ENCOUNTER — TELEPHONE (OUTPATIENT)
Dept: ADMINISTRATIVE | Facility: OTHER | Age: 87
End: 2022-04-21
Payer: MEDICARE

## 2022-04-25 ENCOUNTER — TELEPHONE (OUTPATIENT)
Dept: ADMINISTRATIVE | Facility: OTHER | Age: 87
End: 2022-04-25
Payer: MEDICARE

## 2022-04-28 ENCOUNTER — TELEPHONE (OUTPATIENT)
Dept: FAMILY MEDICINE | Facility: CLINIC | Age: 87
End: 2022-04-28
Payer: MEDICARE

## 2022-04-28 NOTE — TELEPHONE ENCOUNTER
Spoke to patients son and advised him to take patient to ED. Patient tested for covid ( Congested , coughing, headache, bodyache and sob. Patients son voiced understanding

## 2022-04-28 NOTE — TELEPHONE ENCOUNTER
----- Message from Fernanda Mendenhall sent at 4/28/2022  7:59 AM CDT -----  Contact: [phone]  Type:  Needs Medical Advice    Who Called:pt's son called  Would the patient rather a call back or a response via [facility]? Call back  Best Call Back Number: [phone]  Additional Information: pt calling to ask if mother can get the infusion given for COVID. Pt tested positive last night. Please call pt's son to advice

## 2022-05-12 ENCOUNTER — TELEPHONE (OUTPATIENT)
Dept: SPORTS MEDICINE | Facility: CLINIC | Age: 87
End: 2022-05-12
Payer: MEDICARE

## 2022-05-12 NOTE — TELEPHONE ENCOUNTER
Spoke with pt's son who said they were told when they called by phone staff that this appt was just a follow up to discuss old images that were taken. I let him know that it is a follow up but we were getting new xrays done and then Dr. Rutledge would discuss the images and what the next step was. He said that's not what Parisa told him and  wouldn't of canceled if he knew. I apologize and asked if he wanted me to put her back on the schedule he said no that he would schedule it through the portal .

## 2022-05-12 NOTE — TELEPHONE ENCOUNTER
----- Message from Parisa Dempsey sent at 5/12/2022 10:21 AM CDT -----  Contact: Daughter  Type: Patient Call Back         Who called: Daughter - [phone]         What is the request in detail: patient has an appt today to f/u on image of femur; daughter wants to know if x ray and information planned on being discussed in appt could be emailed to her brother; please advise           Best call back number: [phone]         Additional Information: [email or web address]           Thank You

## 2022-05-31 ENCOUNTER — PATIENT MESSAGE (OUTPATIENT)
Dept: ADMINISTRATIVE | Facility: HOSPITAL | Age: 87
End: 2022-05-31
Payer: MEDICARE

## 2022-07-19 ENCOUNTER — PATIENT MESSAGE (OUTPATIENT)
Dept: RESEARCH | Facility: CLINIC | Age: 87
End: 2022-07-19
Payer: MEDICARE

## 2022-08-29 ENCOUNTER — TELEPHONE (OUTPATIENT)
Dept: FAMILY MEDICINE | Facility: CLINIC | Age: 87
End: 2022-08-29
Payer: MEDICARE

## 2022-08-29 DIAGNOSIS — N39.0 URINARY TRACT INFECTION WITHOUT HEMATURIA, SITE UNSPECIFIED: Primary | ICD-10-CM

## 2022-08-29 RX ORDER — CIPROFLOXACIN 500 MG/1
500 TABLET ORAL 2 TIMES DAILY
Qty: 14 TABLET | Refills: 0 | Status: SHIPPED | OUTPATIENT
Start: 2022-08-29 | End: 2022-09-05

## 2022-08-29 NOTE — TELEPHONE ENCOUNTER
Patient with possible urinary tract infection.    Urine with culture was ordered.    Please do the urine before starting the antibiotics.    Antibiotic was sent to the pharmacy.

## 2022-08-29 NOTE — TELEPHONE ENCOUNTER
----- Message from Otis Driver sent at 8/29/2022  8:40 AM CDT -----  Contact: pt  .Type:  Needs Medical Advice    Who Called: pt  Symptoms (please be specific): strong odor  from urine and pain when she urinate  How long has patient had these symptoms:  1 day   Would the patient rather a call back or a response via [facility]? Call back  Best Call Back Number: [phone]  Additional Information:  Pt would like to know if she needs to drop off a urine sample or come in.

## 2022-08-29 NOTE — TELEPHONE ENCOUNTER
Spoke to patients grandson he will bring a UA sample today before picking up RX. Patient grandson voiced understanding.

## 2022-08-30 ENCOUNTER — LAB VISIT (OUTPATIENT)
Dept: LAB | Facility: HOSPITAL | Age: 87
End: 2022-08-30
Attending: FAMILY MEDICINE
Payer: MEDICARE

## 2022-08-30 ENCOUNTER — TELEPHONE (OUTPATIENT)
Dept: FAMILY MEDICINE | Facility: CLINIC | Age: 87
End: 2022-08-30
Payer: MEDICARE

## 2022-08-30 DIAGNOSIS — N39.0 URINARY TRACT INFECTION WITHOUT HEMATURIA, SITE UNSPECIFIED: ICD-10-CM

## 2022-08-30 LAB
BACTERIA #/AREA URNS AUTO: ABNORMAL /HPF
BILIRUB UR QL STRIP: NEGATIVE
CLARITY UR REFRACT.AUTO: ABNORMAL
COLOR UR AUTO: YELLOW
GLUCOSE UR QL STRIP: NEGATIVE
HGB UR QL STRIP: NEGATIVE
HYALINE CASTS UR QL AUTO: 0 /LPF
KETONES UR QL STRIP: NEGATIVE
LEUKOCYTE ESTERASE UR QL STRIP: ABNORMAL
MICROSCOPIC COMMENT: ABNORMAL
NITRITE UR QL STRIP: POSITIVE
PH UR STRIP: 6 [PH] (ref 5–8)
PROT UR QL STRIP: ABNORMAL
RBC #/AREA URNS AUTO: 0 /HPF (ref 0–4)
SP GR UR STRIP: 1.01 (ref 1–1.03)
SQUAMOUS #/AREA URNS AUTO: 8 /HPF
URN SPEC COLLECT METH UR: ABNORMAL
WBC #/AREA URNS AUTO: >100 /HPF (ref 0–5)
WBC CLUMPS UR QL AUTO: ABNORMAL

## 2022-08-30 PROCEDURE — 87088 URINE BACTERIA CULTURE: CPT | Performed by: FAMILY MEDICINE

## 2022-08-30 PROCEDURE — 81001 URINALYSIS AUTO W/SCOPE: CPT | Performed by: FAMILY MEDICINE

## 2022-08-30 PROCEDURE — 87077 CULTURE AEROBIC IDENTIFY: CPT | Performed by: FAMILY MEDICINE

## 2022-08-30 PROCEDURE — 87186 SC STD MICRODIL/AGAR DIL: CPT | Performed by: FAMILY MEDICINE

## 2022-08-30 PROCEDURE — 87086 URINE CULTURE/COLONY COUNT: CPT | Performed by: FAMILY MEDICINE

## 2022-08-30 NOTE — TELEPHONE ENCOUNTER
----- Message from Otis Driver sent at 8/30/2022  4:46 PM CDT -----  Contact: pt.  .Type:  Test Results    Who Called: pt  Name of Test (Lab/Mammo/Etc): lab  Date of Test: 08/30/20/22  Ordering Provider:   Where the test was performed: [facility]  Would the patient rather a call back or a response via [facility]? Call back  Best Call Back Number: [phone]  Additional Information:  Pt. Is calling to get the test results from  her urine sample.

## 2022-08-31 ENCOUNTER — TELEPHONE (OUTPATIENT)
Dept: FAMILY MEDICINE | Facility: CLINIC | Age: 87
End: 2022-08-31
Payer: MEDICARE

## 2022-08-31 NOTE — TELEPHONE ENCOUNTER
Spoke to patients grandson and informed him that patient is to keep taking the antibiotics.     Urine culture is still pending.     Urine with evidence of infection. Patients grandson voiced understanding

## 2022-08-31 NOTE — TELEPHONE ENCOUNTER
Keep taking the antibiotics.    Urine culture is still pending.    Urine with evidence of infection.

## 2022-09-01 LAB — BACTERIA UR CULT: ABNORMAL

## 2022-09-22 NOTE — ASSESSMENT & PLAN NOTE
Initial trop 0.058 will trend   Specialty Pharmacy - Refill Coordination    Specialty Medication Orders Linked to Encounter      Flowsheet Row Most Recent Value   Medication #1 evolocumab (REPATHA PUSHTRONEX) 420 mg/3.5 mL Injt (Order#[id number], Rx#[id number])            Refill Questions - Documented Responses      Flowsheet Row Most Recent Value   Patient Availability and HIPAA Verification    Does patient want to proceed with activity? Yes   HIPAA/medical authority confirmed? Yes   Relationship to patient of person spoken to? Self   Refill Screening Questions    Would patient like to speak to a pharmacist? No   When does the patient need to receive the medication? 10/01/22   Refill Delivery Questions    How will the patient receive the medication? Delivery Jackelyn   When does the patient need to receive the medication? 10/01/22   Shipping Address Home   Address in [facility] confirmed and updated if neccessary? Yes   Expected Copay ($) 0   Is the patient able to afford the medication copay? Yes   Payment Method zero copay   Days supply of Refill 30   Supplies needed? No supplies needed   Refill activity completed? Yes   Refill activity plan Refill scheduled   Shipment/Pickup Date: 09/28/22            Current Outpatient Medications   Medication Sig    ACCU-CHEK SMARTVIEW TEST STRIP Strp 1 strip by Misc.(Non-Drug; Combo Route) route 3 (three) times daily. Patient needs an appointment    ammonium lactate 12 % Crea Apply small amount to feet except for in between toes twice a day    aspirin (ECOTRIN) 81 MG EC tablet Take 81 mg by mouth once daily.    atorvastatin (LIPITOR) 80 MG tablet Take 1 tablet (80 mg total) by mouth once daily.    blood-glucose meter Misc Use to check sugar 3 times daily. Accu-chek Emily. Patient needs an appointment    carvediloL (COREG) 25 MG tablet Take 1 tablet (25 mg total) by mouth 2 (two) times daily.    celecoxib (CELEBREX) 200 MG capsule Take 1 capsule (200 mg total) by mouth 2 (two) times daily.    diazePAM  "(VALIUM) 5 MG tablet Take 1 tablet (5 mg total) by mouth once daily at 6am. for 3 days    diclofenac sodium (VOLTAREN) 1 % Gel Apply 2 g topically 4 (four) times daily as needed (neck muscle spasm).    diphenhydrAMINE (BENADRYL) 25 mg capsule Take 50 mg by mouth nightly as needed for Itching.     evolocumab (REPATHA PUSHTRONEX) 420 mg/3.5 mL Injt Inject 3.5 mLs (420 mg total) into the skin every 30 days    ezetimibe (ZETIA) 10 mg tablet TAKE 1 TABLET EVERY DAY    FOLIC ACID/MULTIVITS-MIN (MULTIVITAMIN FOR CHOLESTEROL ORAL) Take 1 tablet by mouth nightly.     furosemide (LASIX) 40 MG tablet TAKE 1 TABLET EVERY DAY    insulin glargine, TOUJEO, (TOUJEO SOLOSTAR U-300 INSULIN) 300 unit/mL (1.5 mL) InPn pen Inject 45 Units into the skin once daily.    insulin needles, disposable, (BD INSULIN PEN NEEDLE UF ORIG) 29 x 1/2 " Ndle USE TWICE DAILY AS DIRECTED    JARDIANCE 25 mg tablet TAKE 1 TABLET EVERY DAY    lancets Misc 1 lancet by Misc.(Non-Drug; Combo Route) route 3 (three) times daily. accu-chek Fastclix. Patient needs an appointment    meclizine (ANTIVERT) 25 mg tablet Take 1 tablet (25 mg total) by mouth 3 (three) times daily as needed for Dizziness or Nausea.    metFORMIN (GLUCOPHAGE) 1000 MG tablet Take 1 tablet (1,000 mg total) by mouth 2 (two) times daily.    mupirocin (BACTROBAN) 2 % ointment AAA qd- bid    nitroGLYCERIN (NITROSTAT) 0.4 MG SL tablet PLACE 1 TABLET (0.4 MG TOTAL) UNDER THE TONGUE EVERY 5 (FIVE) MINUTES AS NEEDED FOR CHEST PAIN AS DIRECTED    NOVOLOG FLEXPEN U-100 INSULIN 100 unit/mL (3 mL) InPn pen Inject 12 Units into the skin 3 (three) times daily with meals.    pen needle, diabetic (BD INSULIN PEN NEEDLE UF MINI) 31 gauge x 3/16" Ndle 1 each by Misc.(Non-Drug; Combo Route) route 4 (four) times daily. Patient needs an appointment    semaglutide (OZEMPIC) 0.25 mg or 0.5 mg(2 mg/1.5 mL) pen injector Inject 0.5 mg into the skin every 7 days. Start with 0.25 mg weekly for 4 weeks and then increase " to 0.5 mg if you are tolerating this dose    valsartan (DIOVAN) 320 MG tablet TAKE 1 TABLET ONE TIME DAILY    VASCEPA 1 gram Cap TAKE 2 CAPSULES TWICE DAILY   Last reviewed on 6/23/2022 11:09 AM by Charles Guillermo III, MD    Review of patient's allergies indicates:   Allergen Reactions    Tramadol Hallucinations    Last reviewed on  6/27/2022 7:01 PM by Dallas Martin      Tasks added this encounter   10/24/2022 - Refill Call (Auto Added)   Tasks due within next 3 months   No tasks due.     Marisabel Epstein, PharmD  [facility]  [address] [place] [id number]  Phone: [phone]  Fax: [phone]

## 2022-09-24 DIAGNOSIS — I50.22 CHRONIC SYSTOLIC HEART FAILURE: ICD-10-CM

## 2022-09-26 RX ORDER — HYDRALAZINE HYDROCHLORIDE 25 MG/1
TABLET, FILM COATED ORAL
Qty: 90 TABLET | Refills: 11 | Status: SHIPPED | OUTPATIENT
Start: 2022-09-26 | End: 2023-10-26 | Stop reason: SDUPTHER

## 2022-10-11 DIAGNOSIS — Z86.73 HISTORY OF EMBOLIC STROKE: ICD-10-CM

## 2022-12-12 RX ORDER — ALLOPURINOL 100 MG/1
100 TABLET ORAL DAILY
Qty: 90 TABLET | Refills: 3 | Status: SHIPPED | OUTPATIENT
Start: 2022-12-12 | End: 2022-12-13 | Stop reason: SDUPTHER

## 2022-12-12 RX ORDER — ALLOPURINOL 100 MG/1
100 TABLET ORAL DAILY
Qty: 90 TABLET | Refills: 3 | Status: SHIPPED | OUTPATIENT
Start: 2022-12-12 | End: 2022-12-12 | Stop reason: SDUPTHER

## 2022-12-12 NOTE — TELEPHONE ENCOUNTER
Care Due:                  Date            Visit Type   Department     Provider  --------------------------------------------------------------------------------    Last Visit: None Found      None         None Found  Next Visit: None Scheduled  None         None Found                                                            Last  Test          Frequency    Reason                     Performed    Due Date  --------------------------------------------------------------------------------    Office Visit  12 months..  EScitalopram,              Not Found    Overdue                             allopurinoL, carvediloL,                             isosorbide...............    CBC.........  12 months..  allopurinoL..............  11- 11-    Uric Acid...  12 months..  allopurinoL..............  Not Found    Overdue    [employer] Embedded Care Gaps. Reference number: [id number]. 12/12/2022   8:19:49 AM CST

## 2022-12-13 NOTE — TELEPHONE ENCOUNTER
No new care gaps identified.  [facility] Embedded Care Gaps. Reference number: [id number]. 12/13/2022   2:58:50 PM CST

## 2022-12-14 RX ORDER — ALLOPURINOL 100 MG/1
100 TABLET ORAL DAILY
Qty: 90 TABLET | Refills: 3 | Status: SHIPPED | OUTPATIENT
Start: 2022-12-14 | End: 2022-12-14 | Stop reason: SDUPTHER

## 2022-12-14 NOTE — TELEPHONE ENCOUNTER
No new care gaps identified.  [facility] Embedded Care Gaps. Reference number: [id number]. 12/14/2022   2:55:02 PM CST

## 2022-12-15 RX ORDER — ALLOPURINOL 100 MG/1
100 TABLET ORAL DAILY
Qty: 90 TABLET | Refills: 3 | Status: SHIPPED | OUTPATIENT
Start: 2022-12-15 | End: 2023-12-22

## 2023-01-19 ENCOUNTER — HOSPITAL ENCOUNTER (OUTPATIENT)
Dept: RADIOLOGY | Facility: HOSPITAL | Age: 88
Discharge: HOME | End: 2023-01-19
Attending: FAMILY MEDICINE
Payer: MEDICARE

## 2023-01-19 ENCOUNTER — LAB VISIT (OUTPATIENT)
Dept: LAB | Facility: HOSPITAL | Age: 88
End: 2023-01-19
Attending: FAMILY MEDICINE
Payer: MEDICARE

## 2023-01-19 ENCOUNTER — OFFICE VISIT (OUTPATIENT)
Dept: FAMILY MEDICINE | Facility: CLINIC | Age: 88
End: 2023-01-19
Payer: MEDICARE

## 2023-01-19 VITALS
OXYGEN SATURATION: 98 % | HEART RATE: 70 BPM | DIASTOLIC BLOOD PRESSURE: 72 MMHG | HEIGHT: 63 IN | SYSTOLIC BLOOD PRESSURE: 136 MMHG | WEIGHT: 183 LBS | BODY MASS INDEX: 32.43 KG/M2 | TEMPERATURE: 99 F

## 2023-01-19 DIAGNOSIS — F02.B0 MODERATE LATE ONSET ALZHEIMER'S DEMENTIA WITHOUT BEHAVIORAL DISTURBANCE, PSYCHOTIC DISTURBANCE, MOOD DISTURBANCE, OR ANXIETY: ICD-10-CM

## 2023-01-19 DIAGNOSIS — I50.22 CHRONIC SYSTOLIC HEART FAILURE: ICD-10-CM

## 2023-01-19 DIAGNOSIS — J02.9 SORE THROAT: Primary | ICD-10-CM

## 2023-01-19 DIAGNOSIS — N18.32 TYPE 2 DIABETES MELLITUS WITH STAGE 3B CHRONIC KIDNEY DISEASE, WITHOUT LONG-TERM CURRENT USE OF INSULIN: ICD-10-CM

## 2023-01-19 DIAGNOSIS — I70.0 AORTIC ATHEROSCLEROSIS: ICD-10-CM

## 2023-01-19 DIAGNOSIS — N18.4 CHRONIC KIDNEY DISEASE, STAGE 4 (SEVERE): ICD-10-CM

## 2023-01-19 DIAGNOSIS — F33.1 MAJOR DEPRESSIVE DISORDER, RECURRENT EPISODE, MODERATE: ICD-10-CM

## 2023-01-19 DIAGNOSIS — E11.22 TYPE 2 DIABETES MELLITUS WITH STAGE 3B CHRONIC KIDNEY DISEASE, WITHOUT LONG-TERM CURRENT USE OF INSULIN: ICD-10-CM

## 2023-01-19 DIAGNOSIS — R05.2 SUBACUTE COUGH: ICD-10-CM

## 2023-01-19 DIAGNOSIS — N25.81 HYPERPARATHYROIDISM DUE TO RENAL INSUFFICIENCY: ICD-10-CM

## 2023-01-19 DIAGNOSIS — G30.1 MODERATE LATE ONSET ALZHEIMER'S DEMENTIA WITHOUT BEHAVIORAL DISTURBANCE, PSYCHOTIC DISTURBANCE, MOOD DISTURBANCE, OR ANXIETY: ICD-10-CM

## 2023-01-19 DIAGNOSIS — Z20.822 SUSPECTED COVID-19 VIRUS INFECTION: ICD-10-CM

## 2023-01-19 DIAGNOSIS — J41.1 MUCOPURULENT CHRONIC BRONCHITIS: ICD-10-CM

## 2023-01-19 DIAGNOSIS — I25.118 CORONARY ARTERY DISEASE OF NATIVE ARTERY OF NATIVE HEART WITH STABLE ANGINA PECTORIS: ICD-10-CM

## 2023-01-19 DIAGNOSIS — R68.89 FLU-LIKE SYMPTOMS: ICD-10-CM

## 2023-01-19 DIAGNOSIS — J02.9 SORE THROAT: ICD-10-CM

## 2023-01-19 LAB
ALBUMIN SERPL BCP-MCNC: 2.8 G/DL (ref 3.5–5.2)
ANION GAP SERPL CALC-SCNC: 7 MMOL/L (ref 8–16)
BUN SERPL-MCNC: 38 MG/DL (ref 8–23)
CALCIUM SERPL-MCNC: 9.4 MG/DL (ref 8.7–10.5)
CHLORIDE SERPL-SCNC: 98 MMOL/L (ref 95–110)
CHOLEST SERPL-MCNC: 98 MG/DL (ref 120–199)
CHOLEST/HDLC SERPL: 2.7 {RATIO} (ref 2–5)
CO2 SERPL-SCNC: 24 MMOL/L (ref 23–29)
CREAT SERPL-MCNC: 1.8 MG/DL (ref 0.5–1.4)
CTP QC/QA: YES
CTP QC/QA: YES
EST. GFR  (NO RACE VARIABLE): 26.4 ML/MIN/1.73 M^2
ESTIMATED AVG GLUCOSE: 131 MG/DL (ref 68–131)
FLUAV AG NPH QL: NEGATIVE
FLUBV AG NPH QL: NEGATIVE
GLUCOSE SERPL-MCNC: 144 MG/DL (ref 70–110)
HBA1C MFR BLD: 6.2 % (ref 4–5.6)
HDLC SERPL-MCNC: 36 MG/DL (ref 40–75)
HDLC SERPL: 36.7 % (ref 20–50)
LDLC SERPL CALC-MCNC: 39.4 MG/DL (ref 63–159)
NONHDLC SERPL-MCNC: 62 MG/DL
PHOSPHATE SERPL-MCNC: 3.4 MG/DL (ref 2.7–4.5)
POTASSIUM SERPL-SCNC: 4.8 MMOL/L (ref 3.5–5.1)
SARS-COV-2 RDRP RESP QL NAA+PROBE: NEGATIVE
SODIUM SERPL-SCNC: 129 MMOL/L (ref 136–145)
TRIGL SERPL-MCNC: 113 MG/DL (ref 30–150)

## 2023-01-19 PROCEDURE — 99215 OFFICE O/P EST HI 40 MIN: CPT | Mod: PBBFAC,PO | Performed by: FAMILY MEDICINE

## 2023-01-19 PROCEDURE — 99999 PR PBB SHADOW E&M-EST. PATIENT-LVL V: ICD-10-PCS | Mod: PBBFAC,,, | Performed by: FAMILY MEDICINE

## 2023-01-19 PROCEDURE — 85025 COMPLETE CBC W/AUTO DIFF WBC: CPT | Performed by: FAMILY MEDICINE

## 2023-01-19 PROCEDURE — 71046 XR CHEST PA AND LATERAL: ICD-10-PCS | Mod: 26,,, | Performed by: RADIOLOGY

## 2023-01-19 PROCEDURE — 99999 PR PBB SHADOW E&M-EST. PATIENT-LVL V: CPT | Mod: PBBFAC,,, | Performed by: FAMILY MEDICINE

## 2023-01-19 PROCEDURE — 87635 SARS-COV-2 COVID-19 AMP PRB: CPT | Mod: PBBFAC,PO | Performed by: FAMILY MEDICINE

## 2023-01-19 PROCEDURE — 71046 X-RAY EXAM CHEST 2 VIEWS: CPT | Mod: 26,,, | Performed by: RADIOLOGY

## 2023-01-19 PROCEDURE — 80061 LIPID PANEL: CPT | Performed by: FAMILY MEDICINE

## 2023-01-19 PROCEDURE — 99215 PR OFFICE/OUTPT VISIT, EST, LEVL V, 40-54 MIN: ICD-10-PCS | Mod: S$PBB,,, | Performed by: FAMILY MEDICINE

## 2023-01-19 PROCEDURE — 71046 X-RAY EXAM CHEST 2 VIEWS: CPT | Mod: TC,FY

## 2023-01-19 PROCEDURE — 83036 HEMOGLOBIN GLYCOSYLATED A1C: CPT | Performed by: FAMILY MEDICINE

## 2023-01-19 PROCEDURE — 99215 OFFICE O/P EST HI 40 MIN: CPT | Mod: S$PBB,,, | Performed by: FAMILY MEDICINE

## 2023-01-19 PROCEDURE — 87081 CULTURE SCREEN ONLY: CPT | Performed by: FAMILY MEDICINE

## 2023-01-19 PROCEDURE — 36415 COLL VENOUS BLD VENIPUNCTURE: CPT | Mod: PO | Performed by: FAMILY MEDICINE

## 2023-01-19 PROCEDURE — 87804 INFLUENZA ASSAY W/OPTIC: CPT | Mod: 59,PBBFAC,PO | Performed by: FAMILY MEDICINE

## 2023-01-19 PROCEDURE — 80069 RENAL FUNCTION PANEL: CPT | Performed by: FAMILY MEDICINE

## 2023-01-19 RX ORDER — PROMETHAZINE HYDROCHLORIDE AND DEXTROMETHORPHAN HYDROBROMIDE 6.25; 15 MG/5ML; MG/5ML
5 SYRUP ORAL EVERY 8 HOURS PRN
Qty: 240 ML | Refills: 0 | Status: SHIPPED | OUTPATIENT
Start: 2023-01-19 | End: 2023-01-29

## 2023-01-19 RX ORDER — DOXYCYCLINE 100 MG/1
100 CAPSULE ORAL 2 TIMES DAILY
Qty: 20 CAPSULE | Refills: 0 | Status: SHIPPED | OUTPATIENT
Start: 2023-01-19 | End: 2023-01-29

## 2023-01-19 NOTE — PROGRESS NOTES
Subjective:       Patient ID: Krystina Washington is a 89 y.o. female.    Chief Complaint: Cough    89 years old female came to the clinic with productive cough for the last 3 weeks.  No fever or chills.  No sick contacts at home.  Patient with decreased kidney function but stable in comparison with previous reports.  She was previously diagnosed with aortic atherosclerosis.  Patient with good compliance with Lasix for her heart failure.  Patient needs supervision of her activities of daily living secondary to dementia.  Patient also with sore throat.  No previous COVID or flu testing.    Cough  Associated symptoms include a sore throat. Pertinent negatives include no chest pain, chills, fever, shortness of breath or wheezing.   Review of Systems   Constitutional: Negative.  Negative for chills and fever.   HENT:  Positive for sore throat.    Eyes: Negative.    Respiratory:  Positive for cough. Negative for apnea, choking, chest tightness, shortness of breath and wheezing.    Cardiovascular:  Positive for leg swelling. Negative for chest pain, palpitations and claudication.   Gastrointestinal: Negative.    Genitourinary: Negative.    Musculoskeletal: Negative.    Neurological: Negative.  Positive for memory loss.   Psychiatric/Behavioral:  Positive for behavioral problems and decreased concentration.        Objective:      Physical Exam  Constitutional:       General: She is not in acute distress.     Appearance: She is well-developed. She is not diaphoretic.   HENT:      Head: Normocephalic and atraumatic.      Right Ear: External ear normal.      Left Ear: External ear normal.      Nose: Nose normal.      Mouth/Throat:      Pharynx: No oropharyngeal exudate.   Eyes:      General: No scleral icterus.        Right eye: No discharge.         Left eye: No discharge.      Conjunctiva/sclera: Conjunctivae normal.      Pupils: Pupils are equal, round, and reactive to light.   Neck:      Thyroid: No thyromegaly.       Vascular: No JVD.      Trachea: No tracheal deviation.   Cardiovascular:      Rate and Rhythm: Normal rate and regular rhythm.      Heart sounds: Normal heart sounds. No murmur heard.    No friction rub. No gallop.   Pulmonary:      Effort: Pulmonary effort is normal. No respiratory distress.      Breath sounds: No stridor. Examination of the right-middle field reveals rhonchi. Examination of the left-middle field reveals rhonchi. Rhonchi present. No wheezing or rales.   Chest:      Chest wall: No tenderness.   Abdominal:      General: Bowel sounds are normal. There is no distension.      Palpations: Abdomen is soft. There is no mass.      Tenderness: There is no abdominal tenderness. There is no guarding or rebound.   Musculoskeletal:         General: No tenderness. Normal range of motion.      Cervical back: Normal range of motion and neck supple.      Right lower leg: Edema present.      Left lower leg: Edema present.   Lymphadenopathy:      Cervical: No cervical adenopathy.   Skin:     General: Skin is warm and dry.      Coloration: Skin is not pale.      Findings: No erythema or rash.   Neurological:      Mental Status: She is alert and oriented to person, place, and time.      Cranial Nerves: No cranial nerve deficit.      Motor: No abnormal muscle tone.      Coordination: Coordination abnormal.      Gait: Gait abnormal.      Deep Tendon Reflexes: Reflexes are normal and symmetric.   Psychiatric:         Behavior: Behavior normal.         Thought Content: Thought content normal.         Cognition and Memory: Cognition is impaired. Memory is impaired. She exhibits impaired recent memory. She does not exhibit impaired remote memory.         Judgment: Judgment normal.       Assessment:       Problem List Items Addressed This Visit       Coronary artery disease of native artery of native heart with stable angina pectoris    Relevant Orders    Lipid Panel    Major depressive disorder, recurrent episode, moderate     Relevant Orders    CBC Auto Differential    Type 2 diabetes mellitus with stage 3b chronic kidney disease, without long-term current use of insulin    Relevant Orders    Hemoglobin A1C    Chronic systolic heart failure    Relevant Orders    CBC Auto Differential    Renal Function Panel    X-Ray Chest PA And Lateral    Aortic atherosclerosis    Relevant Orders    Lipid Panel    Hyperparathyroidism due to renal insufficiency    Chronic kidney disease, stage 4 (severe)    Relevant Orders    CBC Auto Differential    Renal Function Panel    Moderate late onset Alzheimer's dementia without behavioral disturbance, psychotic disturbance, mood disturbance, or anxiety     Other Visit Diagnoses       Sore throat    -  Primary    Relevant Orders    CULTURE, STREP A,  THROAT    X-Ray Chest PA And Lateral    Suspected COVID-19 virus infection        Relevant Orders    POCT COVID-19 Rapid Screening    Flu-like symptoms        Relevant Orders    POCT Influenza A/B    Subacute cough        Relevant Orders    X-Ray Chest PA And Lateral    Mucopurulent chronic bronchitis        Relevant Medications    doxycycline (MONODOX) 100 MG capsule    promethazine-dextromethorphan (PROMETHAZINE-DM) 6.25-15 mg/5 mL Syrp              Plan:         Krystina was seen today for cough.    Diagnoses and all orders for this visit:    Sore throat  -     Cancel: POCT Rapid Strep A  -     CULTURE, STREP A,  THROAT  -     X-Ray Chest PA And Lateral; Future    Suspected COVID-19 virus infection  -     Cancel: COVID-19 Routine Screening  -     POCT COVID-19 Rapid Screening    Flu-like symptoms  -     POCT Influenza A/B    Hyperparathyroidism due to renal insufficiency    Chronic kidney disease, stage 4 (severe)  -     CBC Auto Differential; Future  -     Renal Function Panel; Future    Moderate late onset Alzheimer's dementia without behavioral disturbance, psychotic disturbance, mood disturbance, or anxiety    Type 2 diabetes mellitus with stage 3b chronic  kidney disease, without long-term current use of insulin  -     Hemoglobin A1C; Future    Major depressive disorder, recurrent episode, moderate  -     CBC Auto Differential; Future    Chronic systolic heart failure  -     CBC Auto Differential; Future  -     Renal Function Panel; Future  -     X-Ray Chest PA And Lateral; Future    Coronary artery disease of native artery of native heart with stable angina pectoris  -     Lipid Panel; Future    Aortic atherosclerosis  -     Lipid Panel; Future    Subacute cough  -     X-Ray Chest PA And Lateral; Future    Mucopurulent chronic bronchitis  -     doxycycline (MONODOX) 100 MG capsule; Take 1 capsule (100 mg total) by mouth 2 (two) times daily. for 10 days  -     promethazine-dextromethorphan (PROMETHAZINE-DM) 6.25-15 mg/5 mL Syrp; Take 5 mLs by mouth every 8 (eight) hours as needed (cough).

## 2023-01-20 ENCOUNTER — TELEPHONE (OUTPATIENT)
Dept: FAMILY MEDICINE | Facility: CLINIC | Age: 88
End: 2023-01-20
Payer: MEDICARE

## 2023-01-20 DIAGNOSIS — E87.1 HYPONATREMIA: Primary | ICD-10-CM

## 2023-01-20 DIAGNOSIS — N18.4 CKD (CHRONIC KIDNEY DISEASE) STAGE 4, GFR 15-29 ML/MIN: ICD-10-CM

## 2023-01-20 LAB
BASOPHILS # BLD AUTO: 0.01 K/UL (ref 0–0.2)
BASOPHILS NFR BLD: 0.1 % (ref 0–1.9)
DIFFERENTIAL METHOD: ABNORMAL
EOSINOPHIL # BLD AUTO: 0.3 K/UL (ref 0–0.5)
EOSINOPHIL NFR BLD: 2.7 % (ref 0–8)
ERYTHROCYTE [DISTWIDTH] IN BLOOD BY AUTOMATED COUNT: 14.3 % (ref 11.5–14.5)
HCT VFR BLD AUTO: 26.7 % (ref 37–48.5)
HGB BLD-MCNC: 8.2 G/DL (ref 12–16)
IMM GRANULOCYTES # BLD AUTO: 0.05 K/UL (ref 0–0.04)
IMM GRANULOCYTES NFR BLD AUTO: 0.5 % (ref 0–0.5)
LYMPHOCYTES # BLD AUTO: 2 K/UL (ref 1–4.8)
LYMPHOCYTES NFR BLD: 21.2 % (ref 18–48)
MCH RBC QN AUTO: 32 PG (ref 27–31)
MCHC RBC AUTO-ENTMCNC: 30.7 G/DL (ref 32–36)
MCV RBC AUTO: 104 FL (ref 82–98)
MONOCYTES # BLD AUTO: 0.8 K/UL (ref 0.3–1)
MONOCYTES NFR BLD: 8.3 % (ref 4–15)
NEUTROPHILS # BLD AUTO: 6.2 K/UL (ref 1.8–7.7)
NEUTROPHILS NFR BLD: 67.2 % (ref 38–73)
NRBC BLD-RTO: 0 /100 WBC
PLATELET # BLD AUTO: 183 K/UL (ref 150–450)
PMV BLD AUTO: 12.6 FL (ref 9.2–12.9)
RBC # BLD AUTO: 2.56 M/UL (ref 4–5.4)
WBC # BLD AUTO: 9.26 K/UL (ref 3.9–12.7)

## 2023-01-20 NOTE — TELEPHONE ENCOUNTER
Slightly low sodium.  Fluid restriction for 2 days.  1-1/2 L of fluid per day.  Repeat testing 1 week.

## 2023-01-22 LAB — BACTERIA THROAT CULT: NORMAL

## 2023-01-26 ENCOUNTER — LAB VISIT (OUTPATIENT)
Dept: LAB | Facility: HOSPITAL | Age: 88
End: 2023-01-26
Attending: FAMILY MEDICINE
Payer: MEDICARE

## 2023-01-26 DIAGNOSIS — E87.1 HYPONATREMIA: ICD-10-CM

## 2023-01-26 DIAGNOSIS — N18.4 CKD (CHRONIC KIDNEY DISEASE) STAGE 4, GFR 15-29 ML/MIN: ICD-10-CM

## 2023-01-26 LAB
ANION GAP SERPL CALC-SCNC: 8 MMOL/L (ref 8–16)
BUN SERPL-MCNC: 50 MG/DL (ref 8–23)
CALCIUM SERPL-MCNC: 9.9 MG/DL (ref 8.7–10.5)
CHLORIDE SERPL-SCNC: 108 MMOL/L (ref 95–110)
CO2 SERPL-SCNC: 23 MMOL/L (ref 23–29)
CREAT SERPL-MCNC: 2.1 MG/DL (ref 0.5–1.4)
EST. GFR  (NO RACE VARIABLE): 22 ML/MIN/1.73 M^2
GLUCOSE SERPL-MCNC: 149 MG/DL (ref 70–110)
POTASSIUM SERPL-SCNC: 5.1 MMOL/L (ref 3.5–5.1)
SODIUM SERPL-SCNC: 139 MMOL/L (ref 136–145)

## 2023-01-26 PROCEDURE — 36415 COLL VENOUS BLD VENIPUNCTURE: CPT | Mod: PO | Performed by: FAMILY MEDICINE

## 2023-01-26 PROCEDURE — 80048 BASIC METABOLIC PNL TOTAL CA: CPT | Performed by: FAMILY MEDICINE

## 2023-01-27 ENCOUNTER — TELEPHONE (OUTPATIENT)
Dept: FAMILY MEDICINE | Facility: CLINIC | Age: 88
End: 2023-01-27
Payer: MEDICARE

## 2023-01-27 DIAGNOSIS — N18.4 CKD (CHRONIC KIDNEY DISEASE) STAGE 4, GFR 15-29 ML/MIN: Primary | ICD-10-CM

## 2023-01-27 NOTE — TELEPHONE ENCOUNTER
Decreased kidney function but stable in comparison with previous reports .  Increase fluid intake.  Avoid over-the-counter medicines like naproxen or ibuprofen.    Follow-up appointment with the kidney specialist.      Please notify the caregiver.

## 2023-02-10 DIAGNOSIS — N18.4 CHRONIC KIDNEY DISEASE, STAGE 4 (SEVERE): Primary | ICD-10-CM

## 2023-03-10 ENCOUNTER — PES CALL (OUTPATIENT)
Dept: ADMINISTRATIVE | Facility: CLINIC | Age: 88
End: 2023-03-10
Payer: MEDICARE

## 2023-03-13 RX ORDER — SACUBITRIL AND VALSARTAN 24; 26 MG/1; MG/1
TABLET, FILM COATED ORAL
Qty: 60 TABLET | Refills: 3 | Status: SHIPPED | OUTPATIENT
Start: 2023-03-13 | End: 2023-11-09 | Stop reason: SDUPTHER

## 2023-03-16 RX ORDER — ATORVASTATIN CALCIUM 40 MG/1
40 TABLET, FILM COATED ORAL DAILY
Qty: 90 TABLET | Refills: 3 | OUTPATIENT
Start: 2023-03-16

## 2023-03-16 NOTE — TELEPHONE ENCOUNTER
No new care gaps identified.  [facility] Embedded Care Gaps. Reference number: [id number]. 3/16/2023   8:54:39 AM ANNAT

## 2023-03-16 NOTE — TELEPHONE ENCOUNTER
Ronan DC. Request already responded to by other means (e.g. phone or fax)   Refill Authorization Note   Krystina Washington  is requesting a refill authorization.  Brief Assessment and Rationale for Refill:  Quick Discontinue  Medication Therapy Plan:  Signed 3/16/23    Medication Reconciliation Completed:  No      Comments:     Note composed:4:52 PM 03/16/2023

## 2023-05-02 DIAGNOSIS — I50.22 CHRONIC SYSTOLIC HEART FAILURE: ICD-10-CM

## 2023-05-02 RX ORDER — SACUBITRIL AND VALSARTAN 49; 51 MG/1; MG/1
1 TABLET, FILM COATED ORAL 2 TIMES DAILY
Qty: 60 TABLET | Refills: 11 | Status: ON HOLD | OUTPATIENT
Start: 2023-05-02 | End: 2023-08-14 | Stop reason: HOSPADM

## 2023-05-02 RX ORDER — SACUBITRIL AND VALSARTAN 49; 51 MG/1; MG/1
1 TABLET, FILM COATED ORAL 2 TIMES DAILY
Qty: 60 TABLET | Refills: 11 | Status: CANCELLED | OUTPATIENT
Start: 2023-05-02

## 2023-06-14 ENCOUNTER — PES CALL (OUTPATIENT)
Dept: ADMINISTRATIVE | Facility: CLINIC | Age: 88
End: 2023-06-14
Payer: MEDICARE

## 2023-07-22 PROCEDURE — 96376 TX/PRO/DX INJ SAME DRUG ADON: CPT

## 2023-07-22 PROCEDURE — 96374 THER/PROPH/DIAG INJ IV PUSH: CPT

## 2023-07-22 PROCEDURE — 99285 EMERGENCY DEPT VISIT HI MDM: CPT

## 2023-07-23 ENCOUNTER — HOSPITAL ENCOUNTER (OUTPATIENT)
Facility: HOSPITAL | Age: 88
Discharge: HOME | End: 2023-07-23
Attending: EMERGENCY MEDICINE | Admitting: HOSPITALIST
Payer: MEDICARE

## 2023-07-23 VITALS
SYSTOLIC BLOOD PRESSURE: 142 MMHG | TEMPERATURE: 98 F | WEIGHT: 182 LBS | DIASTOLIC BLOOD PRESSURE: 82 MMHG | BODY MASS INDEX: 32.24 KG/M2 | RESPIRATION RATE: 17 BRPM | HEART RATE: 61 BPM | OXYGEN SATURATION: 96 %

## 2023-07-23 DIAGNOSIS — R52 PAIN: ICD-10-CM

## 2023-07-23 DIAGNOSIS — M79.603 ARM PAIN: ICD-10-CM

## 2023-07-23 DIAGNOSIS — S72.009A HIP FRACTURE: Primary | ICD-10-CM

## 2023-07-23 DIAGNOSIS — M25.511 RIGHT SHOULDER PAIN: ICD-10-CM

## 2023-07-23 PROBLEM — S72.334A: Status: ACTIVE | Noted: 2023-07-23
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
## 2023-07-23 LAB
ALBUMIN SERPL BCP-MCNC: 2.8 G/DL (ref 3.5–5.2)
ALP SERPL-CCNC: 110 U/L (ref 55–135)
ALT SERPL W/O P-5'-P-CCNC: 12 U/L (ref 10–44)
ANION GAP SERPL CALC-SCNC: 10 MMOL/L (ref 8–16)
AST SERPL-CCNC: 23 U/L (ref 10–40)
BASOPHILS # BLD AUTO: 0.03 K/UL (ref 0–0.2)
BASOPHILS NFR BLD: 0.2 % (ref 0–1.9)
BILIRUB SERPL-MCNC: 0.4 MG/DL (ref 0.1–1)
BUN SERPL-MCNC: 54 MG/DL (ref 8–23)
CALCIUM SERPL-MCNC: 9.5 MG/DL (ref 8.7–10.5)
CHLORIDE SERPL-SCNC: 106 MMOL/L (ref 95–110)
CO2 SERPL-SCNC: 23 MMOL/L (ref 23–29)
CREAT SERPL-MCNC: 1.8 MG/DL (ref 0.5–1.4)
DIFFERENTIAL METHOD: ABNORMAL
EOSINOPHIL # BLD AUTO: 0.2 K/UL (ref 0–0.5)
EOSINOPHIL NFR BLD: 1.6 % (ref 0–8)
ERYTHROCYTE [DISTWIDTH] IN BLOOD BY AUTOMATED COUNT: 15.5 % (ref 11.5–14.5)
EST. GFR  (NO RACE VARIABLE): 26.4 ML/MIN/1.73 M^2
GLUCOSE SERPL-MCNC: 91 MG/DL (ref 70–110)
HCT VFR BLD AUTO: 27.3 % (ref 37–48.5)
HGB BLD-MCNC: 8.4 G/DL (ref 12–16)
IMM GRANULOCYTES # BLD AUTO: 0.05 K/UL (ref 0–0.04)
IMM GRANULOCYTES NFR BLD AUTO: 0.4 % (ref 0–0.5)
INR PPP: 1.2 (ref 0.8–1.2)
LYMPHOCYTES # BLD AUTO: 2.7 K/UL (ref 1–4.8)
LYMPHOCYTES NFR BLD: 22.1 % (ref 18–48)
MCH RBC QN AUTO: 30.8 PG (ref 27–31)
MCHC RBC AUTO-ENTMCNC: 30.8 G/DL (ref 32–36)
MCV RBC AUTO: 100 FL (ref 82–98)
MONOCYTES # BLD AUTO: 0.8 K/UL (ref 0.3–1)
MONOCYTES NFR BLD: 6.5 % (ref 4–15)
NEUTROPHILS # BLD AUTO: 8.6 K/UL (ref 1.8–7.7)
NEUTROPHILS NFR BLD: 69.2 % (ref 38–73)
NRBC BLD-RTO: 0 /100 WBC
PLATELET # BLD AUTO: 175 K/UL (ref 150–450)
PMV BLD AUTO: 12 FL (ref 9.2–12.9)
POTASSIUM SERPL-SCNC: 4 MMOL/L (ref 3.5–5.1)
PROT SERPL-MCNC: 6.6 G/DL (ref 6–8.4)
PROTHROMBIN TIME: 12.3 SEC (ref 9–12.5)
RBC # BLD AUTO: 2.73 M/UL (ref 4–5.4)
SODIUM SERPL-SCNC: 139 MMOL/L (ref 136–145)
WBC # BLD AUTO: 12.42 K/UL (ref 3.9–12.7)

## 2023-07-23 PROCEDURE — G0378 HOSPITAL OBSERVATION PER HR: HCPCS

## 2023-07-23 PROCEDURE — 93005 ELECTROCARDIOGRAM TRACING: CPT

## 2023-07-23 PROCEDURE — 93010 ELECTROCARDIOGRAM REPORT: CPT | Mod: ,,, | Performed by: INTERNAL MEDICINE

## 2023-07-23 PROCEDURE — 85025 COMPLETE CBC W/AUTO DIFF WBC: CPT | Performed by: EMERGENCY MEDICINE

## 2023-07-23 PROCEDURE — 63600175 PHARM REV CODE 636 W HCPCS: Performed by: EMERGENCY MEDICINE

## 2023-07-23 PROCEDURE — 93010 EKG 12-LEAD: ICD-10-PCS | Mod: ,,, | Performed by: INTERNAL MEDICINE

## 2023-07-23 PROCEDURE — 85610 PROTHROMBIN TIME: CPT | Performed by: EMERGENCY MEDICINE

## 2023-07-23 PROCEDURE — 99213 OFFICE O/P EST LOW 20 MIN: CPT | Mod: ,,, | Performed by: HOSPITALIST

## 2023-07-23 PROCEDURE — 99213 PR OFFICE/OUTPT VISIT, EST, LEVL III, 20-29 MIN: ICD-10-PCS | Mod: ,,, | Performed by: HOSPITALIST

## 2023-07-23 PROCEDURE — 80053 COMPREHEN METABOLIC PANEL: CPT | Performed by: EMERGENCY MEDICINE

## 2023-07-23 RX ORDER — FENTANYL CITRATE 50 UG/ML
50 INJECTION, SOLUTION INTRAMUSCULAR; INTRAVENOUS
Status: COMPLETED | OUTPATIENT
Start: 2023-07-23 | End: 2023-07-23

## 2023-07-23 RX ORDER — HYDROCODONE BITARTRATE AND ACETAMINOPHEN 10; 325 MG/1; MG/1
1 TABLET ORAL EVERY 8 HOURS PRN
Status: DISCONTINUED | OUTPATIENT
Start: 2023-07-23 | End: 2023-07-23 | Stop reason: HOSPADM

## 2023-07-23 RX ADMIN — FENTANYL CITRATE 50 MCG: 50 INJECTION, SOLUTION INTRAMUSCULAR; INTRAVENOUS at 01:07

## 2023-07-23 RX ADMIN — FENTANYL CITRATE 50 MCG: 50 INJECTION, SOLUTION INTRAMUSCULAR; INTRAVENOUS at 05:07

## 2023-07-23 NOTE — SUBJECTIVE & OBJECTIVE
Past Medical History:   Diagnosis Date    Acute ischemic left posterior cerebral artery (PCA) stroke 12/3/2020    Anemia in stage 3b chronic kidney disease 3/8/2017    Arthritis     CHF (congestive heart failure)     Closed displaced subtrochanteric fracture of right femur s/p IM nail on 10/17/2021 10/16/2021    Coronary artery disease     Diabetes mellitus     Glaucoma     Gout, joint     Hx of CABG 9/21/10    Hyperlipidemia     Hypertension     NSTEMI (non-ST elevated myocardial infarction) 09/21/10    Stage 3b chronic kidney disease 10/21/2021    Stenosis of aortic and mitral valves     Systolic heart failure 6/19/2015    TIA (transient ischemic attack) 1/7/2021       Past Surgical History:   Procedure Laterality Date    ANTEGRADE INTRAMEDULLARY RODDING OF FEMUR Right 10/17/2021    Procedure: INSERTION, INTRAMEDULLARY ALTAF, FEMUR, ANTEGRADE;  Surgeon: Dino Rutledge MD;  Location: [facility] OR [address] [place] [address];  Service: Orthopedics;  Laterality: Right;    CARDIAC VALVE SURGERY      CATARACT EXTRACTION      CORONARY ARTERY BYPASS GRAFT  9/21/2010    ENTROPIAN REPAIR Left 3/6/2020    Procedure: REPAIR, ENTROPION;  Surgeon: Yulia Kincaid MD;  Location: [facility] OR [address] [place] [address];  Service: Ophthalmology;  Laterality: Left;    EYE SURGERY      JOINT REPLACEMENT      ORIF FEMUR FRACTURE Right 10/17/2021    Procedure: ORIF, FRACTURE, FEMUR;  Surgeon: Dino Rutledge MD;  Location: [facility] OR [address] [place] [address];  Service: Orthopedics;  Laterality: Right;       Review of patient's allergies indicates:   Allergen Reactions    Indocin [indomethacin sodium] Other (See Comments)     Either caused hot,burning body or caused madness per patient's grandson    Lotensin [benazepril] Other (See Comments)     Either caused hot ,burning body or caused madness per patient's grandson       No current facility-administered medications for this encounter.     Current Outpatient Medications   Medication Sig    acetaminophen (TYLENOL) 325 MG tablet Take 325 mg by mouth  every 6 (six) hours as needed for Pain.    albuterol 90 mcg/actuation inhaler Inhale 1 puff into the lungs every 6 (six) hours as needed for Wheezing. 1 HFA Aerosol Inhaler Inhalation Twice a day    allopurinoL (ZYLOPRIM) 100 MG tablet Take 1 tablet (100 mg total) by mouth once daily.    apixaban (ELIQUIS) 2.5 mg Tab Take 1 tablet (2.5 mg total) by mouth 2 (two) times daily.    atorvastatin (LIPITOR) 40 MG tablet TAKE 1 TABLET BY MOUTH EVERY DAY    blood sugar diagnostic Strp Check BG daily prn if glucose found to be elevated on BMP, per facility protocol.    blood-glucose meter (FREESTYLE SYSTEM KIT) kit Use prn is blood glucose found to be elevated on routine BMP, per facility protocol.    carvediloL (COREG) 12.5 MG tablet TAKE 1 TABLET(12.5 MG) BY MOUTH TWICE DAILY    ENTRESTO 24-26 mg per tablet TAKE 1 TABLET BY MOUTH TWICE DAILY    EScitalopram oxalate (LEXAPRO) 20 MG tablet TAKE 1 TABLET BY MOUTH EVERY DAY    famotidine (PEPCID) 20 MG tablet Take 1 tablet (20 mg total) by mouth once daily. (Patient taking differently: Take 20 mg by mouth 2 (two) times daily.)    furosemide (LASIX) 20 MG tablet TAKE 1 TABLET(20 MG) BY MOUTH EVERY DAY    hydrALAZINE (APRESOLINE) 25 MG tablet TAKE 1 TABLET BY MOUTH EVERY 8 HOURS    isosorbide mononitrate (IMDUR) 30 MG 24 hr tablet TAKE 1 TABLET(30 MG) BY MOUTH EVERY DAY    lancets Misc Use daily prn if blood glucose found to be elevated on routine BMP, per facility protocol.    nitroGLYCERIN 0.4 MG/DOSE TL SPRY (NITROLINGUAL) 400 mcg/spray spray PLACE 1 SPRAY ONTO THE TONGUE EVERY 5 (FIVE) MINUTES AS NEEDED.    sacubitriL-valsartan (ENTRESTO) 49-51 mg per tablet Take 1 tablet by mouth 2 (two) times daily.    timolol maleate 0.5% (TIMOPTIC) 0.5 % Drop Place 1 drop into both eyes 2 (two) times daily.    vitamin D (VITAMIN D3) 1000 units Tab Take 2 tablets (2,000 Units total) by mouth once daily.     Family History       Problem Relation (Age of Onset)    Diabetes Mother, Father     Glaucoma Father    Hypertension Father    No Known Problems Sister, Brother, Maternal Aunt, Maternal Uncle, Paternal Aunt, Paternal Uncle, Maternal Grandmother, Maternal Grandfather, Paternal Grandmother, Paternal Grandfather          Tobacco Use    Smoking status: Never    Smokeless tobacco: Never   Substance and Sexual Activity    Alcohol use: No    Drug use: No    Sexual activity: Never     ROS    Constitutional: negative for fevers  Eyes: negative visual changes  ENT: negative for hearing loss  Respiratory: negative for dyspnea  Cardiovascular: negative for chest pain  Gastrointestinal: negative for abdominal pain  Genitourinary: negative for dysuria  Neurological: negative for headaches  Behavioral/Psych: negative for hallucinations  Endocrine: negative for temperature intolerance      Objective:     Vital Signs (Most Recent):  Temp: 98 °F (36.7 °C) (07/23/23 0403)  Pulse: (!) 48 (07/23/23 0602)  Resp: 15 (07/23/23 0602)  BP: (!) 161/73 (07/23/23 0602)  SpO2: 96 % (07/23/23 0602) Vital Signs (24h Range):  Temp:  [98 °F (36.7 °C)-98.1 °F (36.7 °C)] 98 °F (36.7 °C)  Pulse:  [45-53] 48  Resp:  [12-20] 15  SpO2:  [96 %-100 %] 96 %  BP: (122-165)/(66-73) 161/73           There is no height or weight on file to calculate BMI.    No intake or output data in the 24 hours ending 07/23/23 0753     Ortho/SPM Exam     Gen:  No acute distress, well-developed, well nourished.  CV:  Peripherally well-perfused. 2+ radial pulses, symmetric.  Respiratory:  Normal respiratory effort. No accessory muscle use.   Head/Neck:  Normocephalic.  Atraumatic. Sclera anicteric. TM. Neck supple.  Neuro: CN 2-12 grossly intact. No FND. Awake. Alert. Oriented to person, place, and situation, not time  Abdomen: Soft, NTND.      MSK:  Left Upper Extremity  Inspection  - Skin intact throughout, no open wounds  - No swelling  - No ecchymosis, erythema, or signs of cellulitis  Palpation  - NonTTP throughout, no palpable abnormality   Range of  "motion  - AROM and PROM of the shoulder, elbow, wrist, and hand intact  Stability  - No evidence of joint dislocation or abnormal laxity   Neurovascular  - AIN/PIN/Radial/Median/Ulnar Nerves assessed in isolation without deficit  - Able to give thumbs up, make "OK" sign, cross IF/LF, abduct/adduct fingers, make fist  - SILT throughout  - Compartments soft  - Radial artery palpated  - Capillary Refill <3s  - Muscle tone normal    Right Upper Extremity  Inspection  - Skin intact throughout, no open wounds  - No swelling  - No ecchymosis, erythema, or signs of cellulitis  Palpation  - TTP over the right shoulder   Range of motion  - AROM and PROM of the elbow, wrist, and hand intact  - AROM and PROM limited at shoulder due to pain  Stability  - No evidence of joint dislocation or abnormal laxity   Neurovascular  - AIN/PIN/Radial/Median/Ulnar Nerves assessed in isolation without deficit  - Able to give thumbs up, make "OK" sign, cross IF/LF, abduct/adduct fingers, make fist  - SILT throughout  - Compartments soft  - Radial artery palpated  - Capillary Refill <3s  - Muscle tone normal      Left Lower Extremity  Inspection  - Skin intact throughout, no open wounds  - No swelling  - No ecchymosis, erythema, or signs of cellulitis  Palpation  - NonTTP throughout, no palpable abnormality   Range of motion  - AROM and PROM of the hip, knee, ankle, and foot intact  Stability  - No evidence of joint dislocation or abnormal laxity  Neurovascular  - TA/EHL/Gastroc/FHL assessed in isolation without deficit  - SILT throughout  - Compartments soft  - DP palpated   - Capillary Refill <3s  - Negative Log roll  - Negative Stincfield  - Muscle tone normal    Right Lower Extremity  Inspection  - Skin intact throughout, no open wounds  - No swelling  - No ecchymosis, erythema, or signs of cellulitis  Palpation  - TTP over the right hip and anterior thigh, nontender to the knee and ankle  Range of motion  - AROM and PROM of the hip, knee, " ankle, and foot intact  Stability  - No evidence of joint dislocation or abnormal laxity  Neurovascular  - TA/EHL/Gastroc/FHL assessed in isolation without deficit  - SILT throughout  - Compartments soft  - DP palpated   - Capillary Refill <3s  - Negative Log roll  - Negative Stinchfield  - Muscle tone normal    Spine/pelvis/axial body:  No tenderness to palpation of cervical, thoracic, or lumbar spine  No pain with compression of pelvis  No chest wall or abdominal tenderness  No decubitus ulcers  Muscle tone normal      Significant Labs: CBC:   Recent Labs   Lab 07/23/23  0201   WBC 12.42   HGB 8.4*   HCT 27.3*        CMP:   Recent Labs   Lab 07/23/23  0201      K 4.0      CO2 23   GLU 91   BUN 54*   CREATININE 1.8*   CALCIUM 9.5   PROT 6.6   ALBUMIN 2.8*   BILITOT 0.4   ALKPHOS 110   AST 23   ALT 12   ANIONGAP 10     All pertinent labs within the past 24 hours have been reviewed.    Significant Imaging: X-Ray: I have reviewed all pertinent results/findings and my personal findings are:  AP Pelvis shows right sided IMN placed for prior hip fracture, nondisplaced stable fracture surrounding mid diaphyseal portion of the femur spanned completely by nail. Left hip contains percutaneous screws from prior fixation. Right shoulder XR show no acute fractures or dislocations at this time.   I have reviewed and interpreted all pertinent imaging results/findings.

## 2023-07-23 NOTE — CONSULTS
[facility] - Emergency Dept  Hospital Medicine  Consult Note    Patient Name: Krystina Washington  MRN: [id number]  Admission Date: 7/23/2023  Hospital Length of Stay: 0 days  Attending Physician: No att. providers found   Primary Care Provider: Oniel Johnson MD           Patient information was obtained from patient, relative(s) and ER records.     Consults  Subjective:     Principal Problem: Closed nondisplaced oblique fracture of shaft of right femur    Chief Complaint:   Chief Complaint   Patient presents with    Fall     Right external rotation of hip after fall.    Hip Pain        HPI: Patient was in her usual state of health until sometime in the last 12 hours when she was trying to get up from a sitting position after using the restroom and fell forward.  Family provides history.  Patient is very hard of hearing.  They deny her striking her head or having any loss of consciousness.  No chest pain or shortness a breath noted.  She was having too much pain to move around more so in the right arm and right leg does EMS was contacted.      In the ED EKG was done which I personally reviewed showed no acute ischemic changes.  Blood work was unremarkable for any acute abnormalities.  Extensive imaging studies including CT scans and plain films from the head down to the right upper and right lower extremities overall were unremarkable except for a femoral midshaft fracture that is nondisplaced per Orthopedic surgery which they recommended nonoperative management and weight-bearing as tolerated.  Patient was dosed with fentanyl.    Hospital admission along with inpatient PT/OT evaluation was offered and recommended to the patient and family but they opted to go directly home and were very confident that a home PT/OT evaluation with suffice.  They were not interested in placement recommendations/options as they reported a bad experience in the past and felt that recovery at home with 3 adult Able children would  shirin.    Patient has met maximum benefit from hospitalization and is clinically stable for discharge.  Patient's son reported that he would bring her back to the hospital if she did not do well at home.      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 12/3/2020    Anemia in stage 3b chronic kidney disease 3/8/2017    Arthritis     CHF (congestive heart failure)     Closed displaced subtrochanteric fracture of right femur s/p IM nail on 10/17/2021 10/16/2021    Coronary artery disease     Diabetes mellitus     Glaucoma     Gout, joint     Hx of CABG 9/21/10    Hyperlipidemia     Hypertension     NSTEMI (non-ST elevated myocardial infarction) 09/21/10    Stage 3b chronic kidney disease 10/21/2021    Stenosis of aortic and mitral valves     Systolic heart failure 6/19/2015    TIA (transient ischemic attack) 1/7/2021       Past Surgical History:   Procedure Laterality Date    ANTEGRADE INTRAMEDULLARY RODDING OF FEMUR Right 10/17/2021    Procedure: INSERTION, INTRAMEDULLARY ALTAF, FEMUR, ANTEGRADE;  Surgeon: Dino Rutledge MD;  Location: [facility] OR [address] [place] [address];  Service: Orthopedics;  Laterality: Right;    CARDIAC VALVE SURGERY      CATARACT EXTRACTION      CORONARY ARTERY BYPASS GRAFT  9/21/2010    ENTROPIAN REPAIR Left 3/6/2020    Procedure: REPAIR, ENTROPION;  Surgeon: Yulia Kincaid MD;  Location: [facility] OR [address] [place] [address];  Service: Ophthalmology;  Laterality: Left;    EYE SURGERY      JOINT REPLACEMENT      ORIF FEMUR FRACTURE Right 10/17/2021    Procedure: ORIF, FRACTURE, FEMUR;  Surgeon: Dino Rutledge MD;  Location: [facility] OR [address] [place] [address];  Service: Orthopedics;  Laterality: Right;       Review of patient's allergies indicates:   Allergen Reactions    Indocin [indomethacin sodium] Other (See Comments)     Either caused hot,burning body or caused madness per patient's grandson    Lotensin [benazepril] Other (See Comments)     Either caused hot  ,burning body or caused madness per patient's grandson       No current facility-administered medications on file prior to encounter.     Current Outpatient Medications on File Prior to Encounter   Medication Sig    acetaminophen (TYLENOL) 325 MG tablet Take 325 mg by mouth every 6 (six) hours as needed for Pain.    albuterol 90 mcg/actuation inhaler Inhale 1 puff into the lungs every 6 (six) hours as needed for Wheezing. 1 HFA Aerosol Inhaler Inhalation Twice a day    allopurinoL (ZYLOPRIM) 100 MG tablet Take 1 tablet (100 mg total) by mouth once daily.    apixaban (ELIQUIS) 2.5 mg Tab Take 1 tablet (2.5 mg total) by mouth 2 (two) times daily.    atorvastatin (LIPITOR) 40 MG tablet TAKE 1 TABLET BY MOUTH EVERY DAY    blood sugar diagnostic Strp Check BG daily prn if glucose found to be elevated on BMP, per facility protocol.    blood-glucose meter (FREESTYLE SYSTEM KIT) kit Use prn is blood glucose found to be elevated on routine BMP, per facility protocol.    carvediloL (COREG) 12.5 MG tablet TAKE 1 TABLET(12.5 MG) BY MOUTH TWICE DAILY    ENTRESTO 24-26 mg per tablet TAKE 1 TABLET BY MOUTH TWICE DAILY    EScitalopram oxalate (LEXAPRO) 20 MG tablet TAKE 1 TABLET BY MOUTH EVERY DAY    famotidine (PEPCID) 20 MG tablet Take 1 tablet (20 mg total) by mouth once daily. (Patient taking differently: Take 20 mg by mouth 2 (two) times daily.)    furosemide (LASIX) 20 MG tablet TAKE 1 TABLET(20 MG) BY MOUTH EVERY DAY    hydrALAZINE (APRESOLINE) 25 MG tablet TAKE 1 TABLET BY MOUTH EVERY 8 HOURS    isosorbide mononitrate (IMDUR) 30 MG 24 hr tablet TAKE 1 TABLET(30 MG) BY MOUTH EVERY DAY    lancets Misc Use daily prn if blood glucose found to be elevated on routine BMP, per facility protocol.    nitroGLYCERIN 0.4 MG/DOSE TL SPRY (NITROLINGUAL) 400 mcg/spray spray PLACE 1 SPRAY ONTO THE TONGUE EVERY 5 (FIVE) MINUTES AS NEEDED.    sacubitriL-valsartan (ENTRESTO) 49-51 mg per tablet Take 1 tablet by mouth 2 (two)  times daily.    timolol maleate 0.5% (TIMOPTIC) 0.5 % Drop Place 1 drop into both eyes 2 (two) times daily.    vitamin D (VITAMIN D3) 1000 units Tab Take 2 tablets (2,000 Units total) by mouth once daily.     Family History       Problem Relation (Age of Onset)    Diabetes Mother, Father    Glaucoma Father    Hypertension Father    No Known Problems Sister, Brother, Maternal Aunt, Maternal Uncle, Paternal Aunt, Paternal Uncle, Maternal Grandmother, Maternal Grandfather, Paternal Grandmother, Paternal Grandfather          Tobacco Use    Smoking status: Never    Smokeless tobacco: Never   Substance and Sexual Activity    Alcohol use: No    Drug use: No    Sexual activity: Never     Review of Systems  Objective:     Vital Signs (Most Recent):  Temp: 98 °F (36.7 °C) (07/23/23 0403)  Pulse: 61 (07/23/23 1003)  Resp: 17 (07/23/23 1003)  BP: (!) 142/82 (07/23/23 1003)  SpO2: 96 % (07/23/23 1004) Vital Signs (24h Range):  Temp:  [98 °F (36.7 °C)-98.1 °F (36.7 °C)] 98 °F (36.7 °C)  Pulse:  [45-61] 61  Resp:  [12-20] 17  SpO2:  [96 %-100 %] 96 %  BP: (122-183)/(63-82) 142/82     Weight: 82.6 kg (182 lb)  Body mass index is 32.24 kg/m².     Physical Exam  Vitals reviewed.        General: well nourished, nontoxic appearing  Skin: Warm and dry  Eyes: No conjunctivitis, no scleral icterus  ENT: No nasal bleeding, no obvious discharge  Cardiovascular: Regular rate and rhythm. No obvious JVD  Pulmonary/Chest: No accessory muscle use. Clear to auscultation bilaterally  Gastrointestinal: Abdomen soft, NT, ND.  Extremities: no clubbing, cyanosis or edema; no ingrid Limb length discrepancy  Musculoskeletal: appropriate muscle bulk, 4/5 hip flexion on the R and 4/5 dorsiflexion/plantarflexion of R foot being held back 2/2 pain; 5/5 hip flexion on L; 5/5 prox UE BL including fist   Neurological: no facial droop, no slurred speech  Psychiatric: appropriate mood and affect, insight intact       Assessment/Plan:     * Closed,  stable, nondisplaced oblique fracture of shaft of right femur  nonoperative management per orthopedics  WBAT  Inpatient PT/OT rec'd to family/patient here to fully assess for pain thresholds and amount of care needed; however, family insisted on departure with home health eval. norco prn breakthrough pain.      Fall  2/2 balance/mechanics; noncardiogenic nor neurogenic in nature  PT/OT  Fall precautions        family seeking discharge with HH instead of admission of inpt PT/OT eval; ER and CM notified    VTE Risk Mitigation (From admission, onward)    None              Thank you for your consult. I will sign off. Please contact us if you have any additional questions.    Brian Brewer MD  Department of Hospital Medicine   [facility] - Emergency Dept

## 2023-07-23 NOTE — ED PROVIDER NOTES
Encounter Date: 7/22/2023       History     Chief Complaint   Patient presents with    Fall     Right external rotation of hip after fall.    Hip Pain     89-year-old past medical history of prior CVA, CHF, anemia CKD stage 3, type 2 diabetes, gout, CABG, hypertension, hyperlipidemia, TIA, prior closed displaced subtroch can not enteric fracture of right femur status postORIF presenting with fall.  Patient brought in accompanied by her grandson.  Mentions patient fell off of the toilet landing on right side.  Denies any LOC.  Mentions since having severe pain on right upper and right lower extremity.  Denies any new numbness, tingling, focal weakness.  Denies any nausea vomiting visual changes, swelling.    Review of patient's allergies indicates:   Allergen Reactions    Indocin [indomethacin sodium] Other (See Comments)     Either caused hot,burning body or caused madness per patient's grandson    Lotensin [benazepril] Other (See Comments)     Either caused hot ,burning body or caused madness per patient's grandson     Past Medical History:   Diagnosis Date    Acute ischemic left posterior cerebral artery (PCA) stroke 12/3/2020    Anemia in stage 3b chronic kidney disease 3/8/2017    Arthritis     CHF (congestive heart failure)     Closed displaced subtrochanteric fracture of right femur s/p IM nail on 10/17/2021 10/16/2021    Coronary artery disease     Diabetes mellitus     Glaucoma     Gout, joint     Hx of CABG 9/21/10    Hyperlipidemia     Hypertension     NSTEMI (non-ST elevated myocardial infarction) 09/21/10    Stage 3b chronic kidney disease 10/21/2021    Stenosis of aortic and mitral valves     Systolic heart failure 6/19/2015    TIA (transient ischemic attack) 1/7/2021     Past Surgical History:   Procedure Laterality Date    ANTEGRADE INTRAMEDULLARY RODDING OF FEMUR Right 10/17/2021    Procedure: INSERTION, INTRAMEDULLARY ALTAF, FEMUR, ANTEGRADE;  Surgeon: Dino Rutledge MD;  Location: [facility] OR [address] [place] [address];   Service: Orthopedics;  Laterality: Right;    CARDIAC VALVE SURGERY      CATARACT EXTRACTION      CORONARY ARTERY BYPASS GRAFT  9/21/2010    ENTROPIAN REPAIR Left 3/6/2020    Procedure: REPAIR, ENTROPION;  Surgeon: Yulia Kincaid MD;  Location: [facility] OR [address] [place] [address];  Service: Ophthalmology;  Laterality: Left;    EYE SURGERY      JOINT REPLACEMENT      ORIF FEMUR FRACTURE Right 10/17/2021    Procedure: ORIF, FRACTURE, FEMUR;  Surgeon: Dino Rutledge MD;  Location: [facility] OR [address] [place] [address];  Service: Orthopedics;  Laterality: Right;     Family History   Problem Relation Age of Onset    Diabetes Mother     Hypertension Father     Diabetes Father     Glaucoma Father     No Known Problems Sister     No Known Problems Brother     No Known Problems Maternal Aunt     No Known Problems Maternal Uncle     No Known Problems Paternal Aunt     No Known Problems Paternal Uncle     No Known Problems Maternal Grandmother     No Known Problems Maternal Grandfather     No Known Problems Paternal Grandmother     No Known Problems Paternal Grandfather      Social History     Tobacco Use    Smoking status: Never    Smokeless tobacco: Never   Substance Use Topics    Alcohol use: No    Drug use: No     Review of Systems    Physical Exam     Initial Vitals [07/22/23 2158]   BP Pulse Resp Temp SpO2   122/66 (!) 53 16 98.1 °F (36.7 °C) 97 %      MAP       --         Physical Exam    Constitutional: She appears well-developed and well-nourished. She is not diaphoretic. No distress.   HENT:   Head: Normocephalic and atraumatic.   Eyes: Conjunctivae and EOM are normal. Pupils are equal, round, and reactive to light. Right eye exhibits no discharge. Left eye exhibits no discharge. No scleral icterus.   Neck: Neck supple.   Normal range of motion.  Cardiovascular:  Normal rate and regular rhythm.     Exam reveals no friction rub.       No murmur heard.  Pulmonary/Chest: Breath sounds normal. No respiratory distress. She has no wheezes. She has no rales.  "  Abdominal: Abdomen is soft. Bowel sounds are normal. She exhibits no distension. There is no abdominal tenderness. There is no rebound and no guarding.   Musculoskeletal:         General: Tenderness present. Normal range of motion.      Cervical back: Normal range of motion and neck supple.      Comments: Tenderness to palpation right shoulder right wrist hand, right hip as well.  Strength 5/5 sensation grossly intact to light touch.    Patient able to give " OK" sign and touch thumb to pinky, give thumbs-up, abduct/adduct fingers without difficulty, strength 5/5, radial pulse 2 +, cap refill < 3 seconds, sensation grossly intact to light touch throughout hand.    No midline spinal point tenderness to palpation.  No step-offs or deformities.       Neurological: She is alert and oriented to person, place, and time. No cranial nerve deficit or sensory deficit. GCS score is 15. GCS eye subscore is 4. GCS verbal subscore is 5. GCS motor subscore is 6.   Skin: Skin is warm and dry. No erythema. No pallor.   Psychiatric: She has a normal mood and affect. Her behavior is normal. Judgment and thought content normal.       ED Course   Procedures  Labs Reviewed   CBC W/ AUTO DIFFERENTIAL - Abnormal; Notable for the following components:       Result Value    RBC 2.73 (*)     Hemoglobin 8.4 (*)     Hematocrit 27.3 (*)      (*)     MCHC 30.8 (*)     RDW 15.5 (*)     Gran # (ANC) 8.6 (*)     Immature Grans (Abs) 0.05 (*)     All other components within normal limits   COMPREHENSIVE METABOLIC PANEL - Abnormal; Notable for the following components:    BUN 54 (*)     Creatinine 1.8 (*)     Albumin 2.8 (*)     eGFR 26.4 (*)     All other components within normal limits   PROTIME-INR   HIV 1 / 2 ANTIBODY   HEPATITIS C ANTIBODY        ECG Results              EKG 12-lead (Final result)  Result time 07/23/23 10:07:53      Final result by Interface, Lab In [facility] (07/23/23 10:07:53)                   Narrative:    Test Reason " : S72.009A,    Vent. Rate : 048 BPM     Atrial Rate : 048 BPM     P-R Int : 188 ms          QRS Dur : 166 ms      QT Int : 528 ms       P-R-T Axes : 090 117 008 degrees     QTc Int : 471 ms      Sinus bradycardia  Nonspecific intraventricular block  Possible Cruz-lateral and  Lateral infarct ,age undetermined but more  likely LBBB-Like IVCD  Abnormal ECG  When compared with ECG of 13-NOV-2021 13:49,  Nonspecific T wave abnormality has replaced inverted T waves in Inferior  leads  Confirmed by Raza MA MD ([id number]) on 7/23/2023 10:07:41 AM    Referred By: [email or web address]   SELF           Confirmed By:Raza MA MD                                  Imaging Results              CT Head Without Contrast (Final result)  Result time 07/23/23 06:16:53      Final result by Shanique Lewis MD (07/23/23 06:16:53)                   Impression:      1. No CT evidence of acute intracranial abnormality.  Clinical correlation and further assessment as warranted.  2. Generalized cerebral volume loss, findings suggestive of chronic microvascular ischemic change and multiple remote infarcts as discussed above.  3. No CT evidence of acute cervical spine fracture or traumatic subluxation.  4. Multilevel degenerative change of the cervical spine.    Electronically signed by resident: Christina Bautista  Date:    07/23/2023  Time:    04:38    Electronically signed by: Shanique Lewis MD  Date:    07/23/2023  Time:    06:16               Narrative:    EXAMINATION:  CT CERVICAL SPINE WITHOUT CONTRAST; CT HEAD WITHOUT CONTRAST    CLINICAL HISTORY:  Neck trauma, mechanically unstable spine (Age >= 16y);; Head trauma, minor (Age >= 65y);    TECHNIQUE:  Low dose axial images, sagittal and coronal reformations were performed though the head and  cervical spine.  Contrast was not administered.    COMPARISON:  CT 08/24/2023, MRI 01/06/2023, CT 10/16/2023    Cervical spine CT 03/01/2013    FINDINGS:  CT head:    Generalized cerebral volume loss with  compensatory prominence of the ventricles and sulci.  Remote appearing infarct of the right thalamus and bilateral cerebellar hemispheres.  Stable area of hypoattenuation extending along the right paramedian occipital lobe with associated volume loss, compatible with remote infarct.  Patchy and confluent areas of hypoattenuation through the supratentorial white matter, which is nonspecific but may represent chronic microvascular ischemic change.    There is no acute intracranial hemorrhage, hydrocephalus, midline shift or mass effect.    Small bilateral mastoid effusions.  Mild mucosal thickening of the paranasal sinuses.  Vascular calcifications noted at the skull base.    CT cervical spine:    There is straightening of normal cervical lordosis which can be secondary to patient positioning and/or muscle spasm.  There is also minimal anterolisthesis of C3 on C4, and C4 on C5, similar to prior exam.  Otherwise, cervical vertebral body alignment is within normal limits.  The facet joints articulate appropriately.  No significant prevertebral soft tissue swelling.  Multilevel intervertebral disc height loss with associated degenerative endplate change, most pronounced at the C6-C7 and C7-T1 levels.  Vertebral body heights appear grossly intact.  There is multilevel degenerative change of the cervical spine consisting of posterior disc osteophyte complexes, uncovertebral joint DJD and facet arthropathy resulting in varying degrees of neural foraminal narrowing at multiple levels and mild spinal canal stenosis.  The visualized skull base is intact.  Visualized soft tissue structures are within normal limits.  Lung apices are free of pleural fluid or focal consolidation.                                       CT Cervical Spine Without Contrast (Final result)  Result time 07/23/23 06:16:53      Final result by Shanique Lewis MD (07/23/23 06:16:53)                   Impression:      1. No CT evidence of acute intracranial  abnormality.  Clinical correlation and further assessment as warranted.  2. Generalized cerebral volume loss, findings suggestive of chronic microvascular ischemic change and multiple remote infarcts as discussed above.  3. No CT evidence of acute cervical spine fracture or traumatic subluxation.  4. Multilevel degenerative change of the cervical spine.    Electronically signed by resident: Christina Bautista  Date:    07/23/2023  Time:    04:38    Electronically signed by: Shanique Lewis MD  Date:    07/23/2023  Time:    06:16               Narrative:    EXAMINATION:  CT CERVICAL SPINE WITHOUT CONTRAST; CT HEAD WITHOUT CONTRAST    CLINICAL HISTORY:  Neck trauma, mechanically unstable spine (Age >= 16y);; Head trauma, minor (Age >= 65y);    TECHNIQUE:  Low dose axial images, sagittal and coronal reformations were performed though the head and  cervical spine.  Contrast was not administered.    COMPARISON:  CT 08/24/2023, MRI 01/06/2023, CT 10/16/2023    Cervical spine CT 03/01/2013    FINDINGS:  CT head:    Generalized cerebral volume loss with compensatory prominence of the ventricles and sulci.  Remote appearing infarct of the right thalamus and bilateral cerebellar hemispheres.  Stable area of hypoattenuation extending along the right paramedian occipital lobe with associated volume loss, compatible with remote infarct.  Patchy and confluent areas of hypoattenuation through the supratentorial white matter, which is nonspecific but may represent chronic microvascular ischemic change.    There is no acute intracranial hemorrhage, hydrocephalus, midline shift or mass effect.    Small bilateral mastoid effusions.  Mild mucosal thickening of the paranasal sinuses.  Vascular calcifications noted at the skull base.    CT cervical spine:    There is straightening of normal cervical lordosis which can be secondary to patient positioning and/or muscle spasm.  There is also minimal anterolisthesis of C3 on C4, and C4 on C5,  similar to prior exam.  Otherwise, cervical vertebral body alignment is within normal limits.  The facet joints articulate appropriately.  No significant prevertebral soft tissue swelling.  Multilevel intervertebral disc height loss with associated degenerative endplate change, most pronounced at the C6-C7 and C7-T1 levels.  Vertebral body heights appear grossly intact.  There is multilevel degenerative change of the cervical spine consisting of posterior disc osteophyte complexes, uncovertebral joint DJD and facet arthropathy resulting in varying degrees of neural foraminal narrowing at multiple levels and mild spinal canal stenosis.  The visualized skull base is intact.  Visualized soft tissue structures are within normal limits.  Lung apices are free of pleural fluid or focal consolidation.                                       X-Ray Hand 3 view Right (Final result)  Result time 07/23/23 03:24:14      Final result by Eben Cheney MD (07/23/23 03:24:14)                   Impression:      Degenerative changes in the hand and wrist.  No acute displaced fracture.      Electronically signed by: Eben Cheney MD  Date:    07/23/2023  Time:    03:24               Narrative:    EXAMINATION:  XR HAND COMPLETE 3 VIEW RIGHT    CLINICAL HISTORY:  pain;    TECHNIQUE:  Three views of the right hand.    COMPARISON:  None.    FINDINGS:  No acute displaced fracture.  No dislocation.  Multifocal degenerative changes and chondrocalcinosis in the wrist.  Incidental lunotriquetral coalition.  Soft tissues are symmetric.  No unexpected radiopaque foreign body.                                       X-Ray Humerus 2 View Right (Final result)  Result time 07/23/23 03:22:09      Final result by Eben Cheney MD (07/23/23 03:22:09)                   Impression:      No acute displaced fracture.      Electronically signed by: Eben Cheney MD  Date:    07/23/2023  Time:    03:22               Narrative:    EXAMINATION:  XR  HUMERUS 2 VIEW RIGHT    CLINICAL HISTORY:  Pain in arm, unspecified.    TECHNIQUE:  Two views of the right humerus.    COMPARISON:  None.    FINDINGS:  No acute displaced fracture.  No dislocation.  Degenerative changes in the right shoulder.  Possible right upper arm soft tissue edema.                                       X-Ray Pelvis Routine AP (Final result)  Result time 07/23/23 02:51:22      Final result by Eben Cheney MD (07/23/23 02:51:22)                   Impression:      Findings in the right femur and pelvis discussed above.      Electronically signed by: Eben Cheney MD  Date:    07/23/2023  Time:    02:51               Narrative:    EXAMINATION:  XR FEMUR 2 VIEW RIGHT; XR PELVIS ROUTINE AP    CLINICAL HISTORY:  right hip injury;    TECHNIQUE:  AP and lateral views of the right femur were performed.  Frontal radiograph of the pelvis also obtained.    COMPARISON:  Femur radiographs, 03/10/2022.    FINDINGS:  Pelvis: Postoperative changes involving the bilateral proximal femurs with 3 orthopedic screws traversing the left proximal femur and intramedullary yani on the right side.  There is suspected heterotopic calcification adjacent to the medial aspect of the left proximal femur.  No convincing acute displaced fracture in the pelvis allowing for rotation.  Degenerative changes in both hips.  Additional findings discussed below.    Right femur: Postoperative changes of right femur ORIF with intramedullary yani and similar alignment to the prior study.  There is a remote appearing fracture with lucency involving the subtrochanteric femoral shaft which could be related to delayed union though superimposed more recent fracture is difficult to entirely exclude.  No additional acute displaced fracture.  No dislocation.  Degenerative changes in the right hip and right knee.  No unexpected radiopaque foreign body.                                       X-Ray Chest AP Portable (Final result)  Result time  "07/23/23 02:53:50      Final result by Eben Cheney MD (07/23/23 02:53:50)                   Impression:      No detrimental change when compared with 01/19/2023.      Electronically signed by: Eben Cheney MD  Date:    07/23/2023  Time:    02:53               Narrative:    EXAMINATION:  XR CHEST AP PORTABLE    CLINICAL HISTORY:  Provided history is "pre-operative;  ".    TECHNIQUE:  One view of the chest.    COMPARISON:  01/19/2023.    FINDINGS:  Cardiac wires overlie the chest.  Cardiomediastinal silhouette is enlarged and similar to the prior study.  Atherosclerotic calcifications overlie the aortic arch.  Postoperative changes of prior median sternotomy and TAVR.  Central vascular congestion and minimal prominent interstitial densities in the perihilar region.  No confluent area of consolidation.  No sizable pleural effusion.  No pneumothorax.                                       X-Ray Knee 2 View Right (Final result)  Result time 07/23/23 02:52:39      Final result by Eben Cheney MD (07/23/23 02:52:39)                   Impression:      No acute displaced fracture in the right knee.      Electronically signed by: Eben Cheney MD  Date:    07/23/2023  Time:    02:52               Narrative:    EXAMINATION:  XR KNEE 1 OR 2 VIEW RIGHT    CLINICAL HISTORY:  right hip injury;    TECHNIQUE:  AP and lateral views of the right knee were performed.    COMPARISON:  10/16/2021.    FINDINGS:  Partially visualized operative changes in the distal femur.  No acute displaced fracture.  No dislocation.  Moderate degenerative changes in the right knee with probable chondrocalcinosis.  No large joint effusion.                                       X-Ray Femur 2 View Right (Final result)  Result time 07/23/23 02:51:22      Final result by Eben Cheney MD (07/23/23 02:51:22)                   Impression:      Findings in the right femur and pelvis discussed above.      Electronically signed by: Eben" MD Noni  Date:    07/23/2023  Time:    02:51               Narrative:    EXAMINATION:  XR FEMUR 2 VIEW RIGHT; XR PELVIS ROUTINE AP    CLINICAL HISTORY:  right hip injury;    TECHNIQUE:  AP and lateral views of the right femur were performed.  Frontal radiograph of the pelvis also obtained.    COMPARISON:  Femur radiographs, 03/10/2022.    FINDINGS:  Pelvis: Postoperative changes involving the bilateral proximal femurs with 3 orthopedic screws traversing the left proximal femur and intramedullary yani on the right side.  There is suspected heterotopic calcification adjacent to the medial aspect of the left proximal femur.  No convincing acute displaced fracture in the pelvis allowing for rotation.  Degenerative changes in both hips.  Additional findings discussed below.    Right femur: Postoperative changes of right femur ORIF with intramedullary yani and similar alignment to the prior study.  There is a remote appearing fracture with lucency involving the subtrochanteric femoral shaft which could be related to delayed union though superimposed more recent fracture is difficult to entirely exclude.  No additional acute displaced fracture.  No dislocation.  Degenerative changes in the right hip and right knee.  No unexpected radiopaque foreign body.                                       X-Ray Shoulder Trauma Right (Final result)  Result time 07/23/23 02:47:36      Final result by Eben Cheney MD (07/23/23 02:47:36)                   Impression:      Degenerative changes in the shoulder.  No convincing acute displaced fracture.      Electronically signed by: Eben Cheney MD  Date:    07/23/2023  Time:    02:47               Narrative:    EXAMINATION:  XR SHOULDER TRAUMA 3 VIEW RIGHT    CLINICAL HISTORY:  Pain in right shoulder    TECHNIQUE:  Three or four views of the right shoulder were performed.    COMPARISON:  03/01/2013.    FINDINGS:  Moderate degenerative changes in the right acromioclavicular and  glenohumeral joints.  Small calcification noted along the inferior aspect of the right proximal humerus, potentially soft tissue calcification.  No convincing acute displaced fracture.  No dislocation.  No unexpected radiopaque foreign body.                                       X-Ray Forearm Right (Final result)  Result time 07/23/23 02:43:13      Final result by Eben Cheney MD (07/23/23 02:43:13)                   Impression:      No acute displaced fracture.      Electronically signed by: Eben Cheney MD  Date:    07/23/2023  Time:    02:43               Narrative:    EXAMINATION:  XR FOREARM RIGHT    CLINICAL HISTORY:  Pain, unspecified    TECHNIQUE:  AP and lateral views of the right forearm were performed.    COMPARISON:  None    FINDINGS:  No acute displaced fracture.  No dislocation.  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  Lunotriquetral coalition.  Chronic appearing small soft tissue calcification in the antecubital region.  Soft tissues are symmetric.                                       Medications   fentaNYL 50 mcg/mL injection 50 mcg (50 mcg Intravenous Given 7/23/23 0153)   fentaNYL 50 mcg/mL injection 50 mcg (50 mcg Intravenous Given 7/23/23 0506)     Medical Decision Making:   History:   Old Medical Records: I decided to obtain old medical records.  Initial Assessment:   89-year-old presenting with fall.  Differential Diagnosis:   DDx includes but not limited to:   Fracture, dislocation, contusion, muscular strain, muscular sprain.  Intracranial traumatic process,  Clinical Tests:   Lab Tests: Reviewed and Ordered  Radiological Study: Ordered and Reviewed  ED Management:  Plan:  Obtain CBC CMP, x-rays of right upper extremity and right lower extremity CT head CT C-spine reassess.           ED Course as of 07/24/23 0407   Sun Jul 23, 2023   0407 X-rays noted for degenerative changes noted prior ORIF of right femur unable to comment if  patient has acute on chronic fracture.  Will consult Orthopedics will continue to reassess.  Patient pending CT scan results. [DC]      ED Course User Index  [DC] Hortensia Craig Jr., MD             Imaging noted for concern of right femur fracture.  Consulted Orthopedics patient signed out pending Orthopedics recommendations.    Clinical Impression:   Final diagnoses:  [S72.009A] Hip fracture (Primary)  [M25.511] Right shoulder pain  [M79.603] Arm pain  [R52] Pain  [R52] Pain - r/o fracture        ED Disposition Condition    Discharge Stable          ED Prescriptions    None       Follow-up Information       Follow up With Specialties Details Why Contact Info    Oniel Johnson MD Family Medicine Schedule an appointment as soon as possible for a visit   [address] [facility] [id number] [phone]               Hortensia Craig Jr., MD  07/24/23 9828

## 2023-07-23 NOTE — ASSESSMENT & PLAN NOTE
nonoperative management per orthopedics  WBAT  Inpatient PT/OT rec'd to family/patient here to fully assess for pain thresholds and amount of care needed; however, family insisted on departure with home health eval. norco prn breakthrough pain.

## 2023-07-23 NOTE — PLAN OF CARE
07/23/23 0948   Post-Acute Status   Post-Acute Authorization Home Health   Home Health Status Referrals Sent   Discharge Delays None known at this time   Discharge Plan   Discharge Plan A Home Health     SW was asked to send home health orders and referral for home health on pt d/c    SW sent referral via careport to [facility] and [facility] as per pt request    Per careport message:          Elicia Carrera CD, MSW, LMSW, RSW   Case Management  [facility]  Email: [email or web address]

## 2023-07-23 NOTE — ED TRIAGE NOTES
Krystina Washington, a 89 y.o. female presents to the ED w/ complaint of right hip and arm pain status post fall while attempting to transfer to the toilet.    Triage note:  Chief Complaint   Patient presents with    Fall     Right external rotation of hip after fall.    Hip Pain     Review of patient's allergies indicates:   Allergen Reactions    Indocin [indomethacin sodium] Other (See Comments)     Either caused hot,burning body or caused madness per patient's grandson    Lotensin [benazepril] Other (See Comments)     Either caused hot ,burning body or caused madness per patient's grandson     Past Medical History:   Diagnosis Date    Acute ischemic left posterior cerebral artery (PCA) stroke 12/3/2020    Anemia in stage 3b chronic kidney disease 3/8/2017    Arthritis     CHF (congestive heart failure)     Closed displaced subtrochanteric fracture of right femur s/p IM nail on 10/17/2021 10/16/2021    Coronary artery disease     Diabetes mellitus     Glaucoma     Gout, joint     Hx of CABG 9/21/10    Hyperlipidemia     Hypertension     NSTEMI (non-ST elevated myocardial infarction) 09/21/10    Stage 3b chronic kidney disease 10/21/2021    Stenosis of aortic and mitral valves     Systolic heart failure 6/19/2015    TIA (transient ischemic attack) 1/7/2021     Patient identifiers for Krystina Washington checked and correct.    LOC: The patient is awake, alert and aware of environment with an appropriate affect, the patient is oriented x 4 and speaking appropriately.    APPEARANCE: Patient resting comfortably and in no acute distress, patient is clean and well groomed, patient's clothing is properly fastened.    SKIN: The skin is warm and dry, color consistent with ethnicity, patient has decreased skin turgor and moist mucus membranes, skin intact, no breakdown or bruising noted.    MUSCULOSKELETAL: Patient with generalized weakness,right hip pain and RUE tender to touch and guarding.    RESPIRATORY: Airway is open  .    CARDIAC: Patient has a normal rate     ABDOMEN: Soft and non tender to palpation, no distention noted. Patient denies any nausea, vomiting, diarrhea, or constipation.     NEUROLOGIC: Eyes open spontaneously, PERRL, behavior appropriate to situation, follows commands, facial expression symmetrical, bilateral hand grasp equal and even, purposeful motor response noted, normal sensation in all extremities.     HEENT: No abnormalities noted. White sclera and pupils equal round and reactive to light. Denies headache, dizziness.     : Pt voids independently, denies dysuria, hematuria, frequency.

## 2023-07-23 NOTE — ED NOTES
Pt resting comfortably in bed with eyes closed. Chest rise and fall noted. Respirations E/UL. NAD noted. Call bell within reach at bedside. Will continue to monitor

## 2023-07-23 NOTE — SUBJECTIVE & OBJECTIVE
Past Medical History:   Diagnosis Date    Acute ischemic left posterior cerebral artery (PCA) stroke 12/3/2020    Anemia in stage 3b chronic kidney disease 3/8/2017    Arthritis     CHF (congestive heart failure)     Closed displaced subtrochanteric fracture of right femur s/p IM nail on 10/17/2021 10/16/2021    Coronary artery disease     Diabetes mellitus     Glaucoma     Gout, joint     Hx of CABG 9/21/10    Hyperlipidemia     Hypertension     NSTEMI (non-ST elevated myocardial infarction) 09/21/10    Stage 3b chronic kidney disease 10/21/2021    Stenosis of aortic and mitral valves     Systolic heart failure 6/19/2015    TIA (transient ischemic attack) 1/7/2021       Past Surgical History:   Procedure Laterality Date    ANTEGRADE INTRAMEDULLARY RODDING OF FEMUR Right 10/17/2021    Procedure: INSERTION, INTRAMEDULLARY ALTAF, FEMUR, ANTEGRADE;  Surgeon: Dino Rutledge MD;  Location: [facility] OR [address] [place] [address];  Service: Orthopedics;  Laterality: Right;    CARDIAC VALVE SURGERY      CATARACT EXTRACTION      CORONARY ARTERY BYPASS GRAFT  9/21/2010    ENTROPIAN REPAIR Left 3/6/2020    Procedure: REPAIR, ENTROPION;  Surgeon: Yulia Kincaid MD;  Location: [facility] OR [address] [place] [address];  Service: Ophthalmology;  Laterality: Left;    EYE SURGERY      JOINT REPLACEMENT      ORIF FEMUR FRACTURE Right 10/17/2021    Procedure: ORIF, FRACTURE, FEMUR;  Surgeon: Dino Rutledge MD;  Location: [facility] OR [address] [place] [address];  Service: Orthopedics;  Laterality: Right;       Review of patient's allergies indicates:   Allergen Reactions    Indocin [indomethacin sodium] Other (See Comments)     Either caused hot,burning body or caused madness per patient's grandson    Lotensin [benazepril] Other (See Comments)     Either caused hot ,burning body or caused madness per patient's grandson       No current facility-administered medications on file prior to encounter.     Current Outpatient Medications on File Prior to Encounter   Medication Sig    acetaminophen (TYLENOL)  325 MG tablet Take 325 mg by mouth every 6 (six) hours as needed for Pain.    albuterol 90 mcg/actuation inhaler Inhale 1 puff into the lungs every 6 (six) hours as needed for Wheezing. 1 HFA Aerosol Inhaler Inhalation Twice a day    allopurinoL (ZYLOPRIM) 100 MG tablet Take 1 tablet (100 mg total) by mouth once daily.    apixaban (ELIQUIS) 2.5 mg Tab Take 1 tablet (2.5 mg total) by mouth 2 (two) times daily.    atorvastatin (LIPITOR) 40 MG tablet TAKE 1 TABLET BY MOUTH EVERY DAY    blood sugar diagnostic Strp Check BG daily prn if glucose found to be elevated on BMP, per facility protocol.    blood-glucose meter (FREESTYLE SYSTEM KIT) kit Use prn is blood glucose found to be elevated on routine BMP, per facility protocol.    carvediloL (COREG) 12.5 MG tablet TAKE 1 TABLET(12.5 MG) BY MOUTH TWICE DAILY    ENTRESTO 24-26 mg per tablet TAKE 1 TABLET BY MOUTH TWICE DAILY    EScitalopram oxalate (LEXAPRO) 20 MG tablet TAKE 1 TABLET BY MOUTH EVERY DAY    famotidine (PEPCID) 20 MG tablet Take 1 tablet (20 mg total) by mouth once daily. (Patient taking differently: Take 20 mg by mouth 2 (two) times daily.)    furosemide (LASIX) 20 MG tablet TAKE 1 TABLET(20 MG) BY MOUTH EVERY DAY    hydrALAZINE (APRESOLINE) 25 MG tablet TAKE 1 TABLET BY MOUTH EVERY 8 HOURS    isosorbide mononitrate (IMDUR) 30 MG 24 hr tablet TAKE 1 TABLET(30 MG) BY MOUTH EVERY DAY    lancets Misc Use daily prn if blood glucose found to be elevated on routine BMP, per facility protocol.    nitroGLYCERIN 0.4 MG/DOSE TL SPRY (NITROLINGUAL) 400 mcg/spray spray PLACE 1 SPRAY ONTO THE TONGUE EVERY 5 (FIVE) MINUTES AS NEEDED.    sacubitriL-valsartan (ENTRESTO) 49-51 mg per tablet Take 1 tablet by mouth 2 (two) times daily.    timolol maleate 0.5% (TIMOPTIC) 0.5 % Drop Place 1 drop into both eyes 2 (two) times daily.    vitamin D (VITAMIN D3) 1000 units Tab Take 2 tablets (2,000 Units total) by mouth once daily.     Family History       Problem Relation (Age of  Onset)    Diabetes Mother, Father    Glaucoma Father    Hypertension Father    No Known Problems Sister, Brother, Maternal Aunt, Maternal Uncle, Paternal Aunt, Paternal Uncle, Maternal Grandmother, Maternal Grandfather, Paternal Grandmother, Paternal Grandfather          Tobacco Use    Smoking status: Never    Smokeless tobacco: Never   Substance and Sexual Activity    Alcohol use: No    Drug use: No    Sexual activity: Never     Review of Systems  Objective:     Vital Signs (Most Recent):  Temp: 98 °F (36.7 °C) (07/23/23 0403)  Pulse: 61 (07/23/23 1003)  Resp: 17 (07/23/23 1003)  BP: (!) 142/82 (07/23/23 1003)  SpO2: 96 % (07/23/23 1004) Vital Signs (24h Range):  Temp:  [98 °F (36.7 °C)-98.1 °F (36.7 °C)] 98 °F (36.7 °C)  Pulse:  [45-61] 61  Resp:  [12-20] 17  SpO2:  [96 %-100 %] 96 %  BP: (122-183)/(63-82) 142/82     Weight: 82.6 kg (182 lb)  Body mass index is 32.24 kg/m².     Physical Exam  Vitals reviewed.        General: well nourished, nontoxic appearing  Skin: Warm and dry  Eyes: No conjunctivitis, no scleral icterus  ENT: No nasal bleeding, no obvious discharge  Cardiovascular: Regular rate and rhythm. No obvious JVD  Pulmonary/Chest: No accessory muscle use. Clear to auscultation bilaterally  Gastrointestinal: Abdomen soft, NT, ND.  Extremities: no clubbing, cyanosis or edema; no ingrid Limb length discrepancy  Musculoskeletal: appropriate muscle bulk, 4/5 hip flexion on the R and 4/5 dorsiflexion/plantarflexion of R foot being held back 2/2 pain; 5/5 hip flexion on L; 5/5 prox UE BL including fist   Neurological: no facial droop, no slurred speech  Psychiatric: appropriate mood and affect, insight intact

## 2023-07-23 NOTE — Clinical Note
Diagnosis: Hip fracture [197979]   Future Attending Provider: LEE MALAGON [[id number]]   Admitting Provider:: LEE MALAGON [[id number]]   Special Needs:: Fall Risk [15]

## 2023-07-23 NOTE — MEDICAL/APP STUDENT
Krystina Washington is a 89 y.o. female with PMH significant for CAD, T2DM, hyperparathyroidism 2/2 CKD, dementia presenting with right shoulder and right hip pain after sustaining a ground level fall when using the toilet. She reports trying to get up from wheelchair to transfer to the toilet and fell on her right side. She has a history of bilateral hip fractures requiring CMN on the right and multiple screws on the left.  Patient denies any head trauma or LOC. Patient denies numbness and tingling. Denies any other musculoskeletal pain or injuries. She has been non-weight bearing, using a wheelchair at baseline for at least 1 year, pt does not recall. She does take Eliquis.   She denies IV drug use. She denies tobacco use. She denies alcohol use. She denies immunosuppressant medications. She denies chemotherapy. She denies radiation therapy. Patient lives at home with her grandson and has a caretaker to help with ADLs.    Alex Merchant, MS4

## 2023-07-23 NOTE — HPI
Krystina Washington is a 89 y.o. female with PMH significant for CAD, T2DM, hyperparathyroidism 2/2 CKD, dementia presenting with right shoulder and right hip pain after sustaining a ground level fall when using the toilet. She reports trying to get up from wheelchair to transfer to the toilet and fell on her right side. She has a history of bilateral hip fractures requiring CMN on the right and multiple screws on the left.  Patient denies any head trauma or LOC. Patient denies numbness and tingling. Denies any other musculoskeletal pain or injuries. She has been non-weight bearing, using a wheelchair at baseline for at least 1 year. She does take Eliquis. She denies IV drug use. She denies tobacco use. She denies alcohol use. She denies immunosuppressant medications. She denies chemotherapy. She denies radiation therapy. Patient lives at home with her grandson and has a caretaker to help with ADLs.

## 2023-07-23 NOTE — ASSESSMENT & PLAN NOTE
Krystina Washington is a 89 y.o. female with PMH significant for CAD, T2DM, hyperparathyroidism 2/2 CKD, dementia presenting who presents for evaluation of right femur fracture after sustaining a ground level fall. Evaluated at bedside, VSS, AF, closed, NVI.     - Patient has already undergone right sided IMN nail placement for prior hip fracture, which is the treatment of choice for this femoral shaft fracture she has sustained. Current stabilization is optimal.   - DVT prophy: continue home eliquis  - WBAT  - PT/OT    No need for orthopedic surgical intervention at this time. Will continue to follow should there be changes.

## 2023-07-23 NOTE — CONSULTS
[facility] - Emergency Dept  Orthopedics  Consult Note    Patient Name: Krystina Washington  MRN: [id number]  Admission Date: 7/23/2023  Hospital Length of Stay: 0 days  Attending Provider: No att. providers found  Primary Care Provider: Oniel Johnson MD    Inpatient consult to Orthopedics  Consult performed by: Tom Vizcaino MD  Consult ordered by: Hortensia Craig Jr., MD        Subjective:     Principal Problem:Closed nondisplaced oblique fracture of shaft of right femur    Chief Complaint:   Chief Complaint   Patient presents with    Fall     Right external rotation of hip after fall.    Hip Pain        HPI: Krystina Washington is a 89 y.o. female with PMH significant for CAD, T2DM, hyperparathyroidism 2/2 CKD, dementia presenting with right shoulder and right hip pain after sustaining a ground level fall when using the toilet. She reports trying to get up from wheelchair to transfer to the toilet and fell on her right side. She has a history of bilateral hip fractures requiring CMN on the right and multiple screws on the left.  Patient denies any head trauma or LOC. Patient denies numbness and tingling. Denies any other musculoskeletal pain or injuries. She has been non-weight bearing, using a wheelchair at baseline for at least 1 year. She does take Eliquis. She denies IV drug use. She denies tobacco use. She denies alcohol use. She denies immunosuppressant medications. She denies chemotherapy. She denies radiation therapy. Patient lives at home with her grandson and has a caretaker to help with ADLs.      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 12/3/2020    Anemia in stage 3b chronic kidney disease 3/8/2017    Arthritis     CHF (congestive heart failure)     Closed displaced subtrochanteric fracture of right femur s/p IM nail on 10/17/2021 10/16/2021    Coronary artery disease     Diabetes mellitus     Glaucoma     Gout, joint     Hx  of CABG 9/21/10    Hyperlipidemia     Hypertension     NSTEMI (non-ST elevated myocardial infarction) 09/21/10    Stage 3b chronic kidney disease 10/21/2021    Stenosis of aortic and mitral valves     Systolic heart failure 6/19/2015    TIA (transient ischemic attack) 1/7/2021       Past Surgical History:   Procedure Laterality Date    ANTEGRADE INTRAMEDULLARY RODDING OF FEMUR Right 10/17/2021    Procedure: INSERTION, INTRAMEDULLARY ALTAF, FEMUR, ANTEGRADE;  Surgeon: Dino Rutledge MD;  Location: [facility] OR [address] [place] [address];  Service: Orthopedics;  Laterality: Right;    CARDIAC VALVE SURGERY      CATARACT EXTRACTION      CORONARY ARTERY BYPASS GRAFT  9/21/2010    ENTROPIAN REPAIR Left 3/6/2020    Procedure: REPAIR, ENTROPION;  Surgeon: Yulia Kincaid MD;  Location: [facility] OR [address] [place] [address];  Service: Ophthalmology;  Laterality: Left;    EYE SURGERY      JOINT REPLACEMENT      ORIF FEMUR FRACTURE Right 10/17/2021    Procedure: ORIF, FRACTURE, FEMUR;  Surgeon: Dino Rutledge MD;  Location: [facility] OR [address] [place] [address];  Service: Orthopedics;  Laterality: Right;       Review of patient's allergies indicates:   Allergen Reactions    Indocin [indomethacin sodium] Other (See Comments)     Either caused hot,burning body or caused madness per patient's grandson    Lotensin [benazepril] Other (See Comments)     Either caused hot ,burning body or caused madness per patient's grandson       No current facility-administered medications for this encounter.     Current Outpatient Medications   Medication Sig    acetaminophen (TYLENOL) 325 MG tablet Take 325 mg by mouth every 6 (six) hours as needed for Pain.    albuterol 90 mcg/actuation inhaler Inhale 1 puff into the lungs every 6 (six) hours as needed for Wheezing. 1 HFA Aerosol Inhaler Inhalation Twice a day    allopurinoL (ZYLOPRIM) 100 MG tablet Take 1 tablet (100 mg total) by mouth once daily.    apixaban (ELIQUIS) 2.5 mg Tab Take 1 tablet (2.5 mg total) by mouth 2 (two) times  daily.    atorvastatin (LIPITOR) 40 MG tablet TAKE 1 TABLET BY MOUTH EVERY DAY    blood sugar diagnostic Strp Check BG daily prn if glucose found to be elevated on BMP, per facility protocol.    blood-glucose meter (FREESTYLE SYSTEM KIT) kit Use prn is blood glucose found to be elevated on routine BMP, per facility protocol.    carvediloL (COREG) 12.5 MG tablet TAKE 1 TABLET(12.5 MG) BY MOUTH TWICE DAILY    ENTRESTO 24-26 mg per tablet TAKE 1 TABLET BY MOUTH TWICE DAILY    EScitalopram oxalate (LEXAPRO) 20 MG tablet TAKE 1 TABLET BY MOUTH EVERY DAY    famotidine (PEPCID) 20 MG tablet Take 1 tablet (20 mg total) by mouth once daily. (Patient taking differently: Take 20 mg by mouth 2 (two) times daily.)    furosemide (LASIX) 20 MG tablet TAKE 1 TABLET(20 MG) BY MOUTH EVERY DAY    hydrALAZINE (APRESOLINE) 25 MG tablet TAKE 1 TABLET BY MOUTH EVERY 8 HOURS    isosorbide mononitrate (IMDUR) 30 MG 24 hr tablet TAKE 1 TABLET(30 MG) BY MOUTH EVERY DAY    lancets Misc Use daily prn if blood glucose found to be elevated on routine BMP, per facility protocol.    nitroGLYCERIN 0.4 MG/DOSE TL SPRY (NITROLINGUAL) 400 mcg/spray spray PLACE 1 SPRAY ONTO THE TONGUE EVERY 5 (FIVE) MINUTES AS NEEDED.    sacubitriL-valsartan (ENTRESTO) 49-51 mg per tablet Take 1 tablet by mouth 2 (two) times daily.    timolol maleate 0.5% (TIMOPTIC) 0.5 % Drop Place 1 drop into both eyes 2 (two) times daily.    vitamin D (VITAMIN D3) 1000 units Tab Take 2 tablets (2,000 Units total) by mouth once daily.     Family History       Problem Relation (Age of Onset)    Diabetes Mother, Father    Glaucoma Father    Hypertension Father    No Known Problems Sister, Brother, Maternal Aunt, Maternal Uncle, Paternal Aunt, Paternal Uncle, Maternal Grandmother, Maternal Grandfather, Paternal Grandmother, Paternal Grandfather          Tobacco Use    Smoking status: Never    Smokeless tobacco: Never   Substance and Sexual Activity    Alcohol use: No  "   Drug use: No    Sexual activity: Never     ROS    Constitutional: negative for fevers  Eyes: negative visual changes  ENT: negative for hearing loss  Respiratory: negative for dyspnea  Cardiovascular: negative for chest pain  Gastrointestinal: negative for abdominal pain  Genitourinary: negative for dysuria  Neurological: negative for headaches  Behavioral/Psych: negative for hallucinations  Endocrine: negative for temperature intolerance      Objective:     Vital Signs (Most Recent):  Temp: 98 °F (36.7 °C) (07/23/23 0403)  Pulse: (!) 48 (07/23/23 0602)  Resp: 15 (07/23/23 0602)  BP: (!) 161/73 (07/23/23 0602)  SpO2: 96 % (07/23/23 0602) Vital Signs (24h Range):  Temp:  [98 °F (36.7 °C)-98.1 °F (36.7 °C)] 98 °F (36.7 °C)  Pulse:  [45-53] 48  Resp:  [12-20] 15  SpO2:  [96 %-100 %] 96 %  BP: (122-165)/(66-73) 161/73           There is no height or weight on file to calculate BMI.    No intake or output data in the 24 hours ending 07/23/23 0753     Ortho/SPM Exam     Gen:  No acute distress, well-developed, well nourished.  CV:  Peripherally well-perfused. 2+ radial pulses, symmetric.  Respiratory:  Normal respiratory effort. No accessory muscle use.   Head/Neck:  Normocephalic.  Atraumatic. Sclera anicteric. TM. Neck supple.  Neuro: CN 2-12 grossly intact. No FND. Awake. Alert. Oriented to person, place, and situation, not time  Abdomen: Soft, NTND.      MSK:  Left Upper Extremity  Inspection  - Skin intact throughout, no open wounds  - No swelling  - No ecchymosis, erythema, or signs of cellulitis  Palpation  - NonTTP throughout, no palpable abnormality   Range of motion  - AROM and PROM of the shoulder, elbow, wrist, and hand intact  Stability  - No evidence of joint dislocation or abnormal laxity   Neurovascular  - AIN/PIN/Radial/Median/Ulnar Nerves assessed in isolation without deficit  - Able to give thumbs up, make "OK" sign, cross IF/LF, abduct/adduct fingers, make fist  - SILT throughout  - Compartments " "soft  - Radial artery palpated  - Capillary Refill <3s  - Muscle tone normal    Right Upper Extremity  Inspection  - Skin intact throughout, no open wounds  - No swelling  - No ecchymosis, erythema, or signs of cellulitis  Palpation  - TTP over the right shoulder   Range of motion  - AROM and PROM of the elbow, wrist, and hand intact  - AROM and PROM limited at shoulder due to pain  Stability  - No evidence of joint dislocation or abnormal laxity   Neurovascular  - AIN/PIN/Radial/Median/Ulnar Nerves assessed in isolation without deficit  - Able to give thumbs up, make "OK" sign, cross IF/LF, abduct/adduct fingers, make fist  - SILT throughout  - Compartments soft  - Radial artery palpated  - Capillary Refill <3s  - Muscle tone normal      Left Lower Extremity  Inspection  - Skin intact throughout, no open wounds  - No swelling  - No ecchymosis, erythema, or signs of cellulitis  Palpation  - NonTTP throughout, no palpable abnormality   Range of motion  - AROM and PROM of the hip, knee, ankle, and foot intact  Stability  - No evidence of joint dislocation or abnormal laxity  Neurovascular  - TA/EHL/Gastroc/FHL assessed in isolation without deficit  - SILT throughout  - Compartments soft  - DP palpated   - Capillary Refill <3s  - Negative Log roll  - Negative Stinchfield  - Muscle tone normal    Right Lower Extremity  Inspection  - Skin intact throughout, no open wounds  - No swelling  - No ecchymosis, erythema, or signs of cellulitis  Palpation  - TTP over the right hip and anterior thigh, nontender to the knee and ankle  Range of motion  - AROM and PROM of the hip, knee, ankle, and foot intact  Stability  - No evidence of joint dislocation or abnormal laxity  Neurovascular  - TA/EHL/Gastroc/FHL assessed in isolation without deficit  - SILT throughout  - Compartments soft  - DP palpated   - Capillary Refill <3s  - Negative Log roll  - Negative Stinchfield  - Muscle tone normal    Spine/pelvis/axial body:  No " tenderness to palpation of cervical, thoracic, or lumbar spine  No pain with compression of pelvis  No chest wall or abdominal tenderness  No decubitus ulcers  Muscle tone normal      Significant Labs: CBC:   Recent Labs   Lab 07/23/23  0201   WBC 12.42   HGB 8.4*   HCT 27.3*        CMP:   Recent Labs   Lab 07/23/23  0201      K 4.0      CO2 23   GLU 91   BUN 54*   CREATININE 1.8*   CALCIUM 9.5   PROT 6.6   ALBUMIN 2.8*   BILITOT 0.4   ALKPHOS 110   AST 23   ALT 12   ANIONGAP 10     All pertinent labs within the past 24 hours have been reviewed.    Significant Imaging: X-Ray: I have reviewed all pertinent results/findings and my personal findings are:  AP Pelvis shows right sided IMN placed for prior hip fracture, nondisplaced stable fracture surrounding mid diaphyseal portion of the femur spanned completely by nail. Left hip contains percutaneous screws from prior fixation. Right shoulder XR show no acute fractures or dislocations at this time.   I have reviewed and interpreted all pertinent imaging results/findings.    Assessment/Plan:     * Closed, stable, nondisplaced oblique fracture of shaft of right femur  Krystina Washington is a 89 y.o. female with PMH significant for CAD, T2DM, hyperparathyroidism 2/2 CKD, dementia presenting who presents for evaluation of right femur fracture after sustaining a ground level fall. Evaluated at bedside, VSS, AF, closed, NVI.     - Patient has already undergone right sided IMN nail placement for prior hip fracture, which is the treatment of choice for this femoral shaft fracture she has sustained. Current stabilization is optimal.   - DVT prophy: continue home eliquis  - WBAT  - PT/OT    No need for orthopedic surgical intervention at this time. Will continue to follow should there be changes.             RAMOS MARTINEZ MD  Orthopedics  [facility] - Emergency Dept

## 2023-07-23 NOTE — PLAN OF CARE
[facility] - Emergency Dept      HOME HEALTH ORDERS  FACE TO FACE ENCOUNTER    Patient Name: Krystina Washington  YOB: 1932    PCP: Oniel Johnson MD   PCP Address: [address] / [place] [address]  PCP Phone Number: [phone]  PCP Fax: [phone]    Encounter Date: 7/22/23    Admit to Home Health    Diagnoses:  Active Hospital Problems    Diagnosis  POA    *Closed, stable, nondisplaced oblique fracture of shaft of right femur [S72.334A]  Unknown      Resolved Hospital Problems   No resolved problems to display.       Follow Up Appointments:  Future Appointments   Date Time Provider Department Center   8/21/2023 11:00 AM Monica Burt NP [facility] [place]       Allergies:  Review of patient's allergies indicates:   Allergen Reactions    Indocin [indomethacin sodium] Other (See Comments)     Either caused hot,burning body or caused madness per patient's grandson    Lotensin [benazepril] Other (See Comments)     Either caused hot ,burning body or caused madness per patient's grandson       Medications: Review discharge medications with patient and family and provide education.    No current facility-administered medications for this encounter.     Current Outpatient Medications   Medication Sig Dispense Refill    acetaminophen (TYLENOL) 325 MG tablet Take 325 mg by mouth every 6 (six) hours as needed for Pain.      albuterol 90 mcg/actuation inhaler Inhale 1 puff into the lungs every 6 (six) hours as needed for Wheezing. 1 HFA Aerosol Inhaler Inhalation Twice a day 18 g 0    allopurinoL (ZYLOPRIM) 100 MG tablet Take 1 tablet (100 mg total) by mouth once daily. 90 tablet 3    apixaban (ELIQUIS) 2.5 mg Tab Take 1 tablet (2.5 mg total) by mouth 2 (two) times daily. 60 tablet 11    atorvastatin (LIPITOR) 40 MG tablet TAKE 1 TABLET BY MOUTH EVERY DAY 90 tablet 3    blood sugar diagnostic Strp Check BG daily prn if glucose found to be elevated on BMP, per facility protocol. 100 strip 6     blood-glucose meter (FREESTYLE SYSTEM KIT) kit Use prn is blood glucose found to be elevated on routine BMP, per facility protocol. 1 each 0    carvediloL (COREG) 12.5 MG tablet TAKE 1 TABLET(12.5 MG) BY MOUTH TWICE DAILY 180 tablet 3    ENTRESTO 24-26 mg per tablet TAKE 1 TABLET BY MOUTH TWICE DAILY 60 tablet 3    EScitalopram oxalate (LEXAPRO) 20 MG tablet TAKE 1 TABLET BY MOUTH EVERY DAY 90 tablet 3    famotidine (PEPCID) 20 MG tablet Take 1 tablet (20 mg total) by mouth once daily. (Patient taking differently: Take 20 mg by mouth 2 (two) times daily.)      furosemide (LASIX) 20 MG tablet TAKE 1 TABLET(20 MG) BY MOUTH EVERY DAY 90 tablet 3    hydrALAZINE (APRESOLINE) 25 MG tablet TAKE 1 TABLET BY MOUTH EVERY 8 HOURS 90 tablet 11    isosorbide mononitrate (IMDUR) 30 MG 24 hr tablet TAKE 1 TABLET(30 MG) BY MOUTH EVERY DAY 90 tablet 3    lancets Misc Use daily prn if blood glucose found to be elevated on routine BMP, per facility protocol. 100 each 6    nitroGLYCERIN 0.4 MG/DOSE TL SPRY (NITROLINGUAL) 400 mcg/spray spray PLACE 1 SPRAY ONTO THE TONGUE EVERY 5 (FIVE) MINUTES AS NEEDED. 12 g 0    sacubitriL-valsartan (ENTRESTO) 49-51 mg per tablet Take 1 tablet by mouth 2 (two) times daily. 60 tablet 11    timolol maleate 0.5% (TIMOPTIC) 0.5 % Drop Place 1 drop into both eyes 2 (two) times daily. 15 mL 3    vitamin D (VITAMIN D3) 1000 units Tab Take 2 tablets (2,000 Units total) by mouth once daily.       Current Discharge Medication List        CONTINUE these medications which have NOT CHANGED    Details   acetaminophen (TYLENOL) 325 MG tablet Take 325 mg by mouth every 6 (six) hours as needed for Pain.      albuterol 90 mcg/actuation inhaler Inhale 1 puff into the lungs every 6 (six) hours as needed for Wheezing. 1 HFA Aerosol Inhaler Inhalation Twice a day  Qty: 18 g, Refills: 0      allopurinoL (ZYLOPRIM) 100 MG tablet Take 1 tablet (100 mg total) by mouth once daily.  Qty: 90 tablet, Refills: 3      apixaban  (ELIQUIS) 2.5 mg Tab Take 1 tablet (2.5 mg total) by mouth 2 (two) times daily.  Qty: 60 tablet, Refills: 11    Comments: Please assist with prescription assistance. Thanks!  Associated Diagnoses: History of embolic stroke      atorvastatin (LIPITOR) 40 MG tablet TAKE 1 TABLET BY MOUTH EVERY DAY  Qty: 90 tablet, Refills: 3      blood sugar diagnostic Strp Check BG daily prn if glucose found to be elevated on BMP, per facility protocol.  Qty: 100 strip, Refills: 6    Associated Diagnoses: Type 2 diabetes mellitus with hyperglycemia, without long-term current use of insulin      blood-glucose meter (FREESTYLE SYSTEM KIT) kit Use prn is blood glucose found to be elevated on routine BMP, per facility protocol.  Qty: 1 each, Refills: 0      carvediloL (COREG) 12.5 MG tablet TAKE 1 TABLET(12.5 MG) BY MOUTH TWICE DAILY  Qty: 180 tablet, Refills: 3    Associated Diagnoses: Essential hypertension      ENTRESTO 24-26 mg per tablet TAKE 1 TABLET BY MOUTH TWICE DAILY  Qty: 60 tablet, Refills: 3      EScitalopram oxalate (LEXAPRO) 20 MG tablet TAKE 1 TABLET BY MOUTH EVERY DAY  Qty: 90 tablet, Refills: 3      famotidine (PEPCID) 20 MG tablet Take 1 tablet (20 mg total) by mouth once daily.      furosemide (LASIX) 20 MG tablet TAKE 1 TABLET(20 MG) BY MOUTH EVERY DAY  Qty: 90 tablet, Refills: 3    Associated Diagnoses: SOB (shortness of breath)      hydrALAZINE (APRESOLINE) 25 MG tablet TAKE 1 TABLET BY MOUTH EVERY 8 HOURS  Qty: 90 tablet, Refills: 11    Associated Diagnoses: Chronic systolic heart failure      isosorbide mononitrate (IMDUR) 30 MG 24 hr tablet TAKE 1 TABLET(30 MG) BY MOUTH EVERY DAY  Qty: 90 tablet, Refills: 3      lancets Misc Use daily prn if blood glucose found to be elevated on routine BMP, per facility protocol.  Qty: 100 each, Refills: 6    Associated Diagnoses: Type 2 diabetes mellitus with hyperglycemia, without long-term current use of insulin      nitroGLYCERIN 0.4 MG/DOSE TL SPRY (NITROLINGUAL) 400  mcg/spray spray PLACE 1 SPRAY ONTO THE TONGUE EVERY 5 (FIVE) MINUTES AS NEEDED.  Qty: 12 g, Refills: 0      sacubitriL-valsartan (ENTRESTO) 49-51 mg per tablet Take 1 tablet by mouth 2 (two) times daily.  Qty: 60 tablet, Refills: 11    Comments: Please assist with prescription assistance and make sure the prior authorization was done.  Associated Diagnoses: Chronic systolic heart failure      timolol maleate 0.5% (TIMOPTIC) 0.5 % Drop Place 1 drop into both eyes 2 (two) times daily.  Qty: 15 mL, Refills: 3    Associated Diagnoses: Primary open angle glaucoma (POAG) of left eye, mild stage      vitamin D (VITAMIN D3) 1000 units Tab Take 2 tablets (2,000 Units total) by mouth once daily.               I have seen and examined this patient within the last 30 days. My clinical findings that support the need for the home health skilled services and home bound status are the following:no   Weakness/numbness causing balance and gait disturbance due to Fracture making it taxing to leave home.     Diet:   cardiac diet    Labs:  none    Referrals/ Consults  Physical Therapy to evaluate and treat. Evaluate for home safety and equipment needs; Establish/upgrade home exercise program. Perform / instruct on therapeutic exercises, gait training, transfer training, and Range of Motion.  Occupational Therapy to evaluate and treat. Evaluate home environment for safety and equipment needs. Perform/Instruct on transfers, ADL training, ROM, and therapeutic exercises.    Activities:   other weight bearing as tolerated    Nursing:   Agency to admit patient within 24 hours of hospital discharge unless specified on physician order or at patient request    SN to complete comprehensive assessment including routine vital signs. Instruct on disease process and s/s of complications to report to MD. Review/verify medication list sent home with the patient at time of discharge  and instruct patient/caregiver as needed. Frequency may be adjusted  depending on start of care date.     Skilled nurse to perform up to 3 visits PRN for symptoms related to diagnosis    Notify MD if SBP > 160 or < 90; DBP > 90 or < 50; HR > 120 or < 50; Temp > 101; O2 < 88%; Other:   new falls    Ok to schedule additional visits based on staff availability and patient request on consecutive days within the home health episode.    When multiple disciplines ordered:    Start of Care occurs on Sunday - Wednesday schedule remaining discipline evaluations as ordered on separate consecutive days following the start of care.    Thursday SOC -schedule subsequent evaluations Friday and Monday the following week.     Friday - Saturday SOC - schedule subsequent discipline evaluations on consecutive days starting Monday of the following week.    For all post-discharge communication and subsequent orders please contact patient's primary care physician. If unable to reach primary care physician or do not receive response within 30 minutes, please contact [facility] on call RN for clinical staff order clarification    Miscellaneous   Diabetic Care:   SN to perform and educate Diabetic management with blood glucose monitoring:    Home Health Aide:  Physical Therapy Three times weekly and Occupational Therapy Twice weekly    Wound Care Orders  no    I certify that this patient is confined to her home and needs physical therapy and occupational therapy.

## 2023-07-23 NOTE — ED NOTES
Assumed care for this patient, able to voice all needs, patient currently resting on stretcher eyes closed, patient updated on current status an voiced understanding, patient continues on cardiac monitoring, no acute distress noted, will continue to monitor.

## 2023-07-23 NOTE — HPI
Patient was in her usual state of health until sometime in the last 12 hours when she was trying to get up from a sitting position after using the restroom and fell forward.  Family provides history.  Patient is very hard of hearing.  They deny her striking her head or having any loss of consciousness.  No chest pain or shortness a breath noted.  She was having too much pain to move around more so in the right arm and right leg does EMS was contacted.      In the ED EKG was done which I personally reviewed showed no acute ischemic changes.  Blood work was unremarkable for any acute abnormalities.  Extensive imaging studies including CT scans and plain films from the head down to the right upper and right lower extremities overall were unremarkable except for a femoral midshaft fracture that is nondisplaced per Orthopedic surgery which they recommended nonoperative management and weight-bearing as tolerated.  Patient was dosed with fentanyl.    Hospital admission along with inpatient PT/OT evaluation was offered and recommended to the patient and family but they opted to go directly home and were very confident that a home PT/OT evaluation with suffice.  They were not interested in placement recommendations/options as they reported a bad experience in the past and felt that recovery at home with 3 adult Able children would suffice.    Patient has met maximum benefit from hospitalization and is clinically stable for discharge.  Patient's son reported that he would bring her back to the hospital if she did not do well at home.

## 2023-07-24 PROCEDURE — G0180 MD CERTIFICATION HHA PATIENT: HCPCS | Mod: ,,, | Performed by: EMERGENCY MEDICINE

## 2023-07-24 PROCEDURE — G0180 PR HOME HEALTH MD CERTIFICATION: ICD-10-PCS | Mod: ,,, | Performed by: EMERGENCY MEDICINE

## 2023-08-11 ENCOUNTER — EXTERNAL HOME HEALTH (OUTPATIENT)
Dept: HOME HEALTH SERVICES | Facility: HOSPITAL | Age: 88
End: 2023-08-11
Payer: MEDICARE

## 2023-08-12 ENCOUNTER — HOSPITAL ENCOUNTER (INPATIENT)
Facility: HOSPITAL | Age: 88
LOS: 1 days | Discharge: HOME HEALTH | DRG: 065 | End: 2023-08-14
Attending: EMERGENCY MEDICINE | Admitting: FAMILY MEDICINE
Payer: MEDICARE

## 2023-08-12 DIAGNOSIS — I25.118 CORONARY ARTERY DISEASE OF NATIVE ARTERY OF NATIVE HEART WITH STABLE ANGINA PECTORIS: ICD-10-CM

## 2023-08-12 DIAGNOSIS — R41.82 ALTERED MENTAL STATUS: ICD-10-CM

## 2023-08-12 DIAGNOSIS — I63.9 STROKE: ICD-10-CM

## 2023-08-12 DIAGNOSIS — Z86.73 HISTORY OF EMBOLIC STROKE: ICD-10-CM

## 2023-08-12 DIAGNOSIS — R41.82 ALTERED MENTAL STATE: ICD-10-CM

## 2023-08-12 DIAGNOSIS — N18.4 CHRONIC KIDNEY DISEASE, STAGE 4 (SEVERE): ICD-10-CM

## 2023-08-12 DIAGNOSIS — I63.9 CEREBROVASCULAR ACCIDENT (CVA), UNSPECIFIED MECHANISM: Primary | ICD-10-CM

## 2023-08-12 LAB
ALBUMIN SERPL BCP-MCNC: 2.8 G/DL (ref 3.5–5.2)
ALP SERPL-CCNC: 91 U/L (ref 55–135)
ALT SERPL W/O P-5'-P-CCNC: 11 U/L (ref 10–44)
AMMONIA PLAS-SCNC: 26 UMOL/L (ref 10–50)
AMPHET+METHAMPHET UR QL: NEGATIVE
ANION GAP SERPL CALC-SCNC: 10 MMOL/L (ref 8–16)
AST SERPL-CCNC: 20 U/L (ref 10–40)
BARBITURATES UR QL SCN>200 NG/ML: NEGATIVE
BASOPHILS # BLD AUTO: 0.05 K/UL (ref 0–0.2)
BASOPHILS NFR BLD: 0.5 % (ref 0–1.9)
BENZODIAZ UR QL SCN>200 NG/ML: NEGATIVE
BILIRUB SERPL-MCNC: 0.4 MG/DL (ref 0.1–1)
BILIRUB UR QL STRIP: NEGATIVE
BUN SERPL-MCNC: 51 MG/DL (ref 10–30)
BZE UR QL SCN: NEGATIVE
CALCIUM SERPL-MCNC: 9.5 MG/DL (ref 8.7–10.5)
CANNABINOIDS UR QL SCN: NEGATIVE
CHLORIDE SERPL-SCNC: 105 MMOL/L (ref 95–110)
CLARITY UR: CLEAR
CO2 SERPL-SCNC: 24 MMOL/L (ref 23–29)
COLOR UR: YELLOW
CREAT SERPL-MCNC: 2.1 MG/DL (ref 0.5–1.4)
CREAT UR-MCNC: 95.4 MG/DL (ref 15–325)
CTP QC/QA: YES
DIFFERENTIAL METHOD: ABNORMAL
EOSINOPHIL # BLD AUTO: 0.2 K/UL (ref 0–0.5)
EOSINOPHIL NFR BLD: 2.3 % (ref 0–8)
ERYTHROCYTE [DISTWIDTH] IN BLOOD BY AUTOMATED COUNT: 15.9 % (ref 11.5–14.5)
EST. GFR  (NO RACE VARIABLE): 22 ML/MIN/1.73 M^2
GLUCOSE SERPL-MCNC: 114 MG/DL (ref 70–110)
GLUCOSE UR QL STRIP: NEGATIVE
HCT VFR BLD AUTO: 24.7 % (ref 37–48.5)
HGB BLD-MCNC: 7.9 G/DL (ref 12–16)
HGB UR QL STRIP: NEGATIVE
IMM GRANULOCYTES # BLD AUTO: 0.06 K/UL (ref 0–0.04)
IMM GRANULOCYTES NFR BLD AUTO: 0.6 % (ref 0–0.5)
KETONES UR QL STRIP: NEGATIVE
LEUKOCYTE ESTERASE UR QL STRIP: NEGATIVE
LYMPHOCYTES # BLD AUTO: 2.5 K/UL (ref 1–4.8)
LYMPHOCYTES NFR BLD: 24 % (ref 18–48)
MCH RBC QN AUTO: 31.7 PG (ref 27–31)
MCHC RBC AUTO-ENTMCNC: 32 G/DL (ref 32–36)
MCV RBC AUTO: 99 FL (ref 82–98)
METHADONE UR QL SCN>300 NG/ML: NEGATIVE
MONOCYTES # BLD AUTO: 0.9 K/UL (ref 0.3–1)
MONOCYTES NFR BLD: 8.5 % (ref 4–15)
NEUTROPHILS # BLD AUTO: 6.7 K/UL (ref 1.8–7.7)
NEUTROPHILS NFR BLD: 64.1 % (ref 38–73)
NITRITE UR QL STRIP: NEGATIVE
NRBC BLD-RTO: 0 /100 WBC
OPIATES UR QL SCN: NEGATIVE
PCP UR QL SCN>25 NG/ML: NEGATIVE
PH UR STRIP: 5 [PH] (ref 5–8)
PLATELET # BLD AUTO: 170 K/UL (ref 150–450)
PMV BLD AUTO: 11.1 FL (ref 9.2–12.9)
POCT GLUCOSE: 119 MG/DL (ref 70–110)
POTASSIUM SERPL-SCNC: 4.2 MMOL/L (ref 3.5–5.1)
PROT SERPL-MCNC: 6.4 G/DL (ref 6–8.4)
PROT UR QL STRIP: NEGATIVE
RBC # BLD AUTO: 2.49 M/UL (ref 4–5.4)
SARS-COV-2 RDRP RESP QL NAA+PROBE: NEGATIVE
SODIUM SERPL-SCNC: 139 MMOL/L (ref 136–145)
SP GR UR STRIP: 1.01 (ref 1–1.03)
TOXICOLOGY INFORMATION: NORMAL
TROPONIN I SERPL DL<=0.01 NG/ML-MCNC: 0.05 NG/ML (ref 0–0.03)
URN SPEC COLLECT METH UR: NORMAL
UROBILINOGEN UR STRIP-ACNC: NEGATIVE EU/DL
WBC # BLD AUTO: 10.43 K/UL (ref 3.9–12.7)

## 2023-08-12 PROCEDURE — 96360 HYDRATION IV INFUSION INIT: CPT

## 2023-08-12 PROCEDURE — 93010 EKG 12-LEAD: ICD-10-PCS | Mod: ,,, | Performed by: INTERNAL MEDICINE

## 2023-08-12 PROCEDURE — 80053 COMPREHEN METABOLIC PANEL: CPT | Performed by: EMERGENCY MEDICINE

## 2023-08-12 PROCEDURE — 85025 COMPLETE CBC W/AUTO DIFF WBC: CPT | Performed by: EMERGENCY MEDICINE

## 2023-08-12 PROCEDURE — 63600175 PHARM REV CODE 636 W HCPCS: Performed by: EMERGENCY MEDICINE

## 2023-08-12 PROCEDURE — 81003 URINALYSIS AUTO W/O SCOPE: CPT | Mod: 59 | Performed by: EMERGENCY MEDICINE

## 2023-08-12 PROCEDURE — 93005 ELECTROCARDIOGRAM TRACING: CPT

## 2023-08-12 PROCEDURE — 80307 DRUG TEST PRSMV CHEM ANLYZR: CPT | Performed by: EMERGENCY MEDICINE

## 2023-08-12 PROCEDURE — 82962 GLUCOSE BLOOD TEST: CPT

## 2023-08-12 PROCEDURE — 93010 ELECTROCARDIOGRAM REPORT: CPT | Mod: ,,, | Performed by: INTERNAL MEDICINE

## 2023-08-12 PROCEDURE — 82140 ASSAY OF AMMONIA: CPT | Performed by: EMERGENCY MEDICINE

## 2023-08-12 PROCEDURE — P9612 CATHETERIZE FOR URINE SPEC: HCPCS

## 2023-08-12 PROCEDURE — 84484 ASSAY OF TROPONIN QUANT: CPT | Performed by: EMERGENCY MEDICINE

## 2023-08-12 PROCEDURE — 99285 EMERGENCY DEPT VISIT HI MDM: CPT | Mod: 25

## 2023-08-12 PROCEDURE — 87635 SARS-COV-2 COVID-19 AMP PRB: CPT | Performed by: EMERGENCY MEDICINE

## 2023-08-12 RX ADMIN — SODIUM CHLORIDE, POTASSIUM CHLORIDE, SODIUM LACTATE AND CALCIUM CHLORIDE 500 ML: 600; 310; 30; 20 INJECTION, SOLUTION INTRAVENOUS at 11:08

## 2023-08-12 NOTE — Clinical Note
Diagnosis: Altered mental status [780.97.ICD-9-CM]   Future Attending Provider: SARAY PITTS [[id number]]   Admitting Provider:: SARAY PITTS [[id number]]   Special Needs:: Fall Risk [15]

## 2023-08-13 DIAGNOSIS — R06.02 SOB (SHORTNESS OF BREATH): ICD-10-CM

## 2023-08-13 PROBLEM — R41.82 ALTERED MENTAL STATUS: Status: ACTIVE | Noted: 2023-08-13

## 2023-08-13 LAB
ALBUMIN SERPL BCP-MCNC: 2.6 G/DL (ref 3.5–5.2)
ALLENS TEST: ABNORMAL
ALP SERPL-CCNC: 88 U/L (ref 55–135)
ALT SERPL W/O P-5'-P-CCNC: 10 U/L (ref 10–44)
ANION GAP SERPL CALC-SCNC: 8 MMOL/L (ref 8–16)
APAP SERPL-MCNC: <3 UG/ML (ref 10–20)
AST SERPL-CCNC: 18 U/L (ref 10–40)
BASOPHILS # BLD AUTO: 0.04 K/UL (ref 0–0.2)
BASOPHILS NFR BLD: 0.4 % (ref 0–1.9)
BILIRUB SERPL-MCNC: 0.4 MG/DL (ref 0.1–1)
BUN SERPL-MCNC: 49 MG/DL (ref 10–30)
CALCIUM SERPL-MCNC: 9.3 MG/DL (ref 8.7–10.5)
CHLORIDE SERPL-SCNC: 107 MMOL/L (ref 95–110)
CHOLEST SERPL-MCNC: 85 MG/DL (ref 120–199)
CHOLEST/HDLC SERPL: 3 {RATIO} (ref 2–5)
CO2 SERPL-SCNC: 24 MMOL/L (ref 23–29)
CREAT SERPL-MCNC: 1.9 MG/DL (ref 0.5–1.4)
CREAT UR-MCNC: 60.3 MG/DL (ref 15–325)
DELSYS: ABNORMAL
DIFFERENTIAL METHOD: ABNORMAL
EOSINOPHIL # BLD AUTO: 0.2 K/UL (ref 0–0.5)
EOSINOPHIL NFR BLD: 2.6 % (ref 0–8)
ERYTHROCYTE [DISTWIDTH] IN BLOOD BY AUTOMATED COUNT: 15.9 % (ref 11.5–14.5)
EST. GFR  (NO RACE VARIABLE): 25 ML/MIN/1.73 M^2
ESTIMATED AVG GLUCOSE: 126 MG/DL (ref 68–131)
FOLATE SERPL-MCNC: 10.4 NG/ML (ref 4–24)
GLUCOSE SERPL-MCNC: 89 MG/DL (ref 70–110)
HBA1C MFR BLD: 6 % (ref 4–5.6)
HCO3 UR-SCNC: 27.7 MMOL/L (ref 24–28)
HCT VFR BLD AUTO: 24.3 % (ref 37–48.5)
HCT VFR BLD CALC: 23 %PCV (ref 36–54)
HDLC SERPL-MCNC: 28 MG/DL (ref 40–75)
HDLC SERPL: 32.9 % (ref 20–50)
HGB BLD-MCNC: 7.5 G/DL (ref 12–16)
HGB BLD-MCNC: 8 G/DL
IMM GRANULOCYTES # BLD AUTO: 0.03 K/UL (ref 0–0.04)
IMM GRANULOCYTES NFR BLD AUTO: 0.3 % (ref 0–0.5)
LDLC SERPL CALC-MCNC: 44 MG/DL (ref 63–159)
LYMPHOCYTES # BLD AUTO: 2.7 K/UL (ref 1–4.8)
LYMPHOCYTES NFR BLD: 29 % (ref 18–48)
MAGNESIUM SERPL-MCNC: 1.9 MG/DL (ref 1.6–2.6)
MCH RBC QN AUTO: 31.1 PG (ref 27–31)
MCHC RBC AUTO-ENTMCNC: 30.9 G/DL (ref 32–36)
MCV RBC AUTO: 101 FL (ref 82–98)
MONOCYTES # BLD AUTO: 0.8 K/UL (ref 0.3–1)
MONOCYTES NFR BLD: 8.7 % (ref 4–15)
NEUTROPHILS # BLD AUTO: 5.5 K/UL (ref 1.8–7.7)
NEUTROPHILS NFR BLD: 59 % (ref 38–73)
NONHDLC SERPL-MCNC: 57 MG/DL
NRBC BLD-RTO: 0 /100 WBC
OSMOLALITY UR: 303 MOSM/KG (ref 50–1200)
PCO2 BLDA: 45.9 MMHG (ref 35–45)
PH SMN: 7.39 [PH] (ref 7.35–7.45)
PHOSPHATE SERPL-MCNC: 4 MG/DL (ref 2.7–4.5)
PLATELET # BLD AUTO: 174 K/UL (ref 150–450)
PMV BLD AUTO: 11.8 FL (ref 9.2–12.9)
PO2 BLDA: 37 MMHG (ref 80–100)
POC BE: 3 MMOL/L
POC SATURATED O2: 70 % (ref 95–100)
POC TCO2: 29 MMOL/L (ref 23–27)
POCT GLUCOSE: 104 MG/DL (ref 70–110)
POCT GLUCOSE: 90 MG/DL (ref 70–110)
POCT GLUCOSE: 94 MG/DL (ref 70–110)
POTASSIUM BLD-SCNC: 4 MMOL/L (ref 3.5–5.1)
POTASSIUM SERPL-SCNC: 3.9 MMOL/L (ref 3.5–5.1)
PROT SERPL-MCNC: 6.1 G/DL (ref 6–8.4)
RBC # BLD AUTO: 2.41 M/UL (ref 4–5.4)
SALICYLATES SERPL-MCNC: <5 MG/DL (ref 15–30)
SAMPLE: ABNORMAL
SITE: ABNORMAL
SODIUM BLD-SCNC: 139 MMOL/L (ref 136–145)
SODIUM SERPL-SCNC: 139 MMOL/L (ref 136–145)
SODIUM UR-SCNC: 51 MMOL/L (ref 20–250)
TRIGL SERPL-MCNC: 65 MG/DL (ref 30–150)
TROPONIN I SERPL DL<=0.01 NG/ML-MCNC: 0.05 NG/ML (ref 0–0.03)
UUN UR-MCNC: 358 MG/DL (ref 140–1050)
VIT B12 SERPL-MCNC: 829 PG/ML (ref 210–950)
WBC # BLD AUTO: 9.26 K/UL (ref 3.9–12.7)

## 2023-08-13 PROCEDURE — 36415 COLL VENOUS BLD VENIPUNCTURE: CPT

## 2023-08-13 PROCEDURE — 80179 DRUG ASSAY SALICYLATE: CPT

## 2023-08-13 PROCEDURE — 83735 ASSAY OF MAGNESIUM: CPT

## 2023-08-13 PROCEDURE — G0378 HOSPITAL OBSERVATION PER HR: HCPCS

## 2023-08-13 PROCEDURE — 84540 ASSAY OF URINE/UREA-N: CPT

## 2023-08-13 PROCEDURE — 80143 DRUG ASSAY ACETAMINOPHEN: CPT

## 2023-08-13 PROCEDURE — 84100 ASSAY OF PHOSPHORUS: CPT

## 2023-08-13 PROCEDURE — 84484 ASSAY OF TROPONIN QUANT: CPT

## 2023-08-13 PROCEDURE — 83036 HEMOGLOBIN GLYCOSYLATED A1C: CPT

## 2023-08-13 PROCEDURE — 83935 ASSAY OF URINE OSMOLALITY: CPT

## 2023-08-13 PROCEDURE — 25000003 PHARM REV CODE 250

## 2023-08-13 PROCEDURE — 80053 COMPREHEN METABOLIC PANEL: CPT

## 2023-08-13 PROCEDURE — 84300 ASSAY OF URINE SODIUM: CPT

## 2023-08-13 PROCEDURE — 82607 VITAMIN B-12: CPT

## 2023-08-13 PROCEDURE — 85025 COMPLETE CBC W/AUTO DIFF WBC: CPT

## 2023-08-13 PROCEDURE — 82746 ASSAY OF FOLIC ACID SERUM: CPT

## 2023-08-13 PROCEDURE — 80061 LIPID PANEL: CPT

## 2023-08-13 PROCEDURE — 82570 ASSAY OF URINE CREATININE: CPT

## 2023-08-13 RX ORDER — CARVEDILOL 12.5 MG/1
12.5 TABLET ORAL 2 TIMES DAILY
Status: DISCONTINUED | OUTPATIENT
Start: 2023-08-13 | End: 2023-08-13

## 2023-08-13 RX ORDER — SODIUM CHLORIDE 0.9 % (FLUSH) 0.9 %
5 SYRINGE (ML) INJECTION
Status: DISCONTINUED | OUTPATIENT
Start: 2023-08-13 | End: 2023-08-14 | Stop reason: HOSPADM

## 2023-08-13 RX ORDER — INSULIN ASPART 100 [IU]/ML
0-5 INJECTION, SOLUTION INTRAVENOUS; SUBCUTANEOUS
Status: DISCONTINUED | OUTPATIENT
Start: 2023-08-13 | End: 2023-08-14 | Stop reason: HOSPADM

## 2023-08-13 RX ORDER — ISOSORBIDE MONONITRATE 30 MG/1
30 TABLET, EXTENDED RELEASE ORAL DAILY
Status: DISCONTINUED | OUTPATIENT
Start: 2023-08-13 | End: 2023-08-13

## 2023-08-13 RX ORDER — ATORVASTATIN CALCIUM 40 MG/1
40 TABLET, FILM COATED ORAL DAILY
Status: DISCONTINUED | OUTPATIENT
Start: 2023-08-13 | End: 2023-08-14 | Stop reason: HOSPADM

## 2023-08-13 RX ORDER — CHOLECALCIFEROL (VITAMIN D3) 25 MCG
2000 TABLET ORAL DAILY
Status: DISCONTINUED | OUTPATIENT
Start: 2023-08-13 | End: 2023-08-14 | Stop reason: HOSPADM

## 2023-08-13 RX ORDER — ACETAMINOPHEN 325 MG/1
650 TABLET ORAL EVERY 4 HOURS PRN
Status: DISCONTINUED | OUTPATIENT
Start: 2023-08-13 | End: 2023-08-13

## 2023-08-13 RX ORDER — GLUCAGON 1 MG
1 KIT INJECTION
Status: DISCONTINUED | OUTPATIENT
Start: 2023-08-13 | End: 2023-08-14 | Stop reason: HOSPADM

## 2023-08-13 RX ORDER — INSULIN ASPART 100 [IU]/ML
1-10 INJECTION, SOLUTION INTRAVENOUS; SUBCUTANEOUS
Status: DISCONTINUED | OUTPATIENT
Start: 2023-08-13 | End: 2023-08-13

## 2023-08-13 RX ORDER — AMOXICILLIN 250 MG
1 CAPSULE ORAL 2 TIMES DAILY PRN
Status: DISCONTINUED | OUTPATIENT
Start: 2023-08-13 | End: 2023-08-14 | Stop reason: HOSPADM

## 2023-08-13 RX ORDER — ALLOPURINOL 100 MG/1
100 TABLET ORAL DAILY
Status: DISCONTINUED | OUTPATIENT
Start: 2023-08-13 | End: 2023-08-14 | Stop reason: HOSPADM

## 2023-08-13 RX ORDER — ACETAMINOPHEN 325 MG/1
650 TABLET ORAL EVERY 6 HOURS PRN
Status: DISCONTINUED | OUTPATIENT
Start: 2023-08-13 | End: 2023-08-14 | Stop reason: HOSPADM

## 2023-08-13 RX ORDER — FUROSEMIDE 20 MG/1
20 TABLET ORAL DAILY
Status: DISCONTINUED | OUTPATIENT
Start: 2023-08-13 | End: 2023-08-13

## 2023-08-13 RX ORDER — IBUPROFEN 200 MG
24 TABLET ORAL
Status: DISCONTINUED | OUTPATIENT
Start: 2023-08-13 | End: 2023-08-14 | Stop reason: HOSPADM

## 2023-08-13 RX ORDER — ACETAMINOPHEN 325 MG/1
650 TABLET ORAL EVERY 8 HOURS PRN
Status: DISCONTINUED | OUTPATIENT
Start: 2023-08-13 | End: 2023-08-13

## 2023-08-13 RX ORDER — ONDANSETRON 8 MG/1
8 TABLET, ORALLY DISINTEGRATING ORAL EVERY 8 HOURS PRN
Status: DISCONTINUED | OUTPATIENT
Start: 2023-08-13 | End: 2023-08-14 | Stop reason: HOSPADM

## 2023-08-13 RX ORDER — ALLOPURINOL 100 MG/1
100 TABLET ORAL DAILY
Status: DISCONTINUED | OUTPATIENT
Start: 2023-08-13 | End: 2023-08-13

## 2023-08-13 RX ORDER — IBUPROFEN 200 MG
16 TABLET ORAL
Status: DISCONTINUED | OUTPATIENT
Start: 2023-08-13 | End: 2023-08-14 | Stop reason: HOSPADM

## 2023-08-13 RX ORDER — ENOXAPARIN SODIUM 100 MG/ML
30 INJECTION SUBCUTANEOUS EVERY 24 HOURS
Status: DISCONTINUED | OUTPATIENT
Start: 2023-08-13 | End: 2023-08-13

## 2023-08-13 RX ORDER — LIDOCAINE 50 MG/G
1 PATCH TOPICAL DAILY PRN
Status: DISCONTINUED | OUTPATIENT
Start: 2023-08-13 | End: 2023-08-14 | Stop reason: HOSPADM

## 2023-08-13 RX ORDER — TALC
9 POWDER (GRAM) TOPICAL NIGHTLY PRN
Status: DISCONTINUED | OUTPATIENT
Start: 2023-08-13 | End: 2023-08-14 | Stop reason: HOSPADM

## 2023-08-13 RX ORDER — NAPROXEN SODIUM 220 MG/1
81 TABLET, FILM COATED ORAL DAILY
Status: CANCELLED | OUTPATIENT
Start: 2023-08-13

## 2023-08-13 RX ORDER — ESCITALOPRAM OXALATE 10 MG/1
20 TABLET ORAL DAILY
Status: DISCONTINUED | OUTPATIENT
Start: 2023-08-13 | End: 2023-08-14 | Stop reason: HOSPADM

## 2023-08-13 RX ORDER — HYDRALAZINE HYDROCHLORIDE 25 MG/1
25 TABLET, FILM COATED ORAL EVERY 8 HOURS
Status: DISCONTINUED | OUTPATIENT
Start: 2023-08-13 | End: 2023-08-13

## 2023-08-13 RX ADMIN — CHOLECALCIFEROL TAB 25 MCG (1000 UNIT) 2000 UNITS: 25 TAB at 10:08

## 2023-08-13 RX ADMIN — ATORVASTATIN CALCIUM 40 MG: 40 TABLET, FILM COATED ORAL at 10:08

## 2023-08-13 RX ADMIN — APIXABAN 2.5 MG: 2.5 TABLET, FILM COATED ORAL at 10:08

## 2023-08-13 RX ADMIN — APIXABAN 2.5 MG: 2.5 TABLET, FILM COATED ORAL at 09:08

## 2023-08-13 RX ADMIN — ALLOPURINOL 100 MG: 100 TABLET ORAL at 01:08

## 2023-08-13 RX ADMIN — ESCITALOPRAM OXALATE 20 MG: 10 TABLET ORAL at 10:08

## 2023-08-13 NOTE — NURSING
Received from emergency room on stable conditions via stretcher. Matthias Cross (caregiver) at bedside, orientation to room provided, care plan reviewed with patient and grandson, pt with eyes closed, easily to arouse, disoriented to time, place, and situation. No respiratory distress noted, on room air. SB per cardiac monitor, no red alarm noted. Denies any pain of discomfort, education provided on medication effect and side effect, reinforcement needed. Call light within her reach, no apparent distress noted, bed in low position, bed alarm on, educated on the importance of calling as needed, voices understanding, stable at this time.

## 2023-08-13 NOTE — ASSESSMENT & PLAN NOTE
Acute-onset AMS in setting of possible undiagnosed dementia  Differentials include infectious etiology, undiagnosed dementia (history of altered sleep wake cycles), dehydration (raised BUN/Cr), stroke    Plan:  - Stroke: MRI pending; CT no acute findings  - Dehydration: Urine sodium & creatinine pending  - UTI: UA negative  - Toxins: Acetaminophen, Salicylate levels pending, UTox negative  - Infections: COVID negative, CXR no acute findings  - Bleeds: Hgb similar to baseline, no acute bleeding suspected  - B12, Folate levels pending  - Ammonia WNL  - Spoke about goals of care; pt is partial code - does not want compressions, but is okay with intubation

## 2023-08-13 NOTE — ASSESSMENT & PLAN NOTE
Acute-onset AMS in setting of possible undiagnosed dementia  Differentials include infectious etiology, undiagnosed dementia (history of altered sleep wake cycles), dehydration (raised BUN/Cr), stroke    Plan:  - Stroke: MRI pending; CT no acute findings  - Dehydration: Urine sodium & creatinine pending  - UTI: UA negative  - Toxins: Acetaminophen, Salicylate levels negative, UTox negative  - Infections: COVID negative, CXR no acute findings  - Bleeds: Hgb similar to baseline, no acute bleeding suspected  - B12, Folate levels pending  - Ammonia WNL  - Spoke about goals of care; pt is partial code - does not want compressions, but is okay with intubation

## 2023-08-13 NOTE — ASSESSMENT & PLAN NOTE
Home medications: Carvedilol 12.5, Entresto, Hydralazine, Furosemide    Plan:  - Hold home meds in setting of suspected stroke for permissive HTN

## 2023-08-13 NOTE — H&P
[facility] Medicine  History & Physical    Patient Name: Krystina Washington  MRN: [id number]  Patient Class: OP- Observation  Admission Date: 8/12/2023  Attending Physician: Dr. Salinas  Primary Care Provider: Oniel Johnson MD         Patient information was obtained from caregiver / friend and ER records.     Subjective:     Principal Problem:Altered mental status    Chief Complaint:   Chief Complaint   Patient presents with    Altered Mental Status     Patient brought in by son. States patient has been more lethargic today. Woke up from a nap 30 minutes ago completely altered. Not wanting to respond to questions and was unable to recognize family members. Patient appears very weak and has obvious confusion. Family reports lack of sleep the last couple nights. Last seen normal was 7:30 am. History of past stroke and AMS with UTIs        HPI: 89-year-old with PMHx of PCA stroke (12/3/2020), TIA (1/7/2021), CHF (EF 25%), CAD, DM, joint, CABG (9/21/10), Hyperlipidemia, Hypertension, NSTEMI (09/21/10), CKD3B, Anemia, Stenosis of aortic and mitral valves, Closed displaced subtrochanteric fracture of right femur s/p IM nail on 10/17/2021, Arthritis, Glaucoma, and Gout, presents with family for altered mental status. According to her son, who is her caretaker, patient woke up and was okay, but acutely became sleepy, confused; she did not know her name or how old she was. Son reports that patient exhibited similar symptoms a couple of years ago when she had an UTI. He reports that she has some foul smelling urine today. She has not had any fevers lately, no cough, no c/o chest pain, shortness of breath, headaches, abdominal pain over the last few days. She has been eating and drinking well.     In the ED, vitals Temp 98.2, HR 61, RR 17, /64, 97% on RA. She received 500mL LR; per ED doctor, she perked up after. BUN 51/2.1. Hgb 7.9 (baseline ~8) Troponin 0.054 (baseline 0.03-0.05), UA normal.  Utox negative. CT no acute findings. CXR no acute findings. COVID negative. Patient was admitted to [facility] for management of AMS & MARGARET.      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 12/3/2020    Anemia in stage 3b chronic kidney disease 3/8/2017    Arthritis     CHF (congestive heart failure)     Closed displaced subtrochanteric fracture of right femur s/p IM nail on 10/17/2021 10/16/2021    Coronary artery disease     Diabetes mellitus     Glaucoma     Gout, joint     Hx of CABG 9/21/10    Hyperlipidemia     Hypertension     NSTEMI (non-ST elevated myocardial infarction) 09/21/10    Stage 3b chronic kidney disease 10/21/2021    Stenosis of aortic and mitral valves     Systolic heart failure 6/19/2015    TIA (transient ischemic attack) 1/7/2021       Past Surgical History:   Procedure Laterality Date    ANTEGRADE INTRAMEDULLARY RODDING OF FEMUR Right 10/17/2021    Procedure: INSERTION, INTRAMEDULLARY ALTAF, FEMUR, ANTEGRADE;  Surgeon: Dino Rutledge MD;  Location: [facility] OR [address] [place] [address];  Service: Orthopedics;  Laterality: Right;    CARDIAC VALVE SURGERY      CATARACT EXTRACTION      CORONARY ARTERY BYPASS GRAFT  9/21/2010    ENTROPIAN REPAIR Left 3/6/2020    Procedure: REPAIR, ENTROPION;  Surgeon: Yulia Kincaid MD;  Location: [facility] OR [address] [place] [address];  Service: Ophthalmology;  Laterality: Left;    EYE SURGERY      JOINT REPLACEMENT      ORIF FEMUR FRACTURE Right 10/17/2021    Procedure: ORIF, FRACTURE, FEMUR;  Surgeon: Dino Rutledge MD;  Location: [facility] OR [address] [place] [address];  Service: Orthopedics;  Laterality: Right;       Review of patient's allergies indicates:   Allergen Reactions    Indocin [indomethacin sodium] Other (See Comments)     Either caused hot,burning body or caused madness per patient's grandson    Lotensin [benazepril] Other (See Comments)     Either caused hot ,burning body or caused madness per patient's grandson        No current facility-administered medications on file prior to encounter.     Current Outpatient Medications on File Prior to Encounter   Medication Sig    acetaminophen (TYLENOL) 325 MG tablet Take 325 mg by mouth every 6 (six) hours as needed for Pain.    albuterol 90 mcg/actuation inhaler Inhale 1 puff into the lungs every 6 (six) hours as needed for Wheezing. 1 HFA Aerosol Inhaler Inhalation Twice a day    allopurinoL (ZYLOPRIM) 100 MG tablet Take 1 tablet (100 mg total) by mouth once daily.    apixaban (ELIQUIS) 2.5 mg Tab Take 1 tablet (2.5 mg total) by mouth 2 (two) times daily.    atorvastatin (LIPITOR) 40 MG tablet TAKE 1 TABLET BY MOUTH EVERY DAY    blood sugar diagnostic Strp Check BG daily prn if glucose found to be elevated on BMP, per facility protocol.    blood-glucose meter (FREESTYLE SYSTEM KIT) kit Use prn is blood glucose found to be elevated on routine BMP, per facility protocol.    carvediloL (COREG) 12.5 MG tablet TAKE 1 TABLET(12.5 MG) BY MOUTH TWICE DAILY    ENTRESTO 24-26 mg per tablet TAKE 1 TABLET BY MOUTH TWICE DAILY    EScitalopram oxalate (LEXAPRO) 20 MG tablet TAKE 1 TABLET BY MOUTH EVERY DAY    famotidine (PEPCID) 20 MG tablet Take 1 tablet (20 mg total) by mouth once daily. (Patient taking differently: Take 20 mg by mouth 2 (two) times daily.)    furosemide (LASIX) 20 MG tablet TAKE 1 TABLET(20 MG) BY MOUTH EVERY DAY    hydrALAZINE (APRESOLINE) 25 MG tablet TAKE 1 TABLET BY MOUTH EVERY 8 HOURS    isosorbide mononitrate (IMDUR) 30 MG 24 hr tablet TAKE 1 TABLET(30 MG) BY MOUTH EVERY DAY    lancets Misc Use daily prn if blood glucose found to be elevated on routine BMP, per facility protocol.    nitroGLYCERIN 0.4 MG/DOSE TL SPRY (NITROLINGUAL) 400 mcg/spray spray PLACE 1 SPRAY ONTO THE TONGUE EVERY 5 (FIVE) MINUTES AS NEEDED.    sacubitriL-valsartan (ENTRESTO) 49-51 mg per tablet Take 1 tablet by mouth 2 (two) times daily.    timolol maleate 0.5% (TIMOPTIC) 0.5 %  Drop Place 1 drop into both eyes 2 (two) times daily.    vitamin D (VITAMIN D3) 1000 units Tab Take 2 tablets (2,000 Units total) by mouth once daily.     Family History       Problem Relation (Age of Onset)    Diabetes Mother, Father    Glaucoma Father    Hypertension Father    No Known Problems Sister, Brother, Maternal Aunt, Maternal Uncle, Paternal Aunt, Paternal Uncle, Maternal Grandmother, Maternal Grandfather, Paternal Grandmother, Paternal Grandfather          Tobacco Use    Smoking status: Never    Smokeless tobacco: Never   Substance and Sexual Activity    Alcohol use: No    Drug use: No    Sexual activity: Never     Review of Systems  Objective:     Vital Signs (Most Recent):  Temp: 98.2 °F (36.8 °C) (08/12/23 2154)  Pulse: 60 (08/13/23 0112)  Resp: 18 (08/13/23 0112)  BP: (!) 128/59 (08/13/23 0103)  SpO2: 99 % (08/13/23 0112) Vital Signs (24h Range):  Temp:  [98.2 °F (36.8 °C)] 98.2 °F (36.8 °C)  Pulse:  [56-63] 60  Resp:  [16-19] 18  SpO2:  [96 %-99 %] 99 %  BP: (125-140)/(59-64) 128/59     Weight: 82.6 kg (182 lb)  Body mass index is 32.24 kg/m².     Physical Exam  Cardiovascular:      Rate and Rhythm: Normal rate and regular rhythm.      Pulses: Normal pulses.      Heart sounds: Normal heart sounds. No murmur heard.  Pulmonary:      Effort: Pulmonary effort is normal.   Abdominal:      General: Bowel sounds are normal.      Tenderness: There is no abdominal tenderness. There is no guarding.   Musculoskeletal:      Right lower leg: Edema present.      Left lower leg: No edema.   Skin:     General: Skin is warm.   Neurological:      Mental Status: She is oriented to person, place, and time.                Significant Labs: All pertinent labs within the past 24 hours have been reviewed.  CBC:   Recent Labs   Lab 08/12/23  2200 08/12/23  2357   WBC 10.43  --    HGB 7.9*  --    HCT 24.7* 23*     --      CMP:   Recent Labs   Lab 08/12/23  2200      K 4.2      CO2 24   *   BUN  51*   CREATININE 2.1*   CALCIUM 9.5   PROT 6.4   ALBUMIN 2.8*   BILITOT 0.4   ALKPHOS 91   AST 20   ALT 11   ANIONGAP 10     Troponin:   Recent Labs   Lab 08/12/23  2345   TROPONINI 0.054*       Significant Imaging: I have reviewed all pertinent imaging results/findings within the past 24 hours.    Assessment/Plan:     * Altered mental status  Acute-onset AMS in setting of possible undiagnosed dementia  Differentials include infectious etiology, undiagnosed dementia (history of altered sleep wake cycles), dehydration (raised BUN/Cr), stroke    Plan:  - Stroke: MRI pending; CT no acute findings  - Dehydration: Urine sodium & creatinine pending  - UTI: UA negative  - Toxins: Acetaminophen, Salicylate levels pending, UTox negative  - Infections: COVID negative, CXR no acute findings  - Bleeds: Hgb similar to baseline, no acute bleeding suspected  - B12, Folate levels pending  - Ammonia WNL  - Spoke about goals of care; pt is partial code - does not want compressions, but is okay with intubation    Essential hypertension  Home medications: Carvedilol 12.5, Entresto, Hydralazine, Furosemide    Plan:  - Hold home meds in setting of suspected stroke for permissive HTN      Coronary artery disease of native artery of native heart with stable angina pectoris  Plan:  - Control HLD (Atorvastatin)  - Continue home Eliquis      History of embolic stroke 12/2020  Home medications: Apixaban 2.5mg    Plan:  - Continue home medications    Chronic kidney disease, stage 4 (severe)  BUN/Cr 51/2.1 eGFR 22  Cr similar to baseline    Plan:  - Monitor I&O  - Consider furosemide if lower leg edema worsens    Pure hypercholesterolemia  Home medications: Atorvastatin 40mg    Plan:  - Continue home medications      Major depressive disorder, recurrent episode, moderate  Home medications: Escitalopram 20mg    Plan:  Continue home medications    VTE Risk Mitigation (From admission, onward)         Ordered     apixaban tablet 2.5 mg  2 times  daily         08/13/23 0150     IP VTE HIGH RISK PATIENT  Once         08/13/23 0058     Place sequential compression device  Until discontinued         08/13/23 0058                  On 08/13/2023, patient should be placed in hospital observation services under my care in collaboration with Dr. Salinas.    Therese Patel MD  Department of Hospital Medicine  [facility]

## 2023-08-13 NOTE — TELEPHONE ENCOUNTER
No care due was identified.  [facility] Embedded Care Due Messages. Reference number: [id number].   8/13/2023 4:42:52 AM CDT

## 2023-08-13 NOTE — SUBJECTIVE & OBJECTIVE
"Interval History: NAEON. Patient can have a complete conversation. She reports remembering "acting funny" with family members during her party and not knowing who she was.    Review of Systems   Constitutional:  Negative for fever.        Patient reports feeling cold, but also notes that she is always cold   Respiratory:  Negative for cough.    Cardiovascular:  Negative for chest pain and palpitations.   Gastrointestinal:  Negative for abdominal pain.   Endocrine: Positive for cold intolerance.   Genitourinary:  Negative for difficulty urinating and dysuria.   Neurological:  Negative for dizziness and headaches.     Objective:     Vital Signs (Most Recent):  Temp: 98 °F (36.7 °C) (08/13/23 0842)  Pulse: 64 (08/13/23 0842)  Resp: 18 (08/13/23 0842)  BP: (!) 167/72 (08/13/23 0842)  SpO2: (!) 93 % (08/13/23 0842) Vital Signs (24h Range):  Temp:  [98 °F (36.7 °C)-98.2 °F (36.8 °C)] 98 °F (36.7 °C)  Pulse:  [56-68] 64  Resp:  [16-24] 18  SpO2:  [93 %-99 %] 93 %  BP: (125-168)/(59-72) 167/72     Weight: 82.6 kg (182 lb)  Body mass index is 32.24 kg/m².    Intake/Output Summary (Last 24 hours) at 8/13/2023 0958  Last data filed at 8/13/2023 0015  Gross per 24 hour   Intake 500 ml   Output 400 ml   Net 100 ml         Physical Exam  Constitutional:       General: She is not in acute distress.     Appearance: Normal appearance. She is not diaphoretic.   Cardiovascular:      Rate and Rhythm: Normal rate and regular rhythm.      Pulses: Normal pulses.      Heart sounds: Normal heart sounds. No murmur heard.  Pulmonary:      Effort: Pulmonary effort is normal.      Breath sounds: Normal breath sounds. No wheezing or rhonchi.   Abdominal:      General: Bowel sounds are normal.      Palpations: Abdomen is soft.      Tenderness: There is no abdominal tenderness. There is no guarding.   Musculoskeletal:      Right lower leg: Edema present.      Left lower leg: No edema.   Neurological:      Mental Status: She is alert and oriented " to person, place, and time.      Comments: Patient is more attentive this morning than she was last night. She can have an interactive conversation, which she was unable to do last night.  Visual field limited in R eye - similar to baseline post prior CVA  Power 4/5 right leg - Baseline unknown  Power 5/5 in all other extremities   Psychiatric:         Mood and Affect: Mood normal.         Behavior: Behavior normal.         Thought Content: Thought content normal.             Significant Labs: All pertinent labs within the past 24 hours have been reviewed.  CBC:   Recent Labs   Lab 08/12/23 2200 08/12/23 2357 08/13/23  0527   WBC 10.43  --  9.26   HGB 7.9*  --  7.5*   HCT 24.7* 23* 24.3*     --  174     CMP:   Recent Labs   Lab 08/12/23 2200 08/13/23  0527    139   K 4.2 3.9    107   CO2 24 24   * 89   BUN 51* 49*   CREATININE 2.1* 1.9*   CALCIUM 9.5 9.3   PROT 6.4 6.1   ALBUMIN 2.8* 2.6*   BILITOT 0.4 0.4   ALKPHOS 91 88   AST 20 18   ALT 11 10   ANIONGAP 10 8       Significant Imaging: I have reviewed all pertinent imaging results/findings within the past 24 hours.

## 2023-08-13 NOTE — SUBJECTIVE & OBJECTIVE
Past Medical History:   Diagnosis Date    Acute ischemic left posterior cerebral artery (PCA) stroke 12/3/2020    Anemia in stage 3b chronic kidney disease 3/8/2017    Arthritis     CHF (congestive heart failure)     Closed displaced subtrochanteric fracture of right femur s/p IM nail on 10/17/2021 10/16/2021    Coronary artery disease     Diabetes mellitus     Glaucoma     Gout, joint     Hx of CABG 9/21/10    Hyperlipidemia     Hypertension     NSTEMI (non-ST elevated myocardial infarction) 09/21/10    Stage 3b chronic kidney disease 10/21/2021    Stenosis of aortic and mitral valves     Systolic heart failure 6/19/2015    TIA (transient ischemic attack) 1/7/2021       Past Surgical History:   Procedure Laterality Date    ANTEGRADE INTRAMEDULLARY RODDING OF FEMUR Right 10/17/2021    Procedure: INSERTION, INTRAMEDULLARY ALTAF, FEMUR, ANTEGRADE;  Surgeon: Dino Rutledge MD;  Location: [facility] OR [address] [place] [address];  Service: Orthopedics;  Laterality: Right;    CARDIAC VALVE SURGERY      CATARACT EXTRACTION      CORONARY ARTERY BYPASS GRAFT  9/21/2010    ENTROPIAN REPAIR Left 3/6/2020    Procedure: REPAIR, ENTROPION;  Surgeon: Yulia Kincaid MD;  Location: [facility] OR [address] [place] [address];  Service: Ophthalmology;  Laterality: Left;    EYE SURGERY      JOINT REPLACEMENT      ORIF FEMUR FRACTURE Right 10/17/2021    Procedure: ORIF, FRACTURE, FEMUR;  Surgeon: Dino Rutledge MD;  Location: [facility] OR [address] [place] [address];  Service: Orthopedics;  Laterality: Right;       Review of patient's allergies indicates:   Allergen Reactions    Indocin [indomethacin sodium] Other (See Comments)     Either caused hot,burning body or caused madness per patient's grandson    Lotensin [benazepril] Other (See Comments)     Either caused hot ,burning body or caused madness per patient's grandson       No current facility-administered medications on file prior to encounter.     Current Outpatient Medications on File Prior to Encounter   Medication Sig    acetaminophen (TYLENOL)  325 MG tablet Take 325 mg by mouth every 6 (six) hours as needed for Pain.    albuterol 90 mcg/actuation inhaler Inhale 1 puff into the lungs every 6 (six) hours as needed for Wheezing. 1 HFA Aerosol Inhaler Inhalation Twice a day    allopurinoL (ZYLOPRIM) 100 MG tablet Take 1 tablet (100 mg total) by mouth once daily.    apixaban (ELIQUIS) 2.5 mg Tab Take 1 tablet (2.5 mg total) by mouth 2 (two) times daily.    atorvastatin (LIPITOR) 40 MG tablet TAKE 1 TABLET BY MOUTH EVERY DAY    blood sugar diagnostic Strp Check BG daily prn if glucose found to be elevated on BMP, per facility protocol.    blood-glucose meter (FREESTYLE SYSTEM KIT) kit Use prn is blood glucose found to be elevated on routine BMP, per facility protocol.    carvediloL (COREG) 12.5 MG tablet TAKE 1 TABLET(12.5 MG) BY MOUTH TWICE DAILY    ENTRESTO 24-26 mg per tablet TAKE 1 TABLET BY MOUTH TWICE DAILY    EScitalopram oxalate (LEXAPRO) 20 MG tablet TAKE 1 TABLET BY MOUTH EVERY DAY    famotidine (PEPCID) 20 MG tablet Take 1 tablet (20 mg total) by mouth once daily. (Patient taking differently: Take 20 mg by mouth 2 (two) times daily.)    furosemide (LASIX) 20 MG tablet TAKE 1 TABLET(20 MG) BY MOUTH EVERY DAY    hydrALAZINE (APRESOLINE) 25 MG tablet TAKE 1 TABLET BY MOUTH EVERY 8 HOURS    isosorbide mononitrate (IMDUR) 30 MG 24 hr tablet TAKE 1 TABLET(30 MG) BY MOUTH EVERY DAY    lancets Misc Use daily prn if blood glucose found to be elevated on routine BMP, per facility protocol.    nitroGLYCERIN 0.4 MG/DOSE TL SPRY (NITROLINGUAL) 400 mcg/spray spray PLACE 1 SPRAY ONTO THE TONGUE EVERY 5 (FIVE) MINUTES AS NEEDED.    sacubitriL-valsartan (ENTRESTO) 49-51 mg per tablet Take 1 tablet by mouth 2 (two) times daily.    timolol maleate 0.5% (TIMOPTIC) 0.5 % Drop Place 1 drop into both eyes 2 (two) times daily.    vitamin D (VITAMIN D3) 1000 units Tab Take 2 tablets (2,000 Units total) by mouth once daily.     Family History       Problem Relation (Age of  Onset)    Diabetes Mother, Father    Glaucoma Father    Hypertension Father    No Known Problems Sister, Brother, Maternal Aunt, Maternal Uncle, Paternal Aunt, Paternal Uncle, Maternal Grandmother, Maternal Grandfather, Paternal Grandmother, Paternal Grandfather          Tobacco Use    Smoking status: Never    Smokeless tobacco: Never   Substance and Sexual Activity    Alcohol use: No    Drug use: No    Sexual activity: Never     Review of Systems  Objective:     Vital Signs (Most Recent):  Temp: 98.2 °F (36.8 °C) (08/12/23 2154)  Pulse: 60 (08/13/23 0112)  Resp: 18 (08/13/23 0112)  BP: (!) 128/59 (08/13/23 0103)  SpO2: 99 % (08/13/23 0112) Vital Signs (24h Range):  Temp:  [98.2 °F (36.8 °C)] 98.2 °F (36.8 °C)  Pulse:  [56-63] 60  Resp:  [16-19] 18  SpO2:  [96 %-99 %] 99 %  BP: (125-140)/(59-64) 128/59     Weight: 82.6 kg (182 lb)  Body mass index is 32.24 kg/m².     Physical Exam  Cardiovascular:      Rate and Rhythm: Normal rate and regular rhythm.      Pulses: Normal pulses.      Heart sounds: Normal heart sounds. No murmur heard.  Pulmonary:      Effort: Pulmonary effort is normal.   Abdominal:      General: Bowel sounds are normal.      Tenderness: There is no abdominal tenderness. There is no guarding.   Musculoskeletal:      Right lower leg: Edema present.      Left lower leg: No edema.   Skin:     General: Skin is warm.   Neurological:      Mental Status: She is oriented to person, place, and time.                Significant Labs: All pertinent labs within the past 24 hours have been reviewed.  CBC:   Recent Labs   Lab 08/12/23  2200 08/12/23  2357   WBC 10.43  --    HGB 7.9*  --    HCT 24.7* 23*     --      CMP:   Recent Labs   Lab 08/12/23  2200      K 4.2      CO2 24   *   BUN 51*   CREATININE 2.1*   CALCIUM 9.5   PROT 6.4   ALBUMIN 2.8*   BILITOT 0.4   ALKPHOS 91   AST 20   ALT 11   ANIONGAP 10     Troponin:   Recent Labs   Lab 08/12/23  2345   TROPONINI 0.054*       Significant  Imaging: I have reviewed all pertinent imaging results/findings within the past 24 hours.

## 2023-08-13 NOTE — ASSESSMENT & PLAN NOTE
S/P NSTEMI 2010, CABG X3 2010  Troponin 0.054 on admission    Plan:  - Troponin downtrended to 0.052  - Control HLD (Atorvastatin)  - Continue home Eliquis

## 2023-08-13 NOTE — PROGRESS NOTES
"[facility] Medicine  Progress Note    Patient Name: Krystina Washington  MRN: [id number]  Patient Class: OP- Observation   Admission Date: 8/12/2023  Length of Stay: 0 days  Attending Physician: Vanda Salinas MD  Primary Care Provider: Oniel Johnson MD        Subjective:     Principal Problem:Altered mental status        HPI:  89-year-old with PMHx of PCA stroke (12/3/2020), TIA (1/7/2021), CHF (EF 25%), CAD, DM, joint, CABG (9/21/10), Hyperlipidemia, Hypertension, NSTEMI (09/21/10), CKD3B, Anemia, Stenosis of aortic and mitral valves, Closed displaced subtrochanteric fracture of right femur s/p IM nail on 10/17/2021, Arthritis, Glaucoma, and Gout, presents with family for altered mental status. According to her son, who is her caretaker, patient woke up and was okay, but acutely became sleepy, confused; she did not know her name or how old she was. Son reports that patient exhibited similar symptoms a couple of years ago when she had an UTI. He reports that she has some foul smelling urine today. She has not had any fevers lately, no cough, no c/o chest pain, shortness of breath, headaches, abdominal pain over the last few days. She has been eating and drinking well.     In the ED, vitals Temp 98.2, HR 61, RR 17, /64, 97% on RA. She received 500mL LR; per ED doctor, she perked up after. BUN 51/2.1. Hgb 7.9 (baseline ~8) Troponin 0.054 (baseline 0.03-0.05), UA normal. Utox negative. CT no acute findings. CXR no acute findings. COVID negative. Patient was admitted to [facility] for management of AMS & MARGARET.      Overview/Hospital Course:  No notes on file    Interval History: NAEON. Patient can have a complete conversation. She reports remembering "acting funny" with family members during her party and not knowing who she was.    Review of Systems   Constitutional:  Negative for fever.        Patient reports feeling cold, but also notes that she is always cold   Respiratory:  " Negative for cough.    Cardiovascular:  Negative for chest pain and palpitations.   Gastrointestinal:  Negative for abdominal pain.   Endocrine: Positive for cold intolerance.   Genitourinary:  Negative for difficulty urinating and dysuria.   Neurological:  Negative for dizziness and headaches.     Objective:     Vital Signs (Most Recent):  Temp: 98 °F (36.7 °C) (08/13/23 0842)  Pulse: 64 (08/13/23 0842)  Resp: 18 (08/13/23 0842)  BP: (!) 167/72 (08/13/23 0842)  SpO2: (!) 93 % (08/13/23 0842) Vital Signs (24h Range):  Temp:  [98 °F (36.7 °C)-98.2 °F (36.8 °C)] 98 °F (36.7 °C)  Pulse:  [56-68] 64  Resp:  [16-24] 18  SpO2:  [93 %-99 %] 93 %  BP: (125-168)/(59-72) 167/72     Weight: 82.6 kg (182 lb)  Body mass index is 32.24 kg/m².    Intake/Output Summary (Last 24 hours) at 8/13/2023 0958  Last data filed at 8/13/2023 0015  Gross per 24 hour   Intake 500 ml   Output 400 ml   Net 100 ml         Physical Exam  Constitutional:       General: She is not in acute distress.     Appearance: Normal appearance. She is not diaphoretic.   Cardiovascular:      Rate and Rhythm: Normal rate and regular rhythm.      Pulses: Normal pulses.      Heart sounds: Normal heart sounds. No murmur heard.  Pulmonary:      Effort: Pulmonary effort is normal.      Breath sounds: Normal breath sounds. No wheezing or rhonchi.   Abdominal:      General: Bowel sounds are normal.      Palpations: Abdomen is soft.      Tenderness: There is no abdominal tenderness. There is no guarding.   Musculoskeletal:      Right lower leg: Edema present.      Left lower leg: No edema.   Neurological:      Mental Status: She is alert and oriented to person, place, and time.      Comments: Patient is more attentive this morning than she was last night. She can have an interactive conversation, which she was unable to do last night.  Visual field limited in R eye - similar to baseline post prior CVA  Power 4/5 right leg - Baseline unknown  Power 5/5 in all other  extremities   Psychiatric:         Mood and Affect: Mood normal.         Behavior: Behavior normal.         Thought Content: Thought content normal.             Significant Labs: All pertinent labs within the past 24 hours have been reviewed.  CBC:   Recent Labs   Lab 08/12/23 2200 08/12/23  2357 08/13/23  0527   WBC 10.43  --  9.26   HGB 7.9*  --  7.5*   HCT 24.7* 23* 24.3*     --  174     CMP:   Recent Labs   Lab 08/12/23 2200 08/13/23  0527    139   K 4.2 3.9    107   CO2 24 24   * 89   BUN 51* 49*   CREATININE 2.1* 1.9*   CALCIUM 9.5 9.3   PROT 6.4 6.1   ALBUMIN 2.8* 2.6*   BILITOT 0.4 0.4   ALKPHOS 91 88   AST 20 18   ALT 11 10   ANIONGAP 10 8       Significant Imaging: I have reviewed all pertinent imaging results/findings within the past 24 hours.      Assessment/Plan:      * Altered mental status  Acute-onset AMS in setting of possible undiagnosed dementia  Differentials include infectious etiology, undiagnosed dementia (history of altered sleep wake cycles), dehydration (raised BUN/Cr), stroke    Plan:  - Stroke: MRI pending; CT no acute findings  - Dehydration: Urine sodium & creatinine pending  - UTI: UA negative  - Toxins: Acetaminophen, Salicylate levels negative, UTox negative  - Infections: COVID negative, CXR no acute findings  - Bleeds: Hgb similar to baseline, no acute bleeding suspected  - B12, Folate levels pending  - Ammonia WNL  - Spoke about goals of care; pt is partial code - does not want compressions, but is okay with intubation    Essential hypertension  Home medications: Carvedilol 12.5, Entresto, Hydralazine, Furosemide    Plan:  - Hold home meds in setting of suspected stroke for permissive HTN      Coronary artery disease of native artery of native heart with stable angina pectoris  S/P NSTEMI 2010, CABG X3 2010  Troponin 0.054 on admission    Plan:  - Troponin downtrended to 0.052  - Control HLD (Atorvastatin)  - Continue home Eliquis      History of  embolic stroke 12/2020  Home medications: Apixaban 2.5mg    Plan:  - Continue home medications    Chronic kidney disease, stage 4 (severe)  BUN/Cr 51/2.1 eGFR 22  Cr similar to baseline    Plan:  - Monitor I&O  - Consider furosemide if lower leg edema worsens    Pure hypercholesterolemia  Home medications: Atorvastatin 40mg    Plan:  - Continue home medications      Major depressive disorder, recurrent episode, moderate  Home medications: Escitalopram 20mg    Plan:  - Continue home medications      VTE Risk Mitigation (From admission, onward)         Ordered     apixaban tablet 2.5 mg  2 times daily         08/13/23 0150     IP VTE HIGH RISK PATIENT  Once         08/13/23 0058     Place sequential compression device  Until discontinued         08/13/23 0058                Discharge Planning   KALEY:      Code Status: Partial Code   Is the patient medically ready for discharge?:     Reason for patient still in hospital (select all that apply): Patient trending condition                     Therese Patel MD  Department of Hospital Medicine   [place] [facility]

## 2023-08-13 NOTE — ASSESSMENT & PLAN NOTE
BUN/Cr 51/2.1 eGFR 22  Cr similar to baseline    Plan:  - Monitor I&O  - Consider furosemide if lower leg edema worsens

## 2023-08-13 NOTE — PLAN OF CARE
I was consulted to evaluate Mrs Washington due to her new findings on the MRI of the brain. Result of the MRI brain reveals new subcentimeter focus of restricted diffusion right cerebellum compatible with acute/recent infarction.   -Multifocal smaller areas of remote infarction bilateral cerebellar hemispheres.  -Superimposed cerebral volume loss with superimposed large remote right PCA territory infarction and moderate left cerebellar areas of infarction with encephalomalacia.  -Patient has had history of CVA in the past and last MRA of the neck done on 1/6/2021 showed No significant focal arterial stenoses.  Mild stenosis involving the origins of the posterior cerebral arteries and middle cerebral arteries bilaterally.  -Upon speaking with the team, it appears patient was seen in the past and was found to have low EF of 25%. Given her EF it was determined her last infarct was due to low EF and was started on low dose Apixaban. DAPT was discontinued and plan was to discuss with cardiology regarding starting patient on aspirin while on low dose apixaban.  -Patient was to be seen cardiology but lost to follow up.  -will recommend consulting cardiology to evaluate possible of increasing her AC or adding aspirin to her regimen.  - will recommend full stroke workup for this admission;  Workup include: Lipid profile, A1c, B12, folate, TSH, TTE.    Luis Fernando Orlando   [facility] PGY IV

## 2023-08-13 NOTE — HPI
89-year-old with PMHx of PCA stroke (12/3/2020), TIA (1/7/2021), CHF (EF 25%), CAD, DM, joint, CABG (9/21/10), Hyperlipidemia, Hypertension, NSTEMI (09/21/10), CKD3B, Anemia, Stenosis of aortic and mitral valves, Closed displaced subtrochanteric fracture of right femur s/p IM nail on 10/17/2021, Arthritis, Glaucoma, and Gout, presents with family for altered mental status. According to her son, who is her caretaker, patient woke up and was okay, but acutely became sleepy, confused; she did not know her name or how old she was. Son reports that patient exhibited similar symptoms a couple of years ago when she had an UTI. He reports that she has some foul smelling urine today. She has not had any fevers lately, no cough, no c/o chest pain, shortness of breath, headaches, abdominal pain over the last few days. She has been eating and drinking well.     In the ED, vitals Temp 98.2, HR 61, RR 17, /64, 97% on RA. She received 500mL LR; per ED doctor, she perked up after. BUN 51/2.1. Hgb 7.9 (baseline ~8) Troponin 0.054 (baseline 0.03-0.05), UA normal. Utox negative. CT no acute findings. CXR no acute findings. COVID negative. Patient was admitted to [facility] for management of AMS & MARGARET.

## 2023-08-13 NOTE — ED NOTES
Pt appears more alert. Pt able to state her name and birthday. Pt stated she wants to eat a piece of bread. Pt able to identify grandson. Pt not able to identify son.

## 2023-08-13 NOTE — PROGRESS NOTES
08/13/23 0211   Admission   Initial VN Admission Questions Complete   Shift   Virtual Nurse - Patient Verbalized Approval Of Camera Use;VN Rounding   Safety/Activity   Patient Rounds bed in low position;call light in patient/parent reach;clutter free environment maintained;visualized patient;placement of personal items at bedside   Safety Promotion/Fall Prevention assistive device/personal item within reach;Fall Risk reviewed with patient/family;side rails raised x 2;family to remain at bedside   Positioning   Body Position supine   Head of Bed (HOB) Positioning HOB at 30-45 degrees   Pain/Comfort/Sleep   Comfort/Acceptable Pain Level 0  (rex; pt unable to state at this time)     VN cued in to pt's room with permission. Pt's grandchildren, Hermelindo Cross and Merry, at bedside. Admission questions completed with family and plan of care reviewed. denies any questions or concerns at this time. Call bell w/in reach. Instructed to call for needs/assist.

## 2023-08-13 NOTE — PROGRESS NOTES
[facility]           Pharmacy        Current Drug Shortage     Due to national backorder and [facility] is critically low on inventory of Dextrose 50% (D50) Syringes and Vials, pharmacy has automatically switched from D50% to D10% IVPB at the equivalent dose until resolution of the shortage per P&T approved protocol.               Trinh Villa, PharmD  [phone]

## 2023-08-13 NOTE — ED PROVIDER NOTES
Chief Complaint: altered mental status     History of Present Illness:    Krystina Washington 89 y.o. 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 who presents to the emergency department today with family for altered mental status.  According to her son who is her caretaker pt has not been quite herself today.  Woke up and was ok but throughout the day increasingly sleepy, confused, not really herself.  She has some foul smelling urine today they wonder if this might be due to an infection. Tonight they kept having to arouse her so they brought her in.  She has not had any fevers lately, no cough, no c/o chest pain, shortness of breath, headaches, abdominal pain over the last few days.  Has been eating and drinking well.          ROS  Unable to attain secondary to condition.     The history is provided by the patient's son       Reviewed and verified by myself:   PMH/PSH/SOC/FH REVIEWED :    Past Medical History:   Diagnosis Date    Acute ischemic left posterior cerebral artery (PCA) stroke 12/3/2020    Anemia in stage 3b chronic kidney disease 3/8/2017    Arthritis     CHF (congestive heart failure)     Closed displaced subtrochanteric fracture of right femur s/p IM nail on 10/17/2021 10/16/2021    Coronary artery disease     Diabetes mellitus     Glaucoma     Gout, joint     Hx of CABG 9/21/10    Hyperlipidemia      Hypertension     NSTEMI (non-ST elevated myocardial infarction) 09/21/10    Stage 3b chronic kidney disease 10/21/2021    Stenosis of aortic and mitral valves     Systolic heart failure 6/19/2015    TIA (transient ischemic attack) 1/7/2021       Past Surgical History:   Procedure Laterality Date    ANTEGRADE INTRAMEDULLARY RODDING OF FEMUR Right 10/17/2021    Procedure: INSERTION, INTRAMEDULLARY ALTAF, FEMUR, ANTEGRADE;  Surgeon: Dino Rutledge MD;  Location: [facility] OR [address] [place] [address];  Service: Orthopedics;  Laterality: Right;    CARDIAC VALVE SURGERY      CATARACT EXTRACTION      CORONARY ARTERY BYPASS GRAFT  9/21/2010    ENTROPIAN REPAIR Left 3/6/2020    Procedure: REPAIR, ENTROPION;  Surgeon: Yulia Kincaid MD;  Location: [facility] OR [address] [place] [address];  Service: Ophthalmology;  Laterality: Left;    EYE SURGERY      JOINT REPLACEMENT      ORIF FEMUR FRACTURE Right 10/17/2021    Procedure: ORIF, FRACTURE, FEMUR;  Surgeon: Dino Rutledge MD;  Location: [facility] OR [address] [place] [address];  Service: Orthopedics;  Laterality: Right;       Social History     Socioeconomic History    Marital status:    Tobacco Use    Smoking status: Never    Smokeless tobacco: Never   Substance and Sexual Activity    Alcohol use: No    Drug use: No    Sexual activity: Never     Social Determinants of Health     Financial Resource Strain: Medium Risk (5/20/2021)    Overall Financial Resource Strain (CARDIA)     Difficulty of Paying Living Expenses: Somewhat hard   Food Insecurity: No Food Insecurity (5/20/2021)    Hunger Vital Sign     Worried About Running Out of Food in the Last Year: Never true     Ran Out of Food in the Last Year: Never true   Transportation Needs: No Transportation Needs (5/20/2021)    PRAPARE - Transportation     Lack of Transportation (Medical): No     Lack of Transportation (Non-Medical): No   Physical Activity: Insufficiently Active (5/20/2021)    Exercise Vital Sign     Days of Exercise per Week: 1 day     Minutes of Exercise per Session: 10  min   Stress: No Stress Concern Present (5/20/2021)    [employer] [place] [employer] - Occupational Stress Questionnaire     Feeling of Stress : Only a little   Social Connections: Socially Isolated (5/20/2021)    Social Connection and Isolation Panel [NHANES]     Frequency of Communication with Friends and Family: Never     Frequency of Social Gatherings with Friends and Family: Once a week     Attends Jew Services: More than 4 times per year     Active Member of Clubs or Organizations: No     Attends Club or Organization Meetings: Never     Marital Status:    Housing Stability: Low Risk  (5/20/2021)    Housing Stability Vital Sign     Unable to Pay for Housing in the Last Year: No     Number of Places Lived in the Last Year: 1     Unstable Housing in the Last Year: No       Family History   Problem Relation Age of Onset    Diabetes Mother     Hypertension Father     Diabetes Father     Glaucoma Father     No Known Problems Sister     No Known Problems Brother     No Known Problems Maternal Aunt     No Known Problems Maternal Uncle     No Known Problems Paternal Aunt     No Known Problems Paternal Uncle     No Known Problems Maternal Grandmother     No Known Problems Maternal Grandfather     No Known Problems Paternal Grandmother     No Known Problems Paternal Grandfather                ALLERGIES REVIEWED  Review of patient's allergies indicates:   Allergen Reactions    Indocin [indomethacin sodium] Other (See Comments)     Either caused hot,burning body or caused madness per patient's grandson    Lotensin [benazepril] Other (See Comments)     Either caused hot ,burning body or caused madness per patient's grandson       MEDICATIONS REVIEWED  Medication List with Changes/Refills   Current Medications    ACETAMINOPHEN (TYLENOL) 325 MG TABLET    Take 325 mg by mouth every 6 (six) hours as needed for Pain.    ALBUTEROL 90 MCG/ACTUATION INHALER    Inhale 1 puff into the lungs every 6 (six)  hours as needed for Wheezing. 1 HFA Aerosol Inhaler Inhalation Twice a day    ALLOPURINOL (ZYLOPRIM) 100 MG TABLET    Take 1 tablet (100 mg total) by mouth once daily.    APIXABAN (ELIQUIS) 2.5 MG TAB    Take 1 tablet (2.5 mg total) by mouth 2 (two) times daily.    ATORVASTATIN (LIPITOR) 40 MG TABLET    TAKE 1 TABLET BY MOUTH EVERY DAY    BLOOD SUGAR DIAGNOSTIC STRP    Check BG daily prn if glucose found to be elevated on BMP, per facility protocol.    BLOOD-GLUCOSE METER (FREESTYLE SYSTEM KIT) KIT    Use prn is blood glucose found to be elevated on routine BMP, per facility protocol.    CARVEDILOL (COREG) 12.5 MG TABLET    TAKE 1 TABLET(12.5 MG) BY MOUTH TWICE DAILY    ENTRESTO 24-26 MG PER TABLET    TAKE 1 TABLET BY MOUTH TWICE DAILY    ESCITALOPRAM OXALATE (LEXAPRO) 20 MG TABLET    TAKE 1 TABLET BY MOUTH EVERY DAY    FAMOTIDINE (PEPCID) 20 MG TABLET    Take 1 tablet (20 mg total) by mouth once daily.    FUROSEMIDE (LASIX) 20 MG TABLET    TAKE 1 TABLET(20 MG) BY MOUTH EVERY DAY    HYDRALAZINE (APRESOLINE) 25 MG TABLET    TAKE 1 TABLET BY MOUTH EVERY 8 HOURS    ISOSORBIDE MONONITRATE (IMDUR) 30 MG 24 HR TABLET    TAKE 1 TABLET(30 MG) BY MOUTH EVERY DAY    LANCETS MISC    Use daily prn if blood glucose found to be elevated on routine BMP, per facility protocol.    NITROGLYCERIN 0.4 MG/DOSE TL SPRY (NITROLINGUAL) 400 MCG/SPRAY SPRAY    PLACE 1 SPRAY ONTO THE TONGUE EVERY 5 (FIVE) MINUTES AS NEEDED.    SACUBITRIL-VALSARTAN (ENTRESTO) 49-51 MG PER TABLET    Take 1 tablet by mouth 2 (two) times daily.    TIMOLOL MALEATE 0.5% (TIMOPTIC) 0.5 % DROP    Place 1 drop into both eyes 2 (two) times daily.    VITAMIN D (VITAMIN D3) 1000 UNITS TAB    Take 2 tablets (2,000 Units total) by mouth once daily.           VS reviewed    Nursing/Ancillary staff note reviewed.       Physical Exam     ED Triage Vitals   BP 08/12/23 2153 (!) 140/64   Pulse 08/12/23 2153 61   Resp 08/12/23 2151 17   Temp 08/12/23 2154 98.2 °F (36.8 °C)    SpO2 08/12/23 2153 97 %       Physical Exam    Constitutional: The patient is sleepy and needs to be aroused to open her eyes.  She is not answering questions.   Eyes: Pupils equal, small but round no pallor or injection.   ENT: Mucous membranes are dry. Oropharynx clear.   Neck: Neck is supple non-tender. No lymphadenopathy. No stridor.   Respiratory: There are no retractions, lungs are clear to auscultation. No wheezing, no crackles.   Cardiovascular: Regular rate and rhythm. No murmurs, rubs or gallops.  Gastrointestinal:  Abdomen is soft and non-tender.  Neurological: Opens her eyes when talked to for a short period, not following commands, moves all extremities.   Skin: Warm and dry.  Musculoskeletal: Extremities have edema bilaterally.  She moves both legs spontaneously.       ED Course       Labs Reviewed   CBC W/ AUTO DIFFERENTIAL - Abnormal; Notable for the following components:       Result Value    RBC 2.49 (*)     Hemoglobin 7.9 (*)     Hematocrit 24.7 (*)     MCV 99 (*)     MCH 31.7 (*)     RDW 15.9 (*)     Immature Granulocytes 0.6 (*)     Immature Grans (Abs) 0.06 (*)     All other components within normal limits   COMPREHENSIVE METABOLIC PANEL - Abnormal; Notable for the following components:    Glucose 114 (*)     BUN 51 (*)     Creatinine 2.1 (*)     Albumin 2.8 (*)     eGFR 22 (*)     All other components within normal limits   TROPONIN I - Abnormal; Notable for the following components:    Troponin I 0.054 (*)     All other components within normal limits   POCT GLUCOSE - Abnormal; Notable for the following components:    POCT Glucose 119 (*)     All other components within normal limits   ISTAT PROCEDURE - Abnormal; Notable for the following components:    POC PCO2 45.9 (*)     POC PO2 37 (*)     POC SATURATED O2 70 (*)     POC TCO2 29 (*)     POC Hematocrit 23 (*)     All other components within normal limits   SARS-COV-2 RDRP GENE - Normal   URINALYSIS   DRUG SCREEN PANEL, URINE EMERGENCY    AMMONIA   TROPONIN I   DRUG SCREEN PANEL, URINE EMERGENCY   POCT GLUCOSE MONITORING CONTINUOUS       Imaging Results              CT Head Without Contrast (Final result)  Result time 08/12/23 23:43:45      Final result by Rubin Steinberg DO (08/12/23 23:43:45)                   Impression:      No acute intracranial abnormality.    Multiple remote infarctions, chronic microvascular ischemic changes, and senescent changes as above, stable.      Electronically signed by: Rubin Steinberg  Date:    08/12/2023  Time:    23:43               Narrative:    EXAMINATION:  CT HEAD WITHOUT CONTRAST    CLINICAL HISTORY:  Mental status change, unknown cause;    TECHNIQUE:  Low dose axial CT images obtained throughout the head without intravenous contrast. Sagittal and coronal reconstructions were performed.    COMPARISON:  CT head 07/23/2023, 10/16/2021.    FINDINGS:  There is age-related brain parenchymal volume loss and prominence of the CSF spaces.  There is no hydrocephalus.  There is confluent hypoattenuation in the supratentorial white matter compatible with chronic microvascular ischemic changes, stable.  No extra-axial blood or fluid collections.  Continued encephalomalacia in the medial right occipital lobe compatible with a remote infarction.  There are remote lacunar infarctions in the bilateral cerebellar hemisphere and in the right thalamus.  No parenchymal mass, hemorrhage, edema or major vascular distribution infarct.    No calvarial fracture.  The scalp is unremarkable.  Bilateral paranasal sinuses and mastoid air cells are clear.  There are bilateral prosthetic lenses.                                       X-Ray Chest 1 View (Final result)  Result time 08/12/23 22:14:40      Final result by Ruibn Steinberg DO (08/12/23 22:14:40)                   Impression:      No acute cardiopulmonary abnormality.      Electronically signed by: Rubin Steinberg  Date:    08/12/2023  Time:    22:14               Narrative:     EXAMINATION:  XR CHEST 1 VIEW    CLINICAL HISTORY:  altered mental status;    TECHNIQUE:  Single frontal view of the chest was performed.    COMPARISON:  07/23/2023.    FINDINGS:  The lungs are hypoexpanded and clear.  No focal opacities are seen.  The pleural spaces are clear.  The cardiac silhouette is enlarged.  There is a prosthetic aortic valve.  There are calcifications of the aortic arch.  Osseous structures demonstrate degenerative changes.  Median sternotomy wires are seen.                                          ED Course as of 08/13/23 0047   Sun Aug 13, 2023   0000 PT is slightly improved with some IVFs but not completely to baseline.  Discussed results with family and desire to admit for further eval.  Given her Stroke history might be good to obtain MRI in AM.  They are agreeable      Pt is more awake but is complaining of dizziness now - no vomiting.    [JA]      ED Course User Index  [JA] Olamide Hernandez MD                ED Management:            Medical Decision Making    Differential diagnosis included but not limited to :  Infectious, MARGARET, ARF, electrolyte derangement, DKA,  stroke, hepatic encephalopathy, intoxication     Initial: This is a 89 y.o. female  who comes in for emergent evaluation of a new, acute, complicated and undiagnosed problem of altered mental status. On examination vitals are stable. Afebrile, o2 sats appropriate on room air. On physical exam notable for she is very sleepy, dry mucus membranes, is not answering questions or participating.  Clear lung sounds, RRR. Symptoms started >4.5 hours ago and is not a wake up issue, if a new CVA, although much lower on my ddx, not in the window for thrombolytics.      Orders I ordered to further evaluate included:  EKG, CBC, CMP, troponin, ammonia, VBG, CXR, CT head, UA, UDS for further evaluation.     Comorbidity impacting this encounter includes: previous stroke, CKD, CHF, HTN, Elderly     Social determinants of health taken  into consideration during development of our treatment plan include   Body mass index is 32.24 kg/m². - Cumulative social disadvantage, denoted by higher SDOH burden, was associated with increased odds of obesity, independent of clinical and demographic factors. PMID: 31260428 DOI: 10.1002/hermila.29470      Problems Addressed:  Altered mental state: complicated acute illness or injury with systemic symptoms    Amount and/or Complexity of Data Reviewed  Independent Historian: caregiver     Details: Pts family - see HPI  External Data Reviewed: ECG and notes.     Details: Pt admitted 7/23/23 for a hip fracture    EKG 7/23 showed LBBB  Labs: ordered.     Details: CBC with anemia but baseline, CMP with creatinine of 2.1 but near her baseline, troponin 0.054 but near her baseline, VBG ph appropriate, ammonia normal, UA normal, UDS normal.  Radiology: ordered and independent interpretation performed.     Details: CT head no acute intracranial    CXR no consolidation  ECG/medicine tests: ordered and independent interpretation performed.     Details: 61 bpm, sinus rhtyhm, LBBB (not new), no STEMI  Discussion of management or test interpretation with external provider(s): I spoke with [facility] Dr Therese Patel re:the pts preseentation and workup, need for admission. They accept.     Risk  Prescription drug management.  Decision regarding hospitalization.      Pt received the following in the ED:   Medications   lactated ringers bolus 500 mL (0 mLs Intravenous Stopped 8/13/23 0015)           MDM continued:     Krystina Washington  presents to the emergency Department today with AMS.  Unclear etiology at this time as UA normal, labs baseline, CT head without acute abnormality, CXR no consolidation. She is slightly improved with a small amount of fluids so perhaps some dehydration.  She will be admitted for further care and management. Family members agreeable.       Voice recognition software utilized in this  note.      Procedures          Impression      Diagnoses of Altered mental status and Altered mental state were pertinent to this visit.             Olamide Hernandez MD  08/13/23 0049

## 2023-08-14 VITALS
HEART RATE: 66 BPM | TEMPERATURE: 96 F | SYSTOLIC BLOOD PRESSURE: 144 MMHG | WEIGHT: 161 LBS | OXYGEN SATURATION: 100 % | BODY MASS INDEX: 28.53 KG/M2 | RESPIRATION RATE: 18 BRPM | HEIGHT: 63 IN | DIASTOLIC BLOOD PRESSURE: 64 MMHG

## 2023-08-14 PROBLEM — I63.9 STROKE: Status: ACTIVE | Noted: 2023-08-14

## 2023-08-14 LAB
ALBUMIN SERPL BCP-MCNC: 2.6 G/DL (ref 3.5–5.2)
ALP SERPL-CCNC: 97 U/L (ref 55–135)
ALT SERPL W/O P-5'-P-CCNC: 9 U/L (ref 10–44)
ANION GAP SERPL CALC-SCNC: 4 MMOL/L (ref 8–16)
ASCENDING AORTA: 3.59 CM
AST SERPL-CCNC: 22 U/L (ref 10–40)
AV INDEX (PROSTH): 0.48
AV MEAN GRADIENT: 9 MMHG
AV PEAK GRADIENT: 19 MMHG
AV VALVE AREA BY VELOCITY RATIO: 1.61 CM²
AV VALVE AREA: 1.78 CM²
AV VELOCITY RATIO: 0.43
BASOPHILS # BLD AUTO: 0.04 K/UL (ref 0–0.2)
BASOPHILS NFR BLD: 0.5 % (ref 0–1.9)
BILIRUB SERPL-MCNC: 0.4 MG/DL (ref 0.1–1)
BSA FOR ECHO PROCEDURE: 1.8 M2
BUN SERPL-MCNC: 44 MG/DL (ref 10–30)
CALCIUM SERPL-MCNC: 9.4 MG/DL (ref 8.7–10.5)
CHLORIDE SERPL-SCNC: 108 MMOL/L (ref 95–110)
CO2 SERPL-SCNC: 28 MMOL/L (ref 23–29)
CREAT SERPL-MCNC: 1.6 MG/DL (ref 0.5–1.4)
CV ECHO LV RWT: 0.4 CM
DIFFERENTIAL METHOD: ABNORMAL
DOP CALC AO PEAK VEL: 2.18 M/S
DOP CALC AO VTI: 37.8 CM
DOP CALC LVOT AREA: 3.7 CM2
DOP CALC LVOT DIAMETER: 2.18 CM
DOP CALC LVOT PEAK VEL: 0.94 M/S
DOP CALC LVOT STROKE VOLUME: 67.15 CM3
DOP CALC MV VTI: 38.1 CM
DOP CALCLVOT PEAK VEL VTI: 18 CM
E WAVE DECELERATION TIME: 214.48 MSEC
E/A RATIO: 0.49
E/E' RATIO: 10.14 M/S
ECHO LV POSTERIOR WALL: 0.87 CM (ref 0.6–1.1)
EJECTION FRACTION: 50 %
EOSINOPHIL # BLD AUTO: 0.2 K/UL (ref 0–0.5)
EOSINOPHIL NFR BLD: 2.8 % (ref 0–8)
ERYTHROCYTE [DISTWIDTH] IN BLOOD BY AUTOMATED COUNT: 15.8 % (ref 11.5–14.5)
EST. GFR  (NO RACE VARIABLE): 30 ML/MIN/1.73 M^2
FRACTIONAL SHORTENING: 26 % (ref 28–44)
GLUCOSE SERPL-MCNC: 82 MG/DL (ref 70–110)
HCT VFR BLD AUTO: 26.2 % (ref 37–48.5)
HGB BLD-MCNC: 8.3 G/DL (ref 12–16)
IMM GRANULOCYTES # BLD AUTO: 0.03 K/UL (ref 0–0.04)
IMM GRANULOCYTES NFR BLD AUTO: 0.3 % (ref 0–0.5)
INTERVENTRICULAR SEPTUM: 1.1 CM (ref 0.6–1.1)
IVRT: 220.74 MSEC
LA MAJOR: 5.15 CM
LA MINOR: 4.65 CM
LA WIDTH: 4.4 CM
LEFT ATRIUM SIZE: 3.54 CM
LEFT ATRIUM VOLUME INDEX MOD: 26.7 ML/M2
LEFT ATRIUM VOLUME INDEX: 36.8 ML/M2
LEFT ATRIUM VOLUME MOD: 46.97 CM3
LEFT ATRIUM VOLUME: 64.71 CM3
LEFT INTERNAL DIMENSION IN SYSTOLE: 3.27 CM (ref 2.1–4)
LEFT VENTRICLE DIASTOLIC VOLUME INDEX: 49.9 ML/M2
LEFT VENTRICLE DIASTOLIC VOLUME: 87.83 ML
LEFT VENTRICLE MASS INDEX: 82 G/M2
LEFT VENTRICLE SYSTOLIC VOLUME INDEX: 24.6 ML/M2
LEFT VENTRICLE SYSTOLIC VOLUME: 43.3 ML
LEFT VENTRICULAR INTERNAL DIMENSION IN DIASTOLE: 4.4 CM (ref 3.5–6)
LEFT VENTRICULAR MASS: 144.78 G
LV LATERAL E/E' RATIO: 7.89 M/S
LV SEPTAL E/E' RATIO: 14.2 M/S
LVOT MG: 1.91 MMHG
LVOT MV: 0.66 CM/S
LYMPHOCYTES # BLD AUTO: 2.2 K/UL (ref 1–4.8)
LYMPHOCYTES NFR BLD: 25.5 % (ref 18–48)
MAGNESIUM SERPL-MCNC: 2 MG/DL (ref 1.6–2.6)
MCH RBC QN AUTO: 31.6 PG (ref 27–31)
MCHC RBC AUTO-ENTMCNC: 31.7 G/DL (ref 32–36)
MCV RBC AUTO: 100 FL (ref 82–98)
MONOCYTES # BLD AUTO: 0.7 K/UL (ref 0.3–1)
MONOCYTES NFR BLD: 8.4 % (ref 4–15)
MV MEAN GRADIENT: 4 MMHG
MV PEAK A VEL: 1.45 M/S
MV PEAK E VEL: 0.71 M/S
MV PEAK GRADIENT: 11 MMHG
MV STENOSIS PRESSURE HALF TIME: 62.2 MS
MV VALVE AREA BY CONTINUITY EQUATION: 1.76 CM2
MV VALVE AREA P 1/2 METHOD: 3.54 CM2
NEUTROPHILS # BLD AUTO: 5.4 K/UL (ref 1.8–7.7)
NEUTROPHILS NFR BLD: 62.5 % (ref 38–73)
NRBC BLD-RTO: 0 /100 WBC
PHOSPHATE SERPL-MCNC: 3.7 MG/DL (ref 2.7–4.5)
PISA MRMAX VEL: 4.71 M/S
PISA TR MAX VEL: 2.96 M/S
PLATELET # BLD AUTO: 176 K/UL (ref 150–450)
PMV BLD AUTO: 11.9 FL (ref 9.2–12.9)
POCT GLUCOSE: 127 MG/DL (ref 70–110)
POCT GLUCOSE: 166 MG/DL (ref 70–110)
POCT GLUCOSE: 79 MG/DL (ref 70–110)
POTASSIUM SERPL-SCNC: 4.2 MMOL/L (ref 3.5–5.1)
PROT SERPL-MCNC: 6.3 G/DL (ref 6–8.4)
PV PEAK GRADIENT: 5 MMHG
PV PEAK VELOCITY: 1.11 M/S
RA MAJOR: 5.6 CM
RA PRESSURE ESTIMATED: 3 MMHG
RA WIDTH: 4.3 CM
RBC # BLD AUTO: 2.63 M/UL (ref 4–5.4)
RIGHT VENTRICULAR END-DIASTOLIC DIMENSION: 3.04 CM
RV TB RVSP: 6 MMHG
RV TISSUE DOPPLER FREE WALL SYSTOLIC VELOCITY 1 (APICAL 4 CHAMBER VIEW): 10.48 CM/S
SODIUM SERPL-SCNC: 140 MMOL/L (ref 136–145)
TDI LATERAL: 0.09 M/S
TDI SEPTAL: 0.05 M/S
TDI: 0.07 M/S
TR MAX PG: 35 MMHG
TRICUSPID ANNULAR PLANE SYSTOLIC EXCURSION: 1.39 CM
TSH SERPL DL<=0.005 MIU/L-ACNC: 2.08 UIU/ML (ref 0.4–4)
TV REST PULMONARY ARTERY PRESSURE: 38 MMHG
WBC # BLD AUTO: 8.58 K/UL (ref 3.9–12.7)
Z-SCORE OF LEFT VENTRICULAR DIMENSION IN END DIASTOLE: -1.01
Z-SCORE OF LEFT VENTRICULAR DIMENSION IN END SYSTOLE: 0.65

## 2023-08-14 PROCEDURE — 85025 COMPLETE CBC W/AUTO DIFF WBC: CPT

## 2023-08-14 PROCEDURE — 25000003 PHARM REV CODE 250

## 2023-08-14 PROCEDURE — 84443 ASSAY THYROID STIM HORMONE: CPT

## 2023-08-14 PROCEDURE — 83735 ASSAY OF MAGNESIUM: CPT

## 2023-08-14 PROCEDURE — 97165 OT EVAL LOW COMPLEX 30 MIN: CPT

## 2023-08-14 PROCEDURE — 97161 PT EVAL LOW COMPLEX 20 MIN: CPT

## 2023-08-14 PROCEDURE — 97535 SELF CARE MNGMENT TRAINING: CPT

## 2023-08-14 PROCEDURE — 80053 COMPREHEN METABOLIC PANEL: CPT

## 2023-08-14 PROCEDURE — 63600175 PHARM REV CODE 636 W HCPCS

## 2023-08-14 PROCEDURE — 84100 ASSAY OF PHOSPHORUS: CPT

## 2023-08-14 PROCEDURE — 11000001 HC ACUTE MED/SURG PRIVATE ROOM

## 2023-08-14 PROCEDURE — 99223 PR INITIAL HOSPITAL CARE,LEVL III: ICD-10-PCS | Mod: GC,,, | Performed by: INTERNAL MEDICINE

## 2023-08-14 PROCEDURE — 97530 THERAPEUTIC ACTIVITIES: CPT

## 2023-08-14 PROCEDURE — 99223 1ST HOSP IP/OBS HIGH 75: CPT | Mod: GC,,, | Performed by: INTERNAL MEDICINE

## 2023-08-14 PROCEDURE — 36415 COLL VENOUS BLD VENIPUNCTURE: CPT

## 2023-08-14 RX ORDER — FUROSEMIDE 20 MG/1
TABLET ORAL
Qty: 90 TABLET | Refills: 3 | Status: SHIPPED | OUTPATIENT
Start: 2023-08-14

## 2023-08-14 RX ORDER — CLOPIDOGREL BISULFATE 75 MG/1
75 TABLET ORAL DAILY
Qty: 30 TABLET | Refills: 11 | Status: SHIPPED | OUTPATIENT
Start: 2023-08-14 | End: 2024-02-07 | Stop reason: SDUPTHER

## 2023-08-14 RX ORDER — CARVEDILOL 12.5 MG/1
12.5 TABLET ORAL 2 TIMES DAILY
Status: DISCONTINUED | OUTPATIENT
Start: 2023-08-14 | End: 2023-08-14 | Stop reason: HOSPADM

## 2023-08-14 RX ORDER — HYDRALAZINE HYDROCHLORIDE 20 MG/ML
10 INJECTION INTRAMUSCULAR; INTRAVENOUS EVERY 8 HOURS PRN
Status: DISCONTINUED | OUTPATIENT
Start: 2023-08-14 | End: 2023-08-14 | Stop reason: HOSPADM

## 2023-08-14 RX ORDER — HYDRALAZINE HYDROCHLORIDE 25 MG/1
25 TABLET, FILM COATED ORAL EVERY 8 HOURS
Status: DISCONTINUED | OUTPATIENT
Start: 2023-08-14 | End: 2023-08-14 | Stop reason: HOSPADM

## 2023-08-14 RX ADMIN — ATORVASTATIN CALCIUM 40 MG: 40 TABLET, FILM COATED ORAL at 10:08

## 2023-08-14 RX ADMIN — CARVEDILOL 12.5 MG: 12.5 TABLET, FILM COATED ORAL at 10:08

## 2023-08-14 RX ADMIN — HYDRALAZINE HYDROCHLORIDE 10 MG: 20 INJECTION, SOLUTION INTRAMUSCULAR; INTRAVENOUS at 04:08

## 2023-08-14 RX ADMIN — APIXABAN 2.5 MG: 2.5 TABLET, FILM COATED ORAL at 10:08

## 2023-08-14 RX ADMIN — HYDRALAZINE HYDROCHLORIDE 25 MG: 25 TABLET, FILM COATED ORAL at 06:08

## 2023-08-14 RX ADMIN — CHOLECALCIFEROL TAB 25 MCG (1000 UNIT) 2000 UNITS: 25 TAB at 10:08

## 2023-08-14 RX ADMIN — HYDRALAZINE HYDROCHLORIDE 25 MG: 25 TABLET, FILM COATED ORAL at 02:08

## 2023-08-14 RX ADMIN — ESCITALOPRAM OXALATE 20 MG: 10 TABLET ORAL at 10:08

## 2023-08-14 RX ADMIN — ALLOPURINOL 100 MG: 100 TABLET ORAL at 10:08

## 2023-08-14 NOTE — ASSESSMENT & PLAN NOTE
BUN/Cr 51/2.1 eGFR 22  Cr similar to baseline    Plan:  -BUN/Cr:44/1.6  - Monitor I&O  - Consider furosemide if lower leg edema worsens

## 2023-08-14 NOTE — NURSING
Patients family (son and daughter) educated on stroke signs and symptoms, BEFAST, risk factors including HTN, HLD, DM,diet, exercise and medications. Stroke education packet individualized for his care and given to patient to take home.

## 2023-08-14 NOTE — PLAN OF CARE
Problem: Physical Therapy  Goal: Physical Therapy Goal  Description: Goals to be met by: 23     Patient will increase functional independence with mobility by performin. Supine to sit with Modified [place]  2. Sit to supine with Modified [place]  3. Sit to stand transfer with Supervision  4. Bed to chair transfer with Supervision using Rolling Walker  5. Gait  x 150 feet with Supervision using Rolling Walker.     Outcome: Ongoing, Progressing     Pt requires Emmanuelle for bed mobility and sit <>stands with use of RW. Pt ambulated ~30 ft in room with RW and CGA-SBA. Pt has a good support system and family that is able to assist her at home. Recommending low intensity therapy ( PT/OT) upon d/c. Pt owns recommended DME.

## 2023-08-14 NOTE — PLAN OF CARE
[facility] - Telemetry  Initial Discharge Assessment       Primary Care Provider: Oniel Johnson MD    Admission Diagnosis: Altered mental status [R41.82]  Altered mental state [R41.82]    Admission Date: 8/12/2023  Expected Discharge Date: 8/14/2023    Pt oriented. DCA done with Hermelindo Mackenzie (Grandchild)   [phone] (Home Phone). Demographics/Ins/NextofKin/PCP verified with patient/family and updated as needed. Denies any DME needs at present from pt/family. Patient/family plans to return home with family. Educated on bedside RX. Patient consents for [place] to discuss discharge plan with next of kin. Preferences appointment times and location obtained. Patient reports they will have transportation home upon discharge.    Pt lives with family. [place] with [place]. Family to transport home today.      Transition of Care Barriers: (P) None    Payor: MEDICARE / Plan: MEDICARE PART A & B / Product Type: Government /     Extended Emergency Contact Information  Primary Emergency Contact: Hermelindo Mackenzie  Address: [address] [place] [address]           [place] [address] [facility]  Home Phone: [phone]  Relation: Grandchild  Secondary Emergency Contact: pao Lafleur  Mobile Phone: [phone]  Relation: Grandchild  Preferred language: English   needed? No    Discharge Plan A: (P) Home, Home with family, Home Health         [id number] - [place] - [address] [facility]  [address]  [place] [address]  Phone: [phone] Fax: [phone]    [id number] - [place] - [address] AT [facility]  [address]  [place] [address]  Phone: [phone] Fax: [phone]    [id number] - [place] - [address] [facility]  [address]  [place] [address]  Phone: [phone] Fax: [phone]      Initial Assessment (most recent)       Adult  "Discharge Assessment - 08/14/23 1530          Discharge Assessment    Assessment Type Discharge Planning Assessment (P)      Confirmed/corrected address, phone number and insurance Yes (P)      Confirmed Demographics Correct on Facesheet (P)      Source of Information family (P)      Does patient/caregiver understand observation status No (P)      Communicated KALEY with patient/caregiver Yes (P)      People in Home child(ever), adult (P)      Do you expect to return to your current living situation? Yes (P)    [facility] ASAF    Walking or Climbing Stairs ambulation difficulty, assistance 1 person;stair climbing difficulty, dependent;transferring difficulty, dependent (P)      Dressing/Bathing bathing difficulty, assistance 1 person (P)      Dressing/Bathing Management Assistance (P)      Equipment Currently Used at Home bedside commode;walker, rolling;shower chair;wheelchair (P)      Readmission within 30 days? No (P)      Do you currently have service(s) that help you manage your care at home? No (P)      Do you take prescription medications? Yes (P)      Do you have any problems affording any of your prescribed medications? No (P)      Who is going to help you get home at discharge? Family (P)      How do you get to doctors appointments? family or friend will provide (P)      Are you on dialysis? No (P)      Do you take coumadin? Yes (P)      Discharge Plan A Home;Home with family;Home Health (P)      DME Needed Upon Discharge  none (P)      Discharge Plan discussed with: Caregiver (P)      Name(s) and Number(s) Hermelindo Mackenzie (Grandchild)   [phone] (Home Phone) (P)      Transition of Care Barriers None (P)                         Future Appointments   Date Time Provider Department Center   8/21/2023 11:00 AM oMnica Burt NP [facility] [place]       BP (!) 154/71 (BP Location: Right arm, Patient Position: Lying)   Pulse 69   Temp 98.1 °F (36.7 °C) (Oral)   Resp 18   Ht 5' 3" (1.6 m)   Wt 73 kg " (161 lb)   LMP  (LMP Unknown)   SpO2 96%   BMI 28.52 kg/m²      allopurinoL  100 mg Oral Daily    apixaban  2.5 mg Oral BID    atorvastatin  40 mg Oral Daily    carvediloL  12.5 mg Oral BID    EScitalopram oxalate  20 mg Oral Daily    hydrALAZINE  25 mg Oral Q8H    vitamin D  2,000 Units Oral Daily     Consults (From admission, onward)          Status Ordering Provider     Inpatient consult to Cardiology-[facility]  Once        Provider:  (Not yet assigned)    Completed MARTY CASTLE     IP consult to case management  Once        Provider:  (Not yet assigned)    Acknowledged SARAY PITTS     IP consult to case management  Once        Provider:  (Not yet assigned)    Acknowledged SARAY PITTS

## 2023-08-14 NOTE — PLAN OF CARE
[facility] - Telemetry  Discharge Final Note    Primary Care Provider: Oniel Johnson MD    Expected Discharge Date: 8/14/2023    Pharmacist will go over home medications and reasons for medications. VN and bedside nurse to reiterate final discharge instructions.     Cleared from CM . Bedside Nurse and VN notified.      Final Discharge Note (most recent)       Final Note - 08/14/23 1534          Final Note    Assessment Type Final Discharge Note (P)      Anticipated Discharge Disposition Home-Health Care Svc (P)      Hospital Resources/Appts/Education Provided Appointments scheduled and added to AVS (P)         Post-Acute Status    Post-Acute Authorization Home Health (P)      Home Health Status Set-up Complete/Auth obtained (P)    MELY orders sent to [facility].    Discharge Delays None known at this time (P)                    Future Appointments   Date Time Provider Department Center   8/21/2023 11:00 AM Monica Burt NP [address]      Follow-up Information       [facility]. Schedule an appointment as soon as possible for a visit.    Why: As needed, NURSE PRACTIONER AT HOME PROGRAM, OFFICE TO CALL PATIENT/FAMILY TO SET UP APPT TIME  Contact information:   to contact pt/family to coordinate home visits for hospital follow up.             [facility]. Call.    Specialties: Home Health Services, Home Therapy Services, Home Living Aide Services  Why: As needed, HOME HEALTH, OFFICE TO CALL PATIENT/FAMILY TO SET UP APPT TIME  Contact information:  [address]  [facility] [address]  [phone]             [facility] VASCULAR NEUROLOGY. Go in 1 week(s).    Specialty: Vascular Neurology  Why: VASCULAR NEUROLOGY FOLLOW UP  Contact information:  [address]  [facility] [address]  [phone]                                Medication List        START taking these medications      clopidogreL 75 mg  tablet  Commonly known as: PLAVIX  Take 1 tablet (75 mg total) by mouth once daily.            CHANGE how you take these medications      ENTRESTO 24-26 mg per tablet  Generic drug: sacubitriL-valsartan  TAKE 1 TABLET BY MOUTH TWICE DAILY  What changed: Another medication with the same name was removed. Continue taking this medication, and follow the directions you see here.     famotidine 20 MG tablet  Commonly known as: PEPCID  Take 1 tablet (20 mg total) by mouth once daily.  What changed:   when to take this  reasons to take this     furosemide 20 MG tablet  Commonly known as: LASIX  TAKE 1 TABLET(20 MG) BY MOUTH EVERY DAY  What changed:   how much to take  how to take this  when to take this  additional instructions            CONTINUE taking these medications      acetaminophen 325 MG tablet  Commonly known as: TYLENOL     albuterol 90 mcg/actuation inhaler  Commonly known as: PROVENTIL/VENTOLIN HFA  Inhale 1 puff into the lungs every 6 (six) hours as needed for Wheezing. 1 HFA Aerosol Inhaler Inhalation Twice a day     allopurinoL 100 MG tablet  Commonly known as: ZYLOPRIM  Take 1 tablet (100 mg total) by mouth once daily.     apixaban 2.5 mg Tab  Commonly known as: ELIQUIS  Take 1 tablet (2.5 mg total) by mouth 2 (two) times daily.     atorvastatin 40 MG tablet  Commonly known as: LIPITOR  TAKE 1 TABLET BY MOUTH EVERY DAY     blood sugar diagnostic Strp  Check BG daily prn if glucose found to be elevated on BMP, per facility protocol.     blood-glucose meter kit  Commonly known as: FREESTYLE SYSTEM KIT  Use prn is blood glucose found to be elevated on routine BMP, per facility protocol.     carvediloL 12.5 MG tablet  Commonly known as: COREG  TAKE 1 TABLET(12.5 MG) BY MOUTH TWICE DAILY     EScitalopram oxalate 20 MG tablet  Commonly known as: LEXAPRO  TAKE 1 TABLET BY MOUTH EVERY DAY     hydrALAZINE 25 MG tablet  Commonly known as: APRESOLINE  TAKE 1 TABLET BY MOUTH EVERY 8 HOURS     isosorbide mononitrate  30 MG 24 hr tablet  Commonly known as: IMDUR  TAKE 1 TABLET(30 MG) BY MOUTH EVERY DAY     lancets Misc  Use daily prn if blood glucose found to be elevated on routine BMP, per facility protocol.     multivitamin tablet  Commonly known as: THERAGRAN     nitroGLYCERIN 0.4 MG/DOSE TL SPRY 400 mcg/spray spray  Commonly known as: NITROLINGUAL  PLACE 1 SPRAY ONTO THE TONGUE EVERY 5 (FIVE) MINUTES AS NEEDED.     timolol maleate 0.5% 0.5 % Drop  Commonly known as: TIMOPTIC  Place 1 drop into both eyes 2 (two) times daily.               Where to Get Your Medications        These medications were sent to [facility]  [address] [place] [address]      Hours: Mon-Fri, 8a-5:30p Phone: [phone]   clopidogreL 75 mg tablet       These [id number] [place] - [address] [facility]  [address] [place] [place] [address]      Phone: [phone]   furosemide 20 MG tablet       No orders of the defined types were placed in this encounter.

## 2023-08-14 NOTE — PLAN OF CARE
Occupational therapy evaluation completed, coevaluation performed with physical therapy. Patient with dementia, glaucoma, and prior CVAs with pre-hospitalization baseline function of supervision/SBA at minimum to needing assist for ADL / functional mobility regimen with use of rolling walker and tub transfer bench; dependence on family for IADLs/ home management/ med management, and community mobility. On today's date, patient required minimal assist to SBA for functional mobility inclusive of in room functional mobility but increased assist for lower body ADLs. Family / caregivers present during session, educated and provided on use of gait safety belt, and actively engaged in session. Anticipate upon medically stable patient to d/c to home with family support and return of low intensity (home health OT / PT). No dme needs.    Problem: Occupational Therapy  Goal: Occupational Therapy Goal  Description: Goals to be met by: 9/11/2023     Patient will increase functional independence with ADLs by performing:    UE Dressing with Supervision.  LE Dressing with Minimal Assistance.  Toileting from bedside commode with Minimal Assistance for hygiene and clothing management.   Step transfer with Stand-by Assistance with rolling walker.  Toilet transfer to bedside commode with Stand-by Assistance with rolling walker.  Family reciprocation of fall risk reduction techniques, BEFAST recommendations, and follow up caregiver recommendations to support safe d/c to home with family.  Outcome: Ongoing, Progressing

## 2023-08-14 NOTE — PROGRESS NOTES
[facility] Medicine  Progress Note    Patient Name: Krystina Washington  MRN: [id number]  Patient Class: IP- Inpatient   Admission Date: 8/12/2023  Length of Stay: 0 days  Attending Physician: Abilio Flores III, MD  Primary Care Provider: Oniel Johnson MD        Subjective:     Principal Problem:Stroke        HPI:  89-year-old with PMHx of PCA stroke (12/3/2020), TIA (1/7/2021), CHF (EF 25%), CAD, DM, joint, CABG (9/21/10), Hyperlipidemia, Hypertension, NSTEMI (09/21/10), CKD3B, Anemia, Stenosis of aortic and mitral valves, Closed displaced subtrochanteric fracture of right femur s/p IM nail on 10/17/2021, Arthritis, Glaucoma, and Gout, presents with family for altered mental status. According to her son, who is her caretaker, patient woke up and was okay, but acutely became sleepy, confused; she did not know her name or how old she was. Son reports that patient exhibited similar symptoms a couple of years ago when she had an UTI. He reports that she has some foul smelling urine today. She has not had any fevers lately, no cough, no c/o chest pain, shortness of breath, headaches, abdominal pain over the last few days. She has been eating and drinking well.     In the ED, vitals Temp 98.2, HR 61, RR 17, /64, 97% on RA. She received 500mL LR; per ED doctor, she perked up after. BUN 51/2.1. Hgb 7.9 (baseline ~8) Troponin 0.054 (baseline 0.03-0.05), UA normal. Utox negative. CT no acute findings. CXR no acute findings. COVID negative. Patient was admitted to [facility] for management of AMS & MARGARET.      Overview/Hospital Course:  No notes on file    Interval History:   The patient was seen this morning and was found in bed eating. She was in no acute distress, but appears to be hard of hearing. She was alert, aware, and oriented to person, place, and time. She reports no acute complaints. Cardiology was consulted this morning, waiting on recommendations.  Review of Systems    Constitutional:  Negative for fever.   Respiratory:  Negative for shortness of breath.    Cardiovascular:  Negative for chest pain.   Gastrointestinal:  Negative for abdominal pain.   Neurological:  Negative for headaches.     Objective:     Vital Signs (Most Recent):  Temp: 96.3 °F (35.7 °C) (08/14/23 0753)  Pulse: (!) 56 (08/14/23 0753)  Resp: 18 (08/14/23 0753)  BP: 127/61 (08/14/23 0753)  SpO2: 96 % (08/14/23 0753) Vital Signs (24h Range):  Temp:  [96.1 °F (35.6 °C)-96.3 °F (35.7 °C)] 96.3 °F (35.7 °C)  Pulse:  [56-63] 56  Resp:  [18] 18  SpO2:  [95 %-97 %] 96 %  BP: (127-188)/(61-81) 127/61     Weight: 73.1 kg (161 lb 2.5 oz)  Body mass index is 28.55 kg/m².    Intake/Output Summary (Last 24 hours) at 8/14/2023 0917  Last data filed at 8/14/2023 0547  Gross per 24 hour   Intake --   Output 1250 ml   Net -1250 ml         Physical Exam  Constitutional:       General: She is not in acute distress.  HENT:      Head: Normocephalic.      Nose: Nose normal.   Eyes:      Extraocular Movements: Extraocular movements intact.   Cardiovascular:      Pulses: Normal pulses.      Heart sounds: Normal heart sounds.   Pulmonary:      Effort: Pulmonary effort is normal.      Breath sounds: Normal breath sounds.   Musculoskeletal:      Right lower leg: No edema.      Left lower leg: No edema.   Skin:     General: Skin is warm and dry.      Capillary Refill: Capillary refill takes less than 2 seconds.   Neurological:      Mental Status: She is alert and oriented to person, place, and time.   Psychiatric:         Mood and Affect: Mood normal.             Significant Labs: All pertinent labs within the past 24 hours have been reviewed.  CBC:   Recent Labs   Lab 08/12/23  2200 08/12/23  2357 08/13/23  0527 08/14/23  0455   WBC 10.43  --  9.26 8.58   HGB 7.9*  --  7.5* 8.3*   HCT 24.7* 23* 24.3* 26.2*     --  174 176     CMP:   Recent Labs   Lab 08/12/23  2200 08/13/23  0527 08/14/23  0455    139 140   K 4.2 3.9 4.2  "   107 108   CO2 24 24 28   * 89 82   BUN 51* 49* 44*   CREATININE 2.1* 1.9* 1.6*   CALCIUM 9.5 9.3 9.4   PROT 6.4 6.1 6.3   ALBUMIN 2.8* 2.6* 2.6*   BILITOT 0.4 0.4 0.4   ALKPHOS 91 88 97   AST 20 18 22   ALT 11 10 9*   ANIONGAP 10 8 4*       Significant Imaging: I have reviewed all pertinent imaging results/findings within the past 24 hours.      Assessment/Plan:      * Stroke  MRI brain on admit w/ interval development subcentimeter focus of restricted diffusion right cerebellum compatible with acute/recent infarction.  No significant mass effect or hemorrhagic conversion  Not on  ASA or Plavix at home. ASA discontinued 2021 after a fracture, though unclear why    Plan:  - Neurology consulted and recommended:  "will recommend consulting cardiology to evaluate possible of increasing her AC or adding aspirin to her regimen.  will recommend full stroke workup for this admission; Workup include: Lipid profile, A1c, B12, folate, TSH, TTE"  - B12, Folate, TSH were WNL  - TTE has been completed, waiting on official read.  - Neurochecks Q4 Hr  - If passes swallow screen, can have full liquids and oral medications but will have to wait until SLP swallow study to allow solid foods  - Continue Atorvastatin for secondary stroke prevention  - Initiate rehab efforts with PT/OT/SLP  - Elevate the head of the bed with aspiration precautions  - Fall precautions    Altered mental status  Acute-onset AMS in setting of possible undiagnosed dementia  Differentials include infectious etiology, undiagnosed dementia (history of altered sleep wake cycles), dehydration (raised BUN/Cr), stroke    Plan:  - Mental status seems greatly improved. Pt is AAOx3  - Stroke: MRI showed: "Interval development subcentimeter focus of restricted diffusion right cerebellum compatible with acute/recent infarction.  No significant mass effect or hemorrhagic conversion. Superimposed cerebral volume loss with superimposed large remote right PCA " "territory infarction and moderate left cerebellar areas of infarction with encephalomalacia."  - Neurology was consulted and recommended:   "will recommend consulting cardiology to evaluate possible of increasing her AC or adding aspirin to her regimen.  will recommend full stroke workup for this admission; Workup include: Lipid profile, A1c, B12, folate, TSH, TTE"  - B12, Folate, TSH were WNL  - TTE has been completed, waiting on official read.    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  Home medications: Apixaban 2.5mg    Plan:  - Continue home medications    Essential hypertension  Home medications: Carvedilol 12.5, Entresto, Hydralazine, Furosemide    Plan:  - Will continue home meds      Major depressive disorder, recurrent episode, moderate  Home medications: Escitalopram 20mg    Plan:  - Continue home medications    Coronary artery disease of native artery of native heart with stable angina pectoris  S/P NSTEMI 2010, CABG X3 2010  Troponin 0.054 on admission    Plan:  - Troponin downtrended to 0.052  - Control HLD (Atorvastatin)  - Continue home Eliquis      Pure hypercholesterolemia  Home medications: Atorvastatin 40mg    Plan:  - Continue home medications        VTE Risk Mitigation (From admission, onward)         Ordered     apixaban tablet 2.5 mg  2 times daily         08/13/23 0150     IP VTE HIGH RISK PATIENT  Once         08/13/23 0058     Place sequential compression device  Until discontinued         08/13/23 0058                Discharge Planning   KALEY:      Code Status: Partial Code   Is the patient medically ready for discharge?:     Reason for patient still in hospital (select all that apply): Treatment                     Vinay Stratton MD  Department of Hospital Medicine   [place] - Telemetry    "

## 2023-08-14 NOTE — PT/OT/SLP EVAL
Physical Therapy Evaluation and Treatment    Patient Name:  Krystina Washington   MRN:  [id number]    Recommendations:     Discharge Recommendations: home health OT, home health PT (low intensity therapy)   Discharge Equipment Recommendations: none   Barriers to discharge: None    Assessment:     Krystina Washington is a 89 y.o. female admitted with a medical diagnosis of Stroke.  She presents with the following impairments/functional limitations: weakness, gait instability, impaired endurance, impaired balance, impaired self care skills, pain, impaired functional mobility, decreased lower extremity function, decreased upper extremity function, decreased coordination, impaired coordination, visual deficits .Pt requires Emmanuelle for bed mobility and sit <>stands with use of RW. Pt ambulated ~30 ft in room with RW and CGA-SBA. Pt has a good support system and family that is able to assist her at home. Recommending low intensity therapy ( PT/OT) upon d/c. Pt owns recommended DME.     Rehab Prognosis: Good; patient would benefit from acute skilled PT services to address these deficits and reach maximum level of function.    Recent Surgery: * No surgery found *      Plan:     During this hospitalization, patient to be seen 3 x/week to address the identified rehab impairments via gait training, therapeutic activities, therapeutic exercises, neuromuscular re-education and progress toward the following goals:    Plan of Care Expires:  09/14/23    Subjective     Chief Complaint: none  Patient/Family Comments/goals: return home  Pain/Comfort:  Pain Rating 1: 0/10  Pain Rating Post-Intervention 1:  (reports back pain after ambulating limiting distance)    Patients cultural, spiritual, Shinto conflicts given the current situation: no    Living Environment:  Pt lives with family in [facility], [facility], T/S with TTB; BSC over toilet   Prior to admission, patients level of function was Supervision-Mod I for mobility and ADL's with use of RW.   Equipment used at home: bedside commode, wheelchair, walker, rolling, rollator, bath bench.  Upon discharge, patient will have assistance from family 24/7.    Objective:     Communicated with nsg prior to session.  Patient found  on BSC  with telemetry  upon PT entry to room.    General Precautions: Standard, fall  Orthopedic Precautions:N/A   Braces: N/A  Respiratory Status: Room air    Exams:  Cognitive Exam:  Patient is oriented to Person, Place, Time, and Situation; pt is [place] and has a hx of glaucoma   Postural Exam:  Patient presented with the following abnormalities:    -       Rounded shoulders  -       Forward head  -       Kyphosis  BLE ROM: grossly WFL for pt's needs based on functional assessment      Functional Mobility:  Bed Mobility:     Scooting: contact guard assistance  Sit to Supine: minimum assistance  Transfers:     Sit to Stand:  minimum assistance with rolling walker  BSC Transfer: minimum assistance with  rolling walker  using  Step Transfer  Gait: Pt ambulated ~30 ft in room with RW and CGA-SBA. Pt demonstrated forward flexed posture; No LOB noted       AM-PAC 6 CLICK MOBILITY  Total Score:17       Treatment & Education:  Pt educated on role of PT/POC and pt agreeable to participate in therapy session  Pt found on BSC with family present  Performed sit>stand with RW and assisted pt with hygiene  Pt ambulated in room as reported above and distance limited by back pain per pt   Pt performed 2 squat scoots to HOB with CGA then returned supine  Family provided with gait belt and educated on use; discussed overall home safety      Patient left HOB elevated with all lines intact, call button in reach, bed alarm on, nsg notified, and family present.    GOALS:   Multidisciplinary Problems       Physical Therapy Goals          Problem: Physical Therapy    Goal Priority Disciplines Outcome Goal Variances Interventions   Physical Therapy Goal     PT, PT/OT Ongoing, Progressing     Description: Goals to  be met by: 23     Patient will increase functional independence with mobility by performin. Supine to sit with Modified [place]  2. Sit to supine with Modified [place]  3. Sit to stand transfer with Supervision  4. Bed to chair transfer with Supervision using Rolling Walker  5. Gait  x 150 feet with Supervision using Rolling Walker.                          History:     Past Medical History:   Diagnosis Date    Acute ischemic left posterior cerebral artery (PCA) stroke 12/3/2020    Anemia in stage 3b chronic kidney disease 3/8/2017    Arthritis     CHF (congestive heart failure)     Closed displaced subtrochanteric fracture of right femur s/p IM nail on 10/17/2021 10/16/2021    Coronary artery disease     Diabetes mellitus     Glaucoma     Gout, joint     Hx of CABG 9/21/10    Hyperlipidemia     Hypertension     NSTEMI (non-ST elevated myocardial infarction) 09/21/10    Stage 3b chronic kidney disease 10/21/2021    Stenosis of aortic and mitral valves     Systolic heart failure 2015    TIA (transient ischemic attack) 2021       Past Surgical History:   Procedure Laterality Date    ANTEGRADE INTRAMEDULLARY RODDING OF FEMUR Right 10/17/2021    Procedure: INSERTION, INTRAMEDULLARY ALTAF, FEMUR, ANTEGRADE;  Surgeon: Dino Rutledge MD;  Location: [facility] OR [address] [place] [address];  Service: Orthopedics;  Laterality: Right;    CARDIAC VALVE SURGERY      CATARACT EXTRACTION      CORONARY ARTERY BYPASS GRAFT  2010    ENTROPIAN REPAIR Left 3/6/2020    Procedure: REPAIR, ENTROPION;  Surgeon: Yulia Kincaid MD;  Location: [facility] OR [address] [place] [address];  Service: Ophthalmology;  Laterality: Left;    EYE SURGERY      JOINT REPLACEMENT      ORIF FEMUR FRACTURE Right 10/17/2021    Procedure: ORIF, FRACTURE, FEMUR;  Surgeon: Dino Rutledge MD;  Location: [facility] OR [address] [place] [address];  Service: Orthopedics;  Laterality: Right;       Time Tracking:     PT Received On: 23  PT Start Time: 1421     PT Stop Time: 1449  PT Total Time  (min): 28 min     Billable Minutes: Evaluation 10 and Therapeutic Activity 18 (cotx with OT)      08/14/2023

## 2023-08-14 NOTE — ASSESSMENT & PLAN NOTE
"Acute-onset AMS in setting of possible undiagnosed dementia  Differentials include infectious etiology, undiagnosed dementia (history of altered sleep wake cycles), dehydration (raised BUN/Cr), stroke    Plan:  - Mental status seems greatly improved. Pt is AAOx3  - Stroke: MRI showed: "Interval development subcentimeter focus of restricted diffusion right cerebellum compatible with acute/recent infarction.  No significant mass effect or hemorrhagic conversion. Superimposed cerebral volume loss with superimposed large remote right PCA territory infarction and moderate left cerebellar areas of infarction with encephalomalacia."  - Neurology was consulted and recommended:   "will recommend consulting cardiology to evaluate possible of increasing her AC or adding aspirin to her regimen.  will recommend full stroke workup for this admission; Workup include: Lipid profile, A1c, B12, folate, TSH, TTE"  - B12, Folate, TSH were WNL  - TTE has been completed, waiting on official read.  "

## 2023-08-14 NOTE — DISCHARGE SUMMARY
"[facility] Medicine  Discharge Summary      Patient Name: Krystina Washington  MRN: [id number]  SABI: [id number]  Patient Class: IP- Inpatient  Admission Date: 8/12/2023  Hospital Length of Stay: 0 days  Discharge Date and Time:  08/14/2023 2:48 PM  Attending Physician: Abilio Flores III, MD   Discharging Provider: Vinay Stratton MD  Primary Care Provider: Oniel Johnson MD    Primary Care Team: Networked reference to record PCT     HPI:   89-year-old with PMHx of PCA stroke (12/3/2020), TIA (1/7/2021), CHF (EF 25%), CAD, DM, joint, CABG (9/21/10), Hyperlipidemia, Hypertension, NSTEMI (09/21/10), CKD3B, Anemia, Stenosis of aortic and mitral valves, Closed displaced subtrochanteric fracture of right femur s/p IM nail on 10/17/2021, Arthritis, Glaucoma, and Gout, presents with family for altered mental status. According to her son, who is her caretaker, patient woke up and was okay, but acutely became sleepy, confused; she did not know her name or how old she was. Son reports that patient exhibited similar symptoms a couple of years ago when she had an UTI. He reports that she has some foul smelling urine today. She has not had any fevers lately, no cough, no c/o chest pain, shortness of breath, headaches, abdominal pain over the last few days. She has been eating and drinking well.     In the ED, vitals Temp 98.2, HR 61, RR 17, /64, 97% on RA. She received 500mL LR; per ED doctor, she perked up after. BUN 51/2.1. Hgb 7.9 (baseline ~8) Troponin 0.054 (baseline 0.03-0.05), UA normal. Utox negative. CT no acute findings. CXR no acute findings. COVID negative. Patient was admitted to [facility] for management of AMS & MARGARET.      * No surgery found *      Hospital Course:   08/12: Patient admitted for altered mental status, initial CXR showed no abnormalities, CT head showed: No acute intracranial abnormality.    08/13: MRI head done and showed: "Interval development subcentimeter " "focus of restricted diffusion right cerebellum compatible with acute/recent infarction.  No significant mass effect or hemorrhagic conversion. Superimposed cerebral volume loss with superimposed large remote right PCA territory infarction and moderate left cerebellar areas of infarction with encephalomalacia." Neurology was consulted and they recommended the following: "will recommend consulting cardiology to evaluate possible of increasing her AC or adding aspirin to her regimen.will recommend full stroke workup for this admission".    08/14: Cardiology was consulted and saw the patient this morning and recommended adding Plavix 75mg per day to the patient's current regimen of Eliquis 2.5mg.    On discharge, the patient should resume home health PT/OT/SLP and start Plavix 75mg along with Eliquis 2.5mg.            Goals of Care Treatment Preferences:  Code Status: Partial Code    Living Will: Yes              Consults:   Consults (From admission, onward)        Status Ordering Provider     Inpatient consult to Cardiology-[facility]  Once        Provider:  (Not yet assigned)    Completed MARTY CASTLE     IP consult to case management  Once        Provider:  (Not yet assigned)    Acknowledged SARAY PITTS     IP consult to case management  Once        Provider:  (Not yet assigned)    Acknowledged SARAY PITTS          No new Assessment & Plan notes have been filed under this hospital service since the last note was generated.  Service: Hospital Medicine    Final Active Diagnoses:    Diagnosis Date Noted POA    PRINCIPAL PROBLEM:  Stroke [I63.9] 08/14/2023 Yes    Altered mental status [R41.82] 08/13/2023 Yes    Chronic kidney disease, stage 4 (severe) [N18.4] 01/19/2023 Yes    History of embolic stroke 12/2020 [Z86.73] 03/29/2022 Not Applicable    Essential hypertension [I10] 11/29/2015 Yes    Major depressive disorder, recurrent episode, moderate [F33.1] 05/06/2015 Yes    Coronary artery disease of " native artery of native heart with stable angina pectoris [I25.118] 04/09/2013 Yes    Pure hypercholesterolemia [E78.00] 07/19/2012 Yes      Problems Resolved During this Admission:       Discharged Condition: stable    Disposition: Home or Self Care    Follow Up:    Patient Instructions:      SUBSEQUENT HOME HEALTH ORDERS     Order Specific Question Answer Comments   What Home Health Agency is the patient currently using? [facility]      Activity as tolerated       Significant Diagnostic Studies: Labs:   CMP   Recent Labs   Lab 08/12/23  2200 08/13/23  0527 08/14/23  0455    139 140   K 4.2 3.9 4.2    107 108   CO2 24 24 28   * 89 82   BUN 51* 49* 44*   CREATININE 2.1* 1.9* 1.6*   CALCIUM 9.5 9.3 9.4   PROT 6.4 6.1 6.3   ALBUMIN 2.8* 2.6* 2.6*   BILITOT 0.4 0.4 0.4   ALKPHOS 91 88 97   AST 20 18 22   ALT 11 10 9*   ANIONGAP 10 8 4*    and CBC   Recent Labs   Lab 08/12/23 2200 08/12/23 2357 08/13/23 0527 08/14/23  0455   WBC 10.43  --  9.26 8.58   HGB 7.9*  --  7.5* 8.3*   HCT 24.7*   < > 24.3* 26.2*     --  174 176    < > = values in this interval not displayed.       Pending Diagnostic Studies:     None         Medications:  Reconciled Home Medications:      Medication List      START taking these medications    clopidogreL 75 mg tablet  Commonly known as: PLAVIX  Take 1 tablet (75 mg total) by mouth once daily.        CHANGE how you take these medications    ENTRESTO 24-26 mg per tablet  Generic drug: sacubitriL-valsartan  TAKE 1 TABLET BY MOUTH TWICE DAILY  What changed: Another medication with the same name was removed. Continue taking this medication, and follow the directions you see here.     famotidine 20 MG tablet  Commonly known as: PEPCID  Take 1 tablet (20 mg total) by mouth once daily.  What changed:   · when to take this  · reasons to take this     furosemide 20 MG tablet  Commonly known as: LASIX  TAKE 1 TABLET(20 MG) BY MOUTH EVERY DAY  What changed:   · how  much to take  · how to take this  · when to take this  · additional instructions        CONTINUE taking these medications    acetaminophen 325 MG tablet  Commonly known as: TYLENOL  Take 325 mg by mouth every 6 (six) hours as needed for Pain.     albuterol 90 mcg/actuation inhaler  Commonly known as: PROVENTIL/VENTOLIN HFA  Inhale 1 puff into the lungs every 6 (six) hours as needed for Wheezing. 1 HFA Aerosol Inhaler Inhalation Twice a day     allopurinoL 100 MG tablet  Commonly known as: ZYLOPRIM  Take 1 tablet (100 mg total) by mouth once daily.     apixaban 2.5 mg Tab  Commonly known as: ELIQUIS  Take 1 tablet (2.5 mg total) by mouth 2 (two) times daily.     atorvastatin 40 MG tablet  Commonly known as: LIPITOR  TAKE 1 TABLET BY MOUTH EVERY DAY     blood sugar diagnostic Strp  Check BG daily prn if glucose found to be elevated on BMP, per facility protocol.     blood-glucose meter kit  Commonly known as: FREESTYLE SYSTEM KIT  Use prn is blood glucose found to be elevated on routine BMP, per facility protocol.     carvediloL 12.5 MG tablet  Commonly known as: COREG  TAKE 1 TABLET(12.5 MG) BY MOUTH TWICE DAILY     EScitalopram oxalate 20 MG tablet  Commonly known as: LEXAPRO  TAKE 1 TABLET BY MOUTH EVERY DAY     hydrALAZINE 25 MG tablet  Commonly known as: APRESOLINE  TAKE 1 TABLET BY MOUTH EVERY 8 HOURS     isosorbide mononitrate 30 MG 24 hr tablet  Commonly known as: IMDUR  TAKE 1 TABLET(30 MG) BY MOUTH EVERY DAY     lancets Misc  Use daily prn if blood glucose found to be elevated on routine BMP, per facility protocol.     multivitamin tablet  Commonly known as: THERAGRAN  Take 1 tablet by mouth once daily.     nitroGLYCERIN 0.4 MG/DOSE TL SPRY 400 mcg/spray spray  Commonly known as: NITROLINGUAL  PLACE 1 SPRAY ONTO THE TONGUE EVERY 5 (FIVE) MINUTES AS NEEDED.     timolol maleate 0.5% 0.5 % Drop  Commonly known as: TIMOPTIC  Place 1 drop into both eyes 2 (two) times daily.            Indwelling Lines/Drains at  time of discharge:   Lines/Drains/Airways     None                 Time spent on the discharge of patient: 25 minutes         Vinay Stratton MD  Department of Hospital Medicine  [facility]

## 2023-08-14 NOTE — PT/OT/SLP EVAL
Occupational Therapy   Evaluation and Treatment    Name: Krystina Washington  MRN: [id number]  Admitting Diagnosis: Stroke  Recent Surgery: * No surgery found *      Recommendations:     Discharge Recommendations: home health PT, home health OT, other (see comments) (low intensity therapy)  Discharge Equipment Recommendations:  none  Barriers to discharge:  Other (Comment) (no therapy barriers)    Assessment:     Krystina Washington is a 89 y.o. female with a medical diagnosis of Stroke.  She presents with ..Diagnoses of Altered mental status, Altered mental state, and Stroke were pertinent to this visit.  . Performance deficits affecting function: weakness, visual deficits, impaired endurance, impaired sensation, impaired cognition, impaired self care skills, impaired functional mobility, gait instability, impaired balance, decreased safety awareness.      Rehab Prognosis: Fair; patient would benefit from acute skilled OT services to address these deficits and reach maximum level of function.       Plan:     Patient to be seen 2 x/week to address the above listed problems via self-care/home management, therapeutic activities, therapeutic exercises, neuromuscular re-education  Plan of Care Expires: 09/11/23  Plan of Care Reviewed with: patient, family    Subjective     Chief Complaint: no complaints reported  Patient/Family Comments/goals: Family indicates readiness to bring patient home reporting that patient is back to her baseline.    Occupational Profile:  Living Environment: Patient resides with family in a single story home, threshold to enter, tub shower combo with tub transfer bench and bedside commode over toilet  Previous level of function: Prior to admission patient required supervision / SBA mostly for ADL/mobility with rolling walker, but dependent for IADL/ medication management. Intermittently has incontinent episodes or needs assist with lower body dressing/bathing.  Equipment Used at Home: bedside commode,  walker, rolling, other (see comments), rollator, wheelchair (tub transfer bench)  Assistance upon Discharge: per family members in room patient is never alone, always has at least one family member present    Pain/Comfort:  Pain Rating 1: 0/10  Pain Addressed 1: Reposition  Pain Rating Post-Intervention 1: 0/10  Pain Addressed 2: Reposition      Objective:     Communicated with: nursing prior to session.  Patient found HOB elevated with telemetry upon OT entry to room.    General Precautions: Standard, fall, vision impaired  Orthopedic Precautions: N/A  Braces: N/A  Respiratory Status: Room air    Occupational Performance:    Bed Mobility:    Patient completed Sit to Supine with minimum assist, min gestural cues    Functional Mobility/Transfers:  Patient completed Sit <> Stand Transfer with minimum assist  with  rolling walker   Patient completed Toilet Transfer Step Transfer technique with minimum assist with  rolling walker  Functional Mobility: in room functional mobility with rolling walker, with CGA progressing to SBA. Mild flexed posture with minimal improvements with posture cues.     Activities of Daily Living:  Upper Body Dressing: minimum assistance ; complexity 2/2 buttons on unfamiliar gown; backward chaining instruction provided  Lower Body Dressing: moderate assistance ; cross leg technique  Toileting: mod/max assist 2/2 urgency Bowel movement; after initial assistance for hygiene patient progressed to maintaining stand dynamic balance to complete hygiene/clothing manipulation    Cognitive/Visual Perceptual:  Cognitive/Psychosocial Skills:     -       Oriented to: person, aware of being in hospital setting, grossly oriented to time (month, year), and grossly oriented to situation    -       Follows Commands/attention:follows one to two step simple commands  -       Memory: Impaired STM and Poor immediate recall  -       Safety awareness/insight to disability: deficits; reliance on caregiver/ family   -        Mood/Affect/Coping skills/emotional control: Cooperative  Visual/Perceptual:      -acuity deficits; glaucoma    Physical Exam:  Postural examination/scapula alignment:    -       Rounded shoulders  -       Forward head  -       Kyphosis  Upper Extremity Range of Motion:     -       Right Upper Extremity: WFL  -       Left Upper Extremity: WFL  Upper Extremity Strength:    -       Right Upper Extremity: WFL  -       Left Upper Extremity: WFL   Strength:    -       Right Upper Extremity: WFL  -       Left Upper Extremity: WFL  Fine Motor Coordination:formal exams not performed; grossly during ADL assessment/ toileting tasks / lower body dressing management tasks demonstrates WFL    AMPAC 6 Click ADL:  AMPAC Total Score: 15    Treatment & Education:  Patient and patient's family educated on role of OT/ POC development. Co-evaluation with physical therapy in consideration of low activity tolerance. Occupational profile developed (with increased time, etc). Patient guided to transition eob for assessment. Initiated ADL / functional mobility training as above with emphasis on safety, transitional movements, and integrated caregiver / family education with active engagement from family whom report positive prior experience with home health therapy and affirm patient is never alone at home. Educated family and provided family with gait safety belt. Answered questions within scope.      Patient left HOB elevated with all lines intact, call button in reach, and bed alarm on with family present    GOALS:   Multidisciplinary Problems       Occupational Therapy Goals          Problem: Occupational Therapy    Goal Priority Disciplines Outcome Interventions   Occupational Therapy Goal     OT, PT/OT Ongoing, Progressing    Description: Goals to be met by: 9/11/2023     Patient will increase functional independence with ADLs by performing:    UE Dressing with Supervision.  LE Dressing with Minimal Assistance.  Toileting  from bedside commode with Minimal Assistance for hygiene and clothing management.   Step transfer with Stand-by Assistance with rolling walker.  Toilet transfer to bedside commode with Stand-by Assistance with rolling walker.  Family reciprocation of fall risk reduction techniques, BEFAST recommendations, and follow up caregiver recommendations to support safe d/c to home with family.                       History:     Past Medical History:   Diagnosis Date    Acute ischemic left posterior cerebral artery (PCA) stroke 12/3/2020    Anemia in stage 3b chronic kidney disease 3/8/2017    Arthritis     CHF (congestive heart failure)     Closed displaced subtrochanteric fracture of right femur s/p IM nail on 10/17/2021 10/16/2021    Coronary artery disease     Diabetes mellitus     Glaucoma     Gout, joint     Hx of CABG 9/21/10    Hyperlipidemia     Hypertension     NSTEMI (non-ST elevated myocardial infarction) 09/21/10    Stage 3b chronic kidney disease 10/21/2021    Stenosis of aortic and mitral valves     Systolic heart failure 6/19/2015    TIA (transient ischemic attack) 1/7/2021         Past Surgical History:   Procedure Laterality Date    ANTEGRADE INTRAMEDULLARY RODDING OF FEMUR Right 10/17/2021    Procedure: INSERTION, INTRAMEDULLARY ALTAF, FEMUR, ANTEGRADE;  Surgeon: Dino Rutledge MD;  Location: [facility] OR [address] [place] [address];  Service: Orthopedics;  Laterality: Right;    CARDIAC VALVE SURGERY      CATARACT EXTRACTION      CORONARY ARTERY BYPASS GRAFT  9/21/2010    ENTROPIAN REPAIR Left 3/6/2020    Procedure: REPAIR, ENTROPION;  Surgeon: Yulia Kincaid MD;  Location: [facility] OR [address] [place] [address];  Service: Ophthalmology;  Laterality: Left;    EYE SURGERY      JOINT REPLACEMENT      ORIF FEMUR FRACTURE Right 10/17/2021    Procedure: ORIF, FRACTURE, FEMUR;  Surgeon: Dino Rutledge MD;  Location: [facility] OR [address] [place] [address];  Service: Orthopedics;  Laterality: Right;       Time Tracking:     OT Date of Treatment: 08/14/23  OT Start Time: 1421  OT  Stop Time: 1449  OT Total Time (min): 28 min ; coeval overlap with PT    Billable Minutes:Evaluation 10 min  Self Care/Home Management 15 min    8/14/2023

## 2023-08-14 NOTE — ASSESSMENT & PLAN NOTE
Home medications: Carvedilol 12.5, Entresto, Hydralazine, Furosemide    Plan:  - Will continue home meds     Patient woke up on Friday and blow nose and he said it was gushing blood and down his throat. At that time he noticed his right arm went numb and had some blurred vision. This didn't happen anymore since but he said he has been getting some headaches which is not normal for him.  Please advise

## 2023-08-14 NOTE — CONSULTS
"Cardiology Consult Note     Cardiology Attending: Dr. Pepe  Cardiology Fellow: Therese Patel MD     Date of Admit: 8/12/2023  Date of Consult: 8/14/2023    Reason for Consultation:     "Evaluate possibility of increasing coagulation vs adding new medication for new onset stroke"    History of Present Illness:      Krystina Washington is a 89 y.o. female with a PMH significant for  PCA stroke (12/3/2020), TIA (1/7/2021), CHF (EF 25%), CAD, DM, joint, CABG (9/21/10), Hyperlipidemia, Hypertension, NSTEMI (09/21/10), CKD3B, Anemia, Stenosis of aortic and mitral valves who presented to [facility] for AMS.    Patient was found to have a new onset stroke on MRI: "Interval development subcentimeter focus of restricted diffusion right cerebellum compatible with acute/recent infarction."    Past Medical History:     Past Medical History:   Diagnosis Date    Acute ischemic left posterior cerebral artery (PCA) stroke 12/3/2020    Anemia in stage 3b chronic kidney disease 3/8/2017    Arthritis     CHF (congestive heart failure)     Closed displaced subtrochanteric fracture of right femur s/p IM nail on 10/17/2021 10/16/2021    Coronary artery disease     Diabetes mellitus     Glaucoma     Gout, joint     Hx of CABG 9/21/10    Hyperlipidemia     Hypertension     NSTEMI (non-ST elevated myocardial infarction) 09/21/10    Stage 3b chronic kidney disease 10/21/2021    Stenosis of aortic and mitral valves     Systolic heart failure 6/19/2015    TIA (transient ischemic attack) 1/7/2021     Surgical History:     Past Surgical History:   Procedure Laterality Date    ANTEGRADE INTRAMEDULLARY RODDING OF FEMUR Right 10/17/2021    Procedure: INSERTION, INTRAMEDULLARY ALTAF, FEMUR, ANTEGRADE;  Surgeon: Dino Rutledge MD;  Location: [facility] OR [address] [place] [address];  Service: Orthopedics;  Laterality: Right;    CARDIAC VALVE SURGERY      CATARACT EXTRACTION      CORONARY ARTERY BYPASS GRAFT  9/21/2010    ENTROPIAN REPAIR Left 3/6/2020    Procedure: " REPAIR, ENTROPION;  Surgeon: Yulia Kincaid MD;  Location: [facility] OR [address] [place] [address];  Service: Ophthalmology;  Laterality: Left;    EYE SURGERY      JOINT REPLACEMENT      ORIF FEMUR FRACTURE Right 10/17/2021    Procedure: ORIF, FRACTURE, FEMUR;  Surgeon: Dino Rutledge MD;  Location: [facility] OR [facility];  Service: Orthopedics;  Laterality: Right;     Allergies:     Review of patient's allergies indicates:   Allergen Reactions    Indocin [indomethacin sodium] Other (See Comments)     Either caused hot,burning body or caused madness per patient's grandson    Lotensin [benazepril] Other (See Comments)     Either caused hot ,burning body or caused madness per patient's grandson     Family History:     Family History   Problem Relation Age of Onset    Diabetes Mother     Hypertension Father     Diabetes Father     Glaucoma Father     No Known Problems Sister     No Known Problems Brother     No Known Problems Maternal Aunt     No Known Problems Maternal Uncle     No Known Problems Paternal Aunt     No Known Problems Paternal Uncle     No Known Problems Maternal Grandmother     No Known Problems Maternal Grandfather     No Known Problems Paternal Grandmother     No Known Problems Paternal Grandfather      Social History:     Social History     Tobacco Use    Smoking status: Never    Smokeless tobacco: Never   Substance Use Topics    Alcohol use: No    Drug use: No     Review of Systems:     Review of Systems   Respiratory:  Negative for shortness of breath and wheezing.    Cardiovascular:  Negative for chest pain, palpitations and leg swelling.   Gastrointestinal:  Negative for abdominal pain, nausea and vomiting.   Genitourinary:  Negative for dysuria and flank pain.   Neurological:  Negative for headaches.     Medications:     Home Medications:  Prior to Admission medications    Medication Sig Start Date End Date Taking? Authorizing Provider   acetaminophen (TYLENOL) 325 MG tablet Take 325 mg by mouth every 6 (six) hours as  needed for Pain.   Yes Provider, Historical   albuterol 90 mcg/actuation inhaler Inhale 1 puff into the lungs every 6 (six) hours as needed for Wheezing. 1 HFA Aerosol Inhaler Inhalation Twice a day 12/16/15  Yes Oniel Johnson MD   allopurinoL (ZYLOPRIM) 100 MG tablet Take 1 tablet (100 mg total) by mouth once daily. 12/15/22  Yes Oniel Johnson MD   apixaban (ELIQUIS) 2.5 mg Tab Take 1 tablet (2.5 mg total) by mouth 2 (two) times daily. 10/11/22 10/11/23 Yes Paty Golden MD   atorvastatin (LIPITOR) 40 MG tablet TAKE 1 TABLET BY MOUTH EVERY DAY 3/16/23  Yes Oniel Johnson MD   carvediloL (COREG) 12.5 MG tablet TAKE 1 TABLET(12.5 MG) BY MOUTH TWICE DAILY 12/27/22  Yes Oniel Johnson MD   ENTRESTO 24-26 mg per tablet TAKE 1 TABLET BY MOUTH TWICE DAILY 3/13/23  Yes Paty Golden MD   EScitalopram oxalate (LEXAPRO) 20 MG tablet TAKE 1 TABLET BY MOUTH EVERY DAY 12/9/22  Yes Oniel Johnson MD   famotidine (PEPCID) 20 MG tablet Take 1 tablet (20 mg total) by mouth once daily.  Patient taking differently: Take 20 mg by mouth 2 (two) times daily as needed. 10/24/21  Yes Livia Lobo MD   furosemide (LASIX) 20 MG tablet TAKE 1 TABLET(20 MG) BY MOUTH EVERY DAY  Patient taking differently: every other day. And take one on non lasix day if retaking fluid 8/8/22  Yes Oniel Johnson MD   hydrALAZINE (APRESOLINE) 25 MG tablet TAKE 1 TABLET BY MOUTH EVERY 8 HOURS 9/26/22  Yes Paty Golden MD   isosorbide mononitrate (IMDUR) 30 MG 24 hr tablet TAKE 1 TABLET(30 MG) BY MOUTH EVERY DAY 2/12/23  Yes Oniel Johnson MD   multivitamin (THERAGRAN) tablet Take 1 tablet by mouth once daily.   Yes Provider, Historical   nitroGLYCERIN 0.4 MG/DOSE TL SPRY (NITROLINGUAL) 400 mcg/spray spray PLACE 1 SPRAY ONTO THE TONGUE EVERY 5 (FIVE) MINUTES AS NEEDED. 11/1/18  Yes Oniel Johnson MD   timolol maleate 0.5% (TIMOPTIC) 0.5 % Drop Place 1  "drop into both eyes 2 (two) times daily. 8/3/21 8/13/23 Yes Kodak Herman, OD   blood sugar diagnostic Strp Check BG daily prn if glucose found to be elevated on BMP, per facility protocol. 11/16/21   Katy Schmitt PA-C   blood-glucose meter (FREESTYLE SYSTEM KIT) kit Use prn is blood glucose found to be elevated on routine BMP, per facility protocol. 11/16/21 7/13/23  Katy Schmitt PA-C   lancets Misc Use daily prn if blood glucose found to be elevated on routine BMP, per facility protocol. 11/16/21   Katy Schmitt PA-C   sacubitriL-valsartan (ENTRESTO) 49-51 mg per tablet Take 1 tablet by mouth 2 (two) times daily. 5/2/23   Brianna Anguiano NP       Current/Inpatient Medications:  Infusions:    Scheduled:   allopurinoL  100 mg Oral Daily    apixaban  2.5 mg Oral BID    atorvastatin  40 mg Oral Daily    carvediloL  12.5 mg Oral BID    EScitalopram oxalate  20 mg Oral Daily    hydrALAZINE  25 mg Oral Q8H    vitamin D  2,000 Units Oral Daily     PRN:  acetaminophen, dextrose 10%, dextrose 10%, glucagon (human recombinant), glucose, glucose, hydrALAZINE, insulin aspart U-100, LIDOcaine, melatonin, ondansetron, senna-docusate 8.6-50 mg, sodium chloride 0.9%    Objective:     24-hour Vitals:   Temp:  [96.1 °F (35.6 °C)-96.3 °F (35.7 °C)] 96.3 °F (35.7 °C)  Pulse:  [56-63] 56  Resp:  [18] 18  SpO2:  [95 %-97 %] 96 %  BP: (127-188)/(61-81) 127/61    Vitals: /61 (BP Location: Right arm, Patient Position: Lying)   Pulse (!) 56   Temp 96.3 °F (35.7 °C) (Oral)   Resp 18   Ht 5' 3" (1.6 m)   Wt 73.1 kg (161 lb 2.5 oz)   LMP  (LMP Unknown)   SpO2 96%   BMI 28.55 kg/m²     Intake/Output Summary (Last 24 hours) at 8/14/2023 0847  Last data filed at 8/14/2023 0547  Gross per 24 hour   Intake --   Output 1250 ml   Net -1250 ml       Physical Exam  Eyes:      General:         Right eye: No discharge.         Left eye: No discharge.   Cardiovascular:      Rate and Rhythm: Bradycardia " present. Rhythm irregular.      Pulses: Normal pulses.      Heart sounds: No murmur heard.  Pulmonary:      Effort: Pulmonary effort is normal. No respiratory distress.      Breath sounds: No wheezing or rhonchi.   Abdominal:      Palpations: Abdomen is soft.      Tenderness: There is no abdominal tenderness. There is no guarding.   Musculoskeletal:      Right lower leg: No edema.      Left lower leg: No edema.      Comments: R leg edema no longer present   Skin:     General: Skin is warm.      Coloration: Skin is not jaundiced.   Neurological:      Mental Status: She is oriented to person, place, and time.   Psychiatric:         Mood and Affect: Mood normal.         Behavior: Behavior normal.         Thought Content: Thought content normal.        Labs:   I have reviewed the following labs below:    Recent Labs   Lab 08/12/23  2200 08/12/23  2345 08/12/23  2357 08/13/23  0527 08/14/23  0455   WBC 10.43  --   --  9.26 8.58   HGB 7.9*  --   --  7.5* 8.3*   HCT 24.7*  --  23* 24.3* 26.2*     --   --  174 176     --   --  139 140   K 4.2  --   --  3.9 4.2     --   --  107 108   CO2 24  --   --  24 28   BUN 51*  --   --  49* 44*   CREATININE 2.1*  --   --  1.9* 1.6*   *  --   --  89 82   CALCIUM 9.5  --   --  9.3 9.4   MG  --   --   --  1.9 2.0   PHOS  --   --   --  4.0 3.7   PROT 6.4  --   --  6.1 6.3   ALBUMIN 2.8*  --   --  2.6* 2.6*   BILITOT 0.4  --   --  0.4 0.4   AST 20  --   --  18 22   ALT 11  --   --  10 9*   ALKPHOS 91  --   --  88 97   TROPONINI  --  0.054*  --  0.052*  --      Cardiovascular Studies/Imaging:   I have reviewed the following studies below:    ECG (8/12/2023):  Sinus bradycardia with frequent Premature ventricular complexes Left bundle branch block    TTE (11/14/2021):   Severe left atrial enlargement.  The left ventricle is normal in size with severely decreased systolic function.  There is severe left ventricular global hypokinesis. The estimated ejection fraction  is 25%.  Mild right atrial enlargement.  Normal right ventricular size with mildly reduced right ventricular systolic function.  Grade I left ventricular diastolic dysfunction.  There is a 26 mm Rubalcava Jessica transcutaneously-placed aortic bioprosthesis present. There is no aortic aortic insufficiency present. Aortic valve area is 2.28 cm2; peak velocity is 2.19 m/s; mean gradient is 12 mmHg. The dimensionless index is 0.73. The LVOT diameter was 2.0 cm.  Aortic valve area is 2.28 cm2; peak velocity is 2.19 m/s; mean gradient is 12 mmHg. The dimensionless index is 0.73.  Mild to moderate tricuspid regurgitation.  There is pulmonary hypertension. The estimated PA systolic pressure is 51 mmHg.  Normal central venous pressure (3 mmHg).    Imaging:   MRI Brain Without Contrast    Result Date: 8/13/2023  EXAMINATION: MRI BRAIN WITHOUT CONTRAST CLINICAL HISTORY: Mental status change, unknown cause; TECHNIQUE: Sagittal and axial T1, axial T2, axial FLAIR, axial gradient and axial diffusion imaging of the whole brain without contrast. COMPARISON: CT head 08/12/2023 and MRI brain 01/06/2021 FINDINGS: There is a subcentimeter focus of restricted diffusion right inferior cerebellum compatible with acute/recent infarction continued generalized cerebral volume loss.  There is prominence of the lateral and 3rd ventricles likely compensatory to volume loss similar to prior without evidence for developing hydrocephalus There is large right occipital encephalomalacia compatible with remote infarction additional encephalomalacia medial right thalamus, there is T2 flair signal hyperintensity underlying this compatible with gliosis and scarring unchanged.  There is interval mild moderate encephalomalacia left inferior cerebellum most compatible with interval prior infarction with additional remote right cerebellar infarcts. Patchy and confluent T2 FLAIR signal abnormality supratentorial white matter while nonspecific concerning for  advanced chronic microvascular ischemic change Major intracranial skull base T2 flow voids are present.  There is small region of encephalomalacia right zachariah rufus similar to prior concerning for possible additional prior infarcts Question partially empty sella.  Degenerative change in the visualized cervical spine.  Continued scattered punctate foci of susceptibility within the cerebral hemispheres with additional foci medial thalami which may represent remote microhemorrhages with underlying amyloid angiopathy not excluded. Partial fluid opacification mastoid air cells bilaterally. This report was flagged in Epic as abnormal.     Interval development subcentimeter focus of restricted diffusion right cerebellum compatible with acute/recent infarction.  No significant mass effect or hemorrhagic conversion Superimposed cerebral volume loss with superimposed large remote right PCA territory infarction and moderate left cerebellar areas of infarction with encephalomalacia. Multifocal smaller areas of remote infarction bilateral cerebellar hemispheres. Patchy and confluent T2 FLAIR signal abnormality supratentorial white matter and rufus while nonspecific concerning for chronic ischemic change with probable remote right paramedian pontine infarct. Scattered foci of susceptibility within the cerebral hemispheres with additional foci in the medial thalami which may represent remote microhemorrhages with amyloid angiopathy not excluded.  No evidence for recent hemorrhage. Clinical correlation and follow-up advised Electronically signed by: Yury Stephens DO Date:    08/13/2023 Time:    14:08    CT Head Without Contrast    Result Date: 8/12/2023  EXAMINATION: CT HEAD WITHOUT CONTRAST CLINICAL HISTORY: Mental status change, unknown cause; TECHNIQUE: Low dose axial CT images obtained throughout the head without intravenous contrast. Sagittal and coronal reconstructions were performed. COMPARISON: CT head 07/23/2023, 10/16/2021.  FINDINGS: There is age-related brain parenchymal volume loss and prominence of the CSF spaces.  There is no hydrocephalus.  There is confluent hypoattenuation in the supratentorial white matter compatible with chronic microvascular ischemic changes, stable.  No extra-axial blood or fluid collections.  Continued encephalomalacia in the medial right occipital lobe compatible with a remote infarction.  There are remote lacunar infarctions in the bilateral cerebellar hemisphere and in the right thalamus.  No parenchymal mass, hemorrhage, edema or major vascular distribution infarct. No calvarial fracture.  The scalp is unremarkable.  Bilateral paranasal sinuses and mastoid air cells are clear.  There are bilateral prosthetic lenses.     No acute intracranial abnormality. Multiple remote infarctions, chronic microvascular ischemic changes, and senescent changes as above, stable. Electronically signed by: Rubin Steinberg Date:    08/12/2023 Time:    23:43    X-Ray Chest 1 View    Result Date: 8/12/2023  EXAMINATION: XR CHEST 1 VIEW CLINICAL HISTORY: altered mental status; TECHNIQUE: Single frontal view of the chest was performed. COMPARISON: 07/23/2023. FINDINGS: The lungs are hypoexpanded and clear.  No focal opacities are seen.  The pleural spaces are clear.  The cardiac silhouette is enlarged.  There is a prosthetic aortic valve.  There are calcifications of the aortic arch.  Osseous structures demonstrate degenerative changes.  Median sternotomy wires are seen.     No acute cardiopulmonary abnormality. Electronically signed by: Rubin Steinberg Date:    08/12/2023 Time:    22:14    CT Head Without Contrast    Result Date: 7/23/2023  EXAMINATION: CT CERVICAL SPINE WITHOUT CONTRAST; CT HEAD WITHOUT CONTRAST CLINICAL HISTORY: Neck trauma, mechanically unstable spine (Age >= 16y);; Head trauma, minor (Age >= 65y); TECHNIQUE: Low dose axial images, sagittal and coronal reformations were performed though the head and  cervical  spine.  Contrast was not administered. COMPARISON: CT 08/24/2023, MRI 01/06/2023, CT 10/16/2023 Cervical spine CT 03/01/2013 FINDINGS: CT head: Generalized cerebral volume loss with compensatory prominence of the ventricles and sulci.  Remote appearing infarct of the right thalamus and bilateral cerebellar hemispheres.  Stable area of hypoattenuation extending along the right paramedian occipital lobe with associated volume loss, compatible with remote infarct.  Patchy and confluent areas of hypoattenuation through the supratentorial white matter, which is nonspecific but may represent chronic microvascular ischemic change. There is no acute intracranial hemorrhage, hydrocephalus, midline shift or mass effect. Small bilateral mastoid effusions.  Mild mucosal thickening of the paranasal sinuses.  Vascular calcifications noted at the skull base. CT cervical spine: There is straightening of normal cervical lordosis which can be secondary to patient positioning and/or muscle spasm.  There is also minimal anterolisthesis of C3 on C4, and C4 on C5, similar to prior exam.  Otherwise, cervical vertebral body alignment is within normal limits.  The facet joints articulate appropriately.  No significant prevertebral soft tissue swelling.  Multilevel intervertebral disc height loss with associated degenerative endplate change, most pronounced at the C6-C7 and C7-T1 levels.  Vertebral body heights appear grossly intact.  There is multilevel degenerative change of the cervical spine consisting of posterior disc osteophyte complexes, uncovertebral joint DJD and facet arthropathy resulting in varying degrees of neural foraminal narrowing at multiple levels and mild spinal canal stenosis.  The visualized skull base is intact.  Visualized soft tissue structures are within normal limits.  Lung apices are free of pleural fluid or focal consolidation.     1. No CT evidence of acute intracranial abnormality.  Clinical correlation and  further assessment as warranted. 2. Generalized cerebral volume loss, findings suggestive of chronic microvascular ischemic change and multiple remote infarcts as discussed above. 3. No CT evidence of acute cervical spine fracture or traumatic subluxation. 4. Multilevel degenerative change of the cervical spine. Electronically signed by resident: Christina Bautista Date:    07/23/2023 Time:    04:38 Electronically signed by: Shanique Lewis MD Date:    07/23/2023 Time:    06:16    CT Cervical Spine Without Contrast    Result Date: 7/23/2023  EXAMINATION: CT CERVICAL SPINE WITHOUT CONTRAST; CT HEAD WITHOUT CONTRAST CLINICAL HISTORY: Neck trauma, mechanically unstable spine (Age >= 16y);; Head trauma, minor (Age >= 65y); TECHNIQUE: Low dose axial images, sagittal and coronal reformations were performed though the head and  cervical spine.  Contrast was not administered. COMPARISON: CT 08/24/2023, MRI 01/06/2023, CT 10/16/2023 Cervical spine CT 03/01/2013 FINDINGS: CT head: Generalized cerebral volume loss with compensatory prominence of the ventricles and sulci.  Remote appearing infarct of the right thalamus and bilateral cerebellar hemispheres.  Stable area of hypoattenuation extending along the right paramedian occipital lobe with associated volume loss, compatible with remote infarct.  Patchy and confluent areas of hypoattenuation through the supratentorial white matter, which is nonspecific but may represent chronic microvascular ischemic change. There is no acute intracranial hemorrhage, hydrocephalus, midline shift or mass effect. Small bilateral mastoid effusions.  Mild mucosal thickening of the paranasal sinuses.  Vascular calcifications noted at the skull base. CT cervical spine: There is straightening of normal cervical lordosis which can be secondary to patient positioning and/or muscle spasm.  There is also minimal anterolisthesis of C3 on C4, and C4 on C5, similar to prior exam.  Otherwise, cervical vertebral  body alignment is within normal limits.  The facet joints articulate appropriately.  No significant prevertebral soft tissue swelling.  Multilevel intervertebral disc height loss with associated degenerative endplate change, most pronounced at the C6-C7 and C7-T1 levels.  Vertebral body heights appear grossly intact.  There is multilevel degenerative change of the cervical spine consisting of posterior disc osteophyte complexes, uncovertebral joint DJD and facet arthropathy resulting in varying degrees of neural foraminal narrowing at multiple levels and mild spinal canal stenosis.  The visualized skull base is intact.  Visualized soft tissue structures are within normal limits.  Lung apices are free of pleural fluid or focal consolidation.     1. No CT evidence of acute intracranial abnormality.  Clinical correlation and further assessment as warranted. 2. Generalized cerebral volume loss, findings suggestive of chronic microvascular ischemic change and multiple remote infarcts as discussed above. 3. No CT evidence of acute cervical spine fracture or traumatic subluxation. 4. Multilevel degenerative change of the cervical spine. Electronically signed by resident: Christina Bautista Date:    07/23/2023 Time:    04:38 Electronically signed by: Shanique Lewis MD Date:    07/23/2023 Time:    06:16    X-Ray Hand 3 view Right    Result Date: 7/23/2023  EXAMINATION: XR HAND COMPLETE 3 VIEW RIGHT CLINICAL HISTORY: pain; TECHNIQUE: Three views of the right hand. COMPARISON: None. FINDINGS: No acute displaced fracture.  No dislocation.  Multifocal degenerative changes and chondrocalcinosis in the wrist.  Incidental lunotriquetral coalition.  Soft tissues are symmetric.  No unexpected radiopaque foreign body.     Degenerative changes in the hand and wrist.  No acute displaced fracture. Electronically signed by: Eben Cheney MD Date:    07/23/2023 Time:    03:24    X-Ray Humerus 2 View Right    Result Date: 7/23/2023  EXAMINATION:  "XR HUMERUS 2 VIEW RIGHT CLINICAL HISTORY: Pain in arm, unspecified. TECHNIQUE: Two views of the right humerus. COMPARISON: None. FINDINGS: No acute displaced fracture.  No dislocation.  Degenerative changes in the right shoulder.  Possible right upper arm soft tissue edema.     No acute displaced fracture. Electronically signed by: Eben Cheney MD Date:    07/23/2023 Time:    03:22    X-Ray Chest AP Portable    Result Date: 7/23/2023  EXAMINATION: XR CHEST AP PORTABLE CLINICAL HISTORY: Provided history is "pre-operative;  ". TECHNIQUE: One view of the chest. COMPARISON: 01/19/2023. FINDINGS: Cardiac wires overlie the chest.  Cardiomediastinal silhouette is enlarged and similar to the prior study.  Atherosclerotic calcifications overlie the aortic arch.  Postoperative changes of prior median sternotomy and TAVR.  Central vascular congestion and minimal prominent interstitial densities in the perihilar region.  No confluent area of consolidation.  No sizable pleural effusion.  No pneumothorax.     No detrimental change when compared with 01/19/2023. Electronically signed by: Eben Cheney MD Date:    07/23/2023 Time:    02:53    X-Ray Knee 2 View Right    Result Date: 7/23/2023  EXAMINATION: XR KNEE 1 OR 2 VIEW RIGHT CLINICAL HISTORY: right hip injury; TECHNIQUE: AP and lateral views of the right knee were performed. COMPARISON: 10/16/2021. FINDINGS: Partially visualized operative changes in the distal femur.  No acute displaced fracture.  No dislocation.  Moderate degenerative changes in the right knee with probable chondrocalcinosis.  No large joint effusion.     No acute displaced fracture in the right knee. Electronically signed by: Eben Cheney MD Date:    07/23/2023 Time:    02:52    X-Ray Femur 2 View Right    Result Date: 7/23/2023  EXAMINATION: XR FEMUR 2 VIEW RIGHT; XR PELVIS ROUTINE AP CLINICAL HISTORY: right hip injury; TECHNIQUE: AP and lateral views of the right femur were performed.  Frontal " radiograph of the pelvis also obtained. COMPARISON: Femur radiographs, 03/10/2022. FINDINGS: Pelvis: Postoperative changes involving the bilateral proximal femurs with 3 orthopedic screws traversing the left proximal femur and intramedullary ayni on the right side.  There is suspected heterotopic calcification adjacent to the medial aspect of the left proximal femur.  No convincing acute displaced fracture in the pelvis allowing for rotation.  Degenerative changes in both hips.  Additional findings discussed below. Right femur: Postoperative changes of right femur ORIF with intramedullary yani and similar alignment to the prior study.  There is a remote appearing fracture with lucency involving the subtrochanteric femoral shaft which could be related to delayed union though superimposed more recent fracture is difficult to entirely exclude.  No additional acute displaced fracture.  No dislocation.  Degenerative changes in the right hip and right knee.  No unexpected radiopaque foreign body.     Findings in the right femur and pelvis discussed above. Electronically signed by: Eben Cheney MD Date:    07/23/2023 Time:    02:51    X-Ray Pelvis Routine AP    Result Date: 7/23/2023  EXAMINATION: XR FEMUR 2 VIEW RIGHT; XR PELVIS ROUTINE AP CLINICAL HISTORY: right hip injury; TECHNIQUE: AP and lateral views of the right femur were performed.  Frontal radiograph of the pelvis also obtained. COMPARISON: Femur radiographs, 03/10/2022. FINDINGS: Pelvis: Postoperative changes involving the bilateral proximal femurs with 3 orthopedic screws traversing the left proximal femur and intramedullary yani on the right side.  There is suspected heterotopic calcification adjacent to the medial aspect of the left proximal femur.  No convincing acute displaced fracture in the pelvis allowing for rotation.  Degenerative changes in both hips.  Additional findings discussed below. Right femur: Postoperative changes of right femur ORIF with  intramedullary yani and similar alignment to the prior study.  There is a remote appearing fracture with lucency involving the subtrochanteric femoral shaft which could be related to delayed union though superimposed more recent fracture is difficult to entirely exclude.  No additional acute displaced fracture.  No dislocation.  Degenerative changes in the right hip and right knee.  No unexpected radiopaque foreign body.     Findings in the right femur and pelvis discussed above. Electronically signed by: Eben Cheney MD Date:    07/23/2023 Time:    02:51    X-Ray Shoulder Trauma Right    Result Date: 7/23/2023  EXAMINATION: XR SHOULDER TRAUMA 3 VIEW RIGHT CLINICAL HISTORY: Pain in right shoulder TECHNIQUE: Three or four views of the right shoulder were performed. COMPARISON: 03/01/2013. FINDINGS: Moderate degenerative changes in the right acromioclavicular and glenohumeral joints.  Small calcification noted along the inferior aspect of the right proximal humerus, potentially soft tissue calcification.  No convincing acute displaced fracture.  No dislocation.  No unexpected radiopaque foreign body.     Degenerative changes in the shoulder.  No convincing acute displaced fracture. Electronically signed by: Eben Cheney MD Date:    07/23/2023 Time:    02:47    X-Ray Forearm Right    Result Date: 7/23/2023  EXAMINATION: XR FOREARM RIGHT CLINICAL HISTORY: Pain, unspecified TECHNIQUE: AP and lateral views of the right forearm were performed. COMPARISON: None FINDINGS: No acute displaced fracture.  No dislocation.  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  Lunotriquetral coalition.  Chronic appearing small soft tissue calcification in the antecubital region.  Soft tissues are symmetric.     No acute displaced fracture. Electronically signed by: Eben Cheney MD Date:    07/23/2023 Time:    02:43    Assessment & Plan/Recommendations:     89-year old woman with history  of multiple cardiac risk factors, history of stroke, TIA, NSTEMI, was found to have a new stroke on admission, and is currently having multiple PVCs on cardiac monitoring, but otherwise hemodynamically stable.    New Onset Stroke  - Patient's home medications include Apixaban 2.5mg  - Would recommend adding antiplatelet therapy - Clopidogrel 75mg      Therese Patel MD  [facility], PGY1  08/14/2023 8:47 AM

## 2023-08-14 NOTE — PT/OT/SLP PROGRESS
Speech Language Pathology  VISIT Attempt      Krystina Washington  MRN: [id number]    1428pm:    Patient not seen today secondary to Bowel/bladder accident. Pt on commode, dtr in room and asking for assistance as pt with uncontrolled BMs. LPN made aware.  SLP will follow up.        8/14/2023

## 2023-08-14 NOTE — TELEPHONE ENCOUNTER
Refill Routing Note   Medication(s) are not appropriate for processing by [facility] for the following reason(s):      Patient seen in ED/Hospital since LOV with provider    [facility] action(s):  Defer Care Due:  None identified          Appointments  past 12m or future 3m with PCP    Date Provider   Last Visit   1/19/2023 Oniel Johnson MD   Next Visit   Visit date not found Oniel Johnson MD   ED visits in past 90 days: 0        Note composed:12:27 PM 08/14/2023

## 2023-08-14 NOTE — SUBJECTIVE & OBJECTIVE
Interval History:   The patient was seen this morning and was found in bed eating. She was in no acute distress, but appears to be hard of hearing. She was alert, aware, and oriented to person, place, and time. She reports no acute complaints. Cardiology was consulted this morning, waiting on recommendations.  Review of Systems   Constitutional:  Negative for fever.   Respiratory:  Negative for shortness of breath.    Cardiovascular:  Negative for chest pain.   Gastrointestinal:  Negative for abdominal pain.   Neurological:  Negative for headaches.     Objective:     Vital Signs (Most Recent):  Temp: 96.3 °F (35.7 °C) (08/14/23 0753)  Pulse: (!) 56 (08/14/23 0753)  Resp: 18 (08/14/23 0753)  BP: 127/61 (08/14/23 0753)  SpO2: 96 % (08/14/23 0753) Vital Signs (24h Range):  Temp:  [96.1 °F (35.6 °C)-96.3 °F (35.7 °C)] 96.3 °F (35.7 °C)  Pulse:  [56-63] 56  Resp:  [18] 18  SpO2:  [95 %-97 %] 96 %  BP: (127-188)/(61-81) 127/61     Weight: 73.1 kg (161 lb 2.5 oz)  Body mass index is 28.55 kg/m².    Intake/Output Summary (Last 24 hours) at 8/14/2023 0917  Last data filed at 8/14/2023 0547  Gross per 24 hour   Intake --   Output 1250 ml   Net -1250 ml         Physical Exam  Constitutional:       General: She is not in acute distress.  HENT:      Head: Normocephalic.      Nose: Nose normal.   Eyes:      Extraocular Movements: Extraocular movements intact.   Cardiovascular:      Pulses: Normal pulses.      Heart sounds: Normal heart sounds.   Pulmonary:      Effort: Pulmonary effort is normal.      Breath sounds: Normal breath sounds.   Musculoskeletal:      Right lower leg: No edema.      Left lower leg: No edema.   Skin:     General: Skin is warm and dry.      Capillary Refill: Capillary refill takes less than 2 seconds.   Neurological:      Mental Status: She is alert and oriented to person, place, and time.   Psychiatric:         Mood and Affect: Mood normal.             Significant Labs: All pertinent labs within the past  24 hours have been reviewed.  CBC:   Recent Labs   Lab 08/12/23 2200 08/12/23  2357 08/13/23  0527 08/14/23  0455   WBC 10.43  --  9.26 8.58   HGB 7.9*  --  7.5* 8.3*   HCT 24.7* 23* 24.3* 26.2*     --  174 176     CMP:   Recent Labs   Lab 08/12/23 2200 08/13/23 0527 08/14/23  0455    139 140   K 4.2 3.9 4.2    107 108   CO2 24 24 28   * 89 82   BUN 51* 49* 44*   CREATININE 2.1* 1.9* 1.6*   CALCIUM 9.5 9.3 9.4   PROT 6.4 6.1 6.3   ALBUMIN 2.8* 2.6* 2.6*   BILITOT 0.4 0.4 0.4   ALKPHOS 91 88 97   AST 20 18 22   ALT 11 10 9*   ANIONGAP 10 8 4*       Significant Imaging: I have reviewed all pertinent imaging results/findings within the past 24 hours.

## 2023-08-14 NOTE — PROGRESS NOTES
[facility]                    Pharmacy       Discharge Medication Education    Patient ACCEPTED medication education. Pharmacy has provided education on the name, indication, and possible side effects of the medication(s) prescribed, using teach-back method.     The following medications have also been discussed, during this admission.        Medication List        START taking these medications      clopidogreL 75 mg tablet  Commonly known as: PLAVIX  Take 1 tablet (75 mg total) by mouth once daily.            CHANGE how you take these medications      ENTRESTO 24-26 mg per tablet  Generic drug: sacubitriL-valsartan  TAKE 1 TABLET BY MOUTH TWICE DAILY  What changed: Another medication with the same name was removed. Continue taking this medication, and follow the directions you see here.     famotidine 20 MG tablet  Commonly known as: PEPCID  Take 1 tablet (20 mg total) by mouth once daily.  What changed:   when to take this  reasons to take this     furosemide 20 MG tablet  Commonly known as: LASIX  TAKE 1 TABLET(20 MG) BY MOUTH EVERY DAY  What changed:   how much to take  how to take this  when to take this  additional instructions            CONTINUE taking these medications      acetaminophen 325 MG tablet  Commonly known as: TYLENOL     albuterol 90 mcg/actuation inhaler  Commonly known as: PROVENTIL/VENTOLIN HFA  Inhale 1 puff into the lungs every 6 (six) hours as needed for Wheezing. 1 HFA Aerosol Inhaler Inhalation Twice a day     allopurinoL 100 MG tablet  Commonly known as: ZYLOPRIM  Take 1 tablet (100 mg total) by mouth once daily.     apixaban 2.5 mg Tab  Commonly known as: ELIQUIS  Take 1 tablet (2.5 mg total) by mouth 2 (two) times daily.     atorvastatin 40 MG tablet  Commonly known as: LIPITOR  TAKE 1 TABLET BY MOUTH EVERY DAY     blood sugar diagnostic Strp  Check BG daily prn if glucose found to be elevated on BMP, per facility protocol.     blood-glucose meter kit  Commonly  known as: FREESTYLE SYSTEM KIT  Use prn is blood glucose found to be elevated on routine BMP, per facility protocol.     carvediloL 12.5 MG tablet  Commonly known as: COREG  TAKE 1 TABLET(12.5 MG) BY MOUTH TWICE DAILY     EScitalopram oxalate 20 MG tablet  Commonly known as: LEXAPRO  TAKE 1 TABLET BY MOUTH EVERY DAY     hydrALAZINE 25 MG tablet  Commonly known as: APRESOLINE  TAKE 1 TABLET BY MOUTH EVERY 8 HOURS     isosorbide mononitrate 30 MG 24 hr tablet  Commonly known as: IMDUR  TAKE 1 TABLET(30 MG) BY MOUTH EVERY DAY     lancets Misc  Use daily prn if blood glucose found to be elevated on routine BMP, per facility protocol.     multivitamin tablet  Commonly known as: THERAGRAN     nitroGLYCERIN 0.4 MG/DOSE TL SPRY 400 mcg/spray spray  Commonly known as: NITROLINGUAL  PLACE 1 SPRAY ONTO THE TONGUE EVERY 5 (FIVE) MINUTES AS NEEDED.     timolol maleate 0.5% 0.5 % Drop  Commonly known as: TIMOPTIC  Place 1 drop into both eyes 2 (two) times daily.               Where to Get Your Medications        These medications were sent to [facility]  [address] [place] [address] [id number]      Hours: Mon-Fri, 8a-5:30p Phone: [phone]   clopidogreL 75 mg tablet       These medications were sent to [id number] - [place] [facility] - [address] AT [facility]  [address] [place] [address]      Phone: [phone]   furosemide 20 MG tablet          Thank you  Aziza Aguayo, PharmD  [phone]

## 2023-08-14 NOTE — PROGRESS NOTES
Virtual Nurse:Discharge orders noted; additional clinical references attached.  and pharmacy tech notified.  Patient's discharge instruction packet given by bedside RN.    Cued into patient's room.  Permission received per patient's son to turn camera to view patient.  Introduced as VN that will be instructing on discharge instructions.  Family member at bedside.  Educated patient's grandson on reason for admission; medications to hold, continue, and start, appointment to follow-up with doctor, and when to return to ED. Teach back method used. Verbalized understanding  Number given for 24/7 Nurse Line. Opportunity given for questions and questions answered.  Bedside nurse updated.       08/14/23 6600   Admission   Communication Issues? None   Shift   Virtual Nurse - Patient Verbalized Approval Of VN Rounding;Camera Use   Safety/Activity   Patient Rounds visualized patient   Safety Promotion/Fall Prevention Fall Risk reviewed with patient/family

## 2023-08-14 NOTE — HOSPITAL COURSE
"08/12: Patient admitted for altered mental status, initial CXR showed no abnormalities, CT head showed: No acute intracranial abnormality.    08/13: MRI head done and showed: "Interval development subcentimeter focus of restricted diffusion right cerebellum compatible with acute/recent infarction.  No significant mass effect or hemorrhagic conversion. Superimposed cerebral volume loss with superimposed large remote right PCA territory infarction and moderate left cerebellar areas of infarction with encephalomalacia." Neurology was consulted and they recommended the following: "will recommend consulting cardiology to evaluate possible of increasing her AC or adding aspirin to her regimen.will recommend full stroke workup for this admission".    08/14: Cardiology was consulted and saw the patient this morning and recommended adding Plavix 75mg per day to the patient's current regimen of Eliquis 2.5mg.    On discharge, the patient should resume home health PT/OT/SLP and start Plavix 75mg along with Eliquis 2.5mg.       "

## 2023-08-14 NOTE — ASSESSMENT & PLAN NOTE
"MRI brain on admit w/ interval development subcentimeter focus of restricted diffusion right cerebellum compatible with acute/recent infarction.  No significant mass effect or hemorrhagic conversion  Not on  ASA or Plavix at home. ASA discontinued 2021 after a fracture, though unclear why    Plan:  - Neurology consulted and recommended:  "will recommend consulting cardiology to evaluate possible of increasing her AC or adding aspirin to her regimen.  will recommend full stroke workup for this admission; Workup include: Lipid profile, A1c, B12, folate, TSH, TTE"  - B12, Folate, TSH were WNL  - TTE has been completed, waiting on official read.  - Neurochecks Q4 Hr  - If passes swallow screen, can have full liquids and oral medications but will have to wait until SLP swallow study to allow solid foods  - Continue Atorvastatin for secondary stroke prevention  - Initiate rehab efforts with PT/OT/SLP  - Elevate the head of the bed with aspiration precautions  - Fall precautions  "

## 2023-08-14 NOTE — PLAN OF CARE
Care plan reviewed with patient, ISAIAHOx2, family at bedside, no respiratory distress noted, on room air. NSR per cardiac monitor, no red alarm noted. Denies any pain of discomfort, education provided on medication effect and side effect, voices understanding. B/P elevated, MD notified, PRN medication administered as ordered, b/p rechecked, positive results. Call light within her reach, no apparent distress noted, bed in low position, bed alarm on, educated on the importance of calling as needed, voices understanding, stable at this time.       Problem: Adult Inpatient Plan of Care  Goal: Plan of Care Review  Outcome: Ongoing, Progressing  Goal: Patient-Specific Goal (Individualized)  Outcome: Ongoing, Progressing  Goal: Absence of Hospital-Acquired Illness or Injury  Outcome: Ongoing, Progressing  Goal: Optimal Comfort and Wellbeing  Outcome: Ongoing, Progressing  Goal: Readiness for Transition of Care  Outcome: Ongoing, Progressing

## 2023-08-15 ENCOUNTER — PES CALL (OUTPATIENT)
Dept: ADMINISTRATIVE | Facility: CLINIC | Age: 88
End: 2023-08-15
Payer: MEDICARE

## 2023-08-15 ENCOUNTER — PATIENT OUTREACH (OUTPATIENT)
Dept: ADMINISTRATIVE | Facility: CLINIC | Age: 88
End: 2023-08-15
Payer: MEDICARE

## 2023-08-15 NOTE — PROGRESS NOTES
C3 nurse attempted to contact Krystina Washington  for a TCC post hospital discharge follow up call. Spoke with patient's contact, unable to complete tcc call at this time. Provided tcc nurse's callback information per request.    [facility]

## 2023-08-16 NOTE — PROGRESS NOTES
C3 nurse spoke with Krystina Washington's contact, Merry for a TCC post hospital discharge follow up call. The patient has a scheduled HOSFU appointment with Kyra Hill NP on 08/18/23 @ 0800.

## 2023-08-17 NOTE — TELEPHONE ENCOUNTER
@ 2040 returning tcm vm from Hermelindo Mackenzie ([facility]). No answer, JUAN ANTONIO.

## 2023-08-18 ENCOUNTER — OFFICE VISIT (OUTPATIENT)
Dept: HOME HEALTH SERVICES | Facility: CLINIC | Age: 88
End: 2023-08-18
Payer: MEDICARE

## 2023-08-18 VITALS — HEART RATE: 68 BPM | RESPIRATION RATE: 18 BRPM | OXYGEN SATURATION: 98 % | TEMPERATURE: 98 F

## 2023-08-18 DIAGNOSIS — I63.431 EMBOLIC STROKE INVOLVING RIGHT POSTERIOR CEREBRAL ARTERY: Primary | ICD-10-CM

## 2023-08-18 DIAGNOSIS — G30.1 MODERATE LATE ONSET ALZHEIMER'S DEMENTIA WITHOUT BEHAVIORAL DISTURBANCE, PSYCHOTIC DISTURBANCE, MOOD DISTURBANCE, OR ANXIETY: ICD-10-CM

## 2023-08-18 DIAGNOSIS — F02.B0 MODERATE LATE ONSET ALZHEIMER'S DEMENTIA WITHOUT BEHAVIORAL DISTURBANCE, PSYCHOTIC DISTURBANCE, MOOD DISTURBANCE, OR ANXIETY: ICD-10-CM

## 2023-08-18 DIAGNOSIS — I63.9 STROKE: ICD-10-CM

## 2023-08-18 PROCEDURE — 99495 TRANSJ CARE MGMT MOD F2F 14D: CPT | Mod: S$GLB,,, | Performed by: NURSE PRACTITIONER

## 2023-08-18 PROCEDURE — 99495 TCM SERVICES (MODERATE COMPLEXITY): ICD-10-PCS | Mod: S$GLB,,, | Performed by: NURSE PRACTITIONER

## 2023-08-18 NOTE — PATIENT INSTRUCTIONS
Instructions:  - [facility] Nurse Practitioner to schedule home follow-up visit with patient in 4-6 weeks or as needed.  - Continue all medications, treatments and therapies as ordered.   - Follow all instructions, recommendations as discussed.  - Maintain Safety Precautions at all times.  - Attend all medical appointments as scheduled.  - For worsening symptoms: call Primary Care Physician or Nurse Practitioner.  - For emergencies, call 911 or immediately report to the nearest emergency room.  - Limit Risks of environmental exposure to coronavirus/COVID-19 as discussed including: social distancing, hand hygiene, and limiting departures from the home for necessities only.

## 2023-08-18 NOTE — PROGRESS NOTES
[facility]  Transition of Care Home Visit    Visit Date: 8/18/23  Encounter Provider: Kyra Talamantess   PCP:  Oniel Johnson MD    PRESENTING HISTORY      Patient ID: Krystina Washington is a 89 y.o. female.    Consult Requested By:  Dr. Abilio Flores III  Reason for Consult:  Hospital Follow Up.    Krystina is being seen at home due to being seen at home due to physical debility that presents a taxing effort to leave the home, to mitigate high risk of hospital readmission and/or due to the limited availability of reliable or safe options for transportation to the point of access to the provider. Prior to treatment on this visit the chart was reviewed and patient verbal consent was obtained.      Chief Complaint: Transitional Care        History of Present Illness: Ms. Krystina Washington is a 89 y.o. female who was recently admitted to the hospital.    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 According to her son, who is her caretaker, patient woke up and was okay, but acutely became sleepy, confused; she did not know her name or how old she was. Son reports that patient exhibited similar symptoms a couple of years ago when she had an UTI. He reports that she has some foul smelling urine today. She has not had any fevers lately, no cough, no c/o chest pain, shortness of breath, headaches, abdominal pain over the last few days. She has been eating and drinking well.      In the ED, vitals Temp 98.2, HR 61, RR 17, /64, 97% on RA. She received 500mL LR; per ED doctor, she  "perked up after. BUN 51/2.1. Hgb 7.9 (baseline ~8) Troponin 0.054 (baseline 0.03-0.05), UA normal. Utox negative. CT no acute findings. CXR no acute findings. COVID negative. Patient was admitted to [facility] for management of AMS & MARGARET.           Hospital Course:   08/12: Patient admitted for altered mental status, initial CXR showed no abnormalities, CT head showed: No acute intracranial abnormality.     08/13: MRI head done and showed: "Interval development subcentimeter focus of restricted diffusion right cerebellum compatible with acute/recent infarction.  No significant mass effect or hemorrhagic conversion. Superimposed cerebral volume loss with superimposed large remote right PCA territory infarction and moderate left cerebellar areas of infarction with encephalomalacia." Neurology was consulted and they recommended the following: "will recommend consulting cardiology to evaluate possible of increasing her AC or adding aspirin to her regimen.will recommend full stroke workup for this admission".     08/14: Cardiology was consulted and saw the patient this morning and recommended adding Plavix 75mg per day to the patient's current regimen of Eliquis 2.5mg.     On discharge, the patient should resume home health PT/OT/SLP and start Plavix 75mg along with Eliquis 2.5mg.  ___________________________________________________________________    Today:Ms. Washington is being evaluated at home today for a transition of care visit s/p hospitalization, see hospital course above.    She is found at home sitting up in her recliner chair. She is AAO X2, her grandkids are present for the visit. Her appetite is back to baseline, she has normal bowel movements. She requires max asst with ADLs and mobility.     Her PCP is Dr. Bauer. She follows Dr Flores for Cardiology. Her medication were all reconciled, no refills required today. I will continue to follow at home alone with PCP as patient is a high re admission " risk.    [facility] is working with patient over the last few weeks as well as  to help with patient needs. Education completed ~ 15 minutes. Plan to follow up.     VSS. Denies fever, chest pain, shortness of breath, nausea, vomiting, diarrhea. Risks of environmental exposure to coronavirus discussed including: social distancing, hand hygiene, and limiting departures from the home for necessities only.  Reports understanding and willingness to comply.  All hospital discharge orders reviewed and being followed, all medications reconciled and reviewed, patient and family verbalized understanding. No other needs identified at this time.     Discussion held with pt and/or family related to advanced care planning.  Discussed end of life goals and pt's wishes regarding end of care.  Encouraged designation of a HPOA and discussing wishes should the patient become sick and lose decision-making capacity.  Reviewed living will, [place] Post, and pt's wishes regarding life support and tube feeding.  Reviewed pt's current code status and prognosis.   Partial CODE STATUS  20 minutes spent in discussion of these services.        Attestation: Screening criteria to assess the level of the patient's risk for infection with COVID-19 as recommended by the CDC at the time of the above documented home visit concluded appropriateness to proceed. Universal precautions were maintained at all times, including provider use of 60% alcohol gel hand  immediately prior to entry and upon departing the patient's home.        Review of Systems   Constitutional:  Negative for fatigue and unexpected weight change.   HENT:  Negative for congestion.    Eyes:  Negative for redness.   Respiratory:  Negative for cough, choking and shortness of breath.    Cardiovascular:  Negative for chest pain and palpitations.   Gastrointestinal:  Negative for abdominal distention.   Genitourinary:  Negative for difficulty urinating.    Musculoskeletal:  Positive for arthralgias and gait problem.   Skin:  Negative for color change and rash.   Neurological:  Positive for weakness. Negative for dizziness.   Psychiatric/Behavioral:  Negative for agitation.        Assessments:  Environmental: Lives in single story with no steps to enter  Functional Status: Max asst with ADLs and mobility   Safety: fall precautions   Nutritional: Heart healthy  Home Health/DME/Supplies: [facility]    PAST HISTORY:     Past Medical History:   Diagnosis Date    Acute ischemic left posterior cerebral artery (PCA) stroke 12/3/2020    Anemia in stage 3b chronic kidney disease 3/8/2017    Arthritis     CHF (congestive heart failure)     Closed displaced subtrochanteric fracture of right femur s/p IM nail on 10/17/2021 10/16/2021    Coronary artery disease     Diabetes mellitus     Glaucoma     Gout, joint     Hx of CABG 9/21/10    Hyperlipidemia     Hypertension     NSTEMI (non-ST elevated myocardial infarction) 09/21/10    Stage 3b chronic kidney disease 10/21/2021    Stenosis of aortic and mitral valves     Systolic heart failure 6/19/2015    TIA (transient ischemic attack) 1/7/2021       Past Surgical History:   Procedure Laterality Date    ANTEGRADE INTRAMEDULLARY RODDING OF FEMUR Right 10/17/2021    Procedure: INSERTION, INTRAMEDULLARY ALTAF, FEMUR, ANTEGRADE;  Surgeon: Dino Rutledge MD;  Location: [facility] OR [address] [place] [address];  Service: Orthopedics;  Laterality: Right;    CARDIAC VALVE SURGERY      CATARACT EXTRACTION      CORONARY ARTERY BYPASS GRAFT  9/21/2010    ENTROPIAN REPAIR Left 3/6/2020    Procedure: REPAIR, ENTROPION;  Surgeon: Yulia Kincaid MD;  Location: [facility] OR [address] [place] [address];  Service: Ophthalmology;  Laterality: Left;    EYE SURGERY      JOINT REPLACEMENT      ORIF FEMUR FRACTURE Right 10/17/2021    Procedure: ORIF, FRACTURE, FEMUR;  Surgeon: Dino Rutledge MD;  Location: [facility] OR [address] [place] [address];  Service: Orthopedics;  Laterality: Right;       Family History   Problem Relation Age  of Onset    Diabetes Mother     Hypertension Father     Diabetes Father     Glaucoma Father     No Known Problems Sister     No Known Problems Brother     No Known Problems Maternal Aunt     No Known Problems Maternal Uncle     No Known Problems Paternal Aunt     No Known Problems Paternal Uncle     No Known Problems Maternal Grandmother     No Known Problems Maternal Grandfather     No Known Problems Paternal Grandmother     No Known Problems Paternal Grandfather        Social History     Socioeconomic History    Marital status:    Tobacco Use    Smoking status: Never    Smokeless tobacco: Never   Substance and Sexual Activity    Alcohol use: No    Drug use: No    Sexual activity: Never     Social Determinants of Health     Financial Resource Strain: Medium Risk (5/20/2021)    Overall Financial Resource Strain (CARDIA)     Difficulty of Paying Living Expenses: Somewhat hard   Food Insecurity: No Food Insecurity (5/20/2021)    Hunger Vital Sign     Worried About Running Out of Food in the Last Year: Never true     Ran Out of Food in the Last Year: Never true   Transportation Needs: No Transportation Needs (5/20/2021)    PRAPARE - Transportation     Lack of Transportation (Medical): No     Lack of Transportation (Non-Medical): No   Physical Activity: Insufficiently Active (5/20/2021)    Exercise Vital Sign     Days of Exercise per Week: 1 day     Minutes of Exercise per Session: 10 min   Stress: No Stress Concern Present (5/20/2021)    [place] [employer] - Occupational Stress Questionnaire     Feeling of Stress : Only a little   Social Connections: Socially Isolated (5/20/2021)    Social Connection and Isolation Panel [NHANES]     Frequency of Communication with Friends and Family: Never     Frequency of Social Gatherings with Friends and Family: Once a week     Attends Jew Services: More than 4 times per year     Active Member of Clubs or Organizations: No     Attends Club or  Organization Meetings: Never     Marital Status:    Housing Stability: Low Risk  (5/20/2021)    Housing Stability Vital Sign     Unable to Pay for Housing in the Last Year: No     Number of Places Lived in the Last Year: 1     Unstable Housing in the Last Year: No       MEDICATIONS & ALLERGIES:     Current Outpatient Medications on File Prior to Visit   Medication Sig Dispense Refill    acetaminophen (TYLENOL) 325 MG tablet Take 325 mg by mouth every 6 (six) hours as needed for Pain.      albuterol 90 mcg/actuation inhaler Inhale 1 puff into the lungs every 6 (six) hours as needed for Wheezing. 1 HFA Aerosol Inhaler Inhalation Twice a day 18 g 0    allopurinoL (ZYLOPRIM) 100 MG tablet Take 1 tablet (100 mg total) by mouth once daily. 90 tablet 3    apixaban (ELIQUIS) 2.5 mg Tab Take 1 tablet (2.5 mg total) by mouth 2 (two) times daily. 60 tablet 11    atorvastatin (LIPITOR) 40 MG tablet TAKE 1 TABLET BY MOUTH EVERY DAY 90 tablet 3    blood sugar diagnostic Strp Check BG daily prn if glucose found to be elevated on BMP, per facility protocol. (Patient not taking: Reported on 8/16/2023) 100 strip 6    blood-glucose meter (FREESTYLE SYSTEM KIT) kit Use prn is blood glucose found to be elevated on routine BMP, per facility protocol. 1 each 0    carvediloL (COREG) 12.5 MG tablet TAKE 1 TABLET(12.5 MG) BY MOUTH TWICE DAILY 180 tablet 3    clopidogreL (PLAVIX) 75 mg tablet Take 1 tablet (75 mg total) by mouth once daily. 30 tablet 11    ENTRESTO 24-26 mg per tablet TAKE 1 TABLET BY MOUTH TWICE DAILY 60 tablet 3    EScitalopram oxalate (LEXAPRO) 20 MG tablet TAKE 1 TABLET BY MOUTH EVERY DAY 90 tablet 3    famotidine (PEPCID) 20 MG tablet Take 1 tablet (20 mg total) by mouth once daily. (Patient not taking: Reported on 8/16/2023)      furosemide (LASIX) 20 MG tablet TAKE 1 TABLET(20 MG) BY MOUTH EVERY DAY 90 tablet 3    hydrALAZINE (APRESOLINE) 25 MG tablet TAKE 1 TABLET BY MOUTH EVERY 8 HOURS 90 tablet 11     isosorbide mononitrate (IMDUR) 30 MG 24 hr tablet TAKE 1 TABLET(30 MG) BY MOUTH EVERY DAY 90 tablet 3    lancets Misc Use daily prn if blood glucose found to be elevated on routine BMP, per facility protocol. (Patient not taking: Reported on 8/16/2023) 100 each 6    multivitamin (THERAGRAN) tablet Take 1 tablet by mouth once daily.      nitroGLYCERIN 0.4 MG/DOSE TL SPRY (NITROLINGUAL) 400 mcg/spray spray PLACE 1 SPRAY ONTO THE TONGUE EVERY 5 (FIVE) MINUTES AS NEEDED. 12 g 0    timolol maleate 0.5% (TIMOPTIC) 0.5 % Drop Place 1 drop into both eyes 2 (two) times daily. 15 mL 3     No current facility-administered medications on file prior to visit.        Review of patient's allergies indicates:   Allergen Reactions    Indocin [indomethacin sodium] Other (See Comments)     Either caused hot,burning body or caused madness per patient's grandson    Lotensin [benazepril] Other (See Comments)     Either caused hot ,burning body or caused madness per patient's grandson       OBJECTIVE:     Vital Signs:  Vitals:    08/18/23 1316   Pulse: 68   Resp: 18   Temp: 97.5 °F (36.4 °C)     There is no height or weight on file to calculate BMI.     Physical Exam:  Physical Exam  HENT:      Head: Atraumatic.   Cardiovascular:      Rate and Rhythm: Normal rate.      Pulses: Normal pulses.   Pulmonary:      Effort: Pulmonary effort is normal.   Abdominal:      General: Bowel sounds are normal.   Skin:     General: Skin is warm and dry.      Capillary Refill: Capillary refill takes less than 2 seconds.   Neurological:      Mental Status: She is alert. Mental status is at baseline.   Psychiatric:         Mood and Affect: Mood normal.         Laboratory  Lab Results   Component Value Date    WBC 8.58 08/14/2023    HGB 8.3 (L) 08/14/2023    HCT 26.2 (L) 08/14/2023     (H) 08/14/2023     08/14/2023     Lab Results   Component Value Date    INR 1.2 07/23/2023    INR 1.1 10/17/2021    INR 1.1 10/16/2021     Lab Results  "  Component Value Date    HGBA1C 6.0 (H) 08/13/2023     No results for input(s): "POCTGLUCOSE" in the last 72 hours.    Diagnostic Results:  N/a    TRANSITION OF CARE:     [facility] Contact Note: 08/15/2023    Family and/or Caretaker present at visit?  Yes.  Diagnostic tests reviewed/disposition: No diagnosic tests pending after this hospitalization.  Disease/illness education: Fall precautions  Home health/community services discussion/referrals: Patient has home health established at [facility] .   Establishment or re-establishment of referral orders for community resources: No other necessary community resources.   Discussion with other health care providers: No discussion with other health care providers necessary.     Transition of Care Visit:  I have reviewed and updated the history and problem list.  I have reconciled the medication list.  I have discussed the hospitalization and current medical issues, prognosis and plans with the patient/family.  I  spent more than 50% of time discussing the care with the patient/family.  Total Face-to-Face Encounter: 60 minutes.    Medications Reconciliation:   I have reconciled the patient's home medications and discharge medications with the patient/family. I have updated all changes.  Refer to After-Visit Medication List.    ASSESSMENT & PLAN:     HIGH RISK CONDITION(S):  Patient has a condition that poses threat to life and bodily function:     1. History of embolic stroke involving right posterior cerebral artery  Continue home PT/OT  Continue plavix, statin. BB  Outpatient neurology      2. Stroke  -     Ambulatory referral/consult to [facility]  Continue home PT/OT  Continue plavix, statin.   Outpatient neurology    3. 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  No      Patient Instructions Given:  - Continue all medications, treatments and therapies as ordered.   - Follow all instructions, recommendations as discussed.  - Maintain Safety Precautions at all times.  - Attend all medical appointments as scheduled.  - For worsening symptoms: call Primary Care Physician or Nurse Practitioner.  - For emergencies, call 911 or immediately report to the nearest emergency room.    After Visit Medication List :     Medication List            Accurate as of August 18, 2023  5:17 PM. If you have any questions, ask your nurse or doctor.                CONTINUE taking these medications      acetaminophen 325 MG tablet  Commonly known as: TYLENOL     albuterol 90 mcg/actuation inhaler  Commonly known as: PROVENTIL/VENTOLIN HFA  Inhale 1 puff into the lungs every 6 (six) hours as needed for Wheezing. 1 HFA Aerosol Inhaler Inhalation Twice a day     allopurinoL 100 MG tablet  Commonly known as: ZYLOPRIM  Take 1 tablet (100 mg total) by mouth once daily.     apixaban 2.5 mg Tab  Commonly known as: ELIQUIS  Take 1 tablet (2.5 mg total) by mouth 2 (two) times daily.     atorvastatin 40 MG tablet  Commonly known as: LIPITOR  TAKE 1 TABLET BY MOUTH EVERY DAY     blood sugar diagnostic Strp  Check BG daily prn if glucose found to be elevated on BMP, per facility protocol.     blood-glucose meter kit  Commonly known as: FREESTYLE SYSTEM KIT  Use prn is blood glucose found to be elevated on routine BMP, per facility protocol.     carvediloL 12.5 MG tablet  Commonly known as: COREG  TAKE 1 TABLET(12.5 MG) BY MOUTH TWICE DAILY     clopidogreL 75 mg tablet  Commonly known as: PLAVIX  Take 1 tablet (75 mg total) by mouth once daily.     ENTRESTO 24-26 mg per tablet  Generic drug: sacubitriL-valsartan  TAKE 1 TABLET BY MOUTH TWICE DAILY     EScitalopram oxalate 20 MG tablet  Commonly known as: LEXAPRO  TAKE 1 TABLET BY MOUTH EVERY DAY     famotidine 20 MG tablet  Commonly known as:  PEPCID  Take 1 tablet (20 mg total) by mouth once daily.     furosemide 20 MG tablet  Commonly known as: LASIX  TAKE 1 TABLET(20 MG) BY MOUTH EVERY DAY     hydrALAZINE 25 MG tablet  Commonly known as: APRESOLINE  TAKE 1 TABLET BY MOUTH EVERY 8 HOURS     isosorbide mononitrate 30 MG 24 hr tablet  Commonly known as: IMDUR  TAKE 1 TABLET(30 MG) BY MOUTH EVERY DAY     lancets Misc  Use daily prn if blood glucose found to be elevated on routine BMP, per facility protocol.     multivitamin tablet  Commonly known as: THERAGRAN     nitroGLYCERIN 0.4 MG/DOSE TL SPRY 400 mcg/spray spray  Commonly known as: NITROLINGUAL  PLACE 1 SPRAY ONTO THE TONGUE EVERY 5 (FIVE) MINUTES AS NEEDED.     timolol maleate 0.5% 0.5 % Drop  Commonly known as: TIMOPTIC  Place 1 drop into both eyes 2 (two) times daily.            Future Appointments   Date Time Provider Department Center   8/21/2023 11:00 AM Monica Burt NP [address]   9/11/2023  8:30 AM Oniel Johnson MD [facility]   10/9/2023  8:00 AM Kyra Hill [address] [place]   11/3/2023 11:30 AM Paty Golden MD [facility] [place]     Risks of environmental exposure to coronavirus discussed including: social distancing, hand hygiene, and limiting departures from the home for necessities only. Reports understanding and willingness to comply.     Signature:    Kyra Hill, MSN, APRN, FNP-C  [facility]

## 2023-08-21 ENCOUNTER — OFFICE VISIT (OUTPATIENT)
Dept: HOME HEALTH SERVICES | Facility: CLINIC | Age: 88
End: 2023-08-21
Payer: MEDICARE

## 2023-08-21 VITALS — WEIGHT: 161 LBS | RESPIRATION RATE: 16 BRPM | BODY MASS INDEX: 28.53 KG/M2 | HEIGHT: 63 IN

## 2023-08-21 DIAGNOSIS — I50.22 CHRONIC SYSTOLIC HEART FAILURE: ICD-10-CM

## 2023-08-21 DIAGNOSIS — N18.32 ANEMIA IN STAGE 3B CHRONIC KIDNEY DISEASE: ICD-10-CM

## 2023-08-21 DIAGNOSIS — I15.2 HYPERTENSION ASSOCIATED WITH DIABETES: ICD-10-CM

## 2023-08-21 DIAGNOSIS — E11.59 HYPERTENSION ASSOCIATED WITH DIABETES: ICD-10-CM

## 2023-08-21 DIAGNOSIS — N18.32 STAGE 3B CHRONIC KIDNEY DISEASE: ICD-10-CM

## 2023-08-21 DIAGNOSIS — F33.1 MAJOR DEPRESSIVE DISORDER, RECURRENT EPISODE, MODERATE: ICD-10-CM

## 2023-08-21 DIAGNOSIS — I11.0 HYPERTENSIVE HEART DISEASE WITH HEART FAILURE: ICD-10-CM

## 2023-08-21 DIAGNOSIS — I70.0 AORTIC ATHEROSCLEROSIS: ICD-10-CM

## 2023-08-21 DIAGNOSIS — J41.1 MUCOPURULENT CHRONIC BRONCHITIS: ICD-10-CM

## 2023-08-21 DIAGNOSIS — R26.9 ABNORMALITY OF GAIT AND MOBILITY: ICD-10-CM

## 2023-08-21 DIAGNOSIS — G30.1 MODERATE LATE ONSET ALZHEIMER'S DEMENTIA WITHOUT BEHAVIORAL DISTURBANCE, PSYCHOTIC DISTURBANCE, MOOD DISTURBANCE, OR ANXIETY: ICD-10-CM

## 2023-08-21 DIAGNOSIS — D63.1 ANEMIA IN STAGE 3B CHRONIC KIDNEY DISEASE: ICD-10-CM

## 2023-08-21 DIAGNOSIS — Z00.00 ENCOUNTER FOR PREVENTIVE HEALTH EXAMINATION: Primary | ICD-10-CM

## 2023-08-21 DIAGNOSIS — F02.B0 MODERATE LATE ONSET ALZHEIMER'S DEMENTIA WITHOUT BEHAVIORAL DISTURBANCE, PSYCHOTIC DISTURBANCE, MOOD DISTURBANCE, OR ANXIETY: ICD-10-CM

## 2023-08-21 DIAGNOSIS — E11.22 TYPE 2 DIABETES MELLITUS WITH STAGE 3B CHRONIC KIDNEY DISEASE, WITHOUT LONG-TERM CURRENT USE OF INSULIN: ICD-10-CM

## 2023-08-21 DIAGNOSIS — E11.9 NO HISTORY OF DIABETIC RETINOPATHY IN PAST YEAR: ICD-10-CM

## 2023-08-21 DIAGNOSIS — Z99.89 DEPENDENCE ON OTHER ENABLING MACHINES AND DEVICES: ICD-10-CM

## 2023-08-21 DIAGNOSIS — I25.118 CORONARY ARTERY DISEASE OF NATIVE ARTERY OF NATIVE HEART WITH STABLE ANGINA PECTORIS: ICD-10-CM

## 2023-08-21 DIAGNOSIS — N25.81 HYPERPARATHYROIDISM DUE TO RENAL INSUFFICIENCY: ICD-10-CM

## 2023-08-21 DIAGNOSIS — N18.32 TYPE 2 DIABETES MELLITUS WITH STAGE 3B CHRONIC KIDNEY DISEASE, WITHOUT LONG-TERM CURRENT USE OF INSULIN: ICD-10-CM

## 2023-08-21 PROBLEM — N18.4 CHRONIC KIDNEY DISEASE, STAGE 4 (SEVERE): Status: RESOLVED | Noted: 2023-01-19 | Resolved: 2023-08-21

## 2023-08-21 PROCEDURE — G0439 PR MEDICARE ANNUAL WELLNESS SUBSEQUENT VISIT: ICD-10-PCS | Mod: S$GLB,,,

## 2023-08-21 PROCEDURE — G0439 PPPS, SUBSEQ VISIT: HCPCS | Mod: S$GLB,,,

## 2023-08-21 NOTE — PATIENT INSTRUCTIONS
Counseling and Referral of Other Preventative  (Italic type indicates deductible and co-insurance are waived)    Patient Name: Krystina Washington  Today's Date: 8/21/2023    Health Maintenance       Date Due Completion Date    COVID-19 Vaccine (1) Never done ---    Shingles Vaccine (1 of 2) Never done ---    Diabetes Urine Screening 12/18/2010 12/18/2009    TETANUS VACCINE 10/30/2017 10/30/2007    Eye Exam 07/28/2023 7/28/2022    Influenza Vaccine (1) 09/01/2023 3/10/2022    Hemoglobin A1c 02/13/2024 8/13/2023    Lipid Panel 08/13/2024 8/13/2023        No orders of the defined types were placed in this encounter.    The following information is provided to all patients.  This information is to help you find resources for any of the problems found today that may be affecting your health:                Living healthy guide: www.[email or web address].[email or web address].[email or web address]      Understanding Diabetes: www.diabetes.org      Eating healthy: www.cdc.gov/healthyweight      CDC home safety checklist: www.cdc.gov/steadi/patient.html      Agency on Aging: www.goea.[place].[facility]      Alcoholics anonymous (AA): www.aa.org      Physical Activity: www.jennifer.nih.gov/id5zmvq      Tobacco use: www.quitwithusla.org

## 2023-08-21 NOTE — PROGRESS NOTES
"Krystina Washington presented for a  Medicare AWV and comprehensive Health Risk Assessment today. The following components were reviewed and updated:    Medical history  Family History  Social history  Allergies and Current Medications  Health Risk Assessment  Health Maintenance  Care Team         ** See Completed Assessments for Annual Wellness Visit within the encounter summary.**         The following assessments were completed:  Living Situation  CAGE  Depression Screening  Timed Get Up and Go: Deferred, impaired mobility  Whisper Test  Cognitive Function Screening: Deferred, dementia  Nutrition Screening  ADL Screening  PAQ Screening        Vitals:    08/21/23 1311   BP: (P) 110/60   BP Location: (P) Left arm   Patient Position: (P) Sitting   Pulse: (P) 78   Resp: 16   Temp: (P) 97 °F (36.1 °C)   SpO2: (P) 95%   Weight: 73 kg (161 lb)   Height: 5' 3" (1.6 m)     Body mass index is 28.52 kg/m².    Physical Exam  Vitals reviewed.   Constitutional:       General: She is not in acute distress.     Appearance: Normal appearance.   Cardiovascular:      Rate and Rhythm: Normal rate and regular rhythm.      Pulses: Normal pulses.      Heart sounds: No murmur heard.  Pulmonary:      Effort: Pulmonary effort is normal. No respiratory distress.      Breath sounds: Normal breath sounds.   Abdominal:      General: Bowel sounds are normal.   Musculoskeletal:      Right lower leg: No edema.      Left lower leg: No edema.   Neurological:      General: No focal deficit present.      Mental Status: She is alert and oriented to person, place, and time.      Gait: Gait abnormal.   Psychiatric:         Mood and Affect: Mood normal.         Behavior: Behavior normal.               Diagnoses and health risks identified today and associated recommendations/orders:    1. Encounter for preventive health examination  Assessments completed.  HM recommendations reviewed.  F/u with PCP as instructed.     Patient not on chronic opioids.   Risk " factors reviewed for any potential opioid use disorder   Pain evaluated during visit.  Current treatment plan documented.  Will refer to specialist, as appropriate.      2. Stage 3b chronic kidney disease  Chronic, stable on current regimen. Followed by PCP.     3. Type 2 diabetes mellitus with stage 3b chronic kidney disease, without long-term current use of insulin  Chronic, stable on current diet regimen. Followed by PCP.     4. Anemia in stage 3b chronic kidney disease  Chronic, stable on current regimen. Followed by PCP.     5. Mucopurulent chronic bronchitis  Chronic, stable on current regimen. Followed by PCP.     6. Aortic atherosclerosis  Chronic, stable on current statin regimen. Followed by PCP.     7. Coronary artery disease of native artery of native heart with stable angina pectoris  Chronic, stable on current regimen. Followed by PCP.     8. Chronic systolic heart failure  Chronic, stable on current Entresto, Imdur, Coreg, Lasix regimen. Followed by PCP.     9. Hypertensive heart disease with heart failure  Chronic, stable on current regimen. Followed by PCP.     10. Major depressive disorder, recurrent episode, moderate  Chronic, stable on current Lexapro regimen. Followed by PCP.     11. Moderate late onset Alzheimer's dementia without behavioral disturbance, psychotic disturbance, mood disturbance, or anxiety  Chronic, stable on current regimen. Followed by PCP.     12. Hyperparathyroidism due to renal insufficiency  Chronic, stable on current regimen. Followed by PCP.     13. Hypertension associated with diabetes  Chronic, stable on current Hydralazine regimen. Followed by PCP.     14. No history of diabetic retinopathy in past year  Chronic, stable on current regimen. Followed by Ophthalmology.     15. Abnormality of gait and mobility  Unsteady.     16. Dependence on other enabling machines and devices  Uses walker and wheelchair as needed.       Provided Krystina with a 5-10 year written  screening schedule and personal prevention plan. Recommendations were developed using the USPSTF age appropriate recommendations. Education, counseling, and referrals were provided as needed. After Visit Summary printed and given to patient which includes a list of additional screenings\tests needed.    Follow up in about 1 year (around 8/21/2024) for Medicare AWV.    Monica Burt NP    I offered to discuss advanced care planning, including how to pick a person who would make decisions for you if you were unable to make them for yourself, called a health care power of , and what kind of decisions you might make such as use of life sustaining treatments such as ventilators and tube feeding when faced with a life limiting illness recorded on a living will that they will need to know. (How you want to be cared for as you near the end of your natural life)     X  Patient has advanced directives on file, which we reviewed, and they do not wish to make changes.

## 2023-08-28 ENCOUNTER — PATIENT MESSAGE (OUTPATIENT)
Dept: INTERNAL MEDICINE | Facility: CLINIC | Age: 88
End: 2023-08-28
Payer: MEDICARE

## 2023-09-21 ENCOUNTER — TELEPHONE (OUTPATIENT)
Dept: FAMILY MEDICINE | Facility: CLINIC | Age: 88
End: 2023-09-21
Payer: MEDICARE

## 2023-09-21 NOTE — TELEPHONE ENCOUNTER
Left voice message informing patient's grandchild to call back to reschedule patient's appointment that was missed.

## 2023-10-09 ENCOUNTER — OFFICE VISIT (OUTPATIENT)
Dept: HOME HEALTH SERVICES | Facility: CLINIC | Age: 88
End: 2023-10-09
Payer: MEDICARE

## 2023-10-09 VITALS
SYSTOLIC BLOOD PRESSURE: 121 MMHG | RESPIRATION RATE: 18 BRPM | TEMPERATURE: 98 F | OXYGEN SATURATION: 98 % | DIASTOLIC BLOOD PRESSURE: 84 MMHG | HEART RATE: 80 BPM

## 2023-10-09 DIAGNOSIS — G30.1 MODERATE LATE ONSET ALZHEIMER'S DEMENTIA WITHOUT BEHAVIORAL DISTURBANCE, PSYCHOTIC DISTURBANCE, MOOD DISTURBANCE, OR ANXIETY: Primary | ICD-10-CM

## 2023-10-09 DIAGNOSIS — F02.B0 MODERATE LATE ONSET ALZHEIMER'S DEMENTIA WITHOUT BEHAVIORAL DISTURBANCE, PSYCHOTIC DISTURBANCE, MOOD DISTURBANCE, OR ANXIETY: Primary | ICD-10-CM

## 2023-10-09 DIAGNOSIS — I50.22 CHRONIC SYSTOLIC HEART FAILURE: ICD-10-CM

## 2023-10-09 DIAGNOSIS — E11.65 TYPE 2 DIABETES MELLITUS WITH HYPERGLYCEMIA, WITHOUT LONG-TERM CURRENT USE OF INSULIN: ICD-10-CM

## 2023-10-09 PROCEDURE — 99349 PR HOME VISIT,ESTAB PATIENT,LEVEL III: ICD-10-PCS | Mod: S$GLB,,, | Performed by: NURSE PRACTITIONER

## 2023-10-09 PROCEDURE — 99349 HOME/RES VST EST MOD MDM 40: CPT | Mod: S$GLB,,, | Performed by: NURSE PRACTITIONER

## 2023-10-09 RX ORDER — LANCETS
EACH MISCELLANEOUS
Qty: 100 EACH | Refills: 6
Start: 2023-10-09 | End: 2024-01-12 | Stop reason: SDUPTHER

## 2023-10-09 RX ORDER — INSULIN PUMP SYRINGE, 3 ML
EACH MISCELLANEOUS
Qty: 1 EACH | Refills: 0
Start: 2023-10-09 | End: 2024-01-12 | Stop reason: SDUPTHER

## 2023-10-09 NOTE — PATIENT INSTRUCTIONS
Instructions:  - [facility] Nurse Practitioner to schedule home follow-up visit with patient in 4-6 weeks or as needed.  - Continue all medications, treatments and therapies as ordered.   - Follow all instructions, recommendations as discussed.  - Maintain Safety Precautions at all times.  - Attend all medical appointments as scheduled.  - For worsening symptoms: call Primary Care Physician or Nurse Practitioner.  - For emergencies, call 911 or immediately report to the nearest emergency room.  - Limit Risks of environmental exposure to coronavirus/COVID-19 as discussed including: social distancing, hand hygiene, and limiting departures from the home for necessities only.

## 2023-10-09 NOTE — PROGRESS NOTES
[facility]  Transition of Care Home Visit    Visit Date: 10/9/23  Encounter Provider: Kyra Hill   PCP:  Oniel Johnson MD    PRESENTING HISTORY      Patient ID: Krystina Washington is a 89 y.o. female.    Consult Requested By:  No ref. provider found  Reason for Consult:  Hospital Follow Up.    Krystina is being seen at home due to being seen at home due to physical debility that presents a taxing effort to leave the home, to mitigate high risk of hospital readmission and/or due to the limited availability of reliable or safe options for transportation to the point of access to the provider. Prior to treatment on this visit the chart was reviewed and patient verbal consent was obtained.      Chief Complaint: Follow-up        History of Present Illness: Ms. Krystina Washington is a 89 y.o. female who was recently admitted to the hospital.    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 According to her son, who is her caretaker, patient woke up and was okay, but acutely became sleepy, confused; she did not know her name or how old she was. Son reports that patient exhibited similar symptoms a couple of years ago when she had an UTI. He reports that she has some foul smelling urine today. She has not had any fevers lately, no cough, no c/o chest pain, shortness of breath, headaches, abdominal pain over the last few days. She has been eating and drinking well.      In the ED, vitals Temp 98.2, HR 61, RR 17, /64, 97% on RA. She received 500mL LR; per ED doctor, she perked  "up after. BUN 51/2.1. Hgb 7.9 (baseline ~8) Troponin 0.054 (baseline 0.03-0.05), UA normal. Utox negative. CT no acute findings. CXR no acute findings. COVID negative. Patient was admitted to [facility] for management of AMS & MARGARET.           Hospital Course:   08/12: Patient admitted for altered mental status, initial CXR showed no abnormalities, CT head showed: No acute intracranial abnormality.     08/13: MRI head done and showed: "Interval development subcentimeter focus of restricted diffusion right cerebellum compatible with acute/recent infarction.  No significant mass effect or hemorrhagic conversion. Superimposed cerebral volume loss with superimposed large remote right PCA territory infarction and moderate left cerebellar areas of infarction with encephalomalacia." Neurology was consulted and they recommended the following: "will recommend consulting cardiology to evaluate possible of increasing her AC or adding aspirin to her regimen.will recommend full stroke workup for this admission".     08/14: Cardiology was consulted and saw the patient this morning and recommended adding Plavix 75mg per day to the patient's current regimen of Eliquis 2.5mg.     On discharge, the patient should resume home health PT/OT/SLP and start Plavix 75mg along with Eliquis 2.5mg.  ___________________________________________________________________    Today:Ms. Washington is being evaluated at home today for a follow up care visit s/p hospitalization, see hospital course above.    She is found at home sitting up in her recliner chair. She is AAO X2, her granddaughter is present for the visit. Her appetite is back to baseline, she has normal bowel movements. She requires max asst with ADLs and mobility.     Her PCP is Dr. Bauer. She follows Dr Flores for Cardiology. Her medication were all reconciled, no refills required today. I will continue to follow at home alone with PCP as patient is a high re admission " risk.    [facility] is working with patient over the last few weeks as well as  to help with patient needs. Education completed ~ 15 minutes. Plan to follow up.     VSS. Denies fever, chest pain, shortness of breath, nausea, vomiting, diarrhea. Risks of environmental exposure to coronavirus discussed including: social distancing, hand hygiene, and limiting departures from the home for necessities only.  Reports understanding and willingness to comply.  All hospital discharge orders reviewed and being followed, all medications reconciled and reviewed, patient and family verbalized understanding. No other needs identified at this time.       Attestation: Screening criteria to assess the level of the patient's risk for infection with COVID-19 as recommended by the CDC at the time of the above documented home visit concluded appropriateness to proceed. Universal precautions were maintained at all times, including provider use of 60% alcohol gel hand  immediately prior to entry and upon departing the patient's home.        Review of Systems   Constitutional:  Negative for fatigue and unexpected weight change.   HENT:  Negative for congestion.    Eyes:  Negative for redness.   Respiratory:  Negative for cough, choking and shortness of breath.    Cardiovascular:  Negative for chest pain and palpitations.   Gastrointestinal:  Negative for abdominal distention.   Genitourinary:  Negative for difficulty urinating.   Musculoskeletal:  Positive for arthralgias and gait problem.   Skin:  Negative for color change and rash.   Neurological:  Positive for weakness. Negative for dizziness.   Psychiatric/Behavioral:  Negative for agitation.        Assessments:  Environmental: Lives in single story with no steps to enter  Functional Status: Max asst with ADLs and mobility   Safety: fall precautions   Nutritional: Heart healthy  [place] Health/D[place]lies: [place]    PAST HISTORY:     Past Medical History:    Diagnosis Date    Acute ischemic left posterior cerebral artery (PCA) stroke 12/3/2020    Anemia in stage 3b chronic kidney disease 3/8/2017    Arthritis     CHF (congestive heart failure)     Closed displaced subtrochanteric fracture of right femur s/p IM nail on 10/17/2021 10/16/2021    Coronary artery disease     Diabetes mellitus     Glaucoma     Gout, joint     Hx of CABG 9/21/10    Hyperlipidemia     Hypertension     NSTEMI (non-ST elevated myocardial infarction) 09/21/10    Stage 3b chronic kidney disease 10/21/2021    Stenosis of aortic and mitral valves     Systolic heart failure 6/19/2015    TIA (transient ischemic attack) 1/7/2021       Past Surgical History:   Procedure Laterality Date    ANTEGRADE INTRAMEDULLARY RODDING OF FEMUR Right 10/17/2021    Procedure: INSERTION, INTRAMEDULLARY ALTAF, FEMUR, ANTEGRADE;  Surgeon: Dino Rutledge MD;  Location: [facility] OR [address] [place] [address];  Service: Orthopedics;  Laterality: Right;    CARDIAC VALVE SURGERY      CATARACT EXTRACTION      CORONARY ARTERY BYPASS GRAFT  9/21/2010    ENTROPIAN REPAIR Left 3/6/2020    Procedure: REPAIR, ENTROPION;  Surgeon: Yulia Kincaid MD;  Location: [facility] OR [address] [place] [address];  Service: Ophthalmology;  Laterality: Left;    EYE SURGERY      JOINT REPLACEMENT      ORIF FEMUR FRACTURE Right 10/17/2021    Procedure: ORIF, FRACTURE, FEMUR;  Surgeon: Dino Rutledge MD;  Location: [facility] OR [address] [place] [address];  Service: Orthopedics;  Laterality: Right;       Family History   Problem Relation Age of Onset    Diabetes Mother     Hypertension Father     Diabetes Father     Glaucoma Father     No Known Problems Sister     No Known Problems Brother     No Known Problems Maternal Aunt     No Known Problems Maternal Uncle     No Known Problems Paternal Aunt     No Known Problems Paternal Uncle     No Known Problems Maternal Grandmother     No Known Problems Maternal Grandfather     No Known Problems Paternal Grandmother     No Known Problems Paternal Grandfather        Social  History     Socioeconomic History    Marital status:    Tobacco Use    Smoking status: Never    Smokeless tobacco: Never   Substance and Sexual Activity    Alcohol use: No    Drug use: No    Sexual activity: Never     Social Determinants of Health     Financial Resource Strain: Low Risk  (8/21/2023)    Overall Financial Resource Strain (CARDIA)     Difficulty of Paying Living Expenses: Not hard at all   Food Insecurity: No Food Insecurity (8/21/2023)    Hunger Vital Sign     Worried About Running Out of Food in the Last Year: Never true     Ran Out of Food in the Last Year: Never true   Transportation Needs: No Transportation Needs (8/21/2023)    PRAPARE - Transportation     Lack of Transportation (Medical): No     Lack of Transportation (Non-Medical): No   Physical Activity: Insufficiently Active (8/21/2023)    Exercise Vital Sign     Days of Exercise per Week: 3 days     Minutes of Exercise per Session: 30 min   Stress: No Stress Concern Present (8/21/2023)    [employer] [place] - Occupational Stress Questionnaire     Feeling of Stress : Not at all   Social Connections: Socially Isolated (8/21/2023)    Social Connection and Isolation Panel [NHANES]     Frequency of Communication with Friends and Family: Once a week     Frequency of Social Gatherings with Friends and Family: Once a week     Attends Quaker Services: Never     Active Member of Clubs or Organizations: No     Attends Club or Organization Meetings: Never     Marital Status:    Housing Stability: Low Risk  (8/21/2023)    Housing Stability Vital Sign     Unable to Pay for Housing in the Last Year: No     Number of Places Lived in the Last Year: 1     Unstable Housing in the Last Year: No       MEDICATIONS & ALLERGIES:     Current Outpatient Medications on File Prior to Visit   Medication Sig Dispense Refill    acetaminophen (TYLENOL) 325 MG tablet Take 325 mg by mouth every 6 (six) hours as needed for Pain.       albuterol 90 mcg/actuation inhaler Inhale 1 puff into the lungs every 6 (six) hours as needed for Wheezing. 1 HFA Aerosol Inhaler Inhalation Twice a day 18 g 0    allopurinoL (ZYLOPRIM) 100 MG tablet Take 1 tablet (100 mg total) by mouth once daily. 90 tablet 3    apixaban (ELIQUIS) 2.5 mg Tab Take 1 tablet (2.5 mg total) by mouth 2 (two) times daily. 60 tablet 11    atorvastatin (LIPITOR) 40 MG tablet TAKE 1 TABLET BY MOUTH EVERY DAY 90 tablet 3    carvediloL (COREG) 12.5 MG tablet TAKE 1 TABLET(12.5 MG) BY MOUTH TWICE DAILY 180 tablet 3    clopidogreL (PLAVIX) 75 mg tablet Take 1 tablet (75 mg total) by mouth once daily. 30 tablet 11    ENTRESTO 24-26 mg per tablet TAKE 1 TABLET BY MOUTH TWICE DAILY 60 tablet 3    EScitalopram oxalate (LEXAPRO) 20 MG tablet TAKE 1 TABLET BY MOUTH EVERY DAY 90 tablet 3    furosemide (LASIX) 20 MG tablet TAKE 1 TABLET(20 MG) BY MOUTH EVERY DAY 90 tablet 3    hydrALAZINE (APRESOLINE) 25 MG tablet TAKE 1 TABLET BY MOUTH EVERY 8 HOURS 90 tablet 11    isosorbide mononitrate (IMDUR) 30 MG 24 hr tablet TAKE 1 TABLET(30 MG) BY MOUTH EVERY DAY 90 tablet 3    multivitamin (THERAGRAN) tablet Take 1 tablet by mouth once daily.      nitroGLYCERIN 0.4 MG/DOSE TL SPRY (NITROLINGUAL) 400 mcg/spray spray PLACE 1 SPRAY ONTO THE TONGUE EVERY 5 (FIVE) MINUTES AS NEEDED. 12 g 0    timolol maleate 0.5% (TIMOPTIC) 0.5 % Drop Place 1 drop into both eyes 2 (two) times daily. 15 mL 3    [DISCONTINUED] blood sugar diagnostic Strp Check BG daily prn if glucose found to be elevated on BMP, per facility protocol. (Patient not taking: Reported on 8/16/2023) 100 strip 6    [DISCONTINUED] blood-glucose meter (FREESTYLE SYSTEM KIT) kit Use prn is blood glucose found to be elevated on routine BMP, per facility protocol. 1 each 0    [DISCONTINUED] lancets Misc Use daily prn if blood glucose found to be elevated on routine BMP, per facility protocol. (Patient not taking: Reported on 8/16/2023) 100 each 6     No  "current facility-administered medications on file prior to visit.        Review of patient's allergies indicates:   Allergen Reactions    Indocin [indomethacin sodium] Other (See Comments)     Either caused hot,burning body or caused madness per patient's grandson    Lotensin [benazepril] Other (See Comments)     Either caused hot ,burning body or caused madness per patient's grandson       OBJECTIVE:     Vital Signs:  Vitals:    10/09/23 1756   BP: 121/84   Pulse: 80   Resp: 18   Temp: 97.6 °F (36.4 °C)     There is no height or weight on file to calculate BMI.     Physical Exam:  Physical Exam  HENT:      Head: Atraumatic.   Cardiovascular:      Rate and Rhythm: Normal rate.      Pulses: Normal pulses.   Pulmonary:      Effort: Pulmonary effort is normal.   Abdominal:      General: Bowel sounds are normal.   Skin:     General: Skin is warm and dry.      Capillary Refill: Capillary refill takes less than 2 seconds.   Neurological:      Mental Status: She is alert. Mental status is at baseline.   Psychiatric:         Mood and Affect: Mood normal.         Laboratory  Lab Results   Component Value Date    WBC 8.58 08/14/2023    HGB 8.3 (L) 08/14/2023    HCT 26.2 (L) 08/14/2023     (H) 08/14/2023     08/14/2023     Lab Results   Component Value Date    INR 1.2 07/23/2023    INR 1.1 10/17/2021    INR 1.1 10/16/2021     Lab Results   Component Value Date    HGBA1C 6.0 (H) 08/13/2023     No results for input(s): "POCTGLUCOSE" in the last 72 hours.    Diagnostic Results:  N/a    TRANSITION OF CARE:     [facility] Contact Note: 08/15/2023    Family and/or Caretaker present at visit?  Yes.  Diagnostic tests reviewed/disposition: No diagnosic tests pending after this hospitalization.  Disease/illness education: Fall precautions  Home health/community services discussion/referrals: Patient has home health established at [facility] .   Establishment or re-establishment of referral orders for community resources: No " other necessary community resources.   Discussion with other health care providers: No discussion with other health care providers necessary.     Transition of Care Visit:  I have reviewed and updated the history and problem list.  I have reconciled the medication list.  I have discussed the hospitalization and current medical issues, prognosis and plans with the patient/family.  I  spent more than 50% of time discussing the care with the patient/family.  Total Face-to-Face Encounter: 60 minutes.    Medications Reconciliation:   I have reconciled the patient's home medications and discharge medications with the patient/family. I have updated all changes.  Refer to After-Visit Medication List.    ASSESSMENT & PLAN:     HIGH RISK CONDITION(S):  Patient has a condition that poses threat to life and bodily function:     1. History of embolic stroke involving right posterior cerebral artery  Continue home PT/OT  Continue plavix, statin.   Outpatient neurology      2. Stroke  -     Ambulatory referral/consult to [facility]  Continue home PT/OT  Continue plavix, statin.   Outpatient neurology    3. 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  No      Patient Instructions Given:  - Continue all medications, treatments and therapies as ordered.   - Follow all instructions, recommendations as discussed.  - Maintain Safety Precautions at all times.  - Attend all medical appointments as scheduled.  - For worsening symptoms: call Primary Care Physician or Nurse Practitioner.  - For emergencies, call 911 or immediately report to the nearest emergency room.    After Visit Medication List :     Medication List            Accurate as of October 9, 2023  5:57 PM. If you have any questions, ask your nurse or doctor.                 CHANGE how you take these medications      blood-glucose meter kit  Commonly known as: FREESTYLE SYSTEM KIT  Patient to check blood glucose daily every evening.  What changed: additional instructions  Changed by: Kyra Hill NP            CONTINUE taking these medications      acetaminophen 325 MG tablet  Commonly known as: TYLENOL     albuterol 90 mcg/actuation inhaler  Commonly known as: PROVENTIL/VENTOLIN HFA  Inhale 1 puff into the lungs every 6 (six) hours as needed for Wheezing. 1 HFA Aerosol Inhaler Inhalation Twice a day     allopurinoL 100 MG tablet  Commonly known as: ZYLOPRIM  Take 1 tablet (100 mg total) by mouth once daily.     apixaban 2.5 mg Tab  Commonly known as: ELIQUIS  Take 1 tablet (2.5 mg total) by mouth 2 (two) times daily.     atorvastatin 40 MG tablet  Commonly known as: LIPITOR  TAKE 1 TABLET BY MOUTH EVERY DAY     blood sugar diagnostic Strp  Check BG daily prn if glucose found to be elevated on BMP, per facility protocol.     carvediloL 12.5 MG tablet  Commonly known as: COREG  TAKE 1 TABLET(12.5 MG) BY MOUTH TWICE DAILY     clopidogreL 75 mg tablet  Commonly known as: PLAVIX  Take 1 tablet (75 mg total) by mouth once daily.     ENTRESTO 24-26 mg per tablet  Generic drug: sacubitriL-valsartan  TAKE 1 TABLET BY MOUTH TWICE DAILY     EScitalopram oxalate 20 MG tablet  Commonly known as: LEXAPRO  TAKE 1 TABLET BY MOUTH EVERY DAY     furosemide 20 MG tablet  Commonly known as: LASIX  TAKE 1 TABLET(20 MG) BY MOUTH EVERY DAY     hydrALAZINE 25 MG tablet  Commonly known as: APRESOLINE  TAKE 1 TABLET BY MOUTH EVERY 8 HOURS     isosorbide mononitrate 30 MG 24 hr tablet  Commonly known as: IMDUR  TAKE 1 TABLET(30 MG) BY MOUTH EVERY DAY     lancets Misc  Use daily prn if blood glucose found to be elevated on routine BMP, per facility protocol.     multivitamin tablet  Commonly known as: THERAGRAN     nitroGLYCERIN 0.4 MG/DOSE TL SPRY 400 mcg/spray spray  Commonly known as:  NITROLINGUAL  PLACE 1 SPRAY ONTO THE TONGUE EVERY 5 (FIVE) MINUTES AS NEEDED.     timolol maleate 0.5% 0.5 % Drop  Commonly known as: TIMOPTIC  Place 1 drop into both eyes 2 (two) times daily.               Where to Get Your Medications        Information about where to get these medications is not yet available    Ask your nurse or doctor about these medications  blood sugar diagnostic Strp  blood-glucose meter kit  lancets Misc       Future Appointments   Date Time Provider Department Center   11/3/2023 11:30 AM Paty Golden MD [facility] [place]     Risks of environmental exposure to coronavirus discussed including: social distancing, hand hygiene, and limiting departures from the home for necessities only. Reports understanding and willingness to comply.     Signature:    Kyra Hill, MSN, APRN, FNP-C  [facility]

## 2023-10-26 DIAGNOSIS — I50.22 CHRONIC SYSTOLIC HEART FAILURE: ICD-10-CM

## 2023-10-26 RX ORDER — HYDRALAZINE HYDROCHLORIDE 25 MG/1
25 TABLET, FILM COATED ORAL EVERY 8 HOURS
Qty: 90 TABLET | Refills: 11 | Status: SHIPPED | OUTPATIENT
Start: 2023-10-26

## 2023-11-03 ENCOUNTER — PATIENT MESSAGE (OUTPATIENT)
Dept: CARDIOLOGY | Facility: CLINIC | Age: 88
End: 2023-11-03

## 2023-11-10 RX ORDER — SACUBITRIL AND VALSARTAN 24; 26 MG/1; MG/1
1 TABLET, FILM COATED ORAL 2 TIMES DAILY
Qty: 60 TABLET | Refills: 3 | Status: SHIPPED | OUTPATIENT
Start: 2023-11-10 | End: 2023-11-14 | Stop reason: DRUGHIGH

## 2023-11-13 PROBLEM — I63.9 STROKE: Status: RESOLVED | Noted: 2023-08-14 | Resolved: 2023-11-13

## 2023-11-14 ENCOUNTER — OFFICE VISIT (OUTPATIENT)
Dept: CARDIOLOGY | Facility: CLINIC | Age: 88
End: 2023-11-14
Payer: MEDICARE

## 2023-11-14 VITALS
HEART RATE: 74 BPM | DIASTOLIC BLOOD PRESSURE: 56 MMHG | SYSTOLIC BLOOD PRESSURE: 125 MMHG | BODY MASS INDEX: 30.28 KG/M2 | HEIGHT: 63 IN | WEIGHT: 170.88 LBS

## 2023-11-14 DIAGNOSIS — I63.431 EMBOLIC STROKE INVOLVING RIGHT POSTERIOR CEREBRAL ARTERY: Primary | ICD-10-CM

## 2023-11-14 DIAGNOSIS — I25.118 CORONARY ARTERY DISEASE OF NATIVE ARTERY OF NATIVE HEART WITH STABLE ANGINA PECTORIS: ICD-10-CM

## 2023-11-14 DIAGNOSIS — I70.0 AORTIC ATHEROSCLEROSIS: ICD-10-CM

## 2023-11-14 DIAGNOSIS — I50.22 CHRONIC SYSTOLIC HEART FAILURE: ICD-10-CM

## 2023-11-14 PROCEDURE — 99214 OFFICE O/P EST MOD 30 MIN: CPT | Mod: S$PBB,,, | Performed by: INTERNAL MEDICINE

## 2023-11-14 PROCEDURE — 99999 PR PBB SHADOW E&M-EST. PATIENT-LVL IV: CPT | Mod: PBBFAC,,, | Performed by: INTERNAL MEDICINE

## 2023-11-14 PROCEDURE — 99214 OFFICE O/P EST MOD 30 MIN: CPT | Mod: PBBFAC,PO | Performed by: INTERNAL MEDICINE

## 2023-11-14 RX ORDER — SACUBITRIL AND VALSARTAN 49; 51 MG/1; MG/1
1 TABLET, FILM COATED ORAL 2 TIMES DAILY
Qty: 60 TABLET | Refills: 11 | Status: SHIPPED | OUTPATIENT
Start: 2023-11-14

## 2023-11-14 NOTE — PATIENT INSTRUCTIONS
Take 1/2 of a 5 mg tab of ELIQUIS (apixaban) twice a day  Take 1/2 of a 49-51 mg tab of ENTRESTO (sacubitril/valsartan) twice a day

## 2023-11-14 NOTE — PROGRESS NOTES
Subjective:   Chief Complaint:  Krystina Washington is a 89 y.o. female who presents for follow-up of No chief complaint on file.      Problem List and HPI:   HFrEF, EF 25% >50%  TAVR 26 S3 2017 for severe aortic stenosis    CAD   S/P CABG x3  9/10  Old MI   HTN   Hyperlipidemia   T2D - long standing   CKD 3  Stroke 12/2020  TIA 1/2021    Accompanied by her grandson Hermelindo Cross and Merry.  She woke up confused in 8/2023. Hospitalized and was found to have evidence of an acute/recent right cerebellar stroke on MRI. On apixaban since 12/2020. Clopidogrel was added.  Anemic. No active bleeding.  Not very active.  Moves around using a walker.       Review of patient's allergies indicates:   Allergen Reactions    Indocin [indomethacin sodium] Other (See Comments)     Either caused hot,burning body or caused madness per patient's grandson    Lotensin [benazepril] Other (See Comments)     Either caused hot ,burning body or caused madness per patient's grandson        Current Outpatient Medications   Medication Sig    acetaminophen (TYLENOL) 325 MG tablet Take 325 mg by mouth every 6 (six) hours as needed for Pain.    albuterol 90 mcg/actuation inhaler Inhale 1 puff into the lungs every 6 (six) hours as needed for Wheezing. 1 HFA Aerosol Inhaler Inhalation Twice a day    allopurinoL (ZYLOPRIM) 100 MG tablet Take 1 tablet (100 mg total) by mouth once daily.    apixaban (ELIQUIS) 2.5 mg Tab Take 1 tablet (2.5 mg total) by mouth 2 (two) times daily.    atorvastatin (LIPITOR) 40 MG tablet TAKE 1 TABLET BY MOUTH EVERY DAY    blood sugar diagnostic Strp Check BG daily prn if glucose found to be elevated on BMP, per facility protocol.    blood-glucose meter (FREESTYLE SYSTEM KIT) kit Patient to check blood glucose daily every evening.    carvediloL (COREG) 12.5 MG tablet TAKE 1 TABLET(12.5 MG) BY MOUTH TWICE DAILY    clopidogreL (PLAVIX) 75 mg tablet Take 1 tablet (75 mg total) by mouth once daily.    EScitalopram oxalate  "(LEXAPRO) 20 MG tablet TAKE 1 TABLET BY MOUTH EVERY DAY    furosemide (LASIX) 20 MG tablet TAKE 1 TABLET(20 MG) BY MOUTH EVERY DAY (Patient taking differently: Take 20 mg by mouth every other day.)    hydrALAZINE (APRESOLINE) 25 MG tablet Take 1 tablet (25 mg total) by mouth every 8 (eight) hours.    isosorbide mononitrate (IMDUR) 30 MG 24 hr tablet TAKE 1 TABLET(30 MG) BY MOUTH EVERY DAY    lancets Misc Use daily prn if blood glucose found to be elevated on routine BMP, per facility protocol.    multivitamin (THERAGRAN) tablet Take 1 tablet by mouth once daily.    nitroGLYCERIN 0.4 MG/DOSE TL SPRY (NITROLINGUAL) 400 mcg/spray spray PLACE 1 SPRAY ONTO THE TONGUE EVERY 5 (FIVE) MINUTES AS NEEDED.    sacubitriL-valsartan (ENTRESTO) 24-26 mg per tablet Take 1 tablet by mouth 2 (two) times daily.    timolol maleate 0.5% (TIMOPTIC) 0.5 % Drop Place 1 drop into both eyes 2 (two) times daily.         Social history:  Krystina Washington  reports that she has never smoked. She has never used smokeless tobacco. She reports that she does not drink alcohol and does not use drugs.      Objective:   BP (!) 125/56   Pulse 74   Ht 5' 3" (1.6 m)   Wt 77.5 kg (170 lb 13.7 oz)   LMP  (LMP Unknown)   BMI 30.27 kg/m²    Physical Exam  Constitutional:       Appearance: Normal appearance. She is well-developed.   HENT:      Right Ear: Decreased hearing noted.      Left Ear: Decreased hearing noted.   Neck:      Vascular: No JVD.   Cardiovascular:      Rate and Rhythm: Normal rate and regular rhythm.      Pulses:           Radial pulses are 2+ on the right side and 2+ on the left side.      Heart sounds: S1 normal and S2 normal. No murmur heard.  Pulmonary:      Breath sounds: No decreased breath sounds, wheezing or rales.   Chest:      Chest wall: There is no dullness to percussion.   Abdominal:      Palpations: Abdomen is soft. There is no hepatomegaly or splenomegaly.      Tenderness: There is no abdominal tenderness. "   Musculoskeletal:      Right lower leg: No edema.      Left lower leg: No edema.   Skin:     General: Skin is warm.      Findings: No bruising.      Nails: There is no clubbing.   Neurological:      Mental Status: She is alert and oriented to person, place, and time.   Psychiatric:         Speech: Speech normal.         Behavior: Behavior normal.             Results for orders placed during the hospital encounter of 08/12/23    Echo Saline Bubble? Yes    Interpretation Summary    Left Ventricle: The left ventricle is normal in size. Normal wall thickness. Septal motion is consistent with bundle branch block. Ejection fraction by visual approximation is 50%. There is normal diastolic function.    Left Atrium: Left atrium is mildly dilated.    Right Ventricle: Systolic function is normal.    Aortic Valve: There is a transcatheter valve replacement in the aortic position. It is reported to be a 26 mm Rubalcava valve. Aortic valve peak velocity is 2.18 m/s. Mean gradient is 9 mmHg.    Tricuspid Valve: There is mild regurgitation.    Pulmonic Valve: The estimated PA systolic pressure is 38 mmHg.    IVC/SVC: Normal venous pressure at 3 mmHg.    Negative bubble study       No results found for this or any previous visit.      No results found. However, due to the size of the patient record, not all encounters were searched. Please check Results Review for a complete set of results.        Assessment and Plan:       ICD-10-CM ICD-9-CM   1. 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 Chronic systolic heart failure  I50.22 428.22   3. Coronary artery disease of native artery of native heart with stable angina pectoris  I25.118 414.01     413.9   4. Aortic atherosclerosis  I70.0 440.0        Stroke occurred while on apixaban. Continue clopidogrel 75 mg daily. Decrease apixaban to 2.5 mg bid. No aspirin.  CHF stable. Continue sacubitril/valsartan.  CAD stable. No angina. Continue same  meds.        Orders placed during this encounter:     History of embolic stroke involving right posterior cerebral artery  -     CBC Without Differential; Future; Expected date: 08/04/2024    Chronic systolic heart failure  -     Comprehensive Metabolic Panel; Future; Expected date: 08/04/2024    Coronary artery disease of native artery of native heart with stable angina pectoris  -     Lipid Panel; Future; Expected date: 08/04/2024  -     EKG 12-lead; Future; Expected date: 08/04/2024    Aortic atherosclerosis  -     Lipid Panel; Future; Expected date: 08/04/2024         Follow up in about 9 months (around 8/14/2024).

## 2023-12-05 ENCOUNTER — CARE AT HOME (OUTPATIENT)
Dept: HOME HEALTH SERVICES | Facility: CLINIC | Age: 88
End: 2023-12-05
Payer: MEDICARE

## 2023-12-05 DIAGNOSIS — G30.1 MODERATE LATE ONSET ALZHEIMER'S DEMENTIA WITHOUT BEHAVIORAL DISTURBANCE, PSYCHOTIC DISTURBANCE, MOOD DISTURBANCE, OR ANXIETY: Primary | ICD-10-CM

## 2023-12-05 DIAGNOSIS — F02.B0 MODERATE LATE ONSET ALZHEIMER'S DEMENTIA WITHOUT BEHAVIORAL DISTURBANCE, PSYCHOTIC DISTURBANCE, MOOD DISTURBANCE, OR ANXIETY: Primary | ICD-10-CM

## 2023-12-05 PROCEDURE — 99349 HOME/RES VST EST MOD MDM 40: CPT | Mod: S$GLB,,, | Performed by: NURSE PRACTITIONER

## 2023-12-05 PROCEDURE — 99349 PR HOME VISIT,ESTAB PATIENT,LEVEL III: ICD-10-PCS | Mod: S$GLB,,, | Performed by: NURSE PRACTITIONER

## 2023-12-06 VITALS — TEMPERATURE: 98 F | RESPIRATION RATE: 20 BRPM | HEART RATE: 80 BPM | OXYGEN SATURATION: 98 %

## 2023-12-07 NOTE — PROGRESS NOTES
[facility]  Transition of Care Home Visit    Visit Date: 12/5/23  Encounter Provider: Kyra Hill   PCP:  Oniel Johnson MD    PRESENTING HISTORY      Patient ID: Krystina Washington is a 89 y.o. female.    Consult Requested By:  No ref. provider found  Reason for Consult:  Hospital Follow Up.    Krystina is being seen at home due to being seen at home due to physical debility that presents a taxing effort to leave the home, to mitigate high risk of hospital readmission and/or due to the limited availability of reliable or safe options for transportation to the point of access to the provider. Prior to treatment on this visit the chart was reviewed and patient verbal consent was obtained.      Chief Complaint: Follow-up        History of Present Illness: Ms. Krystina Washington is a 89 y.o. female who was recently admitted to the hospital.    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 According to her son, who is her caretaker, patient woke up and was okay, but acutely became sleepy, confused; she did not know her name or how old she was. Son reports that patient exhibited similar symptoms a couple of years ago when she had an UTI. He reports that she has some foul smelling urine today. She has not had any fevers lately, no cough, no c/o chest pain, shortness of breath, headaches, abdominal pain over the last few days. She has been eating and drinking well.      In the ED, vitals Temp 98.2, HR 61, RR 17, /64, 97% on RA. She received 500mL LR; per ED doctor, she perked  "up after. BUN 51/2.1. Hgb 7.9 (baseline ~8) Troponin 0.054 (baseline 0.03-0.05), UA normal. Utox negative. CT no acute findings. CXR no acute findings. COVID negative. Patient was admitted to [facility] for management of AMS & MARGARET.           Hospital Course:   08/12: Patient admitted for altered mental status, initial CXR showed no abnormalities, CT head showed: No acute intracranial abnormality.     08/13: MRI head done and showed: "Interval development subcentimeter focus of restricted diffusion right cerebellum compatible with acute/recent infarction.  No significant mass effect or hemorrhagic conversion. Superimposed cerebral volume loss with superimposed large remote right PCA territory infarction and moderate left cerebellar areas of infarction with encephalomalacia." Neurology was consulted and they recommended the following: "will recommend consulting cardiology to evaluate possible of increasing her AC or adding aspirin to her regimen.will recommend full stroke workup for this admission".     08/14: Cardiology was consulted and saw the patient this morning and recommended adding Plavix 75mg per day to the patient's current regimen of Eliquis 2.5mg.     On discharge, the patient should resume home health PT/OT/SLP and start Plavix 75mg along with Eliquis 2.5mg.  ___________________________________________________________________    Today:Ms. Washington is being evaluated at home today for a follow up care visit s/p hospitalization, see hospital course above.    She is found at home sitting up in her recliner chair. She is AAO X2, her granddaughter is present for the visit. Her appetite is back to baseline, she has normal bowel movements. She requires max asst with ADLs and mobility.     Her PCP is Dr. Bauer. She follows Dr Flores for Cardiology. Her medication were all reconciled, no refills required today. I will continue to follow at home alone with PCP as patient is a high re admission " risk.    [facility] is working with patient over the last few weeks as well as  to help with patient needs. Education completed ~ 15 minutes. Plan to follow up.     VSS. Denies fever, chest pain, shortness of breath, nausea, vomiting, diarrhea. Risks of environmental exposure to coronavirus discussed including: social distancing, hand hygiene, and limiting departures from the home for necessities only.  Reports understanding and willingness to comply.  All hospital discharge orders reviewed and being followed, all medications reconciled and reviewed, patient and family verbalized understanding. No other needs identified at this time.       Attestation: Screening criteria to assess the level of the patient's risk for infection with COVID-19 as recommended by the CDC at the time of the above documented home visit concluded appropriateness to proceed. Universal precautions were maintained at all times, including provider use of 60% alcohol gel hand  immediately prior to entry and upon departing the patient's home.        Review of Systems   Constitutional:  Negative for fatigue and unexpected weight change.   HENT:  Negative for congestion.    Eyes:  Negative for redness.   Respiratory:  Negative for cough, choking and shortness of breath.    Cardiovascular:  Negative for chest pain and palpitations.   Gastrointestinal:  Negative for abdominal distention.   Genitourinary:  Negative for difficulty urinating.   Musculoskeletal:  Positive for arthralgias and gait problem.   Skin:  Negative for color change and rash.   Neurological:  Positive for weakness. Negative for dizziness.   Psychiatric/Behavioral:  Negative for agitation.        Assessments:  Environmental: Lives in single story with no steps to enter  Functional Status: Max asst with ADLs and mobility   Safety: fall precautions   Nutritional: Heart healthy  Home Health/DME/Supplies: [place]    PAST HISTORY:     Past Medical History:    Diagnosis Date    Acute ischemic left posterior cerebral artery (PCA) stroke 12/3/2020    Anemia in stage 3b chronic kidney disease 3/8/2017    Arthritis     CHF (congestive heart failure)     Closed displaced subtrochanteric fracture of right femur s/p IM nail on 10/17/2021 10/16/2021    Coronary artery disease     Diabetes mellitus     Glaucoma     Gout, joint     Hx of CABG 9/21/10    Hyperlipidemia     Hypertension     NSTEMI (non-ST elevated myocardial infarction) 09/21/10    Stage 3b chronic kidney disease 10/21/2021    Stenosis of aortic and mitral valves     Systolic heart failure 6/19/2015    TIA (transient ischemic attack) 1/7/2021       Past Surgical History:   Procedure Laterality Date    ANTEGRADE INTRAMEDULLARY RODDING OF FEMUR Right 10/17/2021    Procedure: INSERTION, INTRAMEDULLARY ALTAF, FEMUR, ANTEGRADE;  Surgeon: Dino Rutledge MD;  Location: [facility] OR [address] [place] [address];  Service: Orthopedics;  Laterality: Right;    CARDIAC VALVE SURGERY      CATARACT EXTRACTION      CORONARY ARTERY BYPASS GRAFT  9/21/2010    ENTROPIAN REPAIR Left 3/6/2020    Procedure: REPAIR, ENTROPION;  Surgeon: Yulia Kincaid MD;  Location: [facility] OR [address] [place] [address];  Service: Ophthalmology;  Laterality: Left;    EYE SURGERY      JOINT REPLACEMENT      ORIF FEMUR FRACTURE Right 10/17/2021    Procedure: ORIF, FRACTURE, FEMUR;  Surgeon: Dino Rutledge MD;  Location: [facility] OR [address] [place] [address];  Service: Orthopedics;  Laterality: Right;       Family History   Problem Relation Age of Onset    Diabetes Mother     Hypertension Father     Diabetes Father     Glaucoma Father     No Known Problems Sister     No Known Problems Brother     No Known Problems Maternal Aunt     No Known Problems Maternal Uncle     No Known Problems Paternal Aunt     No Known Problems Paternal Uncle     No Known Problems Maternal Grandmother     No Known Problems Maternal Grandfather     No Known Problems Paternal Grandmother     No Known Problems Paternal Grandfather        Social  History     Socioeconomic History    Marital status:    Tobacco Use    Smoking status: Never    Smokeless tobacco: Never   Substance and Sexual Activity    Alcohol use: No    Drug use: No    Sexual activity: Never     Social Determinants of Health     Financial Resource Strain: Low Risk  (8/21/2023)    Overall Financial Resource Strain (CARDIA)     Difficulty of Paying Living Expenses: Not hard at all   Food Insecurity: No Food Insecurity (8/21/2023)    Hunger Vital Sign     Worried About Running Out of Food in the Last Year: Never true     Ran Out of Food in the Last Year: Never true   Transportation Needs: No Transportation Needs (8/21/2023)    PRAPARE - Transportation     Lack of Transportation (Medical): No     Lack of Transportation (Non-Medical): No   Physical Activity: Insufficiently Active (8/21/2023)    Exercise Vital Sign     Days of Exercise per Week: 3 days     Minutes of Exercise per Session: 30 min   Stress: No Stress Concern Present (8/21/2023)    [employer] - Occupational Stress Questionnaire     Feeling of Stress : Not at all   Social Connections: Socially Isolated (8/21/2023)    Social Connection and Isolation Panel [NHANES]     Frequency of Communication with Friends and Family: Once a week     Frequency of Social Gatherings with Friends and Family: Once a week     Attends Sikhism Services: Never     Active Member of Clubs or Organizations: No     Attends Club or Organization Meetings: Never     Marital Status:    Housing Stability: Low Risk  (8/21/2023)    Housing Stability Vital Sign     Unable to Pay for Housing in the Last Year: No     Number of Places Lived in the Last Year: 1     Unstable Housing in the Last Year: No       MEDICATIONS & ALLERGIES:     Current Outpatient Medications on File Prior to Visit   Medication Sig Dispense Refill    acetaminophen (TYLENOL) 325 MG tablet Take 325 mg by mouth every 6 (six) hours as needed for Pain.       albuterol 90 mcg/actuation inhaler Inhale 1 puff into the lungs every 6 (six) hours as needed for Wheezing. 1 HFA Aerosol Inhaler Inhalation Twice a day 18 g 0    allopurinoL (ZYLOPRIM) 100 MG tablet Take 1 tablet (100 mg total) by mouth once daily. 90 tablet 3    apixaban (ELIQUIS) 5 mg Tab Take 1 tablet (5 mg total) by mouth 2 (two) times daily. 60 tablet 11    atorvastatin (LIPITOR) 40 MG tablet TAKE 1 TABLET BY MOUTH EVERY DAY 90 tablet 3    blood sugar diagnostic Strp Check BG daily prn if glucose found to be elevated on BMP, per facility protocol. 100 strip 6    blood-glucose meter (FREESTYLE SYSTEM KIT) kit Patient to check blood glucose daily every evening. 1 each 0    carvediloL (COREG) 12.5 MG tablet TAKE 1 TABLET(12.5 MG) BY MOUTH TWICE DAILY 180 tablet 3    clopidogreL (PLAVIX) 75 mg tablet Take 1 tablet (75 mg total) by mouth once daily. 30 tablet 11    EScitalopram oxalate (LEXAPRO) 20 MG tablet TAKE 1 TABLET BY MOUTH EVERY DAY 90 tablet 3    furosemide (LASIX) 20 MG tablet TAKE 1 TABLET(20 MG) BY MOUTH EVERY DAY (Patient taking differently: Take 20 mg by mouth every other day.) 90 tablet 3    hydrALAZINE (APRESOLINE) 25 MG tablet Take 1 tablet (25 mg total) by mouth every 8 (eight) hours. 90 tablet 11    isosorbide mononitrate (IMDUR) 30 MG 24 hr tablet TAKE 1 TABLET(30 MG) BY MOUTH EVERY DAY 90 tablet 3    lancets Misc Use daily prn if blood glucose found to be elevated on routine BMP, per facility protocol. 100 each 6    multivitamin (THERAGRAN) tablet Take 1 tablet by mouth once daily.      nitroGLYCERIN 0.4 MG/DOSE TL SPRY (NITROLINGUAL) 400 mcg/spray spray PLACE 1 SPRAY ONTO THE TONGUE EVERY 5 (FIVE) MINUTES AS NEEDED. 12 g 0    sacubitriL-valsartan (ENTRESTO) 49-51 mg per tablet Take 1 tablet by mouth 2 (two) times daily. 60 tablet 11    timolol maleate 0.5% (TIMOPTIC) 0.5 % Drop Place 1 drop into both eyes 2 (two) times daily. 15 mL 3     No current facility-administered medications on file  "prior to visit.        Review of patient's allergies indicates:   Allergen Reactions    Indocin [indomethacin sodium] Other (See Comments)     Either caused hot,burning body or caused madness per patient's grandson    Lotensin [benazepril] Other (See Comments)     Either caused hot ,burning body or caused madness per patient's grandson       OBJECTIVE:     Vital Signs:  Vitals:    12/05/23 1102   Pulse: 80   Resp: 20   Temp: 98.3 °F (36.8 °C)     There is no height or weight on file to calculate BMI.     Physical Exam:  Physical Exam  HENT:      Head: Atraumatic.   Cardiovascular:      Rate and Rhythm: Normal rate.      Pulses: Normal pulses.   Pulmonary:      Effort: Pulmonary effort is normal.   Abdominal:      General: Bowel sounds are normal.   Skin:     General: Skin is warm and dry.      Capillary Refill: Capillary refill takes less than 2 seconds.   Neurological:      Mental Status: She is alert. Mental status is at baseline.   Psychiatric:         Mood and Affect: Mood normal.         Laboratory  Lab Results   Component Value Date    WBC 8.58 08/14/2023    HGB 8.3 (L) 08/14/2023    HCT 26.2 (L) 08/14/2023     (H) 08/14/2023     08/14/2023     Lab Results   Component Value Date    INR 1.2 07/23/2023    INR 1.1 10/17/2021    INR 1.1 10/16/2021     Lab Results   Component Value Date    HGBA1C 6.0 (H) 08/13/2023     No results for input(s): "POCTGLUCOSE" in the last 72 hours.    Diagnostic Results:  N/a    TRANSITION OF CARE:     [facility] Contact Note: 08/15/2023    Family and/or Caretaker present at visit?  Yes.  Diagnostic tests reviewed/disposition: No diagnosic tests pending after this hospitalization.  Disease/illness education: Fall precautions  Home health/community services discussion/referrals: Patient has home health [facility] at [facility] .   Establishment or re-establishment of referral orders for community resources: No other necessary community resources.   Discussion with other " health care providers: No discussion with other health care providers necessary.     Transition of Care Visit:  I have reviewed and updated the history and problem list.  I have reconciled the medication list.  I have discussed the hospitalization and current medical issues, prognosis and plans with the patient/family.  I  spent more than 50% of time discussing the care with the patient/family.  Total Face-to-Face Encounter: 60 minutes.    Medications Reconciliation:   I have reconciled the patient's home medications and discharge medications with the patient/family. I have updated all changes.  Refer to After-Visit Medication List.    ASSESSMENT & PLAN:     HIGH RISK CONDITION(S):  Patient has a condition that poses threat to life and bodily function:     1. History of embolic stroke involving right posterior cerebral artery  Continue home PT/OT  Continue plavix, statin.   Outpatient neurology      2. Stroke  -     Ambulatory referral/consult to [facility]  Continue home PT/OT  Continue plavix, statin. BB  Outpatient neurology    3. 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  No      Patient Instructions Given:  - Continue all medications, treatments and therapies as ordered.   - Follow all instructions, recommendations as discussed.  - Maintain Safety Precautions at all times.  - Attend all medical appointments as scheduled.  - For worsening symptoms: call Primary Care Physician or Nurse Practitioner.  - For emergencies, call 911 or immediately report to the nearest emergency room.    After Visit Medication List :     Medication List            Accurate as of December 5, 2023 11:59 PM. If you have any questions, ask your nurse or doctor.                CHANGE how you take these medications       furosemide 20 MG tablet  Commonly known as: LASIX  TAKE 1 TABLET(20 MG) BY MOUTH EVERY DAY  What changed: when to take this            CONTINUE taking these medications      acetaminophen 325 MG tablet  Commonly known as: TYLENOL     albuterol 90 mcg/actuation inhaler  Commonly known as: PROVENTIL/VENTOLIN HFA  Inhale 1 puff into the lungs every 6 (six) hours as needed for Wheezing. 1 HFA Aerosol Inhaler Inhalation Twice a day     allopurinoL 100 MG tablet  Commonly known as: ZYLOPRIM  Take 1 tablet (100 mg total) by mouth once daily.     apixaban 5 mg Tab  Commonly known as: ELIQUIS  Take 1 tablet (5 mg total) by mouth 2 (two) times daily.     atorvastatin 40 MG tablet  Commonly known as: LIPITOR  TAKE 1 TABLET BY MOUTH EVERY DAY     blood sugar diagnostic Strp  Check BG daily prn if glucose found to be elevated on BMP, per facility protocol.     blood-glucose meter kit  Commonly known as: FREESTYLE SYSTEM KIT  Patient to check blood glucose daily every evening.     carvediloL 12.5 MG tablet  Commonly known as: COREG  TAKE 1 TABLET(12.5 MG) BY MOUTH TWICE DAILY     clopidogreL 75 mg tablet  Commonly known as: PLAVIX  Take 1 tablet (75 mg total) by mouth once daily.     ENTRESTO 49-51 mg per tablet  Generic drug: sacubitriL-valsartan  Take 1 tablet by mouth 2 (two) times daily.     EScitalopram oxalate 20 MG tablet  Commonly known as: LEXAPRO  TAKE 1 TABLET BY MOUTH EVERY DAY     hydrALAZINE 25 MG tablet  Commonly known as: APRESOLINE  Take 1 tablet (25 mg total) by mouth every 8 (eight) hours.     isosorbide mononitrate 30 MG 24 hr tablet  Commonly known as: IMDUR  TAKE 1 TABLET(30 MG) BY MOUTH EVERY DAY     lancets Misc  Use daily prn if blood glucose found to be elevated on routine BMP, per facility protocol.     multivitamin tablet  Commonly known as: THERAGRAN     nitroGLYCERIN 0.4 MG/DOSE TL SPRY 400 mcg/spray spray  Commonly known as: NITROLINGUAL  PLACE 1 SPRAY ONTO THE TONGUE EVERY 5 (FIVE) MINUTES AS  NEEDED.     timolol maleate 0.5% 0.5 % Drop  Commonly known as: TIMOPTIC  Place 1 drop into both eyes 2 (two) times daily.            No future appointments.    Risks of environmental exposure to coronavirus discussed including: social distancing, hand hygiene, and limiting departures from the home for necessities only. Reports understanding and willingness to comply.     Signature:    Kyra Hill, MSN, APRN, FNP-C  [facility]

## 2023-12-12 ENCOUNTER — HOSPITAL ENCOUNTER (INPATIENT)
Facility: HOSPITAL | Age: 88
LOS: 8 days | Discharge: HOME HEALTH | DRG: 690 | End: 2023-12-21
Attending: EMERGENCY MEDICINE | Admitting: FAMILY MEDICINE
Payer: MEDICARE

## 2023-12-12 DIAGNOSIS — I63.9 CVA (CEREBRAL VASCULAR ACCIDENT): ICD-10-CM

## 2023-12-12 DIAGNOSIS — D64.9 ACUTE ON CHRONIC ANEMIA: ICD-10-CM

## 2023-12-12 DIAGNOSIS — R41.82 AMS (ALTERED MENTAL STATUS): ICD-10-CM

## 2023-12-12 DIAGNOSIS — R29.818 ACUTE FOCAL NEUROLOGICAL DEFICIT: ICD-10-CM

## 2023-12-12 DIAGNOSIS — R47.81 SLURRED SPEECH: ICD-10-CM

## 2023-12-12 DIAGNOSIS — D50.0 IRON DEFICIENCY ANEMIA DUE TO CHRONIC BLOOD LOSS: ICD-10-CM

## 2023-12-12 DIAGNOSIS — N30.00 ACUTE CYSTITIS WITHOUT HEMATURIA: ICD-10-CM

## 2023-12-12 DIAGNOSIS — R07.9 CHEST PAIN: ICD-10-CM

## 2023-12-12 DIAGNOSIS — D64.9 ANEMIA, UNSPECIFIED TYPE: ICD-10-CM

## 2023-12-12 DIAGNOSIS — R25.1 TREMOR: Primary | ICD-10-CM

## 2023-12-12 PROBLEM — R13.10 DYSPHAGIA: Status: RESOLVED | Noted: 2023-12-12 | Resolved: 2023-12-12

## 2023-12-12 PROBLEM — R13.10 DYSPHAGIA: Status: ACTIVE | Noted: 2023-12-12

## 2023-12-12 LAB
ALBUMIN SERPL BCP-MCNC: 2.9 G/DL (ref 3.5–5.2)
ALP SERPL-CCNC: 105 U/L (ref 55–135)
ALT SERPL W/O P-5'-P-CCNC: 10 U/L (ref 10–44)
ANION GAP SERPL CALC-SCNC: 10 MMOL/L (ref 8–16)
ASCENDING AORTA: 3.41 CM
AST SERPL-CCNC: 25 U/L (ref 10–40)
AV INDEX (PROSTH): 0.43
AV MEAN GRADIENT: 11 MMHG
AV PEAK GRADIENT: 21 MMHG
AV VALVE AREA BY VELOCITY RATIO: 1.45 CM²
AV VALVE AREA: 1.46 CM²
AV VELOCITY RATIO: 0.43
BASOPHILS # BLD AUTO: 0.03 K/UL (ref 0–0.2)
BASOPHILS NFR BLD: 0.3 % (ref 0–1.9)
BILIRUB SERPL-MCNC: 0.2 MG/DL (ref 0.1–1)
BILIRUB UR QL STRIP: NEGATIVE
BSA FOR ECHO PROCEDURE: 1.85 M2
BUN SERPL-MCNC: 37 MG/DL (ref 10–30)
CALCIUM SERPL-MCNC: 9.5 MG/DL (ref 8.7–10.5)
CHLORIDE SERPL-SCNC: 106 MMOL/L (ref 95–110)
CHOLEST SERPL-MCNC: 88 MG/DL (ref 120–199)
CHOLEST/HDLC SERPL: 2.7 {RATIO} (ref 2–5)
CK SERPL-CCNC: 151 U/L (ref 20–180)
CLARITY UR: CLEAR
CO2 SERPL-SCNC: 24 MMOL/L (ref 23–29)
COLOR UR: COLORLESS
CREAT SERPL-MCNC: 1.5 MG/DL (ref 0.5–1.4)
CREAT SERPL-MCNC: 1.7 MG/DL (ref 0.5–1.4)
CV ECHO LV RWT: 0.43 CM
DIFFERENTIAL METHOD: ABNORMAL
DOP CALC AO PEAK VEL: 2.3 M/S
DOP CALC AO VTI: 45.5 CM
DOP CALC LVOT AREA: 3.4 CM2
DOP CALC LVOT DIAMETER: 2.08 CM
DOP CALC LVOT PEAK VEL: 0.98 M/S
DOP CALC LVOT STROKE VOLUME: 66.23 CM3
DOP CALC MV VTI: 39.2 CM
DOP CALCLVOT PEAK VEL VTI: 19.5 CM
ECHO LV POSTERIOR WALL: 0.96 CM (ref 0.6–1.1)
EOSINOPHIL # BLD AUTO: 0.3 K/UL (ref 0–0.5)
EOSINOPHIL NFR BLD: 2.4 % (ref 0–8)
ERYTHROCYTE [DISTWIDTH] IN BLOOD BY AUTOMATED COUNT: 16.1 % (ref 11.5–14.5)
EST. GFR  (NO RACE VARIABLE): 33 ML/MIN/1.73 M^2
ESTIMATED AVG GLUCOSE: 123 MG/DL (ref 68–131)
FRACTIONAL SHORTENING: 20 % (ref 28–44)
GLUCOSE SERPL-MCNC: 139 MG/DL (ref 70–110)
GLUCOSE UR QL STRIP: NEGATIVE
HBA1C MFR BLD: 5.9 % (ref 4–5.6)
HCT VFR BLD AUTO: 25.8 % (ref 37–48.5)
HDLC SERPL-MCNC: 33 MG/DL (ref 40–75)
HDLC SERPL: 37.5 % (ref 20–50)
HGB BLD-MCNC: 8.2 G/DL (ref 12–16)
HGB UR QL STRIP: NEGATIVE
IMM GRANULOCYTES # BLD AUTO: 0.04 K/UL (ref 0–0.04)
IMM GRANULOCYTES NFR BLD AUTO: 0.4 % (ref 0–0.5)
INR PPP: 1.1 (ref 0.8–1.2)
INTERVENTRICULAR SEPTUM: 1.34 CM (ref 0.6–1.1)
IVC DIAMETER: 1.82 CM
KETONES UR QL STRIP: NEGATIVE
LA MAJOR: 4.84 CM
LA MINOR: 5.55 CM
LA WIDTH: 4.1 CM
LACTATE SERPL-SCNC: 1.3 MMOL/L (ref 0.5–2.2)
LDLC SERPL CALC-MCNC: 36.8 MG/DL (ref 63–159)
LEFT ATRIUM SIZE: 3.38 CM
LEFT ATRIUM VOLUME INDEX MOD: 43.5 ML/M2
LEFT ATRIUM VOLUME INDEX: 33.8 ML/M2
LEFT ATRIUM VOLUME MOD: 78.37 CM3
LEFT ATRIUM VOLUME: 60.91 CM3
LEFT INTERNAL DIMENSION IN SYSTOLE: 3.55 CM (ref 2.1–4)
LEFT VENTRICLE DIASTOLIC VOLUME INDEX: 49.66 ML/M2
LEFT VENTRICLE DIASTOLIC VOLUME: 89.39 ML
LEFT VENTRICLE MASS INDEX: 101 G/M2
LEFT VENTRICLE SYSTOLIC VOLUME INDEX: 29.2 ML/M2
LEFT VENTRICLE SYSTOLIC VOLUME: 52.62 ML
LEFT VENTRICULAR INTERNAL DIMENSION IN DIASTOLE: 4.44 CM (ref 3.5–6)
LEFT VENTRICULAR MASS: 182.52 G
LEUKOCYTE ESTERASE UR QL STRIP: ABNORMAL
LVOT MG: 2.1 MMHG
LVOT MV: 0.69 CM/S
LYMPHOCYTES # BLD AUTO: 2 K/UL (ref 1–4.8)
LYMPHOCYTES NFR BLD: 18.9 % (ref 18–48)
MCH RBC QN AUTO: 29.4 PG (ref 27–31)
MCHC RBC AUTO-ENTMCNC: 31.8 G/DL (ref 32–36)
MCV RBC AUTO: 93 FL (ref 82–98)
MICROSCOPIC COMMENT: ABNORMAL
MONOCYTES # BLD AUTO: 0.7 K/UL (ref 0.3–1)
MONOCYTES NFR BLD: 6.9 % (ref 4–15)
MV PEAK GRADIENT: 9 MMHG
MV VALVE AREA BY CONTINUITY EQUATION: 1.69 CM2
NEUTROPHILS # BLD AUTO: 7.5 K/UL (ref 1.8–7.7)
NEUTROPHILS NFR BLD: 71.1 % (ref 38–73)
NITRITE UR QL STRIP: NEGATIVE
NONHDLC SERPL-MCNC: 55 MG/DL
NRBC BLD-RTO: 0 /100 WBC
OHS LV EJECTION FRACTION SIMPSONS BIPLANE MOD: 37 %
PH UR STRIP: 6 [PH] (ref 5–8)
PISA MRMAX VEL: 4.56 M/S
PISA TR MAX VEL: 2.98 M/S
PLATELET # BLD AUTO: 204 K/UL (ref 150–450)
PMV BLD AUTO: 13.4 FL (ref 9.2–12.9)
POC PTINR: 1.4 (ref 0.9–1.2)
POCT GLUCOSE: 137 MG/DL (ref 70–110)
POTASSIUM SERPL-SCNC: 4.5 MMOL/L (ref 3.5–5.1)
PROCALCITONIN SERPL IA-MCNC: 0.03 NG/ML
PROT SERPL-MCNC: 7.1 G/DL (ref 6–8.4)
PROT UR QL STRIP: NEGATIVE
PROTHROMBIN TIME: 12.4 SEC (ref 9–12.5)
PULM VEIN S/D RATIO: 1.81
PV PEAK D VEL: 0.21 M/S
PV PEAK GRADIENT: 5 MMHG
PV PEAK S VEL: 0.38 M/S
PV PEAK VELOCITY: 1.16 M/S
RA MAJOR: 5.41 CM
RA PRESSURE ESTIMATED: 3 MMHG
RA WIDTH: 3.63 CM
RBC # BLD AUTO: 2.79 M/UL (ref 4–5.4)
RBC #/AREA URNS HPF: 1 /HPF (ref 0–4)
RIGHT VENTRICULAR END-DIASTOLIC DIMENSION: 2.88 CM
RV TB RVSP: 6 MMHG
RV TISSUE DOPPLER FREE WALL SYSTOLIC VELOCITY 1 (APICAL 4 CHAMBER VIEW): 11.74 CM/S
SAMPLE: ABNORMAL
SAMPLE: ABNORMAL
SINUS: 2.93 CM
SODIUM SERPL-SCNC: 140 MMOL/L (ref 136–145)
SP GR UR STRIP: 1.02 (ref 1–1.03)
SQUAMOUS #/AREA URNS HPF: 4 /HPF
STJ: 3.22 CM
T4 FREE SERPL-MCNC: 0.97 NG/DL (ref 0.71–1.51)
TDI LATERAL: 0.05 M/S
TDI SEPTAL: 0.04 M/S
TDI: 0.05 M/S
TR MAX PG: 36 MMHG
TRICUSPID ANNULAR PLANE SYSTOLIC EXCURSION: 1.88 CM
TRIGL SERPL-MCNC: 91 MG/DL (ref 30–150)
TROPONIN I SERPL DL<=0.01 NG/ML-MCNC: 0.04 NG/ML (ref 0–0.03)
TROPONIN I SERPL DL<=0.01 NG/ML-MCNC: 0.06 NG/ML (ref 0–0.03)
TSH SERPL DL<=0.005 MIU/L-ACNC: 0.1 UIU/ML (ref 0.4–4)
TV REST PULMONARY ARTERY PRESSURE: 39 MMHG
URN SPEC COLLECT METH UR: ABNORMAL
UROBILINOGEN UR STRIP-ACNC: NEGATIVE EU/DL
WBC # BLD AUTO: 10.51 K/UL (ref 3.9–12.7)
WBC #/AREA URNS HPF: 32 /HPF (ref 0–5)
Z-SCORE OF LEFT VENTRICULAR DIMENSION IN END DIASTOLE: -1.14
Z-SCORE OF LEFT VENTRICULAR DIMENSION IN END SYSTOLE: 1.13

## 2023-12-12 PROCEDURE — 96365 THER/PROPH/DIAG IV INF INIT: CPT

## 2023-12-12 PROCEDURE — 63600175 PHARM REV CODE 636 W HCPCS: Performed by: EMERGENCY MEDICINE

## 2023-12-12 PROCEDURE — 87088 URINE BACTERIA CULTURE: CPT | Performed by: EMERGENCY MEDICINE

## 2023-12-12 PROCEDURE — 97165 OT EVAL LOW COMPLEX 30 MIN: CPT

## 2023-12-12 PROCEDURE — 85610 PROTHROMBIN TIME: CPT | Performed by: EMERGENCY MEDICINE

## 2023-12-12 PROCEDURE — 80061 LIPID PANEL: CPT | Performed by: FAMILY MEDICINE

## 2023-12-12 PROCEDURE — 84443 ASSAY THYROID STIM HORMONE: CPT | Performed by: EMERGENCY MEDICINE

## 2023-12-12 PROCEDURE — 36415 COLL VENOUS BLD VENIPUNCTURE: CPT | Performed by: FAMILY MEDICINE

## 2023-12-12 PROCEDURE — 85610 PROTHROMBIN TIME: CPT

## 2023-12-12 PROCEDURE — 97535 SELF CARE MNGMENT TRAINING: CPT

## 2023-12-12 PROCEDURE — 80053 COMPREHEN METABOLIC PANEL: CPT | Performed by: EMERGENCY MEDICINE

## 2023-12-12 PROCEDURE — 84484 ASSAY OF TROPONIN QUANT: CPT | Mod: 91 | Performed by: FAMILY MEDICINE

## 2023-12-12 PROCEDURE — 82803 BLOOD GASES ANY COMBINATION: CPT

## 2023-12-12 PROCEDURE — 92610 EVALUATE SWALLOWING FUNCTION: CPT

## 2023-12-12 PROCEDURE — 81000 URINALYSIS NONAUTO W/SCOPE: CPT | Performed by: EMERGENCY MEDICINE

## 2023-12-12 PROCEDURE — 99900035 HC TECH TIME PER 15 MIN (STAT)

## 2023-12-12 PROCEDURE — 93010 EKG 12-LEAD: ICD-10-PCS | Mod: ,,, | Performed by: INTERNAL MEDICINE

## 2023-12-12 PROCEDURE — 83605 ASSAY OF LACTIC ACID: CPT | Performed by: EMERGENCY MEDICINE

## 2023-12-12 PROCEDURE — 25000003 PHARM REV CODE 250: Performed by: FAMILY MEDICINE

## 2023-12-12 PROCEDURE — G0426 PR INPT TELEHEALTH CONSULT 50M: ICD-10-PCS | Mod: 95,G0,, | Performed by: PSYCHIATRY & NEUROLOGY

## 2023-12-12 PROCEDURE — G0378 HOSPITAL OBSERVATION PER HR: HCPCS

## 2023-12-12 PROCEDURE — 25000003 PHARM REV CODE 250: Performed by: EMERGENCY MEDICINE

## 2023-12-12 PROCEDURE — 82962 GLUCOSE BLOOD TEST: CPT

## 2023-12-12 PROCEDURE — 97162 PT EVAL MOD COMPLEX 30 MIN: CPT

## 2023-12-12 PROCEDURE — G0426 INPT/ED TELECONSULT50: HCPCS | Mod: 95,G0,, | Performed by: PSYCHIATRY & NEUROLOGY

## 2023-12-12 PROCEDURE — 93010 ELECTROCARDIOGRAM REPORT: CPT | Mod: ,,, | Performed by: INTERNAL MEDICINE

## 2023-12-12 PROCEDURE — 93005 ELECTROCARDIOGRAM TRACING: CPT

## 2023-12-12 PROCEDURE — 84484 ASSAY OF TROPONIN QUANT: CPT | Performed by: EMERGENCY MEDICINE

## 2023-12-12 PROCEDURE — 82565 ASSAY OF CREATININE: CPT

## 2023-12-12 PROCEDURE — 87040 BLOOD CULTURE FOR BACTERIA: CPT | Mod: 59 | Performed by: EMERGENCY MEDICINE

## 2023-12-12 PROCEDURE — 99285 EMERGENCY DEPT VISIT HI MDM: CPT | Mod: 25

## 2023-12-12 PROCEDURE — 25500020 PHARM REV CODE 255: Performed by: EMERGENCY MEDICINE

## 2023-12-12 PROCEDURE — 84439 ASSAY OF FREE THYROXINE: CPT | Performed by: EMERGENCY MEDICINE

## 2023-12-12 PROCEDURE — 87077 CULTURE AEROBIC IDENTIFY: CPT | Performed by: EMERGENCY MEDICINE

## 2023-12-12 PROCEDURE — 84145 PROCALCITONIN (PCT): CPT | Performed by: EMERGENCY MEDICINE

## 2023-12-12 PROCEDURE — 87086 URINE CULTURE/COLONY COUNT: CPT | Performed by: EMERGENCY MEDICINE

## 2023-12-12 PROCEDURE — 87186 SC STD MICRODIL/AGAR DIL: CPT | Performed by: EMERGENCY MEDICINE

## 2023-12-12 PROCEDURE — 82550 ASSAY OF CK (CPK): CPT | Performed by: EMERGENCY MEDICINE

## 2023-12-12 PROCEDURE — 83036 HEMOGLOBIN GLYCOSYLATED A1C: CPT | Performed by: FAMILY MEDICINE

## 2023-12-12 PROCEDURE — 85025 COMPLETE CBC W/AUTO DIFF WBC: CPT | Performed by: EMERGENCY MEDICINE

## 2023-12-12 RX ORDER — CARVEDILOL 12.5 MG/1
12.5 TABLET ORAL 2 TIMES DAILY
Status: DISCONTINUED | OUTPATIENT
Start: 2023-12-12 | End: 2023-12-21 | Stop reason: HOSPADM

## 2023-12-12 RX ORDER — BISACODYL 5 MG
5 TABLET, DELAYED RELEASE (ENTERIC COATED) ORAL DAILY PRN
COMMUNITY

## 2023-12-12 RX ORDER — ALLOPURINOL 100 MG/1
100 TABLET ORAL DAILY
Status: DISCONTINUED | OUTPATIENT
Start: 2023-12-12 | End: 2023-12-21 | Stop reason: HOSPADM

## 2023-12-12 RX ORDER — ACETAMINOPHEN 325 MG/1
650 TABLET ORAL EVERY 4 HOURS PRN
Status: DISCONTINUED | OUTPATIENT
Start: 2023-12-12 | End: 2023-12-21 | Stop reason: HOSPADM

## 2023-12-12 RX ORDER — ESCITALOPRAM OXALATE 10 MG/1
20 TABLET ORAL DAILY
Status: DISCONTINUED | OUTPATIENT
Start: 2023-12-13 | End: 2023-12-21 | Stop reason: HOSPADM

## 2023-12-12 RX ORDER — ASPIRIN 81 MG/1
81 TABLET ORAL
COMMUNITY

## 2023-12-12 RX ORDER — IBUPROFEN 200 MG
24 TABLET ORAL
Status: DISCONTINUED | OUTPATIENT
Start: 2023-12-12 | End: 2023-12-21 | Stop reason: HOSPADM

## 2023-12-12 RX ORDER — IBUPROFEN 200 MG
16 TABLET ORAL
Status: DISCONTINUED | OUTPATIENT
Start: 2023-12-12 | End: 2023-12-21 | Stop reason: HOSPADM

## 2023-12-12 RX ORDER — ATORVASTATIN CALCIUM 40 MG/1
40 TABLET, FILM COATED ORAL DAILY
Status: DISCONTINUED | OUTPATIENT
Start: 2023-12-13 | End: 2023-12-21 | Stop reason: HOSPADM

## 2023-12-12 RX ORDER — SODIUM CHLORIDE 0.9 % (FLUSH) 0.9 %
10 SYRINGE (ML) INJECTION EVERY 12 HOURS PRN
Status: DISCONTINUED | OUTPATIENT
Start: 2023-12-12 | End: 2023-12-21 | Stop reason: HOSPADM

## 2023-12-12 RX ORDER — MULTIVIT,IRON,MINERALS/LUTEIN
1 TABLET ORAL DAILY
COMMUNITY

## 2023-12-12 RX ORDER — CLOPIDOGREL BISULFATE 75 MG/1
75 TABLET ORAL NIGHTLY
Status: DISCONTINUED | OUTPATIENT
Start: 2023-12-12 | End: 2023-12-15

## 2023-12-12 RX ORDER — ONDANSETRON 2 MG/ML
4 INJECTION INTRAMUSCULAR; INTRAVENOUS EVERY 8 HOURS PRN
Status: DISCONTINUED | OUTPATIENT
Start: 2023-12-12 | End: 2023-12-21 | Stop reason: HOSPADM

## 2023-12-12 RX ORDER — POLYETHYLENE GLYCOL 3350 17 G/17G
17 POWDER, FOR SOLUTION ORAL DAILY
Status: DISCONTINUED | OUTPATIENT
Start: 2023-12-12 | End: 2023-12-14

## 2023-12-12 RX ORDER — CLOPIDOGREL BISULFATE 75 MG/1
75 TABLET ORAL DAILY
Status: DISCONTINUED | OUTPATIENT
Start: 2023-12-12 | End: 2023-12-12

## 2023-12-12 RX ORDER — GLUCAGON 1 MG
1 KIT INJECTION
Status: DISCONTINUED | OUTPATIENT
Start: 2023-12-12 | End: 2023-12-21 | Stop reason: HOSPADM

## 2023-12-12 RX ORDER — HYDRALAZINE HYDROCHLORIDE 25 MG/1
25 TABLET, FILM COATED ORAL EVERY 8 HOURS
Status: DISCONTINUED | OUTPATIENT
Start: 2023-12-12 | End: 2023-12-13

## 2023-12-12 RX ORDER — ISOSORBIDE MONONITRATE 30 MG/1
30 TABLET, EXTENDED RELEASE ORAL DAILY
Status: DISCONTINUED | OUTPATIENT
Start: 2023-12-13 | End: 2023-12-21 | Stop reason: HOSPADM

## 2023-12-12 RX ORDER — TALC
6 POWDER (GRAM) TOPICAL NIGHTLY PRN
Status: DISCONTINUED | OUTPATIENT
Start: 2023-12-12 | End: 2023-12-21 | Stop reason: HOSPADM

## 2023-12-12 RX ORDER — SODIUM CHLORIDE 9 MG/ML
INJECTION, SOLUTION INTRAVENOUS CONTINUOUS
Status: DISCONTINUED | OUTPATIENT
Start: 2023-12-12 | End: 2023-12-13

## 2023-12-12 RX ADMIN — HYDRALAZINE HYDROCHLORIDE 25 MG: 25 TABLET, FILM COATED ORAL at 03:12

## 2023-12-12 RX ADMIN — APIXABAN 2.5 MG: 2.5 TABLET, FILM COATED ORAL at 09:12

## 2023-12-12 RX ADMIN — IOHEXOL 100 ML: 350 INJECTION, SOLUTION INTRAVENOUS at 10:12

## 2023-12-12 RX ADMIN — ACETAMINOPHEN 650 MG: 325 TABLET ORAL at 09:12

## 2023-12-12 RX ADMIN — CEFTRIAXONE SODIUM 1 G: 1 INJECTION, POWDER, FOR SOLUTION INTRAMUSCULAR; INTRAVENOUS at 12:12

## 2023-12-12 RX ADMIN — CLOPIDOGREL BISULFATE 75 MG: 75 TABLET ORAL at 09:12

## 2023-12-12 RX ADMIN — CARVEDILOL 12.5 MG: 12.5 TABLET, FILM COATED ORAL at 09:12

## 2023-12-12 RX ADMIN — SACUBITRIL AND VALSARTAN 1 TABLET: 49; 51 TABLET, FILM COATED ORAL at 09:12

## 2023-12-12 RX ADMIN — SODIUM CHLORIDE: 9 INJECTION, SOLUTION INTRAVENOUS at 02:12

## 2023-12-12 RX ADMIN — HYDRALAZINE HYDROCHLORIDE 25 MG: 25 TABLET, FILM COATED ORAL at 09:12

## 2023-12-12 NOTE — PHARMACY MED REC
"Admission Medication History     The home medication history was taken by Sania Herrera CPhT.    Medication history obtained from, Patient's Grandson Verified    You may go to "Admission" then "Reconcile Home Medications" tabs to review and/or act upon these items.     The home medication list has been updated by the Pharmacy department.   Please read ALL comments highlighted in yellow.   Please address this information as you see fit.    Feel free to contact us if you have any questions or require assistance.            Sania Herrera CPhT.  Ext [phone]               .          "

## 2023-12-12 NOTE — Clinical Note
Diagnosis: Acute focal neurological deficit [800274]   Future Attending Provider: SAY GALO [[id number]]   Admitting Provider:: SAY GALO [[id number]]

## 2023-12-12 NOTE — PLAN OF CARE
Problem: SLP  Goal: SLP Goal  Description: Short Term Goals:  1. Pt will participate in a clinical swallow eval to determine least restrictive diet. -ongoing; progressing  2. Pt will safely tolerate >75% of Pureed with no overt s/s of aspiration.  3. Pt will participate in informal speech-language and cognitive eval to r/o related deficits.    Outcome: Ongoing, Progressing     Pt completed clinical BSE. Pt safe for pureed diet 2/2 edentulous state. ST to follow up for diet safety and informally assess speech, lang, and cog.

## 2023-12-12 NOTE — ED PROVIDER NOTES
Encounter Date: 12/12/2023       History     Chief Complaint   Patient presents with    Altered Mental Status     Pt BIB son with c/o aphasia and AMS from baseline. Started 30 minutes PTA. Recent Hx CVA.     Krystina Washington is a 89 y.o. 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    The patient presents to the ED due to altered mental status approximately an hour prior to arrival.  Patient was answering questions appropriately per son however he noted that she began to shake and look off to the side with her eyes open and since then she has been unable to speak.  She never lost consciousness per family. Patient has a history of embolic stroke.  She is alert she is able to follow commands but she can not speak.  She is moving her extremities.  Limited Information at this time.        Review of patient's allergies indicates:   Allergen Reactions    Indocin [indomethacin sodium] Other (See Comments)     Either caused hot,burning body or caused madness per patient's grandson    Lotensin [benazepril] Other (See Comments)     Either caused hot ,burning body or caused madness per patient's grandson     Past Medical History:   Diagnosis Date    Acute ischemic left posterior cerebral artery (PCA) stroke 12/3/2020    Anemia in stage 3b chronic kidney disease 3/8/2017    Arthritis     CHF (congestive heart failure)     Closed  displaced subtrochanteric fracture of right femur s/p IM nail on 10/17/2021 10/16/2021    Coronary artery disease     Diabetes mellitus     Glaucoma     Gout, joint     Hx of CABG 9/21/10    Hyperlipidemia     Hypertension     NSTEMI (non-ST elevated myocardial infarction) 09/21/10    Stage 3b chronic kidney disease 10/21/2021    Stenosis of aortic and mitral valves     Systolic heart failure 6/19/2015    TIA (transient ischemic attack) 1/7/2021     Past Surgical History:   Procedure Laterality Date    ANTEGRADE INTRAMEDULLARY RODDING OF FEMUR Right 10/17/2021    Procedure: INSERTION, INTRAMEDULLARY ALTAF, FEMUR, ANTEGRADE;  Surgeon: Dino Rutledge MD;  Location: [facility] OR [address] [place] [address];  Service: Orthopedics;  Laterality: Right;    CARDIAC VALVE SURGERY      CATARACT EXTRACTION      CORONARY ARTERY BYPASS GRAFT  9/21/2010    ENTROPIAN REPAIR Left 3/6/2020    Procedure: REPAIR, ENTROPION;  Surgeon: Yulia Kincaid MD;  Location: [facility] OR [address] [place] [address];  Service: Ophthalmology;  Laterality: Left;    EYE SURGERY      JOINT REPLACEMENT      ORIF FEMUR FRACTURE Right 10/17/2021    Procedure: ORIF, FRACTURE, FEMUR;  Surgeon: Dino Rutledge MD;  Location: [facility] OR [address] [place] [address];  Service: Orthopedics;  Laterality: Right;     Family History   Problem Relation Age of Onset    Diabetes Mother     Hypertension Father     Diabetes Father     Glaucoma Father     No Known Problems Sister     No Known Problems Brother     No Known Problems Maternal Aunt     No Known Problems Maternal Uncle     No Known Problems Paternal Aunt     No Known Problems Paternal Uncle     No Known Problems Maternal Grandmother     No Known Problems Maternal Grandfather     No Known Problems Paternal Grandmother     No Known Problems Paternal Grandfather      Social History     Tobacco Use    Smoking status: Never    Smokeless tobacco: Never   Substance Use Topics    Alcohol use: No    Drug use: No     Review of Systems   Unable to perform ROS: Mental status change        Physical Exam     Initial Vitals [12/12/23 0949]   BP Pulse Resp Temp SpO2   (!) 184/83 73 18 -- 100 %      MAP       --         Physical Exam    Constitutional:  Non-toxic appearance. She appears distressed.   alert   HENT:   Head: Normocephalic and atraumatic.   Eyes: EOM are normal. Pupils are equal, round, and reactive to light.   Neck: No thyromegaly present. No tracheal deviation present.   Cardiovascular:  Regular rhythm, S1 normal and S2 normal.           Pulmonary/Chest: Breath sounds normal. No respiratory distress.   Abdominal: Abdomen is soft. She exhibits no distension. There is no abdominal tenderness.     Neurological: She is alert.   Eyes open  Following commands  Non verbal  No zachariah neglect  Moving all extremities, unable to assess sensation    Skin: Skin is warm. No rash noted.   Psychiatric: She has a normal mood and affect.         ED Course   Procedures  Labs Reviewed   POCT GLUCOSE - Abnormal; Notable for the following components:       Result Value    POCT Glucose 137 (*)     All other components within normal limits          Imaging Results    None          Medications - No data to display  Medical Decision Making  Differential Diagnosis includes, but is not limited to:  CVA/TIA, seizure, status epilepticus, post-ictal state, meningitis/encephalitis, sepsis, MI/ACS, arrhythmia, syncope, intracranial mass/hemorrhage, head trauma, anaphylaxis, substance abuse, alcohol intoxication/withdrawal, medication reaction, intentional medication overdose, neuroleptic malignant syndrome, serotonin syndrome, CO poisoning, hypoxia/hypercapnea, hepatic encephalopathy, metabolic disturbance, thyroid disease, hypoglycemia.      Amount and/or Complexity of Data Reviewed  Labs: ordered. Decision-making details documented in ED Course.  Radiology: ordered.    Risk  Prescription drug management.  Risk Details: Patient will be admitted for altered mental status workup.  Clinically she appears to be improving.   CTA without acute findings seen.    Urine does show some signs of infection she was given a dose of IV Rocephin.  I discussed with [facility] who will admit the patient on their service.               ED Course as of 12/12/23 1544   Tue Dec 12, 2023   1033 Patient evaluated by Dr. Lyons not a candidate for thrombolysis.  Recommends infectious/encephalopathic workup  [RN]   1138 CBC W/ AUTO DIFFERENTIAL(!)  Stable anemia  [RN]   1139 ISTAT CREATININE(!)  Cr at baseline [RN]   1139 Comprehensive metabolic panel(!)  Cr at baseline [RN]   1139 Troponin I(!)  Elevated  [RN]   1140 EKG: Rate 66.  Normal sinus rhythm .Left bundle-branch block.  Negative Sgarbossa criteria for STEMI   [RN]   1141 Patient's mental status continues to improve.  Will plan to admit her for encephalopathy.  Patient's son at bedside also reports that patient has been having chest pain for the past 3 days. [RN]   1141 T4, Free  No significant abnormality.    [RN]      ED Course User Index  [RN] Ivan Baptiste Jr., MD                           Clinical Impression:  Final diagnoses:  [R29.818] Acute focal neurological deficit       Portions of this note were dictated using voice recognition software and may contain dictation related errors in spelling/grammar/syntax not found on text review            Ivan Baptiste Jr., MD  12/12/23 4206

## 2023-12-12 NOTE — PT/OT/SLP EVAL
"Occupational Therapy   Evaluation and Treatment    Name: Krystina Washington  MRN: [id number]  Admitting Diagnosis: TIA (transient ischemic attack)  Recent Surgery: * No surgery found *      Recommendations:     Discharge Recommendations:  (TBD)  Discharge Equipment Recommendations:  to be determined by next level of care  Barriers to discharge:  Other (Comment) (decreased functional performance; pain; further medical workup)    Assessment:     Krystina Washington is a 89 y.o. female with a medical diagnosis of TIA (transient ischemic attack).  She presents with ..Diagnoses of Acute focal neurological deficit, AMS (altered mental status), and CVA (cerebral vascular accident) were pertinent to this visit.  . Performance deficits affecting function: weakness, impaired endurance, impaired sensation, impaired self care skills, impaired functional mobility, gait instability, impaired balance, pain, decreased safety awareness, impaired cardiopulmonary response to activity, decreased lower extremity function, impaired skin, edema, decreased upper extremity function, impaired cognition, visual deficits, impaired joint extensibility, impaired muscle length, impaired coordination, impaired fine motor.      Dependence for ADL / deferred out of bed mobility this date. Significant change from prior to admission level of function. Recommendations pending further needed assessment. Discharge disposition and equipment recommendations pending.     Rehab Prognosis: Fair; patient would benefit from acute skilled OT services to address these deficits and reach maximum level of function.       Plan:     Patient to be seen 3 x/week to address the above listed problems via self-care/home management, therapeutic activities, therapeutic exercises, neuromuscular re-education  Plan of Care Expires: 01/09/24  Plan of Care Reviewed with: patient, daughter    Subjective     Chief Complaint: pain (generalized) and "just do not feel good"  Patient/Family " Comments/goals: daughter reports patient is far from her typical baseline functioning    Occupational Profile:  Living Environment: Patient resides with family in a single story home, threshold to enter, tub shower combo with tub transfer bench and bedside commode over toilet  Previous level of function: Prior to admission patient required supervision / SBA mostly for ADL/mobility with rolling walker, but dependent for IADL/ medication management. W/c for longer duration with assist for management. Intermittently has incontinent episodes or needs assist with lower body dressing/bathing. Hearing impaired, vision impaired, and memory impaired secondary due to glaucoma and Alzheimer's dementia.   Equipment Used at Home: bedside commode, walker, rolling, other (see comments), rollator, wheelchair (tub transfer bench)  Assistance upon Discharge: per family members in room patient is never alone, always has at least one family member present    Pain/Comfort:  Pain Rating 1: other (see comments) (fluctuating responses; at rest indicates discomfort in chest; later reports pain in B shoulders and B LE)  Pain Addressed 1: Reposition, Nurse notified  Pain Rating Post-Intervention 1: other (see comments) (fluctuating responses as above; increased reports of pain with movement; decreased reports at rest)      Objective:     Communicated with: nursing prior to session.  Patient found HOB elevated with bed alarm, telemetry, peripheral IV upon OT entry to room.    General Precautions: Standard, fall, vision impaired  Orthopedic Precautions: N/A  Braces: N/A  Respiratory Status: Room air    Occupational Performance:    Bed Mobility:    Patient completed Rolling/Turning to Left with  does not assist in performance despite multimodal effort  Patient completed Rolling/Turning to Right with does not assist in performance despite multimodal effort  Patient completed scooting up in bed with dependence x2    Functional  Mobility/Transfers:  Unable to attempt; additional assessment needed    Activities of Daily Living:  Grossly dependent at this time; unable to perform hand to mouth with either UE; weak grasp / poor executive function to initiate action; bed level eval only this date    Cognitive/Visual Perceptual:  Cognitive/Psychosocial Skills:     -       Oriented to: person, daughter, hospital setting, month and year; requires increased time - delayed response; not oriented to situation - conveys information regarding situation of being hospitalized in 2021 (per daughter)   -       Follows Commands/attention:limited command following; hard of hearing; one step motor commands with moderate prompting  -       Communication: fluctuations of speech (slurring/ stuttering); however, able to name basic objects presented to her at bedside  -       Memory: deficits; decreased comprehension; hx Alzheimer's  -       Safety awareness/insight to disability: impaired   Visual/Perceptual: acuity deficits; glaucoma    Physical Exam:  Sensation:    -       Intact  light/touch UE and LE  BUE ROM and strength: limited and inconsistent testing  performed in supine 2/2 to pain, weakness, limited understanding. Does not achieve WFL for passive ROM.  B grasp: weak gross grasp bilaterally  Neurological: mild ataxia BUE (L > R)    AMPAC 6 Click ADL:  AMPAC Total Score: 6    Treatment & Education:  Patient / daughter educated on role of OT. Patient with fluctuations in responses, communication, and movements this date.  Patient guided for attempts of gentle UE ROM with reservations, inconsistent reports of pain, and delayed responses.  Occupational profile developed via interview with daughter present.  Patient with pain and limited command following, rigidity in extremities during assisted attempt for rolling.   Session terminated 2/2 pain and limited response; total assist of 2 for repositioning. Answered questions in scope.      Patient left HOB  elevated with all lines intact, call button in reach, and bed alarm on  Daughter present  GOALS:   Multidisciplinary Problems       Occupational Therapy Goals          Problem: Occupational Therapy    Goal Priority Disciplines Outcome Interventions   Occupational Therapy Goal     OT, PT/OT Ongoing, Progressing    Description: Goals to be met by: 1/9/2024     Patient will increase functional independence with ADLs by performing:    Rolling with SBA to support self initiated pressure relief  Feeding with Stand-by Assistance with meal tray setup  UE Dressing with Minimal Assistance with overhead shirt  Grooming with Contact Guard Assistance with simplifed set up  Toileting from bedside commode with Moderate Assistance for hygiene and clothing management.   Sitting at edge of bed x5 minutes with Stand-by Assistance.  Toilet transfer to bedside commode with Contact Guard Assistance with least restrictive device.  Family or caregiver 100% verbalized reciprocation of dementia friendly recommendations (ex: simplify choices; decrease visual clutter; remove hazardous objects; maximize familiarity, etc) to support patient's wellbeing and highest level of occupational engagement and participation in least restrictive discharge environment                          History:     Past Medical History:   Diagnosis Date    Acute ischemic left posterior cerebral artery (PCA) stroke 12/3/2020    Anemia in stage 3b chronic kidney disease 3/8/2017    Arthritis     CHF (congestive heart failure)     Closed displaced subtrochanteric fracture of right femur s/p IM nail on 10/17/2021 10/16/2021    Coronary artery disease     Diabetes mellitus     Glaucoma     Gout, joint     Hx of CABG 9/21/10    Hyperlipidemia     Hypertension     NSTEMI (non-ST elevated myocardial infarction) 09/21/10    Stage 3b chronic kidney disease 10/21/2021    Stenosis of aortic and mitral valves     Systolic heart failure 6/19/2015    TIA (transient ischemic attack)  1/7/2021         Past Surgical History:   Procedure Laterality Date    ANTEGRADE INTRAMEDULLARY RODDING OF FEMUR Right 10/17/2021    Procedure: INSERTION, INTRAMEDULLARY ALTAF, FEMUR, ANTEGRADE;  Surgeon: Dino Rutledge MD;  Location: [address];  Service: Orthopedics;  Laterality: Right;    CARDIAC VALVE SURGERY      CATARACT EXTRACTION      CORONARY ARTERY BYPASS GRAFT  9/21/2010    ENTROPIAN REPAIR Left 3/6/2020    Procedure: REPAIR, ENTROPION;  Surgeon: Yulia Kincaid MD;  Location: [address];  Service: Ophthalmology;  Laterality: Left;    EYE SURGERY      JOINT REPLACEMENT      ORIF FEMUR FRACTURE Right 10/17/2021    Procedure: ORIF, FRACTURE, FEMUR;  Surgeon: Dino Rutledge MD;  Location: [address];  Service: Orthopedics;  Laterality: Right;       Time Tracking:     OT Date of Treatment: 12/12/23  OT Start Time: 1545  OT Stop Time: 1608  OT Total Time (min): 23 min coeval PT    Billable Minutes:Evaluation 23 min    12/12/2023

## 2023-12-12 NOTE — Clinical Note
Merry Lafleur accompanied their mother to the emergency department on 12/12/2023. They may return to work on 12/13/2023.      If you have any questions or concerns, please don't hesitate to call.      EDUARDO Avendano RN RN

## 2023-12-12 NOTE — PT/OT/SLP PROGRESS
Physical Therapy Missed Evaluation      Patient Name:  Krystina Washington   MRN:  [id number]    Patient not seen today secondary to Off the floor; no H&P in chart.     12/12/23

## 2023-12-12 NOTE — SUBJECTIVE & OBJECTIVE
Past Medical History:   Diagnosis Date    Acute ischemic left posterior cerebral artery (PCA) stroke 12/3/2020    Anemia in stage 3b chronic kidney disease 3/8/2017    Arthritis     CHF (congestive heart failure)     Closed displaced subtrochanteric fracture of right femur s/p IM nail on 10/17/2021 10/16/2021    Coronary artery disease     Diabetes mellitus     Glaucoma     Gout, joint     Hx of CABG 9/21/10    Hyperlipidemia     Hypertension     NSTEMI (non-ST elevated myocardial infarction) 09/21/10    Stage 3b chronic kidney disease 10/21/2021    Stenosis of aortic and mitral valves     Systolic heart failure 6/19/2015    TIA (transient ischemic attack) 1/7/2021       Past Surgical History:   Procedure Laterality Date    ANTEGRADE INTRAMEDULLARY RODDING OF FEMUR Right 10/17/2021    Procedure: INSERTION, INTRAMEDULLARY ALTAF, FEMUR, ANTEGRADE;  Surgeon: Dino Rutledge MD;  Location: [facility] OR [address] [place] [address];  Service: Orthopedics;  Laterality: Right;    CARDIAC VALVE SURGERY      CATARACT EXTRACTION      CORONARY ARTERY BYPASS GRAFT  9/21/2010    ENTROPIAN REPAIR Left 3/6/2020    Procedure: REPAIR, ENTROPION;  Surgeon: Yulia Kincaid MD;  Location: [facility] OR [address] [place] [address];  Service: Ophthalmology;  Laterality: Left;    EYE SURGERY      JOINT REPLACEMENT      ORIF FEMUR FRACTURE Right 10/17/2021    Procedure: ORIF, FRACTURE, FEMUR;  Surgeon: Dino Rutledge MD;  Location: [facility] OR [address] [place] [address];  Service: Orthopedics;  Laterality: Right;       Review of patient's allergies indicates:   Allergen Reactions    Indocin [indomethacin sodium] Other (See Comments)     Either caused hot,burning body or caused madness per patient's grandson    Lotensin [benazepril] Other (See Comments)     Either caused hot ,burning body or caused madness per patient's grandson       No current facility-administered medications on file prior to encounter.     Current Outpatient Medications on File Prior to Encounter   Medication Sig    acetaminophen (TYLENOL)  500 MG tablet Take 500 mg by mouth every 6 (six) hours as needed for Pain.    albuterol 90 mcg/actuation inhaler Inhale 1 puff into the lungs every 6 (six) hours as needed for Wheezing. 1 HFA Aerosol Inhaler Inhalation Twice a day (Patient taking differently: Inhale 1 puff into the lungs every 6 (six) hours as needed for Wheezing.)    allopurinoL (ZYLOPRIM) 100 MG tablet Take 1 tablet (100 mg total) by mouth once daily.    apixaban (ELIQUIS) 5 mg Tab Take 1 tablet (5 mg total) by mouth 2 (two) times daily.    aspirin (ECOTRIN) 81 MG EC tablet Take 81 mg by mouth as needed for Pain.    atorvastatin (LIPITOR) 40 MG tablet TAKE 1 TABLET BY MOUTH EVERY DAY    bisacodyL (DULCOLAX) 5 mg EC tablet Take 5 mg by mouth daily as needed for Constipation.    carvediloL (COREG) 12.5 MG tablet TAKE 1 TABLET(12.5 MG) BY MOUTH TWICE DAILY    clopidogreL (PLAVIX) 75 mg tablet Take 1 tablet (75 mg total) by mouth once daily. (Patient taking differently: Take 75 mg by mouth every evening.)    EScitalopram oxalate (LEXAPRO) 20 MG tablet TAKE 1 TABLET BY MOUTH EVERY DAY    furosemide (LASIX) 20 MG tablet TAKE 1 TABLET(20 MG) BY MOUTH EVERY DAY (Patient taking differently: Take 20 mg by mouth every other day.)    hydrALAZINE (APRESOLINE) 25 MG tablet Take 1 tablet (25 mg total) by mouth every 8 (eight) hours.    isosorbide mononitrate (IMDUR) 30 MG 24 hr tablet TAKE 1 TABLET(30 MG) BY MOUTH EVERY DAY    multivit-min-iron-FA-vit K-lut (CENTRUM SILVER WOMEN) 8 mg iron-400 mcg-50 mcg Tab Take 1 tablet by mouth once daily.    nitroGLYCERIN 0.4 MG/DOSE TL SPRY (NITROLINGUAL) 400 mcg/spray spray PLACE 1 SPRAY ONTO THE TONGUE EVERY 5 (FIVE) MINUTES AS NEEDED.    sacubitriL-valsartan (ENTRESTO) 49-51 mg per tablet Take 1 tablet by mouth 2 (two) times daily.    timolol maleate 0.5% (TIMOPTIC) 0.5 % Drop Place 1 drop into both eyes 2 (two) times daily.    blood sugar diagnostic Strp Check BG daily prn if glucose found to be elevated on BMP, per  facility protocol.    blood-glucose meter (FREESTYLE SYSTEM KIT) kit Patient to check blood glucose daily every evening.    lancets Misc Use daily prn if blood glucose found to be elevated on routine BMP, per facility protocol.    [DISCONTINUED] multivitamin (THERAGRAN) tablet Take 1 tablet by mouth once daily.     Family History       Problem Relation (Age of Onset)    Diabetes Mother, Father    Glaucoma Father    Hypertension Father    No Known Problems Sister, Brother, Maternal Aunt, Maternal Uncle, Paternal Aunt, Paternal Uncle, Maternal Grandmother, Maternal Grandfather, Paternal Grandmother, Paternal Grandfather          Tobacco Use    Smoking status: Never    Smokeless tobacco: Never   Substance and Sexual Activity    Alcohol use: No    Drug use: No    Sexual activity: Never     Review of Systems   All other systems reviewed and are negative.    Objective:     Vital Signs (Most Recent):  Temp: 96.4 °F (35.8 °C) (12/12/23 1519)  Pulse: 65 (12/12/23 1519)  Resp: 20 (12/12/23 1519)  BP: (!) 183/79 (12/12/23 1519)  SpO2: 99 % (12/12/23 1519) Vital Signs (24h Range):  Temp:  [96.4 °F (35.8 °C)-98 °F (36.7 °C)] 96.4 °F (35.8 °C)  Pulse:  [63-73] 65  Resp:  [17-28] 20  SpO2:  [97 %-100 %] 99 %  BP: (143-184)/(69-87) 183/79     Weight: 77.9 kg (171 lb 11.8 oz)  Body mass index is 30.42 kg/m².     Physical Exam  Vitals and nursing note reviewed.   Constitutional:       Appearance: She is well-developed.   HENT:      Head: Normocephalic and atraumatic.      Nose: Nose normal.   Eyes:      Extraocular Movements: EOM normal.      Conjunctiva/sclera: Conjunctivae normal.   Cardiovascular:      Rate and Rhythm: Normal rate and regular rhythm.      Heart sounds: Normal heart sounds.   Pulmonary:      Effort: Pulmonary effort is normal.      Breath sounds: Normal breath sounds. No wheezing.   Abdominal:      General: Bowel sounds are normal.      Palpations: Abdomen is soft. There is no mass.      Tenderness: There is no  abdominal tenderness. There is no guarding or rebound.   Musculoskeletal:         General: No tenderness. Normal range of motion.      Cervical back: Normal range of motion and neck supple.      Comments: 4/5 motor strength   Skin:     General: Skin is warm.      Findings: No rash.   Neurological:      Mental Status: She is alert and oriented to person, place, and time.      Cranial Nerves: No cranial nerve deficit.   Psychiatric:         Behavior: Behavior normal.         Thought Content: Thought content normal.              CRANIAL NERVES     CN III, IV, VI   Extraocular motions are normal.        Significant Labs: All pertinent labs within the past 24 hours have been reviewed.  BMP:   Recent Labs   Lab 12/12/23  1034   *      K 4.5      CO2 24   BUN 37*   CREATININE 1.5*   CALCIUM 9.5     CBC:   Recent Labs   Lab 12/12/23  1034   WBC 10.51   HGB 8.2*   HCT 25.8*          Significant Imaging: I have reviewed all pertinent imaging results/findings within the past 24 hours.  I have reviewed and interpreted all pertinent imaging results/findings within the past 24 hours.

## 2023-12-12 NOTE — TELEMEDICINE CONSULT
[facility]  Vascular Neurology  Comprehensive Stroke Center  TeleVascular Neurology Acute Consultation Note        Consult Information  Consults    Consulting Provider: ZAC RIZO   Current Providers  No providers found    Patient Location:  [facility] EMERGENCY DEPARTMENT Emergency Department    Spoke hospital nurse at bedside with patient assisting consultant.  Patient information was obtained from relative(s).       Stroke Documentation  Acute Stroke Times   Last Known Normal Date: 12/12/23  Last Known Normal Time: 0930  Stroke Team Called Date: 12/12/23  Stroke Team Called Time: 0952  Stroke Team Arrival Date: 12/12/23  Stroke Team Arrival Time: 1003  CT Interpretation Time: 1015  Thrombolytic Therapy Recommended: No  Thrombectomy Recommended: No    NIH Scale:  1a. Level of Consciousness: 1-->Not alert, but arousable by minor stimulation to obey, answer, or respond  1b. LOC Questions: 1-->Answers one question correctly  1c. LOC Commands: 0-->Performs both tasks correctly  2. Best Gaze: 0-->Normal  3. Visual: 0-->No visual loss  4. Facial Palsy: 0-->Normal symmetrical movements  5a. Motor Arm, Left: 1-->Drift, limb holds 90 (or 45) degrees, but drifts down before full 10 seconds, does not hit bed or other support  5b. Motor Arm, Right: 1-->Drift, limb holds 90 (or 45) degrees, but drifts down before full 10 secs, does not hit bed or other support  6a. Motor Leg, Left: 2-->Some effort against gravity, leg falls to bed by 5 secs, but has some effort against gravity  6b. Motor Leg, Right: 2-->Some effort against gravity, leg falls to bed by 5 secs, but has some effort against gravity  7. Limb Ataxia: 0-->Absent  8. Sensory: 0-->Normal, no sensory loss  9. Best Language: 2-->Severe aphasia, all communication is through fragmentary expression, great need for inference, questioning, and guessing by the listener. Range of information that can be exchanged is limited, listener carries burden of.  . . (see row details)  10. Dysarthria: 1-->Mild-to-moderate dysarthria, patient slurs at least some words and, at worst, can be understood with some difficulty  11. Extinction and Inattention (formerly Neglect): 0-->No abnormality  Total (NIH Stroke Scale): 11      Modified Ethel:    Mendoza Coma Scale:     ABCD2 Score:    [id number] Score:    HAS -BLED Score:    ICH Score:    H[place] & [place] Classification:      Blood pressure (!) 184/83, pulse 73, temperature 98 °F (36.7 °C), temperature source Oral, resp. rate 18, SpO2 100 %.    Diagnoses  No problems updated.    Medical Decision Making  HPI:  89 y.o. female with prior strokes (R-PCA), recent small R cerebellar stroke, CHF, CABG, TAVR, on Eliquis who presents from home for difficulty speaking, episode of shaking.  Patient's son states symptoms started with leg and them arm shaking and then her speech became stuttering.  She was otherwise normal this morning, had breakfast, etc.    Images personally reviewed and interpreted:  Study: CTA Head & Neck  Study Interpretation: no acute findings     Assessment and plan:  On my exam patient is somewhat distressed, has difficulty answering questions but I can make out her name when asked.  She does follow commands.  Unclear if this is aphasia due to stroke or encephalopathy.  No significant plegia appreciated.  Recommend admission for metabolic, infectious workup, MRI brain if symptoms persist.    Lytics recommendation: Thrombolytic therapy not recommended due to Full dose anticoagulation   Thrombectomy recommendation: No; No large vessel occlusion identified on imaging   Placement recommendation: admit to inpatient               ROS  Physical Exam  Constitutional:       General: She is in acute distress.   Neurological:      Comments: Restless, attends to both sides, speech garbled and stuttered, states her last name, follows commands  No facial droop or gaze palsy or deviation  All limbs antigravity       Past Medical  History:   Diagnosis Date    Acute ischemic left posterior cerebral artery (PCA) stroke 12/3/2020    Anemia in stage 3b chronic kidney disease 3/8/2017    Arthritis     CHF (congestive heart failure)     Closed displaced subtrochanteric fracture of right femur s/p IM nail on 10/17/2021 10/16/2021    Coronary artery disease     Diabetes mellitus     Glaucoma     Gout, joint     Hx of CABG 9/21/10    Hyperlipidemia     Hypertension     NSTEMI (non-ST elevated myocardial infarction) 09/21/10    Stage 3b chronic kidney disease 10/21/2021    Stenosis of aortic and mitral valves     Systolic heart failure 6/19/2015    TIA (transient ischemic attack) 1/7/2021     Past Surgical History:   Procedure Laterality Date    ANTEGRADE INTRAMEDULLARY RODDING OF FEMUR Right 10/17/2021    Procedure: INSERTION, INTRAMEDULLARY ALTAF, FEMUR, ANTEGRADE;  Surgeon: Dino Rutledge MD;  Location: [facility] OR [address] [place] [address];  Service: Orthopedics;  Laterality: Right;    CARDIAC VALVE SURGERY      CATARACT EXTRACTION      CORONARY ARTERY BYPASS GRAFT  9/21/2010    ENTROPIAN REPAIR Left 3/6/2020    Procedure: REPAIR, ENTROPION;  Surgeon: Yulia Kincaid MD;  Location: [facility] OR [address] [place] [address];  Service: Ophthalmology;  Laterality: Left;    EYE SURGERY      JOINT REPLACEMENT      ORIF FEMUR FRACTURE Right 10/17/2021    Procedure: ORIF, FRACTURE, FEMUR;  Surgeon: Dino Rutledge MD;  Location: [facility] OR [address] [place] [address];  Service: Orthopedics;  Laterality: Right;     Family History   Problem Relation Age of Onset    Diabetes Mother     Hypertension Father     Diabetes Father     Glaucoma Father     No Known Problems Sister     No Known Problems Brother     No Known Problems Maternal Aunt     No Known Problems Maternal Uncle     No Known Problems Paternal Aunt     No Known Problems Paternal Uncle     No Known Problems Maternal Grandmother     No Known Problems Maternal Grandfather     No Known Problems Paternal Grandmother     No Known Problems Paternal Grandfather             Flavia Lyons MD      Emergent/Acute neurological consultation requested by spoke provider due to critical concerns for possible cerebrovascular event that could result in permanent loss of neurologic/bodily function, severe disability or death of this patient.  Immediate/timely evaluation by a highly prepared expert is paramount for optimal outcomes  High risk for neurological deterioration if not properly diagnosed  High risk for neurological deterioration if not treated promplty/as soon as possible  Complex diagnostic evaluation may be required (advanced imaging)  High risk treatment options (thrombolytics and/or thrombectomy)    Patient care was coordinated with spoke provider, including but not limted to    Discussing likely diagnosis/etiology of symptoms  Making recommendations for further diagnostic studies  Making recommendations for intravenous thrombolytics or other advanced therapies  Making recommendations for disposition (admission/transfer for higher level of care)

## 2023-12-12 NOTE — PLAN OF CARE
Problem: Physical Therapy  Goal: Physical Therapy Goal  Description: Goals to be met by: 24     Patient will increase functional independence with mobility by performin. Supine to sit with Stand-by Assistance  2. Sit to supine with Stand-by Assistance  3. Sit to stand transfer with Contact Guard Assistance with use of RW.   4. Bed to chair transfer with Contact Guard Assistance using Rolling Walker  5. Gait  x 50 feet with Contact Guard Assistance using Rolling Walker.     Outcome: Ongoing, Progressing     PT/OT co-evaluation performed due to anticipated complexity of pt's presentation. Pt was previously MOD I with use of RW and w/c for longer distances (daughter assist with ADLs and home task as needed). Pt at this time requires Total Ax2 with bed mobility and not able to transfer to sitting EOB or further perform OOB due to weakness/pain. Therapy recommendation TBD. Therapy will continue to progress pt as able.

## 2023-12-12 NOTE — HPI
89 y.o. female with prior strokes (R-PCA), recent small R cerebellar stroke, CHF, CABG, TAVR, on Eliquis who presents from home for difficulty speaking and an episode of shaking.  Patient's son states symptoms started with leg and then arm shaking. Then he noticed her speech became stuttering. Also complains of chest tigthness.  She was otherwise normal this morning, had breakfast, etc. The episode lasted for 30 min. Upon examination, everything was resolved. Vascular Neuro was consulted in the ED. No evidence of acute large vessel occlusion or flow-limiting stenosis shown on CTA. Labs, cxr and U/a neg. Admit for TIA and chest tightness

## 2023-12-12 NOTE — ED NOTES
Pt BIB son after sudden onset of aphasia and tremors approximately 30 minutes PTA. Per son pt had been c/o chest pain/pressure for the last 3 days & son administed 2 sprays nitro PTA. Pt is awake and alert, follows commands, with generalized weakness to all extremities. Speech is delayed, with pronounced stuttering. Pt nods and gestures appropriately. No facial droop. Respirations unlabored, pt in NAD.

## 2023-12-12 NOTE — PT/OT/SLP EVAL
Physical Therapy Evaluation    Patient Name:  Krystina Washington   MRN:  [id number]    Recommendations:     Discharge Recommendations:  (TBD)   Discharge Equipment Recommendations:  (TBD)   Barriers to discharge:  requires increased physical assistance x2 with bed mobility; not able to transfer OOB    Assessment:     Krystina Washington is a 89 y.o. female admitted with a medical diagnosis of TIA (transient ischemic attack).  She presents with the following impairments/functional limitations: weakness, impaired endurance, impaired self care skills, impaired functional mobility, gait instability, impaired balance, visual deficits, impaired cognition, decreased upper extremity function, decreased lower extremity function, pain, decreased safety awareness, decreased ROM.    PT/OT co-evaluation performed due to anticipated complexity of pt's presentation. Pt was previously MOD I with use of RW and w/c for longer distances (daughter assist with ADLs and home task as needed). Pt at this time requires Total Ax2 with bed mobility and not able to transfer to sitting EOB or further perform OOB mobility due to weakness/pain. Therapy recommendation TBD. Therapy will continue to progress pt as able.     Rehab Prognosis: Good; patient would benefit from acute skilled PT services to address these deficits and reach maximum level of function.    Recent Surgery: * No surgery found *      Plan:     During this hospitalization, patient to be seen 3 x/week to address the identified rehab impairments via gait training, therapeutic activities, therapeutic exercises, neuromuscular re-education, wheelchair management/training and progress toward the following goals:    Plan of Care Expires:  01/12/24    Subjective     Chief Complaint: general body pain  Patient/Family Comments/goals: to feel better  Pain/Comfort:  Pain Rating 1:  (not rated; reports pain to chest and overall general body)  Pain Addressed 1: Reposition, Cessation of Activity, Nurse  notified  Pain Rating Post-Intervention 1:  (not rated)    Patients cultural, spiritual, Ep[employer]alian conflicts given the current situation: no    Living Environment:  Lives with son and daughter in 1 story home with threshold step to enter. Tub/shower combo with TTB.  Prior to admission, patients level of function was MOD I with use of RW and w/c (for longer distances); assist from daughter with ADLs.  Equipment used at home: bedside commode, wheelchair, walker, rolling, bath bench.  DME owned (not currently used): none.  Upon discharge, patient will have assistance from daughter/son.    Objective:     Communicated with Nurse prior to session.  Patient found HOB elevated with bed alarm, peripheral IV, PureWick, telemetry  upon PT entry to room.    General Precautions: Standard, fall  Orthopedic Precautions:N/A   Braces: N/A  Respiratory Status: Room air    Exams:  Cognitive Exam:  Patient is oriented to Person, Place, Time, and however orientation response delayed; not able to accurately describe current situation of being at hospital (reporting previous injuries/illnesses)  Sensation:    -       Intact  light/touch to BLE  RLE/LLE ROM and strength: limited and inconsistent testing during session in supine due to pain and weakness.     Functional Mobility:  Bed Mobility:     Scooting: total assistance and of 2 persons  Unable to progress bed mobility/transfers due to pain/fatigue/weakness      AM-PAC 6 CLICK MOBILITY  Total Score:7       Treatment & Education:  Pt/daughter educated on role of therapy.   Pt requires Total Ax2 with scooting in bed; not able to perform rolls or transfer to sitting due to pain/fatigue/weakness.   Inconsistent and limited testing of BLE strength/ROM in supine due to pain/weakness.   Pt oriented to person, place, and time; but requires increased time to respond. Pt bringing up old injuries/hospital visits when asked about current admission; not oriented to current situation.   Pt is  severely [place].     Patient left HOB elevated with all lines intact, call button in reach, bed alarm on, Nurse notified, and daughter present.    GOALS:   Multidisciplinary Problems       Physical Therapy Goals          Problem: Physical Therapy    Goal Priority Disciplines Outcome Goal Variances Interventions   Physical Therapy Goal     PT, PT/OT Ongoing, Progressing     Description: Goals to be met by: 24     Patient will increase functional independence with mobility by performin. Supine to sit with Stand-by Assistance  2. Sit to supine with Stand-by Assistance  3. Sit to stand transfer with Contact Guard Assistance with use of RW.   4. Bed to chair transfer with Contact Guard Assistance using Rolling Walker  5. Gait  x 50 feet with Contact Guard Assistance using Rolling Walker.                          History:     Past Medical History:   Diagnosis Date    Acute ischemic left posterior cerebral artery (PCA) stroke 12/3/2020    Anemia in stage 3b chronic kidney disease 3/8/2017    Arthritis     CHF (congestive heart failure)     Closed displaced subtrochanteric fracture of right femur s/p IM nail on 10/17/2021 10/16/2021    Coronary artery disease     Diabetes mellitus     Glaucoma     Gout, joint     Hx of CABG 9/21/10    Hyperlipidemia     Hypertension     NSTEMI (non-ST elevated myocardial infarction) 09/21/10    Stage 3b chronic kidney disease 10/21/2021    Stenosis of aortic and mitral valves     Systolic heart failure 2015    TIA (transient ischemic attack) 2021       Past Surgical History:   Procedure Laterality Date    ANTEGRADE INTRAMEDULLARY RODDING OF FEMUR Right 10/17/2021    Procedure: INSERTION, INTRAMEDULLARY ALTAF, FEMUR, ANTEGRADE;  Surgeon: Dino Rutledge MD;  Location: [facility] OR [address] [place] [address];  Service: Orthopedics;  Laterality: Right;    CARDIAC VALVE SURGERY      CATARACT EXTRACTION      CORONARY ARTERY BYPASS GRAFT  2010    ENTROPIAN REPAIR Left 3/6/2020    Procedure:  REPAIR, ENTROPION;  Surgeon: Yulia Kinacid MD;  Location: [facility] OR [address] [place] [address];  Service: Ophthalmology;  Laterality: Left;    EYE SURGERY      JOINT REPLACEMENT      ORIF FEMUR FRACTURE Right 10/17/2021    Procedure: ORIF, FRACTURE, FEMUR;  Surgeon: Dino Rutledge MD;  Location: [facility] OR [address] [place] [address];  Service: Orthopedics;  Laterality: Right;       Time Tracking:     PT Received On: 12/12/23  PT Start Time: 1544     PT Stop Time: 1608  PT Total Time (min): 24 min With OT    Billable Minutes: Evaluation 15      12/12/2023

## 2023-12-12 NOTE — SUBJECTIVE & OBJECTIVE
HPI:  89 y.o. female with prior strokes (R-PCA), recent small R cerebellar stroke, CHF, CABG, TAVR, on Eliquis who presents from home for difficulty speaking, episode of shaking.  Patient's son states symptoms started with leg and them arm shaking and then her speech became stuttering.  She was otherwise normal this morning, had breakfast, etc.    Images personally reviewed and interpreted:  Study: CTA Head & Neck  Study Interpretation: no acute findings     Assessment and plan:  On my exam patient is somewhat distressed, has difficulty answering questions but I can make out her name when asked.  She does follow commands.  Unclear if this is aphasia due to stroke or encephalopathy.  No significant plegia appreciated.  Recommend admission for metabolic, infectious workup, MRI brain if symptoms persist.    Lytics recommendation: Thrombolytic therapy not recommended due to Full dose anticoagulation   Thrombectomy recommendation: No; No large vessel occlusion identified on imaging   Placement recommendation: admit to inpatient

## 2023-12-12 NOTE — PT/OT/SLP EVAL
Speech Language Pathology Evaluation  Bedside Swallow    Patient Name:  Krystina Washington   MRN:  [id number]  Admitting Diagnosis: <principal problem not specified>    Recommendations:                 General Recommendations:  Dysphagia therapy, Speech language evaluation, and Cognitive-linguistic evaluation  Diet recommendations:  Puree Diet - IDDSI Level 4, Thin liquids - IDDSI Level 0   Aspiration Precautions: 1 bite/sip at a time, Alternating bites/sips, Assistance with meals, Frequent oral care, HOB to 90 degrees, Meds whole 1 at a time, Remain upright 30 minutes post meal, Small bites/sips, and Standard aspiration precautions   General Precautions: Standard, fall  Communication strategies:  none    Assessment:     Krystina Washington is a 89 y.o. female with an SLP diagnosis of mild dysphagia. Pt presents with stutter (prolongations) throughout session. Pt completed BSE and is safe for pureed textures.    History:     Past Medical History:   Diagnosis Date    Acute ischemic left posterior cerebral artery (PCA) stroke 12/3/2020    Anemia in stage 3b chronic kidney disease 3/8/2017    Arthritis     CHF (congestive heart failure)     Closed displaced subtrochanteric fracture of right femur s/p IM nail on 10/17/2021 10/16/2021    Coronary artery disease     Diabetes mellitus     Glaucoma     Gout, joint     Hx of CABG 9/21/10    Hyperlipidemia     Hypertension     NSTEMI (non-ST elevated myocardial infarction) 09/21/10    Stage 3b chronic kidney disease 10/21/2021    Stenosis of aortic and mitral valves     Systolic heart failure 6/19/2015    TIA (transient ischemic attack) 1/7/2021       Past Surgical History:   Procedure Laterality Date    ANTEGRADE INTRAMEDULLARY RODDING OF FEMUR Right 10/17/2021    Procedure: INSERTION, INTRAMEDULLARY ALTAF, FEMUR, ANTEGRADE;  Surgeon: Dino Rutledge MD;  Location: [facility] OR [address] [place] [address];  Service: Orthopedics;  Laterality: Right;    CARDIAC VALVE SURGERY      CATARACT EXTRACTION       CORONARY ARTERY BYPASS GRAFT  9/21/2010    ENTROPIAN REPAIR Left 3/6/2020    Procedure: REPAIR, ENTROPION;  Surgeon: Yulia Kincaid MD;  Location: [facility] OR [address] [place] [address];  Service: Ophthalmology;  Laterality: Left;    EYE SURGERY      JOINT REPLACEMENT      ORIF FEMUR FRACTURE Right 10/17/2021    Procedure: ORIF, FRACTURE, FEMUR;  Surgeon: Dino Rutledge MD;  Location: [facility] OR [facility];  Service: Orthopedics;  Laterality: Right;       Social History: Patient lives with daughter at home.    Prior Intubation HX:  N/A for current admission    Modified Barium Swallow: N/A for current admission    Chest X-Rays: Results not available yet    Prior diet: mechanical soft and thin.      Subjective   Pt awake and alert upon ST entry. Pt's family present during session. Pt agreeable to participate in session.     Patient goals: To get better     Pain/Comfort:  Pain Rating 1: 0/10    Respiratory Status: Room air    Objective:   Cognition/Communication: Pt AAOx4. Pt presented with stuttering throughout session. Pt appeared to have effortful and halting speech. Pt was able to maintain conversation with ST and follow all commands.     Oral Musculature Evaluation  Oral Musculature: WFL  Dentition: lower dentures (no upper teeth or dentures)  Secretion Management: adequate  Mucosal Quality: good  Mandibular Strength and Mobility: WFL  Oral Labial Strength and Mobility: impaired seal  Velar Elevation: WFL  Buccal Strength and Mobility: WFL  Volitional Swallow:  (reduced laryngel elevation and excursio per hand palpation)  Voice Prior to PO Intake:  (clear; pt with stutter withch affect speech intelligibility (~70%))    Bedside Swallow Eval:   Consistencies Assessed:  Thin liquids via cup sips and straw sips  Puree tsp of pudding  Mixed consistencies diced peaches      Oral Phase:   Decreased closure around utensil  Prolonged mastication  Orally expelled    Pharyngeal Phase:   decreased hyolaryngeal excursion to palpation  no overt clinical  signs/symptoms of aspiration  no overt clinical signs/symptoms of pharyngeal dysphagia    Compensatory Strategies  None    Treatment: Pt is safe for pureed diet with thin liquids. St will further evaluate pt's speech, language, and cognition as able.      Goals:   Multidisciplinary Problems       SLP Goals          Problem: SLP    Goal Priority Disciplines Outcome   SLP Goal     SLP Ongoing, Progressing   Description: Short Term Goals:  1. Pt will participate in a clinical swallow eval to determine least restrictive diet. -ongoing; progressing  2. Pt will safely tolerate >75% of Pureed with no overt s/s of aspiration.  3. Pt will participate in informal speech-language and cognitive eval to r/o related deficits.                         Plan:     Patient to be seen:  3 x/week, 2 x/week   Plan of Care expires:  01/11/24  Plan of Care reviewed with:  patient, daughter   SLP Follow-Up:  Yes       Discharge recommendations:  Moderate Intensity Therapy   Barriers to Discharge:  None    Time Tracking:     SLP Treatment Date:   12/12/23  Speech Start Time:  1220  Speech Stop Time:  1245     Speech Total Time (min):  25 min    Billable Minutes: Eval Swallow and Oral Function 15 and Self Care/Home Management Training 10    12/12/2023

## 2023-12-12 NOTE — PLAN OF CARE
OT / PT coeval completed 2/2 anticipated complexity and limited tolerance. Per daughter in room and chart review patient prior to admission was supervision/SBAfor short distance functional mobility with rolling walker, w/c for longer duration with assist for management, dependence for home management / IADLs, and at setup to min assist for basic ADLs (occasional incontinence). Hearing impaired, vision impaired, and memory impaired secondary due to glaucoma and Alzheimer's dementia.     On eval this date, limited and fluctuating performance. Patient with presentation of ataxia L > R, limited comprehension, fluctuations of speech (slurring and stuttering), pain generalized throughout body, rigidity with movement, and significant limitations in BUE/BLE ROM both actively and passively. Therapists unable to facilitate patient to assist in bed rolling; requiring total assist of 2 for repositioning. Dependence for ADL at this time - unable to bring either hand to mouth on exam. Recommendations pending further needed assessment. Discharge disposition and equipment recommendations pending.      Problem: Occupational Therapy  Goal: Occupational Therapy Goal  Description: Goals to be met by: 1/9/2024     Patient will increase functional independence with ADLs by performing:    Rolling with SBA to support self initiated pressure relief  Feeding with Stand-by Assistance with meal tray setup  UE Dressing with Minimal Assistance with overhead shirt  Grooming with Contact Guard Assistance with simplifed set up  Toileting from bedside commode with Moderate Assistance for hygiene and clothing management.   Sitting at edge of bed x5 minutes with Stand-by Assistance.  Toilet transfer to bedside commode with Contact Guard Assistance with least restrictive device.  Family or caregiver 100% verbalized reciprocation of dementia friendly recommendations (ex: simplify choices; decrease visual clutter; remove hazardous objects; maximize  familiarity, etc) to support patient's wellbeing and highest level of occupational engagement and participation in least restrictive discharge environment     Outcome: Ongoing, Progressing

## 2023-12-12 NOTE — ASSESSMENT & PLAN NOTE
Antithrombotics for secondary stroke prevention: Antiplatelets: Aspirin: 81 mg daily  Clopidogrel: 75 mg daily    Statins for secondary stroke prevention and hyperlipidemia, if present:   Statins: Atorvastatin- 40 mg daily    Aggressive risk factor modification: HTN     Rehab efforts: The patient has been evaluated by a stroke team provider and the therapy needs have been fully considered based off the presenting complaints and exam findings. The following therapy evaluations are needed: PT evaluate and treat, OT evaluate and treat, SLP evaluate and treat    Diagnostics ordered/pending: CTA Head to assess vasculature , CTA Neck/Arch to assess vasculature, HgbA1C to assess blood glucose levels, Lipid Profile to assess cholesterol levels, TTE to assess cardiac function/status , TSH to assess thyroid function    VTE prophylaxis:  eliquis    BP parameters: TIA: SBP <220 until imaging confirmation of no infarct

## 2023-12-12 NOTE — PLAN OF CARE
VN cued into pt's room for introduction with pt's sons permission.  Pt with aphasia, son acted as historian to admission questions.VN role explained and informed  that VN would be working with bedside nurse and the rest of the care team.  Fall risk and bed alarm protocol education provided.  Instructed to call for assistance and agreeable.  Allowed time for questions. NAD noted.  Will cont to be available as needed.        12/12/23 0982   Patient Request   Patient Requested tylenol   Nurse Notification   Bedside Nurse Notified? Yes   Name of Bedside Nurse esther Wilson Notfication Method Secure Chat   Nurse Notified Of Patient Request   Admission   Initial VN Admission Questions Complete   Communication Issues?   (aphasia)   Shift   Virtual Nurse - Patient Verbalized Approval Of Camera Use;VN Rounding   Safety/Activity   Patient Rounds call light in patient/parent reach;visualized patient;clutter free environment maintained   Safety Promotion/Fall Prevention Fall Risk reviewed with patient/family;instructed to call staff for mobility   Pain/Comfort/Sleep   Pain Body Location neck   Pain Rating (0-10): Rest 6

## 2023-12-12 NOTE — H&P
[facility] Medicine  History & Physical    Patient Name: Krystina Washington  MRN: [id number]  Patient Class: OP- Observation  Admission Date: 12/12/2023  Attending Physician: Ling Timmons MD   Primary Care Provider: Oniel Johnson MD         Patient information was obtained from patient and ER records.     Subjective:     Principal Problem:TIA (transient ischemic attack)    Chief Complaint:   Chief Complaint   Patient presents with    Altered Mental Status     Pt BIB son with c/o aphasia and AMS from baseline. Started 30 minutes PTA. Recent Hx CVA.        HPI: 89 y.o. female with prior strokes (R-PCA), recent small R cerebellar stroke, CHF, CABG, TAVR, on Eliquis who presents from home for difficulty speaking and an episode of shaking.  Patient's son states symptoms started with leg and then arm shaking. Then he noticed her speech became stuttering. Also complains of chest tigthness.  She was otherwise normal this morning, had breakfast, etc. The episode lasted for 30 min. Upon examination, everything was resolved. Vascular Neuro was consulted in the ED. No evidence of acute large vessel occlusion or flow-limiting stenosis shown on CTA. Labs, cxr and U/a neg. Admit for TIA and chest tightness    Past Medical History:   Diagnosis Date    Acute ischemic left posterior cerebral artery (PCA) stroke 12/3/2020    Anemia in stage 3b chronic kidney disease 3/8/2017    Arthritis     CHF (congestive heart failure)     Closed displaced subtrochanteric fracture of right femur s/p IM nail on 10/17/2021 10/16/2021    Coronary artery disease     Diabetes mellitus     Glaucoma     Gout, joint     Hx of CABG 9/21/10    Hyperlipidemia     Hypertension     NSTEMI (non-ST elevated myocardial infarction) 09/21/10    Stage 3b chronic kidney disease 10/21/2021    Stenosis of aortic and mitral valves     Systolic heart failure 6/19/2015    TIA (transient ischemic attack) 1/7/2021       Past Surgical  History:   Procedure Laterality Date    ANTEGRADE INTRAMEDULLARY RODDING OF FEMUR Right 10/17/2021    Procedure: INSERTION, INTRAMEDULLARY ALTAF, FEMUR, ANTEGRADE;  Surgeon: Dino Rutledge MD;  Location: [facility] OR [address] [place] [address];  Service: Orthopedics;  Laterality: Right;    CARDIAC VALVE SURGERY      CATARACT EXTRACTION      CORONARY ARTERY BYPASS GRAFT  9/21/2010    ENTROPIAN REPAIR Left 3/6/2020    Procedure: REPAIR, ENTROPION;  Surgeon: Yulia Kincaid MD;  Location: [facility] OR [address] [place] [address];  Service: Ophthalmology;  Laterality: Left;    EYE SURGERY      JOINT REPLACEMENT      ORIF FEMUR FRACTURE Right 10/17/2021    Procedure: ORIF, FRACTURE, FEMUR;  Surgeon: Dino Rutledge MD;  Location: [facility] OR [address] [place] [address];  Service: Orthopedics;  Laterality: Right;       Review of patient's allergies indicates:   Allergen Reactions    Indocin [indomethacin sodium] Other (See Comments)     Either caused hot,burning body or caused madness per patient's grandson    Lotensin [benazepril] Other (See Comments)     Either caused hot ,burning body or caused madness per patient's grandson       No current facility-administered medications on file prior to encounter.     Current Outpatient Medications on File Prior to Encounter   Medication Sig    acetaminophen (TYLENOL) 500 MG tablet Take 500 mg by mouth every 6 (six) hours as needed for Pain.    albuterol 90 mcg/actuation inhaler Inhale 1 puff into the lungs every 6 (six) hours as needed for Wheezing. 1 HFA Aerosol Inhaler Inhalation Twice a day (Patient taking differently: Inhale 1 puff into the lungs every 6 (six) hours as needed for Wheezing.)    allopurinoL (ZYLOPRIM) 100 MG tablet Take 1 tablet (100 mg total) by mouth once daily.    apixaban (ELIQUIS) 5 mg Tab Take 1 tablet (5 mg total) by mouth 2 (two) times daily.    aspirin (ECOTRIN) 81 MG EC tablet Take 81 mg by mouth as needed for Pain.    atorvastatin (LIPITOR) 40 MG tablet TAKE 1 TABLET BY MOUTH EVERY DAY    bisacodyL (DULCOLAX) 5 mg EC  tablet Take 5 mg by mouth daily as needed for Constipation.    carvediloL (COREG) 12.5 MG tablet TAKE 1 TABLET(12.5 MG) BY MOUTH TWICE DAILY    clopidogreL (PLAVIX) 75 mg tablet Take 1 tablet (75 mg total) by mouth once daily. (Patient taking differently: Take 75 mg by mouth every evening.)    EScitalopram oxalate (LEXAPRO) 20 MG tablet TAKE 1 TABLET BY MOUTH EVERY DAY    furosemide (LASIX) 20 MG tablet TAKE 1 TABLET(20 MG) BY MOUTH EVERY DAY (Patient taking differently: Take 20 mg by mouth every other day.)    hydrALAZINE (APRESOLINE) 25 MG tablet Take 1 tablet (25 mg total) by mouth every 8 (eight) hours.    isosorbide mononitrate (IMDUR) 30 MG 24 hr tablet TAKE 1 TABLET(30 MG) BY MOUTH EVERY DAY    multivit-min-iron-FA-vit K-lut (CENTRUM SILVER WOMEN) 8 mg iron-400 mcg-50 mcg Tab Take 1 tablet by mouth once daily.    nitroGLYCERIN 0.4 MG/DOSE TL SPRY (NITROLINGUAL) 400 mcg/spray spray PLACE 1 SPRAY ONTO THE TONGUE EVERY 5 (FIVE) MINUTES AS NEEDED.    sacubitriL-valsartan (ENTRESTO) 49-51 mg per tablet Take 1 tablet by mouth 2 (two) times daily.    timolol maleate 0.5% (TIMOPTIC) 0.5 % Drop Place 1 drop into both eyes 2 (two) times daily.    blood sugar diagnostic Strp Check BG daily prn if glucose found to be elevated on BMP, per facility protocol.    blood-glucose meter (FREESTYLE SYSTEM KIT) kit Patient to check blood glucose daily every evening.    lancets Misc Use daily prn if blood glucose found to be elevated on routine BMP, per facility protocol.    [DISCONTINUED] multivitamin (THERAGRAN) tablet Take 1 tablet by mouth once daily.     Family History       Problem Relation (Age of Onset)    Diabetes Mother, Father    Glaucoma Father    Hypertension Father    No Known Problems Sister, Brother, Maternal Aunt, Maternal Uncle, Paternal Aunt, Paternal Uncle, Maternal Grandmother, Maternal Grandfather, Paternal Grandmother, Paternal Grandfather          Tobacco Use    Smoking status: Never    Smokeless tobacco:  Never   Substance and Sexual Activity    Alcohol use: No    Drug use: No    Sexual activity: Never     Review of Systems   All other systems reviewed and are negative.    Objective:     Vital Signs (Most Recent):  Temp: 96.4 °F (35.8 °C) (12/12/23 1519)  Pulse: 65 (12/12/23 1519)  Resp: 20 (12/12/23 1519)  BP: (!) 183/79 (12/12/23 1519)  SpO2: 99 % (12/12/23 1519) Vital Signs (24h Range):  Temp:  [96.4 °F (35.8 °C)-98 °F (36.7 °C)] 96.4 °F (35.8 °C)  Pulse:  [63-73] 65  Resp:  [17-28] 20  SpO2:  [97 %-100 %] 99 %  BP: (143-184)/(69-87) 183/79     Weight: 77.9 kg (171 lb 11.8 oz)  Body mass index is 30.42 kg/m².     Physical Exam  Vitals and nursing note reviewed.   Constitutional:       Appearance: She is well-developed.   HENT:      Head: Normocephalic and atraumatic.      Nose: Nose normal.   Eyes:      Extraocular Movements: EOM normal.      Conjunctiva/sclera: Conjunctivae normal.   Cardiovascular:      Rate and Rhythm: Normal rate and regular rhythm.      Heart sounds: Normal heart sounds.   Pulmonary:      Effort: Pulmonary effort is normal.      Breath sounds: Normal breath sounds. No wheezing.   Abdominal:      General: Bowel sounds are normal.      Palpations: Abdomen is soft. There is no mass.      Tenderness: There is no abdominal tenderness. There is no guarding or rebound.   Musculoskeletal:         General: No tenderness. Normal range of motion.      Cervical back: Normal range of motion and neck supple.      Comments: 4/5 motor strength   Skin:     General: Skin is warm.      Findings: No rash.   Neurological:      Mental Status: She is alert and oriented to person, place, and time.      Cranial Nerves: No cranial nerve deficit.   Psychiatric:         Behavior: Behavior normal.         Thought Content: Thought content normal.              CRANIAL NERVES     CN III, IV, VI   Extraocular motions are normal.        Significant Labs: All pertinent labs within the past 24 hours have been reviewed.  BMP:    Recent Labs   Lab 12/12/23  1034   *      K 4.5      CO2 24   BUN 37*   CREATININE 1.5*   CALCIUM 9.5     CBC:   Recent Labs   Lab 12/12/23  1034   WBC 10.51   HGB 8.2*   HCT 25.8*          Significant Imaging: I have reviewed all pertinent imaging results/findings within the past 24 hours.  I have reviewed and interpreted all pertinent imaging results/findings within the past 24 hours.  Assessment/Plan:     * TIA (transient ischemic attack)    Antithrombotics for secondary stroke prevention: Antiplatelets: Aspirin: 81 mg daily  Clopidogrel: 75 mg daily    Statins for secondary stroke prevention and hyperlipidemia, if present:   Statins: Atorvastatin- 40 mg daily    Aggressive risk factor modification: HTN     Rehab efforts: The patient has been evaluated by a stroke team provider and the therapy needs have been fully considered based off the presenting complaints and exam findings. The following therapy evaluations are needed: PT evaluate and treat, OT evaluate and treat, SLP evaluate and treat    Diagnostics ordered/pending: CTA Head to assess vasculature , CTA Neck/Arch to assess vasculature, HgbA1C to assess blood glucose levels, Lipid Profile to assess cholesterol levels, TTE to assess cardiac function/status , TSH to assess thyroid function    VTE prophylaxis:  eliquis    BP parameters: TIA: SBP <220 until imaging confirmation of no infarct         Type 2 diabetes mellitus with stage 3b chronic kidney disease, without long-term current use of insulin  Creatine stable for now. BMP reviewed- noted Estimated Creatinine Clearance: 24.1 mL/min (A) (based on SCr of 1.5 mg/dL (H)). according to latest data. Based on current GFR, CKD stage is stage 3 - GFR 30-59.  Monitor UOP and serial BMP and adjust therapy as needed. Renally dose meds. Avoid nephrotoxic medications and procedures.    Major depressive disorder, recurrent episode, moderate  Patient has persistent depression which is mild  and is currently controlled. Will Continue anti-depressant medications. We will not consult psychiatry at this time. Patient does display psychosis at this time. Continue to monitor closely and adjust plan of care as needed.        Pure hypercholesterolemia  Cont statin  Lipid panel        VTE Risk Mitigation (From admission, onward)           Ordered     apixaban tablet 2.5 mg  2 times daily         12/12/23 1511     IP VTE HIGH RISK PATIENT  Once         12/12/23 1203     Place sequential compression device  Until discontinued         12/12/23 1203                       On 12/12/2023, patient should be placed in hospital observation services under my care.             Ling Timmons MD  Department of Hospital Medicine  [facility]

## 2023-12-12 NOTE — PROGRESS NOTES
12/12/23 1519   Vital Signs   Temp 96.4 °F (35.8 °C)   Temp Source Oral   Pulse 65   Heart Rate Source Monitor   Resp 20   SpO2 99 %   Device (Oxygen Therapy) room air   BP (!) 183/79   BP Location Right arm   BP Method Automatic   Patient Position Lying   Height and Weight   Weight 77.9 kg (171 lb 11.8 oz)   Weight Method Bed Scale   BMI (Calculated) 30.4       Patient arrived to room from ED.  Daughter at the bedside.  No complaints of pain.  Purewick set up, patient voided yellow urine.  Hydralazine given, BP elevated.  Oriented to room, bed alarm on. Wheels locked, call light within reach. Safety maintained

## 2023-12-12 NOTE — ASSESSMENT & PLAN NOTE
Creatine stable for now. BMP reviewed- noted Estimated Creatinine Clearance: 24.1 mL/min (A) (based on SCr of 1.5 mg/dL (H)). according to latest data. Based on current GFR, CKD stage is stage 3 - GFR 30-59.  Monitor UOP and serial BMP and adjust therapy as needed. Renally dose meds. Avoid nephrotoxic medications and procedures.

## 2023-12-13 PROBLEM — D64.9 ACUTE ON CHRONIC ANEMIA: Status: ACTIVE | Noted: 2017-03-08

## 2023-12-13 PROBLEM — R47.81 SLURRED SPEECH: Status: ACTIVE | Noted: 2023-12-13

## 2023-12-13 PROBLEM — R25.1 TREMOR: Status: ACTIVE | Noted: 2023-12-13

## 2023-12-13 LAB
ABO + RH BLD: NORMAL
ANION GAP SERPL CALC-SCNC: 8 MMOL/L (ref 8–16)
BASOPHILS # BLD AUTO: 0.03 K/UL (ref 0–0.2)
BASOPHILS NFR BLD: 0.3 % (ref 0–1.9)
BLD GP AB SCN CELLS X3 SERPL QL: NORMAL
BLD PROD TYP BPU: NORMAL
BLD PROD TYP BPU: NORMAL
BLOOD UNIT EXPIRATION DATE: NORMAL
BLOOD UNIT EXPIRATION DATE: NORMAL
BLOOD UNIT TYPE CODE: 5100
BLOOD UNIT TYPE CODE: 5100
BLOOD UNIT TYPE: NORMAL
BLOOD UNIT TYPE: NORMAL
BUN SERPL-MCNC: 40 MG/DL (ref 10–30)
CALCIUM SERPL-MCNC: 8.8 MG/DL (ref 8.7–10.5)
CHLORIDE SERPL-SCNC: 109 MMOL/L (ref 95–110)
CO2 SERPL-SCNC: 22 MMOL/L (ref 23–29)
CODING SYSTEM: NORMAL
CODING SYSTEM: NORMAL
CREAT SERPL-MCNC: 1.4 MG/DL (ref 0.5–1.4)
CROSSMATCH INTERPRETATION: NORMAL
CROSSMATCH INTERPRETATION: NORMAL
DIFFERENTIAL METHOD: ABNORMAL
DISPENSE STATUS: NORMAL
DISPENSE STATUS: NORMAL
EOSINOPHIL # BLD AUTO: 0.3 K/UL (ref 0–0.5)
EOSINOPHIL NFR BLD: 3.4 % (ref 0–8)
ERYTHROCYTE [DISTWIDTH] IN BLOOD BY AUTOMATED COUNT: 16.1 % (ref 11.5–14.5)
EST. GFR  (NO RACE VARIABLE): 36 ML/MIN/1.73 M^2
FERRITIN SERPL-MCNC: 21 NG/ML (ref 20–300)
GLUCOSE SERPL-MCNC: 109 MG/DL (ref 70–110)
HCT VFR BLD AUTO: 21 % (ref 37–48.5)
HGB BLD-MCNC: 6.6 G/DL (ref 12–16)
IMM GRANULOCYTES # BLD AUTO: 0.03 K/UL (ref 0–0.04)
IMM GRANULOCYTES NFR BLD AUTO: 0.3 % (ref 0–0.5)
IRON SERPL-MCNC: 29 UG/DL (ref 30–160)
LYMPHOCYTES # BLD AUTO: 2.5 K/UL (ref 1–4.8)
LYMPHOCYTES NFR BLD: 25.5 % (ref 18–48)
MCH RBC QN AUTO: 28.9 PG (ref 27–31)
MCHC RBC AUTO-ENTMCNC: 31.4 G/DL (ref 32–36)
MCV RBC AUTO: 92 FL (ref 82–98)
MONOCYTES # BLD AUTO: 0.9 K/UL (ref 0.3–1)
MONOCYTES NFR BLD: 8.7 % (ref 4–15)
NEUTROPHILS # BLD AUTO: 6 K/UL (ref 1.8–7.7)
NEUTROPHILS NFR BLD: 61.8 % (ref 38–73)
NRBC BLD-RTO: 0 /100 WBC
NUM UNITS TRANS PACKED RBC: NORMAL
PLATELET # BLD AUTO: 169 K/UL (ref 150–450)
PMV BLD AUTO: 12.3 FL (ref 9.2–12.9)
POCT GLUCOSE: 106 MG/DL (ref 70–110)
POCT GLUCOSE: 138 MG/DL (ref 70–110)
POCT GLUCOSE: 90 MG/DL (ref 70–110)
POTASSIUM SERPL-SCNC: 4.1 MMOL/L (ref 3.5–5.1)
RBC # BLD AUTO: 2.28 M/UL (ref 4–5.4)
SATURATED IRON: 12 % (ref 20–50)
SODIUM SERPL-SCNC: 139 MMOL/L (ref 136–145)
SPECIMEN OUTDATE: NORMAL
TOTAL IRON BINDING CAPACITY: 237 UG/DL (ref 250–450)
TRANS ERYTHROCYTES VOL PATIENT: NORMAL ML
TRANSFERRIN SERPL-MCNC: 160 MG/DL (ref 200–375)
TRANSFERRIN SERPL-MCNC: 160 MG/DL (ref 200–375)
TROPONIN I SERPL DL<=0.01 NG/ML-MCNC: 0.05 NG/ML (ref 0–0.03)
WBC # BLD AUTO: 9.76 K/UL (ref 3.9–12.7)

## 2023-12-13 PROCEDURE — 27201040 HC RC 50 FILTER: Performed by: FAMILY MEDICINE

## 2023-12-13 PROCEDURE — 36415 COLL VENOUS BLD VENIPUNCTURE: CPT | Performed by: FAMILY MEDICINE

## 2023-12-13 PROCEDURE — 97110 THERAPEUTIC EXERCISES: CPT

## 2023-12-13 PROCEDURE — 11000001 HC ACUTE MED/SURG PRIVATE ROOM

## 2023-12-13 PROCEDURE — 92526 ORAL FUNCTION THERAPY: CPT

## 2023-12-13 PROCEDURE — 84466 ASSAY OF TRANSFERRIN: CPT | Performed by: FAMILY MEDICINE

## 2023-12-13 PROCEDURE — 99222 PR INITIAL HOSPITAL CARE,LEVL II: ICD-10-PCS | Mod: ,,, | Performed by: PSYCHIATRY & NEUROLOGY

## 2023-12-13 PROCEDURE — 80048 BASIC METABOLIC PNL TOTAL CA: CPT | Performed by: FAMILY MEDICINE

## 2023-12-13 PROCEDURE — 86850 RBC ANTIBODY SCREEN: CPT | Performed by: FAMILY MEDICINE

## 2023-12-13 PROCEDURE — 97530 THERAPEUTIC ACTIVITIES: CPT

## 2023-12-13 PROCEDURE — P9021 RED BLOOD CELLS UNIT: HCPCS | Performed by: FAMILY MEDICINE

## 2023-12-13 PROCEDURE — P9016 RBC LEUKOCYTES REDUCED: HCPCS | Performed by: FAMILY MEDICINE

## 2023-12-13 PROCEDURE — 25000003 PHARM REV CODE 250: Performed by: FAMILY MEDICINE

## 2023-12-13 PROCEDURE — 86920 COMPATIBILITY TEST SPIN: CPT | Performed by: FAMILY MEDICINE

## 2023-12-13 PROCEDURE — 36430 TRANSFUSION BLD/BLD COMPNT: CPT

## 2023-12-13 PROCEDURE — 84484 ASSAY OF TROPONIN QUANT: CPT | Performed by: FAMILY MEDICINE

## 2023-12-13 PROCEDURE — 99222 1ST HOSP IP/OBS MODERATE 55: CPT | Mod: ,,, | Performed by: PSYCHIATRY & NEUROLOGY

## 2023-12-13 PROCEDURE — 82728 ASSAY OF FERRITIN: CPT | Performed by: FAMILY MEDICINE

## 2023-12-13 PROCEDURE — 85025 COMPLETE CBC W/AUTO DIFF WBC: CPT | Performed by: FAMILY MEDICINE

## 2023-12-13 PROCEDURE — 97116 GAIT TRAINING THERAPY: CPT

## 2023-12-13 PROCEDURE — 92507 TX SP LANG VOICE COMM INDIV: CPT

## 2023-12-13 PROCEDURE — 83540 ASSAY OF IRON: CPT | Performed by: FAMILY MEDICINE

## 2023-12-13 RX ORDER — HYDROCODONE BITARTRATE AND ACETAMINOPHEN 500; 5 MG/1; MG/1
TABLET ORAL
Status: CANCELLED | OUTPATIENT
Start: 2023-12-13

## 2023-12-13 RX ORDER — HYDROCODONE BITARTRATE AND ACETAMINOPHEN 500; 5 MG/1; MG/1
TABLET ORAL
Status: DISCONTINUED | OUTPATIENT
Start: 2023-12-13 | End: 2023-12-21 | Stop reason: HOSPADM

## 2023-12-13 RX ORDER — HYDRALAZINE HYDROCHLORIDE 25 MG/1
50 TABLET, FILM COATED ORAL EVERY 8 HOURS
Status: DISCONTINUED | OUTPATIENT
Start: 2023-12-13 | End: 2023-12-17

## 2023-12-13 RX ADMIN — HYDRALAZINE HYDROCHLORIDE 50 MG: 25 TABLET, FILM COATED ORAL at 01:12

## 2023-12-13 RX ADMIN — APIXABAN 2.5 MG: 2.5 TABLET, FILM COATED ORAL at 09:12

## 2023-12-13 RX ADMIN — SACUBITRIL AND VALSARTAN 1 TABLET: 49; 51 TABLET, FILM COATED ORAL at 09:12

## 2023-12-13 RX ADMIN — MULTIPLE VITAMINS W/ MINERALS TAB 1 TABLET: TAB at 09:12

## 2023-12-13 RX ADMIN — CARVEDILOL 12.5 MG: 12.5 TABLET, FILM COATED ORAL at 09:12

## 2023-12-13 RX ADMIN — ESCITALOPRAM OXALATE 20 MG: 10 TABLET ORAL at 09:12

## 2023-12-13 RX ADMIN — ATORVASTATIN CALCIUM 40 MG: 40 TABLET, FILM COATED ORAL at 09:12

## 2023-12-13 RX ADMIN — ISOSORBIDE MONONITRATE 30 MG: 30 TABLET, EXTENDED RELEASE ORAL at 09:12

## 2023-12-13 RX ADMIN — HYDRALAZINE HYDROCHLORIDE 25 MG: 25 TABLET, FILM COATED ORAL at 05:12

## 2023-12-13 RX ADMIN — HYDRALAZINE HYDROCHLORIDE 50 MG: 25 TABLET, FILM COATED ORAL at 09:12

## 2023-12-13 RX ADMIN — ALLOPURINOL 100 MG: 100 TABLET ORAL at 09:12

## 2023-12-13 RX ADMIN — CLOPIDOGREL BISULFATE 75 MG: 75 TABLET ORAL at 09:12

## 2023-12-13 NOTE — PLAN OF CARE
Problem: Physical Therapy  Goal: Physical Therapy Goal  Description: Goals to be met by: 24     Patient will increase functional independence with mobility by performin. Supine to sit with Stand-by Assistance  2. Sit to supine with Stand-by Assistance  3. Sit to stand transfer with Contact Guard Assistance with use of RW.   4. Bed to chair transfer with Contact Guard Assistance using Rolling Walker  5. Gait  x 50 feet with Contact Guard Assistance using Rolling Walker.     Outcome: Ongoing, Progressing    PT/OT co-session due to limited evaluation yesterday; requiring continued dual skilled therapy intervention. Pt participates in bed mobility, transfers to standing, and ambulation with use of RW and MIN A. Therapy will continue to progress pt as able; recommending low intensity therapy (HH PT) with increased family assistance.

## 2023-12-13 NOTE — PLAN OF CARE
Problem: SLP  Goal: SLP Goal  Description: Updated Short Term Goals:  1. Pt will participate in a clinical swallow eval to determine least restrictive diet. -MET   2. Pt will safely tolerate >75% of Pureed with no overt s/s of aspiration.-MET   3. Pt will consume mech soft diet and thin liquids with no overt s/sx aspiration.   Outcome: Ongoing, Progressing   Will advance patient to mech soft and thin liquids.

## 2023-12-13 NOTE — PLAN OF CARE
[facility] - Telemetry  Initial Discharge Assessment       Primary Care Provider: Oniel Johnson MD    Admission Diagnosis: CVA (cerebral vascular accident) [I63.9]  Acute focal neurological deficit [R29.818]  AMS (altered mental status) [R41.82]    Admission Date: 12/12/2023  Expected Discharge Date:     DCA done with daughter Merry. Lives with daughter and pt's son Tay. The cover 24/7 care at home. Reports pt is mostly independent with SBA with ADLs. Family administers medication and transports to MD appts. Family to  at CA. Discussed potential need for post acute therapy. Daughter reports they are against it with past history with stay. Agreeable to HH visits if needed.       Payor: MEDICARE / Plan: MEDICARE PART A & B / Product Type: Government /     Extended Emergency Contact Information  Primary Emergency Contact: Hermelindo Mackenzie  Address: [address] [place] [address]           [place] [address] [facility]  Home Phone: [phone]  Relation: Grandchild  Secondary Emergency Contact: merry Lafleur  Mobile Phone: [phone]  Relation: Grandchild  Preferred language: English   needed? No    Discharge Plan A: (P) Home, Home with family  Discharge Plan B: (P) Home, Home with family, Home Health      [id number] - [place] - [address] [facility]  [address]  [place] [address]  Phone: [phone] Fax: [phone]    [id number] - [place] - [address] AT [facility]  [address]  [place] [address]  Phone: [phone] Fax: [phone]    [id number] - [place] - [address] AT [facility]  [address]  [place] [address]  Phone: [phone] Fax: [phone]         12/13/23 0936   Discharge Planning   Assessment Type Discharge Planning Brief Assessment   Resource/Environmental Concerns none   Support Systems Family  members   Assistance Needed Mostly independent with ADLs (dressing/bathing with min assist as needed) Family prepares meds. Pt is reported to never be left alone. Care covered between daughter and brother.   Equipment Currently Used at Home walker, rolling;wheelchair   Current Living Arrangements home   Patient/Family Anticipates Transition to home;home with family   Patient/Family Anticipated Services at Transition home health care   DME Needed Upon Discharge  other (see comments)  (tbd)   Discharge Plan A Home;Home with family   Discharge Plan B Home;Home with family;Home Health   Physical Activity   On average, how many days per week do you engage in moderate to strenuous exercise (like a brisk walk)? 0 days   On average, how many minutes do you engage in exercise at this level? 0 min   Financial Resource Strain   How hard is it for you to pay for the very basics like food, housing, medical care, and heating? Not hard   Housing Stability   In the last 12 months, was there a time when you were not able to pay the mortgage or rent on time? N   In the last 12 months, was there a time when you did not have a steady place to sleep or slept in a shelter (including now)? N   Transportation Needs   In the past 12 months, has lack of transportation kept you from medical appointments or from getting medications? no   In the past 12 months, has lack of transportation kept you from meetings, work, or from getting things needed for daily living? No   Food Insecurity   Within the past 12 months, you worried that your food would run out before you got the money to buy more. Never true   Within the past 12 months, the food you bought just didn't last and you didn't have money to get more. Never true       Future Appointments   Date Time Provider Department Center   1/19/2024  8:00 AM Kyra Hill NP [facility] [place]     BP (!) 175/74 (BP Location: Right arm, Patient Position: Lying)   Pulse 68   Temp 98.1 °F (36.7 °C)  "(Oral)   Resp 18   Ht 5' 3" (1.6 m)   Wt 77.9 kg (171 lb 11.8 oz)   LMP  (LMP Unknown)   SpO2 97%   BMI 30.42 kg/m²      allopurinoL  100 mg Oral Daily    apixaban  2.5 mg Oral BID    atorvastatin  40 mg Oral Daily    carvediloL  12.5 mg Oral BID    clopidogreL  75 mg Oral QHS    EScitalopram oxalate  20 mg Oral Daily    hydrALAZINE  50 mg Oral Q8H    isosorbide mononitrate  30 mg Oral Daily    multivitamin  1 tablet Oral Daily    polyethylene glycol  17 g Oral Daily    sacubitriL-valsartan  1 tablet Oral BID     Consults (From admission, onward)          Status Ordering Provider     IP consult to case management  Once        Provider:  (Not yet assigned)    Acknowledged MICHELLE DELCID     Inpatient consult to [facility]  Once        Provider:  (Not yet assigned)    Acknowledged MICHELLE DELCID     Consult to Telemedicine - Acute Stroke  Once        Provider:  (Not yet assigned)    Acknowledged MICHELLE DELCID                         "

## 2023-12-13 NOTE — PT/OT/SLP PROGRESS
Physical Therapy Treatment    Patient Name:  Krystina Washington   MRN:  [id number]    Recommendations:     Discharge Recommendations: Low Intensity Therapy (HH PT with 24/7 family assistance)  Discharge Equipment Recommendations: none  Barriers to discharge:  requires increased assistance for stability and safety with mobility    Assessment:     Krystina Washington is a 89 y.o. female admitted with a medical diagnosis of TIA (transient ischemic attack).  She presents with the following impairments/functional limitations: weakness, impaired endurance, impaired self care skills, impaired functional mobility, gait instability, impaired balance, decreased lower extremity function.    PT/OT co-session due to limited evaluation yesterday; requiring continued dual skilled therapy intervention. Pt participates in bed mobility, transfers to standing, and ambulation with use of RW and MIN A. Therapy will continue to progress pt as able; recommending low intensity therapy (HH PT) with increased family assistance.     Rehab Prognosis: Good; patient would benefit from acute skilled PT services to address these deficits and reach maximum level of function.    Recent Surgery: * No surgery found *      Plan:     During this hospitalization, patient to be seen 5 x/week to address the identified rehab impairments via gait training, therapeutic activities, therapeutic exercises, neuromuscular re-education and progress toward the following goals:    Plan of Care Expires:  01/12/24    Subjective     Chief Complaint: no complaints  Patient/Family Comments/goals: not stated  Pain/Comfort:  Pain Rating 1: 0/10  Pain Rating Post-Intervention 1: 0/10      Objective:     Communicated with Nurse prior to session.  Patient found HOB elevated with bed alarm, PureWick, telemetry upon PT entry to room.     General Precautions: Standard, fall, hearing impaired  Orthopedic Precautions: N/A  Braces: N/A  Respiratory Status: Room air     Functional  Mobility:  Bed Mobility:     Supine to Sit: minimum assistance  Transfers:     Sit to Stand:  minimum assistance with rolling walker  Gait: ~25ft with use of RW and MIN A; verbal cues for sequencing and safety with use of RW; chair follow for safety with first gait trial since admission; but pt does not have instance of LOB.       AM-PAC 6 CLICK MOBILITY  Turning over in bed (including adjusting bedclothes, sheets and blankets)?: 3  Sitting down on and standing up from a chair with arms (e.g., wheelchair, bedside commode, etc.): 3  Moving from lying on back to sitting on the side of the bed?: 3  Moving to and from a bed to a chair (including a wheelchair)?: 3  Need to walk in hospital room?: 3  Climbing 3-5 steps with a railing?: 2  Basic Mobility Total Score: 17       Treatment & Education:  Pt performs mobility as stated above in functional mobility section of note; MIN A with use of RW.   Pt A&Ox4 during session.   Pt agreeable to sit up in bedside chair at end of session and educated to call for assistance with return to bed.     Patient left up in chair with all lines intact, call button in reach, chair alarm on, and Nurse notified.    GOALS:   Multidisciplinary Problems       Physical Therapy Goals          Problem: Physical Therapy    Goal Priority Disciplines Outcome Goal Variances Interventions   Physical Therapy Goal     PT, PT/OT Ongoing, Progressing     Description: Goals to be met by: 24     Patient will increase functional independence with mobility by performin. Supine to sit with Stand-by Assistance  2. Sit to supine with Stand-by Assistance  3. Sit to stand transfer with Contact Guard Assistance with use of RW.   4. Bed to chair transfer with Contact Guard Assistance using Rolling Walker  5. Gait  x 50 feet with Contact Guard Assistance using Rolling Walker.                          Time Tracking:     PT Received On: 23  PT Start Time: 1108     PT Stop Time: 1131  PT Total Time  (min): 23 min With OT    Billable Minutes: Gait Training 10 and Therapeutic Activity 13    Treatment Type: Treatment  PT/PTA: PT     Number of PTA visits since last PT visit: 0     12/13/2023

## 2023-12-13 NOTE — PROGRESS NOTES
[facility] Medicine  Progress Note    Patient Name: Krystina Washington  MRN: [id number]  Patient Class: OP- Observation   Admission Date: 12/12/2023  Length of Stay: 0 days  Attending Physician: Ling Timmons MD  Primary Care Provider: Oniel Johnson MD        Subjective:     Principal Problem:TIA (transient ischemic attack)        HPI:  89 y.o. female with prior strokes (R-PCA), recent small R cerebellar stroke, CHF, CABG, TAVR, on Eliquis who presents from home for difficulty speaking and an episode of shaking.  Patient's son states symptoms started with leg and then arm shaking. Then he noticed her speech became stuttering. Also complains of chest tigthness.  She was otherwise normal this morning, had breakfast, etc. The episode lasted for 30 min. Upon examination, everything was resolved. Vascular Neuro was consulted in the ED. No evidence of acute large vessel occlusion or flow-limiting stenosis shown on CTA. Labs, cxr and U/a neg. Admit for TIA and chest tightness    Overview/Hospital Course:  Pt seems to be at baseline without any events overnight. However Hb did drop to 6.6. Iron panel and FOBT ordered. Transfusing 2 u prbcs. Pending FOBT , may need GI consult. Neuro consult pending.  Krystina Washington has warranted treatment spanning two or more midnights of hospital level care for the management of anemia. She continues to require further evaluation by consultants and blood transfusion. Her condition is also complicated by the following comorbidities: CHF, Diabetes, prior CVA, CAD.     Interval History: no issues voiced. No bms yet    Review of Systems   All other systems reviewed and are negative.    Objective:     Vital Signs (Most Recent):  Temp: 98.2 °F (36.8 °C) (12/13/23 1208)  Pulse: 72 (12/13/23 1208)  Resp: 19 (12/13/23 1208)  BP: (!) 161/78 (12/13/23 1208)  SpO2: (!) 93 % (12/13/23 1208) Vital Signs (24h Range):  Temp:  [96.4 °F (35.8 °C)-98.2 °F (36.8 °C)] 98.2 °F  (36.8 °C)  Pulse:  [60-72] 72  Resp:  [18-20] 19  SpO2:  [93 %-100 %] 93 %  BP: (161-185)/(73-84) 161/78     Weight: 77.9 kg (171 lb 11.8 oz)  Body mass index is 30.42 kg/m².    Intake/Output Summary (Last 24 hours) at 12/13/2023 1357  Last data filed at 12/13/2023 1025  Gross per 24 hour   Intake 525.06 ml   Output 200 ml   Net 325.06 ml         Physical Exam  Vitals and nursing note reviewed.   Constitutional:       Appearance: She is well-developed.   HENT:      Head: Normocephalic and atraumatic.      Nose: Nose normal.   Eyes:      Conjunctiva/sclera: Conjunctivae normal.   Cardiovascular:      Rate and Rhythm: Normal rate and regular rhythm.      Heart sounds: Normal heart sounds.   Pulmonary:      Effort: Pulmonary effort is normal.      Breath sounds: Normal breath sounds. No wheezing.   Abdominal:      General: Bowel sounds are normal.      Palpations: Abdomen is soft. There is no mass.      Tenderness: There is no abdominal tenderness. There is no guarding or rebound.   Musculoskeletal:         General: No tenderness. Normal range of motion.      Cervical back: Normal range of motion and neck supple.   Skin:     General: Skin is warm.      Findings: No rash.   Neurological:      Mental Status: She is alert and oriented to person, place, and time.      Cranial Nerves: No cranial nerve deficit.   Psychiatric:         Behavior: Behavior normal.         Thought Content: Thought content normal.             Significant Labs: All pertinent labs within the past 24 hours have been reviewed.  CBC:   Recent Labs   Lab 12/12/23  1034 12/13/23  0354   WBC 10.51 9.76   HGB 8.2* 6.6*   HCT 25.8* 21.0*    169     CMP:   Recent Labs   Lab 12/12/23  1034 12/13/23  0354    139   K 4.5 4.1    109   CO2 24 22*   * 109   BUN 37* 40*   CREATININE 1.5* 1.4   CALCIUM 9.5 8.8   PROT 7.1  --    ALBUMIN 2.9*  --    BILITOT 0.2  --    ALKPHOS 105  --    AST 25  --    ALT 10  --    ANIONGAP 10 8     Troponin:    Recent Labs   Lab 12/12/23  1034 12/12/23  1722 12/13/23  0354   TROPONINI 0.058* 0.037* 0.052*       Significant Imaging: I have reviewed all pertinent imaging results/findings within the past 24 hours.  I have reviewed and interpreted all pertinent imaging results/findings within the past 24 hours.    Assessment/Plan:      * TIA (transient ischemic attack)    Antithrombotics for secondary stroke prevention: Antiplatelets: Aspirin: 81 mg daily  Clopidogrel: 75 mg daily    Statins for secondary stroke prevention and hyperlipidemia, if present:   Statins: Atorvastatin- 40 mg daily    Aggressive risk factor modification: HTN     Rehab efforts: The patient has been evaluated by a stroke team provider and the therapy needs have been fully considered based off the presenting complaints and exam findings. The following therapy evaluations are needed: PT evaluate and treat, OT evaluate and treat, SLP evaluate and treat    Diagnostics ordered/pending: CTA Head to assess vasculature , CTA Neck/Arch to assess vasculature, HgbA1C to assess blood glucose levels, Lipid Profile to assess cholesterol levels, TTE to assess cardiac function/status , TSH to assess thyroid function    VTE prophylaxis:  eliquis    BP parameters: TIA: SBP <220 until imaging confirmation of no infarct     Echo: 45-50%   Neuro consult pending        Acute on chronic anemia  Patient's anemia is currently uncontrolled. Has received 2 units of PRBCs on 12/13/23 . Etiology likely d/t ckd, cad  Current CBC reviewed-   Lab Results   Component Value Date    HGB 6.6 (L) 12/13/2023    HCT 21.0 (L) 12/13/2023     Monitor serial CBC and transfuse if patient becomes hemodynamically unstable, symptomatic or H/H drops below 7/21.    Getting 2 u pbcs    Type 2 diabetes mellitus with stage 3b chronic kidney disease, without long-term current use of insulin  Creatine stable for now. BMP reviewed- noted Estimated Creatinine Clearance: 24.1 mL/min (A) (based on SCr of 1.5  mg/dL (H)). according to latest data. Based on current GFR, CKD stage is stage 3 - GFR 30-59.  Monitor UOP and serial BMP and adjust therapy as needed. Renally dose meds. Avoid nephrotoxic medications and procedures.    Major depressive disorder, recurrent episode, moderate  Patient has persistent depression which is mild and is currently controlled. Will Continue anti-depressant medications. We will not consult psychiatry at this time. Patient does display psychosis at this time. Continue to monitor closely and adjust plan of care as needed.        Pure hypercholesterolemia  Cont statin  Lipid panel        VTE Risk Mitigation (From admission, onward)           Ordered     apixaban tablet 2.5 mg  2 times daily         12/12/23 1511     IP VTE HIGH RISK PATIENT  Once         12/12/23 1203     Place sequential compression device  Until discontinued         12/12/23 1203                    Discharge Planning   KALEY:      Code Status: DNR   Is the patient medically ready for discharge?:     Reason for patient still in hospital (select all that apply): Treatment  Discharge Plan A: Home, Home with family                  Ling Timmons MD  Department of [facility] Medicine   [facility]

## 2023-12-13 NOTE — HOSPITAL COURSE
Pt seems to be at baseline without any events overnight. However Hb did drop to 6.6. Iron panel and FOBT ordered. Transfusing 2 u prbcs. Pending FOBT , may need GI consult. Neuro consult pending.  Krystina Washington has warranted treatment spanning two or more midnights of hospital level care for the management of anemia. She continues to require further evaluation by consultants and blood transfusion. Her condition is also complicated by the following comorbidities: CHF, Diabetes, prior CVA, CAD.   12/14: awaiting FOBT to rule out bleed.  Ucx pending enterococcus. Rocephin started  12/15: FOBT Positive even though hb stable around 9. Plavix and eliquis d/c'd. GI consulted. PPI IV BID started  12/16: GI plans to do EGD on Monday 12/17: no issues voice  12/18: going for EGD today  12/19  MARGARET likely from dehydration    89 y.o. female with PMH of prior strokes (R-PCA), recent small R cerebellar stroke, CHF, CABG, TAVR, on Eliquis was admitted for difficulty speaking and an episode of shaking. She had a CT headNo definite acute intracranial findings by CT.  No evidence of acute large vessel occlusion or flow-limiting stenosis. Serial routine labs done, she was found to be anemic and was transfused one unit of PRBC. Gastro was consulted. She underwent upper endoscopy, refused colonoscopy and has opted for outpatient CT abdomen. She was found to have UTI and urine grow enterococcus. She has completed 7 days course of IV rocephin. She also had 2D echo Left Ventricle: There is concentric hypertrophy. Septal motion is consistent with bundle branch block. There is mildly reduced systolic function with a visually estimated ejection fraction of 45 - 50%. CXR Minimal subsegmental atelectasis at the left lung base which has improved. She  is stable for DC home with HH, follow up with PCP.

## 2023-12-13 NOTE — PLAN OF CARE
12/13/23 0946   Post-Acute Status   Post-Acute Authorization Home Health   Home Health Status Referrals Sent  (HH referral sent to first choice provider. Pending medical clearance and HH orders and accepting agency.)

## 2023-12-13 NOTE — ASSESSMENT & PLAN NOTE
Patient's anemia is currently uncontrolled. Has received 2 units of PRBCs on 12/13/23 . Etiology likely d/t ckd, cad  Current CBC reviewed-   Lab Results   Component Value Date    HGB 6.6 (L) 12/13/2023    HCT 21.0 (L) 12/13/2023     Monitor serial CBC and transfuse if patient becomes hemodynamically unstable, symptomatic or H/H drops below 7/21.    Getting 2 u pbcs

## 2023-12-13 NOTE — PT/OT/SLP PROGRESS
Occupational Therapy   Treatment    Name: Krystina Washington  MRN: [id number]  Admitting Diagnosis:  TIA (transient ischemic attack)       Recommendations:     Discharge Recommendations: Low Intensity Therapy (HH OT/PT & 24/7 care/assistance)  Discharge Equipment Recommendations:  none  Barriers to discharge:  Other (Comment) (Pt requires increased level of assistance)    Assessment:     Krystina Washington is a 89 y.o. female with a medical diagnosis of TIA (transient ischemic attack).  She presents with Diagnoses of Acute focal neurological deficit, AMS (altered mental status), and CVA (cerebral vascular accident) were pertinent to this visit. Performance deficits affecting function are weakness, impaired endurance, impaired self care skills, gait instability, impaired functional mobility, impaired balance, decreased ROM, decreased lower extremity function, decreased upper extremity function, decreased coordination, decreased safety awareness.     Two goals met this date. Per chart pt with significant improvement since prior therapy session. Pt performing functional mobility in room with Emmanuelle & use of RW. Recommending low intensity therapy (HH OT/PT & 34/7 care/assistance) upon d/c. Pt continues with mild stutter this date when having difficulty with word finding, however able to verbalize with increased time. Will progress as able.     Rehab Prognosis:  Good; patient would benefit from acute skilled OT services to address these deficits and reach maximum level of function.       Plan:     Patient to be seen 3 x/week to address the above listed problems via self-care/home management, therapeutic activities, therapeutic exercises  Plan of Care Expires: 01/09/24  Plan of Care Reviewed with: patient    Subjective     Chief Complaint: None stated  Patient/Family Comments/goals: Pt pleasant & agreeable to therapy this date  Pain/Comfort:  Pain Rating 1: 0/10    Objective:     Communicated with: marybel prior to session.  Patient  found HOB elevated with bed alarm, telemetry, PureWick upon OT entry to room.    General Precautions: Standard, fall, hearing impaired    Orthopedic Precautions:N/A  Braces: N/A  Respiratory Status: Room air     Occupational Performance:     Bed Mobility:    Patient completed Supine to Sit with minimum assistance     Functional Mobility/Transfers:  Patient completed Sit <> Stand Transfer with minimum assistance  with  rolling walker   Functional Mobility: Pt performing functional mobility in room with Emmanuelle & use of RW. Pt requires increased v/cs for safety, OT with chair follow 2/2 safety first time OOB. No LOB noted, increased time.    Activities of Daily Living:  Lower Body Dressing: total assistance to josh B socks seated EOB      AMPA 6 Click ADL: 17    Treatment & Education:  Two goals met this date. Per chart pt with significant improvement since prior therapy session.   Pt performing functional mobility in room with Emmanuelle & use of RW.   Recommending low intensity therapy (HH OT/PT & 34/7 care/assistance) upon d/c.   Pt continues with mild stutter this date when having difficulty with word finding, however able to verbalize with increased time.   Pt performing BUE exs while seated up in chair; pt requires increased v/cs for PLB 2/2 SOB. Pt educated on pacing breathing with exs.   Will progress as able.     Patient left up in chair with all lines intact, call button in reach, chair alarm on, and nsg notified    GOALS:   Multidisciplinary Problems       Occupational Therapy Goals          Problem: Occupational Therapy    Goal Priority Disciplines Outcome Interventions   Occupational Therapy Goal     OT, PT/OT Ongoing, Progressing    Description: Goals to be met by: 1/9/2024     Patient will increase functional independence with ADLs by performing:    Rolling with SBA to support self initiated pressure relief  Feeding with Stand-by Assistance with meal tray setup  UE Dressing with Minimal Assistance with overhead  shirt  Grooming with Contact Guard Assistance with simplifed set up  Toileting from bedside commode with Moderate Assistance for hygiene and clothing management.   Sitting at edge of bed x5 minutes with Stand-by Assistance.  Toilet transfer to bedside commode with Contact Guard Assistance with least restrictive device.  Family or caregiver 100% verbalized reciprocation of dementia friendly recommendations (ex: simplify choices; decrease visual clutter; remove hazardous objects; maximize familiarity, etc) to support patient's wellbeing and highest level of occupational engagement and participation in least restrictive discharge environment                          Time Tracking:     OT Date of Treatment: 11/10/23  OT Start Time: 1108  OT Stop Time: 1131  OT Total Time (min): 23 min cotx with PT first time OOB 2/2 safety    Billable Minutes:Therapeutic Activity 15  Therapeutic Exercise 8    OT/AVELINO: OT     Number of AVELINO visits since last OT visit: 0    12/13/2023

## 2023-12-13 NOTE — ASSESSMENT & PLAN NOTE
Antithrombotics for secondary stroke prevention: Antiplatelets: Aspirin: 81 mg daily  Clopidogrel: 75 mg daily    Statins for secondary stroke prevention and hyperlipidemia, if present:   Statins: Atorvastatin- 40 mg daily    Aggressive risk factor modification: HTN     Rehab efforts: The patient has been evaluated by a stroke team provider and the therapy needs have been fully considered based off the presenting complaints and exam findings. The following therapy evaluations are needed: PT evaluate and treat, OT evaluate and treat, SLP evaluate and treat    Diagnostics ordered/pending: CTA Head to assess vasculature , CTA Neck/Arch to assess vasculature, HgbA1C to assess blood glucose levels, Lipid Profile to assess cholesterol levels, TTE to assess cardiac function/status , TSH to assess thyroid function    VTE prophylaxis:  eliquis    BP parameters: TIA: SBP <220 until imaging confirmation of no infarct     Echo: 45-50%   Neuro consult pending

## 2023-12-13 NOTE — CONSULTS
[facility] NEUROLOGY CONSULT  EVALUATION    Reason for consult:  stroke  Informant:  chart    Other sources of information : chart    CC:  Altered Mental Status (Pt BIB son with c/o aphasia and AMS from baseline. Started 30 minutes PTA. Recent Hx CVA.)       HPI: 89 y.o F with PMH of prior strokes (R-PCA), recent small R cerebellar stroke, DM, CHF, CABG, TAVR, on Eliquis(for embolic stroke per chart) who presents from home for difficulty speaking, episode of shaking.      Patient's son states symptoms started with leg and  arm shaking and then her speech became difficult to understand. Son says she also had some chest pain.     Vascular neurology consulted and recommended workup for metabolic, infectious workup, MRI brain if symptoms persist.      Hb is 6.6 this AM. MARGARET on admission, elevated trop (could be demand 2/2 anemia), low tsh w/ normal ft4, uti on UA    This morning pt is at baseline per sone. Getting transfusion    ROS:   12pt ROS negative other then mentioned above    Histories:     Allergies:  Indocin [indomethacin sodium] and Lotensin [benazepril]    Current Medications:    Current Facility-Administered Medications   Medication Dose Route Frequency Provider Last Rate Last Admin    0.9%  NaCl infusion (for blood administration)   Intravenous Q24H PRN Ling Timmons MD        acetaminophen tablet 650 mg  650 mg Oral Q4H PRN Ling Timmons MD   650 mg at 12/12/23 2127    allopurinoL tablet 100 mg  100 mg Oral Daily Ling Timmons MD   100 mg at 12/13/23 0941    apixaban tablet 2.5 mg  2.5 mg Oral BID Ling Timmons MD   2.5 mg at 12/13/23 0941    atorvastatin tablet 40 mg  40 mg Oral Daily Ling Timmons MD   40 mg at 12/13/23 0941    carvediloL tablet 12.5 mg  12.5 mg Oral BID Ling Timmons MD   12.5 mg at 12/13/23 0941    clopidogreL tablet 75 mg  75 mg Oral QHS Ling Timmons MD   75 mg at 12/12/23 2127    dextrose 10% bolus 125 mL 125 mL  12.5 g Intravenous  PRLing Gonzales MD        dextrose 10% bolus 250 mL 250 mL  25 g Intravenous PRN Ling Timmons MD        EScitalopram oxalate tablet 20 mg  20 mg Oral Daily Ling Timmons MD   20 mg at 12/13/23 0941    glucagon (human recombinant) injection 1 mg  1 mg Intramuscular PRN Ling Timmons MD        glucose chewable tablet 16 g  16 g Oral PRN Ling Timmons MD        glucose chewable tablet 24 g  24 g Oral PRN Ling Timmons MD        hydrALAZINE tablet 50 mg  50 mg Oral Q8H Ling Timmons MD        isosorbide mononitrate 24 hr tablet 30 mg  30 mg Oral Daily Ling Timmons MD   30 mg at 12/13/23 0941    melatonin tablet 6 mg  6 mg Oral Nightly PRLing Gonzales MD        multivitamin tablet  1 tablet Oral Daily Ling Timmons MD   1 tablet at 12/13/23 0941    ondansetron injection 4 mg  4 mg Intravenous Q8H PRN Ling Timmons MD        polyethylene glycol packet 17 g  17 g Oral Daily Ling Timmons MD        sacubitriL-valsartan 49-51 mg per tablet 1 tablet  1 tablet Oral BID Ling Timmons MD   1 tablet at 12/13/23 0941    sodium chloride 0.9% flush 10 mL  10 mL Intravenous Q12H PRN Ling Timmons MD           Past Medical/Surgical/Family/Social History:  PMHx:   Past Medical History:   Diagnosis Date    Acute ischemic left posterior cerebral artery (PCA) stroke 12/3/2020    Anemia in stage 3b chronic kidney disease 3/8/2017    Arthritis     CHF (congestive heart failure)     Closed displaced subtrochanteric fracture of right femur s/p IM nail on 10/17/2021 10/16/2021    Coronary artery disease     Diabetes mellitus     Glaucoma     Gout, joint     Hx of CABG 9/21/10    Hyperlipidemia     Hypertension     NSTEMI (non-ST elevated myocardial infarction) 09/21/10    Stage 3b chronic kidney disease 10/21/2021    Stenosis of aortic and mitral valves     Systolic heart failure 6/19/2015    TIA (transient ischemic attack)  1/7/2021      Surgeries:   Past Surgical History:   Procedure Laterality Date    ANTEGRADE INTRAMEDULLARY RODDING OF FEMUR Right 10/17/2021    Procedure: INSERTION, INTRAMEDULLARY ALTAF, FEMUR, ANTEGRADE;  Surgeon: Dino Rutledge MD;  Location: [address];  Service: Orthopedics;  Laterality: Right;    CARDIAC VALVE SURGERY      CATARACT EXTRACTION      CORONARY ARTERY BYPASS GRAFT  9/21/2010    ENTROPIAN REPAIR Left 3/6/2020    Procedure: REPAIR, ENTROPION;  Surgeon: Yulia Kincaid MD;  Location: [address];  Service: Ophthalmology;  Laterality: Left;    EYE SURGERY      JOINT REPLACEMENT      ORIF FEMUR FRACTURE Right 10/17/2021    Procedure: ORIF, FRACTURE, FEMUR;  Surgeon: Dino Rutledge MD;  Location: [facility] OR [address] [place] [address];  Service: Orthopedics;  Laterality: Right;      Family  Hx:   Family History   Problem Relation Age of Onset    Diabetes Mother     Hypertension Father     Diabetes Father     Glaucoma Father     No Known Problems Sister     No Known Problems Brother     No Known Problems Maternal Aunt     No Known Problems Maternal Uncle     No Known Problems Paternal Aunt     No Known Problems Paternal Uncle     No Known Problems Maternal Grandmother     No Known Problems Maternal Grandfather     No Known Problems Paternal Grandmother     No Known Problems Paternal Grandfather       Social Hx:   Social History     Tobacco Use    Smoking status: Never    Smokeless tobacco: Never   Substance Use Topics    Alcohol use: No    Drug use: No         Current Evaluation:     Vital Signs:   Vitals:    12/13/23 0838   BP: (!) 175/74   Pulse: 68   Resp: 18   Temp: 98.1 °F (36.7 °C)        General Exam  No apparent distress  Orientation  Alert, awake, oriented to self, place, time, and situation.  Memory  Recent and remote memory intact.  Language  Fluent speech. No dysarthria, No aphasia.   Cranial Nerves  PERRL, L homonymous hemianopsia, EOMI, V1-V3 intact, symmetric facial expression, hearing grossly intact,  SCM & TPZ 5/5, tongue midline.  Motor  Normal Bulk, Normal Tone  Antigravity in all extremities. 4+/5 throughout   Sensory  Normal to light touch throughout  Cerebellar/Gait  Normal finger to nose on L normal. R somewhat dysmetric but lack of effort      LABORATORY STUDIES:  Labs:  Recent Labs   Lab 12/12/23  1034 12/13/23  0354   WBC 10.51 9.76   HGB 8.2* 6.6*   HCT 25.8* 21.0*    169   MCV 93 92       Recent Labs   Lab 12/12/23  1034 12/13/23  0354    139   K 4.5 4.1    109   CO2 24 22*   BUN 37* 40*   * 109   CALCIUM 9.5 8.8   PROT 7.1  --    ALBUMIN 2.9*  --    BILITOT 0.2  --    AST 25  --    ALKPHOS 105  --    ALT 10  --        Recent Labs   Lab 12/12/23  1003 12/12/23  1034   INR 1.4* 1.1       Recent Labs   Lab 12/12/23  1034 12/13/23  0354   * 109       Urine:   Lab Results   Component Value Date    LABURIN ESCHERICHIA COLI  >100,000 cfu/ml   (A) 08/30/2022    SPECGRAV 1.025 12/12/2023    NITRITE Negative 12/12/2023    KETONESU Negative 12/12/2023    UROBILINOGEN Negative 12/12/2023    WBCUA 32 (H) 12/12/2023       Recent Labs   Lab 12/12/23  1722   HGBA1C 5.9*   LDLCALC 36.8*       Thyroid:   Recent Labs   Lab 12/12/23  1034   TSH 0.102*   FREET4 0.97       FLP:   Recent Labs   Lab 12/12/23  1722   CHOL 88*   HDL 33*   LDLCALC 36.8*   TRIG 91   CHOLHDL 37.5       Cardiac markers:  Recent Labs   Lab 12/12/23  1034 12/12/23  1722 12/13/23  0354   TROPONINI 0.058* 0.037* 0.052*       RADIOLOGY STUDIES:  Imaging Results              X-Ray Chest 1 View (Final result)  Result time 12/12/23 13:17:04      Final result by Angelic Reynaga MD (12/12/23 13:17:04)                   Impression:      Minimal subsegmental atelectasis at the left lung base.      Electronically signed by: Angelic Reynaga MD  Date:    12/12/2023  Time:    13:17               Narrative:    EXAMINATION:  XR CHEST 1 VIEW    CLINICAL HISTORY:  Altered mental status, unspecified    TECHNIQUE:  Single  frontal view of the chest was performed.    COMPARISON:  08/12/2023    FINDINGS:  Sternotomy wires.  TAVR.    The cardiac silhouette is midline, slightly prominent.  The pulmonary vascularity is normal.    Subtle area of increased attenuation left lung base suggest subsegmental atelectasis.    No pleural effusion.  No pneumothorax.    Age related degenerative changes of the osseous structures.                                       CTA Head and Neck (xpd) (Final result)  Result time 12/12/23 10:36:06      Final result by Guillermo Jaime MD (12/12/23 10:36:06)                   Impression:      1. Motion compromised examination.  2. No definite acute intracranial findings by CT.  3. No evidence of acute large vessel occlusion or flow-limiting stenosis.  4. Details of cervical and intracranial atherosclerotic disease, as provided in the body of report.      Electronically signed by: Guillermo Jaime  Date:    12/12/2023  Time:    10:36               Narrative:    EXAMINATION:  CTA HEAD AND NECK (XPD)    CLINICAL HISTORY:  Stroke/TIA, determine embolic source;    TECHNIQUE:  Non contrast low dose axial images were obtained through the head.  CT angiogram was performed from the level of the jeffery to the top of the head following the IV administration of 100mL of Omnipaque 350.   Sagittal and coronal reconstructions and maximum intensity projection reconstructions were performed. Arterial stenosis percentages are based on NASCET measurement criteria.    COMPARISON:  MRI brain 08/13/2023.  CT head 08/12/2023.    FINDINGS:  Comment: Motion compromised examination.    CT HEAD:    Blood: No acute intracranial hemorrhage.    Parenchyma: No definite loss of gray-white differentiation to suggest acute or subacute transcortical infarct. Parenchymal volume loss.  Nonspecific areas of white matter hypoattenuation may relate to sequela of chronic small vessel ischemic disease.  Remote lacunar type infarcts involve the deep gray  nuclei.  Findings in keeping with remote right PCA territory infarct.    Ventricles/Extra-axial spaces: No abnormal extra-axial fluid collection. Basal cisterns are patent.    Vessels: Moderate to heavy atherosclerotic calcifications.    Orbits: Status post bilateral lens replacements.    Scalp: Unremarkable.    Skull: There are no depressed skull fractures or destructive bone lesions.    Sinuses and mastoids: Minor dependent increased attenuation within the mastoid air cells, possibly the basis of effusion and/or mucosal thickening.  Minor paranasal sinus mucosal thickening.    Other findings: None    CTA:    NECK:    Imaged aortic arch: Nonaneurysmal.  Short segment common origin of the brachiocephalic and left common carotid artery.  Moderate to heavy atherosclerotic calcifications.  Brachiocephalic and subclavian arteries grossly patent.    Right common and cervical internal carotid arteries: CCA and ECA grossly patent.  Less than 50% atheromatous narrowing at the proximal ICA.  No evidence of carotid dissection or web.    Left common and cervical internal carotid arteries: CCA and ECA grossly patent.  Less than 50% atheromatous narrowing at the proximal ICA.  No evidence of carotid dissection or web.    Right cervical vertebral artery: There is no hemodynamically significant stenosis or dissection.  Right vertebral artery appears dominant.    Left cervical vertebral artery: There is no hemodynamically significant stenosis or dissection.    HEAD:    Right anterior circulation:Atherosclerotic irregularity of the ICA ranging from mild to moderate-to-severe.  Right ophthalmic artery is patent.Mild atheromatous irregularity of the M1 and proximal M2 branches of the MCA.  No definite flow-limiting stenosis of the MERNA branches.    Left anterior circulation: Atheromatous irregularity of the ICA ranging from mild to moderate to severe.Left ophthalmic artery is patent.Focal moderate narrowing of the proximal M1 MCA.   Elsewhere, multifocal mild atheromatous irregularity of M1 and M2 branches elsewhere.    Posterior circulation: Multifocal mild and moderate atheromatous irregularity of the bilateral V4 vertebral artery segments and of the basilar artery.  Multifocal mild atheromatous irregularity of bilateral P1 and left P2 PCA segments.  Posterior inferior cerebellar arteries patent at origin.    Anterior and posterior communicating arteries: The anterior and left posterior communicating arteries are visualized. A right posterior communicating arteries not reliably visualized.    NON-ANGIOGRAPHIC FINDINGS:    Visualized intracranial contents: As above.    Soft tissues of the neck: Heterogeneous thyroid gland with hypodense nodule measuring up to 14 mm in the left lobe.    Visualized sinuses: As above.    Dentition: Unremarkable.    Spine: Degenerative change.    Visualized lungs: Limited assessment.  Apical pleuroparenchymal scarring.                                      Assessment/Plan:   89 y.o F with PMH of prior strokes (R-PCA), recent small R cerebellar stroke, DM, CHF, CABG, TAVR, on Eliquis(for embolic stroke per chart) who presents from home for difficulty speaking, episode of shaking. Considering underlying medical problems as well as no new focal deficits/resolved symptoms low concern for CVA at this time     - CTA from yesterday does show diffuse atherosclerotic disease. Pt is on Eliquis and Plavix(discuss risk and benefits given severe anemia). Would FU w/ vascular neurology outpt  - Agree w/ vascular neurology team regarding management of metabolic and infectious workup and treatment at this time  - No need for further IP workup     Differential diagnosis was explained to the patient. All questions were answered. Patient understood and agreed to adhere to plan.     No further intervention indicated at this time from Neurology Service. Please call if any further questions or any changes in neurologic  condition.    Case discussed with Dr. Puentes    Electronically signed by:   Rachele Buenrostro MD   12/13/2023 11:10 AM

## 2023-12-13 NOTE — PLAN OF CARE
Two goals met this date. Per chart pt with significant improvement since prior therapy session. Pt performing functional mobility in room with Emmanuelle & use of RW. Recommending low intensity therapy ( OT/PT & 34/7 care/assistance) upon d/c. Pt continues with mild stutter this date when having difficulty with word finding, however able to verbalize with increased time. Will progress as able.     Problem: Occupational Therapy  Goal: Occupational Therapy Goal  Description: Goals to be met by: 1/9/2024     Patient will increase functional independence with ADLs by performing:    Rolling with SBA to support self initiated pressure relief. MET 12/13/2023  Feeding with Stand-by Assistance with meal tray setup  UE Dressing with Minimal Assistance with overhead shirt  Grooming with Contact Guard Assistance with simplifed set up  Toileting from bedside commode with Moderate Assistance for hygiene and clothing management.   Sitting at edge of bed x5 minutes with Stand-by Assistance. MET 12/13/2023  Toilet transfer to bedside commode with Contact Guard Assistance with least restrictive device.  Family or caregiver 100% verbalized reciprocation of dementia friendly recommendations (ex: simplify choices; decrease visual clutter; remove hazardous objects; maximize familiarity, etc) to support patient's wellbeing and highest level of occupational engagement and participation in least restrictive discharge environment     Outcome: Ongoing, Progressing

## 2023-12-13 NOTE — SUBJECTIVE & OBJECTIVE
Interval History: no issues voiced. No bms yet    Review of Systems   All other systems reviewed and are negative.    Objective:     Vital Signs (Most Recent):  Temp: 98.2 °F (36.8 °C) (12/13/23 1208)  Pulse: 72 (12/13/23 1208)  Resp: 19 (12/13/23 1208)  BP: (!) 161/78 (12/13/23 1208)  SpO2: (!) 93 % (12/13/23 1208) Vital Signs (24h Range):  Temp:  [96.4 °F (35.8 °C)-98.2 °F (36.8 °C)] 98.2 °F (36.8 °C)  Pulse:  [60-72] 72  Resp:  [18-20] 19  SpO2:  [93 %-100 %] 93 %  BP: (161-185)/(73-84) 161/78     Weight: 77.9 kg (171 lb 11.8 oz)  Body mass index is 30.42 kg/m².    Intake/Output Summary (Last 24 hours) at 12/13/2023 1357  Last data filed at 12/13/2023 1025  Gross per 24 hour   Intake 525.06 ml   Output 200 ml   Net 325.06 ml         Physical Exam  Vitals and nursing note reviewed.   Constitutional:       Appearance: She is well-developed.   HENT:      Head: Normocephalic and atraumatic.      Nose: Nose normal.   Eyes:      Conjunctiva/sclera: Conjunctivae normal.   Cardiovascular:      Rate and Rhythm: Normal rate and regular rhythm.      Heart sounds: Normal heart sounds.   Pulmonary:      Effort: Pulmonary effort is normal.      Breath sounds: Normal breath sounds. No wheezing.   Abdominal:      General: Bowel sounds are normal.      Palpations: Abdomen is soft. There is no mass.      Tenderness: There is no abdominal tenderness. There is no guarding or rebound.   Musculoskeletal:         General: No tenderness. Normal range of motion.      Cervical back: Normal range of motion and neck supple.   Skin:     General: Skin is warm.      Findings: No rash.   Neurological:      Mental Status: She is alert and oriented to person, place, and time.      Cranial Nerves: No cranial nerve deficit.   Psychiatric:         Behavior: Behavior normal.         Thought Content: Thought content normal.             Significant Labs: All pertinent labs within the past 24 hours have been reviewed.  CBC:   Recent Labs   Lab  12/12/23  1034 12/13/23  0354   WBC 10.51 9.76   HGB 8.2* 6.6*   HCT 25.8* 21.0*    169     CMP:   Recent Labs   Lab 12/12/23  1034 12/13/23  0354    139   K 4.5 4.1    109   CO2 24 22*   * 109   BUN 37* 40*   CREATININE 1.5* 1.4   CALCIUM 9.5 8.8   PROT 7.1  --    ALBUMIN 2.9*  --    BILITOT 0.2  --    ALKPHOS 105  --    AST 25  --    ALT 10  --    ANIONGAP 10 8     Troponin:   Recent Labs   Lab 12/12/23  1034 12/12/23  1722 12/13/23  0354   TROPONINI 0.058* 0.037* 0.052*       Significant Imaging: I have reviewed all pertinent imaging results/findings within the past 24 hours.  I have reviewed and interpreted all pertinent imaging results/findings within the past 24 hours.

## 2023-12-14 PROBLEM — N30.00 ACUTE CYSTITIS: Status: ACTIVE | Noted: 2021-08-26

## 2023-12-14 LAB
ANION GAP SERPL CALC-SCNC: 9 MMOL/L (ref 8–16)
BASOPHILS # BLD AUTO: 0.02 K/UL (ref 0–0.2)
BASOPHILS # BLD AUTO: 0.02 K/UL (ref 0–0.2)
BASOPHILS NFR BLD: 0.2 % (ref 0–1.9)
BASOPHILS NFR BLD: 0.2 % (ref 0–1.9)
BUN SERPL-MCNC: 46 MG/DL (ref 10–30)
CALCIUM SERPL-MCNC: 9 MG/DL (ref 8.7–10.5)
CHLORIDE SERPL-SCNC: 108 MMOL/L (ref 95–110)
CO2 SERPL-SCNC: 22 MMOL/L (ref 23–29)
CREAT SERPL-MCNC: 1.7 MG/DL (ref 0.5–1.4)
DIFFERENTIAL METHOD: ABNORMAL
DIFFERENTIAL METHOD: ABNORMAL
EOSINOPHIL # BLD AUTO: 0.3 K/UL (ref 0–0.5)
EOSINOPHIL # BLD AUTO: 0.3 K/UL (ref 0–0.5)
EOSINOPHIL NFR BLD: 2.5 % (ref 0–8)
EOSINOPHIL NFR BLD: 2.7 % (ref 0–8)
ERYTHROCYTE [DISTWIDTH] IN BLOOD BY AUTOMATED COUNT: 17.2 % (ref 11.5–14.5)
ERYTHROCYTE [DISTWIDTH] IN BLOOD BY AUTOMATED COUNT: 17.7 % (ref 11.5–14.5)
EST. GFR  (NO RACE VARIABLE): 28 ML/MIN/1.73 M^2
GLUCOSE SERPL-MCNC: 87 MG/DL (ref 70–110)
HCT VFR BLD AUTO: 28.5 % (ref 37–48.5)
HCT VFR BLD AUTO: 29.1 % (ref 37–48.5)
HGB BLD-MCNC: 9 G/DL (ref 12–16)
HGB BLD-MCNC: 9.3 G/DL (ref 12–16)
IMM GRANULOCYTES # BLD AUTO: 0.04 K/UL (ref 0–0.04)
IMM GRANULOCYTES # BLD AUTO: 0.04 K/UL (ref 0–0.04)
IMM GRANULOCYTES NFR BLD AUTO: 0.3 % (ref 0–0.5)
IMM GRANULOCYTES NFR BLD AUTO: 0.4 % (ref 0–0.5)
LYMPHOCYTES # BLD AUTO: 2.5 K/UL (ref 1–4.8)
LYMPHOCYTES # BLD AUTO: 2.7 K/UL (ref 1–4.8)
LYMPHOCYTES NFR BLD: 22.9 % (ref 18–48)
LYMPHOCYTES NFR BLD: 24.6 % (ref 18–48)
MCH RBC QN AUTO: 28.6 PG (ref 27–31)
MCH RBC QN AUTO: 28.7 PG (ref 27–31)
MCHC RBC AUTO-ENTMCNC: 31.6 G/DL (ref 32–36)
MCHC RBC AUTO-ENTMCNC: 32 G/DL (ref 32–36)
MCV RBC AUTO: 90 FL (ref 82–98)
MCV RBC AUTO: 91 FL (ref 82–98)
MONOCYTES # BLD AUTO: 0.8 K/UL (ref 0.3–1)
MONOCYTES # BLD AUTO: 0.9 K/UL (ref 0.3–1)
MONOCYTES NFR BLD: 7.8 % (ref 4–15)
MONOCYTES NFR BLD: 7.8 % (ref 4–15)
NEUTROPHILS # BLD AUTO: 6.5 K/UL (ref 1.8–7.7)
NEUTROPHILS # BLD AUTO: 7.8 K/UL (ref 1.8–7.7)
NEUTROPHILS NFR BLD: 64.5 % (ref 38–73)
NEUTROPHILS NFR BLD: 66.1 % (ref 38–73)
NRBC BLD-RTO: 0 /100 WBC
NRBC BLD-RTO: 0 /100 WBC
PLATELET # BLD AUTO: 174 K/UL (ref 150–450)
PLATELET # BLD AUTO: 182 K/UL (ref 150–450)
PMV BLD AUTO: 12.7 FL (ref 9.2–12.9)
PMV BLD AUTO: 12.9 FL (ref 9.2–12.9)
POCT GLUCOSE: 127 MG/DL (ref 70–110)
POCT GLUCOSE: 82 MG/DL (ref 70–110)
POTASSIUM SERPL-SCNC: 4.4 MMOL/L (ref 3.5–5.1)
RBC # BLD AUTO: 3.14 M/UL (ref 4–5.4)
RBC # BLD AUTO: 3.25 M/UL (ref 4–5.4)
SODIUM SERPL-SCNC: 139 MMOL/L (ref 136–145)
WBC # BLD AUTO: 10.1 K/UL (ref 3.9–12.7)
WBC # BLD AUTO: 11.82 K/UL (ref 3.9–12.7)

## 2023-12-14 PROCEDURE — 25000003 PHARM REV CODE 250: Performed by: FAMILY MEDICINE

## 2023-12-14 PROCEDURE — 97110 THERAPEUTIC EXERCISES: CPT | Mod: CQ

## 2023-12-14 PROCEDURE — 97530 THERAPEUTIC ACTIVITIES: CPT | Mod: CQ

## 2023-12-14 PROCEDURE — 36415 COLL VENOUS BLD VENIPUNCTURE: CPT | Performed by: FAMILY MEDICINE

## 2023-12-14 PROCEDURE — 80048 BASIC METABOLIC PNL TOTAL CA: CPT | Performed by: FAMILY MEDICINE

## 2023-12-14 PROCEDURE — 63600175 PHARM REV CODE 636 W HCPCS: Performed by: FAMILY MEDICINE

## 2023-12-14 PROCEDURE — 97116 GAIT TRAINING THERAPY: CPT | Mod: CQ

## 2023-12-14 PROCEDURE — 11000001 HC ACUTE MED/SURG PRIVATE ROOM

## 2023-12-14 PROCEDURE — 85025 COMPLETE CBC W/AUTO DIFF WBC: CPT | Mod: 91 | Performed by: FAMILY MEDICINE

## 2023-12-14 PROCEDURE — 94761 N-INVAS EAR/PLS OXIMETRY MLT: CPT

## 2023-12-14 RX ORDER — POLYETHYLENE GLYCOL 3350 17 G/17G
17 POWDER, FOR SOLUTION ORAL 2 TIMES DAILY
Status: DISCONTINUED | OUTPATIENT
Start: 2023-12-14 | End: 2023-12-15

## 2023-12-14 RX ORDER — BISACODYL 5 MG
10 TABLET, DELAYED RELEASE (ENTERIC COATED) ORAL 2 TIMES DAILY
Status: DISCONTINUED | OUTPATIENT
Start: 2023-12-14 | End: 2023-12-15

## 2023-12-14 RX ADMIN — HYDRALAZINE HYDROCHLORIDE 50 MG: 25 TABLET, FILM COATED ORAL at 03:12

## 2023-12-14 RX ADMIN — ESCITALOPRAM OXALATE 20 MG: 10 TABLET ORAL at 08:12

## 2023-12-14 RX ADMIN — SACUBITRIL AND VALSARTAN 1 TABLET: 49; 51 TABLET, FILM COATED ORAL at 08:12

## 2023-12-14 RX ADMIN — HYDRALAZINE HYDROCHLORIDE 50 MG: 25 TABLET, FILM COATED ORAL at 05:12

## 2023-12-14 RX ADMIN — BISACODYL 10 MG: 5 TABLET, COATED ORAL at 08:12

## 2023-12-14 RX ADMIN — POLYETHYLENE GLYCOL 3350 17 G: 17 POWDER, FOR SOLUTION ORAL at 08:12

## 2023-12-14 RX ADMIN — ATORVASTATIN CALCIUM 40 MG: 40 TABLET, FILM COATED ORAL at 08:12

## 2023-12-14 RX ADMIN — CARVEDILOL 12.5 MG: 12.5 TABLET, FILM COATED ORAL at 08:12

## 2023-12-14 RX ADMIN — ISOSORBIDE MONONITRATE 30 MG: 30 TABLET, EXTENDED RELEASE ORAL at 08:12

## 2023-12-14 RX ADMIN — BISACODYL 10 MG: 5 TABLET, COATED ORAL at 10:12

## 2023-12-14 RX ADMIN — ACETAMINOPHEN 650 MG: 325 TABLET ORAL at 08:12

## 2023-12-14 RX ADMIN — CLOPIDOGREL BISULFATE 75 MG: 75 TABLET ORAL at 08:12

## 2023-12-14 RX ADMIN — HYDRALAZINE HYDROCHLORIDE 50 MG: 25 TABLET, FILM COATED ORAL at 09:12

## 2023-12-14 RX ADMIN — APIXABAN 2.5 MG: 2.5 TABLET, FILM COATED ORAL at 08:12

## 2023-12-14 RX ADMIN — ALLOPURINOL 100 MG: 100 TABLET ORAL at 08:12

## 2023-12-14 RX ADMIN — CEFTRIAXONE SODIUM 1 G: 1 INJECTION, POWDER, FOR SOLUTION INTRAMUSCULAR; INTRAVENOUS at 10:12

## 2023-12-14 RX ADMIN — MULTIPLE VITAMINS W/ MINERALS TAB 1 TABLET: TAB at 08:12

## 2023-12-14 NOTE — PT/OT/SLP PROGRESS
Physical Therapy Treatment    Patient Name:  Krystina Washington   MRN:  [id number]    Recommendations:     Discharge Recommendations: Low Intensity Therapy (HH PT with 24/7 family assistance)  Discharge Equipment Recommendations: none  Barriers to discharge:  decreased functional mobility requiring some assist for increased safety.    Assessment:     Krystina Washington is a 89 y.o. female admitted with a medical diagnosis of Acute cystitis.  She presents with the following impairments/functional limitations: weakness, impaired endurance, impaired self care skills, impaired functional mobility, gait instability, impaired balance, decreased lower extremity function, decreased upper extremity function, decreased coordination Pt would continue to benefit from P.T. To address impairments listed above.  .    Rehab Prognosis: Fair; patient would benefit from acute skilled PT services to address these deficits and reach maximum level of function.    Recent Surgery: * No surgery found *      Plan:     During this hospitalization, patient to be seen 5 x/week to address the identified rehab impairments via gait training, therapeutic activities, therapeutic exercises, neuromuscular re-education and progress toward the following goals:    Plan of Care Expires:  01/12/24    Subjective       Patient/Family Comments/goals: Pt agreed to tx.  Pain/Comfort:  Pain Rating 1: 0/10  Pain Rating Post-Intervention 1: 0/10      Objective:     Communicated with RN prior to session.  Patient found supine with bed alarm, telemetry, PureWick, peripheral IV upon PT entry to room.     General Precautions: Standard, fall, hearing impaired  Orthopedic Precautions: N/A  Braces: N/A  Respiratory Status: Room air     Functional Mobility:  Bed Mobility:     Rolling Left:  contact guard assistance and with vc's and b/r for assist.  Increased time and effort  Scooting: contact guard assistance and vc's and increased time  Supine to Sit: minimum  assistance  Transfers:     Sit to Stand:  from EOB minimum assistance with rolling walker and vc's for hand placement; from b/s chair Mod/Emmanuelle with RW and vc's for hand placement x 2 trials.  Bed to Chair: minimum assistance with  rolling walker  using  Step Transfer  Gait: 3 turning steps from EOB to b/s chair with RW and Emmanuelle/CGA and vc's for sequencing.  After a seated rest, pt ambulated 20ft x 2 with RW and Emmanuelle/close cGA.  Pt ambulates slowly with decreased step length and requires vc's for close proximity to Rw with occasional Emmanuelle when turning.  Brief standing rest between bouts secondary to decreased strength/endurance.  Balance: sitting good-, standing fair/fair-, gait fair/fair-      AM-PAC 6 CLICK MOBILITY  Turning over in bed (including adjusting bedclothes, sheets and blankets)?: 3  Sitting down on and standing up from a chair with arms (e.g., wheelchair, bedside commode, etc.): 3  Moving from lying on back to sitting on the side of the bed?: 3  Moving to and from a bed to a chair (including a wheelchair)?: 3  Need to walk in hospital room?: 3  Climbing 3-5 steps with a railing?: 2  Basic Mobility Total Score: 17       Treatment & Education:  Seated BLE therex: AP, LAQs, hip flexion 10 x 2 reps with vc's and occasional visual cues for proper technique and increased ROM.  Gait as above.  Static standing with Emmanuelle/CGA and RW secondary to one bout of posterior lean. Pt remained up in b/s chair after tx with b/s table in front and PTA assisting with lunch set up for pt.    Patient left up in chair with all lines intact, call button in reach, chair alarm on, and RN notified..    GOALS:   Multidisciplinary Problems       Physical Therapy Goals          Problem: Physical Therapy    Goal Priority Disciplines Outcome Goal Variances Interventions   Physical Therapy Goal     PT, PT/OT Ongoing, Progressing     Description: Goals to be met by: 1/12/24     Patient will increase functional independence with mobility  by performin. Supine to sit with Stand-by Assistance  2. Sit to supine with Stand-by Assistance  3. Sit to stand transfer with Contact Guard Assistance with use of RW.   4. Bed to chair transfer with Contact Guard Assistance using Rolling Walker  5. Gait  x 50 feet with Contact Guard Assistance using Rolling Walker.                          Time Tracking:     PT Received On: 23  PT Start Time: 1235     PT Stop Time: 1306  PT Total Time (min): 31 min     Billable Minutes: Gait Training 11, Therapeutic Activity 10, and Therapeutic Exercise 10    Treatment Type: Treatment  PT/PTA: PTA     Number of PTA visits since last PT visit: 2023

## 2023-12-14 NOTE — PT/OT/SLP PROGRESS
"Occupational Therapy      Patient Name:  Krystina Washington   MRN:  [id number]    Patient not seen today secondary to Other (Comment). Pt sleeping upon OT entrance to room, arouses with verbal & tactile stimulation. Pt states "I don't feel good, I'm hurting." Pt then reports 6/10 "stomach" pain. Nsg notified. Pt requests therapy return at a later time. Will follow-up as able.    12/14/2023  "

## 2023-12-14 NOTE — PROGRESS NOTES
[facility] Medicine  Progress Note    Patient Name: Krystina Washington  MRN: [id number]  Patient Class: IP- Inpatient   Admission Date: 12/12/2023  Length of Stay: 1 days  Attending Physician: Ling Timmons MD  Primary Care Provider: Oniel Johnson MD        Subjective:     Principal Problem:Acute cystitis        HPI:  89 y.o. female with prior strokes (R-PCA), recent small R cerebellar stroke, CHF, CABG, TAVR, on Eliquis who presents from home for difficulty speaking and an episode of shaking.  Patient's son states symptoms started with leg and then arm shaking. Then he noticed her speech became stuttering. Also complains of chest tigthness.  She was otherwise normal this morning, had breakfast, etc. The episode lasted for 30 min. Upon examination, everything was resolved. Vascular Neuro was consulted in the ED. No evidence of acute large vessel occlusion or flow-limiting stenosis shown on CTA. Labs, cxr and U/a neg. Admit for TIA and chest tightness    Overview/Hospital Course:  Pt seems to be at baseline without any events overnight. However Hb did drop to 6.6. Iron panel and FOBT ordered. Transfusing 2 u prbcs. Pending FOBT , may need GI consult. Neuro consult pending.  Krystina Washington has warranted treatment spanning two or more midnights of hospital level care for the management of anemia. She continues to require further evaluation by consultants and blood transfusion. Her condition is also complicated by the following comorbidities: CHF, Diabetes, prior CVA, CAD.   14/14: awaiting FOBT to rule out bleed.  Ucx pending enterococcus. Rocephin started      Interval History: no issues voiced. No bms yet    Review of Systems   All other systems reviewed and are negative.    Objective:     Vital Signs (Most Recent):  Temp: 97.9 °F (36.6 °C) (12/14/23 1112)  Pulse: 62 (12/14/23 1158)  Resp: 18 (12/14/23 1112)  BP: (!) 159/67 (12/14/23 1112)  SpO2: 97 % (12/14/23 1112) Vital Signs (24h  Range):  Temp:  [96.9 °F (36.1 °C)-99.5 °F (37.5 °C)] 97.9 °F (36.6 °C)  Pulse:  [57-67] 62  Resp:  [18] 18  SpO2:  [94 %-100 %] 97 %  BP: (140-185)/(67-86) 159/67     Weight: 77.9 kg (171 lb 11.8 oz)  Body mass index is 30.42 kg/m².    Intake/Output Summary (Last 24 hours) at 12/14/2023 1635  Last data filed at 12/13/2023 1855  Gross per 24 hour   Intake 120 ml   Output 700 ml   Net -580 ml           Physical Exam  Vitals and nursing note reviewed.   Constitutional:       Appearance: She is well-developed.   HENT:      Head: Normocephalic and atraumatic.      Nose: Nose normal.   Eyes:      Conjunctiva/sclera: Conjunctivae normal.   Cardiovascular:      Rate and Rhythm: Normal rate and regular rhythm.      Heart sounds: Normal heart sounds.   Pulmonary:      Effort: Pulmonary effort is normal.      Breath sounds: Normal breath sounds. No wheezing.   Abdominal:      General: Bowel sounds are normal.      Palpations: Abdomen is soft. There is no mass.      Tenderness: There is no abdominal tenderness. There is no guarding or rebound.   Musculoskeletal:         General: No tenderness. Normal range of motion.      Cervical back: Normal range of motion and neck supple.   Skin:     General: Skin is warm.      Findings: No rash.   Neurological:      Mental Status: She is alert and oriented to person, place, and time.      Cranial Nerves: No cranial nerve deficit.   Psychiatric:         Behavior: Behavior normal.         Thought Content: Thought content normal.             Significant Labs: All pertinent labs within the past 24 hours have been reviewed.  CBC:   Recent Labs   Lab 12/13/23 0354 12/14/23  0332   WBC 9.76 11.82   HGB 6.6* 9.0*   HCT 21.0* 28.5*    182       CMP:   Recent Labs   Lab 12/13/23 0354 12/14/23  0332    139   K 4.1 4.4    108   CO2 22* 22*    87   BUN 40* 46*   CREATININE 1.4 1.7*   CALCIUM 8.8 9.0   ANIONGAP 8 9       Troponin:   Recent Labs   Lab 12/12/23  8112  12/13/23  0354   TROPONINI 0.037* 0.052*         Significant Imaging: I have reviewed all pertinent imaging results/findings within the past 24 hours.  I have reviewed and interpreted all pertinent imaging results/findings within the past 24 hours.    Assessment/Plan:      * Acute cystitis    Ucx growing out enterococcus  Rocephin started    TIA (transient ischemic attack)    Antithrombotics for secondary stroke prevention: Antiplatelets: Aspirin: 81 mg daily  Clopidogrel: 75 mg daily    Statins for secondary stroke prevention and hyperlipidemia, if present:   Statins: Atorvastatin- 40 mg daily    Aggressive risk factor modification: HTN     Rehab efforts: The patient has been evaluated by a stroke team provider and the therapy needs have been fully considered based off the presenting complaints and exam findings. The following therapy evaluations are needed: PT evaluate and treat, OT evaluate and treat, SLP evaluate and treat    Diagnostics ordered/pending: CTA Head to assess vasculature , CTA Neck/Arch to assess vasculature, HgbA1C to assess blood glucose levels, Lipid Profile to assess cholesterol levels, TTE to assess cardiac function/status , TSH to assess thyroid function    VTE prophylaxis:  eliquis    BP parameters: TIA: SBP <220 until imaging confirmation of no infarct     Echo: 45-50%   Neuro consult pending        Acute on chronic anemia  Patient's anemia is currently uncontrolled. Has received 2 units of PRBCs on 12/13/23 . Etiology likely d/t ckd, cad  Current CBC reviewed-   Lab Results   Component Value Date    HGB 9.0 (L) 12/14/2023    HCT 28.5 (L) 12/14/2023     Monitor serial CBC and transfuse if patient becomes hemodynamically unstable, symptomatic or H/H drops below 7/21.    S/p  2 u pbcs  FOBT pending      Type 2 diabetes mellitus with stage 3b chronic kidney disease, without long-term current use of insulin  Creatine stable for now. BMP reviewed- noted Estimated Creatinine Clearance: 24.1  mL/min (A) (based on SCr of 1.5 mg/dL (H)). according to latest data. Based on current GFR, CKD stage is stage 3 - GFR 30-59.  Monitor UOP and serial BMP and adjust therapy as needed. Renally dose meds. Avoid nephrotoxic medications and procedures.    Major depressive disorder, recurrent episode, moderate  Patient has persistent depression which is mild and is currently controlled. Will Continue anti-depressant medications. We will not consult psychiatry at this time. Patient does display psychosis at this time. Continue to monitor closely and adjust plan of care as needed.        Pure hypercholesterolemia  Cont statin  Lipid panel        VTE Risk Mitigation (From admission, onward)           Ordered     apixaban tablet 2.5 mg  2 times daily         12/12/23 1511     IP VTE HIGH RISK PATIENT  Once         12/12/23 1203     Place sequential compression device  Until discontinued         12/12/23 1203                    Discharge Planning   KALEY:      Code Status: DNR   Is the patient medically ready for discharge?:     Reason for patient still in hospital (select all that apply): Laboratory test and Treatment  Discharge Plan A: Home, Home with family   Discharge Delays: None known at this time              Ling Timmons MD  Department of Hospital Medicine   [facility]

## 2023-12-14 NOTE — ASSESSMENT & PLAN NOTE
Patient's anemia is currently uncontrolled. Has received 2 units of PRBCs on 12/13/23 . Etiology likely d/t ckd, cad  Current CBC reviewed-   Lab Results   Component Value Date    HGB 9.0 (L) 12/14/2023    HCT 28.5 (L) 12/14/2023     Monitor serial CBC and transfuse if patient becomes hemodynamically unstable, symptomatic or H/H drops below 7/21.    S/p  2 u pbcs  FOBT pending

## 2023-12-14 NOTE — PLAN OF CARE
[facility] - Telemetry      HOME HEALTH ORDERS  FACE TO FACE ENCOUNTER    Patient Name: Krystina Washington  YOB: 1932    PCP: Oniel Johnson MD   PCP Address: [address] / [place] [address]  PCP Phone Number: [phone]  PCP Fax: [phone]    Encounter Date: 12/12/23    Admit to Home Health    Diagnoses:  Active Hospital Problems    Diagnosis  POA    *TIA (transient ischemic attack) [G45.9]  Yes    Tremor [R25.1]  Yes    Slurred speech [R47.81]  Yes    Acute on chronic anemia [D64.9]  Yes    Type 2 diabetes mellitus with stage 3b chronic kidney disease, without long-term current use of insulin [E11.22, N18.32]  Yes    Major depressive disorder, recurrent episode, moderate [F33.1]  Yes    Pure hypercholesterolemia [E78.00]  Yes      Resolved Hospital Problems    Diagnosis Date Resolved POA    Dysphagia [R13.10] 12/12/2023 Yes       Follow Up Appointments:  Future Appointments   Date Time Provider Department Center   1/19/2024  8:00 AM Kyra Hill NP [address]       Allergies:  Review of patient's allergies indicates:   Allergen Reactions    Indocin [indomethacin sodium] Other (See Comments)     Either caused hot,burning body or caused madness per patient's grandson    Lotensin [benazepril] Other (See Comments)     Either caused hot ,burning body or caused madness per patient's grandson       Medications: Review discharge medications with patient and family and provide education.    Current Facility-Administered Medications   Medication Dose Route Frequency Provider Last Rate Last Admin    0.9%  NaCl infusion (for blood administration)   Intravenous Q24H PRN Ling Timmons MD        acetaminophen tablet 650 mg  650 mg Oral Q4H PRN Ling Timmons MD   650 mg at 12/14/23 0821    allopurinoL tablet 100 mg  100 mg Oral Daily Ling Timmons MD   100 mg at 12/14/23 0821    apixaban tablet 2.5 mg  2.5 mg Oral BID Ling Timmons MD   2.5 mg at 12/14/23  0821    atorvastatin tablet 40 mg  40 mg Oral Daily Ling Timmons MD   40 mg at 12/14/23 0821    bisacodyL EC tablet 10 mg  10 mg Oral BID Ling Timmons MD        carvediloL tablet 12.5 mg  12.5 mg Oral BID Ling Timmons MD   12.5 mg at 12/14/23 0821    cefTRIAXone (ROCEPHIN) 1 g in dextrose 5 % in water (D5W) 100 mL IVPB (MB+)  1 g Intravenous Q24H Ling Timmons MD        clopidogreL tablet 75 mg  75 mg Oral QHS Ling Timmons MD   75 mg at 12/13/23 2134    dextrose 10% bolus 125 mL 125 mL  12.5 g Intravenous PRN Ling Timmons MD        dextrose 10% bolus 250 mL 250 mL  25 g Intravenous PRN Ling Timmons MD        EScitalopram oxalate tablet 20 mg  20 mg Oral Daily Ling Timmons MD   20 mg at 12/14/23 0821    glucagon (human recombinant) injection 1 mg  1 mg Intramuscular PRN Ling Timmons MD        glucose chewable tablet 16 g  16 g Oral PRN Ling Timmons MD        glucose chewable tablet 24 g  24 g Oral PRN Ling Timmons MD        hydrALAZINE tablet 50 mg  50 mg Oral Q8H Ling Timmons MD   50 mg at 12/14/23 0550    isosorbide mononitrate 24 hr tablet 30 mg  30 mg Oral Daily Ling Timmons MD   30 mg at 12/14/23 0821    melatonin tablet 6 mg  6 mg Oral Nightly PRN Ling Timmons MD        multivitamin tablet  1 tablet Oral Daily Ling Timmons MD   1 tablet at 12/14/23 0821    ondansetron injection 4 mg  4 mg Intravenous Q8H PRN Ling Timmons MD        polyethylene glycol packet 17 g  17 g Oral BID Ling Timmons MD        sacubitriL-valsartan 49-51 mg per tablet 1 tablet  1 tablet Oral BID Ling Timmons MD   1 tablet at 12/14/23 0821    sodium chloride 0.9% flush 10 mL  10 mL Intravenous Q12H PRN Ling Timmons MD         Current Discharge Medication List        CONTINUE these medications which have NOT CHANGED    Details   acetaminophen (TYLENOL) 500 MG tablet Take 500  mg by mouth every 6 (six) hours as needed for Pain.      albuterol 90 mcg/actuation inhaler Inhale 1 puff into the lungs every 6 (six) hours as needed for Wheezing. 1 HFA Aerosol Inhaler Inhalation Twice a day  Qty: 18 g, Refills: 0      allopurinoL (ZYLOPRIM) 100 MG tablet Take 1 tablet (100 mg total) by mouth once daily.  Qty: 90 tablet, Refills: 3      apixaban (ELIQUIS) 5 mg Tab Take 1 tablet (5 mg total) by mouth 2 (two) times daily.  Qty: 60 tablet, Refills: 11      aspirin (ECOTRIN) 81 MG EC tablet Take 81 mg by mouth as needed for Pain.      atorvastatin (LIPITOR) 40 MG tablet TAKE 1 TABLET BY MOUTH EVERY DAY  Qty: 90 tablet, Refills: 3      bisacodyL (DULCOLAX) 5 mg EC tablet Take 5 mg by mouth daily as needed for Constipation.      carvediloL (COREG) 12.5 MG tablet TAKE 1 TABLET(12.5 MG) BY MOUTH TWICE DAILY  Qty: 180 tablet, Refills: 3    Associated Diagnoses: Essential hypertension      clopidogreL (PLAVIX) 75 mg tablet Take 1 tablet (75 mg total) by mouth once daily.  Qty: 30 tablet, Refills: 11      EScitalopram oxalate (LEXAPRO) 20 MG tablet TAKE 1 TABLET BY MOUTH EVERY DAY  Qty: 90 tablet, Refills: 3      furosemide (LASIX) 20 MG tablet TAKE 1 TABLET(20 MG) BY MOUTH EVERY DAY  Qty: 90 tablet, Refills: 3    Associated Diagnoses: SOB (shortness of breath)      hydrALAZINE (APRESOLINE) 25 MG tablet Take 1 tablet (25 mg total) by mouth every 8 (eight) hours.  Qty: 90 tablet, Refills: 11    Comments: .  Associated Diagnoses: Chronic systolic heart failure      isosorbide mononitrate (IMDUR) 30 MG 24 hr tablet TAKE 1 TABLET(30 MG) BY MOUTH EVERY DAY  Qty: 90 tablet, Refills: 3      multivit-min-iron-FA-vit K-lut (CENTRUM SILVER WOMEN) 8 mg iron-400 mcg-50 mcg Tab Take 1 tablet by mouth once daily.      nitroGLYCERIN 0.4 MG/DOSE TL SPRY (NITROLINGUAL) 400 mcg/spray spray PLACE 1 SPRAY ONTO THE TONGUE EVERY 5 (FIVE) MINUTES AS NEEDED.  Qty: 12 g, Refills: 0      sacubitriL-valsartan (ENTRESTO) 49-51 mg per  tablet Take 1 tablet by mouth 2 (two) times daily.  Qty: 60 tablet, Refills: 11      timolol maleate 0.5% (TIMOPTIC) 0.5 % Drop Place 1 drop into both eyes 2 (two) times daily.  Qty: 15 mL, Refills: 3    Associated Diagnoses: Primary open angle glaucoma (POAG) of left eye, mild stage      blood sugar diagnostic Strp Check BG daily prn if glucose found to be elevated on BMP, per facility protocol.  Qty: 100 strip, Refills: 6    Comments: Fill what is equivalent to patient's glucometer.  Associated Diagnoses: Type 2 diabetes mellitus with hyperglycemia, without long-term current use of insulin      blood-glucose meter (FREESTYLE SYSTEM KIT) kit Patient to check blood glucose daily every evening.  Qty: 1 each, Refills: 0    Comments: Please dispense what is covered by patient's insurance.  And the supplies to use with the glucometer.  Associated Diagnoses: Type 2 diabetes mellitus with hyperglycemia, without long-term current use of insulin      lancets Misc Use daily prn if blood glucose found to be elevated on routine BMP, per facility protocol.  Qty: 100 each, Refills: 6    Associated Diagnoses: Type 2 diabetes mellitus with hyperglycemia, without long-term current use of insulin           STOP taking these medications       multivitamin (THERAGRAN) tablet Comments:   Reason for Stopping:                 I have seen and examined this patient within the last 30 days. My clinical findings that support the need for the home health skilled services and home bound status are the following:no   Weakness/numbness causing balance and gait disturbance due to Stroke making it taxing to leave home.     Diet:   low fat, low cholesterol    Labs:  N/a    Referrals/ Consults  Physical Therapy to evaluate and treat. Evaluate for home safety and equipment needs; Establish/upgrade home exercise program. Perform / instruct on therapeutic exercises, gait training, transfer training, and Range of Motion.  Occupational Therapy to  evaluate and treat. Evaluate home environment for safety and equipment needs. Perform/Instruct on transfers, ADL training, ROM, and therapeutic exercises.  Speech Therapy  to evaluate and treat for  Language and Cognition.   to evaluate for community resources/long-range planning.  Aide to provide assistance with personal care, ADLs, and vital signs.    Activities:   activity as tolerated    Nursing:   Agency to admit patient within 24 hours of hospital discharge unless specified on physician order or at patient request    SN to complete comprehensive assessment including routine vital signs. Instruct on disease process and s/s of complications to report to MD. Review/verify medication list sent home with the patient at time of discharge  and instruct patient/caregiver as needed. Frequency may be adjusted depending on start of care date.     Skilled nurse to perform up to 3 visits PRN for symptoms related to diagnosis    Notify MD if SBP > 160 or < 90; DBP > 90 or < 50; HR > 120 or < 50; Temp > 101; O2 < 88%; Other:       Ok to schedule additional visits based on staff availability and patient request on consecutive days within the home health episode.    When multiple disciplines ordered:    Start of Care occurs on Sunday - Wednesday schedule remaining discipline evaluations as ordered on separate consecutive days following the start of care.    Thursday SOC -schedule subsequent evaluations Friday and Monday the following week.     Friday - Saturday SOC - schedule subsequent discipline evaluations on consecutive days starting Monday of the following week.    For all post-discharge communication and subsequent orders please contact patient's primary care physician. If unable to reach primary care physician or do not receive response within 30 minutes, please contact SW for clinical staff order clarification    Miscellaneous   Diabetic Care:   Report CBG < 60 or > 350 to physician.    Home Health  Aide:  Nursing Three times weekly, Physical Therapy Three times weekly, Occupational Therapy Three times weekly, Speech Language Pathology Three times weekly, and Home Health Aide Three times weekly    Wound Care Orders  no    I certify that this patient is confined to her home and needs intermittent skilled nursing care, physical therapy, speech therapy, and occupational therapy.

## 2023-12-14 NOTE — PLAN OF CARE
rounded on patient this morning. Patient asleep, no family at bedside. I contacted her daughter Merry to discuss discharge plans. They are aware and agreeable for [facility]. Home Health orders placed. NP at home follow-up scheduled, sooner appointment requested. Vascular Neurology follow-up also requested from access navigator. No DME recommended. Family will transport home at discharge. Family encouraged to call with any questions or concerns.  will continue to follow patient through transitions of care and assist with any discharge needs.     OP  referral placed ([facility] ADVISORY).    Patient Contacts    Name Relation Home Work Mobile   Hermelindo Mackenzie [phone]     merry Lafleur Grandmaggy   [phone]   Eliz Mackenzie   [phone]   Jj Mackenzie   [phone]     Future Appointments   Date Time Provider Department Center   1/19/2024  8:00 AM [facility] [address] [facility]  Vascular Neurology [phone] [address] [facility] [place] [address]      Next Steps: Follow up  Instructions: Vascular Neurology Follow-Up Requested [facility] Neuro Phone: [phone]    [facility]  Home Health Services, Home Therapy Services, Home Living Aide Services [phone] [address] [facility] [address] [place] [address]     Next Steps: Follow up  Instructions: [employer]        12/14/23 1040   Final Note   Assessment Type Final Discharge Note   Anticipated Discharge Disposition Home-Health   Hospital Resources/Appts/Education Provided Appointments scheduled and added to AVS   Post-Acute Status   Home Health Status Set-up Complete/Auth obtained   Discharge Delays None known at this time     Claudia Scott RN    [phone]

## 2023-12-14 NOTE — SUBJECTIVE & OBJECTIVE
Interval History: no issues voiced. No bms yet    Review of Systems   All other systems reviewed and are negative.    Objective:     Vital Signs (Most Recent):  Temp: 97.9 °F (36.6 °C) (12/14/23 1112)  Pulse: 62 (12/14/23 1158)  Resp: 18 (12/14/23 1112)  BP: (!) 159/67 (12/14/23 1112)  SpO2: 97 % (12/14/23 1112) Vital Signs (24h Range):  Temp:  [96.9 °F (36.1 °C)-99.5 °F (37.5 °C)] 97.9 °F (36.6 °C)  Pulse:  [57-67] 62  Resp:  [18] 18  SpO2:  [94 %-100 %] 97 %  BP: (140-185)/(67-86) 159/67     Weight: 77.9 kg (171 lb 11.8 oz)  Body mass index is 30.42 kg/m².    Intake/Output Summary (Last 24 hours) at 12/14/2023 1635  Last data filed at 12/13/2023 1855  Gross per 24 hour   Intake 120 ml   Output 700 ml   Net -580 ml           Physical Exam  Vitals and nursing note reviewed.   Constitutional:       Appearance: She is well-developed.   HENT:      Head: Normocephalic and atraumatic.      Nose: Nose normal.   Eyes:      Conjunctiva/sclera: Conjunctivae normal.   Cardiovascular:      Rate and Rhythm: Normal rate and regular rhythm.      Heart sounds: Normal heart sounds.   Pulmonary:      Effort: Pulmonary effort is normal.      Breath sounds: Normal breath sounds. No wheezing.   Abdominal:      General: Bowel sounds are normal.      Palpations: Abdomen is soft. There is no mass.      Tenderness: There is no abdominal tenderness. There is no guarding or rebound.   Musculoskeletal:         General: No tenderness. Normal range of motion.      Cervical back: Normal range of motion and neck supple.   Skin:     General: Skin is warm.      Findings: No rash.   Neurological:      Mental Status: She is alert and oriented to person, place, and time.      Cranial Nerves: No cranial nerve deficit.   Psychiatric:         Behavior: Behavior normal.         Thought Content: Thought content normal.             Significant Labs: All pertinent labs within the past 24 hours have been reviewed.  CBC:   Recent Labs   Lab 12/13/23  8653  12/14/23  0332   WBC 9.76 11.82   HGB 6.6* 9.0*   HCT 21.0* 28.5*    182       CMP:   Recent Labs   Lab 12/13/23  0354 12/14/23  0332    139   K 4.1 4.4    108   CO2 22* 22*    87   BUN 40* 46*   CREATININE 1.4 1.7*   CALCIUM 8.8 9.0   ANIONGAP 8 9       Troponin:   Recent Labs   Lab 12/12/23  1722 12/13/23 0354   TROPONINI 0.037* 0.052*         Significant Imaging: I have reviewed all pertinent imaging results/findings within the past 24 hours.  I have reviewed and interpreted all pertinent imaging results/findings within the past 24 hours.

## 2023-12-14 NOTE — PROGRESS NOTES
[facility] Medicine  Progress Note    Patient Name: Krystina Washington  MRN: [id number]  Patient Class: IP- Inpatient   Admission Date: 12/12/2023  Length of Stay: 1 days  Attending Physician: Ling Timmons MD  Primary Care Provider: Oniel Johnson MD        Subjective:     Principal Problem:TIA (transient ischemic attack)        HPI:  89 y.o. female with prior strokes (R-PCA), recent small R cerebellar stroke, CHF, CABG, TAVR, on Eliquis who presents from home for difficulty speaking and an episode of shaking.  Patient's son states symptoms started with leg and then arm shaking. Then he noticed her speech became stuttering. Also complains of chest tigthness.  She was otherwise normal this morning, had breakfast, etc. The episode lasted for 30 min. Upon examination, everything was resolved. Vascular Neuro was consulted in the ED. No evidence of acute large vessel occlusion or flow-limiting stenosis shown on CTA. Labs, cxr and U/a neg. Admit for TIA and chest tightness    Overview/Hospital Course:  Pt seems to be at baseline without any events overnight. However Hb did drop to 6.6. Iron panel and FOBT ordered. Transfusing 2 u prbcs. Pending FOBT , may need GI consult. Neuro consult pending.  Krystina Washington has warranted treatment spanning two or more midnights of hospital level care for the management of anemia. She continues to require further evaluation by consultants and blood transfusion. Her condition is also complicated by the following comorbidities: CHF, Diabetes, prior CVA, CAD.   14/14: awaiting FOBT to rule out bleed.  Ucx pending enterococcus. Rocephin started      Interval History: no issues voiced. No bms yet    Review of Systems   All other systems reviewed and are negative.    Objective:     Vital Signs (Most Recent):  Temp: 97.9 °F (36.6 °C) (12/14/23 1112)  Pulse: 62 (12/14/23 1158)  Resp: 18 (12/14/23 1112)  BP: (!) 159/67 (12/14/23 1112)  SpO2: 97 % (12/14/23 1112)  Vital Signs (24h Range):  Temp:  [96.9 °F (36.1 °C)-99.5 °F (37.5 °C)] 97.9 °F (36.6 °C)  Pulse:  [57-67] 62  Resp:  [18] 18  SpO2:  [94 %-100 %] 97 %  BP: (140-185)/(67-86) 159/67     Weight: 77.9 kg (171 lb 11.8 oz)  Body mass index is 30.42 kg/m².    Intake/Output Summary (Last 24 hours) at 12/14/2023 1631  Last data filed at 12/13/2023 1855  Gross per 24 hour   Intake 589.58 ml   Output 700 ml   Net -110.42 ml           Physical Exam  Vitals and nursing note reviewed.   Constitutional:       Appearance: She is well-developed.   HENT:      Head: Normocephalic and atraumatic.      Nose: Nose normal.   Eyes:      Conjunctiva/sclera: Conjunctivae normal.   Cardiovascular:      Rate and Rhythm: Normal rate and regular rhythm.      Heart sounds: Normal heart sounds.   Pulmonary:      Effort: Pulmonary effort is normal.      Breath sounds: Normal breath sounds. No wheezing.   Abdominal:      General: Bowel sounds are normal.      Palpations: Abdomen is soft. There is no mass.      Tenderness: There is no abdominal tenderness. There is no guarding or rebound.   Musculoskeletal:         General: No tenderness. Normal range of motion.      Cervical back: Normal range of motion and neck supple.   Skin:     General: Skin is warm.      Findings: No rash.   Neurological:      Mental Status: She is alert and oriented to person, place, and time.      Cranial Nerves: No cranial nerve deficit.   Psychiatric:         Behavior: Behavior normal.         Thought Content: Thought content normal.             Significant Labs: All pertinent labs within the past 24 hours have been reviewed.  CBC:   Recent Labs   Lab 12/13/23 0354 12/14/23 0332   WBC 9.76 11.82   HGB 6.6* 9.0*   HCT 21.0* 28.5*    182       CMP:   Recent Labs   Lab 12/13/23 0354 12/14/23 0332    139   K 4.1 4.4    108   CO2 22* 22*    87   BUN 40* 46*   CREATININE 1.4 1.7*   CALCIUM 8.8 9.0   ANIONGAP 8 9       Troponin:   Recent Labs   Lab  12/12/23  1722 12/13/23  0354   TROPONINI 0.037* 0.052*         Significant Imaging: I have reviewed all pertinent imaging results/findings within the past 24 hours.  I have reviewed and interpreted all pertinent imaging results/findings within the past 24 hours.    Assessment/Plan:      * TIA (transient ischemic attack)    Antithrombotics for secondary stroke prevention: Antiplatelets: Aspirin: 81 mg daily  Clopidogrel: 75 mg daily    Statins for secondary stroke prevention and hyperlipidemia, if present:   Statins: Atorvastatin- 40 mg daily    Aggressive risk factor modification: HTN     Rehab efforts: The patient has been evaluated by a stroke team provider and the therapy needs have been fully considered based off the presenting complaints and exam findings. The following therapy evaluations are needed: PT evaluate and treat, OT evaluate and treat, SLP evaluate and treat    Diagnostics ordered/pending: CTA Head to assess vasculature , CTA Neck/Arch to assess vasculature, HgbA1C to assess blood glucose levels, Lipid Profile to assess cholesterol levels, TTE to assess cardiac function/status , TSH to assess thyroid function    VTE prophylaxis:  eliquis    BP parameters: TIA: SBP <220 until imaging confirmation of no infarct     Echo: 45-50%   Neuro consult pending        Acute on chronic anemia  Patient's anemia is currently uncontrolled. Has received 2 units of PRBCs on 12/13/23 . Etiology likely d/t ckd, cad  Current CBC reviewed-   Lab Results   Component Value Date    HGB 6.6 (L) 12/13/2023    HCT 21.0 (L) 12/13/2023     Monitor serial CBC and transfuse if patient becomes hemodynamically unstable, symptomatic or H/H drops below 7/21.    Getting 2 u pbcs    Type 2 diabetes mellitus with stage 3b chronic kidney disease, without long-term current use of insulin  Creatine stable for now. BMP reviewed- noted Estimated Creatinine Clearance: 24.1 mL/min (A) (based on SCr of 1.5 mg/dL (H)). according to latest data.  Based on current GFR, CKD stage is stage 3 - GFR 30-59.  Monitor UOP and serial BMP and adjust therapy as needed. Renally dose meds. Avoid nephrotoxic medications and procedures.    Major depressive disorder, recurrent episode, moderate  Patient has persistent depression which is mild and is currently controlled. Will Continue anti-depressant medications. We will not consult psychiatry at this time. Patient does display psychosis at this time. Continue to monitor closely and adjust plan of care as needed.        Pure hypercholesterolemia  Cont statin  Lipid panel        VTE Risk Mitigation (From admission, onward)           Ordered     apixaban tablet 2.5 mg  2 times daily         12/12/23 1511     IP VTE HIGH RISK PATIENT  Once         12/12/23 1203     Place sequential compression device  Until discontinued         12/12/23 1203                    Discharge Planning   KALEY:      Code Status: DNR   Is the patient medically ready for discharge?:     Reason for patient still in hospital (select all that apply): Laboratory test and Treatment  Discharge Plan A: Home, Home with family   Discharge Delays: None known at this time              Ling Timmons MD  Department of Hospital Medicine   [facility]

## 2023-12-15 LAB
ANION GAP SERPL CALC-SCNC: 7 MMOL/L (ref 8–16)
BACTERIA UR CULT: ABNORMAL
BASOPHILS # BLD AUTO: 0.03 K/UL (ref 0–0.2)
BASOPHILS NFR BLD: 0.3 % (ref 0–1.9)
BUN SERPL-MCNC: 47 MG/DL (ref 10–30)
CALCIUM SERPL-MCNC: 9 MG/DL (ref 8.7–10.5)
CHLORIDE SERPL-SCNC: 108 MMOL/L (ref 95–110)
CO2 SERPL-SCNC: 23 MMOL/L (ref 23–29)
CREAT SERPL-MCNC: 2 MG/DL (ref 0.5–1.4)
DIFFERENTIAL METHOD: ABNORMAL
EOSINOPHIL # BLD AUTO: 0.3 K/UL (ref 0–0.5)
EOSINOPHIL NFR BLD: 2.8 % (ref 0–8)
ERYTHROCYTE [DISTWIDTH] IN BLOOD BY AUTOMATED COUNT: 17.2 % (ref 11.5–14.5)
EST. GFR  (NO RACE VARIABLE): 23 ML/MIN/1.73 M^2
GLUCOSE SERPL-MCNC: 98 MG/DL (ref 70–110)
HCT VFR BLD AUTO: 29.7 % (ref 37–48.5)
HGB BLD-MCNC: 9.5 G/DL (ref 12–16)
IMM GRANULOCYTES # BLD AUTO: 0.02 K/UL (ref 0–0.04)
IMM GRANULOCYTES NFR BLD AUTO: 0.2 % (ref 0–0.5)
LYMPHOCYTES # BLD AUTO: 2.7 K/UL (ref 1–4.8)
LYMPHOCYTES NFR BLD: 26.3 % (ref 18–48)
MCH RBC QN AUTO: 28.6 PG (ref 27–31)
MCHC RBC AUTO-ENTMCNC: 32 G/DL (ref 32–36)
MCV RBC AUTO: 90 FL (ref 82–98)
MONOCYTES # BLD AUTO: 0.8 K/UL (ref 0.3–1)
MONOCYTES NFR BLD: 7.6 % (ref 4–15)
NEUTROPHILS # BLD AUTO: 6.4 K/UL (ref 1.8–7.7)
NEUTROPHILS NFR BLD: 62.8 % (ref 38–73)
NRBC BLD-RTO: 0 /100 WBC
OB PNL STL: POSITIVE
PLATELET # BLD AUTO: 182 K/UL (ref 150–450)
PMV BLD AUTO: 12.7 FL (ref 9.2–12.9)
POCT GLUCOSE: 111 MG/DL (ref 70–110)
POCT GLUCOSE: 119 MG/DL (ref 70–110)
POCT GLUCOSE: 81 MG/DL (ref 70–110)
POTASSIUM SERPL-SCNC: 4.5 MMOL/L (ref 3.5–5.1)
RBC # BLD AUTO: 3.32 M/UL (ref 4–5.4)
SODIUM SERPL-SCNC: 138 MMOL/L (ref 136–145)
WBC # BLD AUTO: 10.26 K/UL (ref 3.9–12.7)

## 2023-12-15 PROCEDURE — C9113 INJ PANTOPRAZOLE SODIUM, VIA: HCPCS | Performed by: FAMILY MEDICINE

## 2023-12-15 PROCEDURE — 97530 THERAPEUTIC ACTIVITIES: CPT | Mod: CQ

## 2023-12-15 PROCEDURE — 94761 N-INVAS EAR/PLS OXIMETRY MLT: CPT

## 2023-12-15 PROCEDURE — 99223 PR INITIAL HOSPITAL CARE,LEVL III: ICD-10-PCS | Mod: ,,, | Performed by: INTERNAL MEDICINE

## 2023-12-15 PROCEDURE — 99223 1ST HOSP IP/OBS HIGH 75: CPT | Mod: ,,, | Performed by: INTERNAL MEDICINE

## 2023-12-15 PROCEDURE — 25000003 PHARM REV CODE 250: Performed by: FAMILY MEDICINE

## 2023-12-15 PROCEDURE — 97530 THERAPEUTIC ACTIVITIES: CPT

## 2023-12-15 PROCEDURE — 99900035 HC TECH TIME PER 15 MIN (STAT)

## 2023-12-15 PROCEDURE — 36415 COLL VENOUS BLD VENIPUNCTURE: CPT | Performed by: FAMILY MEDICINE

## 2023-12-15 PROCEDURE — 85025 COMPLETE CBC W/AUTO DIFF WBC: CPT | Performed by: FAMILY MEDICINE

## 2023-12-15 PROCEDURE — 63600175 PHARM REV CODE 636 W HCPCS: Performed by: FAMILY MEDICINE

## 2023-12-15 PROCEDURE — 80048 BASIC METABOLIC PNL TOTAL CA: CPT | Performed by: FAMILY MEDICINE

## 2023-12-15 PROCEDURE — 82270 OCCULT BLOOD FECES: CPT | Performed by: FAMILY MEDICINE

## 2023-12-15 PROCEDURE — 93010 EKG 12-LEAD: ICD-10-PCS | Mod: ,,, | Performed by: INTERNAL MEDICINE

## 2023-12-15 PROCEDURE — 11000001 HC ACUTE MED/SURG PRIVATE ROOM

## 2023-12-15 PROCEDURE — 97535 SELF CARE MNGMENT TRAINING: CPT

## 2023-12-15 PROCEDURE — 93005 ELECTROCARDIOGRAM TRACING: CPT

## 2023-12-15 PROCEDURE — 93010 ELECTROCARDIOGRAM REPORT: CPT | Mod: ,,, | Performed by: INTERNAL MEDICINE

## 2023-12-15 PROCEDURE — 97116 GAIT TRAINING THERAPY: CPT | Mod: CQ

## 2023-12-15 RX ORDER — AMOXICILLIN AND CLAVULANATE POTASSIUM 875; 125 MG/1; MG/1
1 TABLET, FILM COATED ORAL EVERY 12 HOURS
Status: DISCONTINUED | OUTPATIENT
Start: 2023-12-16 | End: 2023-12-20

## 2023-12-15 RX ORDER — PANTOPRAZOLE SODIUM 40 MG/10ML
40 INJECTION, POWDER, LYOPHILIZED, FOR SOLUTION INTRAVENOUS 2 TIMES DAILY
Status: DISCONTINUED | OUTPATIENT
Start: 2023-12-15 | End: 2023-12-21

## 2023-12-15 RX ADMIN — CARVEDILOL 12.5 MG: 12.5 TABLET, FILM COATED ORAL at 09:12

## 2023-12-15 RX ADMIN — ATORVASTATIN CALCIUM 40 MG: 40 TABLET, FILM COATED ORAL at 09:12

## 2023-12-15 RX ADMIN — PANTOPRAZOLE SODIUM 40 MG: 40 INJECTION, POWDER, LYOPHILIZED, FOR SOLUTION INTRAVENOUS at 12:12

## 2023-12-15 RX ADMIN — POLYETHYLENE GLYCOL 3350 17 G: 17 POWDER, FOR SOLUTION ORAL at 09:12

## 2023-12-15 RX ADMIN — SACUBITRIL AND VALSARTAN 1 TABLET: 49; 51 TABLET, FILM COATED ORAL at 09:12

## 2023-12-15 RX ADMIN — HYDRALAZINE HYDROCHLORIDE 50 MG: 25 TABLET, FILM COATED ORAL at 03:12

## 2023-12-15 RX ADMIN — PANTOPRAZOLE SODIUM 40 MG: 40 INJECTION, POWDER, LYOPHILIZED, FOR SOLUTION INTRAVENOUS at 09:12

## 2023-12-15 RX ADMIN — HYDRALAZINE HYDROCHLORIDE 50 MG: 25 TABLET, FILM COATED ORAL at 09:12

## 2023-12-15 RX ADMIN — ALLOPURINOL 100 MG: 100 TABLET ORAL at 09:12

## 2023-12-15 RX ADMIN — HYDRALAZINE HYDROCHLORIDE 50 MG: 25 TABLET, FILM COATED ORAL at 05:12

## 2023-12-15 RX ADMIN — BISACODYL 10 MG: 5 TABLET, COATED ORAL at 09:12

## 2023-12-15 RX ADMIN — ESCITALOPRAM OXALATE 20 MG: 10 TABLET ORAL at 09:12

## 2023-12-15 RX ADMIN — APIXABAN 2.5 MG: 2.5 TABLET, FILM COATED ORAL at 09:12

## 2023-12-15 RX ADMIN — MULTIPLE VITAMINS W/ MINERALS TAB 1 TABLET: TAB at 09:12

## 2023-12-15 RX ADMIN — CEFTRIAXONE SODIUM 1 G: 1 INJECTION, POWDER, FOR SOLUTION INTRAMUSCULAR; INTRAVENOUS at 09:12

## 2023-12-15 RX ADMIN — ISOSORBIDE MONONITRATE 30 MG: 30 TABLET, EXTENDED RELEASE ORAL at 09:12

## 2023-12-15 NOTE — PLAN OF CARE
contacted patient's daughter Merry. Patient will not be discharging today. Patient will be discharged home with Home Health ([facility]) at time of discharge. Daughter is aware requested sooner NP at Home follow-up and Vascular Neurology appointment. Patient/Daughter encouraged to call with any questions or concerns.  will continue to follow patient through transitions of care and assist with any discharge needs.     Patient Contacts    Name Relation Home Work Mobile   Hermelindo Mackenzie Grandmaggy [phone]     merry Lafleur Grandmaggy   [phone]   Eliz Mackenzie Grandmaggy   [phone]   Jj Mackenzie   [phone]     Future Appointments   Date Time Provider Department Center   1/19/2024  8:00 AM Chairs, Kyra KRISHNA NP [address] VASCULAR NEUROLOGY  Vascular Neurology [phone] [address] [facility] [place] [address]      Next Steps: Follow up  Instructions: Vascular Neurology Follow-Up Requested [facility] Phone: [phone]    [facility]  Home Health Services, Home Therapy Services, Home Living Aide Services [phone] [address] [place] [address]     Next Steps: Follow up  Instructions: [employer]        12/15/23 1329   Discharge Reassessment   Assessment Type Discharge Planning Reassessment   Did the patient's condition or plan change since previous assessment? No   Discharge Plan discussed with: Adult children   Discharge Plan A Home;Home Health   Discharge Plan B Home with family;Home Health   DME Needed Upon Discharge  none   Transition of Care Barriers None   Why the patient remains in the hospital Requires continued medical care   Post-Acute Status   Post-Acute Authorization Home Health   Home Health Status Set-up Complete/Auth obtained   Hospital Resources/Appts/Education Provided Appointments scheduled and added to AVS   Discharge Delays None known  at this time     Claudia Scott RN    [phone]

## 2023-12-15 NOTE — PT/OT/SLP PROGRESS
Occupational Therapy   Treatment    Name: Krystina Washington  MRN: [id number]  Admitting Diagnosis:  Acute cystitis       Recommendations:     Discharge Recommendations: Low Intensity Therapy (HH OT/PT & 24/7 care/assistance)  Discharge Equipment Recommendations:  none  Barriers to discharge:  Other (Comment) (Pt requires increased level of assistance)    Assessment:     Krystina Washington is a 89 y.o. female with a medical diagnosis of Acute cystitis.  She presents with The primary encounter diagnosis was Tremor. Diagnoses of Acute focal neurological deficit, AMS (altered mental status), CVA (cerebral vascular accident), and Slurred speech were also pertinent to this visit. Performance deficits affecting function are weakness, impaired functional mobility, decreased safety awareness, decreased coordination, decreased upper extremity function, decreased lower extremity function, impaired self care skills, impaired balance, impaired endurance, gait instability, decreased ROM, impaired joint extensibility, impaired coordination.     Rehab Prognosis:  Good; patient would benefit from acute skilled OT services to address these deficits and reach maximum level of function.       Plan:     Patient to be seen 3 x/week to address the above listed problems via self-care/home management, therapeutic activities, therapeutic exercises  Plan of Care Expires: 01/09/24  Plan of Care Reviewed with: patient    Subjective     Chief Complaint: Pt having episode of incontinence during t/f  Patient/Family Comments/goals: Agreeable to get [place] [facility]  Pain/Comfort:  Pain Rating 1: other (see comments) (none stated)    Objective:     Communicated with: nsg prior to session.  Patient found HOB elevated with bed alarm, telemetry upon OT entry to room.    General Precautions: Standard, fall, hearing impaired    Orthopedic Precautions:N/A  Braces: N/A  Respiratory Status: Room air     Occupational Performance:     Bed Mobility:    Patient completed  Scooting/Bridging with minimum assistance to EOB with use of bed rails  Patient completed Supine to Sit with moderate assistance     Functional Mobility/Transfers:  Patient completed Sit <> Stand Transfer with moderate assistance  with  rolling walker from EOB, increased Mod/MaxA for sit<>stand from lower surfaces such as BSC & bedside chair  Patient completed Bed <> Chair Transfer using Step Transfer technique with minimum assistance with rolling walker  Patient completed Toilet Transfer Step Transfer technique with minimum assistance with  rolling walker    Activities of Daily Living:  Toileting: maximal assistance and total assistance for hygiene and diaper management after BM during t/f      [facility] 6 Click ADL: 16    Treatment & Education:  Pt progressing towards goals, agreeable to therapy this date.  Pt oriented to day of the week, eating with nsg upon therapy entrance to room.  Pt performing t/fs as above.  Pt educated on PLB 2/2 mild increased WOB after ax.  Pt requires increased v/sc throughout session for safety.  Will progress as able.     Patient left up in chair with all lines intact, call button in reach, chair alarm on, and nsg notified    GOALS:   Multidisciplinary Problems       Occupational Therapy Goals          Problem: Occupational Therapy    Goal Priority Disciplines Outcome Interventions   Occupational Therapy Goal     OT, PT/OT Ongoing, Progressing    Description: Goals to be met by: 1/9/2024     Patient will increase functional independence with ADLs by performing:    Rolling with SBA to support self initiated pressure relief. MET 12/13/2023  Feeding with Stand-by Assistance with meal tray setup  UE Dressing with Minimal Assistance with overhead shirt  Grooming with Contact Guard Assistance with simplifed set up  Toileting from bedside commode with Moderate Assistance for hygiene and clothing management.   Sitting at edge of bed x5 minutes with Stand-by Assistance. MET 12/13/2023  Toilet  transfer to bedside commode with Contact Guard Assistance with least restrictive device.  Family or caregiver 100% verbalized reciprocation of dementia friendly recommendations (ex: simplify choices; decrease visual clutter; remove hazardous objects; maximize familiarity, etc) to support patient's wellbeing and highest level of occupational engagement and participation in least restrictive discharge environment                          Time Tracking:     OT Date of Treatment: 12/15/23  OT Start Time: 1008  OT Stop Time: 1039  OT Total Time (min): 31 min    Billable Minutes:Self Care/Home Management 8  Therapeutic Activity 23    OT/AVELINO: OT     Number of AVELINO visits since last OT visit: 0    12/15/2023

## 2023-12-15 NOTE — ASSESSMENT & PLAN NOTE
With occult stool positive but no reported overt gi bleeding  Certainly on 2 blood thinners    Recs  Would start PPI IV BID  Monitor stool   Monitor hgb    If develops persistent anemia, we can consider EGD in the coming days.  Call us for development of overt GI bleeding    Will follow

## 2023-12-15 NOTE — SUBJECTIVE & OBJECTIVE
Past Medical History:   Diagnosis Date    Acute ischemic left posterior cerebral artery (PCA) stroke 12/3/2020    Anemia in stage 3b chronic kidney disease 3/8/2017    Arthritis     CHF (congestive heart failure)     Closed displaced subtrochanteric fracture of right femur s/p IM nail on 10/17/2021 10/16/2021    Coronary artery disease     Diabetes mellitus     Glaucoma     Gout, joint     Hx of CABG 9/21/10    Hyperlipidemia     Hypertension     NSTEMI (non-ST elevated myocardial infarction) 09/21/10    Stage 3b chronic kidney disease 10/21/2021    Stenosis of aortic and mitral valves     Systolic heart failure 6/19/2015    TIA (transient ischemic attack) 1/7/2021       Past Surgical History:   Procedure Laterality Date    ANTEGRADE INTRAMEDULLARY RODDING OF FEMUR Right 10/17/2021    Procedure: INSERTION, INTRAMEDULLARY ALTAF, FEMUR, ANTEGRADE;  Surgeon: Dino Rutledge MD;  Location: [facility] OR [address] [place] [address];  Service: Orthopedics;  Laterality: Right;    CARDIAC VALVE SURGERY      CATARACT EXTRACTION      CORONARY ARTERY BYPASS GRAFT  9/21/2010    ENTROPIAN REPAIR Left 3/6/2020    Procedure: REPAIR, ENTROPION;  Surgeon: Yulia Kincaid MD;  Location: [facility] OR [address] [place] [address];  Service: Ophthalmology;  Laterality: Left;    EYE SURGERY      JOINT REPLACEMENT      ORIF FEMUR FRACTURE Right 10/17/2021    Procedure: ORIF, FRACTURE, FEMUR;  Surgeon: Dino Rutledge MD;  Location: [facility] OR [address] [place] [address];  Service: Orthopedics;  Laterality: Right;       Review of patient's allergies indicates:   Allergen Reactions    Indocin [indomethacin sodium] Other (See Comments)     Either caused hot,burning body or caused madness per patient's grandson    Lotensin [benazepril] Other (See Comments)     Either caused hot ,burning body or caused madness per patient's grandson     Family History       Problem Relation (Age of Onset)    Diabetes Mother, Father    Glaucoma Father    Hypertension Father    No Known Problems Sister, Brother, Maternal Aunt,  Maternal Uncle, Paternal Aunt, Paternal Uncle, Maternal Grandmother, Maternal Grandfather, Paternal Grandmother, Paternal Grandfather          Tobacco Use    Smoking status: Never    Smokeless tobacco: Never   Substance and Sexual Activity    Alcohol use: No    Drug use: No    Sexual activity: Never     Review of Systems   Unable to perform ROS: Dementia     Objective:     Vital Signs (Most Recent):  Temp: 97.6 °F (36.4 °C) (12/15/23 1638)  Pulse: 65 (12/15/23 1638)  Resp: 18 (12/15/23 1638)  BP: (!) 161/77 (12/15/23 1638)  SpO2: 96 % (12/15/23 1638) Vital Signs (24h Range):  Temp:  [97.6 °F (36.4 °C)-98.6 °F (37 °C)] 97.6 °F (36.4 °C)  Pulse:  [57-70] 65  Resp:  [18-20] 18  SpO2:  [96 %-100 %] 96 %  BP: (135-186)/(70-81) 161/77     Weight: (!) 182.4 kg (402 lb 1.9 oz) (12/14/23 1949)  Body mass index is 71.23 kg/m².      Intake/Output Summary (Last 24 hours) at 12/15/2023 1642  Last data filed at 12/14/2023 2035  Gross per 24 hour   Intake --   Output 450 ml   Net -450 ml       Lines/Drains/Airways       Drain  Duration             Female External Urinary Catheter 12/12/23 1441 3 days              Peripheral Intravenous Line  Duration                  Peripheral IV - Single Lumen 12/12/23 0958 20 G Left Antecubital 3 days                     Physical Exam  Vitals reviewed.   Constitutional:       Appearance: She is not toxic-appearing.      Comments: nonoriteind   HENT:      Mouth/Throat:      Mouth: Mucous membranes are moist.   Eyes:      General: No scleral icterus.     Conjunctiva/sclera: Conjunctivae normal.   Cardiovascular:      Rate and Rhythm: Normal rate.   Pulmonary:      Effort: Pulmonary effort is normal.      Breath sounds: Normal breath sounds.   Abdominal:      General: There is no distension.      Palpations: Abdomen is soft.      Tenderness: There is no abdominal tenderness.   Musculoskeletal:      Cervical back: Neck supple.      Right lower leg: No edema.      Left lower leg: No edema.    Lymphadenopathy:      Cervical: No cervical adenopathy.   Neurological:      Mental Status: She is alert. She is disoriented.          Significant Labs:  CBC:   Recent Labs   Lab 12/14/23  0332 12/14/23  1709 12/15/23  0337   WBC 11.82 10.10 10.26   HGB 9.0* 9.3* 9.5*   HCT 28.5* 29.1* 29.7*    174 182     BMP:   Recent Labs   Lab 12/15/23  0337   GLU 98      K 4.5      CO2 23   BUN 47*   CREATININE 2.0*   CALCIUM 9.0     CMP:   Recent Labs   Lab 12/15/23  0337   GLU 98   CALCIUM 9.0      K 4.5   CO2 23      BUN 47*   CREATININE 2.0*       Significant Imaging:  Imaging results within the past 24 hours have been reviewed.

## 2023-12-15 NOTE — HPI
This is 89-year-old lady with history of prior stroke on Eliquis who had episode of difficulty speaking and shaking at home.    No in his at bedside to provide history this history obtained mainly from the chart.  The patient can not provide history.    There was some stuttering of speech and chest tightness on admission and the limb episode lasted 30 minutes apparently there was no other concerns for any bleeding.    GI is now consulted for occult stool positive and transfusion-dependent anemia, although she has a history of chronic anemia.  There is no reports of any overt blood loss presently.  No vomiting.  The patient has no complaints.

## 2023-12-15 NOTE — PROGRESS NOTES
[facility] Medicine  Progress Note    Patient Name: Krystina Washington  MRN: [id number]  Patient Class: IP- Inpatient   Admission Date: 12/12/2023  Length of Stay: 2 days  Attending Physician: Ling Timmons MD  Primary Care Provider: Oniel Johnson MD        Subjective:     Principal Problem:Acute cystitis        HPI:  89 y.o. female with prior strokes (R-PCA), recent small R cerebellar stroke, CHF, CABG, TAVR, on Eliquis who presents from home for difficulty speaking and an episode of shaking.  Patient's son states symptoms started with leg and then arm shaking. Then he noticed her speech became stuttering. Also complains of chest tigthness.  She was otherwise normal this morning, had breakfast, etc. The episode lasted for 30 min. Upon examination, everything was resolved. Vascular Neuro was consulted in the ED. No evidence of acute large vessel occlusion or flow-limiting stenosis shown on CTA. Labs, cxr and U/a neg. Admit for TIA and chest tightness    Overview/Hospital Course:  Pt seems to be at baseline without any events overnight. However Hb did drop to 6.6. Iron panel and FOBT ordered. Transfusing 2 u prbcs. Pending FOBT , may need GI consult. Neuro consult pending.  Krystina Washington has warranted treatment spanning two or more midnights of hospital level care for the management of anemia. She continues to require further evaluation by consultants and blood transfusion. Her condition is also complicated by the following comorbidities: CHF, Diabetes, prior CVA, CAD.   14/14: awaiting FOBT to rule out bleed.  Ucx pending enterococcus. Rocephin started  14/15: FOBT Positive even though hb stable around 9. Plavix and eliquis d/c'd. GI consulted. PPI IV BID started    Interval History: no issues voiced. No bms yet    Review of Systems   All other systems reviewed and are negative.    Objective:     Vital Signs (Most Recent):  Temp: 98 °F (36.7 °C) (12/15/23 1157)  Pulse: 69 (12/15/23  1157)  Resp: 18 (12/15/23 1157)  BP: 135/70 (12/15/23 1157)  SpO2: 96 % (12/15/23 1157) Vital Signs (24h Range):  Temp:  [98 °F (36.7 °C)-98.6 °F (37 °C)] 98 °F (36.7 °C)  Pulse:  [57-70] 69  Resp:  [18-20] 18  SpO2:  [96 %-100 %] 96 %  BP: (135-186)/(70-81) 135/70     Weight: (!) 182.4 kg (402 lb 1.9 oz)  Body mass index is 71.23 kg/m².    Intake/Output Summary (Last 24 hours) at 12/15/2023 1623  Last data filed at 12/14/2023 2035  Gross per 24 hour   Intake --   Output 450 ml   Net -450 ml           Physical Exam  Vitals and nursing note reviewed.   Constitutional:       Appearance: She is well-developed.   HENT:      Head: Normocephalic and atraumatic.      Nose: Nose normal.   Eyes:      Conjunctiva/sclera: Conjunctivae normal.   Cardiovascular:      Rate and Rhythm: Normal rate and regular rhythm.      Heart sounds: Normal heart sounds.   Pulmonary:      Effort: Pulmonary effort is normal.      Breath sounds: Normal breath sounds. No wheezing.   Abdominal:      General: Bowel sounds are normal.      Palpations: Abdomen is soft. There is no mass.      Tenderness: There is no abdominal tenderness. There is no guarding or rebound.   Musculoskeletal:         General: No tenderness. Normal range of motion.      Cervical back: Normal range of motion and neck supple.   Skin:     General: Skin is warm.      Findings: No rash.   Neurological:      Mental Status: She is alert and oriented to person, place, and time.      Cranial Nerves: No cranial nerve deficit.   Psychiatric:         Behavior: Behavior normal.         Thought Content: Thought content normal.             Significant Labs: All pertinent labs within the past 24 hours have been reviewed.  CBC:   Recent Labs   Lab 12/14/23  0332 12/14/23  1709 12/15/23  0337   WBC 11.82 10.10 10.26   HGB 9.0* 9.3* 9.5*   HCT 28.5* 29.1* 29.7*    174 182       CMP:   Recent Labs   Lab 12/14/23  0332 12/15/23  0337    138   K 4.4 4.5    108   CO2 22* 23  "  GLU 87 98   BUN 46* 47*   CREATININE 1.7* 2.0*   CALCIUM 9.0 9.0   ANIONGAP 9 7*       Troponin:   No results for input(s): "TROPONINI", "TROPONINIHS" in the last 48 hours.      Significant Imaging: I have reviewed all pertinent imaging results/findings within the past 24 hours.  I have reviewed and interpreted all pertinent imaging results/findings within the past 24 hours.    Assessment/Plan:      * Acute cystitis    Ucx growing out enterococcus  Rocephin started    TIA (transient ischemic attack)    Antithrombotics for secondary stroke prevention: Antiplatelets: Aspirin: 81 mg daily  Clopidogrel: 75 mg daily    Statins for secondary stroke prevention and hyperlipidemia, if present:   Statins: Atorvastatin- 40 mg daily    Aggressive risk factor modification: HTN     Rehab efforts: The patient has been evaluated by a stroke team provider and the therapy needs have been fully considered based off the presenting complaints and exam findings. The following therapy evaluations are needed: PT evaluate and treat, OT evaluate and treat, SLP evaluate and treat    Diagnostics ordered/pending: CTA Head to assess vasculature , CTA Neck/Arch to assess vasculature, HgbA1C to assess blood glucose levels, Lipid Profile to assess cholesterol levels, TTE to assess cardiac function/status , TSH to assess thyroid function    VTE prophylaxis:  eliquis    BP parameters: TIA: SBP <220 until imaging confirmation of no infarct     Echo: 45-50%   Neuro consult pending        Acute on chronic anemia  Patient's anemia is currently uncontrolled. Has received 2 units of PRBCs on 12/13/23 . Etiology likely d/t ckd, cad  Current CBC reviewed-   Lab Results   Component Value Date    HGB 9.5 (L) 12/15/2023    HCT 29.7 (L) 12/15/2023     Monitor serial CBC and transfuse if patient becomes hemodynamically unstable, symptomatic or H/H drops below 7/21.    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 BMP reviewed- noted Estimated Creatinine Clearance: 24.1 mL/min (A) (based on SCr of 1.5 mg/dL (H)). according to latest data. Based on current GFR, CKD stage is stage 3 - GFR 30-59.  Monitor UOP and serial BMP and adjust therapy as needed. Renally dose meds. Avoid nephrotoxic medications and procedures.    Major depressive disorder, recurrent episode, moderate  Patient has persistent depression which is mild and is currently controlled. Will Continue anti-depressant medications. We will not consult psychiatry at this time. Patient does display psychosis at this time. Continue to monitor closely and adjust plan of care as needed.        Pure hypercholesterolemia  Cont statin  Lipid panel        VTE Risk Mitigation (From admission, onward)           Ordered     IP VTE HIGH RISK PATIENT  Once         12/12/23 1203     Place sequential compression device  Until discontinued         12/12/23 1203                    Discharge Planning   KALEY:      Code Status: DNR   Is the patient medically ready for discharge?:     Reason for patient still in hospital (select all that apply): Treatment  Discharge Plan A: Home, Home Health   Discharge Delays: None known at this time              Ling Timmons MD  Department of Hospital Medicine   [facility]

## 2023-12-15 NOTE — PT/OT/SLP PROGRESS
Physical Therapy Treatment    Patient Name:  Krystina Washington   MRN:  [id number]    Recommendations:     Discharge Recommendations: Low Intensity Therapy (HH PT with 24/7 family assistance)  Discharge Equipment Recommendations: none  Barriers to discharge:  decreased strength/functional mobility requiring increased assistance at this time for safety; fall risk    Assessment:     Krystina Washington is a 89 y.o. female admitted with a medical diagnosis of Acute cystitis.  She presents with the following impairments/functional limitations: weakness, impaired endurance, impaired self care skills, impaired functional mobility, gait instability, impaired balance, decreased upper extremity function, decreased lower extremity function, decreased safety awareness, pain, decreased ROM, decreased coordination, impaired coordination Pt would continue to benefit from P.T. To address impairments listed above.  .    Rehab Prognosis: Fair; patient would benefit from acute skilled PT services to address these deficits and reach maximum level of function.    Recent Surgery: * No surgery found *      Plan:     During this hospitalization, patient to be seen 5 x/week to address the identified rehab impairments via gait training, therapeutic activities, therapeutic exercises, neuromuscular re-education and progress toward the following goals:    Plan of Care Expires:  01/12/24    Subjective     Chief Complaint: stomach pain with BM and after  Patient/Family Comments/goals: Pt agreed to tx.  Pain/Comfort:  Pain Rating 1:  (stomach pain; did not rate)  Location - Orientation 1: generalized  Location 1:  (stomach)  Pain Addressed 1: Distraction, Reposition, Cessation of Activity, Nurse notified  Pain Rating Post-Intervention 1:  (stomach; did not rate)      Objective:     Communicated with Rn prior to session.  Patient found up in chair with chair check, peripheral IV, telemetry upon PT entry to room.     General Precautions: Standard, fall,  "hearing impaired  Orthopedic Precautions: N/A  Braces: N/A  Respiratory Status: Room air     Functional Mobility:  Transfers:     Sit to Stand:  moderate assistance with rolling walker and vcs for hand placement.  3x from b/s chair and once from b/s commode  Toilet Transfer: minimum assistance with  rolling walker  using  Step Transfer  Gait: 4 turning steps x 2 from b/s chair <> b/s commode with RW and Emmanuelle with vc's for sequencing with RW use.  Seated rest, 30ft x 2 with RW and Emmanuelle/close CGA with occasional vc's for closer proximity to RW for increased safety.  Pt ambulates slowly with mild trunk flexion and decreased step length with decreased foot to floor clearance.    Balance: sitting good-, standing fair/fair-, gait fair/fair-      AM-PAC 6 CLICK MOBILITY  Turning over in bed (including adjusting bedclothes, sheets and blankets)?: 3  Sitting down on and standing up from a chair with arms (e.g., wheelchair, bedside commode, etc.): 2  Moving from lying on back to sitting on the side of the bed?: 3  Moving to and from a bed to a chair (including a wheelchair)?: 3  Need to walk in hospital room?: 3  Climbing 3-5 steps with a railing?: 1  Basic Mobility Total Score: 15       Treatment & Education:  Pt found reclined in b/s recliner sleeping.  Woke easily and requested to use toilet to have a BM.  Standing and transfers as above.  Static standing with Rw and close CGA with one bout of Emmanuelle secondary to mild posterior lean.  Pt stood while PTA assisted pt with doffing diaper, then hygiene needs secondary to BM, seated reset then standing again to assist donning clean diaper. After another seated rest, pt ambulated 30ft x 2 with RW and Emmanuelle/close CGA as above with a standing rest between bouts secondary to decreased strength/endurance.  Pt returned to b/s chair sitting upright with pillow for support and b/s table in front.  Pt stated she did not want to eat lunch secondary to her "stomach hurting."  Pt did eat " yogurt.  RN notified of pt having stomach pain after BM.     Patient left up in chair with chair alarm on and Rn notified.    GOALS:   Multidisciplinary Problems       Physical Therapy Goals          Problem: Physical Therapy    Goal Priority Disciplines Outcome Goal Variances Interventions   Physical Therapy Goal     PT, PT/OT Ongoing, Progressing     Description: Goals to be met by: 24     Patient will increase functional independence with mobility by performin. Supine to sit with Stand-by Assistance  2. Sit to supine with Stand-by Assistance  3. Sit to stand transfer with Contact Guard Assistance with use of RW.   4. Bed to chair transfer with Contact Guard Assistance using Rolling Walker  5. Gait  x 50 feet with Contact Guard Assistance using Rolling Walker.                          Time Tracking:     PT Received On: 12/15/23  PT Start Time: 1139     PT Stop Time: 1222  PT Total Time (min): 43 min     Billable Minutes: Gait Training 12 and Therapeutic Activity 31    Treatment Type: Treatment  PT/PTA: PTA     Number of PTA visits since last PT visit: 2     12/15/2023

## 2023-12-15 NOTE — ASSESSMENT & PLAN NOTE
Patient's anemia is currently uncontrolled. Has received 2 units of PRBCs on 12/13/23 . Etiology likely d/t ckd, cad  Current CBC reviewed-   Lab Results   Component Value Date    HGB 9.5 (L) 12/15/2023    HCT 29.7 (L) 12/15/2023     Monitor serial CBC and transfuse if patient becomes hemodynamically unstable, symptomatic or H/H drops below 7/21.    S/p  2 u pbcs  FOBT positive   GI consult  PPI IV BID  Eliquis and plavix d/c'd

## 2023-12-15 NOTE — SUBJECTIVE & OBJECTIVE
Interval History: no issues voiced. No bms yet    Review of Systems   All other systems reviewed and are negative.    Objective:     Vital Signs (Most Recent):  Temp: 98 °F (36.7 °C) (12/15/23 1157)  Pulse: 69 (12/15/23 1157)  Resp: 18 (12/15/23 1157)  BP: 135/70 (12/15/23 1157)  SpO2: 96 % (12/15/23 1157) Vital Signs (24h Range):  Temp:  [98 °F (36.7 °C)-98.6 °F (37 °C)] 98 °F (36.7 °C)  Pulse:  [57-70] 69  Resp:  [18-20] 18  SpO2:  [96 %-100 %] 96 %  BP: (135-186)/(70-81) 135/70     Weight: (!) 182.4 kg (402 lb 1.9 oz)  Body mass index is 71.23 kg/m².    Intake/Output Summary (Last 24 hours) at 12/15/2023 1623  Last data filed at 12/14/2023 2035  Gross per 24 hour   Intake --   Output 450 ml   Net -450 ml           Physical Exam  Vitals and nursing note reviewed.   Constitutional:       Appearance: She is well-developed.   HENT:      Head: Normocephalic and atraumatic.      Nose: Nose normal.   Eyes:      Conjunctiva/sclera: Conjunctivae normal.   Cardiovascular:      Rate and Rhythm: Normal rate and regular rhythm.      Heart sounds: Normal heart sounds.   Pulmonary:      Effort: Pulmonary effort is normal.      Breath sounds: Normal breath sounds. No wheezing.   Abdominal:      General: Bowel sounds are normal.      Palpations: Abdomen is soft. There is no mass.      Tenderness: There is no abdominal tenderness. There is no guarding or rebound.   Musculoskeletal:         General: No tenderness. Normal range of motion.      Cervical back: Normal range of motion and neck supple.   Skin:     General: Skin is warm.      Findings: No rash.   Neurological:      Mental Status: She is alert and oriented to person, place, and time.      Cranial Nerves: No cranial nerve deficit.   Psychiatric:         Behavior: Behavior normal.         Thought Content: Thought content normal.             Significant Labs: All pertinent labs within the past 24 hours have been reviewed.  CBC:   Recent Labs   Lab 12/14/23  7719  "12/14/23  1709 12/15/23  0337   WBC 11.82 10.10 10.26   HGB 9.0* 9.3* 9.5*   HCT 28.5* 29.1* 29.7*    174 182       CMP:   Recent Labs   Lab 12/14/23  0332 12/15/23  0337    138   K 4.4 4.5    108   CO2 22* 23   GLU 87 98   BUN 46* 47*   CREATININE 1.7* 2.0*   CALCIUM 9.0 9.0   ANIONGAP 9 7*       Troponin:   No results for input(s): "TROPONINI", "TROPONINIHS" in the last 48 hours.      Significant Imaging: I have reviewed all pertinent imaging results/findings within the past 24 hours.  I have reviewed and interpreted all pertinent imaging results/findings within the past 24 hours.  "

## 2023-12-15 NOTE — CONSULTS
[facility] - Telemetry  Gastroenterology  Consult Note    Patient Name: Krystina Washington  MRN: [id number]  Admission Date: 12/12/2023  Hospital Length of Stay: 2 days  Code Status: DNR   Attending Provider: Ling Timmons MD   Consulting Provider: Markus Mendez MD  Primary Care Physician: Oniel Johnson MD  Principal Problem:Acute cystitis    [facility]  Consult performed by: Markus Mendez MD  Consult ordered by: Ling Timmons MD        Subjective:     HPI:  This is 89-year-old lady with history of prior stroke on Eliquis who had episode of difficulty speaking and shaking at home.    No in his at bedside to provide history this history obtained mainly from the chart.  The patient can not provide history.    There was some stuttering of speech and chest tightness on admission and the limb episode lasted 30 minutes apparently there was no other concerns for any bleeding.    GI is now consulted for occult stool positive and transfusion-dependent anemia, although she has a history of chronic anemia.  There is no reports of any overt blood loss presently.  No vomiting.  The patient has no complaints.    Past Medical History:   Diagnosis Date    Acute ischemic left posterior cerebral artery (PCA) stroke 12/3/2020    Anemia in stage 3b chronic kidney disease 3/8/2017    Arthritis     CHF (congestive heart failure)     Closed displaced subtrochanteric fracture of right femur s/p IM nail on 10/17/2021 10/16/2021    Coronary artery disease     Diabetes mellitus     Glaucoma     Gout, joint     Hx of CABG 9/21/10    Hyperlipidemia     Hypertension     NSTEMI (non-ST elevated myocardial infarction) 09/21/10    Stage 3b chronic kidney disease 10/21/2021    Stenosis of aortic and mitral valves     Systolic heart failure 6/19/2015    TIA (transient ischemic attack) 1/7/2021       Past Surgical History:   Procedure Laterality Date    ANTEGRADE INTRAMEDULLARY RODDING OF  FEMUR Right 10/17/2021    Procedure: INSERTION, INTRAMEDULLARY ALTAF, FEMUR, ANTEGRADE;  Surgeon: Dino Rutledge MD;  Location: [facility] OR 2ND FLR;  Service: Orthopedics;  Laterality: Right;    CARDIAC VALVE SURGERY      CATARACT EXTRACTION      CORONARY ARTERY BYPASS GRAFT  9/21/2010    ENTROPIAN REPAIR Left 3/6/2020    Procedure: REPAIR, ENTROPION;  Surgeon: Yulia Kincaid MD;  Location: [facility] OR 1ST FLR;  Service: Ophthalmology;  Laterality: Left;    EYE SURGERY      JOINT REPLACEMENT      ORIF FEMUR FRACTURE Right 10/17/2021    Procedure: ORIF, FRACTURE, FEMUR;  Surgeon: Dino Rutledge MD;  Location: [facility] OR 2ND FLR;  Service: Orthopedics;  Laterality: Right;       Review of patient's allergies indicates:   Allergen Reactions    Indocin [indomethacin sodium] Other (See Comments)     Either caused hot,burning body or caused madness per patient's grandson    Lotensin [benazepril] Other (See Comments)     Either caused hot ,burning body or caused madness per patient's grandson     Family History       Problem Relation (Age of Onset)    Diabetes Mother, Father    Glaucoma Father    Hypertension Father    No Known Problems Sister, Brother, Maternal Aunt, Maternal Uncle, Paternal Aunt, Paternal Uncle, Maternal Grandmother, Maternal Grandfather, Paternal Grandmother, Paternal Grandfather          Tobacco Use    Smoking status: Never    Smokeless tobacco: Never   Substance and Sexual Activity    Alcohol use: No    Drug use: No    Sexual activity: Never     Review of Systems   Unable to perform ROS: Dementia     Objective:     Vital Signs (Most Recent):  Temp: 97.6 °F (36.4 °C) (12/15/23 1638)  Pulse: 65 (12/15/23 1638)  Resp: 18 (12/15/23 1638)  BP: (!) 161/77 (12/15/23 1638)  SpO2: 96 % (12/15/23 1638) Vital Signs (24h Range):  Temp:  [97.6 °F (36.4 °C)-98.6 °F (37 °C)] 97.6 °F (36.4 °C)  Pulse:  [57-70] 65  Resp:  [18-20] 18  SpO2:  [96 %-100 %] 96 %  BP: (135-186)/(70-81) 161/77     Weight: (!) 182.4 kg (402 lb 1.9  oz) (12/14/23 1949)  Body mass index is 71.23 kg/m².      Intake/Output Summary (Last 24 hours) at 12/15/2023 1642  Last data filed at 12/14/2023 2035  Gross per 24 hour   Intake --   Output 450 ml   Net -450 ml       Lines/Drains/Airways       Drain  Duration             Female External Urinary Catheter 12/12/23 1441 3 days              Peripheral Intravenous Line  Duration                  Peripheral IV - Single Lumen 12/12/23 0958 20 G Left Antecubital 3 days                     Physical Exam  Vitals reviewed.   Constitutional:       Appearance: She is not toxic-appearing.      Comments: nonoriteind   HENT:      Mouth/Throat:      Mouth: Mucous membranes are moist.   Eyes:      General: No scleral icterus.     Conjunctiva/sclera: Conjunctivae normal.   Cardiovascular:      Rate and Rhythm: Normal rate.   Pulmonary:      Effort: Pulmonary effort is normal.      Breath sounds: Normal breath sounds.   Abdominal:      General: There is no distension.      Palpations: Abdomen is soft.      Tenderness: There is no abdominal tenderness.   Musculoskeletal:      Cervical back: Neck supple.      Right lower leg: No edema.      Left lower leg: No edema.   Lymphadenopathy:      Cervical: No cervical adenopathy.   Neurological:      Mental Status: She is alert. She is disoriented.          Significant Labs:  CBC:   Recent Labs   Lab 12/14/23  0332 12/14/23  1709 12/15/23  0337   WBC 11.82 10.10 10.26   HGB 9.0* 9.3* 9.5*   HCT 28.5* 29.1* 29.7*    174 182     BMP:   Recent Labs   Lab 12/15/23  0337   GLU 98      K 4.5      CO2 23   BUN 47*   CREATININE 2.0*   CALCIUM 9.0     CMP:   Recent Labs   Lab 12/15/23  0337   GLU 98   CALCIUM 9.0      K 4.5   CO2 23      BUN 47*   CREATININE 2.0*       Significant Imaging:  Imaging results within the past 24 hours have been reviewed.  Assessment/Plan:     Oncology  Acute on chronic anemia  With occult stool positive but no reported overt gi  bleeding  Certainly on 2 blood thinners    Recs  Would start PPI IV BID  Monitor stool   Monitor hgb    If develops persistent anemia, we can consider EGD in the coming days.  Call us for development of overt GI bleeding    Will follow        Thank you for your consult. I will follow-up with patient. Please contact us if you have any additional questions.    Markus Mendez MD  Gastroenterology  [facility]

## 2023-12-16 PROBLEM — D64.9 ANEMIA: Status: ACTIVE | Noted: 2023-12-16

## 2023-12-16 PROBLEM — D64.9 ANEMIA: Status: RESOLVED | Noted: 2023-12-16 | Resolved: 2023-12-16

## 2023-12-16 LAB
ANION GAP SERPL CALC-SCNC: 7 MMOL/L (ref 8–16)
BASOPHILS # BLD AUTO: 0.02 K/UL (ref 0–0.2)
BASOPHILS NFR BLD: 0.2 % (ref 0–1.9)
BUN SERPL-MCNC: 44 MG/DL (ref 10–30)
CALCIUM SERPL-MCNC: 9.8 MG/DL (ref 8.7–10.5)
CHLORIDE SERPL-SCNC: 106 MMOL/L (ref 95–110)
CO2 SERPL-SCNC: 24 MMOL/L (ref 23–29)
CREAT SERPL-MCNC: 1.9 MG/DL (ref 0.5–1.4)
DIFFERENTIAL METHOD: ABNORMAL
EOSINOPHIL # BLD AUTO: 0.2 K/UL (ref 0–0.5)
EOSINOPHIL NFR BLD: 2 % (ref 0–8)
ERYTHROCYTE [DISTWIDTH] IN BLOOD BY AUTOMATED COUNT: 17.2 % (ref 11.5–14.5)
EST. GFR  (NO RACE VARIABLE): 25 ML/MIN/1.73 M^2
GLUCOSE SERPL-MCNC: 99 MG/DL (ref 70–110)
HCT VFR BLD AUTO: 31.6 % (ref 37–48.5)
HGB BLD-MCNC: 10.1 G/DL (ref 12–16)
IMM GRANULOCYTES # BLD AUTO: 0.03 K/UL (ref 0–0.04)
IMM GRANULOCYTES NFR BLD AUTO: 0.3 % (ref 0–0.5)
LYMPHOCYTES # BLD AUTO: 2.4 K/UL (ref 1–4.8)
LYMPHOCYTES NFR BLD: 23.7 % (ref 18–48)
MCH RBC QN AUTO: 28.6 PG (ref 27–31)
MCHC RBC AUTO-ENTMCNC: 32 G/DL (ref 32–36)
MCV RBC AUTO: 90 FL (ref 82–98)
MONOCYTES # BLD AUTO: 0.7 K/UL (ref 0.3–1)
MONOCYTES NFR BLD: 6.5 % (ref 4–15)
NEUTROPHILS # BLD AUTO: 6.7 K/UL (ref 1.8–7.7)
NEUTROPHILS NFR BLD: 67.3 % (ref 38–73)
NRBC BLD-RTO: 0 /100 WBC
PLATELET # BLD AUTO: 204 K/UL (ref 150–450)
PMV BLD AUTO: 12.3 FL (ref 9.2–12.9)
POCT GLUCOSE: 130 MG/DL (ref 70–110)
POCT GLUCOSE: 131 MG/DL (ref 70–110)
POTASSIUM SERPL-SCNC: 4 MMOL/L (ref 3.5–5.1)
RBC # BLD AUTO: 3.53 M/UL (ref 4–5.4)
SODIUM SERPL-SCNC: 137 MMOL/L (ref 136–145)
WBC # BLD AUTO: 9.93 K/UL (ref 3.9–12.7)

## 2023-12-16 PROCEDURE — C9113 INJ PANTOPRAZOLE SODIUM, VIA: HCPCS | Performed by: FAMILY MEDICINE

## 2023-12-16 PROCEDURE — 11000001 HC ACUTE MED/SURG PRIVATE ROOM

## 2023-12-16 PROCEDURE — 94761 N-INVAS EAR/PLS OXIMETRY MLT: CPT

## 2023-12-16 PROCEDURE — 80048 BASIC METABOLIC PNL TOTAL CA: CPT | Performed by: FAMILY MEDICINE

## 2023-12-16 PROCEDURE — 36415 COLL VENOUS BLD VENIPUNCTURE: CPT | Performed by: FAMILY MEDICINE

## 2023-12-16 PROCEDURE — 25000003 PHARM REV CODE 250: Performed by: FAMILY MEDICINE

## 2023-12-16 PROCEDURE — 63600175 PHARM REV CODE 636 W HCPCS: Performed by: FAMILY MEDICINE

## 2023-12-16 PROCEDURE — 85025 COMPLETE CBC W/AUTO DIFF WBC: CPT | Performed by: FAMILY MEDICINE

## 2023-12-16 RX ADMIN — ALLOPURINOL 100 MG: 100 TABLET ORAL at 11:12

## 2023-12-16 RX ADMIN — SACUBITRIL AND VALSARTAN 1 TABLET: 49; 51 TABLET, FILM COATED ORAL at 11:12

## 2023-12-16 RX ADMIN — ISOSORBIDE MONONITRATE 30 MG: 30 TABLET, EXTENDED RELEASE ORAL at 11:12

## 2023-12-16 RX ADMIN — ESCITALOPRAM OXALATE 20 MG: 10 TABLET ORAL at 11:12

## 2023-12-16 RX ADMIN — SACUBITRIL AND VALSARTAN 1 TABLET: 49; 51 TABLET, FILM COATED ORAL at 08:12

## 2023-12-16 RX ADMIN — PANTOPRAZOLE SODIUM 40 MG: 40 INJECTION, POWDER, LYOPHILIZED, FOR SOLUTION INTRAVENOUS at 11:12

## 2023-12-16 RX ADMIN — AMOXICILLIN AND CLAVULANATE POTASSIUM 1 TABLET: 875; 125 TABLET, FILM COATED ORAL at 08:12

## 2023-12-16 RX ADMIN — ATORVASTATIN CALCIUM 40 MG: 40 TABLET, FILM COATED ORAL at 11:12

## 2023-12-16 RX ADMIN — CARVEDILOL 12.5 MG: 12.5 TABLET, FILM COATED ORAL at 11:12

## 2023-12-16 RX ADMIN — PANTOPRAZOLE SODIUM 40 MG: 40 INJECTION, POWDER, LYOPHILIZED, FOR SOLUTION INTRAVENOUS at 08:12

## 2023-12-16 RX ADMIN — HYDRALAZINE HYDROCHLORIDE 50 MG: 25 TABLET, FILM COATED ORAL at 11:12

## 2023-12-16 RX ADMIN — CARVEDILOL 12.5 MG: 12.5 TABLET, FILM COATED ORAL at 08:12

## 2023-12-16 RX ADMIN — HYDRALAZINE HYDROCHLORIDE 50 MG: 25 TABLET, FILM COATED ORAL at 02:12

## 2023-12-16 RX ADMIN — AMOXICILLIN AND CLAVULANATE POTASSIUM 1 TABLET: 875; 125 TABLET, FILM COATED ORAL at 11:12

## 2023-12-16 RX ADMIN — MULTIPLE VITAMINS W/ MINERALS TAB 1 TABLET: TAB at 11:12

## 2023-12-16 RX ADMIN — HYDRALAZINE HYDROCHLORIDE 50 MG: 25 TABLET, FILM COATED ORAL at 06:12

## 2023-12-16 NOTE — SUBJECTIVE & OBJECTIVE
Interval History: no issues voiced. Did get annoyed with grandson yesterday but happy today     Review of Systems   All other systems reviewed and are negative.    Objective:     Vital Signs (Most Recent):  Temp: 98 °F (36.7 °C) (12/16/23 1123)  Pulse: 74 (12/16/23 1123)  Resp: 20 (12/16/23 1123)  BP: 138/67 (12/16/23 1123)  SpO2: 98 % (12/16/23 1123) Vital Signs (24h Range):  Temp:  [97.6 °F (36.4 °C)-98.5 °F (36.9 °C)] 98 °F (36.7 °C)  Pulse:  [61-78] 74  Resp:  [18-20] 20  SpO2:  [96 %-100 %] 98 %  BP: (128-179)/(67-99) 138/67     Weight: (!) 182.4 kg (402 lb 1.9 oz)  Body mass index is 71.23 kg/m².    Intake/Output Summary (Last 24 hours) at 12/16/2023 1258  Last data filed at 12/16/2023 0730  Gross per 24 hour   Intake 350 ml   Output 125 ml   Net 225 ml           Physical Exam  Vitals and nursing note reviewed.   Constitutional:       Appearance: She is well-developed.   HENT:      Head: Normocephalic and atraumatic.      Nose: Nose normal.   Eyes:      Conjunctiva/sclera: Conjunctivae normal.   Cardiovascular:      Rate and Rhythm: Normal rate and regular rhythm.      Heart sounds: Normal heart sounds.   Pulmonary:      Effort: Pulmonary effort is normal.      Breath sounds: Normal breath sounds. No wheezing.   Abdominal:      General: Bowel sounds are normal.      Palpations: Abdomen is soft. There is no mass.      Tenderness: There is no abdominal tenderness. There is no guarding or rebound.   Musculoskeletal:         General: No tenderness. Normal range of motion.      Cervical back: Normal range of motion and neck supple.   Skin:     General: Skin is warm.      Findings: No rash.   Neurological:      Mental Status: She is alert and oriented to person, place, and time.      Cranial Nerves: No cranial nerve deficit.   Psychiatric:         Behavior: Behavior normal.         Thought Content: Thought content normal.             Significant Labs: All pertinent labs within the past 24 hours have been  "reviewed.  CBC:   Recent Labs   Lab 12/14/23  1709 12/15/23  0337 12/16/23  0857   WBC 10.10 10.26 9.93   HGB 9.3* 9.5* 10.1*   HCT 29.1* 29.7* 31.6*    182 204       CMP:   Recent Labs   Lab 12/15/23  0337 12/16/23  0857    137   K 4.5 4.0    106   CO2 23 24   GLU 98 99   BUN 47* 44*   CREATININE 2.0* 1.9*   CALCIUM 9.0 9.8   ANIONGAP 7* 7*       Troponin:   No results for input(s): "TROPONINI", "TROPONINIHS" in the last 48 hours.      Significant Imaging: I have reviewed all pertinent imaging results/findings within the past 24 hours.  I have reviewed and interpreted all pertinent imaging results/findings within the past 24 hours.  "

## 2023-12-16 NOTE — PLAN OF CARE
0805  CM was informed by Dr Timmons of possible dc home today.     0935  Patient resting quietly in bed with daughter, Merry Lafleur ([phone]), at the bedside when CM rounded via [employer]. Patient & daughter in agreement with plan to discharge home with HH today, did not have a HH preference, & denied the need for assistance with transportation at time of discharge.    Previously scheduled [facility] visit by TAVO Hill on 1/19/2024 at 0800 noted. Daughter will be notified of home visit date & time. Information added to the pt's discharge paperwork.     CM informed Dr Timmons of above & requested HH orders as recommended by PT. Awaiting response. Pt previously accepted by [facility]-NO. Information noted on the pt's discharge paperwork.     0945  CM was informed by Dr Timmons that GI is planning to do a scope on Monday 12/18/2023. Daughter not at the bedside when CM rounded via [employer]. CM informed nurse Samuel of above. CM unable to contact Merry via phone due to cell # of file being incorrect. Voicemail message left for the pt's grandson, Hermelindo Mackenzie ([phone]), informing of above. Awaiting response.     8599  Message sent to TAVO Hill informing of the pt's hospitalization & requesting a sooner home visit.

## 2023-12-16 NOTE — ASSESSMENT & PLAN NOTE
Patient's anemia is currently uncontrolled. Has received 2 units of PRBCs on 12/13/23 . Etiology likely d/t ckd, cad  Current CBC reviewed-   Lab Results   Component Value Date    HGB 10.1 (L) 12/16/2023    HCT 31.6 (L) 12/16/2023     Monitor serial CBC and transfuse if patient becomes hemodynamically unstable, symptomatic or H/H drops below 7/21.    S/p  2 u pbcs  FOBT positive   GI consulted and plans for egd monday  PPI IV BID  Eliquis and plavix d/c'd

## 2023-12-16 NOTE — PROGRESS NOTES
"[facility] Progress note    CC: anemia    HPI 89 y.o. female  with history of prior stroke on Eliquis who had episode of difficulty speaking and shaking at home.    No family at bedside to provide history this history obtained mainly from the chart.  The patient can not provide history.    There was some stuttering of speech and chest tightness on admission and the limb episode lasted 30 minutes apparently there was no other concerns for any bleeding.     GI is now consulted for occult stool positive and transfusion-dependent anemia, although she has a history of chronic anemia.  There is no reports of any overt blood loss presently.  No vomiting.  The patient has no complaints.      Interval Hx:  NAEON. No overt GI bleeding. FOBT + on admission. Hgb 6.6 12/13. She received 1U pRBC on 12/13 and 1U on 12/15 (though hgb remained in the 9s all day). Labs pending this am.       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 12/3/2020    Anemia in stage 3b chronic kidney disease 3/8/2017    Arthritis     CHF (congestive heart failure)     Closed displaced subtrochanteric fracture of right femur s/p IM nail on 10/17/2021 10/16/2021    Coronary artery disease     Diabetes mellitus     Glaucoma     Gout, joint     Hx of CABG 9/21/10    Hyperlipidemia     Hypertension     NSTEMI (non-ST elevated myocardial infarction) 09/21/10    Stage 3b chronic kidney disease 10/21/2021    Stenosis of aortic and mitral valves     Systolic heart failure 6/19/2015    TIA (transient ischemic attack) 1/7/2021         Review of Systems  As per HPI    Physical Examination  BP (!) 151/68   Pulse 69   Temp 98.5 °F (36.9 °C)   Resp 18   Ht 5' 3" (1.6 m)   Wt (!) 182.4 kg (402 lb 1.9 oz)   LMP  (LMP Unknown)   SpO2 96%   BMI 71.23 kg/m²   General appearance: alert, cooperative, no distress  HENT: Normocephalic, atraumatic. Anicteric sclera. No sublingual " jaundice.  Lungs:  Normal work of breathing  Abdomen: Soft, non-tender; bowel sounds normoactive; no organomegaly  Skin: No jaundice  Extremities:  No edema  Neurologic: Alert and oriented    Labs: Hgb 9.5    Imaging: CXR with minimal atelectasis    I have personally reviewed and interpreted these images.    Assessment: 89 y.o. female prior strokes (R-PCA; on ASA/plavix), recent small R cerebellar stroke, CHF, CABG, TAVR, on Eliquis who presents from home for difficulty speaking and an episode of shaking. She has sicne been diagnosed with UTI. GI consulted for AoC anemia.     Acute on chronic anemia  With occult stool positive but no reported overt gi bleeding. She is on ASA/Plavix and eliquis.      Recs  Continue PPI IV BID  Monitor for overt GI bleeding   Monitor hgb  If develops persistent anemia, we can consider EGD in the coming days.  Call us for development of overt GI bleeding  Continue treatment for UTI  Agree with neurology consultation      Shankar Gutierrez MD  Gastroenterology Fellow - [facility]

## 2023-12-16 NOTE — PROGRESS NOTES
[facility]  Progress Note    Patient Name: Krystina Washington  MRN: [id number]  Patient Class: IP- Inpatient   Admission Date: 12/12/2023  Length of Stay: 3 days  Attending Physician: Ling Timmons MD  Primary Care Provider: Oniel Johnson MD        Subjective:     Principal Problem:Acute cystitis        HPI:  89 y.o. female with prior strokes (R-PCA), recent small R cerebellar stroke, CHF, CABG, TAVR, on Eliquis who presents from home for difficulty speaking and an episode of shaking.  Patient's son states symptoms started with leg and then arm shaking. Then he noticed her speech became stuttering. Also complains of chest tigthness.  She was otherwise normal this morning, had breakfast, etc. The episode lasted for 30 min. Upon examination, everything was resolved. Vascular Neuro was consulted in the ED. No evidence of acute large vessel occlusion or flow-limiting stenosis shown on CTA. Labs, cxr and U/a neg. Admit for TIA and chest tightness    Overview/Hospital Course:  Pt seems to be at baseline without any events overnight. However Hb did drop to 6.6. Iron panel and FOBT ordered. Transfusing 2 u prbcs. Pending FOBT , may need GI consult. Neuro consult pending.  Krystina Washington has warranted treatment spanning two or more midnights of hospital level care for the management of anemia. She continues to require further evaluation by consultants and blood transfusion. Her condition is also complicated by the following comorbidities: CHF, Diabetes, prior CVA, CAD.   14/14: awaiting FOBT to rule out bleed.  Ucx pending enterococcus. Rocephin started  14/15: FOBT Positive even though hb stable around 9. Plavix and eliquis d/c'd. GI consulted. PPI IV BID started  14/16: GI plans to do EGD on monday    Interval History: no issues voiced. Did get annoyed with grandson yesterday but happy today     Review of Systems   All other systems reviewed and are negative.    Objective:      Vital Signs (Most Recent):  Temp: 98 °F (36.7 °C) (12/16/23 1123)  Pulse: 74 (12/16/23 1123)  Resp: 20 (12/16/23 1123)  BP: 138/67 (12/16/23 1123)  SpO2: 98 % (12/16/23 1123) Vital Signs (24h Range):  Temp:  [97.6 °F (36.4 °C)-98.5 °F (36.9 °C)] 98 °F (36.7 °C)  Pulse:  [61-78] 74  Resp:  [18-20] 20  SpO2:  [96 %-100 %] 98 %  BP: (128-179)/(67-99) 138/67     Weight: (!) 182.4 kg (402 lb 1.9 oz)  Body mass index is 71.23 kg/m².    Intake/Output Summary (Last 24 hours) at 12/16/2023 1258  Last data filed at 12/16/2023 0730  Gross per 24 hour   Intake 350 ml   Output 125 ml   Net 225 ml           Physical Exam  Vitals and nursing note reviewed.   Constitutional:       Appearance: She is well-developed.   HENT:      Head: Normocephalic and atraumatic.      Nose: Nose normal.   Eyes:      Conjunctiva/sclera: Conjunctivae normal.   Cardiovascular:      Rate and Rhythm: Normal rate and regular rhythm.      Heart sounds: Normal heart sounds.   Pulmonary:      Effort: Pulmonary effort is normal.      Breath sounds: Normal breath sounds. No wheezing.   Abdominal:      General: Bowel sounds are normal.      Palpations: Abdomen is soft. There is no mass.      Tenderness: There is no abdominal tenderness. There is no guarding or rebound.   Musculoskeletal:         General: No tenderness. Normal range of motion.      Cervical back: Normal range of motion and neck supple.   Skin:     General: Skin is warm.      Findings: No rash.   Neurological:      Mental Status: She is alert and oriented to person, place, and time.      Cranial Nerves: No cranial nerve deficit.   Psychiatric:         Behavior: Behavior normal.         Thought Content: Thought content normal.             Significant Labs: All pertinent labs within the past 24 hours have been reviewed.  CBC:   Recent Labs   Lab 12/14/23  1709 12/15/23  0337 12/16/23  0857   WBC 10.10 10.26 9.93   HGB 9.3* 9.5* 10.1*   HCT 29.1* 29.7* 31.6*    182 204       CMP:  "  Recent Labs   Lab 12/15/23  0337 12/16/23  0857    137   K 4.5 4.0    106   CO2 23 24   GLU 98 99   BUN 47* 44*   CREATININE 2.0* 1.9*   CALCIUM 9.0 9.8   ANIONGAP 7* 7*       Troponin:   No results for input(s): "TROPONINI", "TROPONINIHS" in the last 48 hours.      Significant Imaging: I have reviewed all pertinent imaging results/findings within the past 24 hours.  I have reviewed and interpreted all pertinent imaging results/findings within the past 24 hours.    Assessment/Plan:      * Acute cystitis    Ucx growing out enterococcus  Rocephin started    Transitioned to augmentin    TIA (transient ischemic attack)    Antithrombotics for secondary stroke prevention: Antiplatelets: Aspirin: 81 mg daily  Clopidogrel: 75 mg daily    Statins for secondary stroke prevention and hyperlipidemia, if present:   Statins: Atorvastatin- 40 mg daily    Aggressive risk factor modification: HTN     Rehab efforts: The patient has been evaluated by a stroke team provider and the therapy needs have been fully considered based off the presenting complaints and exam findings. The following therapy evaluations are needed: PT evaluate and treat, OT evaluate and treat, SLP evaluate and treat    Diagnostics ordered/pending: CTA Head to assess vasculature , CTA Neck/Arch to assess vasculature, HgbA1C to assess blood glucose levels, Lipid Profile to assess cholesterol levels, TTE to assess cardiac function/status , TSH to assess thyroid function    VTE prophylaxis:  eliquis    BP parameters: TIA: SBP <220 until imaging confirmation of no infarct     Echo: 45-50%   Neuro consult pending        Acute on chronic anemia  Patient's anemia is currently uncontrolled. Has received 2 units of PRBCs on 12/13/23 . Etiology likely d/t ckd, cad  Current CBC reviewed-   Lab Results   Component Value Date    HGB 10.1 (L) 12/16/2023    HCT 31.6 (L) 12/16/2023     Monitor serial CBC and transfuse if patient becomes hemodynamically unstable, " symptomatic or H/H drops below 7/21.    S/p  2 u pbcs  FOBT positive   GI consulted and plans for egd monday  PPI IV BID  Eliquis and plavix d/c'd      Type 2 diabetes mellitus with stage 3b chronic kidney disease, without long-term current use of insulin  Creatine stable for now. BMP reviewed- noted Estimated Creatinine Clearance: 24.1 mL/min (A) (based on SCr of 1.5 mg/dL (H)). according to latest data. Based on current GFR, CKD stage is stage 3 - GFR 30-59.  Monitor UOP and serial BMP and adjust therapy as needed. Renally dose meds. Avoid nephrotoxic medications and procedures.    Major depressive disorder, recurrent episode, moderate  Patient has persistent depression which is mild and is currently controlled. Will Continue anti-depressant medications. We will not consult psychiatry at this time. Patient does display psychosis at this time. Continue to monitor closely and adjust plan of care as needed.        Pure hypercholesterolemia  Cont statin  Lipid panel        VTE Risk Mitigation (From admission, onward)           Ordered     IP VTE HIGH RISK PATIENT  Once         12/12/23 1203     Place sequential compression device  Until discontinued         12/12/23 1203                    Discharge Planning   KALEY: 12/19/2023     Code Status: DNR   Is the patient medically ready for discharge?:     Reason for patient still in hospital (select all that apply): Treatment  Discharge Plan A: Home, Home Health   Discharge Delays: None known at this time              Ling Timmons MD  Department of Hospital Medicine   [facility]

## 2023-12-17 LAB
ANION GAP SERPL CALC-SCNC: 7 MMOL/L (ref 8–16)
BACTERIA BLD CULT: NORMAL
BACTERIA BLD CULT: NORMAL
BASOPHILS # BLD AUTO: 0.02 K/UL (ref 0–0.2)
BASOPHILS NFR BLD: 0.2 % (ref 0–1.9)
BUN SERPL-MCNC: 45 MG/DL (ref 10–30)
CALCIUM SERPL-MCNC: 9.3 MG/DL (ref 8.7–10.5)
CHLORIDE SERPL-SCNC: 108 MMOL/L (ref 95–110)
CO2 SERPL-SCNC: 23 MMOL/L (ref 23–29)
CREAT SERPL-MCNC: 1.9 MG/DL (ref 0.5–1.4)
DIFFERENTIAL METHOD: ABNORMAL
EOSINOPHIL # BLD AUTO: 0.2 K/UL (ref 0–0.5)
EOSINOPHIL NFR BLD: 2.6 % (ref 0–8)
ERYTHROCYTE [DISTWIDTH] IN BLOOD BY AUTOMATED COUNT: 16.9 % (ref 11.5–14.5)
EST. GFR  (NO RACE VARIABLE): 25 ML/MIN/1.73 M^2
GLUCOSE SERPL-MCNC: 87 MG/DL (ref 70–110)
HCT VFR BLD AUTO: 28.4 % (ref 37–48.5)
HGB BLD-MCNC: 9 G/DL (ref 12–16)
IMM GRANULOCYTES # BLD AUTO: 0.03 K/UL (ref 0–0.04)
IMM GRANULOCYTES NFR BLD AUTO: 0.4 % (ref 0–0.5)
LYMPHOCYTES # BLD AUTO: 2.3 K/UL (ref 1–4.8)
LYMPHOCYTES NFR BLD: 27.2 % (ref 18–48)
MCH RBC QN AUTO: 29.1 PG (ref 27–31)
MCHC RBC AUTO-ENTMCNC: 31.7 G/DL (ref 32–36)
MCV RBC AUTO: 92 FL (ref 82–98)
MONOCYTES # BLD AUTO: 0.7 K/UL (ref 0.3–1)
MONOCYTES NFR BLD: 8.3 % (ref 4–15)
NEUTROPHILS # BLD AUTO: 5.2 K/UL (ref 1.8–7.7)
NEUTROPHILS NFR BLD: 61.3 % (ref 38–73)
NRBC BLD-RTO: 0 /100 WBC
PLATELET # BLD AUTO: 187 K/UL (ref 150–450)
PMV BLD AUTO: 12.2 FL (ref 9.2–12.9)
POCT GLUCOSE: 87 MG/DL (ref 70–110)
POCT GLUCOSE: 94 MG/DL (ref 70–110)
POCT GLUCOSE: 97 MG/DL (ref 70–110)
POTASSIUM SERPL-SCNC: 4 MMOL/L (ref 3.5–5.1)
RBC # BLD AUTO: 3.09 M/UL (ref 4–5.4)
SODIUM SERPL-SCNC: 138 MMOL/L (ref 136–145)
WBC # BLD AUTO: 8.52 K/UL (ref 3.9–12.7)

## 2023-12-17 PROCEDURE — 36415 COLL VENOUS BLD VENIPUNCTURE: CPT | Performed by: FAMILY MEDICINE

## 2023-12-17 PROCEDURE — C9113 INJ PANTOPRAZOLE SODIUM, VIA: HCPCS | Performed by: FAMILY MEDICINE

## 2023-12-17 PROCEDURE — 11000001 HC ACUTE MED/SURG PRIVATE ROOM

## 2023-12-17 PROCEDURE — 25000003 PHARM REV CODE 250: Performed by: FAMILY MEDICINE

## 2023-12-17 PROCEDURE — 97530 THERAPEUTIC ACTIVITIES: CPT | Mod: CQ

## 2023-12-17 PROCEDURE — 80048 BASIC METABOLIC PNL TOTAL CA: CPT | Performed by: FAMILY MEDICINE

## 2023-12-17 PROCEDURE — 85025 COMPLETE CBC W/AUTO DIFF WBC: CPT | Performed by: FAMILY MEDICINE

## 2023-12-17 PROCEDURE — 63600175 PHARM REV CODE 636 W HCPCS: Performed by: FAMILY MEDICINE

## 2023-12-17 PROCEDURE — 94761 N-INVAS EAR/PLS OXIMETRY MLT: CPT

## 2023-12-17 RX ORDER — HYDRALAZINE HYDROCHLORIDE 25 MG/1
75 TABLET, FILM COATED ORAL EVERY 8 HOURS
Status: DISCONTINUED | OUTPATIENT
Start: 2023-12-17 | End: 2023-12-21 | Stop reason: HOSPADM

## 2023-12-17 RX ADMIN — CARVEDILOL 12.5 MG: 12.5 TABLET, FILM COATED ORAL at 09:12

## 2023-12-17 RX ADMIN — ATORVASTATIN CALCIUM 40 MG: 40 TABLET, FILM COATED ORAL at 09:12

## 2023-12-17 RX ADMIN — PANTOPRAZOLE SODIUM 40 MG: 40 INJECTION, POWDER, LYOPHILIZED, FOR SOLUTION INTRAVENOUS at 09:12

## 2023-12-17 RX ADMIN — ISOSORBIDE MONONITRATE 30 MG: 30 TABLET, EXTENDED RELEASE ORAL at 09:12

## 2023-12-17 RX ADMIN — ALLOPURINOL 100 MG: 100 TABLET ORAL at 09:12

## 2023-12-17 RX ADMIN — MULTIPLE VITAMINS W/ MINERALS TAB 1 TABLET: TAB at 09:12

## 2023-12-17 RX ADMIN — HYDRALAZINE HYDROCHLORIDE 75 MG: 25 TABLET, FILM COATED ORAL at 01:12

## 2023-12-17 RX ADMIN — HYDRALAZINE HYDROCHLORIDE 75 MG: 25 TABLET, FILM COATED ORAL at 09:12

## 2023-12-17 RX ADMIN — SACUBITRIL AND VALSARTAN 1 TABLET: 49; 51 TABLET, FILM COATED ORAL at 09:12

## 2023-12-17 RX ADMIN — AMOXICILLIN AND CLAVULANATE POTASSIUM 1 TABLET: 875; 125 TABLET, FILM COATED ORAL at 09:12

## 2023-12-17 RX ADMIN — ESCITALOPRAM OXALATE 20 MG: 10 TABLET ORAL at 09:12

## 2023-12-17 RX ADMIN — HYDRALAZINE HYDROCHLORIDE 50 MG: 25 TABLET, FILM COATED ORAL at 06:12

## 2023-12-17 NOTE — PROGRESS NOTES
[facility]  Progress Note    Patient Name: Krystina Washington  MRN: [id number]  Patient Class: IP- Inpatient   Admission Date: 12/12/2023  Length of Stay: 4 days  Attending Physician: Ling Timmons MD  Primary Care Provider: Oniel Johnson MD        Subjective:     Principal Problem:Acute cystitis        HPI:  89 y.o. female with prior strokes (R-PCA), recent small R cerebellar stroke, CHF, CABG, TAVR, on Eliquis who presents from home for difficulty speaking and an episode of shaking.  Patient's son states symptoms started with leg and then arm shaking. Then he noticed her speech became stuttering. Also complains of chest tigthness.  She was otherwise normal this morning, had breakfast, etc. The episode lasted for 30 min. Upon examination, everything was resolved. Vascular Neuro was consulted in the ED. No evidence of acute large vessel occlusion or flow-limiting stenosis shown on CTA. Labs, cxr and U/a neg. Admit for TIA and chest tightness    Overview/Hospital Course:  Pt seems to be at baseline without any events overnight. However Hb did drop to 6.6. Iron panel and FOBT ordered. Transfusing 2 u prbcs. Pending FOBT , may need GI consult. Neuro consult pending.  Krystina Washington has warranted treatment spanning two or more midnights of hospital level care for the management of anemia. She continues to require further evaluation by consultants and blood transfusion. Her condition is also complicated by the following comorbidities: CHF, Diabetes, prior CVA, CAD.   12/14: awaiting FOBT to rule out bleed.  Ucx pending enterococcus. Rocephin started  12/15: FOBT Positive even though hb stable around 9. Plavix and eliquis d/c'd. GI consulted. PPI IV BID started  12/16: GI plans to do EGD on Monday 12/17: no issues voiced.    Interval History: no issues voiced.  Review of Systems   All other systems reviewed and are negative.    Objective:     Vital Signs (Most Recent):  Temp:  97.6 °F (36.4 °C) (12/17/23 1117)  Pulse: 67 (12/17/23 1214)  Resp: 18 (12/17/23 1117)  BP: (!) 154/71 (12/17/23 1117)  SpO2: 96 % (12/17/23 1117) Vital Signs (24h Range):  Temp:  [97.6 °F (36.4 °C)-99.2 °F (37.3 °C)] 97.6 °F (36.4 °C)  Pulse:  [62-83] 67  Resp:  [18-20] 18  SpO2:  [94 %-98 %] 96 %  BP: (151-193)/(71-88) 154/71     Weight: (!) 182.4 kg (402 lb 1.9 oz)  Body mass index is 71.23 kg/m².    Intake/Output Summary (Last 24 hours) at 12/17/2023 1442  Last data filed at 12/17/2023 0705  Gross per 24 hour   Intake --   Output 330 ml   Net -330 ml           Physical Exam  Vitals and nursing note reviewed.   Constitutional:       Appearance: She is well-developed.   HENT:      Head: Normocephalic and atraumatic.      Nose: Nose normal.   Eyes:      Conjunctiva/sclera: Conjunctivae normal.   Cardiovascular:      Rate and Rhythm: Normal rate and regular rhythm.      Heart sounds: Normal heart sounds.   Pulmonary:      Effort: Pulmonary effort is normal.      Breath sounds: Normal breath sounds. No wheezing.   Abdominal:      General: Bowel sounds are normal.      Palpations: Abdomen is soft. There is no mass.      Tenderness: There is no abdominal tenderness. There is no guarding or rebound.   Musculoskeletal:         General: No tenderness. Normal range of motion.      Cervical back: Normal range of motion and neck supple.   Skin:     General: Skin is warm.      Findings: No rash.   Neurological:      Mental Status: She is alert and oriented to person, place, and time.      Cranial Nerves: No cranial nerve deficit.   Psychiatric:         Behavior: Behavior normal.         Thought Content: Thought content normal.             Significant Labs: All pertinent labs within the past 24 hours have been reviewed.  CBC:   Recent Labs   Lab 12/16/23  0857 12/17/23  0922   WBC 9.93 8.52   HGB 10.1* 9.0*   HCT 31.6* 28.4*    187       CMP:   Recent Labs   Lab 12/16/23  0857 12/17/23  0922    138   K 4.0 4.0  "   108   CO2 24 23   GLU 99 87   BUN 44* 45*   CREATININE 1.9* 1.9*   CALCIUM 9.8 9.3   ANIONGAP 7* 7*       Troponin:   No results for input(s): "TROPONINI", "TROPONINIHS" in the last 48 hours.      Significant Imaging: I have reviewed all pertinent imaging results/findings within the past 24 hours.  I have reviewed and interpreted all pertinent imaging results/findings within the past 24 hours.    Assessment/Plan:      * Acute cystitis    Ucx growing out enterococcus  Rocephin started    Transitioned to augmentin    TIA (transient ischemic attack)    Antithrombotics for secondary stroke prevention: Antiplatelets: Aspirin: 81 mg daily  Clopidogrel: 75 mg daily    Statins for secondary stroke prevention and hyperlipidemia, if present:   Statins: Atorvastatin- 40 mg daily    Aggressive risk factor modification: HTN     Rehab efforts: The patient has been evaluated by a stroke team provider and the therapy needs have been fully considered based off the presenting complaints and exam findings. The following therapy evaluations are needed: PT evaluate and treat, OT evaluate and treat, SLP evaluate and treat    Diagnostics ordered/pending: CTA Head to assess vasculature , CTA Neck/Arch to assess vasculature, HgbA1C to assess blood glucose levels, Lipid Profile to assess cholesterol levels, TTE to assess cardiac function/status , TSH to assess thyroid function    VTE prophylaxis:  eliquis    BP parameters: TIA: SBP <220 until imaging confirmation of no infarct     Echo: 45-50%   Neuro consult pending        Acute on chronic anemia  Patient's anemia is currently uncontrolled. Has received 2 units of PRBCs on 12/13/23 . Etiology likely d/t ckd, cad  Current CBC reviewed-   Lab Results   Component Value Date    HGB 10.1 (L) 12/16/2023    HCT 31.6 (L) 12/16/2023     Monitor serial CBC and transfuse if patient becomes hemodynamically unstable, symptomatic or H/H drops below 7/21.    S/p  2 u pbcs  FOBT positive   GI " consulted and plans for egd monday  PPI IV BID  Eliquis and plavix d/c'd      Type 2 diabetes mellitus with stage 3b chronic kidney disease, without long-term current use of insulin  Creatine stable for now. BMP reviewed- noted Estimated Creatinine Clearance: 24.1 mL/min (A) (based on SCr of 1.5 mg/dL (H)). according to latest data. Based on current GFR, CKD stage is stage 3 - GFR 30-59.  Monitor UOP and serial BMP and adjust therapy as needed. Renally dose meds. Avoid nephrotoxic medications and procedures.    Major depressive disorder, recurrent episode, moderate  Patient has persistent depression which is mild and is currently controlled. Will Continue anti-depressant medications. We will not consult psychiatry at this time. Patient does display psychosis at this time. Continue to monitor closely and adjust plan of care as needed.        Pure hypercholesterolemia  Cont statin  Lipid panel        VTE Risk Mitigation (From admission, onward)           Ordered     IP VTE HIGH RISK PATIENT  Once         12/12/23 1203     Place sequential compression device  Until discontinued         12/12/23 1203                    Discharge Planning   KALEY: 12/19/2023     Code Status: DNR   Is the patient medically ready for discharge?:     Reason for patient still in hospital (select all that apply): Treatment  Discharge Plan A: Home, Home Health   Discharge Delays: None known at this time              Ling Timmons MD  Department of Hospital Medicine   [facility]

## 2023-12-17 NOTE — SUBJECTIVE & OBJECTIVE
Interval History: no issues voiced.  Review of Systems   All other systems reviewed and are negative.    Objective:     Vital Signs (Most Recent):  Temp: 97.6 °F (36.4 °C) (12/17/23 1117)  Pulse: 67 (12/17/23 1214)  Resp: 18 (12/17/23 1117)  BP: (!) 154/71 (12/17/23 1117)  SpO2: 96 % (12/17/23 1117) Vital Signs (24h Range):  Temp:  [97.6 °F (36.4 °C)-99.2 °F (37.3 °C)] 97.6 °F (36.4 °C)  Pulse:  [62-83] 67  Resp:  [18-20] 18  SpO2:  [94 %-98 %] 96 %  BP: (151-193)/(71-88) 154/71     Weight: (!) 182.4 kg (402 lb 1.9 oz)  Body mass index is 71.23 kg/m².    Intake/Output Summary (Last 24 hours) at 12/17/2023 1442  Last data filed at 12/17/2023 0705  Gross per 24 hour   Intake --   Output 330 ml   Net -330 ml           Physical Exam  Vitals and nursing note reviewed.   Constitutional:       Appearance: She is well-developed.   HENT:      Head: Normocephalic and atraumatic.      Nose: Nose normal.   Eyes:      Conjunctiva/sclera: Conjunctivae normal.   Cardiovascular:      Rate and Rhythm: Normal rate and regular rhythm.      Heart sounds: Normal heart sounds.   Pulmonary:      Effort: Pulmonary effort is normal.      Breath sounds: Normal breath sounds. No wheezing.   Abdominal:      General: Bowel sounds are normal.      Palpations: Abdomen is soft. There is no mass.      Tenderness: There is no abdominal tenderness. There is no guarding or rebound.   Musculoskeletal:         General: No tenderness. Normal range of motion.      Cervical back: Normal range of motion and neck supple.   Skin:     General: Skin is warm.      Findings: No rash.   Neurological:      Mental Status: She is alert and oriented to person, place, and time.      Cranial Nerves: No cranial nerve deficit.   Psychiatric:         Behavior: Behavior normal.         Thought Content: Thought content normal.             Significant Labs: All pertinent labs within the past 24 hours have been reviewed.  CBC:   Recent Labs   Lab 12/16/23  0857 12/17/23  0998  "  WBC 9.93 8.52   HGB 10.1* 9.0*   HCT 31.6* 28.4*    187       CMP:   Recent Labs   Lab 12/16/23  0857 12/17/23  0922    138   K 4.0 4.0    108   CO2 24 23   GLU 99 87   BUN 44* 45*   CREATININE 1.9* 1.9*   CALCIUM 9.8 9.3   ANIONGAP 7* 7*       Troponin:   No results for input(s): "TROPONINI", "TROPONINIHS" in the last 48 hours.      Significant Imaging: I have reviewed all pertinent imaging results/findings within the past 24 hours.  I have reviewed and interpreted all pertinent imaging results/findings within the past 24 hours.  "

## 2023-12-17 NOTE — PROGRESS NOTES
"[facility] Progress note    CC: anemia    HPI 89 y.o. female  with history of prior stroke on Eliquis who had episode of difficulty speaking and shaking at home.    No family at bedside to provide history this history obtained mainly from the chart.  The patient can not provide history.    There was some stuttering of speech and chest tightness on admission and the limb episode lasted 30 minutes apparently there was no other concerns for any bleeding.     GI is now consulted for occult stool positive and transfusion-dependent anemia, although she has a history of chronic anemia.  There is no reports of any overt blood loss presently.  No vomiting.  The patient has no complaints.      Interval Hx:  NAEON. No overt GI bleeding. Hgb 9, improving, this morning.    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 12/3/2020    Anemia in stage 3b chronic kidney disease 3/8/2017    Arthritis     CHF (congestive heart failure)     Closed displaced subtrochanteric fracture of right femur s/p IM nail on 10/17/2021 10/16/2021    Coronary artery disease     Diabetes mellitus     Glaucoma     Gout, joint     Hx of CABG 9/21/10    Hyperlipidemia     Hypertension     NSTEMI (non-ST elevated myocardial infarction) 09/21/10    Stage 3b chronic kidney disease 10/21/2021    Stenosis of aortic and mitral valves     Systolic heart failure 6/19/2015    TIA (transient ischemic attack) 1/7/2021         Review of Systems  As per HPI    Physical Examination  BP (!) 154/71 (BP Location: Right arm, Patient Position: Lying)   Pulse 62   Temp 97.6 °F (36.4 °C) (Oral)   Resp 18   Ht 5' 3" (1.6 m)   Wt (!) 182.4 kg (402 lb 1.9 oz)   LMP  (LMP Unknown)   SpO2 96%   BMI 71.23 kg/m²   General appearance: alert, cooperative, no distress  HENT: Normocephalic, atraumatic. Anicteric sclera. No sublingual jaundice.  Lungs:  Normal work of breathing  Abdomen: Soft, " non-tender; bowel sounds normoactive; no organomegaly  Skin: No jaundice  Extremities:  No edema  Neurologic: Alert and oriented    Labs: Hgb 9.5    Imaging: CXR with minimal atelectasis    I have personally reviewed and interpreted these images.    Assessment: 89 y.o. female prior strokes (R-PCA; on ASA/plavix), recent small R cerebellar stroke, CHF, CABG, TAVR, on Eliquis who presents from home for difficulty speaking and an episode of shaking. She has sicne been diagnosed with UTI. GI consulted for AoC anemia.     Acute on chronic anemia  With occult stool positive but no reported overt gi bleeding. She is on ASA/Plavix and eliquis.      Recs  Continue PPI IV BID  Monitor for overt GI bleeding   Monitor hgb  Will plan EGD early this week.   Can also consider CT to eval for colon mass if EGD is negative, but defer for now due to recent CTA head/neck and CKD.   Uncertain she would tolerate a prep at present.   Call us for development of overt GI bleeding  Continue treatment for UTI  Agree with neurology consultation    Discussed with Dr. Gorman. See attestation    Shankar Gutierrez MD  Gastroenterology Fellow - [facility]

## 2023-12-17 NOTE — PT/OT/SLP PROGRESS
Physical Therapy Treatment    Patient Name:  Krystina Washington   MRN:  [id number]    Recommendations:     Discharge Recommendations: Low Intensity Therapy (HH OT/PT & 24/7 care/assistance)  Discharge Equipment Recommendations: none  Barriers to discharge: decreased strength/functional mobility requiring increased assistance at this time for safety; fall risk.    Assessment:     Krystina Washington is a 89 y.o. female admitted with a medical diagnosis of Acute cystitis.  She presents with the following impairments/functional limitations: weakness, impaired self care skills, impaired balance, decreased coordination, impaired endurance, impaired functional mobility, decreased lower extremity function, gait instability.    Rehab Prognosis: Fair; patient would benefit from acute skilled PT services to address these deficits and reach maximum level of function.    Recent Surgery: * No surgery found *      Plan:     During this hospitalization, patient to be seen 5 x/week to address the identified rehab impairments via gait training, therapeutic activities, therapeutic exercises, neuromuscular re-education and progress toward the following goals:    Plan of Care Expires:  01/12/24    Subjective     Chief Complaint: Pt with no complaints or concerns at this time.   Patient/Family Comments/goals: Pt agreeable to PT treatment.   Pain/Comfort:  Pain Rating 1: 0/10      Objective:     Communicated with nursing prior to session.  Patient found HOB elevated with bed alarm, telemetry upon PT entry to room.     General Precautions: Standard, fall, hearing impaired  Orthopedic Precautions: N/A  Braces: N/A  Respiratory Status: Room air     Functional Mobility:  Bed Mobility:     Supine to Sit: minimum assistance  Sit to Supine: moderate assistance  Transfers:     Sit to Stand:  minimum assistance with rolling walker  Gait: Pt was able to perform fwd/retro walking as well as lateral stepping with RW, min assist requiring max verbal as well  as tactile cueing on proper AD usage to prevent LOB.   Balance: Pt with good sitting and fair - standing balance.       AM-PAC 6 CLICK MOBILITY  Turning over in bed (including adjusting bedclothes, sheets and blankets)?: 3  Sitting down on and standing up from a chair with arms (e.g., wheelchair, bedside commode, etc.): 3  Moving from lying on back to sitting on the side of the bed?: 3  Moving to and from a bed to a chair (including a wheelchair)?: 3  Need to walk in hospital room?: 3  Climbing 3-5 steps with a railing?: 1  Basic Mobility Total Score: 16       Treatment & Education:      Patient left HOB elevated with all lines intact, call button in reach, and nursing notified..    GOALS:   Multidisciplinary Problems       Physical Therapy Goals          Problem: Physical Therapy    Goal Priority Disciplines Outcome Goal Variances Interventions   Physical Therapy Goal     PT, PT/OT Ongoing, Progressing     Description: Goals to be met by: 24     Patient will increase functional independence with mobility by performin. Supine to sit with Stand-by Assistance  2. Sit to supine with Stand-by Assistance  3. Sit to stand transfer with Contact Guard Assistance with use of RW.   4. Bed to chair transfer with Contact Guard Assistance using Rolling Walker  5. Gait  x 50 feet with Contact Guard Assistance using Rolling Walker.                          Time Tracking:     PT Received On: 23  PT Start Time: 1026     PT Stop Time: 1049  PT Total Time (min): 23 min     Billable Minutes: Therapeutic Activity 23    Treatment Type: Treatment  PT/PTA: PTA     Number of PTA visits since last PT visit: 3     2023

## 2023-12-18 ENCOUNTER — ANESTHESIA EVENT (OUTPATIENT)
Dept: ENDOSCOPY | Facility: HOSPITAL | Age: 88
DRG: 690 | End: 2023-12-18
Payer: MEDICARE

## 2023-12-18 ENCOUNTER — ANESTHESIA (OUTPATIENT)
Dept: ENDOSCOPY | Facility: HOSPITAL | Age: 88
DRG: 690 | End: 2023-12-18
Payer: MEDICARE

## 2023-12-18 LAB
ANION GAP SERPL CALC-SCNC: 8 MMOL/L (ref 8–16)
BASOPHILS # BLD AUTO: 0.03 K/UL (ref 0–0.2)
BASOPHILS NFR BLD: 0.3 % (ref 0–1.9)
BUN SERPL-MCNC: 40 MG/DL (ref 10–30)
CALCIUM SERPL-MCNC: 9 MG/DL (ref 8.7–10.5)
CHLORIDE SERPL-SCNC: 109 MMOL/L (ref 95–110)
CO2 SERPL-SCNC: 22 MMOL/L (ref 23–29)
CREAT SERPL-MCNC: 1.9 MG/DL (ref 0.5–1.4)
DIFFERENTIAL METHOD: ABNORMAL
EOSINOPHIL # BLD AUTO: 0.2 K/UL (ref 0–0.5)
EOSINOPHIL NFR BLD: 2.3 % (ref 0–8)
ERYTHROCYTE [DISTWIDTH] IN BLOOD BY AUTOMATED COUNT: 17 % (ref 11.5–14.5)
EST. GFR  (NO RACE VARIABLE): 25 ML/MIN/1.73 M^2
GLUCOSE SERPL-MCNC: 78 MG/DL (ref 70–110)
HCT VFR BLD AUTO: 28.2 % (ref 37–48.5)
HGB BLD-MCNC: 8.8 G/DL (ref 12–16)
IMM GRANULOCYTES # BLD AUTO: 0.05 K/UL (ref 0–0.04)
IMM GRANULOCYTES NFR BLD AUTO: 0.6 % (ref 0–0.5)
LYMPHOCYTES # BLD AUTO: 2.3 K/UL (ref 1–4.8)
LYMPHOCYTES NFR BLD: 26.5 % (ref 18–48)
MCH RBC QN AUTO: 28.7 PG (ref 27–31)
MCHC RBC AUTO-ENTMCNC: 31.2 G/DL (ref 32–36)
MCV RBC AUTO: 92 FL (ref 82–98)
MONOCYTES # BLD AUTO: 0.8 K/UL (ref 0.3–1)
MONOCYTES NFR BLD: 9 % (ref 4–15)
NEUTROPHILS # BLD AUTO: 5.4 K/UL (ref 1.8–7.7)
NEUTROPHILS NFR BLD: 61.3 % (ref 38–73)
NRBC BLD-RTO: 0 /100 WBC
PLATELET # BLD AUTO: 190 K/UL (ref 150–450)
PMV BLD AUTO: 12.6 FL (ref 9.2–12.9)
POCT GLUCOSE: 77 MG/DL (ref 70–110)
POCT GLUCOSE: 79 MG/DL (ref 70–110)
POCT GLUCOSE: 91 MG/DL (ref 70–110)
POTASSIUM SERPL-SCNC: 3.9 MMOL/L (ref 3.5–5.1)
RBC # BLD AUTO: 3.07 M/UL (ref 4–5.4)
SODIUM SERPL-SCNC: 139 MMOL/L (ref 136–145)
WBC # BLD AUTO: 8.79 K/UL (ref 3.9–12.7)

## 2023-12-18 PROCEDURE — 97530 THERAPEUTIC ACTIVITIES: CPT | Mod: CO

## 2023-12-18 PROCEDURE — D9220A PRA ANESTHESIA: ICD-10-PCS | Mod: CRNA,,, | Performed by: STUDENT IN AN ORGANIZED HEALTH CARE EDUCATION/TRAINING PROGRAM

## 2023-12-18 PROCEDURE — D9220A PRA ANESTHESIA: Mod: ANES,,, | Performed by: ANESTHESIOLOGY

## 2023-12-18 PROCEDURE — 11000001 HC ACUTE MED/SURG PRIVATE ROOM

## 2023-12-18 PROCEDURE — 25000003 PHARM REV CODE 250: Performed by: FAMILY MEDICINE

## 2023-12-18 PROCEDURE — 63600175 PHARM REV CODE 636 W HCPCS: Performed by: FAMILY MEDICINE

## 2023-12-18 PROCEDURE — 88342 CHG IMMUNOCYTOCHEMISTRY: ICD-10-PCS | Mod: 26,,, | Performed by: PATHOLOGY

## 2023-12-18 PROCEDURE — 43239 EGD BIOPSY SINGLE/MULTIPLE: CPT | Mod: ,,, | Performed by: INTERNAL MEDICINE

## 2023-12-18 PROCEDURE — 37000009 HC ANESTHESIA EA ADD 15 MINS: Performed by: INTERNAL MEDICINE

## 2023-12-18 PROCEDURE — D9220A PRA ANESTHESIA: ICD-10-PCS | Mod: ANES,,, | Performed by: ANESTHESIOLOGY

## 2023-12-18 PROCEDURE — C9113 INJ PANTOPRAZOLE SODIUM, VIA: HCPCS | Performed by: FAMILY MEDICINE

## 2023-12-18 PROCEDURE — 88305 TISSUE EXAM BY PATHOLOGIST: CPT | Performed by: PATHOLOGY

## 2023-12-18 PROCEDURE — D9220A PRA ANESTHESIA: Mod: CRNA,,, | Performed by: STUDENT IN AN ORGANIZED HEALTH CARE EDUCATION/TRAINING PROGRAM

## 2023-12-18 PROCEDURE — 27201012 HC FORCEPS, HOT/COLD, DISP: Performed by: INTERNAL MEDICINE

## 2023-12-18 PROCEDURE — 43239 EGD BIOPSY SINGLE/MULTIPLE: CPT | Performed by: INTERNAL MEDICINE

## 2023-12-18 PROCEDURE — 97110 THERAPEUTIC EXERCISES: CPT | Mod: CQ

## 2023-12-18 PROCEDURE — 80048 BASIC METABOLIC PNL TOTAL CA: CPT | Performed by: FAMILY MEDICINE

## 2023-12-18 PROCEDURE — 25000003 PHARM REV CODE 250: Performed by: STUDENT IN AN ORGANIZED HEALTH CARE EDUCATION/TRAINING PROGRAM

## 2023-12-18 PROCEDURE — 88305 TISSUE EXAM BY PATHOLOGIST: CPT | Mod: 26,,, | Performed by: PATHOLOGY

## 2023-12-18 PROCEDURE — 85025 COMPLETE CBC W/AUTO DIFF WBC: CPT | Performed by: FAMILY MEDICINE

## 2023-12-18 PROCEDURE — 97116 GAIT TRAINING THERAPY: CPT | Mod: CQ

## 2023-12-18 PROCEDURE — 88342 IMHCHEM/IMCYTCHM 1ST ANTB: CPT | Performed by: PATHOLOGY

## 2023-12-18 PROCEDURE — 43239 PR EGD, FLEX, W/BIOPSY, SGL/MULTI: ICD-10-PCS | Mod: ,,, | Performed by: INTERNAL MEDICINE

## 2023-12-18 PROCEDURE — 63600175 PHARM REV CODE 636 W HCPCS: Performed by: STUDENT IN AN ORGANIZED HEALTH CARE EDUCATION/TRAINING PROGRAM

## 2023-12-18 PROCEDURE — 88305 TISSUE EXAM BY PATHOLOGIST: ICD-10-PCS | Mod: 26,,, | Performed by: PATHOLOGY

## 2023-12-18 PROCEDURE — 36415 COLL VENOUS BLD VENIPUNCTURE: CPT | Performed by: FAMILY MEDICINE

## 2023-12-18 PROCEDURE — 37000008 HC ANESTHESIA 1ST 15 MINUTES: Performed by: INTERNAL MEDICINE

## 2023-12-18 PROCEDURE — 94761 N-INVAS EAR/PLS OXIMETRY MLT: CPT

## 2023-12-18 PROCEDURE — 88342 IMHCHEM/IMCYTCHM 1ST ANTB: CPT | Mod: 26,,, | Performed by: PATHOLOGY

## 2023-12-18 RX ORDER — PROPOFOL 10 MG/ML
VIAL (ML) INTRAVENOUS
Status: DISCONTINUED | OUTPATIENT
Start: 2023-12-18 | End: 2023-12-18

## 2023-12-18 RX ORDER — LIDOCAINE HYDROCHLORIDE 20 MG/ML
INJECTION INTRAVENOUS
Status: DISCONTINUED | OUTPATIENT
Start: 2023-12-18 | End: 2023-12-18

## 2023-12-18 RX ORDER — TIMOLOL MALEATE 5 MG/ML
1 SOLUTION/ DROPS OPHTHALMIC 2 TIMES DAILY
Status: DISCONTINUED | OUTPATIENT
Start: 2023-12-18 | End: 2023-12-21 | Stop reason: HOSPADM

## 2023-12-18 RX ADMIN — AMOXICILLIN AND CLAVULANATE POTASSIUM 1 TABLET: 875; 125 TABLET, FILM COATED ORAL at 10:12

## 2023-12-18 RX ADMIN — LIDOCAINE HYDROCHLORIDE 100 MG: 20 INJECTION, SOLUTION INTRAVENOUS at 03:12

## 2023-12-18 RX ADMIN — ATORVASTATIN CALCIUM 40 MG: 40 TABLET, FILM COATED ORAL at 09:12

## 2023-12-18 RX ADMIN — SACUBITRIL AND VALSARTAN 1 TABLET: 49; 51 TABLET, FILM COATED ORAL at 09:12

## 2023-12-18 RX ADMIN — MULTIPLE VITAMINS W/ MINERALS TAB 1 TABLET: TAB at 09:12

## 2023-12-18 RX ADMIN — ISOSORBIDE MONONITRATE 30 MG: 30 TABLET, EXTENDED RELEASE ORAL at 09:12

## 2023-12-18 RX ADMIN — CARVEDILOL 12.5 MG: 12.5 TABLET, FILM COATED ORAL at 09:12

## 2023-12-18 RX ADMIN — HYDRALAZINE HYDROCHLORIDE 75 MG: 25 TABLET, FILM COATED ORAL at 06:12

## 2023-12-18 RX ADMIN — PANTOPRAZOLE SODIUM 40 MG: 40 INJECTION, POWDER, LYOPHILIZED, FOR SOLUTION INTRAVENOUS at 09:12

## 2023-12-18 RX ADMIN — AMOXICILLIN AND CLAVULANATE POTASSIUM 1 TABLET: 875; 125 TABLET, FILM COATED ORAL at 09:12

## 2023-12-18 RX ADMIN — PROPOFOL 50 MG: 10 INJECTION, EMULSION INTRAVENOUS at 03:12

## 2023-12-18 RX ADMIN — SODIUM CHLORIDE: 0.9 INJECTION, SOLUTION INTRAVENOUS at 02:12

## 2023-12-18 RX ADMIN — CARVEDILOL 12.5 MG: 12.5 TABLET, FILM COATED ORAL at 10:12

## 2023-12-18 RX ADMIN — PROPOFOL 10 MG: 10 INJECTION, EMULSION INTRAVENOUS at 03:12

## 2023-12-18 RX ADMIN — TIMOLOL MALEATE 1 DROP: 5 SOLUTION OPHTHALMIC at 11:12

## 2023-12-18 RX ADMIN — ESCITALOPRAM OXALATE 20 MG: 10 TABLET ORAL at 09:12

## 2023-12-18 RX ADMIN — SACUBITRIL AND VALSARTAN 1 TABLET: 49; 51 TABLET, FILM COATED ORAL at 10:12

## 2023-12-18 RX ADMIN — PANTOPRAZOLE SODIUM 40 MG: 40 INJECTION, POWDER, LYOPHILIZED, FOR SOLUTION INTRAVENOUS at 10:12

## 2023-12-18 RX ADMIN — ALLOPURINOL 100 MG: 100 TABLET ORAL at 09:12

## 2023-12-18 RX ADMIN — HYDRALAZINE HYDROCHLORIDE 75 MG: 25 TABLET, FILM COATED ORAL at 10:12

## 2023-12-18 NOTE — PLAN OF CARE
Problem: Occupational Therapy  Goal: Occupational Therapy Goal  Description: Goals to be met by: 1/9/2024     Patient will increase functional independence with ADLs by performing:    Rolling with SBA to support self initiated pressure relief. MET 12/13/2023  Feeding with Stand-by Assistance with meal tray setup  UE Dressing with Minimal Assistance with overhead shirt  Grooming with Contact Guard Assistance with simplifed set up  Toileting from bedside commode with Moderate Assistance for hygiene and clothing management.   Sitting at edge of bed x5 minutes with Stand-by Assistance. MET 12/13/2023  Toilet transfer to bedside commode with Contact Guard Assistance with least restrictive device.  Family or caregiver 100% verbalized reciprocation of dementia friendly recommendations (ex: simplify choices; decrease visual clutter; remove hazardous objects; maximize familiarity, etc) to support patient's wellbeing and highest level of occupational engagement and participation in least restrictive discharge environment     Outcome: Ongoing, Progressing   Krystina Washington is a 89 y.o. female with a medical diagnosis of Acute cystitis.   Performance deficits affecting function are weakness, impaired endurance, impaired self care skills, impaired functional mobility, gait instability, impaired balance, decreased upper extremity function, decreased lower extremity function, decreased safety awareness, pain, decreased ROM, impaired joint extensibility.    Pt found in bed, agreeable to therapy.  Pt tolerated tx well. Continue OT services to address functional goals, progressing as able.

## 2023-12-18 NOTE — PROGRESS NOTES
"[facility]           Pharmacy  Admission Medication Reconciliation     Based on information gathered for medication list, you may go to "Admission" then "Reconcile Home Medications" tabs to review and/or act upon those items.     The home medication list has been updated by the Pharmacy department.   Please read ALL comments highlighted in red.   Please address this information as you see fit.    Feel free to contact us if you have any questions or require assistance.    Home medication list has been compared to current inpatient medications. Please review the following discrepancies noted below:    Patient reports NO LONGER TAKING the following medication(s) which has been ordered upon admit.    Patient reports STILL TAKING the following medication(s) which was not ordered upon admit  Timolol 0.5% one drop both eyes twice daily  Patient reports taking a DIFFERENT dose,route, or frequency than what is ordered upon admit    Feel free to contact us if you have any questions or require assistance.    Gila Bernal, PharmD  [phone]    "

## 2023-12-18 NOTE — ANESTHESIA POSTPROCEDURE EVALUATION
Anesthesia Post Evaluation    Patient: Krystina Washington    Procedure(s) Performed: Procedure(s) (LRB):  EGD (ESOPHAGOGASTRODUODENOSCOPY) (N/A)    Final Anesthesia Type: general      Patient location during evaluation: GI PACU  Patient participation: Yes- Able to Participate  Level of consciousness: awake and alert  Post-procedure vital signs: reviewed and stable  Pain management: adequate  Airway patency: patent    PONV status at discharge: No PONV  Anesthetic complications: no      Cardiovascular status: blood pressure returned to baseline  Respiratory status: unassisted  Hydration status: euvolemic  Follow-up not needed.              Vitals Value Taken Time   /70 12/18/23 1552   Temp 36.9 °C (98.5 °F) 12/18/23 1525   Pulse 59 12/18/23 1552   Resp 15 12/18/23 1552   SpO2 97 % 12/18/23 1552         Event Time   Out of Recovery 12/18/2023 15:56:36         Pain/Gael Score: No data recorded

## 2023-12-18 NOTE — PLAN OF CARE
Patient recovered to baseline. Md spoke with patient regarding procedural results . Attending RN notified of procedural results, VSS.  Verbal order to transfer back to unit.

## 2023-12-18 NOTE — PROGRESS NOTES
[facility]  Progress Note    Patient Name: Krystina Washington  MRN: [id number]  Patient Class: IP- Inpatient   Admission Date: 12/12/2023  Length of Stay: 5 days  Attending Physician: Ling Timmons MD  Primary Care Provider: Oniel Johnson MD        Subjective:     Principal Problem:Acute cystitis        HPI:  89 y.o. female with prior strokes (R-PCA), recent small R cerebellar stroke, CHF, CABG, TAVR, on Eliquis who presents from home for difficulty speaking and an episode of shaking.  Patient's son states symptoms started with leg and then arm shaking. Then he noticed her speech became stuttering. Also complains of chest tigthness.  She was otherwise normal this morning, had breakfast, etc. The episode lasted for 30 min. Upon examination, everything was resolved. Vascular Neuro was consulted in the ED. No evidence of acute large vessel occlusion or flow-limiting stenosis shown on CTA. Labs, cxr and U/a neg. Admit for TIA and chest tightness    Overview/Hospital Course:  Pt seems to be at baseline without any events overnight. However Hb did drop to 6.6. Iron panel and FOBT ordered. Transfusing 2 u prbcs. Pending FOBT , may need GI consult. Neuro consult pending.  Krystina Washington has warranted treatment spanning two or more midnights of hospital level care for the management of anemia. She continues to require further evaluation by consultants and blood transfusion. Her condition is also complicated by the following comorbidities: CHF, Diabetes, prior CVA, CAD.   12/14: awaiting FOBT to rule out bleed.  Ucx pending enterococcus. Rocephin started  12/15: FOBT Positive even though hb stable around 9. Plavix and eliquis d/c'd. GI consulted. PPI IV BID started  12/16: GI plans to do EGD on Monday 12/17: no issues voice  12/18: going for EGD today    Interval History: no issues voiced.  Review of Systems   All other systems reviewed and are negative.    Objective:     Vital  Signs (Most Recent):  Temp: 98.5 °F (36.9 °C) (12/18/23 1525)  Pulse: (!) 59 (12/18/23 1552)  Resp: 15 (12/18/23 1552)  BP: (!) 147/70 (12/18/23 1552)  SpO2: 97 % (12/18/23 1552) Vital Signs (24h Range):  Temp:  [97.6 °F (36.4 °C)-98.6 °F (37 °C)] 98.5 °F (36.9 °C)  Pulse:  [53-98] 59  Resp:  [15-20] 15  SpO2:  [95 %-99 %] 97 %  BP: ()/(50-84) 147/70     Weight: (!) 182.4 kg (402 lb 1.9 oz)  Body mass index is 71.23 kg/m².    Intake/Output Summary (Last 24 hours) at 12/18/2023 1603  Last data filed at 12/18/2023 1519  Gross per 24 hour   Intake 50 ml   Output 350 ml   Net -300 ml           Physical Exam  Vitals and nursing note reviewed.   Constitutional:       Appearance: She is well-developed.   HENT:      Head: Normocephalic and atraumatic.      Nose: Nose normal.   Eyes:      Conjunctiva/sclera: Conjunctivae normal.   Cardiovascular:      Rate and Rhythm: Normal rate and regular rhythm.      Heart sounds: Normal heart sounds.   Pulmonary:      Effort: Pulmonary effort is normal.      Breath sounds: Normal breath sounds. No wheezing.   Abdominal:      General: Bowel sounds are normal.      Palpations: Abdomen is soft. There is no mass.      Tenderness: There is no abdominal tenderness. There is no guarding or rebound.   Musculoskeletal:         General: No tenderness. Normal range of motion.      Cervical back: Normal range of motion and neck supple.   Skin:     General: Skin is warm.      Findings: No rash.   Neurological:      Mental Status: She is alert and oriented to person, place, and time.      Cranial Nerves: No cranial nerve deficit.   Psychiatric:         Behavior: Behavior normal.         Thought Content: Thought content normal.             Significant Labs: All pertinent labs within the past 24 hours have been reviewed.  CBC:   Recent Labs   Lab 12/17/23  0922 12/18/23  0239   WBC 8.52 8.79   HGB 9.0* 8.8*   HCT 28.4* 28.2*    190       CMP:   Recent Labs   Lab 12/17/23  0999  "12/18/23  0239    139   K 4.0 3.9    109   CO2 23 22*   GLU 87 78   BUN 45* 40*   CREATININE 1.9* 1.9*   CALCIUM 9.3 9.0   ANIONGAP 7* 8       Troponin:   No results for input(s): "TROPONINI", "TROPONINIHS" in the last 48 hours.      Significant Imaging: I have reviewed all pertinent imaging results/findings within the past 24 hours.  I have reviewed and interpreted all pertinent imaging results/findings within the past 24 hours.    Assessment/Plan:      * Acute cystitis    Ucx growing out enterococcus  Rocephin started    Transitioned to augmentin    TIA (transient ischemic attack)    Antithrombotics for secondary stroke prevention: Antiplatelets: Aspirin: 81 mg daily  Clopidogrel: 75 mg daily    Statins for secondary stroke prevention and hyperlipidemia, if present:   Statins: Atorvastatin- 40 mg daily    Aggressive risk factor modification: HTN     Rehab efforts: The patient has been evaluated by a stroke team provider and the therapy needs have been fully considered based off the presenting complaints and exam findings. The following therapy evaluations are needed: PT evaluate and treat, OT evaluate and treat, SLP evaluate and treat    Diagnostics ordered/pending: CTA Head to assess vasculature , CTA Neck/Arch to assess vasculature, HgbA1C to assess blood glucose levels, Lipid Profile to assess cholesterol levels, TTE to assess cardiac function/status , TSH to assess thyroid function    VTE prophylaxis:  eliquis    BP parameters: TIA: SBP <220 until imaging confirmation of no infarct     Echo: 45-50%   Neuro consult pending        Acute on chronic anemia  Patient's anemia is currently uncontrolled. Has received 2 units of PRBCs on 12/13/23 . Etiology likely d/t ckd, cad  Current CBC reviewed-   Lab Results   Component Value Date    HGB 8.8 (L) 12/18/2023    HCT 28.2 (L) 12/18/2023     Monitor serial CBC and transfuse if patient becomes hemodynamically unstable, symptomatic or H/H drops below " 7/21.    S/p  2 u pbcs  FOBT positive   GI consulted and plans for egd monday  PPI IV BID  Eliquis and plavix d/c'd      Type 2 diabetes mellitus with stage 3b chronic kidney disease, without long-term current use of insulin  Creatine stable for now. BMP reviewed- noted Estimated Creatinine Clearance: 24.1 mL/min (A) (based on SCr of 1.5 mg/dL (H)). according to latest data. Based on current GFR, CKD stage is stage 3 - GFR 30-59.  Monitor UOP and serial BMP and adjust therapy as needed. Renally dose meds. Avoid nephrotoxic medications and procedures.    Major depressive disorder, recurrent episode, moderate  Patient has persistent depression which is mild and is currently controlled. Will Continue anti-depressant medications. We will not consult psychiatry at this time. Patient does display psychosis at this time. Continue to monitor closely and adjust plan of care as needed.        Pure hypercholesterolemia  Cont statin  Lipid panel        VTE Risk Mitigation (From admission, onward)           Ordered     IP VTE HIGH RISK PATIENT  Once         12/12/23 1203     Place sequential compression device  Until discontinued         12/12/23 1203                    Discharge Planning   KALEY: 12/19/2023     Code Status: DNR   Is the patient medically ready for discharge?:     Reason for patient still in hospital (select all that apply): Treatment  Discharge Plan A: Home, Home Health   Discharge Delays: None known at this time              Ling Timmons MD  Department of Hospital Medicine   [facility]

## 2023-12-18 NOTE — PLAN OF CARE
Problem: Physical Therapy  Goal: Physical Therapy Goal  Description: Goals to be met by: 24     Patient will increase functional independence with mobility by performin. Supine to sit with Stand-by Assistance  2. Sit to supine with Stand-by Assistance  3. Sit to stand transfer with Contact Guard Assistance with use of RW.   4. Bed to chair transfer with Contact Guard Assistance using Rolling Walker  5. Gait  x 50 feet with Contact Guard Assistance using Rolling Walker.     Outcome: Ongoing, Progressing

## 2023-12-18 NOTE — ANESTHESIA PREPROCEDURE EVALUATION
12/18/2023  Krystina Washington is a 89 y.o., female for EGD  Past Medical History:   Diagnosis Date    Acute ischemic left posterior cerebral artery (PCA) stroke 12/3/2020    Anemia in stage 3b chronic kidney disease 3/8/2017    Arthritis     CHF (congestive heart failure)     Closed displaced subtrochanteric fracture of right femur s/p IM nail on 10/17/2021 10/16/2021    Coronary artery disease     Diabetes mellitus     Glaucoma     Gout, joint     Hx of CABG 9/21/10    Hyperlipidemia     Hypertension     NSTEMI (non-ST elevated myocardial infarction) 09/21/10    Stage 3b chronic kidney disease 10/21/2021    Stenosis of aortic and mitral valves     Systolic heart failure 6/19/2015    TIA (transient ischemic attack) 1/7/2021     Past Surgical History:   Procedure Laterality Date    ANTEGRADE INTRAMEDULLARY RODDING OF FEMUR Right 10/17/2021    Procedure: INSERTION, INTRAMEDULLARY ALTAF, FEMUR, ANTEGRADE;  Surgeon: Dino Rutledge MD;  Location: [facility] OR [address] [place] [address];  Service: Orthopedics;  Laterality: Right;    CARDIAC VALVE SURGERY      CATARACT EXTRACTION      CORONARY ARTERY BYPASS GRAFT  9/21/2010    ENTROPIAN REPAIR Left 3/6/2020    Procedure: REPAIR, ENTROPION;  Surgeon: Yulia Kincaid MD;  [facility];  Service: Ophthalmology;  Laterality: Left;    EYE SURGERY      JOINT REPLACEMENT      ORIF FEMUR FRACTURE Right 10/17/2021    Procedure: ORIF, FRACTURE, FEMUR;  Surgeon: Dino Rutledge MD;  Location: [facility] OR 2ND FLR;  Service: Orthopedics;  Laterality: Right;     Pre-op Assessment       I have reviewed the Medications.     Review of Systems  Anesthesia Hx:             Denies Family Hx of Anesthesia complications.     Cardiovascular:     Hypertension  Past MI CAD   CABG/stent   Angina CHF                                 Renal/:  Chronic Renal Disease                Neurological:  TIA, CVA                                     Endocrine:  Diabetes, type 2           Psych:  Psychiatric History                  Physical Exam  General: Well nourished    Airway:  Mallampati: II   TM Distance: Normal  Neck ROM: Normal ROM    Dental:  Dentures    Chest/Lungs:  Clear to auscultation    Heart:  Rate: Normal  Rhythm: Regular Rhythm        Anesthesia Plan  Type of Anesthesia, risks & benefits discussed:    Anesthesia Type: Gen Natural Airway  Intra-op Monitoring Plan: Standard ASA Monitors  Post Op Pain Control Plan: multimodal analgesia  Informed Consent: Informed consent signed with the Patient and all parties understand the risks and agree with anesthesia plan.  All questions answered.   ASA Score: 3  Day of Surgery Review of History & Physical: H&P Update referred to the surgeon/provider.    Ready For Surgery From Anesthesia Perspective.     .

## 2023-12-18 NOTE — SUBJECTIVE & OBJECTIVE
Interval History: no issues voiced.  Review of Systems   All other systems reviewed and are negative.    Objective:     Vital Signs (Most Recent):  Temp: 98.5 °F (36.9 °C) (12/18/23 1525)  Pulse: (!) 59 (12/18/23 1552)  Resp: 15 (12/18/23 1552)  BP: (!) 147/70 (12/18/23 1552)  SpO2: 97 % (12/18/23 1552) Vital Signs (24h Range):  Temp:  [97.6 °F (36.4 °C)-98.6 °F (37 °C)] 98.5 °F (36.9 °C)  Pulse:  [53-98] 59  Resp:  [15-20] 15  SpO2:  [95 %-99 %] 97 %  BP: ()/(50-84) 147/70     Weight: (!) 182.4 kg (402 lb 1.9 oz)  Body mass index is 71.23 kg/m².    Intake/Output Summary (Last 24 hours) at 12/18/2023 1603  Last data filed at 12/18/2023 1519  Gross per 24 hour   Intake 50 ml   Output 350 ml   Net -300 ml           Physical Exam  Vitals and nursing note reviewed.   Constitutional:       Appearance: She is well-developed.   HENT:      Head: Normocephalic and atraumatic.      Nose: Nose normal.   Eyes:      Conjunctiva/sclera: Conjunctivae normal.   Cardiovascular:      Rate and Rhythm: Normal rate and regular rhythm.      Heart sounds: Normal heart sounds.   Pulmonary:      Effort: Pulmonary effort is normal.      Breath sounds: Normal breath sounds. No wheezing.   Abdominal:      General: Bowel sounds are normal.      Palpations: Abdomen is soft. There is no mass.      Tenderness: There is no abdominal tenderness. There is no guarding or rebound.   Musculoskeletal:         General: No tenderness. Normal range of motion.      Cervical back: Normal range of motion and neck supple.   Skin:     General: Skin is warm.      Findings: No rash.   Neurological:      Mental Status: She is alert and oriented to person, place, and time.      Cranial Nerves: No cranial nerve deficit.   Psychiatric:         Behavior: Behavior normal.         Thought Content: Thought content normal.             Significant Labs: All pertinent labs within the past 24 hours have been reviewed.  CBC:   Recent Labs   Lab 12/17/23  7596  "12/18/23 0239   WBC 8.52 8.79   HGB 9.0* 8.8*   HCT 28.4* 28.2*    190       CMP:   Recent Labs   Lab 12/17/23  0922 12/18/23 0239    139   K 4.0 3.9    109   CO2 23 22*   GLU 87 78   BUN 45* 40*   CREATININE 1.9* 1.9*   CALCIUM 9.3 9.0   ANIONGAP 7* 8       Troponin:   No results for input(s): "TROPONINI", "TROPONINIHS" in the last 48 hours.      Significant Imaging: I have reviewed all pertinent imaging results/findings within the past 24 hours.  I have reviewed and interpreted all pertinent imaging results/findings within the past 24 hours.  "

## 2023-12-18 NOTE — TRANSFER OF CARE
"Anesthesia Transfer of Care Note    Patient: Krystina Washington    Procedure(s) Performed: Procedure(s) (LRB):  EGD (ESOPHAGOGASTRODUODENOSCOPY) (N/A)    Patient location: PACU    Anesthesia Type: general    Transport from OR: Transported from OR on 2-3 L/min O2 by NC with adequate spontaneous ventilation    Post pain: adequate analgesia    Post assessment: no apparent anesthetic complications and tolerated procedure well    Post vital signs: stable    Level of consciousness: awake and responds to stimulation    Nausea/Vomiting: no nausea/vomiting    Complications: none    Transfer of care protocol was followed      Last vitals: Visit Vitals  BP (!) 122/57 (BP Location: Left arm, Patient Position: Sitting)   Pulse 67   Temp 37 °C (98.6 °F) (Temporal)   Resp 18   Ht 5' 3" (1.6 m)   Wt (!) 182.4 kg (402 lb 1.9 oz)   LMP  (LMP Unknown)   SpO2 97%   BMI 71.23 kg/m²     "

## 2023-12-18 NOTE — PT/OT/SLP PROGRESS
Occupational Therapy   Treatment    Name: Krystina Washington  MRN: [id number]  Admitting Diagnosis:  Acute cystitis  Day of Surgery    Recommendations:     Discharge Recommendations: Low Intensity Therapy  Discharge Equipment Recommendations:  none  Barriers to discharge:  Other (Comment) (Increased level of assistance)    Assessment:     Krystina Washington is a 89 y.o. female with a medical diagnosis of Acute cystitis.   Performance deficits affecting function are weakness, impaired endurance, impaired self care skills, impaired functional mobility, gait instability, impaired balance, decreased upper extremity function, decreased lower extremity function, decreased safety awareness, pain, decreased ROM, impaired joint extensibility.    Pt found in bed, agreeable to therapy.  Pt tolerated tx well. Continue OT services to address functional goals, progressing as able.      Rehab Prognosis:  Good; patient would benefit from acute skilled OT services to address these deficits and reach maximum level of function.       Plan:     Patient to be seen 3 x/week to address the above listed problems via self-care/home management, therapeutic activities, therapeutic exercises  Plan of Care Expires: 01/09/24  Plan of Care Reviewed with: patient    Subjective     Chief Complaint: I want to get in chair.   Patient/Family Comments/goals: to go home  Pain/Comfort:  Pain Rating 1:  (R shld-minimal discomfort)    Objective:     Communicated with: RN prior to session.  Patient found HOB elevated with bed alarm, peripheral IV, PureWick, telemetry upon OT entry to room.    General Precautions: Standard, fall, hearing impaired    Orthopedic Precautions:N/A  Braces: N/A  Respiratory Status: Room air     Occupational Performance:     Bed Mobility:    Patient completed Rolling/Turning to Left with  minimum assistance with BR  Patient completed Scooting/Bridging with minimum assistance to scoot seated to EOB  Patient completed Supine to Sit with  minimum assistance HOB elevated, increased time and effort, vc's for effective technique    Functional Mobility/Transfers:  Patient completed Sit <> Stand Transfer with minimum assistance  with  rolling walker  with vcs for hand placement/effect tech  Patient completed Bed <> Chair Transfer using Stand Pivot technique with minimum assistance with rolling walker  Functional Mobility: Pt ambulated ~50 feet with CGA using RW requiring one standing rest break half way 2/2 fatigue.       [facility] 6 Click ADL: 16    Treatment & Education:  Pt sat EOB with SBA.   Encouraged OOB in chair 1-2 hours and to call for assistance for back to bed. Pt verbalizes understanding.        Patient left up in chair with all lines intact, call button in reach, chair alarm on, and PCT and RN notified    GOALS:   Multidisciplinary Problems       Occupational Therapy Goals          Problem: Occupational Therapy    Goal Priority Disciplines Outcome Interventions   Occupational Therapy Goal     OT, PT/OT Ongoing, Progressing    Description: Goals to be met by: 1/9/2024     Patient will increase functional independence with ADLs by performing:    Rolling with SBA to support self initiated pressure relief. MET 12/13/2023  Feeding with Stand-by Assistance with meal tray setup  UE Dressing with Minimal Assistance with overhead shirt  Grooming with Contact Guard Assistance with simplifed set up  Toileting from bedside commode with Moderate Assistance for hygiene and clothing management.   Sitting at edge of bed x5 minutes with Stand-by Assistance. MET 12/13/2023  Toilet transfer to bedside commode with Contact Guard Assistance with least restrictive device.  Family or caregiver 100% verbalized reciprocation of dementia friendly recommendations (ex: simplify choices; decrease visual clutter; remove hazardous objects; maximize familiarity, etc) to support patient's wellbeing and highest level of occupational engagement and participation in least  restrictive discharge environment                          Time Tracking:     OT Date of Treatment: 12/18/23  OT Start Time: 1054  OT Stop Time: 1109  OT Total Time (min): 15 min    Billable Minutes:Therapeutic Activity 15            12/18/2023

## 2023-12-18 NOTE — PT/OT/SLP PROGRESS
Physical Therapy Treatment    Patient Name:  Krystina Washington   MRN:  [id number]    Recommendations:     Discharge Recommendations: Low Intensity Therapy (with family assist)  Discharge Equipment Recommendations: none  Barriers to discharge:  decreased mobility,strength and endurance    Assessment:     Krystina Washington is a 89 y.o. female admitted with a medical diagnosis of Acute cystitis.  She presents with the following impairments/functional limitations: weakness, impaired endurance, impaired functional mobility, gait instability, impaired balance, decreased lower extremity function, decreased upper extremity function, pain, decreased ROM, impaired coordination, impaired joint extensibility,pt with improving status and will benefit from low intensity therapy upon discharge to address above functional limitations.    Rehab Prognosis: Good; patient would benefit from acute skilled PT services to address these deficits and reach maximum level of function.    Recent Surgery: Procedure(s) (LRB):  EGD (ESOPHAGOGASTRODUODENOSCOPY) (N/A)      Plan:     During this hospitalization, patient to be seen 5 x/week to address the identified rehab impairments via therapeutic activities, gait training, therapeutic exercises, neuromuscular re-education and progress toward the following goals:    Plan of Care Expires:  01/12/24    Subjective     Chief Complaint: n/a  Patient/Family Comments/goals: pt agreeable to rx.  Pain/Comfort:  Pain Rating 1:  (no rating)  Location - Side 1: Right  Location - Orientation 1: generalized  Location 1: shoulder (pt has a heating pad)  Pain Addressed 1: Reposition, Distraction      Objective:     Communicated with nsg prior to session.  Patient found supine with bed alarm, PureWick, peripheral IV, telemetry upon PT entry to room.     General Precautions: Standard, fall, hearing impaired  Orthopedic Precautions: N/A  Braces: N/A  Respiratory Status: Room air     Functional Mobility:  Bed Mobility:      Supine to Sit: moderate assistance  Sit to Supine: moderate assistance and maximal assistance  Transfers:     Sit to Stand:  moderate assistance and X 2 trials with rolling walker  Gait: amb ~34' X 2 with RW and Min A X 1 seated rest between trials  Balance: fair standing balance with RW      AM-PAC 6 CLICK MOBILITY  Turning over in bed (including adjusting bedclothes, sheets and blankets)?: 3  Sitting down on and standing up from a chair with arms (e.g., wheelchair, bedside commode, etc.): 3  Moving from lying on back to sitting on the side of the bed?: 2  Moving to and from a bed to a chair (including a wheelchair)?: 3  Need to walk in hospital room?: 3  Climbing 3-5 steps with a railing?: 1  Basic Mobility Total Score: 15       Treatment & Education: le seated ex's X 10 reps inc: ap,laq's and hip flex.      Patient left supine with all lines intact, call button in reach, bed alarm on, and nsg notified..    GOALS: see general POC  Multidisciplinary Problems       Physical Therapy Goals          Problem: Physical Therapy    Goal Priority Disciplines Outcome Goal Variances Interventions   Physical Therapy Goal     PT, PT/OT Ongoing, Progressing     Description: Goals to be met by: 24     Patient will increase functional independence with mobility by performin. Supine to sit with Stand-by Assistance  2. Sit to supine with Stand-by Assistance  3. Sit to stand transfer with Contact Guard Assistance with use of RW.   4. Bed to chair transfer with Contact Guard Assistance using Rolling Walker  5. Gait  x 50 feet with Contact Guard Assistance using Rolling Walker.                          Time Tracking:     PT Received On: 23  PT Start Time: 952     PT Stop Time:   PT Total Time (min): 25 min     Billable Minutes: Gait Training 15 and Therapeutic Exercise 10    Treatment Type: Treatment  PT/PTA: PTA     Number of PTA visits since last PT visit: 4     2023

## 2023-12-18 NOTE — ASSESSMENT & PLAN NOTE
Patient's anemia is currently uncontrolled. Has received 2 units of PRBCs on 12/13/23 . Etiology likely d/t ckd, cad  Current CBC reviewed-   Lab Results   Component Value Date    HGB 8.8 (L) 12/18/2023    HCT 28.2 (L) 12/18/2023     Monitor serial CBC and transfuse if patient becomes hemodynamically unstable, symptomatic or H/H drops below 7/21.    S/p  2 u pbcs  FOBT positive   GI consulted and plans for egd monday  PPI IV BID  Eliquis and plavix d/c'd

## 2023-12-18 NOTE — PLAN OF CARE
Patient may be discharged today after EGD. I spoke with Velma at [facility].  She has orders and will place patient on schedule for tomorrow. NP at home appointment scheduled (sooner appointment requested). Vascular Neurology appointment also requested.  will continue to follow.    Patient Contacts     Name Relation Home Work Mobile   Hermelindo Mackenzie Son [phone]     Merry Lafleur Daughter   [phone]   Eliz Mackenzie   [phone]   Jj Mackenzie   [phone]     Future Appointments   Date Time Provider Department Center   1/19/2024  8:00 AM Chairs, Kyra KRISHNA NP [address] VASCULAR NEUROLOGY  Vascular Neurology [phone] [address] [facility] [place] [address]      Next Steps: Follow up  Instructions: Vascular Neurology Follow-Up Requested [facility] Neuro Phone: [phone]    [facility]  Home Health Services, Home Therapy Services, Home Living Aide Services [phone] [address] [place] [address]     Next Steps: Follow up  Instructions: [facility]                12/18/23 1412   Final Note   Assessment Type Final Discharge Note   Anticipated Discharge Disposition Home-Health   Hospital Resources/Appts/Education Provided Appointments scheduled and added to AVS   Post-Acute Status   Post-Acute Authorization [facility]   Home Health Status Set-up Complete/Auth obtained   Discharge Delays None known at this time     Claudia Scott RN    [phone]

## 2023-12-19 LAB
ANION GAP SERPL CALC-SCNC: 10 MMOL/L (ref 8–16)
BUN SERPL-MCNC: 38 MG/DL (ref 10–30)
CALCIUM SERPL-MCNC: 9.3 MG/DL (ref 8.7–10.5)
CHLORIDE SERPL-SCNC: 106 MMOL/L (ref 95–110)
CO2 SERPL-SCNC: 22 MMOL/L (ref 23–29)
CREAT SERPL-MCNC: 1.8 MG/DL (ref 0.5–1.4)
ERYTHROCYTE [DISTWIDTH] IN BLOOD BY AUTOMATED COUNT: 16.9 % (ref 11.5–14.5)
EST. GFR  (NO RACE VARIABLE): 26 ML/MIN/1.73 M^2
GLUCOSE SERPL-MCNC: 130 MG/DL (ref 70–110)
HCT VFR BLD AUTO: 31.6 % (ref 37–48.5)
HGB BLD-MCNC: 9.8 G/DL (ref 12–16)
MCH RBC QN AUTO: 28.7 PG (ref 27–31)
MCHC RBC AUTO-ENTMCNC: 31 G/DL (ref 32–36)
MCV RBC AUTO: 93 FL (ref 82–98)
PLATELET # BLD AUTO: 185 K/UL (ref 150–450)
PMV BLD AUTO: 12.4 FL (ref 9.2–12.9)
POCT GLUCOSE: 108 MG/DL (ref 70–110)
POCT GLUCOSE: 123 MG/DL (ref 70–110)
POTASSIUM SERPL-SCNC: 3.8 MMOL/L (ref 3.5–5.1)
RBC # BLD AUTO: 3.41 M/UL (ref 4–5.4)
SODIUM SERPL-SCNC: 138 MMOL/L (ref 136–145)
WBC # BLD AUTO: 8.64 K/UL (ref 3.9–12.7)

## 2023-12-19 PROCEDURE — C9113 INJ PANTOPRAZOLE SODIUM, VIA: HCPCS | Performed by: FAMILY MEDICINE

## 2023-12-19 PROCEDURE — 97116 GAIT TRAINING THERAPY: CPT

## 2023-12-19 PROCEDURE — 94761 N-INVAS EAR/PLS OXIMETRY MLT: CPT

## 2023-12-19 PROCEDURE — 63600175 PHARM REV CODE 636 W HCPCS: Performed by: FAMILY MEDICINE

## 2023-12-19 PROCEDURE — 85027 COMPLETE CBC AUTOMATED: CPT | Performed by: FAMILY MEDICINE

## 2023-12-19 PROCEDURE — 97110 THERAPEUTIC EXERCISES: CPT

## 2023-12-19 PROCEDURE — 36415 COLL VENOUS BLD VENIPUNCTURE: CPT | Performed by: FAMILY MEDICINE

## 2023-12-19 PROCEDURE — 97530 THERAPEUTIC ACTIVITIES: CPT | Mod: CO

## 2023-12-19 PROCEDURE — 11000001 HC ACUTE MED/SURG PRIVATE ROOM

## 2023-12-19 PROCEDURE — 25000003 PHARM REV CODE 250: Performed by: FAMILY MEDICINE

## 2023-12-19 PROCEDURE — 80048 BASIC METABOLIC PNL TOTAL CA: CPT | Performed by: FAMILY MEDICINE

## 2023-12-19 PROCEDURE — 97535 SELF CARE MNGMENT TRAINING: CPT | Mod: CO

## 2023-12-19 RX ORDER — SODIUM CHLORIDE 9 MG/ML
INJECTION, SOLUTION INTRAVENOUS CONTINUOUS
Status: DISCONTINUED | OUTPATIENT
Start: 2023-12-19 | End: 2023-12-21 | Stop reason: HOSPADM

## 2023-12-19 RX ADMIN — SACUBITRIL AND VALSARTAN 1 TABLET: 49; 51 TABLET, FILM COATED ORAL at 09:12

## 2023-12-19 RX ADMIN — SACUBITRIL AND VALSARTAN 1 TABLET: 49; 51 TABLET, FILM COATED ORAL at 08:12

## 2023-12-19 RX ADMIN — PANTOPRAZOLE SODIUM 40 MG: 40 INJECTION, POWDER, LYOPHILIZED, FOR SOLUTION INTRAVENOUS at 09:12

## 2023-12-19 RX ADMIN — ALLOPURINOL 100 MG: 100 TABLET ORAL at 09:12

## 2023-12-19 RX ADMIN — HYDRALAZINE HYDROCHLORIDE 75 MG: 25 TABLET, FILM COATED ORAL at 02:12

## 2023-12-19 RX ADMIN — HYDRALAZINE HYDROCHLORIDE 75 MG: 25 TABLET, FILM COATED ORAL at 07:12

## 2023-12-19 RX ADMIN — MULTIPLE VITAMINS W/ MINERALS TAB 1 TABLET: TAB at 09:12

## 2023-12-19 RX ADMIN — CARVEDILOL 12.5 MG: 12.5 TABLET, FILM COATED ORAL at 09:12

## 2023-12-19 RX ADMIN — ATORVASTATIN CALCIUM 40 MG: 40 TABLET, FILM COATED ORAL at 09:12

## 2023-12-19 RX ADMIN — ISOSORBIDE MONONITRATE 30 MG: 30 TABLET, EXTENDED RELEASE ORAL at 09:12

## 2023-12-19 RX ADMIN — SODIUM CHLORIDE: 9 INJECTION, SOLUTION INTRAVENOUS at 02:12

## 2023-12-19 RX ADMIN — AMOXICILLIN AND CLAVULANATE POTASSIUM 1 TABLET: 875; 125 TABLET, FILM COATED ORAL at 09:12

## 2023-12-19 RX ADMIN — ESCITALOPRAM OXALATE 20 MG: 10 TABLET ORAL at 09:12

## 2023-12-19 RX ADMIN — AMOXICILLIN AND CLAVULANATE POTASSIUM 1 TABLET: 875; 125 TABLET, FILM COATED ORAL at 08:12

## 2023-12-19 RX ADMIN — CARVEDILOL 12.5 MG: 12.5 TABLET, FILM COATED ORAL at 08:12

## 2023-12-19 RX ADMIN — TIMOLOL MALEATE 1 DROP: 5 SOLUTION OPHTHALMIC at 09:12

## 2023-12-19 RX ADMIN — TIMOLOL MALEATE 1 DROP: 5 SOLUTION OPHTHALMIC at 08:12

## 2023-12-19 RX ADMIN — HYDRALAZINE HYDROCHLORIDE 75 MG: 25 TABLET, FILM COATED ORAL at 09:12

## 2023-12-19 NOTE — PLAN OF CARE
Problem: Physical Therapy  Goal: Physical Therapy Goal  Description: Goals to be met by: 24     Patient will increase functional independence with mobility by performin. Supine to sit with Stand-by Assistance  2. Sit to supine with Stand-by Assistance  3. Sit to stand transfer with Contact Guard Assistance with use of RW.   4. Bed to chair transfer with Contact Guard Assistance using Rolling Walker  5. Gait  x 50 feet with Contact Guard Assistance using Rolling Walker. -MET 23  6. Gait x150 feet with SBA using Rolling Walker.    Outcome: Ongoing, Progressing    Pt participates in transfers to standing, standing therapeutic exercises and ambulation with use of RW and MIN A/CGA. Therapy will continue to progress pt as able.

## 2023-12-19 NOTE — PLAN OF CARE
went to meet with patient. No family at bedside. Patient is agreeable to Home Health at discharge. [facility] has accepted patient. They were updated on possible discharge tomorrow. No DME needs noted. Patient encouraged to call with any questions or concerns.  will continue to follow patient through transitions of care and assist with any discharge needs.      contacted patient's son Hermelindo Cross. He was updated on discharge plans. He told me he has also been in contact with [employer].     Patient Contacts    Name Relation Home Work Mobile   Hermelindo Mackenzie Son [phone]     Merry Lafleur Daughter   [phone]   Eliz Mackenzie   [phone]   Jj Mackenzie Grandmaggy   [phone]     Future Appointments   Date Time Provider Department Center   1/19/2024  8:00 AM Kyra Hill NP [facility] V[facility]ULAR NEUROLOG[facility]  Vascular Neurology [phone] [address] [place] [address]      Next Steps: Follow up  Instructions: Vascular Neurology Follow-Up Requested [facility] Neuro Phone: [phone]    [facility]  Home Health Services, Home Therapy Services, Home Living Aide Services [phone] [address] [facility] [address] [place] [address]     Next Steps: Follow up  Instructions: Parking [place]        12/19/23 1515   Discharge Reassessment   Assessment Type Discharge Planning Reassessment   Did the patient's condition or plan change since previous assessment? No   Discharge Plan discussed with: Patient   Discharge Plan A Home with family;Home Health   Discharge Plan B Home;Home Health   DME Needed Upon Discharge  none   Transition of Care Barriers None   Why the patient remains in the hospital Requires continued medical care   Post-Acute Status   Post-Acute Authorization Home Health   Home Health Status Pending medical clearance/testing   Hospital Resources/Appts/Education Provided  Appointments scheduled and added to AVS   Discharge Delays None known at this time     Claudia Scott RN    [phone]

## 2023-12-19 NOTE — PROGRESS NOTES
[facility] Medicine  Progress Note    Patient Name: Krystina Washington  MRN: [id number]  Patient Class: IP- Inpatient   Admission Date: 12/12/2023  Length of Stay: 6 days  Attending Physician: Figueroa Chu MD  Primary Care Provider: Oniel Johnson MD        Subjective:     Principal Problem:Acute cystitis        HPI:  89 y.o. female with prior strokes (R-PCA), recent small R cerebellar stroke, CHF, CABG, TAVR, on Eliquis who presents from home for difficulty speaking and an episode of shaking.  Patient's son states symptoms started with leg and then arm shaking. Then he noticed her speech became stuttering. Also complains of chest tigthness.  She was otherwise normal this morning, had breakfast, etc. The episode lasted for 30 min. Upon examination, everything was resolved. Vascular Neuro was consulted in the ED. No evidence of acute large vessel occlusion or flow-limiting stenosis shown on CTA. Labs, cxr and U/a neg. Admit for TIA and chest tightness    Overview/Hospital Course:  Pt seems to be at baseline without any events overnight. However Hb did drop to 6.6. Iron panel and FOBT ordered. Transfusing 2 u prbcs. Pending FOBT , may need GI consult. Neuro consult pending.  Krystina Washington has warranted treatment spanning two or more midnights of hospital level care for the management of anemia. She continues to require further evaluation by consultants and blood transfusion. Her condition is also complicated by the following comorbidities: CHF, Diabetes, prior CVA, CAD.   12/14: awaiting FOBT to rule out bleed.  Ucx pending enterococcus. Rocephin started  12/15: FOBT Positive even though hb stable around 9. Plavix and eliquis d/c'd. GI consulted. PPI IV BID started  12/16: GI plans to do EGD on Monday 12/17: no issues voice  12/18: going for EGD today  12/19  MARGARET likely from dehydration    Interval History:   Seen and examined, alert, verbal, minimal confusion, oriented to self, eating  well, no acute event overnight. No CP or SOB    Review of Systems   Constitutional:  Positive for activity change. Negative for appetite change, fatigue and fever.   HENT:  Negative for congestion.    Respiratory:  Negative for cough, chest tightness, shortness of breath and wheezing.    Cardiovascular:  Negative for chest pain, palpitations and leg swelling.   Gastrointestinal:  Negative for constipation, nausea and vomiting.   Musculoskeletal:  Positive for gait problem.   Neurological:  Positive for weakness.   Psychiatric/Behavioral:  Positive for confusion. Negative for agitation and hallucinations.    All other systems reviewed and are negative.    Objective:     Vital Signs (Most Recent):  Temp: 98.2 °F (36.8 °C) (12/19/23 1111)  Pulse: 63 (12/19/23 1205)  Resp: 18 (12/19/23 1111)  BP: (!) 142/66 (12/19/23 1111)  SpO2: 97 % (12/19/23 1111) Vital Signs (24h Range):  Temp:  [97.5 °F (36.4 °C)-98.6 °F (37 °C)] 98.2 °F (36.8 °C)  Pulse:  [52-98] 63  Resp:  [15-20] 18  SpO2:  [96 %-99 %] 97 %  BP: ()/(50-74) 142/66     Weight: (!) 182.4 kg (402 lb 1.9 oz)  Body mass index is 71.23 kg/m².    Intake/Output Summary (Last 24 hours) at 12/19/2023 1304  Last data filed at 12/19/2023 0333  Gross per 24 hour   Intake 50 ml   Output 400 ml   Net -350 ml         Physical Exam  Vitals and nursing note reviewed.   Constitutional:       General: She is not in acute distress.     Appearance: She is not toxic-appearing.   HENT:      Head: Normocephalic and atraumatic.      Nose: No congestion.      Mouth/Throat:      Mouth: Mucous membranes are dry.      Pharynx: No oropharyngeal exudate.   Eyes:      Extraocular Movements: Extraocular movements intact.      Pupils: Pupils are equal, round, and reactive to light.   Cardiovascular:      Rate and Rhythm: Normal rate.      Heart sounds:      No friction rub. No gallop.   Pulmonary:      Effort: Pulmonary effort is normal. No respiratory distress.      Breath sounds: No  "wheezing or rales.   Chest:      Chest wall: No tenderness.   Abdominal:      General: Bowel sounds are normal. There is no distension.      Palpations: Abdomen is soft.      Tenderness: There is no abdominal tenderness. There is no guarding or rebound.   Musculoskeletal:         General: No swelling or tenderness.      Cervical back: No rigidity or tenderness.      Right lower leg: No edema.      Left lower leg: No edema.   Skin:     Findings: No erythema or rash.   Neurological:      Mental Status: She is alert. She is disoriented.      Motor: Weakness present.      Gait: Gait abnormal.             Significant Labs: All pertinent labs within the past 24 hours have been reviewed.  BMP:   Recent Labs   Lab 12/19/23  0912   *      K 3.8      CO2 22*   BUN 38*   CREATININE 1.8*   CALCIUM 9.3     CBC:   Recent Labs   Lab 12/18/23 0239 12/19/23  0912   WBC 8.79 8.64   HGB 8.8* 9.8*   HCT 28.2* 31.6*    185     CMP:   Recent Labs   Lab 12/18/23 0239 12/19/23  0912    138   K 3.9 3.8    106   CO2 22* 22*   GLU 78 130*   BUN 40* 38*   CREATININE 1.9* 1.8*   CALCIUM 9.0 9.3   ANIONGAP 8 10     Cardiac Markers: No results for input(s): "CKMB", "MYOGLOBIN", "BNP", "TROPISTAT" in the last 48 hours.  Lactic Acid: No results for input(s): "LACTATE" in the last 48 hours.  Troponin: No results for input(s): "TROPONINI", "TROPONINIHS" in the last 48 hours.  Recent Lab Results         12/19/23  1110   12/19/23  0912   12/18/23  1652        Anion Gap   10         BUN   38         Calcium   9.3         Chloride   106         CO2   22         Creatinine   1.8         eGFR   26         Glucose   130         Hematocrit   31.6         Hemoglobin   9.8         MCH   28.7         MCHC   31.0         MCV   93         MPV   12.4         Platelet Count   185         POCT Glucose 108     77       Potassium   3.8         RBC   3.41         RDW   16.9         Sodium   138         WBC   8.64           "       Significant Imaging: I have reviewed all pertinent imaging results/findings within the past 24 hours.    Assessment/Plan:      * Acute cystitis    Ucx growing out enterococcus  Rocephin started    Transitioned to augmentin    TIA (transient ischemic attack)    Antithrombotics for secondary stroke prevention: Antiplatelets: Aspirin: 81 mg daily  Clopidogrel: 75 mg daily    Statins for secondary stroke prevention and hyperlipidemia, if present:   Statins: Atorvastatin- 40 mg daily    Aggressive risk factor modification: HTN     Rehab efforts: The patient has been evaluated by a stroke team provider and the therapy needs have been fully considered based off the presenting complaints and exam findings. The following therapy evaluations are needed: PT evaluate and treat, OT evaluate and treat, SLP evaluate and treat    Diagnostics ordered/pending: CTA Head to assess vasculature , CTA Neck/Arch to assess vasculature, HgbA1C to assess blood glucose levels, Lipid Profile to assess cholesterol levels, TTE to assess cardiac function/status , TSH to assess thyroid function    VTE prophylaxis:  eliquis    BP parameters: TIA: SBP <220 until imaging confirmation of no infarct     Echo: 45-50%   Neuro consult pending        Acute on chronic anemia  Patient's anemia is currently uncontrolled. Has received 2 units of PRBCs on 12/13/23 . Etiology likely d/t ckd, cad  Current CBC reviewed-   Lab Results   Component Value Date    HGB 8.8 (L) 12/18/2023    HCT 28.2 (L) 12/18/2023     Monitor serial CBC and transfuse if patient becomes hemodynamically unstable, symptomatic or H/H drops below 7/21.    S/p  2 u pbcs  FOBT positive   GI consulted and plans for egd monday  PPI IV BID  Eliquis and plavix d/c'd      Type 2 diabetes mellitus with stage 3b chronic kidney disease, without long-term current use of insulin  Creatine stable for now. BMP reviewed- noted Estimated Creatinine Clearance: 24.1 mL/min (A) (based on SCr of 1.5 mg/dL  (H)). according to latest data. Based on current GFR, CKD stage is stage 3 - GFR 30-59.  Monitor UOP and serial BMP and adjust therapy as needed. Renally dose meds. Avoid nephrotoxic medications and procedures.    Major depressive disorder, recurrent episode, moderate  Patient has persistent depression which is mild and is currently controlled. Will Continue anti-depressant medications. We will not consult psychiatry at this time. Patient does display psychosis at this time. Continue to monitor closely and adjust plan of care as needed.        Pure hypercholesterolemia  Cont statin  Lipid panel        VTE Risk Mitigation (From admission, onward)           Ordered     IP VTE HIGH RISK PATIENT  Once         12/12/23 1203     Place sequential compression device  Until discontinued         12/12/23 1203                    Discharge Planning   KALEY: 12/19/2023     Code Status: DNR   Is the patient medically ready for discharge?:     Reason for patient still in hospital (select all that apply): Patient trending condition and Treatment  Discharge Plan A: Home with family, Home Health   Discharge Delays: None known at this time      Time spent > 35 min        Figueroa Chu MD  Department of Hospital Medicine   [facility]

## 2023-12-19 NOTE — PT/OT/SLP PROGRESS
Occupational Therapy   Treatment    Name: Krystina Washington  MRN: [id number]  Admitting Diagnosis:  Acute cystitis  1 Day Post-Op    Recommendations:     Discharge Recommendations: Low Intensity Therapy with assist from family  Discharge Equipment Recommendations:  none  Barriers to discharge:  Other (Comment) (Increased level of assistance)    Assessment:     Krystina Washington is a 89 y.o. female with a medical diagnosis of Acute cystitis.  Performance deficits affecting function are weakness, impaired endurance, impaired self care skills, impaired functional mobility, gait instability, impaired balance, decreased upper extremity function, decreased lower extremity function, decreased safety awareness, impaired coordination, impaired fine motor.    Pt found in bed, agreeable to therapy.  Pt is progressing well with therapy. Continue OT services to address functional goals, progressing as able.      Rehab Prognosis:  Good; patient would benefit from acute skilled OT services to address these deficits and reach maximum level of function.       Plan:     Patient to be seen 3 x/week to address the above listed problems via self-care/home management, therapeutic activities, therapeutic exercises  Plan of Care Expires: 01/09/24  Plan of Care Reviewed with: patient    Subjective     Chief Complaint: constipation   Patient/Family Comments/goals: to go home   Pain/Comfort:  Pain Rating 1: 0/10  Pain Rating Post-Intervention 1: 0/10    Objective:     Communicated with: RN prior to session.  Patient found HOB elevated with bed alarm, peripheral IV, telemetry upon OT entry to room.    General Precautions: Standard, fall, hearing impaired    Orthopedic Precautions:N/A  Braces: N/A  Respiratory Status: Room air     Occupational Performance:     Bed Mobility:    Patient completed Rolling/Turning to Right with stand by assistance  Patient completed Scooting/Bridging with stand by assistance  Patient completed Supine to Sit with stand  by assistance HOB elevated, increased time and effort, vc's for effective technique    Functional Mobility/Transfers:  Patient completed Sit <> Stand Transfer with contact guard assistance from EOB and minimum assistance from low surface  with  rolling walker.  VCs for hand placement.  Patient completed Bed <> Chair Transfer using Stand Pivot technique with contact guard assistance with rolling walker  Patient completed Toilet Transfer Stand Pivot technique with minimum assistance to stand with rolling walker  Functional Mobility: Pt ambulated room distances to<>from bathroom with CGA using RW.     Activities of Daily Living:  Grooming: stand by assistance with SBA/CGA for balance standing at sink  Toileting: minimum assistance for clothing mgmt.  Pt sits to perform hygiene.       [facility] 6 Click ADL: 18      Patient left up in chair with all lines intact and PT present    GOALS:   Multidisciplinary Problems       Occupational Therapy Goals          Problem: Occupational Therapy    Goal Priority Disciplines Outcome Interventions   Occupational Therapy Goal     OT, PT/OT Ongoing, Progressing    Description: Goals to be met by: 1/9/2024     Patient will increase functional independence with ADLs by performing:    Rolling with SBA to support self initiated pressure relief. MET 12/13/2023  Feeding with Stand-by Assistance with meal tray setup  UE Dressing with Minimal Assistance with overhead shirt  Grooming with Contact Guard Assistance with simplifed set up  Toileting from bedside commode with Moderate Assistance for hygiene and clothing management.   Sitting at edge of bed x5 minutes with Stand-by Assistance. MET 12/13/2023  Toilet transfer to bedside commode with Contact Guard Assistance with least restrictive device.  Family or caregiver 100% verbalized reciprocation of dementia friendly recommendations (ex: simplify choices; decrease visual clutter; remove hazardous objects; maximize familiarity, etc) to support  patient's wellbeing and highest level of occupational engagement and participation in least restrictive discharge environment                          Time Tracking:     OT Date of Treatment: 12/19/23  OT Start Time: 1005  OT Stop Time: 1028  OT Total Time (min): 23 min    Billable Minutes:Self Care/Home Management 13  Therapeutic Activity 10            12/19/2023

## 2023-12-19 NOTE — SUBJECTIVE & OBJECTIVE
Interval History:   Seen and examined, alert, verbal, minimal confusion, oriented to self, eating well, no acute event overnight. No CP or SOB    Review of Systems   Constitutional:  Positive for activity change. Negative for appetite change, fatigue and fever.   HENT:  Negative for congestion.    Respiratory:  Negative for cough, chest tightness, shortness of breath and wheezing.    Cardiovascular:  Negative for chest pain, palpitations and leg swelling.   Gastrointestinal:  Negative for constipation, nausea and vomiting.   Musculoskeletal:  Positive for gait problem.   Neurological:  Positive for weakness.   Psychiatric/Behavioral:  Positive for confusion. Negative for agitation and hallucinations.    All other systems reviewed and are negative.    Objective:     Vital Signs (Most Recent):  Temp: 98.2 °F (36.8 °C) (12/19/23 1111)  Pulse: 63 (12/19/23 1205)  Resp: 18 (12/19/23 1111)  BP: (!) 142/66 (12/19/23 1111)  SpO2: 97 % (12/19/23 1111) Vital Signs (24h Range):  Temp:  [97.5 °F (36.4 °C)-98.6 °F (37 °C)] 98.2 °F (36.8 °C)  Pulse:  [52-98] 63  Resp:  [15-20] 18  SpO2:  [96 %-99 %] 97 %  BP: ()/(50-74) 142/66     Weight: (!) 182.4 kg (402 lb 1.9 oz)  Body mass index is 71.23 kg/m².    Intake/Output Summary (Last 24 hours) at 12/19/2023 1304  Last data filed at 12/19/2023 0333  Gross per 24 hour   Intake 50 ml   Output 400 ml   Net -350 ml         Physical Exam  Vitals and nursing note reviewed.   Constitutional:       General: She is not in acute distress.     Appearance: She is not toxic-appearing.   HENT:      Head: Normocephalic and atraumatic.      Nose: No congestion.      Mouth/Throat:      Mouth: Mucous membranes are dry.      Pharynx: No oropharyngeal exudate.   Eyes:      Extraocular Movements: Extraocular movements intact.      Pupils: Pupils are equal, round, and reactive to light.   Cardiovascular:      Rate and Rhythm: Normal rate.      Heart sounds:      No friction rub. No gallop.  "  Pulmonary:      Effort: Pulmonary effort is normal. No respiratory distress.      Breath sounds: No wheezing or rales.   Chest:      Chest wall: No tenderness.   Abdominal:      General: Bowel sounds are normal. There is no distension.      Palpations: Abdomen is soft.      Tenderness: There is no abdominal tenderness. There is no guarding or rebound.   Musculoskeletal:         General: No swelling or tenderness.      Cervical back: No rigidity or tenderness.      Right lower leg: No edema.      Left lower leg: No edema.   Skin:     Findings: No erythema or rash.   Neurological:      Mental Status: She is alert. She is disoriented.      Motor: Weakness present.      Gait: Gait abnormal.             Significant Labs: All pertinent labs within the past 24 hours have been reviewed.  BMP:   Recent Labs   Lab 12/19/23  0912   *      K 3.8      CO2 22*   BUN 38*   CREATININE 1.8*   CALCIUM 9.3     CBC:   Recent Labs   Lab 12/18/23  0239 12/19/23  0912   WBC 8.79 8.64   HGB 8.8* 9.8*   HCT 28.2* 31.6*    185     CMP:   Recent Labs   Lab 12/18/23  0239 12/19/23  0912    138   K 3.9 3.8    106   CO2 22* 22*   GLU 78 130*   BUN 40* 38*   CREATININE 1.9* 1.8*   CALCIUM 9.0 9.3   ANIONGAP 8 10     Cardiac Markers: No results for input(s): "CKMB", "MYOGLOBIN", "BNP", "TROPISTAT" in the last 48 hours.  Lactic Acid: No results for input(s): "LACTATE" in the last 48 hours.  Troponin: No results for input(s): "TROPONINI", "TROPONINIHS" in the last 48 hours.  Recent Lab Results         12/19/23  1110   12/19/23  0912   12/18/23  1652        Anion Gap   10         BUN   38         Calcium   9.3         Chloride   106         CO2   22         Creatinine   1.8         eGFR   26         Glucose   130         Hematocrit   31.6         Hemoglobin   9.8         MCH   28.7         MCHC   31.0         MCV   93         MPV   12.4         Platelet Count   185         POCT Glucose 108     77       " Potassium   3.8         RBC   3.41         RDW   16.9         Sodium   138         WBC   8.64                 Significant Imaging: I have reviewed all pertinent imaging results/findings within the past 24 hours.

## 2023-12-19 NOTE — [employer] PATIENT INSTRUCTIONS
Discharge Summary/Instructions after an Endoscopic Procedure  Patient Name: Krystina Washington  Patient MRN: [id number]  Patient YOB: 1932 Monday, December 18, 2023  Mike Rene MD  Dear patient,  As a result of recent federal legislation (The Federal Cures Act), you may   receive lab or pathology results from your procedure in your [facility]   account before your physician is able to contact you. Your physician or   their representative will relay the results to you with their   recommendations at their soonest availability.  Thank you,  Your health is very important to us during the Covid Crisis. Following your   procedure today, you will receive a daily text for 2 weeks asking about   signs or symptoms of Covid 19.  Please respond to this text when you   receive it so we can follow up and keep you as safe as possible.   RESTRICTIONS:  During your procedure today, you received medications for sedation.  These   medications may affect your judgment, balance and coordination.  Therefore,   for 24 hours, you have the following restrictions:   - DO NOT drive a car, operate machinery, make legal/financial decisions,   sign important papers or drink alcohol.    ACTIVITY:  Today: no heavy lifting, straining or running due to procedural   sedation/anesthesia.  The following day: return to full activity including work.  DIET:  Eat and drink normally unless instructed otherwise.     TREATMENT FOR COMMON SIDE EFFECTS:  - Mild abdominal pain, nausea, belching, bloating or excessive gas:  rest,   eat lightly and use a heating pad.  - Sore Throat: treat with throat lozenges and/or gargle with warm salt   water.  - Because air was used during the procedure, expelling large amounts of air   from your rectum or belching is normal.  - If a bowel prep was taken, you may not have a bowel movement for 1-3 days.    This is normal.  SYMPTOMS TO WATCH FOR AND REPORT TO YOUR PHYSICIAN:  1. Abdominal pain or bloating,  other than gas cramps.  2. Chest pain.  3. Back pain.  4. Signs of infection such as: chills or fever occurring within 24 hours   after the procedure.  5. Rectal bleeding, which would show as bright red, maroon, or black stools.   (A tablespoon of blood from the rectum is not serious, especially if   hemorrhoids are present.)  6. Vomiting.  7. Weakness or dizziness.  GO DIRECTLY TO THE NEAREST EMERGENCY ROOM IF YOU HAVE ANY OF THE FOLLOWING:      Difficulty breathing              Chills and/or fever over 101 F   Persistent vomiting and/or vomiting blood   Severe abdominal pain   Severe chest pain   Black, tarry stools   Bleeding- more than one tablespoon   Any other symptom or condition that you feel may need urgent attention  Your doctor recommends these additional instructions:  If any biopsies were taken, your doctors clinic will contact you in 1 to 2   weeks with any results.  - Return patient to [facility] for [facility] care.   - Resume previous diet.   - Continue present medications.   - Await pathology results.   - After discussion with patient, she would like to defer colonoscopy and opt   for CT abdomen with contrast as next step to look for colon mass as   possible source of anemia.  For questions, problems or results please call your physician - Mike Rene MD.  EMERGENCY PHONE NUMBER: [phone],  LAB RESULTS: [phone]  IF A COMPLICATION OR EMERGENCY SITUATION ARISES AND YOU ARE UNABLE TO REACH   YOUR PHYSICIAN - GO DIRECTLY TO THE EMERGENCY ROOM.  Mike Rene MD  12/19/2023 7:49:39 AM  [employer]

## 2023-12-19 NOTE — PT/OT/SLP PROGRESS
Physical Therapy Treatment    Patient Name:  Krystina Washington   MRN:  [id number]    Recommendations:     Discharge Recommendations: Low Intensity Therapy; 24/7 family assistance  Discharge Equipment Recommendations: none  Barriers to discharge:  requires increased assistance with standing mobility to increase overall safety    Assessment:     Krystina Washington is a 89 y.o. female admitted with a medical diagnosis of Acute cystitis.  She presents with the following impairments/functional limitations: weakness, impaired endurance, impaired self care skills, impaired functional mobility, gait instability, impaired balance, decreased lower extremity function, decreased safety awareness.    Pt participates in transfers to standing, standing therapeutic exercises and ambulation with use of RW and MIN A/CGA. Therapy will continue to progress pt as able.        Rehab Prognosis: Good; patient would benefit from acute skilled PT services to address these deficits and reach maximum level of function.    Recent Surgery: Procedure(s) (LRB):  EGD (ESOPHAGOGASTRODUODENOSCOPY) (N/A) 1 Day Post-Op    Plan:     During this hospitalization, patient to be seen 5 x/week to address the identified rehab impairments via gait training, therapeutic activities, therapeutic exercises, neuromuscular re-education and progress toward the following goals:    Plan of Care Expires:  01/12/24    Subjective     Chief Complaint: no complaints  Patient/Family Comments/goals: to increase functional mobility  Pain/Comfort:  Pain Rating 1: 0/10  Pain Rating Post-Intervention 1: 0/10      Objective:     Communicated with Nurse prior to session.  Patient found up in chair with chair check, PureWick, telemetry upon PT entry to room.     General Precautions: Standard, fall, hearing impaired  Orthopedic Precautions: N/A  Braces: N/A  Respiratory Status: Room air     Functional Mobility:  Transfers:     Sit to Stand:  minimum assistance with rolling walker  Gait:  ~60ft with use of RW and CGA; cues to remain in close proximity to RW.       AM-PAC 6 CLICK MOBILITY  Turning over in bed (including adjusting bedclothes, sheets and blankets)?: 3  Sitting down on and standing up from a chair with arms (e.g., wheelchair, bedside commode, etc.): 3  Moving from lying on back to sitting on the side of the bed?: 2  Moving to and from a bed to a chair (including a wheelchair)?: 3  Need to walk in hospital room?: 3  Climbing 3-5 steps with a railing?: 1  Basic Mobility Total Score: 15       Treatment & Education:  Pt performs mobility as stated above in functional mobility section of note.   Pt participates in seated BLE kicks and marches 2x20 each and standing mini squats with use of RW and MIN A 3x10 with standing rest breaks as needed.   Pt's toilet noted to be leaking; nursing notified.     Patient left up in chair with all lines intact, call button in reach, chair alarm on, and Nurse notified.    GOALS:   Multidisciplinary Problems       Physical Therapy Goals          Problem: Physical Therapy    Goal Priority Disciplines Outcome Goal Variances Interventions   Physical Therapy Goal     PT, PT/OT Ongoing, Progressing     Description: Goals to be met by: 24     Patient will increase functional independence with mobility by performin. Supine to sit with Stand-by Assistance  2. Sit to supine with Stand-by Assistance  3. Sit to stand transfer with Contact Guard Assistance with use of RW.   4. Bed to chair transfer with Contact Guard Assistance using Rolling Walker  5. Gait  x 50 feet with Contact Guard Assistance using Rolling Walker. -MET 23  6. Gait x150 feet with SBA using Rolling Walker.                         Time Tracking:     PT Received On: 23  PT Start Time: 1023     PT Stop Time: 1041  PT Total Time (min): 18 min     Billable Minutes: Gait Training 8 and Therapeutic Exercise 10    Treatment Type: Treatment  PT/PTA: PT     Number of PTA visits since last  PT visit: 0     12/19/2023

## 2023-12-19 NOTE — PLAN OF CARE
Problem: Adult Inpatient Plan of Care  Goal: Plan of Care Review  Outcome: Ongoing, Progressing  Goal: Patient-Specific Goal (Individualized)  Outcome: Ongoing, Progressing  Goal: Absence of Hospital-Acquired Illness or Injury  Outcome: Ongoing, Progressing  Goal: Optimal Comfort and Wellbeing  Outcome: Ongoing, Progressing  Goal: Readiness for Transition of Care  Outcome: Ongoing, Progressing     Problem: Diabetes Comorbidity  Goal: Blood Glucose Level Within Targeted Range  Outcome: Ongoing, Progressing     Problem: Skin Injury Risk Increased  Goal: Skin Health and Integrity  Outcome: Ongoing, Progressing     Problem: Fall Injury Risk  Goal: Absence of Fall and Fall-Related Injury  Outcome: Ongoing, Progressing     Problem: Adjustment to Illness (Stroke, Ischemic/Transient Ischemic Attack)  Goal: Optimal Coping  Outcome: Ongoing, Progressing     Problem: Bowel Elimination Impaired (Stroke, Ischemic/Transient Ischemic Attack)  Goal: Effective Bowel Elimination  Outcome: Ongoing, Progressing     Problem: Cerebral Tissue Perfusion (Stroke, Ischemic/Transient Ischemic Attack)  Goal: Optimal Cerebral Tissue Perfusion  Outcome: Ongoing, Progressing     Problem: Cognitive Impairment (Stroke, Ischemic/Transient Ischemic Attack)  Goal: Optimal Cognitive Function  Outcome: Ongoing, Progressing     Problem: Communication Impairment (Stroke, Ischemic/Transient Ischemic Attack)  Goal: Improved Communication Skills  Outcome: Ongoing, Progressing     Problem: Functional Ability Impaired (Stroke, Ischemic/Transient Ischemic Attack)  Goal: Optimal Functional Ability  Outcome: Ongoing, Progressing     Problem: Respiratory Compromise (Stroke, Ischemic/Transient Ischemic Attack)  Goal: Effective Oxygenation and Ventilation  Outcome: Ongoing, Progressing     Problem: Sensorimotor Impairment (Stroke, Ischemic/Transient Ischemic Attack)  Goal: Improved Sensorimotor Function  Outcome: Ongoing, Progressing     Problem: Swallowing  Impairment (Stroke, Ischemic/Transient Ischemic Attack)  Goal: Optimal Eating and Swallowing without Aspiration  Outcome: Ongoing, Progressing     Problem: Urinary Elimination Impaired (Stroke, Ischemic/Transient Ischemic Attack)  Goal: Effective Urinary Elimination  Outcome: Ongoing, Progressing     Problem: Bariatric Environmental Safety  Goal: Safety Maintained with Care  Outcome: Ongoing, Progressing

## 2023-12-19 NOTE — PLAN OF CARE
Problem: Occupational Therapy  Goal: Occupational Therapy Goal  Description: Goals to be met by: 1/9/2024     Patient will increase functional independence with ADLs by performing:    Rolling with SBA to support self initiated pressure relief. MET 12/13/2023  Feeding with Stand-by Assistance with meal tray setup  UE Dressing with Minimal Assistance with overhead shirt  Grooming with Contact Guard Assistance with simplifed set up  Toileting from bedside commode with Moderate Assistance for hygiene and clothing management. MET 12/19  Sitting at edge of bed x5 minutes with Stand-by Assistance. MET 12/13/2023  Toilet transfer to bedside commode with Contact Guard Assistance with least restrictive device.  Family or caregiver 100% verbalized reciprocation of dementia friendly recommendations (ex: simplify choices; decrease visual clutter; remove hazardous objects; maximize familiarity, etc) to support patient's wellbeing and highest level of occupational engagement and participation in least restrictive discharge environment     Outcome: Ongoing, Progressing   Krystina Washington is a 89 y.o. female with a medical diagnosis of Acute cystitis.  Performance deficits affecting function are weakness, impaired endurance, impaired self care skills, impaired functional mobility, gait instability, impaired balance, decreased upper extremity function, decreased lower extremity function, decreased safety awareness, impaired coordination, impaired fine motor.    Pt found in bed, agreeable to therapy.  Pt is progressing well with therapy. Continue OT services to address functional goals, progressing as able.

## 2023-12-20 LAB
ANION GAP SERPL CALC-SCNC: 7 MMOL/L (ref 8–16)
BUN SERPL-MCNC: 35 MG/DL (ref 10–30)
CALCIUM SERPL-MCNC: 9.1 MG/DL (ref 8.7–10.5)
CHLORIDE SERPL-SCNC: 108 MMOL/L (ref 95–110)
CO2 SERPL-SCNC: 23 MMOL/L (ref 23–29)
CREAT SERPL-MCNC: 1.7 MG/DL (ref 0.5–1.4)
ERYTHROCYTE [DISTWIDTH] IN BLOOD BY AUTOMATED COUNT: 16.8 % (ref 11.5–14.5)
EST. GFR  (NO RACE VARIABLE): 28 ML/MIN/1.73 M^2
GLUCOSE SERPL-MCNC: 94 MG/DL (ref 70–110)
HCT VFR BLD AUTO: 28.6 % (ref 37–48.5)
HGB BLD-MCNC: 8.8 G/DL (ref 12–16)
MCH RBC QN AUTO: 28.8 PG (ref 27–31)
MCHC RBC AUTO-ENTMCNC: 30.8 G/DL (ref 32–36)
MCV RBC AUTO: 94 FL (ref 82–98)
PLATELET # BLD AUTO: 173 K/UL (ref 150–450)
PMV BLD AUTO: 12.4 FL (ref 9.2–12.9)
POCT GLUCOSE: 85 MG/DL (ref 70–110)
POTASSIUM SERPL-SCNC: 4.1 MMOL/L (ref 3.5–5.1)
RBC # BLD AUTO: 3.06 M/UL (ref 4–5.4)
SODIUM SERPL-SCNC: 138 MMOL/L (ref 136–145)
WBC # BLD AUTO: 9.03 K/UL (ref 3.9–12.7)

## 2023-12-20 PROCEDURE — 25000003 PHARM REV CODE 250: Performed by: FAMILY MEDICINE

## 2023-12-20 PROCEDURE — 11000001 HC ACUTE MED/SURG PRIVATE ROOM

## 2023-12-20 PROCEDURE — C9113 INJ PANTOPRAZOLE SODIUM, VIA: HCPCS | Performed by: FAMILY MEDICINE

## 2023-12-20 PROCEDURE — 97535 SELF CARE MNGMENT TRAINING: CPT | Mod: CO

## 2023-12-20 PROCEDURE — 94761 N-INVAS EAR/PLS OXIMETRY MLT: CPT

## 2023-12-20 PROCEDURE — 97116 GAIT TRAINING THERAPY: CPT | Mod: CQ

## 2023-12-20 PROCEDURE — 80048 BASIC METABOLIC PNL TOTAL CA: CPT | Performed by: FAMILY MEDICINE

## 2023-12-20 PROCEDURE — 97530 THERAPEUTIC ACTIVITIES: CPT | Mod: CO

## 2023-12-20 PROCEDURE — 36415 COLL VENOUS BLD VENIPUNCTURE: CPT | Performed by: FAMILY MEDICINE

## 2023-12-20 PROCEDURE — 85027 COMPLETE CBC AUTOMATED: CPT | Performed by: FAMILY MEDICINE

## 2023-12-20 PROCEDURE — 63600175 PHARM REV CODE 636 W HCPCS: Performed by: FAMILY MEDICINE

## 2023-12-20 PROCEDURE — 99900035 HC TECH TIME PER 15 MIN (STAT)

## 2023-12-20 PROCEDURE — 97110 THERAPEUTIC EXERCISES: CPT | Mod: CQ

## 2023-12-20 RX ORDER — HEPARIN SODIUM 5000 [USP'U]/ML
5000 INJECTION, SOLUTION INTRAVENOUS; SUBCUTANEOUS EVERY 8 HOURS
Status: DISCONTINUED | OUTPATIENT
Start: 2023-12-20 | End: 2023-12-21 | Stop reason: HOSPADM

## 2023-12-20 RX ORDER — AMOXICILLIN AND CLAVULANATE POTASSIUM 500; 125 MG/1; MG/1
1 TABLET, FILM COATED ORAL ONCE
Status: COMPLETED | OUTPATIENT
Start: 2023-12-20 | End: 2023-12-20

## 2023-12-20 RX ADMIN — ATORVASTATIN CALCIUM 40 MG: 40 TABLET, FILM COATED ORAL at 08:12

## 2023-12-20 RX ADMIN — HYDRALAZINE HYDROCHLORIDE 75 MG: 25 TABLET, FILM COATED ORAL at 01:12

## 2023-12-20 RX ADMIN — SODIUM CHLORIDE: 9 INJECTION, SOLUTION INTRAVENOUS at 04:12

## 2023-12-20 RX ADMIN — AMOXICILLIN AND CLAVULANATE POTASSIUM 500 MG: 500; 125 TABLET, FILM COATED ORAL at 10:12

## 2023-12-20 RX ADMIN — TIMOLOL MALEATE 1 DROP: 5 SOLUTION OPHTHALMIC at 09:12

## 2023-12-20 RX ADMIN — HEPARIN SODIUM 5000 UNITS: 5000 INJECTION INTRAVENOUS; SUBCUTANEOUS at 09:12

## 2023-12-20 RX ADMIN — HYDRALAZINE HYDROCHLORIDE 75 MG: 25 TABLET, FILM COATED ORAL at 06:12

## 2023-12-20 RX ADMIN — PANTOPRAZOLE SODIUM 40 MG: 40 INJECTION, POWDER, LYOPHILIZED, FOR SOLUTION INTRAVENOUS at 09:12

## 2023-12-20 RX ADMIN — TIMOLOL MALEATE 1 DROP: 5 SOLUTION OPHTHALMIC at 08:12

## 2023-12-20 RX ADMIN — AMOXICILLIN AND CLAVULANATE POTASSIUM 1 TABLET: 875; 125 TABLET, FILM COATED ORAL at 08:12

## 2023-12-20 RX ADMIN — HEPARIN SODIUM 5000 UNITS: 5000 INJECTION INTRAVENOUS; SUBCUTANEOUS at 01:12

## 2023-12-20 RX ADMIN — ISOSORBIDE MONONITRATE 30 MG: 30 TABLET, EXTENDED RELEASE ORAL at 08:12

## 2023-12-20 RX ADMIN — MULTIPLE VITAMINS W/ MINERALS TAB 1 TABLET: TAB at 08:12

## 2023-12-20 RX ADMIN — HYDRALAZINE HYDROCHLORIDE 75 MG: 25 TABLET, FILM COATED ORAL at 09:12

## 2023-12-20 RX ADMIN — ALLOPURINOL 100 MG: 100 TABLET ORAL at 08:12

## 2023-12-20 RX ADMIN — SACUBITRIL AND VALSARTAN 1 TABLET: 49; 51 TABLET, FILM COATED ORAL at 09:12

## 2023-12-20 RX ADMIN — CARVEDILOL 12.5 MG: 12.5 TABLET, FILM COATED ORAL at 08:12

## 2023-12-20 RX ADMIN — SACUBITRIL AND VALSARTAN 1 TABLET: 49; 51 TABLET, FILM COATED ORAL at 08:12

## 2023-12-20 RX ADMIN — ESCITALOPRAM OXALATE 20 MG: 10 TABLET ORAL at 08:12

## 2023-12-20 RX ADMIN — CARVEDILOL 12.5 MG: 12.5 TABLET, FILM COATED ORAL at 09:12

## 2023-12-20 NOTE — PT/OT/SLP PROGRESS
"Occupational Therapy   Treatment    Name: Krystina Washington  MRN: [id number]  Admitting Diagnosis:  Acute cystitis  2 Days Post-Op    Recommendations:     Discharge Recommendations: Low Intensity Therapy (24/7 assist from family)  Discharge Equipment Recommendations:  none  Barriers to discharge:  Other (Comment) (Increased level of assistance)    Assessment:     Krystina Washington is a 89 y.o. female with a medical diagnosis of Acute cystitis.  Performance deficits affecting function are weakness, impaired endurance, decreased upper extremity function, impaired functional mobility, impaired self care skills, gait instability, impaired balance, decreased lower extremity function, impaired coordination, impaired fine motor, impaired skin.    Pt found in chair, agreeable to therapy.  Pt is progressing well towards goals. Continue OT services to address functional goals, progressing as able.      Rehab Prognosis:  Good; patient would benefit from acute skilled OT services to address these deficits and reach maximum level of function.       Plan:     Patient to be seen 3 x/week to address the above listed problems via self-care/home management, therapeutic activities, therapeutic exercises  Plan of Care Expires: 01/09/24  Plan of Care Reviewed with: patient    Subjective     Chief Complaint: "I need to use the bathroom."  Patient/Family Comments/goals: to go home  Pain/Comfort:  Pain Rating 1: 0/10  Pain Rating Post-Intervention 1: 0/10    Objective:     Communicated with: RN prior to session.  Patient found up in chair with peripheral IV, telemetry upon OT entry to room.    General Precautions: Standard, fall, hearing impaired    Orthopedic Precautions:N/A  Braces: N/A  Respiratory Status: Room air     Occupational Performance:      Functional Mobility/Transfers:  Patient completed Sit <> Stand Transfer with moderate assistance  with  rolling walker with vcs for effect tech/hand placement from low chair and toilet   Patient " completed Bed <> Chair Transfer using Stand Pivot technique with contact guard assistance with rolling walker  Patient completed Toilet Transfer Stand Pivot technique with contact guard assistance with  rolling walker  Functional Mobility: Pt ambulated room distances with CGA using RW.  Pt requires vcs for RW safety/mgmt.     Activities of Daily Living:  Grooming: minimum assistance to open tight tops 2/2 impaired strength at chair level   Toileting: minimum assistance for clothing mgmt       AMPAC 6 Click ADL: 18    Treatment & Education:  Encouraged OOB in chair 1-2 hours and to call for assistance for back to bed. Pt verbalizes understanding.        Patient left up in chair with all lines intact, call button in reach, and RN notified    GOALS:   Multidisciplinary Problems       Occupational Therapy Goals          Problem: Occupational Therapy    Goal Priority Disciplines Outcome Interventions   Occupational Therapy Goal     OT, PT/OT Ongoing, Progressing    Description: Goals to be met by: 1/9/2024     Patient will increase functional independence with ADLs by performing:    Rolling with SBA to support self initiated pressure relief. MET 12/13/2023  Feeding with Stand-by Assistance with meal tray setup  UE Dressing with Minimal Assistance with overhead shirt  Grooming with Contact Guard Assistance with simplifed set up  Toileting from bedside commode with Moderate Assistance for hygiene and clothing management.   Sitting at edge of bed x5 minutes with Stand-by Assistance. MET 12/13/2023  Toilet transfer to bedside commode with Contact Guard Assistance with least restrictive device.  Family or caregiver 100% verbalized reciprocation of dementia friendly recommendations (ex: simplify choices; decrease visual clutter; remove hazardous objects; maximize familiarity, etc) to support patient's wellbeing and highest level of occupational engagement and participation in least restrictive discharge environment                           Time Tracking:     OT Date of Treatment: 12/20/23  OT Start Time: 1050  OT Stop Time: 1118  OT Total Time (min): 28 min    Billable Minutes:Self Care/Home Management 18  Therapeutic Activity 10            12/20/2023

## 2023-12-20 NOTE — PLAN OF CARE
Problem: Physical Therapy  Goal: Physical Therapy Goal  Description: Goals to be met by: 24     Patient will increase functional independence with mobility by performin. Supine to sit with Stand-by Assistance  2. Sit to supine with Stand-by Assistance  3. Sit to stand transfer with Contact Guard Assistance with use of RW.   4. Bed to chair transfer with Contact Guard Assistance using Rolling Walker  5. Gait  x 50 feet with Contact Guard Assistance using Rolling Walker. -MET 23  6. Gait x150 feet with SBA using Rolling Walker.    Outcome: Ongoing, Progressing

## 2023-12-20 NOTE — PLAN OF CARE
Patient Contacts    Name Relation Home Work Mobile   Hermelindo Mackenzie Son [phone]     Merry Lafleur Daughter   [phone]   Eliz Mackenzie Grandmaggy   [phone]   Jj Mackenzie Grandchild   [phone]   .  Future Appointments   Date Time Provider Department Center   1/19/2024  8:00 AM ChairsKyra NP [facility] [place]        Name Relationship Specialty Phone Fax Address Order                [facility] VASCULAR NEUROLOGY  Vascular Neurology [phone] [address] [facility] [place] [address]     Next Steps: Follow up  Instructions: Vascular Neurology Follow-Up Requested [facility] Neuro Phone: [phone]        [facility]  Home Health Services, Home Therapy Services, Home Living Aide Services [phone] [address] [facility], [address] [place] [address]     Next Steps: Follow up  Instructions: [facility]         Claudia Scott RN    [phone]

## 2023-12-20 NOTE — PT/OT/SLP PROGRESS
Physical Therapy Treatment    Patient Name:  Krystina Washington   MRN:  [id number]    Recommendations:     Discharge Recommendations: Low Intensity Therapy  Discharge Equipment Recommendations: none  Barriers to discharge:  decreased mobility,strength and endurance    Assessment:     Krystina Washington is a 89 y.o. female admitted with a medical diagnosis of Acute cystitis.  She presents with the following impairments/functional limitations: weakness, impaired endurance, impaired functional mobility, gait instability, impaired balance, decreased lower extremity function, decreased ROM, impaired coordination, impaired skin,pt with improving mobility and will benefit from low intensity therapy upon discharge to address above functional limitations .    Rehab Prognosis: Good; patient would benefit from acute skilled PT services to address these deficits and reach maximum level of function.    Recent Surgery: Procedure(s) (LRB):  EGD (ESOPHAGOGASTRODUODENOSCOPY) (N/A) 2 Days Post-Op    Plan:     During this hospitalization, patient to be seen 5 x/week to address the identified rehab impairments via gait training, therapeutic activities, therapeutic exercises, neuromuscular re-education and progress toward the following goals:    Plan of Care Expires:  01/12/24    Subjective     Chief Complaint: n/a  Patient/Family Comments/goals: pt states she didn't sleep last night.  Pain/Comfort:  Pain Rating 1: 0/10      Objective:     Communicated with nsg prior to session.  Patient found supine with PureWick, telemetry upon PT entry to room.     General Precautions: Standard, fall, hearing impaired  Orthopedic Precautions: N/A  Braces: N/A  Respiratory Status: Room air     Functional Mobility:  Bed Mobility:     Supine to Sit: minimum assistance  Transfers:     Sit to Stand:  contact guard assistance with rolling walker  Bed to Chair: contact guard assistance with  rolling walker  using  ambulation  Gait: amb ~75' RW and CGA  Balance:  fair standing balance with RW      AM-PAC 6 CLICK MOBILITY  Turning over in bed (including adjusting bedclothes, sheets and blankets)?: 3  Sitting down on and standing up from a chair with arms (e.g., wheelchair, bedside commode, etc.): 3  Moving from lying on back to sitting on the side of the bed?: 3  Moving to and from a bed to a chair (including a wheelchair)?: 3  Need to walk in hospital room?: 3  Climbing 3-5 steps with a railing?: 2  Basic Mobility Total Score: 17       Treatment & Education: le seated ex's X 10-12 reps inc: ap,laq's and hip flex,placed alarm and set up pt's breakfast tray with requests met.      Patient left up in chair with all lines intact, call button in reach, chair alarm on, and nsg notified..    GOALS: see POC  Multidisciplinary Problems       Physical Therapy Goals          Problem: Physical Therapy    Goal Priority Disciplines Outcome Goal Variances Interventions   Physical Therapy Goal     PT, PT/OT Ongoing, Progressing     Description: Goals to be met by: 24     Patient will increase functional independence with mobility by performin. Supine to sit with Stand-by Assistance  2. Sit to supine with Stand-by Assistance  3. Sit to stand transfer with Contact Guard Assistance with use of RW.   4. Bed to chair transfer with Contact Guard Assistance using Rolling Walker  5. Gait  x 50 feet with Contact Guard Assistance using Rolling Walker. -MET 23  6. Gait x150 feet with SBA using Rolling Walker.                         Time Tracking:     PT Received On: 23  PT Start Time: 48     PT Stop Time: 1013  PT Total Time (min): 25 min     Billable Minutes: Gait Training 14 and Therapeutic Exercise 11    Treatment Type: Treatment  PT/PTA: PTA     Number of PTA visits since last PT visit: 1     2023

## 2023-12-20 NOTE — PLAN OF CARE
Problem: Occupational Therapy  Goal: Occupational Therapy Goal  Description: Goals to be met by: 1/9/2024     Patient will increase functional independence with ADLs by performing:    Rolling with SBA to support self initiated pressure relief. MET 12/13/2023  Feeding with Stand-by Assistance with meal tray setup  UE Dressing with Minimal Assistance with overhead shirt  Grooming with Contact Guard Assistance with simplifed set up  Toileting from bedside commode with Moderate Assistance for hygiene and clothing management.   Sitting at edge of bed x5 minutes with Stand-by Assistance. MET 12/13/2023  Toilet transfer to bedside commode with Contact Guard Assistance with least restrictive device.  Family or caregiver 100% verbalized reciprocation of dementia friendly recommendations (ex: simplify choices; decrease visual clutter; remove hazardous objects; maximize familiarity, etc) to support patient's wellbeing and highest level of occupational engagement and participation in least restrictive discharge environment     Outcome: Ongoing, Progressing   Krystina Washington is a 89 y.o. female with a medical diagnosis of Acute cystitis.  Performance deficits affecting function are weakness, impaired endurance, decreased upper extremity function, impaired functional mobility, impaired self care skills, gait instability, impaired balance, decreased lower extremity function, impaired coordination, impaired fine motor, impaired skin.    Pt found in chair, agreeable to therapy.  Pt is progressing well towards goals. Continue OT services to address functional goals, progressing as able.

## 2023-12-20 NOTE — PLAN OF CARE
Problem: Adult Inpatient Plan of Care  Goal: Plan of Care Review  Outcome: Ongoing, Progressing  Goal: Patient-Specific Goal (Individualized)  Outcome: Ongoing, Progressing  Goal: Absence of Hospital-Acquired Illness or Injury  Outcome: Ongoing, Progressing  Goal: Readiness for Transition of Care  Outcome: Ongoing, Progressing     Problem: Diabetes Comorbidity  Goal: Blood Glucose Level Within Targeted Range  Outcome: Ongoing, Progressing     Problem: Skin Injury Risk Increased  Goal: Skin Health and Integrity  Outcome: Ongoing, Progressing     Problem: Fall Injury Risk  Goal: Absence of Fall and Fall-Related Injury  Outcome: Ongoing, Progressing     Problem: Bowel Elimination Impaired (Stroke, Ischemic/Transient Ischemic Attack)  Goal: Effective Bowel Elimination  Outcome: Ongoing, Progressing     Problem: Cerebral Tissue Perfusion (Stroke, Ischemic/Transient Ischemic Attack)  Goal: Optimal Cerebral Tissue Perfusion  Outcome: Ongoing, Progressing     Problem: Communication Impairment (Stroke, Ischemic/Transient Ischemic Attack)  Goal: Improved Communication Skills  Outcome: Ongoing, Progressing     Problem: Functional Ability Impaired (Stroke, Ischemic/Transient Ischemic Attack)  Goal: Optimal Functional Ability  Outcome: Ongoing, Progressing     Problem: Sensorimotor Impairment (Stroke, Ischemic/Transient Ischemic Attack)  Goal: Improved Sensorimotor Function  Outcome: Ongoing, Progressing     Problem: Bariatric Environmental Safety  Goal: Safety Maintained with Care  Outcome: Ongoing, Progressing

## 2023-12-20 NOTE — PROGRESS NOTES
"HOSPITALIST PROGRESS NOTE     PATIENT IDENTIFICATION:  Admit Date: 2023  Today's Date:2023  Patient Name: Krystina Washington  : 1932  Age: 89 y.o.  Sex: female  Length of Stay: 7    CHIEF COMPLAINT:            HPI:   Patient is a 89 y.o. female with PMH of prior strokes (R-PCA), recent small R cerebellar stroke, CHF, CABG, TAVR, on Eliquis who presents from home for difficulty speaking and an episode of shaking.  Patient's son states symptoms started with leg and then arm shaking. Then he noticed her speech became stuttering. Also complains of chest tigthness.  She was otherwise normal this morning, had breakfast, etc. The episode lasted for 30 min. Upon examination, everything was resolved. Vascular Neuro was consulted in the ED. No evidence of acute large vessel occlusion or flow-limiting stenosis shown on CTA. Labs, cxr and U/a neg. Admit for TIA and chest tightness     SUBJECTIVE:  Patient seen and examined very pleasant, alert, oriented x 2 sitting on a chair, stated feeling good, eating well, no constipation, no diarrhea, doing well with PT. No new acute event    ROS:  Twelve point review of systems is obtained and is negative except as in the history of present illness.    Vitals last 24 hours:  /65 (BP Location: Right arm, Patient Position: Sitting)   Pulse 73   Temp 98 °F (36.7 °C) (Oral)   Resp 18   Ht 5' 3" (1.6 m)   Wt (!) 182.4 kg (402 lb 1.9 oz)   LMP  (LMP Unknown)   SpO2 99%   BMI 71.23 kg/m²     Intake/Output this shift:    Intake/Output Summary (Last 24 hours) at 2023 1308  Last data filed at 2023 1023  Gross per 24 hour   Intake 665 ml   Output 1350 ml   Net -685 ml       Cultures:     ANTIBIOTICS:  augmentin  DVT PPX:  heparin  GI PPX:  protonix    Physical Exam:  GENERAL ASSESSMENT: 89 y.o. female, NAD  EYES: PERRLA, no conjunctival pallor or icterus    ENT: Mucus membranes moist  NECK: no JVD, trachea midline  CHEST: CTA bilaterally with normal " "respiratory effort  HEART: Normal S 1, S 2, no murmur,  No pedal edema  ABDOMEN: soft, non-tender, non-distended, normoactive bowel sounds, no guarding or rigidity,   EXTREMITY: No pedal edema, pulses intact and symmetric,    SKIN: Clean, dry, intact, no rash, normal skin turgor  NEURO: grossly intact, alert and oriented x 2.  PSYCH: normal affect    Recent labs:   Recent Labs   Lab 12/20/23  0316   WBC 9.03   HGB 8.8*   HCT 28.6*        Recent Labs   Lab 12/20/23  0316      K 4.1      CO2 23   BUN 35*   CREATININE 1.7*   CALCIUM 9.1     No results for input(s): "HGBA1C" in the last 168 hours.  No results for input(s): "CHOL", "TRIG", "HDL", "LDL" in the last 168 hours.    Recent Imaging:  Imaging Results              X-Ray Chest 1 View (Final result)  Result time 12/12/23 13:17:04      Final result by Angelic Reynaga MD (12/12/23 13:17:04)                   Impression:      Minimal subsegmental atelectasis at the left lung base.      Electronically signed by: Angelic Reynaga MD  Date:    12/12/2023  Time:    13:17               Narrative:    EXAMINATION:  XR CHEST 1 VIEW    CLINICAL HISTORY:  Altered mental status, unspecified    TECHNIQUE:  Single frontal view of the chest was performed.    COMPARISON:  08/12/2023    FINDINGS:  Sternotomy wires.  TAVR.    The cardiac silhouette is midline, slightly prominent.  The pulmonary vascularity is normal.    Subtle area of increased attenuation left lung base suggest subsegmental atelectasis.    No pleural effusion.  No pneumothorax.    Age related degenerative changes of the osseous structures.                                       CTA Head and Neck (xpd) (Final result)  Result time 12/12/23 10:36:06      Final result by Guillermo Jaime MD (12/12/23 10:36:06)                   Impression:      1. Motion compromised examination.  2. No definite acute intracranial findings by CT.  3. No evidence of acute large vessel occlusion or " flow-limiting stenosis.  4. Details of cervical and intracranial atherosclerotic disease, as provided in the body of report.      Electronically signed by: Guillermo Jaime  Date:    12/12/2023  Time:    10:36               Narrative:    EXAMINATION:  CTA HEAD AND NECK (XPD)    CLINICAL HISTORY:  Stroke/TIA, determine embolic source;    TECHNIQUE:  Non contrast low dose axial images were obtained through the head.  CT angiogram was performed from the level of the jeffery to the top of the head following the IV administration of 100mL of Omnipaque 350.   Sagittal and coronal reconstructions and maximum intensity projection reconstructions were performed. Arterial stenosis percentages are based on NASCET measurement criteria.    COMPARISON:  MRI brain 08/13/2023.  CT head 08/12/2023.    FINDINGS:  Comment: Motion compromised examination.    CT HEAD:    Blood: No acute intracranial hemorrhage.    Parenchyma: No definite loss of gray-white differentiation to suggest acute or subacute transcortical infarct. Parenchymal volume loss.  Nonspecific areas of white matter hypoattenuation may relate to sequela of chronic small vessel ischemic disease.  Remote lacunar type infarcts involve the deep gray nuclei.  Findings in keeping with remote right PCA territory infarct.    Ventricles/Extra-axial spaces: No abnormal extra-axial fluid collection. Basal cisterns are patent.    Vessels: Moderate to heavy atherosclerotic calcifications.    Orbits: Status post bilateral lens replacements.    Scalp: Unremarkable.    Skull: There are no depressed skull fractures or destructive bone lesions.    Sinuses and mastoids: Minor dependent increased attenuation within the mastoid air cells, possibly the basis of effusion and/or mucosal thickening.  Minor paranasal sinus mucosal thickening.    Other findings: None    CTA:    NECK:    Imaged aortic arch: Nonaneurysmal.  Short segment common origin of the brachiocephalic and left common carotid  artery.  Moderate to heavy atherosclerotic calcifications.  Brachiocephalic and subclavian arteries grossly patent.    Right common and cervical internal carotid arteries: CCA and ECA grossly patent.  Less than 50% atheromatous narrowing at the proximal ICA.  No evidence of carotid dissection or web.    Left common and cervical internal carotid arteries: CCA and ECA grossly patent.  Less than 50% atheromatous narrowing at the proximal ICA.  No evidence of carotid dissection or web.    Right cervical vertebral artery: There is no hemodynamically significant stenosis or dissection.  Right vertebral artery appears dominant.    Left cervical vertebral artery: There is no hemodynamically significant stenosis or dissection.    HEAD:    Right anterior circulation:Atherosclerotic irregularity of the ICA ranging from mild to moderate-to-severe.  Right ophthalmic artery is patent.Mild atheromatous irregularity of the M1 and proximal M2 branches of the MCA.  No definite flow-limiting stenosis of the MERNA branches.    Left anterior circulation: Atheromatous irregularity of the ICA ranging from mild to moderate to severe.Left ophthalmic artery is patent.Focal moderate narrowing of the proximal M1 MCA.  Elsewhere, multifocal mild atheromatous irregularity of M1 and M2 branches elsewhere.    Posterior circulation: Multifocal mild and moderate atheromatous irregularity of the bilateral V4 vertebral artery segments and of the basilar artery.  Multifocal mild atheromatous irregularity of bilateral P1 and left P2 PCA segments.  Posterior inferior cerebellar arteries patent at origin.    Anterior and posterior communicating arteries: The anterior and left posterior communicating arteries are visualized. A right posterior communicating arteries not reliably visualized.    NON-ANGIOGRAPHIC FINDINGS:    Visualized intracranial contents: As above.    Soft tissues of the neck: Heterogeneous thyroid gland with hypodense nodule measuring up to  14 mm in the left lobe.    Visualized sinuses: As above.    Dentition: Unremarkable.    Spine: Degenerative change.    Visualized lungs: Limited assessment.  Apical pleuroparenchymal scarring.                                      Recent Echo:  No results found in the last 24 hours.    DISCUSSED WITH: case management, nursing staff, patient                 ASSESSMENT/PLAN:     Acute cystitis     Ucx growing out enterococcus  Cont augmentin          TIA (transient ischemic attack)     Antithrombotics for secondary stroke prevention: Antiplatelets: Aspirin: 81 mg daily  Clopidogrel: 75 mg daily     Statins for secondary stroke prevention and hyperlipidemia, if present:   Statins: Atorvastatin- 40 mg daily     Aggressive risk factor modification: HTN     Rehab efforts: The patient has been evaluated by a stroke team provider and the therapy needs have been fully considered based off the presenting complaints and exam findings. The following therapy evaluations are needed: PT evaluate and treat, OT evaluate and treat, SLP evaluate and treat     Diagnostics ordered/pending: CTA Head to assess vasculature , CTA Neck/Arch to assess vasculature, HgbA1C to assess blood glucose levels, Lipid Profile to assess cholesterol levels, TTE to assess cardiac function/status , TSH to assess thyroid function     VTE prophylaxis:  eliquis     BP parameters: TIA: SBP <220 until imaging confirmation of no infarct      Echo: 45-50%   Neuro consult pending           Acute on chronic anemia  Patient's anemia is currently uncontrolled. Has received 2 units of PRBCs on 12/13/23 . Etiology likely d/t ckd, cad  Current CBC reviewed-         Lab Results   Component Value Date     HGB 8.8 (L) 12/18/2023     HCT 28.2 (L) 12/18/2023      Monitor serial CBC and transfuse if patient becomes hemodynamically unstable, symptomatic or H/H drops below 7/21.     S/p  2 u pbcs  FOBT positive   GI consulted and plans for egd monday  PPI IV BID  Eliquis and  plavix d/c'd        Type 2 diabetes mellitus with stage 3b chronic kidney disease, without long-term current use of insulin  Creatine stable for now. BMP reviewed- noted Estimated Creatinine Clearance: 24.1 mL/min (A) (based on SCr of 1.5 mg/dL (H)). according to latest data. Based on current GFR, CKD stage is stage 3 - GFR 30-59.  Monitor UOP and serial BMP and adjust therapy as needed. Renally dose meds. Avoid nephrotoxic medications and procedures.     Major depressive disorder, recurrent episode, moderate  Patient has persistent depression which is mild and is currently controlled. Will Continue anti-depressant medications. We will not consult psychiatry at this time. Patient does display psychosis at this time. Continue to monitor closely and adjust plan of care as needed.           Pure hypercholesterolemia  Cont statin  Lipid panel    Physical debility   Cont PT   Case management for DC planning           VTE Risk Mitigation (From admission, onward)              Ordered       IP VTE HIGH RISK PATIENT  Once         12/12/23 1203       Place sequential compression device  Until discontinued         12/12/23 1203                          Discharge Planning   KALEY: 12/19/2023     Code Status: DNR   Is the patient medically ready for discharge?:     Reason for patient still in hospital (select all that apply): Patient trending condition and Treatment  Discharge Plan A: Home with family, Home Health   Discharge Delays: None known at this time        Time spent > 35 min           SUBMITTED BY:  Figueroa Chu MD  [facility]  12/20/2023, 1:08 PM    Critical Care time spent exclusive of all separately billed services:

## 2023-12-21 VITALS
TEMPERATURE: 99 F | RESPIRATION RATE: 18 BRPM | DIASTOLIC BLOOD PRESSURE: 73 MMHG | WEIGHT: 179.25 LBS | BODY MASS INDEX: 31.76 KG/M2 | SYSTOLIC BLOOD PRESSURE: 160 MMHG | HEIGHT: 63 IN | HEART RATE: 72 BPM | OXYGEN SATURATION: 96 %

## 2023-12-21 LAB
ANION GAP SERPL CALC-SCNC: 8 MMOL/L (ref 8–16)
BUN SERPL-MCNC: 34 MG/DL (ref 10–30)
CALCIUM SERPL-MCNC: 8.9 MG/DL (ref 8.7–10.5)
CHLORIDE SERPL-SCNC: 111 MMOL/L (ref 95–110)
CO2 SERPL-SCNC: 20 MMOL/L (ref 23–29)
CREAT SERPL-MCNC: 1.8 MG/DL (ref 0.5–1.4)
ERYTHROCYTE [DISTWIDTH] IN BLOOD BY AUTOMATED COUNT: 17 % (ref 11.5–14.5)
EST. GFR  (NO RACE VARIABLE): 26 ML/MIN/1.73 M^2
FINAL PATHOLOGIC DIAGNOSIS: NORMAL
GLUCOSE SERPL-MCNC: 96 MG/DL (ref 70–110)
GROSS: NORMAL
HCT VFR BLD AUTO: 29.2 % (ref 37–48.5)
HGB BLD-MCNC: 9.1 G/DL (ref 12–16)
Lab: NORMAL
MCH RBC QN AUTO: 29 PG (ref 27–31)
MCHC RBC AUTO-ENTMCNC: 31.2 G/DL (ref 32–36)
MCV RBC AUTO: 93 FL (ref 82–98)
PLATELET # BLD AUTO: 171 K/UL (ref 150–450)
PMV BLD AUTO: 12.4 FL (ref 9.2–12.9)
POTASSIUM SERPL-SCNC: 4.2 MMOL/L (ref 3.5–5.1)
RBC # BLD AUTO: 3.14 M/UL (ref 4–5.4)
SODIUM SERPL-SCNC: 139 MMOL/L (ref 136–145)
WBC # BLD AUTO: 8.67 K/UL (ref 3.9–12.7)

## 2023-12-21 PROCEDURE — 85027 COMPLETE CBC AUTOMATED: CPT | Performed by: FAMILY MEDICINE

## 2023-12-21 PROCEDURE — 36415 COLL VENOUS BLD VENIPUNCTURE: CPT | Performed by: FAMILY MEDICINE

## 2023-12-21 PROCEDURE — 63600175 PHARM REV CODE 636 W HCPCS: Performed by: FAMILY MEDICINE

## 2023-12-21 PROCEDURE — 99900035 HC TECH TIME PER 15 MIN (STAT)

## 2023-12-21 PROCEDURE — C9113 INJ PANTOPRAZOLE SODIUM, VIA: HCPCS | Performed by: FAMILY MEDICINE

## 2023-12-21 PROCEDURE — 94761 N-INVAS EAR/PLS OXIMETRY MLT: CPT

## 2023-12-21 PROCEDURE — 25000003 PHARM REV CODE 250: Performed by: FAMILY MEDICINE

## 2023-12-21 PROCEDURE — 25000003 PHARM REV CODE 250: Performed by: NURSE PRACTITIONER

## 2023-12-21 PROCEDURE — 80048 BASIC METABOLIC PNL TOTAL CA: CPT | Performed by: FAMILY MEDICINE

## 2023-12-21 RX ORDER — CLONIDINE HYDROCHLORIDE 0.1 MG/1
0.1 TABLET ORAL EVERY 8 HOURS PRN
Status: DISCONTINUED | OUTPATIENT
Start: 2023-12-21 | End: 2023-12-21 | Stop reason: HOSPADM

## 2023-12-21 RX ORDER — PANTOPRAZOLE SODIUM 40 MG/1
40 TABLET, DELAYED RELEASE ORAL 2 TIMES DAILY
Status: DISCONTINUED | OUTPATIENT
Start: 2023-12-21 | End: 2023-12-21 | Stop reason: HOSPADM

## 2023-12-21 RX ADMIN — CLONIDINE HYDROCHLORIDE 0.1 MG: 0.1 TABLET ORAL at 01:12

## 2023-12-21 RX ADMIN — CARVEDILOL 12.5 MG: 12.5 TABLET, FILM COATED ORAL at 09:12

## 2023-12-21 RX ADMIN — MULTIPLE VITAMINS W/ MINERALS TAB 1 TABLET: TAB at 09:12

## 2023-12-21 RX ADMIN — HEPARIN SODIUM 5000 UNITS: 5000 INJECTION INTRAVENOUS; SUBCUTANEOUS at 01:12

## 2023-12-21 RX ADMIN — ESCITALOPRAM OXALATE 20 MG: 10 TABLET ORAL at 09:12

## 2023-12-21 RX ADMIN — ALLOPURINOL 100 MG: 100 TABLET ORAL at 09:12

## 2023-12-21 RX ADMIN — SACUBITRIL AND VALSARTAN 1 TABLET: 49; 51 TABLET, FILM COATED ORAL at 09:12

## 2023-12-21 RX ADMIN — HYDRALAZINE HYDROCHLORIDE 75 MG: 25 TABLET, FILM COATED ORAL at 01:12

## 2023-12-21 RX ADMIN — TIMOLOL MALEATE 1 DROP: 5 SOLUTION OPHTHALMIC at 09:12

## 2023-12-21 RX ADMIN — PANTOPRAZOLE SODIUM 40 MG: 40 INJECTION, POWDER, LYOPHILIZED, FOR SOLUTION INTRAVENOUS at 09:12

## 2023-12-21 RX ADMIN — ISOSORBIDE MONONITRATE 30 MG: 30 TABLET, EXTENDED RELEASE ORAL at 09:12

## 2023-12-21 RX ADMIN — HYDRALAZINE HYDROCHLORIDE 75 MG: 25 TABLET, FILM COATED ORAL at 06:12

## 2023-12-21 RX ADMIN — ATORVASTATIN CALCIUM 40 MG: 40 TABLET, FILM COATED ORAL at 09:12

## 2023-12-21 RX ADMIN — HEPARIN SODIUM 5000 UNITS: 5000 INJECTION INTRAVENOUS; SUBCUTANEOUS at 06:12

## 2023-12-21 NOTE — PT/OT/SLP PROGRESS
Physical Therapy      Patient Name:  Krystina Washington   MRN:  [id number]    Patient not seen today secondary to  (pt lethargic and not feeling well today,nsg notified([id number])). Will follow-up tomorrow.

## 2023-12-21 NOTE — PLAN OF CARE
Problem: Adult Inpatient Plan of Care  Goal: Plan of Care Review  2023 1519 by Brianna Medrano, RN  Outcome: Met  2023 0844 by Brianna Medrano RN  Outcome: Ongoing, Progressing  Goal: Patient-Specific Goal (Individualized)  Outcome: Met  Goal: Absence of Hospital-Acquired Illness or Injury  Outcome: Met  Goal: Optimal Comfort and Wellbeing  Outcome: Met  Goal: Readiness for Transition of Care  Outcome: Met     Problem: SLP  Goal: SLP Goal  Description: Updated Short Term Goals:  1. Pt will participate in a clinical swallow eval to determine least restrictive diet. -MET   2. Pt will safely tolerate >75% of Pureed with no overt s/s of aspiration.-MET   3. Pt will consume [facility] soft diet and thin liquids with no overt s/sx aspiration.   Outcome: Met     Problem: Diabetes Comorbidity  Goal: Blood Glucose Level Within Targeted Range  Outcome: Met     Problem: Skin Injury Risk Increased  Goal: Skin Health and Integrity  Outcome: Met     Problem: Fall Injury Risk  Goal: Absence of Fall and Fall-Related Injury  Outcome: Met     Problem: Physical Therapy  Goal: Physical Therapy Goal  Description: Goals to be met by: 24     Patient will increase functional independence with mobility by performin. Supine to sit with Stand-by Assistance  2. Sit to supine with Stand-by Assistance  3. Sit to stand transfer with Contact Guard Assistance with use of RW.   4. Bed to chair transfer with Contact Guard Assistance using Rolling Walker  5. Gait  x 50 feet with Contact Guard Assistance using Rolling Walker. -MET 23  6. Gait x150 feet with SBA using Rolling Walker.    Outcome: Met     Problem: Occupational Therapy  Goal: Occupational Therapy Goal  Description: Goals to be met by: 2024     Patient will increase functional independence with ADLs by performing:    Rolling with SBA to support self initiated pressure relief. MET 2023  Feeding with Stand-by Assistance with meal tray setup  UE Dressing with Minimal  Assistance with overhead shirt  Grooming with Contact Guard Assistance with simplifed set up  Toileting from bedside commode with Moderate Assistance for hygiene and clothing management.   Sitting at edge of bed x5 minutes with Stand-by Assistance. MET 12/13/2023  Toilet transfer to bedside commode with Contact Guard Assistance with least restrictive device.  Family or caregiver 100% verbalized reciprocation of dementia friendly recommendations (ex: simplify choices; decrease visual clutter; remove hazardous objects; maximize familiarity, etc) to support patient's wellbeing and highest level of occupational engagement and participation in least restrictive discharge environment     Outcome: Met     Problem: Adjustment to Illness (Stroke, Ischemic/Transient Ischemic Attack)  Goal: Optimal Coping  Outcome: Met     Problem: Bowel Elimination Impaired (Stroke, Ischemic/Transient Ischemic Attack)  Goal: Effective Bowel Elimination  Outcome: Met     Problem: Cerebral Tissue Perfusion (Stroke, Ischemic/Transient Ischemic Attack)  Goal: Optimal Cerebral Tissue Perfusion  Outcome: Met     Problem: Cognitive Impairment (Stroke, Ischemic/Transient Ischemic Attack)  Goal: Optimal Cognitive Function  Outcome: Met     Problem: Communication Impairment (Stroke, Ischemic/Transient Ischemic Attack)  Goal: Improved Communication Skills  Outcome: Met     Problem: Functional Ability Impaired (Stroke, Ischemic/Transient Ischemic Attack)  Goal: Optimal Functional Ability  Outcome: Met     Problem: Respiratory Compromise (Stroke, Ischemic/Transient Ischemic Attack)  Goal: Effective Oxygenation and Ventilation  Outcome: Met     Problem: Sensorimotor Impairment (Stroke, Ischemic/Transient Ischemic Attack)  Goal: Improved Sensorimotor Function  Outcome: Met     Problem: Swallowing Impairment (Stroke, Ischemic/Transient Ischemic Attack)  Goal: Optimal Eating and Swallowing without Aspiration  Outcome: Met     Problem: Urinary Elimination  Impaired (Stroke, Ischemic/Transient Ischemic Attack)  Goal: Effective Urinary Elimination  Outcome: Met     Problem: Bariatric Environmental Safety  Goal: Safety Maintained with Care  Outcome: Met     Problem: UTI (Urinary Tract Infection)  Goal: Improved Infection Symptoms  Outcome: Met     Problem: Heart Failure Comorbidity  Goal: Maintenance of Heart Failure Symptom Control  Outcome: Met

## 2023-12-21 NOTE — PLAN OF CARE
Discharge orders noted. No DME ordered. Home Health set up with [facility]. [facility]. [facility] at home follow-up scheduled. Vascular Neurology follow-up requested.  will request GI follow-up (in-basket message). Family will transport home at discharge. No further Case Management needs.     left message for Shantal Kelley with NP at home to see if sooner follow-up could be scheduled. Scheduled.     contacted daughter to notify of discharge today. She asked that her mother be ready for 1:00 pm, unsure if able to  around that time or at 3 pm when she takes a lunch.  will update staff.    Patient Contacts    Name Relation Home Work Mobile   Hermelindo Mackenzie Son [phone]     Merry Lafleur Daughter   [phone]   Eliz Mackenzie Grandchild   [phone]   Jj Mackenzie Grandchild   [phone]     Future Appointments   Date Time Provider Department Center   1/2/2024  8:00 AM Kyra Hill NP [address] [facility]  Vascular Neurology [phone] [facility] [place] [address]      Next Steps: Follow up  Instructions: Vascular Neurology Follow-Up Requested Please contact office at [facility] Neuro Phone: [phone] if you have not heard back. Scheduling for next year to open up soon.    [facility]  Home Health Services, Home Therapy Services, Home Living Aide Services [phone] [address] [place] [address]     Next Steps: Follow up  Instructions: [facility] [employer] [facility]    [place] - Gastroenterology  Gastroenterology [phone]  [address] [place] [address]     Next Steps: Follow up  Instructions:  will send message to Gastroenterology office for follow-up.     Kyra Hill NP           Next Steps: Follow up on 1/2/2024  Instructions: at 08:00 am (NP at Home follow-up rescheduled).        12/21/23  1101   Final Note   Assessment Type Final Discharge Note   Anticipated Discharge Disposition Home-Health   Hospital Resources/Appts/Education Provided Appointments scheduled and added to AVS   Post-Acute Status   Post-Acute Authorization Home Health   Home Health Status Set-up Complete/Auth obtained   Discharge Delays None known at this time     Claudia Scott RN    [phone]

## 2023-12-21 NOTE — PROGRESS NOTES
Discharge orders noted. Additional clinical references attached. Patient's discharge instructions given by bedside RN and reviewed via this VN.  Education provided on new and previous medications, diagnosis, and follow-up appointments.  Patient's son verbalized understanding and teach back method was used. Patient's ride/transportation home at bedside. All questions answered. Transport to [facility] requested. Floor nurse notified.              12/21/23 3655    Notification    Notified Of Discharge Status   Admission   Communication Issues? None   Shift   Virtual Nurse - Rounding Complete  (discharge)   Virtual Nurse - Patient Verbalized Approval Of Camera Use   Safety/Activity   Patient Rounds bed in low position;call light in patient/parent reach;visualized patient;clutter free environment maintained   Safety Promotion/Fall Prevention family to remain at bedside;side rails raised x 2   Activity Management Sitting at edge of bed - L2

## 2023-12-21 NOTE — PLAN OF CARE
Problem: Adult Inpatient Plan of Care  Goal: Plan of Care Review  Outcome: Ongoing, Progressing  Goal: Patient-Specific Goal (Individualized)  Outcome: Ongoing, Progressing  Goal: Optimal Comfort and Wellbeing  Outcome: Ongoing, Progressing  Goal: Readiness for Transition of Care  Outcome: Ongoing, Progressing     Problem: Skin Injury Risk Increased  Goal: Skin Health and Integrity  Outcome: Ongoing, Progressing     Problem: Fall Injury Risk  Goal: Absence of Fall and Fall-Related Injury  Outcome: Ongoing, Progressing     Problem: Adjustment to Illness (Stroke, Ischemic/Transient Ischemic Attack)  Goal: Optimal Coping  Outcome: Ongoing, Progressing     Problem: Cerebral Tissue Perfusion (Stroke, Ischemic/Transient Ischemic Attack)  Goal: Optimal Cerebral Tissue Perfusion  Outcome: Ongoing, Progressing     Problem: Cognitive Impairment (Stroke, Ischemic/Transient Ischemic Attack)  Goal: Optimal Cognitive Function  Outcome: Ongoing, Progressing     Problem: Functional Ability Impaired (Stroke, Ischemic/Transient Ischemic Attack)  Goal: Optimal Functional Ability  Outcome: Ongoing, Progressing     Problem: Sensorimotor Impairment (Stroke, Ischemic/Transient Ischemic Attack)  Goal: Improved Sensorimotor Function  Outcome: Ongoing, Progressing     Problem: Heart Failure Comorbidity  Goal: Maintenance of Heart Failure Symptom Control  Outcome: Ongoing, Progressing

## 2023-12-21 NOTE — DISCHARGE SUMMARY
[facility] Medicine  Discharge Summary      Patient Name: Krystina Washington  MRN: [id number]  SABI: [id number]  Patient Class: IP- Inpatient  Admission Date: 12/12/2023  Hospital Length of Stay: 8 days  Discharge Date and Time: 12/21/2023  3:33 PM  Attending Physician: Figueroa Chu MD   Discharging Provider: Figueroa Chu MD  Primary Care Provider: Oniel Johnson MD    Primary Care Team: Networked reference to record PCT     HPI:   89 y.o. female with prior strokes (R-PCA), recent small R cerebellar stroke, CHF, CABG, TAVR, on Eliquis who presents from home for difficulty speaking and an episode of shaking.  Patient's son states symptoms started with leg and then arm shaking. Then he noticed her speech became stuttering. Also complains of chest tigthness.  She was otherwise normal this morning, had breakfast, etc. The episode lasted for 30 min. Upon examination, everything was resolved. Vascular Neuro was consulted in the ED. No evidence of acute large vessel occlusion or flow-limiting stenosis shown on CTA. Labs, cxr and U/a neg. Admit for TIA and chest tightness    Procedure(s) (LRB):  EGD (ESOPHAGOGASTRODUODENOSCOPY) (N/A)      Hospital Course:   Pt seems to be at baseline without any events overnight. However Hb did drop to 6.6. Iron panel and FOBT ordered. Transfusing 2 u prbcs. Pending FOBT , may need GI consult. Neuro consult pending.  Krystina Washington has warranted treatment spanning two or more midnights of hospital level care for the management of anemia. She continues to require further evaluation by consultants and blood transfusion. Her condition is also complicated by the following comorbidities: CHF, Diabetes, prior CVA, CAD.   12/14: awaiting FOBT to rule out bleed.  Ucx pending enterococcus. Rocephin started  12/15: FOBT Positive even though hb stable around 9. Plavix and eliquis d/c'd. GI consulted. PPI IV BID started  12/16: GI plans to do EGD on Monday 12/17: no  issues voice  12/18: going for EGD today  12/19  MARGARET likely from dehydration    89 y.o. female with PMH of prior strokes (R-PCA), recent small R cerebellar stroke, CHF, CABG, TAVR, on Eliquis was admitted for difficulty speaking and an episode of shaking. She had a CT headNo definite acute intracranial findings by CT.  No evidence of acute large vessel occlusion or flow-limiting stenosis. Serial routine labs done, she was found to be anemic and was transfused one unit of PRBC. Gastro was consulted. She underwent upper endoscopy, refused colonoscopy and has opted for outpatient CT abdomen. She was found to have UTI and urine grow enterococcus. She has completed 7 days course of IV rocephin. She also had 2D echo Left Ventricle: There is concentric hypertrophy. Septal motion is consistent with bundle branch block. There is mildly reduced systolic function with a visually estimated ejection fraction of 45 - 50%. CXR Minimal subsegmental atelectasis at the left lung base which has improved. She  is stable for DC home with , follow up with PCP.      Goals of Care Treatment Preferences:  Code Status: DNR    Living Will: Yes              Consults:   Consults (From admission, onward)          Status Ordering Provider     Inpatient consult to Telemedicine - Psyc  Once        Provider:  (Not yet assigned)    Completed MICHELLE DELCID     Inpatient consult to Gastroenterology-[facility]  Once        Provider:  (Not yet assigned)    Completed MICHELLE DELCID     IP consult to case management  Once        Provider:  (Not yet assigned)    Completed MICHELLE DELCID     Inpatient consult to [facility]  Once        Provider:  (Not yet assigned)    Completed MICHELLE DELCID     Consult to Telemedicine - Acute Stroke  Once        Provider:  (Not yet assigned)    Acknowledged MICHELLE DELCID            No new Assessment & Plan notes have been filed under this hospital service since the last note was  generated.  Service: Hospital Medicine    Final Active Diagnoses:    Diagnosis Date Noted POA    PRINCIPAL PROBLEM:  Acute cystitis [N30.00] 08/26/2021 Yes    Tremor [R25.1] 12/13/2023 Yes    Slurred speech [R47.81] 12/13/2023 Yes    TIA (transient ischemic attack) [G45.9] 01/07/2021 Yes    Acute on chronic anemia [D64.9] 03/08/2017 Yes    Type 2 diabetes mellitus with stage 3b chronic kidney disease, without long-term current use of insulin [E11.22, N18.32] 11/29/2015 Yes    Major depressive disorder, recurrent episode, moderate [F33.1] 05/06/2015 Yes    Pure hypercholesterolemia [E78.00] 07/19/2012 Yes      Problems Resolved During this Admission:    Diagnosis Date Noted Date Resolved POA    Anemia [D64.9] 12/16/2023 12/16/2023 Yes    Dysphagia [R13.10] 12/12/2023 12/12/2023 Yes       Discharged Condition: stable    Disposition: Home or Self Care    Follow Up:   Follow-up Information       [facility]OLOG[facility] Follow up.    Specialty: Vascular Neurology  Why: Vascular Neurology Follow-Up Requested  Please contact office at [facility] Neuro Phone: [phone] if you have not heard back. Scheduling for next year to open up soon.  Contact information:  [address]  [facility] [address]  [phone]             [facility] Follow up.    Specialties: Home Health Services, Home Therapy Services, Home Living Aide Services  Why: Home Health Company  Contact information:  [address] [facility]  [facility] [address]  [phone]             [facility] Gastroenterology Follow up.    Specialty: Gastroenterology  Why:  will send message to Gastroenterology office for follow-up.  Contact information:  [address]  [address]  [facility] [address] [phone]  Additional information:  Please park in [address] C or D and use [address]chola. Take Medical Office Bldg elevators.                         Patient Instructions:      Ambulatory referral/consult to  Outpatient Case Management   Referral Priority: Routine Referral Type: Consultation   Referral Reason: Specialty Services Required   Number of Visits Requested: 1     Diet Cardiac     Notify your health care provider if you experience any of the following:  persistent nausea and vomiting or diarrhea     Notify your health care provider if you experience any of the following:  temperature >100.4     Notify your health care provider if you experience any of the following:  severe uncontrolled pain     Notify your health care provider if you experience any of the following:  redness, tenderness, or signs of infection (pain, swelling, redness, odor or green/yellow discharge around incision site)     Notify your health care provider if you experience any of the following:  difficulty breathing or increased cough     Notify your health care provider if you experience any of the following:  severe persistent headache     Notify your health care provider if you experience any of the following:  worsening rash     Notify your health care provider if you experience any of the following:  persistent dizziness, light-headedness, or visual disturbances     Notify your health care provider if you experience any of the following:  increased confusion or weakness     Activity as tolerated       Significant Diagnostic Studies: Labs: All labs within the past 24 hours have been reviewed  Microbiology: Blood Culture   Lab Results   Component Value Date    LABBLOO No growth after 5 days. 12/12/2023     Radiology: X-Ray: CXR: X-Ray Chest 1 View (CXR):   Results for orders placed or performed during the hospital encounter of 12/12/23   X-Ray Chest 1 View    Narrative    EXAMINATION:  XR CHEST 1 VIEW    CLINICAL HISTORY:  Altered mental status, unspecified    TECHNIQUE:  Single frontal view of the chest was performed.    COMPARISON:  08/12/2023    FINDINGS:  Sternotomy wires.  TAVR.    The cardiac silhouette is midline, slightly prominent.   The pulmonary vascularity is normal.    Subtle area of increased attenuation left lung base suggest subsegmental atelectasis.    No pleural effusion.  No pneumothorax.    Age related degenerative changes of the osseous structures.      Impression    Minimal subsegmental atelectasis at the left lung base.      Electronically signed by: Angelic Reynaga MD  Date:    12/12/2023  Time:    13:17       Pending Diagnostic Studies:       Procedure Component Value Units Date/Time    Specimen to Pathology, Surgery Gastrointestinal tract [[id number]] Collected: 12/18/23 1519    Order Status: Sent Lab Status: In process Updated: 12/19/23 0811    Specimen: Tissue            Medications:  Reconciled Home Medications:      Medication List        CHANGE how you take these medications      clopidogreL 75 mg tablet  Commonly known as: PLAVIX  Take 1 tablet (75 mg total) by mouth once daily.  What changed: when to take this     furosemide 20 MG tablet  Commonly known as: LASIX  TAKE 1 TABLET(20 MG) BY MOUTH EVERY DAY  What changed: when to take this            CONTINUE taking these medications      acetaminophen 500 MG tablet  Commonly known as: TYLENOL  Take 500 mg by mouth every 6 (six) hours as needed for Pain.     albuterol 90 mcg/actuation inhaler  Commonly known as: PROVENTIL/VENTOLIN HFA  Inhale 1 puff into the lungs every 6 (six) hours as needed for Wheezing. 1 HFA Aerosol Inhaler Inhalation Twice a day     allopurinoL 100 MG tablet  Commonly known as: ZYLOPRIM  Take 1 tablet (100 mg total) by mouth once daily.     aspirin 81 MG EC tablet  Commonly known as: ECOTRIN  Take 81 mg by mouth as needed for Pain.     atorvastatin 40 MG tablet  Commonly known as: LIPITOR  TAKE 1 TABLET BY MOUTH EVERY DAY     bisacodyL 5 mg EC tablet  Commonly known as: DULCOLAX  Take 5 mg by mouth daily as needed for Constipation.     blood sugar diagnostic Strp  Check BG daily prn if glucose found to be elevated on BMP, per facility protocol.      blood-glucose meter kit  Commonly known as: FREESTYLE SYSTEM KIT  Patient to check blood glucose daily every evening.     carvediloL 12.5 MG tablet  Commonly known as: COREG  TAKE 1 TABLET(12.5 MG) BY MOUTH TWICE DAILY     CENTRUM SILVER WOMEN 8 mg iron-400 mcg-50 mcg Tab  Generic drug: multivit-min-iron-FA-vit K-lut  Take 1 tablet by mouth once daily.     ENTRESTO 49-51 mg per tablet  Generic drug: sacubitriL-valsartan  Take 1 tablet by mouth 2 (two) times daily.     EScitalopram oxalate 20 MG tablet  Commonly known as: LEXAPRO  TAKE 1 TABLET BY MOUTH EVERY DAY     hydrALAZINE 25 MG tablet  Commonly known as: APRESOLINE  Take 1 tablet (25 mg total) by mouth every 8 (eight) hours.     isosorbide mononitrate 30 MG 24 hr tablet  Commonly known as: IMDUR  TAKE 1 TABLET(30 MG) BY MOUTH EVERY DAY     lancets Misc  Use daily prn if blood glucose found to be elevated on routine BMP, per facility protocol.     multivitamin tablet  Commonly known as: THERAGRAN  Take 1 tablet by mouth once daily.     nitroGLYCERIN 0.4 MG/DOSE TL SPRY 400 mcg/spray spray  Commonly known as: NITROLINGUAL  PLACE 1 SPRAY ONTO THE TONGUE EVERY 5 (FIVE) MINUTES AS NEEDED.     timolol maleate 0.5% 0.5 % Drop  Commonly known as: TIMOPTIC  Place 1 drop into both eyes 2 (two) times daily.            STOP taking these medications      apixaban 5 mg Tab  Commonly known as: ELIQUIS              Indwelling Lines/Drains at time of discharge:   Lines/Drains/Airways       Drain  Duration             Female External Urinary Catheter 12/12/23 1441 8 days                    Time spent on the discharge of patient: >30 minutes         Figueroa Chu MD  Department of Hospital Medicine  [facility]

## 2023-12-21 NOTE — NURSING
Pt states she is sleepy, pt's named had to be called out several times to wake pt, pt appears fatigued, VSS

## 2023-12-21 NOTE — PROGRESS NOTES
Pharmacist Renal Dose Adjustment Note    Krystina Washington is a 89 y.o. female being treated with the medication amoxicillin-clavulanic acid    Patient Data:    Vital Signs (Most Recent):  Temp: 98.1 °F (36.7 °C) (12/20/23 1940)  Pulse: 67 (12/20/23 1940)  Resp: 16 (12/20/23 1940)  BP: (!) 183/86 (12/20/23 1940)  SpO2: 98 % (12/20/23 2020) Vital Signs (72h Range):  Temp:  [97.5 °F (36.4 °C)-98.7 °F (37.1 °C)]   Pulse:  [52-98]   Resp:  [15-20]   BP: ()/(50-86)   SpO2:  [94 %-99 %]      Recent Labs   Lab 12/18/23  0239 12/19/23  0912 12/20/23  0316   CREATININE 1.9* 1.8* 1.7*     Serum creatinine: 1.7 mg/dL (H) 12/20/23 0316  Estimated creatinine clearance: 21.8 mL/min (A)    Medication:amoxicillin-clavulanic acid dose: 875-125 mg frequency q12h will be changed to medication:amoxicillin-clavulanic acid dose:500-125 mg frequency:q12h    Pharmacist's Name: Trinh Villa  Pharmacist's Extension: [phone]

## 2023-12-21 NOTE — PROGRESS NOTES
Pharmacist Intervention IV to PO Note    Krystina Washington is a 89 y.o. female being treated with IV medication pantoprazole    Patient Data:    Vital Signs (Most Recent):  Temp: 97.5 °F (36.4 °C) (12/21/23 0717)  Pulse: 76 (12/21/23 0828)  Resp: 20 (12/21/23 0934)  BP: (!) 149/69 (12/21/23 0934)  SpO2: 97 % (12/21/23 0934) Vital Signs (72h Range):  Temp:  [97.5 °F (36.4 °C)-98.7 °F (37.1 °C)]   Pulse:  [52-98]   Resp:  [15-20]   BP: ()/(50-92)   SpO2:  [94 %-99 %]      CBC:  Recent Labs   Lab 12/19/23  0912 12/20/23 0316 12/21/23  0348   WBC 8.64 9.03 8.67   RBC 3.41* 3.06* 3.14*   HGB 9.8* 8.8* 9.1*   HCT 31.6* 28.6* 29.2*    173 171   MCV 93 94 93   MCH 28.7 28.8 29.0   MCHC 31.0* 30.8* 31.2*     CMP:     Recent Labs   Lab 12/19/23  0912 12/20/23 0316 12/21/23  0348   * 94 96   CALCIUM 9.3 9.1 8.9    138 139   K 3.8 4.1 4.2   CO2 22* 23 20*    108 111*   BUN 38* 35* 34*   CREATININE 1.8* 1.7* 1.8*       Dietary Orders:  Diet Orders            Diet Soft & Bite Sized (IDDSI Level 6): Soft & Bite Sized starting at 12/18 1630            Based on the following criteria, this patient qualifies for intravenous to oral conversion:  [x] The patients gastrointestinal tract is functioning (tolerating medications via oral or enteral route for 24 hours and tolerating food or enteral feeds for 24 hours).  [x] The patient is hemodynamically stable for 24 hours (heart rate <100 beats per minute, systolic blood pressure >99 mm Hg, and respiratory rate <20 breaths per minute).  [x] The patient shows clinical improvement (afebrile for at least 24 hours and white blood cell count downtrending or normalized). Additionally, the patient must be non-neutropenic (absolute neutrophil count >500 cells/mm3).  [x] For antimicrobials, the patient has received IV therapy for at least 24 hours.    IV medication pantoprazole will be changed to oral medication     Pharmacist's Name: Sebas Timmons  Pharmacist's  Extension: [phone]

## 2023-12-22 DIAGNOSIS — H40.1121 PRIMARY OPEN ANGLE GLAUCOMA (POAG) OF LEFT EYE, MILD STAGE: ICD-10-CM

## 2023-12-22 RX ORDER — TIMOLOL MALEATE 5 MG/ML
1 SOLUTION/ DROPS OPHTHALMIC 2 TIMES DAILY
Qty: 15 ML | Refills: 3 | Status: SHIPPED | OUTPATIENT
Start: 2023-12-22 | End: 2024-01-30 | Stop reason: SDUPTHER

## 2023-12-22 RX ORDER — ALLOPURINOL 100 MG/1
100 TABLET ORAL
Qty: 90 TABLET | Refills: 3 | Status: SHIPPED | OUTPATIENT
Start: 2023-12-22

## 2023-12-22 NOTE — TELEPHONE ENCOUNTER
Care Due:                  Date            Visit Type   Department     Provider  --------------------------------------------------------------------------------                                EP -                              PRIMARY      [facility]  Last Visit: 01-      CARE ([facility])   MEDICINE       Oniel Johnson  Next Visit: None Scheduled  None         None Found                                                            Last  Test          Frequency    Reason                     Performed    Due Date  --------------------------------------------------------------------------------    Uric Acid...  12 months..  allopurinoL..............  Not Found    Overdue    [employer] Embedded Care Due Messages. Reference number: [id number].   12/22/2023 1:19:28 PM CST

## 2023-12-30 ENCOUNTER — NURSE TRIAGE (OUTPATIENT)
Dept: ADMINISTRATIVE | Facility: CLINIC | Age: 88
End: 2023-12-30
Payer: MEDICARE

## 2023-12-30 RX ORDER — CLOPIDOGREL BISULFATE 75 MG/1
75 TABLET ORAL DAILY
Qty: 3 TABLET | Refills: 0 | Status: SHIPPED | OUTPATIENT
Start: 2023-12-30 | End: 2024-02-08

## 2023-12-31 NOTE — TELEPHONE ENCOUNTER
Pt care manager from [facility] calling to report that a family called her to state that the patient is out of Plavix and needs a dose tonight. On call nurse contacted Dr. Leslie on call provider for Cardiology. Warm transfer to connect Pt care manager to MD.   Reason for Disposition   [1] Caller has URGENT medicine question about med that PCP or specialist prescribed AND [2] triager unable to answer question    Protocols used: Medication Question Call-A-AH

## 2024-01-02 ENCOUNTER — CARE AT HOME (OUTPATIENT)
Dept: HOME HEALTH SERVICES | Facility: CLINIC | Age: 89
End: 2024-01-02
Payer: MEDICARE

## 2024-01-02 ENCOUNTER — OUTPATIENT CASE MANAGEMENT (OUTPATIENT)
Dept: ADMINISTRATIVE | Facility: OTHER | Age: 89
End: 2024-01-02
Payer: MEDICARE

## 2024-01-02 VITALS — TEMPERATURE: 98 F | RESPIRATION RATE: 18 BRPM | HEART RATE: 76 BPM | OXYGEN SATURATION: 98 %

## 2024-01-02 DIAGNOSIS — G30.1 MODERATE LATE ONSET ALZHEIMER'S DEMENTIA WITHOUT BEHAVIORAL DISTURBANCE, PSYCHOTIC DISTURBANCE, MOOD DISTURBANCE, OR ANXIETY: Primary | ICD-10-CM

## 2024-01-02 DIAGNOSIS — I50.22 CHRONIC SYSTOLIC HEART FAILURE: ICD-10-CM

## 2024-01-02 DIAGNOSIS — F02.B0 MODERATE LATE ONSET ALZHEIMER'S DEMENTIA WITHOUT BEHAVIORAL DISTURBANCE, PSYCHOTIC DISTURBANCE, MOOD DISTURBANCE, OR ANXIETY: Primary | ICD-10-CM

## 2024-01-02 PROCEDURE — 99349 HOME/RES VST EST MOD MDM 40: CPT | Mod: S$GLB,,, | Performed by: NURSE PRACTITIONER

## 2024-01-02 RX ORDER — BENZONATATE 100 MG/1
100 CAPSULE ORAL 3 TIMES DAILY PRN
Qty: 90 CAPSULE | Refills: 0 | Status: SHIPPED | OUTPATIENT
Start: 2024-01-02 | End: 2024-04-01

## 2024-01-02 NOTE — PATIENT INSTRUCTIONS
Instructions:  - [facility] Nurse Practitioner to schedule home follow-up visit with patient in 4-6 weeks or as needed.  - Continue all medications, treatments and therapies as ordered.   - Follow all instructions, recommendations as discussed.  - Maintain Safety Precautions at all times.  - Attend all medical appointments as scheduled.  - For worsening symptoms: call Primary Care Physician or Nurse Practitioner.  - For emergencies, call 911 or immediately report to the nearest emergency room.  - Limit Risks of environmental exposure to coronavirus/COVID-19 as discussed including: social distancing, hand hygiene, and limiting departures from the home for necessities only.

## 2024-01-02 NOTE — PROGRESS NOTES
"Outpatient Care Management  Initial Patient Assessment    Patient: Krystina Washington  MRN: [id number]  Date of Service: 01/02/2024  Completed by: Fernanda Melgar RN  Referral Date: 12/14/2023  Date of Eligibility: 12/15/2023  Program: High Risk  Status: Ongoing  Effective Dates: 1/2/2024 - present  Responsible Staff: Fernanda Melgar, RN        Reason for Visit   Patient presents with    OPCM Chart Review    OPCM Enrollment Call    Nursing Assessment     01/02/24       Brief Summary:  Krystina Washington was referred by Dr. Jodi Clayton for CVA, Tremor. Patient qualifies for program based on risk score of 68.8%.   Active problem list, medical, surgical and social history reviewed. Active comorbidities include TIA. Areas of need identified by patient include Knowledge Deficit as evidenced by cg has not scheduled continued follow up care for patient  and Self Care Limitations as evidenced by patient's advanced age limiting patient's functional capacity.   Next steps: Sending educational information via patient portal to cg "Transient Ischemic Attack/ Lowering the Risk of Having Another Stroke". Sending message(s) to Cardiology Dr. Paty Golden to request refill for Nitroglycerin 0.4 mg/Dose TL spry 400 mcg/spray and schedule hospital follow. Messaging PCP Dr. Oniel Johnson to request scheduling appointment. Provided cg contact information to Vascular Neurology [phone] and GI [phone] to schedule patient's follow up appointments. Follow with cg to ensure appointments were scheduled. Reinforce s/s of TIA (Weakness, numbness or paralysis in the face, arm, leg typically on one side, slurred speech, blurred or double vision). Will refer patient to OPCM SW to assist with verifying if patient has Medicaid and for any support group(s) that may assist cg. Patient agrees to follow up call with in 2 weeks    Disability Status  Is the patient alert and oriented (person, place, time, and situation)?: Alert and " oriented x 4  Hearing Difficulty or Deaf: yes  Hearing Management: other (has loss of 50 % of hearing, no aids)  Visual Difficulty or Blind: yes (some loss of vision to left eye;uses reading glasses/ eye sx several years ago)  Visual and Hearing Needs Conclusion: pt does not use hearing aids has 50% loss of hearing, uses reading glasses has some vision loss to left ey  Vision Management: Other (uses reading glasses)  Difficulty Concentrating, Remembering or Making Decisions: no  Communication Difficulty: no  Eating/Swallowing Difficulty: no  Walking or Climbing Stairs Difficulty: yes (history of strokes/ uses AD)  Walking or Climbing Stairs: ambulation difficulty, requires equipment; stair climbing difficulty, requires equipment  Mobility Management: uses AD to ambulate  Dressing/Bathing Difficulty: yes  Dressing/Bathing: dressing difficulty, assistance 1 person; bathing difficulty, assistance 1 person  Dressing/Bathing Management: pt is able to bathe herself family member will help if needed/ needs assistance to get dresses  Toileting : Independent  Continence : Incontience - Bladder (uses diaper)  Difficulty Managing Errands Independently: yes  Errands Management: family will help with transport  Equipment Currently Used at Home: bedside commode; shower chair; blood pressure machine; cane, quad; walker, rolling; raised toilet; wheelchair  ADL Conclusion Statement: requires some support for ADL's, dependent IADL's  Change in Functional Status Since Onset of Current Illness/Injury: no (since last admit/ pt is returning to her baseline)        Spiritual Beliefs  Spiritual, Cultural Beliefs, Judaism Practices, Values that Affect Care: no      Social History     Socioeconomic History    Marital status:    Tobacco Use    Smoking status: Never    Smokeless tobacco: Never   Substance and Sexual Activity    Alcohol use: No    Drug use: No    Sexual activity: Never     Social Determinants of Health     Financial  Resource Strain: Medium Risk (1/2/2024)    Overall Financial Resource Strain (CARDIA)     Difficulty of Paying Living Expenses: Somewhat hard   Food Insecurity: No Food Insecurity (1/2/2024)    Hunger Vital Sign     Worried About Running Out of Food in the Last Year: Never true     Ran Out of Food in the Last Year: Never true   Transportation Needs: No Transportation Needs (1/2/2024)    PRAPARE - Transportation     Lack of Transportation (Medical): No     Lack of Transportation (Non-Medical): No   Physical Activity: Inactive (1/2/2024)    Exercise Vital Sign     Days of Exercise per Week: 0 days     Minutes of Exercise per Session: 10 min   Stress: Stress Concern Present (1/2/2024)    [employer] - Occupational Stress Questionnaire     Feeling of Stress : To some extent   Social Connections: Moderately Isolated (1/2/2024)    Social Connection and Isolation Panel [NHANES]     Frequency of Communication with Friends and Family: More than three times a week     Frequency of Social Gatherings with Friends and Family: More than three times a week     Attends Mormonism Services: 1 to 4 times per year     Active Member of Clubs or Organizations: No     Attends Club or Organization Meetings: Never     Marital Status:    Housing Stability: Low Risk  (1/2/2024)    Housing Stability Vital Sign     Unable to Pay for Housing in the Last Year: No     Number of Places Lived in the Last Year: 1     Unstable Housing in the Last Year: No       Roles and Relationships  Primary Source of Support/Comfort: extended family  Name of Support/Comfort Primary Source: Hermelindo Mackenzie  Primary Roles/Responsibilities: disabled; retired  Secondary Source of Support/Comfort: extended family  Name of Support/Comfort Secondary Source: Merry Lafleur      Advance Directives (For Healthcare)  Advance Directive  (If Adv Dir status is received, view document under Adv Dir in header or Chart Review Media tab):  Advance Directive currently in Epic.        Patient Reported Insurance  Verified current insurance plan:: Medicare; Medicaid (unsure if pt has full MADELINE)            1/2/2024     2:21 PM 8/21/2023     1:17 PM 3/29/2022     2:25 PM 8/24/2021     3:15 PM 7/21/2021    11:11 AM 5/20/2021    10:33 AM 3/17/2021    11:43 AM   Depression Patient Health Questionnaire   Over the last two weeks how often have you been bothered by little interest or pleasure in doing things Several days Not at all Not at all Not at all Not at all Not at all Not at all   Over the last two weeks how often have you been bothered by feeling down, depressed or hopeless Not at all Not at all Not at all Not at all Not at all Not at all Not at all   PHQ-2 Total Score 1 0 0 0 0 0 0   Over the last two weeks how often have you been bothered by trouble falling or staying asleep, or sleeping too much       Not at all   Over the last two weeks how often have you been bothered by feeling tired or having little energy       Not at all   Over the last two weeks how often have you been bothered by a poor appetite or overeating       Not at all   Over the last two weeks how often have you been bothered by feeling bad about yourself - or that you are a failure or have let yourself or your family down       Not at all   Over the last two weeks how often have you been bothered by trouble concentrating on things, such as reading the newspaper or watching television       Not at all   Over the last two weeks how often have you been bothered by moving or speaking so slowly that other people could have noticed. Or the opposite - being so fidgety or restless that you have been moving around a lot more than usual.       Not at all   Over the last two weeks how often have you been bothered by thoughts that you would be better off dead, or of hurting yourself       Not at all   If you checked off any problems, how difficult have these problems made it for you to do your  work, take care of things at home or get along with other people?       Not difficult at all   PHQ-9 Score       0   PHQ-9 Interpretation       Minimal or None       Learning Assessment       01/02/2024 1706 [facility] (1/2/2024 - Present)   Created by Fernanda Melgar, RN - RN (Nurse) Status: Complete                 PRIMARY LEARNER     Primary Learner Name:  Krystina MC - 01/02/2024 1706    Relationship:  Patient MC - 01/02/2024 1706    Does the primary learner have any barriers to learning?:  Hearing, Physical MC - 01/02/2024 1706    What is the preferred language of the primary learner?:  English  - 01/02/2024 1706    Is an  required?:  No  - 01/02/2024 1706    How does the primary learner prefer to learn new concepts?:  Listening  - 01/02/2024 1706    How often do you need to have someone help you read instructions, pamphlets, or written material from your doctor or pharmacy?:  Often  - 01/02/2024 1706    Comment: family will assist         CO-LEARNER #1     Co-Learner Name (if applicable):  Hermelindo Mackenzie  - 01/02/2024 1706    Relationship:  Family  - 01/02/2024 1706    Does the co-learner have any barriers to learning?:  No Barriers  - 01/02/2024 1706    What is the preferred language of the co-learner?:  English  - 01/02/2024 1706    Is an  required?:  No  - 01/02/2024 1706    How does the co-learner prefer to learn new concepts?:  Listening, Reading, Demonstration  - 01/02/2024 1706        CO-LEARNER #2     No question answered        SPECIAL TOPICS     No question answered        ANSWERED BY:     No question answered        Edit History       Fernanda Melgar, RN - RN (Nurse)   01/02/2024 1706

## 2024-01-02 NOTE — PROGRESS NOTES
[facility]  Transitional Care Management (TCM) Home Visit    Encounter Provider: Kyra WADE Chairs   PCP: Oniel Johnson MD  Consult Requested By: No ref. provider found  Admit Date: 12/12/23   IP Discharge Date: 12/21/23  Hospital Length of Stay:RRHLOS@ days  Admission Date: 12/12/2023  Hospital Length of Stay: 8 days  Discharge Date and Time: 12/21/2023  Hospital Diagnosis: No admission diagnoses are documented for this encounter.     HISTORY OF PRESENT ILLNESS      Patient ID: Krystina Washington is a 89 y.o. female was recently admitted to the hospital, this is their TCM encounter.    Hospital Course Synopsis:    HPI:   89 y.o. female with prior strokes (R-PCA), recent small R cerebellar stroke, CHF, CABG, TAVR, on Eliquis who presents from home for difficulty speaking and an episode of shaking.  Patient's son states symptoms started with leg and then arm shaking. Then he noticed her speech became stuttering. Also complains of chest tigthness.  She was otherwise normal this morning, had breakfast, etc. The episode lasted for 30 min. Upon examination, everything was resolved. Vascular Neuro was consulted in the ED. No evidence of acute large vessel occlusion or flow-limiting stenosis shown on CTA. Labs, cxr and U/a neg. Admit for TIA and chest tightness     Procedure(s) (LRB):  EGD (ESOPHAGOGASTRODUODENOSCOPY) (N/A)       Hospital Course:   Pt seems to be at baseline without any events overnight. However Hb did drop to 6.6. Iron panel and FOBT ordered. Transfusing 2 u prbcs. Pending FOBT , may need GI consult. Neuro consult pending.  Krystina Washington has warranted treatment spanning two or more midnights of hospital level care for the management of anemia. She continues to require further evaluation by consultants and blood transfusion. Her condition is also complicated by the following comorbidities: CHF, Diabetes, prior CVA, CAD.   12/14: awaiting FOBT to rule out bleed.  Ucx pending enterococcus.  Rocephin started  12/15: FOBT Positive even though hb stable around 9. Plavix and eliquis d/c'd. GI consulted. PPI IV BID started  12/16: GI plans to do EGD on Monday 12/17: no issues voice  12/18: going for EGD today  12/19  MARGARET likely from dehydration     89 y.o. female with PMH of prior strokes (R-PCA), recent small R cerebellar stroke, CHF, CABG, TAVR, on Eliquis was admitted for difficulty speaking and an episode of shaking. She had a CT headNo definite acute intracranial findings by CT.  No evidence of acute large vessel occlusion or flow-limiting stenosis. Serial routine labs done, she was found to be anemic and was transfused one unit of PRBC. Gastro was consulted. She underwent upper endoscopy, refused colonoscopy and has opted for outpatient CT abdomen. She was found to have UTI and urine grow enterococcus. She has completed 7 days course of IV rocephin. She also had 2D echo Left Ventricle: There is concentric hypertrophy. Septal motion is consistent with bundle branch block. There is mildly reduced systolic function with a visually estimated ejection fraction of 45 - 50%. CXR Minimal subsegmental atelectasis at the left lung base which has improved. She  is stable for DC home with HH, follow up with PCP.      DECISION MAKING TODAY     Ms. Washington is found at home sitting up in her recliner chair, her grandchildren who are her primary caregivers are present. She has all her medication and denies acute concerns today.    VSS. Denies fever, chest pain, shortness of breath, nausea, vomiting, diarrhea. Risks of environmental exposure to coronavirus discussed including: social distancing, hand hygiene, and limiting departures from the home for necessities only.  Reports understanding and willingness to comply.  All hospital discharge orders reviewed and being followed, all medications reconciled and reviewed, patient and family verbalized understanding. No other needs identified at this time.     I initiated  the process of advance care planning today and explained the importance of this process to the patient and family.  I introduced the concept of advance directives to the patient, as well. Then the patient received detailed information about the importance of designating a Health Care Power of  (HCPOA). She was also instructed to communicate with this person about his wishes for future healthcare, should he become sick and lose decision-making capacity. FULL CODE STATUS    I Introduced [place] form with patient/family, explaining this is the patient's wishes, and this form will be uploaded into the patient's [facility] Chart and the [place] Registry.     We spoke about ACP for 30 minutes.    Attestation: Screening criteria to assess the level of the patient's risk for infection with COVID-19 as recommended by the CDC at the time of the above documented home visit concluded appropriateness to proceed. Universal precautions were maintained at all times, including provider use of 60% alcohol gel hand  immediately prior to entry and upon departing the patient's home.       Assessment & Plan:  1. Moderate late onset Alzheimer's dementia without behavioral disturbance, psychotic disturbance, mood disturbance, or anxiety  The current medical regimen is effective;  continue present plan and medications.       2. Chronic systolic heart failure  The current medical regimen is effective;  continue present plan and medications.       3. Cough  -     benzonatate (TESSALON PERLES) 100 MG capsule; Take 1 capsule (100 mg total) by mouth 3 (three) times daily as needed for Cough.  Dispense: 90 capsule; Refill: 0         Medication List on Discharge:     Medication List            Accurate as of January 2, 2024  5:22 PM. If you have any questions, ask your nurse or doctor.                START taking these medications      benzonatate 100 MG capsule  Commonly known as: TESSALON PERLES  Take 1 capsule (100 mg total) by  mouth 3 (three) times daily as needed for Cough.  Started by: Kyra Hill, NP            CHANGE how you take these medications      albuterol 90 mcg/actuation inhaler  Commonly known as: PROVENTIL/VENTOLIN HFA  Inhale 1 puff into the lungs every 6 (six) hours as needed for Wheezing. 1 HFA Aerosol Inhaler Inhalation Twice a day  What changed: additional instructions     * clopidogreL 75 mg tablet  Commonly known as: PLAVIX  Take 1 tablet (75 mg total) by mouth once daily.  What changed: when to take this     * clopidogreL 75 mg tablet  Commonly known as: PLAVIX  Take 1 tablet (75 mg total) by mouth once daily.  What changed: Another medication with the same name was changed. Make sure you understand how and when to take each.     furosemide 20 MG tablet  Commonly known as: LASIX  TAKE 1 TABLET(20 MG) BY MOUTH EVERY DAY  What changed: when to take this           * This list has 2 medication(s) that are the same as other medications prescribed for you. Read the directions carefully, and ask your doctor or other care provider to review them with you.                CONTINUE taking these medications      acetaminophen 500 MG tablet  Commonly known as: TYLENOL  Take 500 mg by mouth every 6 (six) hours as needed for Pain.     allopurinoL 100 MG tablet  Commonly known as: ZYLOPRIM  TAKE 1 TABLET(100 MG) BY MOUTH EVERY DAY     aspirin 81 MG EC tablet  Commonly known as: ECOTRIN  Take 81 mg by mouth as needed for Pain.     atorvastatin 40 MG tablet  Commonly known as: LIPITOR  TAKE 1 TABLET BY MOUTH EVERY DAY     bisacodyL 5 mg EC tablet  Commonly known as: DULCOLAX  Take 5 mg by mouth daily as needed for Constipation.     blood sugar diagnostic Strp  Check BG daily prn if glucose found to be elevated on BMP, per facility protocol.     blood-glucose meter kit  Commonly known as: FREESTYLE SYSTEM KIT  Patient to check blood glucose daily every evening.     carvediloL 12.5 MG tablet  Commonly known as: COREG  TAKE 1  TABLET(12.5 MG) BY MOUTH TWICE DAILY     CENTRUM SILVER WOMEN 8 mg iron-400 mcg-50 mcg Tab  Generic drug: multivit-min-iron-FA-vit K-lut  Take 1 tablet by mouth once daily.     ENTRESTO 49-51 mg per tablet  Generic drug: sacubitriL-valsartan  Take 1 tablet by mouth 2 (two) times daily.     EScitalopram oxalate 20 MG tablet  Commonly known as: LEXAPRO  TAKE 1 TABLET BY MOUTH EVERY DAY     hydrALAZINE 25 MG tablet  Commonly known as: APRESOLINE  Take 1 tablet (25 mg total) by mouth every 8 (eight) hours.     isosorbide mononitrate 30 MG 24 hr tablet  Commonly known as: IMDUR  TAKE 1 TABLET(30 MG) BY MOUTH EVERY DAY     lancets Misc  Use daily prn if blood glucose found to be elevated on routine BMP, per facility protocol.     multivitamin tablet  Commonly known as: THERAGRAN  Take 1 tablet by mouth once daily.     nitroGLYCERIN 0.4 MG/DOSE TL SPRY 400 mcg/spray spray  Commonly known as: NITROLINGUAL  PLACE 1 SPRAY ONTO THE TONGUE EVERY 5 (FIVE) MINUTES AS NEEDED.     timolol maleate 0.5% 0.5 % Drop  Commonly known as: TIMOPTIC  INSTILL 1 DROP IN BOTH EYES TWICE DAILY              Medication Reconciliation:  Were medications changed on discharge? Yes  Were medications in the home? Yes  Is the patient taking the medications as directed? Yes  Does the patient understand the medications and changes? Yes  Does updated med list accurately reflects meds patient is currently taking? Yes    ENVIRONMENT OF CARE      Family and/or Caregiver present at visit?  Yes  Name of Caregiver: Grandchildren  History provided by: caregiver    Advance Care Planning   Advanced Care Planning Status:  Patient has had an ACP conversation  Living Will: Yes  Power of : Yes  [place]: No    Does Caregiver have HCPoA: Yes  Changes today: 0       Impression upon entering the home:  Physical Dwelling: single family home   Appearance of home environment: cleaniness: clean  Functional Status: max assistance  Mobility: chair bound  Nutritional  access: adequate intake and access  Home Health: Yes,  Agency [facility]    DME/Supplies: wheelchair     Diagnostic tests reviewed/disposition: No diagnosic tests pending after this hospitalization.  Disease/illness education: CHF  Establishment or re-establishment of referral orders for community resources: No other necessary community resources.   Discussion with other health care providers: No discussion with other health care providers necessary.   Does patient have a PCP at [facility]? Yes   Repatriation plan with PCP? follow-up with PCP within 30d   Does patient have an ostomy (ileostomy, colostomy, suprapubic catheter, nephrostomy tube, tracheostomy, PEG tube, pleurex catheter, cholecystostomy, etc)? No  Were BPAs reviewed? Yes    Social History     Socioeconomic History    Marital status:    Tobacco Use    Smoking status: Never    Smokeless tobacco: Never   Substance and Sexual Activity    Alcohol use: No    Drug use: No    Sexual activity: Never     Social Determinants of Health     Financial Resource Strain: Medium Risk (1/2/2024)    Overall Financial Resource Strain (CARDIA)     Difficulty of Paying Living Expenses: Somewhat hard   Food Insecurity: No Food Insecurity (1/2/2024)    Hunger Vital Sign     Worried About Running Out of Food in the Last Year: Never true     Ran Out of Food in the Last Year: Never true   Transportation Needs: No Transportation Needs (1/2/2024)    PRAPARE - Transportation     Lack of Transportation (Medical): No     Lack of Transportation (Non-Medical): No   Physical Activity: Inactive (1/2/2024)    Exercise Vital Sign     Days of Exercise per Week: 0 days     Minutes of Exercise per Session: 10 min   Stress: Stress Concern Present (1/2/2024)    [employer] [employer] [employer] - Occupational Stress Questionnaire     Feeling of Stress : To some extent   Social Connections: Moderately Isolated (1/2/2024)    Social Connection and Isolation Panel [NHANES]     Frequency of  Communication with Friends and Family: More than three times a week     Frequency of Social Gatherings with Friends and Family: More than three times a week     Attends Mandaeism Services: 1 to 4 times per year     Active Member of Clubs or Organizations: No     Attends Club or Organization Meetings: Never     Marital Status:    Housing Stability: Low Risk  (1/2/2024)    Housing Stability Vital Sign     Unable to Pay for Housing in the Last Year: No     Number of Places Lived in the Last Year: 1     Unstable Housing in the Last Year: No       OBJECTIVE:     Vital Signs:  Vitals:    01/02/24 1000   Pulse: 76   Resp: 18   Temp: 97.6 °F (36.4 °C)       Review of Systems    Physical Exam:  Physical Exam    INSTRUCTIONS FOR PATIENT:     Scheduled Follow-up, Appts Reviewed with Modifications if Needed: Yes  No future appointments.    Signature: Kyra Hill NP    Transition of Care Visit:  I have reviewed and updated the history and problem list.  I have reconciled the medication list.  I have discussed the hospitalization and current medical issues, prognosis and plans with the patient/family.

## 2024-01-03 RX ORDER — NITROGLYCERIN 400 UG/1
SPRAY ORAL
Qty: 12 G | Refills: 0 | Status: SHIPPED | OUTPATIENT
Start: 2024-01-03

## 2024-01-03 NOTE — TELEPHONE ENCOUNTER
----- Message from Fernanda Melgar RN sent at 1/2/2024  5:15 PM CST -----  Regarding: Outpatient Care Management/ Hospital follow up visits  1/2/2024     Welcome Dr. Oniel Johnson:    Your patient [id number] Krystina Washington  was  referred to [facility] by Provider Referral.      My name is Fernanda Melgar RN, Care Manager.  At times, it may be necessary to have a  involved in the coordination of care, their names will be added to the Care Team where appropriate.    In our enrollment encounter, the following needs were identified:  Care Coordination and Disease Management Education    All needs and services provided by [facility] and other care team members will be communicated to you via your Resource Pool.      Our documentation can be readily accessed in the EMR using the following tabs: Chart review -> Episodes: High Risk.     It is our goal to provide holistic care, if at any time you feel other areas of concern need to be addressed, please communicate with me by Inbasket messaging.      NOTE: During enrollment caregiver, Mr. Hermelindo Mackenzie reported patient had hospital admission on 12/12-12/21/23 due to TIA, chest tightness. Cg reported patient was having episode of her leg and arm shaking, difficulty with speaking, stuttering. Cg would like to schedule hospital follow ups with both Ms. Washington's PCP and Cardiologist. Cg also reported she is in need of refilling Nitroglycerin 0.4 mg/Dose TL spry (Nitrolingual) 400 mcg/spray. If you feel this is appropriate please contact Mr. Mackenzie at [phone] to further discuss.     Respectfully,      Fernanda Melgar RN

## 2024-01-09 ENCOUNTER — OUTPATIENT CASE MANAGEMENT (OUTPATIENT)
Dept: ADMINISTRATIVE | Facility: OTHER | Age: 89
End: 2024-01-09
Payer: MEDICARE

## 2024-01-09 NOTE — PROGRESS NOTES
1st Attempt to complete Social Work Assessment for Outpatient Care Management; left message and mailed a letter with contact information requesting a return call.  SW will reattempt at a later date.

## 2024-01-09 NOTE — LETTER
Krystina Washington  [address]  [place] [address]      Dear Krystina Washington,     I am writing from the Outpatient Complex Care Management Department at [facility].  I received a referral from Fernanda Melgar to contact you regarding any needs you may have. I was unable to reach you by phone. Please contact me for assistance.     I can be reached at [phone] Monday thru Friday from 8:00am to 4:30pm.      Additionally, [facility] also has a program with a nurse available 24/7 to answer questions or provide medical advice.  [facility] can be reached at [phone].      Sincerely,        Rubi Paulson LMSW  Outpatient Care Management

## 2024-01-11 ENCOUNTER — PATIENT MESSAGE (OUTPATIENT)
Dept: ADMINISTRATIVE | Facility: OTHER | Age: 89
End: 2024-01-11
Payer: MEDICARE

## 2024-01-11 NOTE — PROGRESS NOTES
Outpatient Care Management   - High Risk Patient Assessment    Patient: Krystina Washington  MRN:  [id number]  Date of Service:  1/10/2024  Completed by:  Rubi Paulson LMSW  Referral Date: 12/14/2023    Reason for Visit   Patient presents with    Other     1/9/2024  1st attempt to complete Initial Assessment  for Outpatient Care Management, left message.  Will mail unable to assess letter.        Social Work Assessment - High Risk     1/10/2024       Brief Summary:  received a referral from OPCM RN Fernanda Melgar for the following patient identified psycho-social needs: Medicaid verification and caregiver support groups.  SW completed social assessment with pt's caregiver and grandson, Mr. Mackenzie (involvement in care form signed in chart). Pt lives with Mr. Mackenzie, his sister and her fiance'. Mr. Mackenzie does the cooking and his sister helps pt with baths and getting dressed. Mr. Mackenzie drives pt to MD appointments if the appointments are not virtual. Pt is able to play games on her tablet. Mr. Mackenzie denied any difficulty with paying for groceries or utilities at this time. Pt is currently receiving home health PT and OT. AURY explained to Mr. Mackenzie that she was unable to verify pt's Medicaid status via Medicaid eligibility website. AURY will send Medicaid contact number via patient portal for Mr. Mackenzie to call and verify. AURY and Mr. Mackenzie also discussed his support system as a caregiver and discussed how it can be overwhelcming. Mr. Mackenzie gave permission to AURY to send referral to [facility] and AURY explained that it is telephonic program and Mr. Mackenzie was open to this. Mr. Mackenzie agreeable to follow up in 1 week.   Care plan was created in collaboration with patient/caregiver input.  completed the SDOH questionnaire.

## 2024-01-12 DIAGNOSIS — E11.65 TYPE 2 DIABETES MELLITUS WITH HYPERGLYCEMIA, WITHOUT LONG-TERM CURRENT USE OF INSULIN: ICD-10-CM

## 2024-01-12 RX ORDER — LANCETS
EACH MISCELLANEOUS
Qty: 100 EACH | Refills: 6
Start: 2024-01-12

## 2024-01-12 RX ORDER — INSULIN PUMP SYRINGE, 3 ML
EACH MISCELLANEOUS
Qty: 1 EACH | Refills: 0
Start: 2024-01-12

## 2024-01-16 ENCOUNTER — TELEPHONE (OUTPATIENT)
Dept: NEUROLOGY | Facility: CLINIC | Age: 89
End: 2024-01-16
Payer: MEDICARE

## 2024-01-16 RX ORDER — ESCITALOPRAM OXALATE 20 MG/1
TABLET ORAL
Qty: 90 TABLET | Refills: 3 | Status: SHIPPED | OUTPATIENT
Start: 2024-01-16

## 2024-01-16 NOTE — TELEPHONE ENCOUNTER
Patient and caregiver Hermelindo Mackenzie referred to our [facility] Program by Rubi Paulson ([facility]).    Called caregiver Mr. Mackenzie today to offer program and assess current care needs. No answer, left detailed voicemail with number for call back ([phone] add number). Will reach out again on 1/18.

## 2024-01-17 ENCOUNTER — OUTPATIENT CASE MANAGEMENT (OUTPATIENT)
Dept: ADMINISTRATIVE | Facility: OTHER | Age: 89
End: 2024-01-17
Payer: MEDICARE

## 2024-01-17 ENCOUNTER — TELEPHONE (OUTPATIENT)
Dept: NEUROLOGY | Facility: CLINIC | Age: 89
End: 2024-01-17

## 2024-01-17 NOTE — TELEPHONE ENCOUNTER
----- Message from Lita Alcala sent at 12/13/2023 11:19 AM CST -----  Regarding: FW: HFU  Good Morning. I am sending this request for scheduling a HFU from [facility].  Please see information below.  ----- Message -----  From: Huyen Ruiz MA  Sent: 12/13/2023  11:04 AM CST  To: Lita Alcala  Subject: RE: HFU                                          Good morning,    I understand that pt is established w/ Dr. Briones but he has no openings for the rest of this month and we are not sure of what his schedule for January is yet. My other provider that sees stroke pts is also not available, she may benefit seeing a stroke specialist at [facility].  ----- Message -----  From: Lita Alcala  Sent: 12/13/2023  10:52 AM CST  To: Anali Lacey Staff  Subject: HFU                                              Patient will be discharging from [facility] and a HFU was requested with Neurology by DC provider.  Please schedule and message me back so DC can relay appointment information to patient prior to discharge.   Patient is established.    Stroke called for eval    Patient seen by Dr Briones in 2021.     DX: TIA (transient ischemic attack)    Neyda Bui  Physician Referral Specialist/Discharge

## 2024-01-17 NOTE — TELEPHONE ENCOUNTER
Spoke with grandson/caregiver. Patient scheduled for stroke follow up with ANDRÉS Yoon 1/18 1300.

## 2024-01-17 NOTE — PROGRESS NOTES
Outpatient Care Management   - Care Plan Follow Up    Patient: Krystina Washington  MRN:  [id number]  Date of Service:  1/17/2024  Completed by:  Rubi Paulson LMSW  Referral Date: 12/14/2023    Reason for Visit   Patient presents with    OPCM SW Follow Up Call     1/17/2024       Brief Summary: Paulnie briefly followed up with pt's caregiver, Mr. Mackenzie due to him being at the dentist. Per chart review, portal message with Medicaid Number not yet read. SW encouraged him to review that and contact Medicaid. SW also informed him the [facility] has attempted to contact him as well. Mr. Mackenzie agreeable to follow up in 2 weeks.     Complex Care Plan     Care plan was discussed and completed today with input from patient and/or caregiver.    Patient Instructions     No follow-ups on file.

## 2024-01-18 ENCOUNTER — TELEPHONE (OUTPATIENT)
Dept: NEUROLOGY | Facility: CLINIC | Age: 89
End: 2024-01-18
Payer: MEDICARE

## 2024-01-18 NOTE — TELEPHONE ENCOUNTER
Patient and caregiver Hermelindo Mackenzie referred to our [facility] Program by Rubi Paulson ([facility]).    Called caregiver Mr. Mackenzie today to offer program and assess current care needs. No answer, CTN will send a message through the portal offering the program.

## 2024-01-19 ENCOUNTER — OFFICE VISIT (OUTPATIENT)
Dept: NEUROLOGY | Facility: CLINIC | Age: 89
End: 2024-01-19
Payer: MEDICARE

## 2024-01-19 ENCOUNTER — PATIENT MESSAGE (OUTPATIENT)
Dept: NEUROLOGY | Facility: CLINIC | Age: 89
End: 2024-01-19
Payer: MEDICARE

## 2024-01-19 DIAGNOSIS — Z86.73 HISTORY OF STROKE: Primary | ICD-10-CM

## 2024-01-19 DIAGNOSIS — Z86.73 HISTORY OF TIA (TRANSIENT ISCHEMIC ATTACK): ICD-10-CM

## 2024-01-19 DIAGNOSIS — I67.2 INTRACRANIAL ATHEROSCLEROSIS: ICD-10-CM

## 2024-01-19 DIAGNOSIS — I50.20 HFREF (HEART FAILURE WITH REDUCED EJECTION FRACTION): ICD-10-CM

## 2024-01-19 PROCEDURE — 99417 PROLNG OP E/M EACH 15 MIN: CPT | Mod: 95,,, | Performed by: PHYSICIAN ASSISTANT

## 2024-01-19 PROCEDURE — 99215 OFFICE O/P EST HI 40 MIN: CPT | Mod: 95,,, | Performed by: PHYSICIAN ASSISTANT

## 2024-01-19 NOTE — ASSESSMENT & PLAN NOTE
Krystina Washington  is a 89 y.o.  female  seen today in virtual clinic for assessment and recommendations.      Patient presents with her son for a virtual visit. She has a PMHx of R PCA infarct, cerebellar infarct, TIA, intracranial athero, HF, CAD (NSTEMI, CABG), DM, HLD, HTN, and CKD. She is currently on DAPT and atorvastatin 40 mg daily for stroke prevention. She was previously on eliquis after a prior stroke due to suspected cardioembolic etiology, EF in the past was 25% with global hypokinesis. She was hospitalized last month for GIB, MARGARET, and UTI. Patient initially presented with speech difficulty (stuttering) and arm/leg shaking. During that hospitalization she was seen on telestroke and by [facility] neurology consults. Eliquis was discontinued and patient was started on DAPT.     Suspect symptoms she experienced last month were likely related to her other medical issues at the time and not cerebrovascular in origin. Per chart review, patient has had a history of a brief episode of afib on telemetry in 2021. EF now is much improved at 45-50%. Etiology of previous stroke DARWIN vs ESUS. Given history of recent GIB while on eliquis, will continue with DAPT for now. 30 day event monitor ordered and ambulatory referral placed for patient to follow up with her cardiologist.      The following recommendations and plan were discussed in depth with the patient who voiced understanding and was in agreement.    R PCA infarct, BL cerebellar infarct, TIA, intracranial athero  - Stroke etiology suspected DARWIN vs ESUS  - In-depth discussion with patient regarding diagnosis, imaging findings, & stroke risk factors  - No current clinical indication for anticoagulation at this time.   - Plan for aggressive risk factor modification and maximum medical management  - Antithrombotics/ Anticoagulation: DAPT  - Stroke Risk Factors: stroke, TIA, intracranial athero, HF, CAD, DM, HTN, HLD  - Lipid Management: Target is LDL < 70mg/dL.  Atorvastatin 40mg  - Hypertension: Target systolic BP<140mmHg  - Rehab efforts: patient currently has HH  - Diagnostics ordered/pendin day event monitor

## 2024-01-19 NOTE — PROGRESS NOTES
[facility] V[facility]ULAR NEUROLOG[facility] VIRTUAL CLINIC VISIT        Referring Provider: Mireya Self       SUBJECTIVE:    History for Present Illness: Krystina Washington is a 89 y.o.  female  who presents to me in clinic today for initial assessment and recommendations.    Patient presents with her son for a virtual visit. She has a PMHx of R PCA infarct, cerebellar infarct, TIA, intracranial athero, HF, CAD (NSTEMI, CABG), DM, HLD, HTN, and CKD. She is currently on DAPT and atorvastatin 40 mg daily for stroke prevention. She was previously on eliquis after a prior stroke due to suspected cardioembolic etiology, EF in the past was 25% with global hypokinesis. She was hospitalized last month for GIB, MARGARET, and UTI. Patient initially presented with speech difficulty (stuttering) and arm/leg shaking. During that hospitalization she was seen on telestroke and by [facility] neurology consults. Eliquis was discontinued and patient was started on DAPT.     Per her son, the patient's speech difficulty improved in hospital and she is now at her baseline. She has been doing well and working with . Son reports baseline poor vision in L eye since prior stroke. He denies memory impairment, no noted black stool or visible blood in stool. At baseline, she ambulates with a walker. He does report her intermittently complaining of palpitations.      At the time of today's visit, the patient denies new or worsening focal neurologic symptoms concerning for stroke or TIA.        Past Medical History:   Diagnosis Date    Acute ischemic left posterior cerebral artery (PCA) stroke 12/3/2020    Anemia in stage 3b chronic kidney disease 3/8/2017    Arthritis     CHF (congestive heart failure)     Closed displaced subtrochanteric fracture of right femur s/p IM nail on 10/17/2021 10/16/2021    Coronary artery disease     Diabetes mellitus     Glaucoma     Gout, joint     Hx of CABG 9/21/10    Hyperlipidemia     Hypertension     NSTEMI (non-ST elevated  myocardial infarction) 09/21/10    Stage 3b chronic kidney disease 10/21/2021    Stenosis of aortic and mitral valves     Systolic heart failure 6/19/2015    TIA (transient ischemic attack) 1/7/2021       Past Surgical History:   Procedure Laterality Date    ANTEGRADE INTRAMEDULLARY RODDING OF FEMUR Right 10/17/2021    Procedure: INSERTION, INTRAMEDULLARY ALTAF, FEMUR, ANTEGRADE;  Surgeon: Dino Rutledge MD;  Location: [facility] OR [address] [place] [address];  Service: Orthopedics;  Laterality: Right;    CARDIAC VALVE SURGERY      CATARACT EXTRACTION      CORONARY ARTERY BYPASS GRAFT  9/21/2010    ENTROPIAN REPAIR Left 3/6/2020    Procedure: REPAIR, ENTROPION;  Surgeon: Yulia Kincaid MD;  Location: [address];  Service: Ophthalmology;  Laterality: Left;    ESOPHAGOGASTRODUODENOSCOPY N/A 12/18/2023    Procedure: EGD (ESOPHAGOGASTRODUODENOSCOPY);  Surgeon: Mike Rene MD;  Location: [facility];  Service: Endoscopy;  Laterality: N/A;    EYE SURGERY      JOINT REPLACEMENT      ORIF FEMUR FRACTURE Right 10/17/2021    Procedure: ORIF, FRACTURE, FEMUR;  Surgeon: Dino Rutledge MD;  Location: [facility] OR [address] [place] [address];  Service: Orthopedics;  Laterality: Right;       Family History   Problem Relation Age of Onset    Diabetes Mother     Hypertension Father     Diabetes Father     Glaucoma Father     No Known Problems Sister     No Known Problems Brother     No Known Problems Maternal Aunt     No Known Problems Maternal Uncle     No Known Problems Paternal Aunt     No Known Problems Paternal Uncle     No Known Problems Maternal Grandmother     No Known Problems Maternal Grandfather     No Known Problems Paternal Grandmother     No Known Problems Paternal Grandfather           Current Outpatient Medications:     acetaminophen (TYLENOL) 500 MG tablet, Take 500 mg by mouth every 6 (six) hours as needed for Pain., Disp: , Rfl:     albuterol 90 mcg/actuation inhaler, Inhale 1 puff into the lungs every 6 (six) hours as needed for Wheezing. 1  HFA Aerosol Inhaler Inhalation Twice a day (Patient taking differently: Inhale 1 puff into the lungs every 6 (six) hours as needed for Wheezing.), Disp: 18 g, Rfl: 0    allopurinoL (ZYLOPRIM) 100 MG tablet, TAKE 1 TABLET(100 MG) BY MOUTH EVERY DAY, Disp: 90 tablet, Rfl: 3    aspirin (ECOTRIN) 81 MG EC tablet, Take 81 mg by mouth as needed for Pain., Disp: , Rfl:     atorvastatin (LIPITOR) 40 MG tablet, TAKE 1 TABLET BY MOUTH EVERY DAY, Disp: 90 tablet, Rfl: 3    benzonatate (TESSALON PERLES) 100 MG capsule, Take 1 capsule (100 mg total) by mouth 3 (three) times daily as needed for Cough., Disp: 90 capsule, Rfl: 0    bisacodyL (DULCOLAX) 5 mg EC tablet, Take 5 mg by mouth daily as needed for Constipation., Disp: , Rfl:     blood sugar diagnostic Strp, Check BG daily prn if glucose found to be elevated on BMP, per facility protocol., Disp: 100 strip, Rfl: 6    blood-glucose meter (FREESTYLE SYSTEM KIT) kit, Patient to check blood glucose daily every evening., Disp: 1 each, Rfl: 0    carvediloL (COREG) 12.5 MG tablet, TAKE 1 TABLET(12.5 MG) BY MOUTH TWICE DAILY, Disp: 180 tablet, Rfl: 3    clopidogreL (PLAVIX) 75 mg tablet, Take 1 tablet (75 mg total) by mouth once daily., Disp: 3 tablet, Rfl: 0    EScitalopram oxalate (LEXAPRO) 20 MG tablet, TAKE 1 TABLET BY MOUTH EVERY DAY, Disp: 90 tablet, Rfl: 3    furosemide (LASIX) 20 MG tablet, TAKE 1 TABLET(20 MG) BY MOUTH EVERY DAY (Patient taking differently: Take 20 mg by mouth every other day.), Disp: 90 tablet, Rfl: 3    hydrALAZINE (APRESOLINE) 25 MG tablet, Take 1 tablet (25 mg total) by mouth every 8 (eight) hours., Disp: 90 tablet, Rfl: 11    isosorbide mononitrate (IMDUR) 30 MG 24 hr tablet, TAKE 1 TABLET(30 MG) BY MOUTH EVERY DAY, Disp: 90 tablet, Rfl: 3    lancets Misc, Use daily prn if blood glucose found to be elevated on routine BMP, per facility protocol., Disp: 100 each, Rfl: 6    multivit-min-iron-FA-vit K-lut (CENTRUM SILVER WOMEN) 8 mg iron-400 mcg-50 mcg  Tab, Take 1 tablet by mouth once daily., Disp: , Rfl:     nitroGLYCERIN 0.4 MG/DOSE TL SPRY (NITROLINGUAL) 400 mcg/spray spray, PLACE 1 SPRAY ONTO THE TONGUE EVERY 5 (FIVE) MINUTES AS NEEDED., Disp: 12 g, Rfl: 0    sacubitriL-valsartan (ENTRESTO) 49-51 mg per tablet, Take 1 tablet by mouth 2 (two) times daily., Disp: 60 tablet, Rfl: 11    timolol maleate 0.5% (TIMOPTIC) 0.5 % Drop, INSTILL 1 DROP IN BOTH EYES TWICE DAILY, Disp: 15 mL, Rfl: 3    clopidogreL (PLAVIX) 75 mg tablet, Take 1 tablet (75 mg total) by mouth once daily. (Patient taking differently: Take 75 mg by mouth every evening.), Disp: 30 tablet, Rfl: 11    multivitamin (THERAGRAN) tablet, Take 1 tablet by mouth once daily., Disp: 30 tablet, Rfl: 2       Review of Systems 12 system review of systems is negative except for the symptoms mentioned in HPI      OBJECTIVE:    Physical Exam  limited by virtual visit  Physical Exam  Constitutional:       General: She is not in acute distress.  HENT:      Head: Normocephalic and atraumatic.   Pulmonary:      Effort: No respiratory distress.   Neurological:      Mental Status: She is alert.          Neurologic Exam:  Limited by virtual visit, neuro exam performed with assistance of patient's son    Alert, oriented to person/place/time  No dysarthria or aphasia  No facial droo  Extraocular eye movements intact  Unable to assess visual fields virtually  Able to hold all extremities antigravity  Sensation intact to light touch bilaterally  No appendicular ataxia                                                                                                                                                                                         NIHSS: 0  Modified Wood[place] Score: 3     Relevant Labwork:  Recent Labs   Lab 12/12/23  1722   Hemoglobin A1C 5.9 H   LDL Cholesterol 36.8 L   HDL 33 L   Triglycerides 91   Cholesterol 88 L       Diagnostic Results:  Brain Imaging   MRI 8/13/23  New R cerebellar infarct  Remote R  PCA and L cerebellar infarcts    Vessel Imaging   CTA head and neck 12/12/23  BL anterior/posterior intracranial athero    Cardiac Imaging   TTE 12/12/23  Left Ventricle There is concentric hypertrophy. Septal motion is consistent with bundle branch block. There is mildly reduced systolic function with a visually estimated ejection fraction of 45 - 50%. There is indeterminate diastolic function.   Right Ventricle Normal right ventricular cavity size. Wall thickness is normal. Right ventricle wall motion  is normal. Systolic function is normal.   Left Atrium Normal left atrial size.   Right Atrium Normal right atrial size.   Aortic Valve There is a transcatheter valve replacement in the aortic position. It is reported to be a 26 mm Medtronic valve. Aortic valve area by VTI is 1.46 cm². Aortic valve peak velocity is 2.30 m/s. Mean gradient is 11 mmHg. The dimensionless index is 0.43.   Mitral Valve There is mild mitral annular calcification present. There is no stenosis.   Tricuspid Valve The tricuspid valve is structurally normal. There is normal leaflet mobility. There is trace regurgitation.   Pulmonic Valve The pulmonic valve is structurally normal. There is normal leaflet mobility. There is trace regurgitation.   IVC/SVC Normal venous pressure at 3 mmHg.   Pericardium and Other Findings There is no pericardial effusion.   Pulmonary Artery The estimated pulmonary artery systolic pressure is 39 mmHg.         Assessment/Plan:  1. History of stroke  Assessment & Plan:  Krystina Washington  is a 89 y.o.  female  seen today in virtual clinic for assessment and recommendations.      Patient presents with her son for a virtual visit. She has a PMHx of R PCA infarct, cerebellar infarct, TIA, intracranial athero, HF, CAD (NSTEMI, CABG), DM, HLD, HTN, and CKD. She is currently on DAPT and atorvastatin 40 mg daily for stroke prevention. She was previously on eliquis after a prior stroke due to suspected cardioembolic etiology,  EF in the past was 25% with global hypokinesis. She was hospitalized last month for GIB, MARGARET, and UTI. Patient initially presented with speech difficulty (stuttering) and arm/leg shaking. During that hospitalization she was seen on telestroke and by [facility] neurology consults. Eliquis was discontinued and patient was started on DAPT.     Suspect symptoms she experienced last month were likely related to her other medical issues at the time and not cerebrovascular in origin. Per chart review, patient has had a history of a brief episode of afib on telemetry in . EF now is much improved at 45-50%. Etiology of previous stroke DARWIN vs ESUS. Given history of recent GIB while on eliquis, will continue with DAPT for now. 30 day event monitor ordered and ambulatory referral placed for patient to follow up with her cardiologist.      The following recommendations and plan were discussed in depth with the patient who voiced understanding and was in agreement.    R PCA infarct, BL cerebellar infarct, TIA, intracranial athero  - Stroke etiology suspected DARWIN vs ESUS  - In-depth discussion with patient regarding diagnosis, imaging findings, & stroke risk factors  - No current clinical indication for anticoagulation at this time.   - Plan for aggressive risk factor modification and maximum medical management  - Antithrombotics/ Anticoagulation: DAPT  - Stroke Risk Factors: stroke, TIA, intracranial athero, HF, CAD, DM, HTN, HLD  - Lipid Management: Target is LDL < 70mg/dL. Atorvastatin 40mg  - Hypertension: Target systolic BP<140mmHg  - Rehab efforts: patient currently has HH  - Diagnostics ordered/pendin day event monitor      Orders:  -     Cardiac event monitor; Future  -     Ambulatory referral/consult to Cardiology; Future; Expected date: 2024    2. HFrEF (heart failure with reduced ejection fraction)  -     Cardiac event monitor; Future  -     Ambulatory referral/consult to Cardiology; Future; Expected date:  01/26/2024    3. History of TIA (transient ischemic attack)  -     Cardiac event monitor; Future  -     Ambulatory referral/consult to Cardiology; Future; Expected date: 01/26/2024    4. Intracranial atherosclerosis            Patient/Family teaching  -A significant amount of time was spent reviewing the patient's stroke risk factors   -Counseled on lifestyle modifications for risk factor reduction     We discussed the usual stroke warning signs and symptoms, and the need to activate EMS (call 911) as soon as the symptoms present.  - Sudden onset numbness or weakness of the face, arm, or leg;  especially on one side of the body  - Sudden confusion, trouble speaking, or understanding  - Sudden trouble seeing in one or both eyes  - Sudden trouble walking, dizziness, loss of coordination  - Sudden severe headache with no known cause    I will plan on having Krystina return to clinic once 30 day event monitor is completed.  The patient can contact my office with any questions or concerns they may have as they arise in the interim.       72 minutes of total time spent on the encounter, which includes face to face time and non-face to face time preparing to see the patient (eg, review of tests), Obtaining and/or reviewing separately obtained history, Documenting clinical information in the electronic or other health record, Independently interpreting results (not separately reported) and communicating results to the patient/family/caregiver, patient/family education and Care coordination (not separately reported).     The patient location is: [place]  The chief complaint leading to consultation is: stroke follow up    Visit type: audiovisual    Face to Face time with patient:   45 minutes of total time spent on the encounter, which includes face to face time and non-face to face time preparing to see the patient (eg, review of tests), Obtaining and/or reviewing separately obtained history, Documenting clinical  information in the electronic or other health record, Independently interpreting results (not separately reported) and communicating results to the patient/family/caregiver, or Care coordination (not separately reported).     Each patient to whom he or she provides medical services by telemedicine is:  (1) informed of the relationship between the physician and patient and the respective role of any other health care provider with respect to management of the patient; and (2) notified that he or she may decline to receive medical services by telemedicine and may withdraw from such care at any time.        Citlaly Alexander PA-C  Department of Vascular Neurology  [facility]

## 2024-01-21 DIAGNOSIS — I10 ESSENTIAL HYPERTENSION: ICD-10-CM

## 2024-01-21 NOTE — TELEPHONE ENCOUNTER
Care Due:                  Date            Visit Type   Department     Provider  --------------------------------------------------------------------------------                                EP -                              PRIMARY      [facility]  Last Visit: 01-      CARE ([facility])   MEDICINE       Oniel Johnson  Next Visit: None Scheduled  None         None Found                                                            Last  Test          Frequency    Reason                     Performed    Due Date  --------------------------------------------------------------------------------    Office Visit  15 months..  EScitalopram,              01- 04-                             allopurinoL,                             atorvastatin, isosorbide.    [facility] Embedded Care Due Messages. Reference number: [id number].   1/21/2024 3:10:56 PM CST

## 2024-01-22 RX ORDER — CARVEDILOL 12.5 MG/1
TABLET ORAL
Qty: 180 TABLET | Refills: 3 | Status: SHIPPED | OUTPATIENT
Start: 2024-01-22

## 2024-01-24 ENCOUNTER — TELEPHONE (OUTPATIENT)
Dept: NEUROLOGY | Facility: CLINIC | Age: 89
End: 2024-01-24
Payer: MEDICARE

## 2024-01-24 NOTE — TELEPHONE ENCOUNTER
Patient and caregiver Hermelindo Mackenzie referred to our Care [facility] Program by Rubi Paulson ([facility]).    Called caregiver Mr. Mackenzie today to offer program and assess current care needs. No answer, CTN left another detailed voicemail about the program and the services we are able to provide.     This is [facility]'s fourth attempt to reach dyad. Next attempt will be made on 2/1.

## 2024-01-25 ENCOUNTER — DOCUMENT SCAN (OUTPATIENT)
Dept: HOME HEALTH SERVICES | Facility: HOSPITAL | Age: 89
End: 2024-01-25

## 2024-01-25 ENCOUNTER — DOCUMENT SCAN (OUTPATIENT)
Dept: HOME HEALTH SERVICES | Facility: HOSPITAL | Age: 89
End: 2024-01-25
Payer: MEDICARE

## 2024-01-28 ENCOUNTER — DOCUMENT SCAN (OUTPATIENT)
Dept: HOME HEALTH SERVICES | Facility: HOSPITAL | Age: 89
End: 2024-01-28
Payer: MEDICARE

## 2024-01-30 ENCOUNTER — OUTPATIENT CASE MANAGEMENT (OUTPATIENT)
Dept: ADMINISTRATIVE | Facility: OTHER | Age: 89
End: 2024-01-30
Payer: MEDICARE

## 2024-01-30 DIAGNOSIS — H40.1121 PRIMARY OPEN ANGLE GLAUCOMA (POAG) OF LEFT EYE, MILD STAGE: ICD-10-CM

## 2024-01-30 RX ORDER — TIMOLOL MALEATE 5 MG/ML
1 SOLUTION/ DROPS OPHTHALMIC 2 TIMES DAILY
Qty: 15 ML | Refills: 3 | Status: SHIPPED | OUTPATIENT
Start: 2024-01-30

## 2024-01-30 NOTE — PROGRESS NOTES
Outpatient Care Management  Plan of Care Follow Up Visit    Patient: Krystina Washington  MRN: [id number]  Date of Service: 01/30/2024  Completed by: Fernanda Melgar RN  Referral Date: 12/14/2023    Reason for Visit   Patient presents with    OPCM Chart Review    Update Plan Of Care     01/30/24       Brief Summary: OPCM follow up complete. Continued education on stroke.  Next Steps:  Inquire if cg was able to contact [employer] system representative Ms. Hernandez. Message sent to PCP to request refill on Timolol. Cg was able to locate message and contact information. Continue to reinforce s/s of stroke with cg. Reinforce maintaining a low sodium diet as much as possible  Patient agrees to follow up call with in 3 weeks

## 2024-01-31 ENCOUNTER — OUTPATIENT CASE MANAGEMENT (OUTPATIENT)
Dept: NEUROLOGY | Facility: CLINIC | Age: 89
End: 2024-01-31
Payer: MEDICARE

## 2024-01-31 ENCOUNTER — OUTPATIENT CASE MANAGEMENT (OUTPATIENT)
Dept: ADMINISTRATIVE | Facility: OTHER | Age: 89
End: 2024-01-31
Payer: MEDICARE

## 2024-01-31 NOTE — LETTER
Krystina Washington  [address]  [place] [address]      Dear Krystina Washington,     I am writing from the Outpatient Care Management Department at [facility]. I have been unsuccessful at reaching you since we spoke on 1/17/2024.  I hope you found the assistance previously provided to you helpful.     Please contact me at [phone] from 8:00AM to 4:30 PM on Monday thru Friday.     As you know, [facility] also has a program with a nurse available 24/7 to answer questions or provide medical advice.  [facility] can be reached at [phone].    Sincerely,       Rubi Paulson, [facility] [place]  Outpatient Care Management

## 2024-01-31 NOTE — PROGRESS NOTES
Care Ecosystem Follow-up Surveys:  AAMIR Black LCSW completed Baseline with caregiver Hermelindo Mackenzie (Son) on (date: 1/31/24).   Surveys documented in RedCap and Epic Flowsheets.    Caregiver would like to address:  Cost of Rx  CG self-care  Home safety -fall prevention       - lovenox 40mg BID  -plan of care d/w family at bedside  , if chose comfort care, will request palliative eval  Prognosis guarded

## 2024-01-31 NOTE — PROGRESS NOTES
Subject was consented today (1/31/2024) for the following study:     Study title: Care Ecosystem  IRB #: 2022.247  IRB approval date: 12/12/2022  Sponsor: [employer]  Note: This study received a waiver of written consent by the IRB, therefore consent is not uploaded to Epic. However, the Participant Information Sheet should be attached to the encounter for every enrolled participant starting 10/2/2023.    Screening of inclusion/exclusion criteria evaluation has been reviewed by Alicia Hernandez. At this time, the subject meets all inclusion and does not meet any one of the exclusion criteria.       INFORMED CONSENT PROCESS/ INVOLVEMENT IN CARE/ PROXY   Present for discussion: Hermelindo Mackenzie (son)  Does subject have capacity to consent per evaluation: No  Is LAR Consenting for Subject: Yes      LAR Determined by: Power of    If applicable, next of kin: Adult Sibling      NOTES ON SIGNING ICF/INVOLVEMENT IN CARE/PROXY  [Capacity is determined per chart review, interview with LAR and patient, along with taking into consideration past practices]    Subject does not have capacity -  POA  POA signs forms, subject signs as well if able    CONSENT:  Verbal Consent: yes  Written Consent: no     Prior to the Informed Consent (IC) being signed, or any protocol required testing, procedure, or intervention being performed, the following was done or discussed:    Purpose of the Study, Qualifications to Participate: YES/NO: Yes  Study Design, Schedule and Procedures: YES/NO: Yes  Risks, Benefits, Alternative Treatments, Compensation and Costs: YES/NO: Yes  Confidentiality and HIPAA Authorization for Release of Medical Records for the research trial/subject's right/study related injury: YES/NO: Yes  Study related contact information: YES/NO: Yes  Voluntary Participation and Withdrawal from the research trial at any time: YES/NO: Yes  Optional samples/procedures (if applicable): Not applicable.  Patient has been offered  the opportunity to ask questions regarding the study and all questions were answered satisfactorily: YES/NO: Yes  Patient and/or LAR verbalizes understanding of the study/procedures and agrees to participate: YES/NO: Yes  CRC and PI contact information given to LAR and/or patient: Yes - Provided in Participant Information Sheet..   Signed copy given to patient and/or LAR: No - does not apply. Participant Information sheet sent via e-mail  Copy in patient's chart and original uploaded to Epic: No - documented in RedCap        Person Obtaining Consent: Alicia Hernandez  Witness: Misty Black  Subject Study ID: [id number]   Assigned Care Team Navigator: Misty Black

## 2024-02-07 ENCOUNTER — OUTPATIENT CASE MANAGEMENT (OUTPATIENT)
Dept: NEUROLOGY | Facility: CLINIC | Age: 89
End: 2024-02-07
Payer: MEDICARE

## 2024-02-07 ENCOUNTER — DOCUMENT SCAN (OUTPATIENT)
Dept: HOME HEALTH SERVICES | Facility: HOSPITAL | Age: 89
End: 2024-02-07
Payer: MEDICARE

## 2024-02-07 NOTE — PROGRESS NOTES
Care [facility] Dementia Care Management Plan - BASELINE     Assigned Care Team Navigator: Misty Black LCSW  Referring Provider: Rubi LOPEZ  Primary Care: Oniel Johnson MD     Protocol: The Care Ecosystem [facility] Effectiveness Study  Identifier: [id number]  IRB#: 2022.247  PI: Dr. Shankar Vasquez, PhD  CO-I: Dr. Chayo Diamond, PsyD  Version Date: 2022  Pt Study ID: [id number]  Visit Month: M1    Timeline:   (*Note for CTN - complete timeline using completed or planned date for study events. Add any important events (i.e. Withdrawals, Lost to Follow Up, Adverse Events, etc.).  Consent: Completed(24)  Baseline questionnaires: Completed(24)  6-month questionnaires: Planned(24)  12-month questionnaires: Planned(25)    Patient Demographic Information     Name: Krystina Washington  Preferred Name: Ms. Washington  MRN: [id number]  : 1932  Age: 89 y.o.  Gender: female  Race: Black or   Ethnicity: Not  or /a  Level of Education: Less than High School   Occupational Status/History: Homemaker - , worked at a [employer]    Communication Preferences     Language: English   Please contact primary caregiver by Phone for scheduling purposes.   Please send information and forms via E-mail    Caregiver(s) and Social History     Family Status: Pt is , she has one child(CG's father), CG father had three boys.  CG has two sons.  CG lives locally as well has one of his brothers who is legally blind. CG cares for his brother as well. CG's fiance and his adopted sister live with him.   Current Living Situation:  Pt lives with CG, his fiance and his adopted sister.  Support System: They have good neighbors who are supportive, CG's ex-wife  Patient Hobbies/Activities: Pt likes to cook, but after broken hip she is no longer stable enough to stand.  CG said he sees improvement, and last week she walked through the house independently. Pt uses a walker. Pt  used to like to fish, sew and garden.  Now she plays slot machines on tablet and has a slot machine in living.          (Examples: music, TV shows/movies, exercise/physical activity, social, outings, games, arts/crafts,         beauty/grooming, spiritual practices)  Patient Stressors (e.g., Pain): Pt broke her hip 4 years ago, titanium yani. Pt is worried about falling.  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   Current Programs/Services: Pt receives PT through  since her stroke in Dec 2023.        Caregiver Name  Role Relationship Main Contact #   Marquis Gurdeep Primary Other Family Grandson [phone]                            Medical History     Providers   (Relevant to dementia care) OPCM   Types of help wanted   (at baseline) Information about dementia and what to expect in the future, Ideas for coping with the stress of caregiving, Caregiving strategies for helping Test do as much as he/she still can, Ideas for managing behavior symptoms, Recreational or purposeful activity ideas, Advice about safety risks like falls, wandering, or poor judgment, Advice about medications, Information about caregiving support services like in-home care, adult day care, or care facilities, Information about programs that might help pay for caregiving services, Information about medical, legal, and/or financial planning, and Help with back-up or crisis planning   Concerns and Goals   (from caregiver and PWD) Goal is to continue to provide care at home for the patient   Type of Dementia and Stage  (all that apply) Dementia Type: Alzheimer's Disease  Stage: Moderate  MoCA /MMSE   Date: Not on file           No data to display                   Medication Review (at baseline)   Medication reviewed with caregiver Hermelindo Mackenzie.   Information is based off patient chart and patient and/or caregiver self-report as of (2/7/24)  *Make note of any changes to current medication list.*    Current Outpatient  Medications   Medication Instructions    acetaminophen (TYLENOL) 500 mg, Oral, Every 6 hours PRN    albuterol 90 mcg/actuation inhaler 1 puff, Inhalation, Every 6 hours PRN, 1 HFA Aerosol Inhaler Inhalation Twice a day    allopurinoL (ZYLOPRIM) 100 mg, Oral    aspirin (ECOTRIN) 81 mg, Oral, As needed (PRN)    atorvastatin (LIPITOR) 40 MG tablet TAKE 1 TABLET BY MOUTH EVERY DAY    benzonatate (TESSALON PERLES) 100 mg, Oral, 3 times daily PRN    bisacodyL (DULCOLAX) 5 mg, Oral, Daily PRN    blood sugar diagnostic Strp Check BG daily prn if glucose found to be elevated on BMP, per facility protocol.    blood-glucose meter (FREESTYLE SYSTEM KIT) kit Patient to check blood glucose daily every evening.    carvediloL (COREG) 12.5 MG tablet TAKE 1 TABLET(12.5 MG) BY MOUTH TWICE DAILY    clopidogreL (PLAVIX) 75 mg, Oral, Daily    clopidogreL (PLAVIX) 75 mg, Oral, Daily  ### listed twice ###    EScitalopram oxalate (LEXAPRO) 20 MG tablet TAKE 1 TABLET BY MOUTH EVERY DAY    furosemide (LASIX) 20 MG tablet TAKE 1 TABLET(20 MG) BY MOUTH EVERY DAY  ### every other day ###    hydrALAZINE (APRESOLINE) 25 mg, Oral, Every 8 hours    isosorbide mononitrate (IMDUR) 30 MG 24 hr tablet TAKE 1 TABLET(30 MG) BY MOUTH EVERY DAY    lancets Misc Use daily prn if blood glucose found to be elevated on routine BMP, per facility protocol.    multivit-min-iron-FA-vit K-lut (CENTRUM SILVER WOMEN) 8 mg iron-400 mcg-50 mcg Tab 1 tablet, Oral, Daily    nitroGLYCERIN 0.4 MG/DOSE TL SPRY (NITROLINGUAL) 400 mcg/spray spray PLACE 1 SPRAY ONTO THE TONGUE EVERY 5 (FIVE) MINUTES AS NEEDED.    sacubitriL-valsartan (ENTRESTO) 49-51 mg per tablet 1 tablet, Oral, 2 times daily    timolol maleate 0.5% (TIMOPTIC) 0.5 % Drop 1 drop, Both Eyes, 2 times daily          Over the counter medications: N/A  Vitamins, supplements: Vitamin D  Caffeine, alcohol, tobacco, marijuana products: No      THE FOLLOWING CONCERNS WERE IDENTIFIED:  Medication management:  "No  Access/cost: Yes - Beginning of the year, before deductible is met meds are expensive.    Side effects: No  Recent changes: Yes - taken off of Eloquis because because "it was too powerful in conjunction with other meds."  Polypharmacy (>9 routine prescription medications?): Yes -   Behaviors/Sleep: Yes - sometimes pt gets her days and nights mixed up, wants to get up at 1 or 2 in the morning and wants to cook.  Pt is able to be redirected  Other symptoms (falls/incontinence/diarrhea/swelling/itching/weight loss): Yes - pt does have a problem with itching, especially on upper back.  Pt has shoulder pain she complains about.       PLAN:  CTN to send medication list to PharmD this week. Review will be addended by PharmD.   CTN will route pharmacist recommendations to Oniel Johnson MD   CTN will share and discuss pharmacist recommendations with caregiver during next scheduled call.         Advanced Care Planning      Living Will: Yes      Copy on chart: Yes  [place]: No:      Medical Power of : Yes      Copy on chart: Yes   Financial Power of : No: CG manages finances, but no formal paperwork.       Copy on chart: No   Agent's Name: N/A       Goals of Care      Patient Values: Engage in Activities and physical health , family    Future Care Plans:   - Long term care goal: Home  - Resources available to pay for care: Income/Private Pay and Insurance Coverage       Current Concerns - BASELINE     Primary Concern(s)  (Immediate needs) Hearing aids   Cognitive Concerns  (Include decision making capacity) Pt is still able to carry on a conversation, she is happy and "smiley, very engaged." No current cognitive concerns.    Primary Behavior Concerns  (Symptoms, triggers, strategies) She wants to cook at night sometimes, and be active, but is easily redirected.    Functional  (Include PWD daily routine and sensory - vision/hearing - information) Pt is afraid of falling so she is more sedentary " than what is good for her.  She had two strokes last year.          1/31/2024     2:36 PM   [facility]-GWEP   Memory and Recall Consistent mild forgetfulness or partial recollection of events that may interfere with performing everyday activities: repeats questions/statements, misplaces items, forgets appointments   Orientation Slight difficulty keeping track of time, may forget day or date more frequently than in the past   Decision Making and Problem Solving Abilities Severely impaired in handling problems, making only simple personal decisions, social judgment and behavior often impaired, lacks insight   Activities Outside the Home No pretense of independent function outside the home, appears well enough to be taken to activities outside the family home but generally needs to be accompanied   Function at Home and Hobby Activities No meaningful function in household chores or with prior hobbies   Toileting and Personal Hygeine Requires some assistance in dressing, hygiene, keeping of personal items, occasionally incontinent   Behavior and Personality Changes Mild changes in behavior or personality   Language and Communication Abilities No language difficulty or occasional word searching, reads and writes as well as in past   Mood Moderate issues with sadness, depression, anxiety, nervousness or loss of interest/motivation   Attention and Concentration Significant portion of the day is spent sleeping, not paying attention to environment, when having a           1/31/2024     2:38 PM   NPIQ RFS   WHO IS FILLING OUT FORM? Caregiver   Does this patient have false beliefs, such as thinking that others are stealing from him/her or planning to harm him/her in some way? No   Does this patient have hallucinations such as false visions or voices? [place] she/he seem to hear or see things that are not present? Yes   Hallucination Severity 1   Hallucination Distress 4   Is the patient resistive to help from others at times, or hard to  handle? No   Does the patient seem sad or say that he/she is depressed? Yes   Depression/Dysphoria Severity 1   Depression/Dysphoria Distress 4   Does the patient become upset when  from you? Does he/she have any other signs of nervousness such as shortness of breath, sighing, being unable tor elax, or feeling excessively tense? Yes   Anxiety Severity 3   Anxiety Distress 5   Does the patient appear to feel good or act excessively happy? No   Does this patient seem less interested in his/her usual activities or in the activities and plans of others? Yes   Apathy/Indifference Severity 1   Apathy/Indifference Distress 3   Does this patient seem to act cumpolsively, for example, talking to strangers as if she/he knows them, or saying things that may hurt people's feelings? No   Is the patient impatient and cranky? Does he/she have difficulty coping with delays or waiting for planned activities? Yes   Irritability/Liability Severity 2   Irritability/Liability Distress 4   Does the patient engage in repetitive activities such as pacing around the house, handling buttons, wrapping string, or doing other things repeatedly? No   Does this patient awaken you during the night, rise too early in the morning, or take excessive naps during the day? Yes   Nightime Behavior Severity 1   Nightime Behavior Distress 3   Has the patient lost or gained weight, or had a change in the type of food he/she likes? No   NPI Total Severity Score 9   NPI Total Distress Score 23         Monthly Questions     Falls in the past month: 0  UTIs in the past month: 0  Hospital encounters in the past month (reported by caregiver): 0      CTN Plan & Recommendations     CTN provided the following resources/recommendations for: Caregiver Consider getting POA sooner rather than later. Acquire a bidet for UTI prevention.     Next steps: Sent bidet options via email. Send pharm D med list for recs.

## 2024-02-07 NOTE — TELEPHONE ENCOUNTER
No care due was identified.  [facility] Embedded Care Due Messages. Reference number: [id number].   2/07/2024 4:42:46 PM CST

## 2024-02-08 RX ORDER — CLOPIDOGREL BISULFATE 75 MG/1
75 TABLET ORAL DAILY
Qty: 90 TABLET | Refills: 3 | Status: SHIPPED | OUTPATIENT
Start: 2024-02-08 | End: 2025-02-07

## 2024-02-08 NOTE — PROGRESS NOTES
Outpatient Care Management   - Care Plan Follow Up    Patient: Krystina Washington  MRN:  [id number]  Date of Service:  2/8/2024  Completed by:  Rubi Paulson LMSW  Referral Date: 12/14/2023    Reason for Visit   Patient presents with    Other     1/31/2024  1st attempt to complete Follow-Up  for Outpatient Care Management, left message.  Will mail unable to assess letter.        Case Closure     2/8/2024       Brief Summary: SW completed follow up with pt's caregiver, Mr. Mackenzie, via telephone. He stated pt is doing well at home, but experiencing some shoulder pain. Mr. Mackenzie has been enrolled with [employer] and has made contact with RUMA Jay regarding caregiver support. Sw reminded and encouraged Mr. Mackenzie to contact Medicaid regarding pt's verification. He verbally understood. No other needs or concerns expressed. Case closed, notified OPCM RN.     Complex Care Plan     Care plan was discussed and completed today with input from patient and/or caregiver.    Patient Instructions     No follow-ups on file.

## 2024-02-14 ENCOUNTER — DOCUMENT SCAN (OUTPATIENT)
Dept: HOME HEALTH SERVICES | Facility: HOSPITAL | Age: 89
End: 2024-02-14
Payer: MEDICARE

## 2024-02-19 ENCOUNTER — EXTERNAL HOME HEALTH (OUTPATIENT)
Dept: HOME HEALTH SERVICES | Facility: HOSPITAL | Age: 89
End: 2024-02-19
Payer: MEDICARE

## 2024-02-20 PROCEDURE — G0179 MD RECERTIFICATION HHA PT: HCPCS | Mod: ,,, | Performed by: FAMILY MEDICINE

## 2024-02-22 RX ORDER — CHOLECALCIFEROL (VITAMIN D3) 25 MCG
1000 TABLET ORAL DAILY
COMMUNITY

## 2024-02-22 NOTE — PROGRESS NOTES
Care Ecosystem Medication Review - PharmD    [facility] patient. Medication review completed by Care Team Navigator (CTN) and Cuauhtemoc Marcelo PharmD to identify dementia specific medication concerns, as well as opportunities to reduce polypharmacy, high risk medications, and fall risk as needed.     Current Outpatient Medications   Medication Instructions    acetaminophen (TYLENOL) 500 mg, Oral, Every 6 hours PRN    albuterol 90 mcg/actuation inhaler 1 puff, Inhalation, Every 6 hours PRN, 1 HFA Aerosol Inhaler Inhalation Twice a day    allopurinoL (ZYLOPRIM) 100 mg, Oral    aspirin (ECOTRIN) 81 mg, Oral, As needed (PRN)    atorvastatin (LIPITOR) 40 MG tablet TAKE 1 TABLET BY MOUTH EVERY DAY    benzonatate (TESSALON PERLES) 100 mg, Oral, 3 times daily PRN    bisacodyL (DULCOLAX) 5 mg, Oral, Daily PRN    blood sugar diagnostic Strp Check BG daily prn if glucose found to be elevated on BMP, per facility protocol.    blood-glucose meter (FREESTYLE SYSTEM KIT) kit Patient to check blood glucose daily every evening.    carvediloL (COREG) 12.5 MG tablet TAKE 1 TABLET(12.5 MG) BY MOUTH TWICE DAILY    clopidogreL (PLAVIX) 75 mg, Oral, Daily    EScitalopram oxalate (LEXAPRO) 20 MG tablet TAKE 1 TABLET BY MOUTH EVERY DAY    furosemide (LASIX) 20 MG tablet TAKE 1 TABLET(20 MG) BY MOUTH EVERY DAY    hydrALAZINE (APRESOLINE) 25 mg, Oral, Every 8 hours    isosorbide mononitrate (IMDUR) 30 MG 24 hr tablet TAKE 1 TABLET(30 MG) BY MOUTH EVERY DAY    lancets Misc Use daily prn if blood glucose found to be elevated on routine BMP, per facility protocol.    multivit-min-iron-FA-vit K-lut (CENTRUM SILVER WOMEN) 8 mg iron-400 mcg-50 mcg Tab 1 tablet, Oral, Daily    nitroGLYCERIN 0.4 MG/DOSE TL SPRY (NITROLINGUAL) 400 mcg/spray spray PLACE 1 SPRAY ONTO THE TONGUE EVERY 5 (FIVE) MINUTES AS NEEDED.    sacubitriL-valsartan (ENTRESTO) 49-51 mg per tablet 1 tablet, Oral, 2 times daily    timolol maleate 0.5% (TIMOPTIC) 0.5 % Drop 1 drop,  Both Eyes, 2 times daily    vitamin D (VITAMIN D3) 1,000 Units, Oral, Daily           PharmD Recommendations:  No medication recommendations  Updated Epic med list to reflect CTN notes and fill history.    Time spent delivering care (in minutes): 10      Plan:  Pharmacist to route recommendations to CTN.

## 2024-03-04 ENCOUNTER — OUTPATIENT CASE MANAGEMENT (OUTPATIENT)
Dept: ADMINISTRATIVE | Facility: OTHER | Age: 89
End: 2024-03-04
Payer: MEDICARE

## 2024-03-04 NOTE — PROGRESS NOTES
Outpatient Care Management  Plan of Care Follow Up Visit    Patient: Krystina Washington  MRN: [id number]  Date of Service: 03/04/2024  Completed by: Fernanda Melgar RN  Referral Date: 12/14/2023    Reason for Visit   Patient presents with    Update Plan Of Care     03/04/24       Brief Summary: OPCM follow up complete. Continued education on stroke/UTI.  Next Steps: Sending message to PCP Dr. Johnson with cg's concern over possible UTI patient may be experiencing.  Patient agrees to follow up call with in 2 weeks

## 2024-03-06 ENCOUNTER — OUTPATIENT CASE MANAGEMENT (OUTPATIENT)
Dept: NEUROLOGY | Facility: CLINIC | Age: 89
End: 2024-03-06
Payer: MEDICARE

## 2024-03-06 NOTE — PROGRESS NOTES
Protocol: The Care [facility] Effectiveness Study  Identifier: [id number]  IRB#: 2022.247  PI: Dr. Shankar Vasquez, PhD  CO-I: Dr. Chayo Diamond PsyD  Version Date: 12/05/2022  Pt Study ID: [id number]  Visit Month: M1     Timeline:   (*Note for CTN - complete timeline using completed or planned date for study events. Add any important events (i.e. Withdrawals, Lost to Follow Up, Adverse Events, etc.).  Consent: Completed(1/31/24)  Baseline questionnaires: Completed(1/31/24)  6-month questionnaires: Planned(7/31/24)  12-month questionnaires: Planned(1/31/25)    Visit Note:   Spoke with caregiver Hermelindo Mackenzie (Son) for month 2 visit. CG said he and pt go to the [employer] on Thursdays because pt loves to cook. CG said he does this so pt can get out and get some exercise.  They cook together with what they buy.  CG pt sinks into her bed and has a hard time getting out. Discussed putting plywood under mattress to make more firm.  Also discussed bed rail so pt can use to pull herself out.   SW reported to CG that pharmD has no med recs.       Falls in the past month: 0  UTIs in the past month: 0  Hospital encounters in the past month (reported by caregiver): 0      Next monthly visit scheduled for: 4/5/24. Caregiver knows how to reach CTN, and is encouraged to do so, as needed before our next scheduled call.     Action items for CTN: Send bed rail links via email.

## 2024-03-18 ENCOUNTER — E-VISIT (OUTPATIENT)
Dept: FAMILY MEDICINE | Facility: CLINIC | Age: 89
End: 2024-03-18
Payer: MEDICARE

## 2024-03-18 ENCOUNTER — PATIENT MESSAGE (OUTPATIENT)
Dept: FAMILY MEDICINE | Facility: CLINIC | Age: 89
End: 2024-03-18

## 2024-03-18 DIAGNOSIS — N89.8 VAGINAL DISCHARGE: ICD-10-CM

## 2024-03-18 DIAGNOSIS — N39.0 URINARY TRACT INFECTION WITHOUT HEMATURIA, SITE UNSPECIFIED: Primary | ICD-10-CM

## 2024-03-18 PROBLEM — G45.9 TIA (TRANSIENT ISCHEMIC ATTACK): Status: RESOLVED | Noted: 2021-01-07 | Resolved: 2024-03-18

## 2024-03-18 PROCEDURE — 99442 PR PHYSICIAN TELEPHONE EVALUATION 11-20 MIN: CPT | Mod: 95,,, | Performed by: FAMILY MEDICINE

## 2024-03-18 RX ORDER — ATORVASTATIN CALCIUM 40 MG/1
TABLET, FILM COATED ORAL
Qty: 90 TABLET | Refills: 0 | Status: SHIPPED | OUTPATIENT
Start: 2024-03-18

## 2024-03-18 RX ORDER — SULFAMETHOXAZOLE AND TRIMETHOPRIM 800; 160 MG/1; MG/1
1 TABLET ORAL 2 TIMES DAILY
Qty: 14 TABLET | Refills: 0 | Status: SHIPPED | OUTPATIENT
Start: 2024-03-18 | End: 2024-03-25

## 2024-03-18 NOTE — TELEPHONE ENCOUNTER
Care Due:                  Date            Visit Type   Department     Provider  --------------------------------------------------------------------------------                                EP -                              PRIMARY      [facility]  Last Visit: 01-      CARE ([facility])   MEDICINE       Oniel Johnson  Next Visit: None Scheduled  None         None Found                                                            Last  Test          Frequency    Reason                     Performed    Due Date  --------------------------------------------------------------------------------    Uric Acid...  12 months..  allopurinoL..............  Not Found    Overdue    [employer] Embedded Care Due Messages. Reference number: [id number].   3/18/2024 1:57:41 PM CDT

## 2024-03-18 NOTE — TELEPHONE ENCOUNTER
Provider Staff:  Action required for this patient    Requires labs      Please see care gap opportunities below in Care Due Message.    Thanks!  [facility]     Appointments      Date Provider   Last Visit   1/19/2023 Oniel Johnson MD   Next Visit   3/18/2024 Oniel Johnson MD     Refill Decision Note   Krystina Washington  is requesting a refill authorization.  Brief Assessment and Rationale for Refill:  Approve     Medication Therapy Plan: Reviewed ED visit notes; No follow up with PCP recommended by acute care provider; Approved per protocol      Extended chart review required: Yes   Comments:     Note composed:2:25 PM 03/18/2024

## 2024-03-18 NOTE — PROGRESS NOTES
The patient location is: [place]  The chief complaint leading to consultation is: UTI/Vaginal discharge    Visit type: E visit.    Face to Face time with patient: 15 minutes of total time spent on the encounter, which includes face to face time and non-face to face time preparing to see the patient (eg, review of tests), Obtaining and/or reviewing separately obtained history, Documenting clinical information in the electronic or other health record, Independently interpreting results (not separately reported) and communicating results to the patient/family/caregiver, or Care coordination (not separately reported).         Each patient to whom he or she provides medical services by telemedicine is:  (1) informed of the relationship between the physician and patient and the respective role of any other health care provider with respect to management of the patient; and (2) notified that he or she may decline to receive medical services by telemedicine and may withdraw from such care at any time.    Notes:  89 years old female who was evaluated by telemedicine with painful urination associated with frequency and vaginal discharge.  No fever or chills.  She reports similar symptoms before better with antibiotic treatment.    Krystina was seen today for vaginal discharge.    Diagnoses and all orders for this visit:    Urinary tract infection without hematuria, site unspecified  -     Urinalysis; Future  -     Urine culture; Future  -     sulfamethoxazole-trimethoprim 800-160mg (BACTRIM DS) 800-160 mg Tab; Take 1 tablet by mouth 2 (two) times daily. for 7 days    Vaginal discharge  -     Vaginosis Screen by DNA Probe; Future

## 2024-04-05 ENCOUNTER — OUTPATIENT CASE MANAGEMENT (OUTPATIENT)
Dept: NEUROLOGY | Facility: CLINIC | Age: 89
End: 2024-04-05
Payer: MEDICARE

## 2024-04-05 NOTE — PROGRESS NOTES
Protocol: [employer] Effectiveness Study  Identifier: [id number]  IRB#: 2022.247  PI: Dr. Shankar Vasquez, PhD  CO-I: Dr. Chayo Diamond PsyD  Version Date: 12/05/2022  Pt Study ID: [id number]  Visit Month: M3     Timeline:   (*Note for CTN - complete timeline using completed or planned date for study events. Add any important events (i.e. Withdrawals, Lost to Follow Up, Adverse Events, etc.).  Consent: Completed(1/31/24)  Baseline questionnaires: Completed(1/31/24)  6-month questionnaires: Planned(7/31/24)  12-month questionnaires: Planned(1/31/25)     Visit Note:   Spoke with caregiver Hermelindo Mackenzie (Son) for month 3 visit.  CG said pt is overall stable but had a severe headache this morning. Stroke protocol was discussed. CG verbalized understanding. CG is attempting to get in-home care from Medicaid and requested the phone number to follow up with them.  SW will provide.   No other needs expressed at this time.     Falls in the past month: 0  UTIs in the past month: 0  Hospital encounters in the past month (reported by caregiver): 0        Next monthly visit scheduled for: 5/7/24. Caregiver knows how to reach CTN, and is encouraged to do so, as needed before our next scheduled call.      Action items for CTN: Send number for LTC Medicaid

## 2024-04-08 ENCOUNTER — EXTERNAL HOME HEALTH (OUTPATIENT)
Dept: HOME HEALTH SERVICES | Facility: HOSPITAL | Age: 89
End: 2024-04-08
Payer: MEDICARE

## 2024-04-17 ENCOUNTER — OUTPATIENT CASE MANAGEMENT (OUTPATIENT)
Dept: ADMINISTRATIVE | Facility: OTHER | Age: 89
End: 2024-04-17
Payer: MEDICARE

## 2024-04-18 NOTE — PATIENT INSTRUCTIONS
Come to clinic for repeat EKG on Friday.  Can stop clopidogrel (plavix) after you finish the pills that you have. Continue other medications.  Continue very low salt diet.  Return to clinic in May.  
Hg  9.8  (bL unclear, no prior in chart )   - Ddx: AoCD vs NORAH   - Check iron studies, b12, folate

## 2024-04-29 ENCOUNTER — OUTPATIENT CASE MANAGEMENT (OUTPATIENT)
Dept: ADMINISTRATIVE | Facility: OTHER | Age: 89
End: 2024-04-29
Payer: MEDICARE

## 2024-05-04 PROBLEM — I50.43 ACUTE ON CHRONIC COMBINED SYSTOLIC AND DIASTOLIC HEART FAILURE: Status: ACTIVE | Noted: 2024-05-04

## 2024-05-07 ENCOUNTER — OUTPATIENT CASE MANAGEMENT (OUTPATIENT)
Dept: NEUROLOGY | Facility: CLINIC | Age: 89
End: 2024-05-07
Payer: MEDICARE

## 2024-05-07 NOTE — PROGRESS NOTES
Protocol: The Care [facility] Effectiveness Study  Identifier: [id number]  IRB#: 2022.247  PI: Dr. Shankar Vasquez, PhD  CO-I: Dr. Chayo Diamond PsyD  Version Date: 12/05/2022  Pt Study ID: [id number]  Visit Month: M4     Timeline:   (*Note for CTN - complete timeline using completed or planned date for study events. Add any important events (i.e. Withdrawals, Lost to Follow Up, Adverse Events, etc.).  Consent: Completed(1/31/24)  Baseline questionnaires: Completed(1/31/24)  6-month questionnaires: Planned(7/31/24)  12-month questionnaires: Planned(1/31/25)     Visit Note:   Spoke with caregiver Hermelindo Mackenzie (Son) for month 4 visit.  CG said pt is stable.  He said she enjoys being active, especially participating in family activities.  However, after an active day is seems to take her a few days to recover. The family is trying to get long term care Medicaid.  CG asked for phone number again and SW provided it.  He said they have completed the phone interview and are waiting for paper work in the mail that he feels they should have already received.     Falls in the past month: 0  UTIs in the past month: 0  Hospital encounters in the past month (reported by caregiver): 0        Next monthly visit scheduled for: 6/6/24. Caregiver knows how to reach CTN, and is encouraged to do so, as needed before our next scheduled call.      Action items for CTN: continue to follow

## 2024-05-17 ENCOUNTER — EXTERNAL HOME HEALTH (OUTPATIENT)
Dept: HOME HEALTH SERVICES | Facility: HOSPITAL | Age: 89
End: 2024-05-17
Payer: MEDICARE

## 2024-06-02 NOTE — TELEPHONE ENCOUNTER
Care Due:                  Date            Visit Type   Department     Provider  --------------------------------------------------------------------------------                                EP -                              PRIMARY      [facility]  Last Visit: 01-      CARE ([facility])   MEDICINE       Oniel Johnson  Next Visit: None Scheduled  None         None Found                                                            Last  Test          Frequency    Reason                     Performed    Due Date  --------------------------------------------------------------------------------    Office Visit  15 months..  EScitalopram,              01- 04-                             allopurinoL,                             atorvastatin,                             clopidogreL, isosorbide..    Uric Acid...  12 months..  allopurinoL..............  Not Found    Overdue    [employer] Embedded Care Due Messages. Reference number: [id number].   6/02/2024 10:14:16 AM CDT

## 2024-06-03 RX ORDER — ISOSORBIDE MONONITRATE 30 MG/1
30 TABLET, EXTENDED RELEASE ORAL DAILY
Qty: 90 TABLET | Refills: 3 | Status: SHIPPED | OUTPATIENT
Start: 2024-06-03

## 2024-06-07 ENCOUNTER — OUTPATIENT CASE MANAGEMENT (OUTPATIENT)
Dept: NEUROLOGY | Facility: CLINIC | Age: 89
End: 2024-06-07
Payer: MEDICARE

## 2024-06-07 NOTE — PROGRESS NOTES
Protocol: The Care [facility] Effectiveness Study  Identifier: [id number]  IRB#: 2022.247  PI: Dr. Shankar Vasquez, PhD  CO-I: Dr. Chayo Diamond PsyD  Version Date: 12/05/2022  Pt Study ID: [id number]  Visit Month: M5     Timeline:   (*Note for CTN - complete timeline using completed or planned date for study events. Add any important events (i.e. Withdrawals, Lost to Follow Up, Adverse Events, etc.).  Consent: Completed(1/31/24)  Baseline questionnaires: Completed(1/31/24)  6-month questionnaires: Planned(7/31/24)  12-month questionnaires: Planned(1/31/25)     Visit Note:   Spoke with caregiver Hermelindo Mackenzie (Son) for month 5 visit.  CG said pt is stable overall, however she has recently been mildly agitated and had trouble sleeping.  SW provided education on the under-reporting and therefore the under-treating of pain in dementia pts.  CG verbalized understanding and agreed to address this with pt's provider.  CG denied any other needs at this time.        Falls in the past month: 0  UTIs in the past month: 0  Hospital encounters in the past month (reported by caregiver): 0        Next monthly visit scheduled for: 7/5/24. Caregiver knows how to reach CTN, and is encouraged to do so, as needed before our next scheduled call.      Action items for CTN: continue to follow

## 2024-06-14 ENCOUNTER — TELEPHONE (OUTPATIENT)
Dept: FAMILY MEDICINE | Facility: CLINIC | Age: 89
End: 2024-06-14
Payer: MEDICARE

## 2024-06-14 NOTE — TELEPHONE ENCOUNTER
----- Message from Milena Olvera sent at 6/14/2024  2:00 PM CDT -----  Type:  Patient Requesting Referral    Who Called: Ankita - Nurse from [facility]   Referral to What Specialty: Home Health - Physical Therapy   Reason for Referral: strength training, weakness, pt is fall risk   Patient Requesting a Response?: Yes  Would the patient rather a call back or a response via [facility]? Call  Best Call Back Number: [phone]   Additional Information: Please fax order to fax #: [phone]

## 2024-06-19 DIAGNOSIS — R29.6 FREQUENT FALLS: ICD-10-CM

## 2024-06-19 DIAGNOSIS — R53.81 DEBILITY: Primary | ICD-10-CM

## 2024-06-21 RX ORDER — ATORVASTATIN CALCIUM 40 MG/1
40 TABLET, FILM COATED ORAL DAILY
Qty: 90 TABLET | Refills: 0 | Status: SHIPPED | OUTPATIENT
Start: 2024-06-21

## 2024-06-21 NOTE — TELEPHONE ENCOUNTER
Refill Routing Note   Medication(s) are not appropriate for processing by [facility] for the following reason(s):        ED/Hospital Visit since last OV with provider  Patient not seen by provider within 15 months    [facility] action(s):  Defer               Appointments  past 12m or future 3m with PCP    Date Provider   Last Visit   3/18/2024 Oniel Johnson MD   Next Visit   Visit date not found Oniel Johnson MD   ED visits in past 90 days: 0        Note composed:12:49 PM 06/21/2024

## 2024-06-21 NOTE — TELEPHONE ENCOUNTER
No care due was identified.  [facility] Embedded Care Due Messages. Reference number: [id number].   6/21/2024 10:09:04 AM CDT

## 2024-07-05 ENCOUNTER — DOCUMENT SCAN (OUTPATIENT)
Dept: HOME HEALTH SERVICES | Facility: HOSPITAL | Age: 89
End: 2024-07-05
Payer: MEDICARE

## 2024-07-05 ENCOUNTER — OUTPATIENT CASE MANAGEMENT (OUTPATIENT)
Dept: NEUROLOGY | Facility: CLINIC | Age: 89
End: 2024-07-05
Payer: MEDICARE

## 2024-07-05 NOTE — PROGRESS NOTES
Protocol: The Care [facility] Effectiveness Study  Identifier: [id number]  IRB#: 2022.247  PI: Dr. Shankar Vasquez, PhD  CO-I: Dr. Chayo Diamond PsyD  Version Date: 12/05/2022  Pt Study ID: [id number]  Visit Month: M5     Timeline:   (*Note for CTN - complete timeline using completed or planned date for study events. Add any important events (i.e. Withdrawals, Lost to Follow Up, Adverse Events, etc.).  Consent: Completed(1/31/24)  Baseline questionnaires: Completed(1/31/24)  6-month questionnaires: Planned(7/31/24)  12-month questionnaires: Planned(1/31/25)     Visit Note:   Spoke with caregiver Hermelindo Mackenzie (Son) for month 6 visit.       LVM-7/5/24     Falls in the past month: 0  UTIs in the past month: 0  Hospital encounters in the past month (reported by caregiver): 0        Next monthly visit scheduled for: /24. Caregiver knows how to reach CTN, and is encouraged to do so, as needed before our next scheduled call.      Action items for CTN: continue to follow

## 2024-07-15 ENCOUNTER — E-VISIT (OUTPATIENT)
Dept: FAMILY MEDICINE | Facility: CLINIC | Age: 89
End: 2024-07-15
Payer: MEDICARE

## 2024-07-15 DIAGNOSIS — R30.0 DYSURIA: Primary | ICD-10-CM

## 2024-07-15 PROCEDURE — 99422 OL DIG E/M SVC 11-20 MIN: CPT | Mod: ,,, | Performed by: FAMILY MEDICINE

## 2024-07-15 RX ORDER — CIPROFLOXACIN 250 MG/1
250 TABLET, FILM COATED ORAL EVERY 12 HOURS
Qty: 10 TABLET | Refills: 0 | Status: SHIPPED | OUTPATIENT
Start: 2024-07-15 | End: 2024-07-20

## 2024-07-15 NOTE — PROGRESS NOTES
Patient ID: Krystina Washington is a 89 y.o. female.    Chief Complaint: Urinary Tract Infection (Entered automatically based on patient selection in [employer].)    The patient initiated a request through [employer] on 7/15/2024 for evaluation and management with a chief complaint of Urinary Tract Infection (Entered automatically based on patient selection in [employer].)     I evaluated the questionnaire submission on 7/16/24.    [facility] P[facility] Evisit Uti Questionnaire    7/15/2024  2:55 PM CDT - Filed by Patient   Do you agree to participate in an E-Visit? Yes   If you have any of the following symptoms, please present to your local emergency room or call 911:  I acknowledge   What is the main issue you would like addressed today? Urinary infection   What symptoms do you currently have? Change in urine appearance or smell   When did your symptoms first appear? 7/11/2024   List what you have done or taken to help your symptoms. Cranberry tablets for bladder pain   Please indicate whether you have had the following symptoms during the past 24 hours     Urgent urination (a sudden and uncontrollable urge to urinate) Moderate   Frequent urination of small amounts of urine (going to the toilet very often) Moderate   Burning pain when urinating Mild   Incomplete bladder emptying (still feel like you need to urinate again after urination) Mild   Pain not associated with urination in the lower abdomen below the belly button) None   What does your urine look like? Cloudy   Blood seen in the urine None   Flank pain (pain in one or both sides of the lower back) Mild   Abnormal Vaginal Discharge (abnormal amount, color and/or odor) None   Discharge from the urethra (urinary opening) without urination None   High body temperature/fever? Yes, mild-99.6 F-100.2 F   Please rate how much discomfort you have experience because of the symptoms in the past 24 hours: Moderate   Please indicate how the symptoms have interfered with your every day  activities/work in the past 24 hours: Mild   Please indicate how these symptoms have interfered with your social activities (visiting people, meeting with friends, etc.) in the past 24 hours? Mild   Are you a diabetic? Yes   Please indicate whether you have the following at the time of completion of this questionnaire: None of the above   Provide any additional information you feel is important.    Please attach any relevant images or files (if you have performed a home test for UTI, please submit a photo of results)    Are you able to take your vital signs? Yes   Systolic Blood Pressure: 118   Diastolic Blood Pressure: 80   Weight: 167   Height: 53   Pulse: 83   Temperature: 99   Respiration rate:    Pulse Oxygen:          Encounter Diagnosis   Name Primary?    Dysuria Yes        No orders of the defined types were placed in this encounter.     Medications Ordered This Encounter   Medications    ciprofloxacin HCl (CIPRO) 250 MG tablet     Sig: Take 1 tablet (250 mg total) by mouth every 12 (twelve) hours. for 5 days     Dispense:  10 tablet     Refill:  0        No follow-ups on file.      E-Visit Time Trackin mins

## 2024-07-23 ENCOUNTER — EXTERNAL HOME HEALTH (OUTPATIENT)
Dept: HOME HEALTH SERVICES | Facility: HOSPITAL | Age: 89
End: 2024-07-23
Payer: MEDICARE

## 2024-08-06 ENCOUNTER — OUTPATIENT CASE MANAGEMENT (OUTPATIENT)
Dept: NEUROLOGY | Facility: CLINIC | Age: 89
End: 2024-08-06
Payer: MEDICARE

## 2024-08-12 DIAGNOSIS — R06.02 SOB (SHORTNESS OF BREATH): ICD-10-CM

## 2024-08-13 NOTE — TELEPHONE ENCOUNTER
Care Due:                  Date            Visit Type   Department     Provider  --------------------------------------------------------------------------------                                EP -                              PRIMARY      [facility]  Last Visit: 01-      CARE ([facility])   MEDICINE       Oniel Johnson  Next Visit: None Scheduled  None         None Found                                                            Last  Test          Frequency    Reason                     Performed    Due Date  --------------------------------------------------------------------------------    Office Visit  15 months..  EScitalopram,              01- 04-                             allopurinoL, clopidogreL,                             isosorbide...............    Uric Acid...  12 months..  allopurinoL..............  Not Found    Overdue    [employer] Embedded Care Due Messages. Reference number: [id number].   8/12/2024 10:17:08 AM CDT  
Refill Routing Note   Medication(s) are not appropriate for processing by [facility] for the following reason(s):        Required vitals abnormal  Patient not seen by provider within 15 months    [facility] action(s):  Defer   Requires appointment : Yes     Requires labs : Yes             Appointments  past 12m or future 3m with PCP    Date Provider   Last Visit   3/18/2024 Oniel Johnson MD   Next Visit   Visit date not found Oniel Johnson MD   ED visits in past 90 days: 0        Note composed:7:18 AM 08/13/2024                
rn

## 2024-08-14 RX ORDER — FUROSEMIDE 20 MG/1
TABLET ORAL
Qty: 90 TABLET | Refills: 0 | Status: SHIPPED | OUTPATIENT
Start: 2024-08-14

## 2024-08-15 DIAGNOSIS — I10 ESSENTIAL HYPERTENSION: Primary | ICD-10-CM

## 2024-08-27 DIAGNOSIS — Z00.00 ENCOUNTER FOR MEDICARE ANNUAL WELLNESS EXAM: ICD-10-CM

## 2024-08-29 ENCOUNTER — DOCUMENT SCAN (OUTPATIENT)
Dept: HOME HEALTH SERVICES | Facility: HOSPITAL | Age: 89
End: 2024-08-29
Payer: MEDICARE

## 2024-08-30 ENCOUNTER — DOCUMENT SCAN (OUTPATIENT)
Dept: HOME HEALTH SERVICES | Facility: HOSPITAL | Age: 89
End: 2024-08-30
Payer: MEDICARE

## 2024-09-09 ENCOUNTER — OUTPATIENT CASE MANAGEMENT (OUTPATIENT)
Dept: NEUROLOGY | Facility: CLINIC | Age: 89
End: 2024-09-09
Payer: MEDICARE

## 2024-09-09 NOTE — PROGRESS NOTES
Protocol: The Care [facility] Effectiveness Study  Identifier: [id number]  IRB#: 2022.247  PI: Dr. Shankar Vasquez, PhD  CO-I: Dr. Chayo Diamond PsyD  Version Date: 12/05/2022  Pt Study ID: [id number]  Visit Month: M8     Timeline:   (*Note for CTN - complete timeline using completed or planned date for study events. Add any important events (i.e. Withdrawals, Lost to Follow Up, Adverse Events, etc.).  Consent: Completed(1/31/24)  Baseline questionnaires: Completed(1/31/24)  6-month questionnaires: Planned(7/31/24)  12-month questionnaires: Planned(1/31/25)     Visit Note:   Spoke with caregiver Hermelindo Mackenize (Son) for month 8 visit.   CG said pt is doing well except CG feels like pt does not get enough exercise or socialization. SW suggested [place] on pt's area.  Cg said they had been there before and enjoyed it. However, he said pt cannot hear very well and this makes it difficult for her to socialize. He said they cannot afford hearing aids.  SW will search resources to help pay for hearing aids.  No other needs expressed at this time.       Falls in the past month: 0  UTIs in the past month: 0  Hospital encounters in the past month (reported by caregiver): 0        Next monthly visit scheduled for: 10/9/24. Caregiver knows how to reach CTN, and is encouraged to do so, as needed before our next scheduled call.      Action items for CTN: Hearing aid resources

## 2024-09-25 PROBLEM — M85.80 OSTEOPENIA WITH HIGH RISK OF FRACTURE: Status: ACTIVE | Noted: 2024-09-25

## 2024-09-25 PROBLEM — I50.43 ACUTE ON CHRONIC COMBINED SYSTOLIC AND DIASTOLIC HEART FAILURE: Status: RESOLVED | Noted: 2024-05-04 | Resolved: 2024-09-25

## 2024-09-25 PROBLEM — Z86.73 HISTORY OF EMBOLIC STROKE: Status: RESOLVED | Noted: 2022-03-29 | Resolved: 2024-09-25

## 2024-09-25 PROBLEM — Z86.73 HISTORY OF STROKE: Status: RESOLVED | Noted: 2024-01-19 | Resolved: 2024-09-25

## 2024-09-25 PROBLEM — Z91.81 HISTORY OF FALL: Status: ACTIVE | Noted: 2023-07-23

## 2024-09-25 PROBLEM — N18.4 STAGE 4 CHRONIC KIDNEY DISEASE: Status: ACTIVE | Noted: 2021-10-21

## 2024-09-26 ENCOUNTER — PATIENT MESSAGE (OUTPATIENT)
Dept: ADMINISTRATIVE | Facility: CLINIC | Age: 89
End: 2024-09-26
Payer: MEDICARE

## 2024-09-30 ENCOUNTER — TELEPHONE (OUTPATIENT)
Dept: ADMINISTRATIVE | Facility: CLINIC | Age: 89
End: 2024-09-30
Payer: MEDICARE

## 2024-10-01 ENCOUNTER — TELEPHONE (OUTPATIENT)
Dept: ADMINISTRATIVE | Facility: CLINIC | Age: 89
End: 2024-10-01
Payer: MEDICARE

## 2024-10-01 ENCOUNTER — OFFICE VISIT (OUTPATIENT)
Dept: INTERNAL MEDICINE | Facility: CLINIC | Age: 89
End: 2024-10-01
Payer: MEDICARE

## 2024-10-01 ENCOUNTER — PATIENT MESSAGE (OUTPATIENT)
Dept: PHARMACY | Facility: CLINIC | Age: 89
End: 2024-10-01
Payer: MEDICARE

## 2024-10-01 ENCOUNTER — TELEPHONE (OUTPATIENT)
Dept: PHARMACY | Facility: CLINIC | Age: 89
End: 2024-10-01
Payer: MEDICARE

## 2024-10-01 VITALS — DIASTOLIC BLOOD PRESSURE: 76 MMHG | SYSTOLIC BLOOD PRESSURE: 121 MMHG

## 2024-10-01 DIAGNOSIS — E78.00 PURE HYPERCHOLESTEROLEMIA: ICD-10-CM

## 2024-10-01 DIAGNOSIS — Z00.00 ENCOUNTER FOR MEDICARE ANNUAL WELLNESS EXAM: Primary | ICD-10-CM

## 2024-10-01 DIAGNOSIS — Z79.01 CHRONIC ANTICOAGULATION: ICD-10-CM

## 2024-10-01 DIAGNOSIS — Z95.2 S/P TAVR (TRANSCATHETER AORTIC VALVE REPLACEMENT): ICD-10-CM

## 2024-10-01 DIAGNOSIS — I25.118 CORONARY ARTERY DISEASE OF NATIVE ARTERY OF NATIVE HEART WITH STABLE ANGINA PECTORIS: ICD-10-CM

## 2024-10-01 DIAGNOSIS — N18.4 TYPE 2 DIABETES MELLITUS WITH STAGE 4 CHRONIC KIDNEY DISEASE, WITHOUT LONG-TERM CURRENT USE OF INSULIN: ICD-10-CM

## 2024-10-01 DIAGNOSIS — I70.0 AORTIC ATHEROSCLEROSIS: ICD-10-CM

## 2024-10-01 DIAGNOSIS — F33.1 MAJOR DEPRESSIVE DISORDER, RECURRENT EPISODE, MODERATE: ICD-10-CM

## 2024-10-01 DIAGNOSIS — I50.22 CHRONIC SYSTOLIC HEART FAILURE: ICD-10-CM

## 2024-10-01 DIAGNOSIS — G30.1 MODERATE LATE ONSET ALZHEIMER'S DEMENTIA WITHOUT BEHAVIORAL DISTURBANCE, PSYCHOTIC DISTURBANCE, MOOD DISTURBANCE, OR ANXIETY: ICD-10-CM

## 2024-10-01 DIAGNOSIS — D64.9 ACUTE ON CHRONIC ANEMIA: ICD-10-CM

## 2024-10-01 DIAGNOSIS — J41.1 MUCOPURULENT CHRONIC BRONCHITIS: ICD-10-CM

## 2024-10-01 DIAGNOSIS — I67.2 INTRACRANIAL ATHEROSCLEROSIS: ICD-10-CM

## 2024-10-01 DIAGNOSIS — R82.90 ABNORMAL URINE ODOR: ICD-10-CM

## 2024-10-01 DIAGNOSIS — E11.22 TYPE 2 DIABETES MELLITUS WITH STAGE 4 CHRONIC KIDNEY DISEASE, WITHOUT LONG-TERM CURRENT USE OF INSULIN: ICD-10-CM

## 2024-10-01 DIAGNOSIS — H40.1121 PRIMARY OPEN ANGLE GLAUCOMA (POAG) OF LEFT EYE, MILD STAGE: ICD-10-CM

## 2024-10-01 DIAGNOSIS — F02.B0 MODERATE LATE ONSET ALZHEIMER'S DEMENTIA WITHOUT BEHAVIORAL DISTURBANCE, PSYCHOTIC DISTURBANCE, MOOD DISTURBANCE, OR ANXIETY: ICD-10-CM

## 2024-10-01 DIAGNOSIS — Z86.73 HISTORY OF TIA (TRANSIENT ISCHEMIC ATTACK): ICD-10-CM

## 2024-10-01 DIAGNOSIS — I35.0 AORTIC STENOSIS, SEVERE: ICD-10-CM

## 2024-10-01 DIAGNOSIS — I11.0 HYPERTENSIVE HEART DISEASE WITH HEART FAILURE: ICD-10-CM

## 2024-10-01 DIAGNOSIS — M85.80 OSTEOPENIA WITH HIGH RISK OF FRACTURE: ICD-10-CM

## 2024-10-01 DIAGNOSIS — N25.81 HYPERPARATHYROIDISM DUE TO RENAL INSUFFICIENCY: ICD-10-CM

## 2024-10-01 DIAGNOSIS — I63.431 EMBOLIC STROKE INVOLVING RIGHT POSTERIOR CEREBRAL ARTERY: ICD-10-CM

## 2024-10-01 DIAGNOSIS — Z99.3 DEPENDENCE ON WHEELCHAIR: ICD-10-CM

## 2024-10-01 NOTE — Clinical Note
Good morning,   Mrs. Washington was seen for a virtual Medicare health risk assessment. During the assessment, her grandson said her urine has had a foul odor and they're concerned about a UTI. Can your team please assist with scheduling Mrs. Washington a visit to be seen for this?  She is also in need of a routine follow up/annual exam with Dr. Johnson.   Thank you for your assistance,   Flavia Norris, DA-AHMET  Medicare eAWV

## 2024-10-01 NOTE — TELEPHONE ENCOUNTER
Krystina Washington has been informed of the [employer] application process for [employer] and what's required to apply.  She will provide the following documents: Proof of household Income( such as social security statement, 1099 form, pension statement or 3 consecutive pay stubs, Copy of all Insurance cards( front and back), and Completed [facility] Authorization Forms        Follow-up will be made in 5 business days.     Andreia Baptiste  Pharmacy Patient Assistance Team

## 2024-10-01 NOTE — PROGRESS NOTES
The patient location is: [place]  The chief complaint leading to consultation is: Medicare eAWV    Visit type: audiovisual    Face to Face time with patient: 28 minutes  60 minutes of total time spent on the encounter, which includes face to face time and non-face to face time preparing to see the patient (eg, review of tests), Obtaining and/or reviewing separately obtained history, Documenting clinical information in the electronic or other health record, Independently interpreting results (not separately reported) and communicating results to the patient/family/caregiver, or Care coordination (not separately reported).         Each patient to whom he or she provides medical services by telemedicine is:  (1) informed of the relationship between the physician and patient and the respective role of any other health care provider with respect to management of the patient; and (2) notified that he or she may decline to receive medical services by telemedicine and may withdraw from such care at any time.    Notes:       Krystina Washington presented for a follow-up Medicare AWV today. The following components were reviewed and updated:    Medical history  Family History  Social history  Allergies and Current Medications  Health Risk Assessment  Health Maintenance  Care Team    **See Completed Assessments for Annual Wellness visit with in the encounter summary    The following assessments were completed:  Depression Screening  Cognitive function Screening    Opioid documentation:      Patient does not have a current opioid prescription.          Vitals:    10/01/24 1109   BP: 121/76     There is no height or weight on file to calculate BMI.       Physical Exam  Constitutional:       Appearance: She is obese.      Comments: Somnolent    HENT:      Head: Normocephalic and atraumatic.      Ears:      Comments: [place]        Diagnoses and health risks identified today and associated recommendations/orders:  1. Encounter for  Medicare annual wellness exam  Reviewed and discussed preventive health screenings and vaccinations with the patient and her grandson, Hermelindo Cross. Hermelindo Cross provided the majority of information for this assessment.     Discussed that she is due for f/u with PCP and routine lab work. Message sent to staff to assist with scheduling     Has urine leakage ever interrupted your daily activites or sleep?  Unable to assess with patient; Hermelindo Cross notes she uses [facility] and wears depends for leakage   Do you think you could use some help to better manage urine leakage? Unable to assess with patient       2. Coronary artery disease of native artery of native heart with stable angina pectoris  On ASA, statin, plavix, imdur, coreg.   Stable without c/o angina. Continue current treatment plan as previously prescribed with your Cardiologist. Due for annual f/u next month - message sent to staff to assist with scheduling with Dr. Golden.     3. Chronic systolic heart failure  On Entresto, Lasix, Coreg. Matthias notes her medications have been controlling her fluid status; she isn't having any problems with edema at this time. Entresto is costly - will refer to pharmacy assistance program.   Continue current treatment plan as previously prescribed with your Cardiologist.     - Ambulatory referral/consult to Pharmacy Assistance; Future    4. Aortic atherosclerosis  On DAPT and statin. Stable. Continue current treatment plan as previously prescribed with your Cards     5. Moderate late onset Alzheimer's dementia without behavioral disturbance, psychotic disturbance, mood disturbance, or anxiety  Evaluated by Neurology. She is living with her grandson who assists with ADLs. She is now primarily using a wheelchair due to BLE weakness and fear of falling. Would benefit from a repeat course of physical therapy. Recommended following up with neurology and PCP for continued monitoring and management. Hermelindo Cross inquired about any  "resources available for caregiver support / assistance with hygiene and ADLs. They are currently in contact with LCSW - will reach out to their current contact for assistance with caregiver resources.     6. Type 2 diabetes mellitus with stage 4 chronic kidney disease, without long-term current use of insulin  Controlled diabetes. On RAAS inhibitor.   Referred to nephrologist previously but hasn't seen   Continue current treatment plan as previously prescribed with your PCP / discuss with PCP recommendations on seeing kidney specialist.     - Ambulatory referral/consult to Podiatry; Future  - Ambulatory referral/consult to Ophthalmology; Future    Diabetes Management Status    Statin: Taking  ACE/ARB: Not taking    Screening or Prevention Patient's value Goal Complete/Controlled?   HgA1C Testing and Control   Lab Results   Component Value Date    HGBA1C 5.9 (H) 12/12/2023      Annually/Less than 8% Yes   Lipid profile : 12/12/2023 Annually Yes   LDL control Lab Results   Component Value Date    LDLCALC 36.8 (L) 12/12/2023    Annually/Less than 100 mg/dl  Yes   Nephropathy screening Lab Results   Component Value Date    LABMICR 183.3 12/18/2009     Lab Results   Component Value Date    PROTEINUA Negative 12/12/2023    Annually Yes   Blood pressure BP Readings from Last 1 Encounters:   10/01/24 121/76    Less than 140/90 No   Dilated retinal exam : 07/28/2022 Annually Yes   Foot exam   Most Recent Foot Exam Date: Not Found Annually Yes     Lab Results   Component Value Date    HGBA1C 5.9 (H) 12/12/2023    HGBA1C 6.0 (H) 08/13/2023    HGBA1C 6.2 (H) 01/19/2023    EGFRNORACEVR 26 (A) 12/21/2023    MICALBCREAT 235.0 (H) 12/18/2009    LDLCALC 36.8 (L) 12/12/2023     No results found for: "GLUTAMICACID", "CPEPTIDE"   Last 5 Blood Glucose Readings           No data to display               HEALTH MAINTENANCE: Diabetic health maintenance interventions reviewed and are up to date except for:      Topic    Eye Exam     " Hemoglobin A1C        7. Hyperparathyroidism due to renal insufficiency  Chronic / has not seen nephrologist. Continue current treatment plan as previously prescribed with your PCP / follow up with nephro as advised.     8. Hypertensive heart disease with heart failure  On Entresto, Coreg, Lasix, hydralazine, imdur. Stable. Continue current treatment plan as previously prescribed with your PCP     9. Intracranial atherosclerosis  On DAPT and statin. Stable. Continue current treatment plan as previously prescribed with your neurologist.     10. Pure hypercholesterolemia  On statin. Stable. Continue current treatment plan as previously prescribed with your PCP and Cards    Lab Results   Component Value Date    CHOL 88 (L) 12/12/2023    CHOL 85 (L) 08/13/2023    CHOL 98 (L) 01/19/2023     Lab Results   Component Value Date    HDL 33 (L) 12/12/2023    HDL 28 (L) 08/13/2023    HDL 36 (L) 01/19/2023     Lab Results   Component Value Date    LDLCALC 36.8 (L) 12/12/2023    LDLCALC 44.0 (L) 08/13/2023    LDLCALC 39.4 (L) 01/19/2023     Lab Results   Component Value Date    TRIG 91 12/12/2023    TRIG 65 08/13/2023    TRIG 113 01/19/2023       Lab Results   Component Value Date    CHOLHDL 37.5 12/12/2023    CHOLHDL 32.9 08/13/2023    CHOLHDL 36.7 01/19/2023       11. Major depressive disorder, recurrent episode, moderate  On Lexapro. Notes stable moods. Continue current treatment plan as previously prescribed with your PCP     12. Aortic stenosis, severe  13. S/P TAVR (transcatheter aortic valve replacement)  Stable. Continue current treatment plan as previously prescribed with your Cardiologist.     14. History of TIA (transient ischemic attack)  15. 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 Recommend optimal BG and BP control.   Stable. Continue current treatment plan as previously prescribed with your Neurologist.     16. Primary open angle glaucoma (POAG) of left eye,  mild stage  Using timolol. Needs an updated eye exam - referral placed. Continue current treatment plan as previously prescribed with your Ophthalmologist.     17. Chronic anticoagulation  Stable / no issues with bleeding noted. Continue current treatment plan as previously prescribed with your Cardiologist and neurologist.     18. Acute on chronic anemia  Chronic anemia / stable. Continue current treatment plan as previously prescribed with your PCP     Lab Results   Component Value Date    WBC 8.67 12/21/2023    HGB 9.1 (L) 12/21/2023    HCT 29.2 (L) 12/21/2023    MCV 93 12/21/2023     12/21/2023       21. Dependence on wheelchair  Fall precautions  Would benefit from another course of PT. Recommend contacting PCP and/or neurologist to discuss     22. Abnormal urine odor  Message sent to PCP staff to assist with scheduling patient for evaluation         Provided Krystina with a 5-10 year written screening schedule and personal prevention plan. Recommendations were developed using the USPSTF age appropriate recommendations. Education, counseling, and referrals were provided as needed.  After Visit Summary printed and given to patient which includes a list of additional screenings\tests needed.    Follow up in about 1 year (around 10/1/2025).      Flavia Norris, TAVO    I offered to discuss advanced care planning, including how to pick a person who would make decisions for you if you were unable to make them for yourself, called a health care power of , and what kind of decisions you might make such as use of life sustaining treatments such as ventilators and tube feeding when faced with a life limiting illness recorded on a living will that they will need to know. (How you want to be cared for as you near the end of your natural life)     X  Patient is unable to engage in a discussion regarding advanced directives due to severe physical and/or cognitive impairment.

## 2024-10-01 NOTE — PATIENT INSTRUCTIONS
Counseling and Referral of Other Preventative  (Italic type indicates deductible and co-insurance are waived)    Patient Name: Krystina Washington  Today's Date: 10/1/2024    Health Maintenance       Date Due Completion Date    Shingles Vaccine (1 of 2) Never done ---    RSV Vaccine (Age 60+ and Pregnant patients) (1 - 1-dose 75+ series) Never done ---    Diabetes Urine Screening 12/18/2010 12/18/2009    TETANUS VACCINE 10/30/2017 10/30/2007    Eye Exam 07/28/2023 7/28/2022    Hemoglobin A1c 06/12/2024 12/12/2023    Influenza Vaccine (1) 09/01/2024 3/10/2022    COVID-19 Vaccine (1 - 2024-25 season) Never done ---    Lipid Panel 12/12/2024 12/12/2023        No orders of the defined types were placed in this encounter.    The following information is provided to all patients.  This information is to help you find resources for any of the problems found today that may be affecting your health:                  Living healthy guide: www.[facility].[place].gov      Understanding Diabetes: www.diabetes.org      Eating healthy: www.cdc.gov/healthyweight      CDC home safety checklist: www.cdc.gov/steadi/patient.html      Agency on Aging: www.goea.[place].gov      Alcoholics anonymous (AA): www.aa.org      Physical Activity: www.jennifer.nih.gov/vu9iufu      Tobacco use: www.quitwithusla.org

## 2024-10-01 NOTE — Clinical Note
Good morning,   Mrs. Washington is due for her annual appt with Dr. Golden in November. Can your team please assist with scheduling her an appt?  Thanks,   CATHY Estevez

## 2024-10-02 ENCOUNTER — OFFICE VISIT (OUTPATIENT)
Dept: FAMILY MEDICINE | Facility: CLINIC | Age: 89
End: 2024-10-02
Payer: MEDICARE

## 2024-10-02 VITALS
BODY MASS INDEX: 26.29 KG/M2 | OXYGEN SATURATION: 96 % | WEIGHT: 148.38 LBS | HEART RATE: 73 BPM | DIASTOLIC BLOOD PRESSURE: 62 MMHG | SYSTOLIC BLOOD PRESSURE: 118 MMHG | HEIGHT: 63 IN

## 2024-10-02 DIAGNOSIS — N18.4 TYPE 2 DIABETES MELLITUS WITH STAGE 4 CHRONIC KIDNEY DISEASE, WITHOUT LONG-TERM CURRENT USE OF INSULIN: ICD-10-CM

## 2024-10-02 DIAGNOSIS — I10 ESSENTIAL HYPERTENSION: ICD-10-CM

## 2024-10-02 DIAGNOSIS — H10.32 ACUTE BACTERIAL CONJUNCTIVITIS OF LEFT EYE: Primary | ICD-10-CM

## 2024-10-02 DIAGNOSIS — E11.22 TYPE 2 DIABETES MELLITUS WITH STAGE 4 CHRONIC KIDNEY DISEASE, WITHOUT LONG-TERM CURRENT USE OF INSULIN: ICD-10-CM

## 2024-10-02 PROCEDURE — 99999 PR PBB SHADOW E&M-EST. PATIENT-LVL IV: CPT | Mod: PBBFAC,,, | Performed by: FAMILY MEDICINE

## 2024-10-02 PROCEDURE — 99214 OFFICE O/P EST MOD 30 MIN: CPT | Mod: PBBFAC,PO | Performed by: FAMILY MEDICINE

## 2024-10-02 PROCEDURE — 99215 OFFICE O/P EST HI 40 MIN: CPT | Mod: S$PBB,,, | Performed by: FAMILY MEDICINE

## 2024-10-02 RX ORDER — TOBRAMYCIN 3 MG/ML
1 SOLUTION/ DROPS OPHTHALMIC EVERY 6 HOURS
Qty: 5 ML | Refills: 1 | Status: SHIPPED | OUTPATIENT
Start: 2024-10-02 | End: 2024-10-12

## 2024-10-02 NOTE — PROGRESS NOTES
Subjective     Patient ID: Krystina Washington is a 89 y.o. female.    Chief Complaint: Medication Refill and Urinary Tract Infection    89 years old female came to the clinic with left eye discharge during the morning.  Patient with lid surgery in the past.  Blood pressure today was stable.  Patient currently only on diet for the diabetes.    Diabetes  She presents for her follow-up diabetic visit. She has type 2 diabetes mellitus. No MedicAlert identification noted. Her disease course has been stable. There are no hypoglycemic associated symptoms. There are no diabetic associated symptoms. Pertinent negatives for diabetes include no chest pain, no polydipsia, no polyphagia and no polyuria. There are no hypoglycemic complications. Symptoms are stable. There are no diabetic complications. Risk factors for coronary artery disease include diabetes mellitus, hypertension, sedentary lifestyle and post-menopausal. When asked about current treatments, none were reported. She is compliant with treatment all of the time. She is following a generally healthy diet. She has not had a previous visit with a dietitian. She never participates in exercise. An ACE inhibitor/angiotensin II receptor blocker is being taken. Eye exam is not current.   Hypertension  This is a chronic problem. The current episode started more than 1 year ago. The problem is unchanged. The problem is controlled. Pertinent negatives include no chest pain, orthopnea, palpitations or peripheral edema. There are no associated agents to hypertension. Risk factors for coronary artery disease include diabetes mellitus, sedentary lifestyle and post-menopausal state. Past treatments include angiotensin blockers, direct vasodilators and beta blockers. The current treatment provides significant improvement. Compliance problems include exercise and diet.  There is no history of angina. There is no history of a hypertension causing med or a thyroid problem.     Review of  Systems   Constitutional: Negative.    HENT: Negative.     Eyes: Negative.    Respiratory: Negative.     Cardiovascular:  Negative for chest pain, palpitations, orthopnea, leg swelling and claudication.   Gastrointestinal: Negative.    Endocrine: Negative for polydipsia, polyphagia and polyuria.   Genitourinary: Negative.    Musculoskeletal: Negative.    Neurological: Negative.    Psychiatric/Behavioral: Negative.            Objective     Physical Exam  Constitutional:       General: She is not in acute distress.     Appearance: She is well-developed. She is not diaphoretic.   HENT:      Head: Normocephalic and atraumatic.      Right Ear: External ear normal.      Left Ear: External ear normal.      Nose: Nose normal.      Mouth/Throat:      Pharynx: No oropharyngeal exudate.   Eyes:      General: No scleral icterus.        Right eye: No discharge.         Left eye: No discharge.      Conjunctiva/sclera: Conjunctivae normal.      Pupils: Pupils are equal, round, and reactive to light.   Neck:      Thyroid: No thyromegaly.      Vascular: No JVD.      Trachea: No tracheal deviation.   Cardiovascular:      Rate and Rhythm: Normal rate and regular rhythm.      Heart sounds: Normal heart sounds. No murmur heard.     No friction rub. No gallop.   Pulmonary:      Effort: Pulmonary effort is normal. No respiratory distress.      Breath sounds: Normal breath sounds. No stridor. No wheezing or rales.   Chest:      Chest wall: No tenderness.   Abdominal:      General: Bowel sounds are normal. There is no distension.      Palpations: Abdomen is soft. There is no mass.      Tenderness: There is no abdominal tenderness. There is no guarding or rebound.   Musculoskeletal:         General: No tenderness. Normal range of motion.      Cervical back: Normal range of motion and neck supple.   Lymphadenopathy:      Cervical: No cervical adenopathy.   Skin:     General: Skin is warm and dry.      Coloration: Skin is not pale.       Findings: No erythema or rash.   Neurological:      Mental Status: She is alert and oriented to person, place, and time.      Cranial Nerves: No cranial nerve deficit.      Motor: Weakness present. No abnormal muscle tone.      Coordination: Coordination abnormal.      Gait: Gait abnormal.      Deep Tendon Reflexes: Reflexes are normal and symmetric.   Psychiatric:         Speech: She is noncommunicative.         Behavior: Behavior is slowed.         Thought Content: Thought content normal.         Judgment: Judgment normal.            Assessment and Plan     1. Acute bacterial conjunctivitis of left eye  -     tobramycin sulfate 0.3% (TOBREX) 0.3 % ophthalmic solution; Place 1 drop into the left eye every 6 (six) hours. for 10 days  Dispense: 5 mL; Refill: 1    2. Type 2 diabetes mellitus with stage 4 chronic kidney disease, without long-term current use of insulin  -     Hemoglobin A1C; Future; Expected date: 10/02/2024  -     Microalbumin/Creatinine Ratio, Urine; Future; Expected date: 10/02/2024    3. Essential hypertension    Continue monitoring blood sugar at home,ADA diet.   Continue monitoring blood pressure at home, low sodium diet.   I spent a total of 40 minutes on the day of the visit.This includes face to face time and non-face to face time preparing to see the patient (eg, review of tests), obtaining and/or reviewing separately obtained history, documenting clinical information in the electronic or other health record, independently interpreting results and communicating results to the patient/family/caregiver, or care coordinator.          Follow up in about 6 months (around 4/2/2025), or if symptoms worsen or fail to improve.

## 2024-10-02 NOTE — TELEPHONE ENCOUNTER
Care Due:                  Date            Visit Type   Department     Provider  --------------------------------------------------------------------------------                                EP -                              PRIMARY      [facility]  Last Visit: 01-      CARE ([facility])   MEDICINE       Oniel Johnson  Next Visit: None Scheduled  None         None Found                                                            Last  Test          Frequency    Reason                     Performed    Due Date  --------------------------------------------------------------------------------    CBC.........  12 months..  allopurinoL, clopidogreL.  12-   12-    CMP.........  12 months..  allopurinoL..............  12- 12-    [facility] Embedded Care Due Messages. Reference number: [id number].   10/02/2024 1:02:21 PM CDT

## 2024-10-03 ENCOUNTER — LAB VISIT (OUTPATIENT)
Dept: LAB | Facility: HOSPITAL | Age: 89
End: 2024-10-03
Attending: FAMILY MEDICINE
Payer: MEDICARE

## 2024-10-03 DIAGNOSIS — I63.431 EMBOLIC STROKE INVOLVING RIGHT POSTERIOR CEREBRAL ARTERY: ICD-10-CM

## 2024-10-03 DIAGNOSIS — N18.4 TYPE 2 DIABETES MELLITUS WITH STAGE 4 CHRONIC KIDNEY DISEASE, WITHOUT LONG-TERM CURRENT USE OF INSULIN: ICD-10-CM

## 2024-10-03 DIAGNOSIS — I50.22 CHRONIC SYSTOLIC HEART FAILURE: ICD-10-CM

## 2024-10-03 DIAGNOSIS — I25.118 CORONARY ARTERY DISEASE OF NATIVE ARTERY OF NATIVE HEART WITH STABLE ANGINA PECTORIS: ICD-10-CM

## 2024-10-03 DIAGNOSIS — E11.22 TYPE 2 DIABETES MELLITUS WITH STAGE 4 CHRONIC KIDNEY DISEASE, WITHOUT LONG-TERM CURRENT USE OF INSULIN: ICD-10-CM

## 2024-10-03 DIAGNOSIS — I70.0 AORTIC ATHEROSCLEROSIS: ICD-10-CM

## 2024-10-03 LAB
ALBUMIN SERPL BCP-MCNC: 2.9 G/DL (ref 3.5–5.2)
ALP SERPL-CCNC: 78 U/L (ref 55–135)
ALT SERPL W/O P-5'-P-CCNC: 11 U/L (ref 10–44)
ANION GAP SERPL CALC-SCNC: 6 MMOL/L (ref 8–16)
AST SERPL-CCNC: 28 U/L (ref 10–40)
BILIRUB SERPL-MCNC: 0.4 MG/DL (ref 0.1–1)
BUN SERPL-MCNC: 70 MG/DL (ref 10–30)
CALCIUM SERPL-MCNC: 10 MG/DL (ref 8.7–10.5)
CHLORIDE SERPL-SCNC: 106 MMOL/L (ref 95–110)
CHOLEST SERPL-MCNC: 87 MG/DL (ref 120–199)
CHOLEST/HDLC SERPL: 2.7 {RATIO} (ref 2–5)
CO2 SERPL-SCNC: 22 MMOL/L (ref 23–29)
CREAT SERPL-MCNC: 2.4 MG/DL (ref 0.5–1.4)
ERYTHROCYTE [DISTWIDTH] IN BLOOD BY AUTOMATED COUNT: 15.7 % (ref 11.5–14.5)
EST. GFR  (NO RACE VARIABLE): 18.5 ML/MIN/1.73 M^2
ESTIMATED AVG GLUCOSE: 114 MG/DL (ref 68–131)
GLUCOSE SERPL-MCNC: 98 MG/DL (ref 70–110)
HBA1C MFR BLD: 5.6 % (ref 4–5.6)
HCT VFR BLD AUTO: 29.2 % (ref 37–48.5)
HDLC SERPL-MCNC: 32 MG/DL (ref 40–75)
HDLC SERPL: 36.8 % (ref 20–50)
HGB BLD-MCNC: 8.8 G/DL (ref 12–16)
LDLC SERPL CALC-MCNC: 42.2 MG/DL (ref 63–159)
MCH RBC QN AUTO: 31.9 PG (ref 27–31)
MCHC RBC AUTO-ENTMCNC: 30.1 G/DL (ref 32–36)
MCV RBC AUTO: 106 FL (ref 82–98)
NONHDLC SERPL-MCNC: 55 MG/DL
PLATELET # BLD AUTO: 169 K/UL (ref 150–450)
PMV BLD AUTO: 12.5 FL (ref 9.2–12.9)
POTASSIUM SERPL-SCNC: 4.4 MMOL/L (ref 3.5–5.1)
PROT SERPL-MCNC: 6.6 G/DL (ref 6–8.4)
RBC # BLD AUTO: 2.76 M/UL (ref 4–5.4)
SODIUM SERPL-SCNC: 134 MMOL/L (ref 136–145)
TRIGL SERPL-MCNC: 64 MG/DL (ref 30–150)
WBC # BLD AUTO: 10.44 K/UL (ref 3.9–12.7)

## 2024-10-03 PROCEDURE — 80053 COMPREHEN METABOLIC PANEL: CPT | Performed by: INTERNAL MEDICINE

## 2024-10-03 PROCEDURE — 85027 COMPLETE CBC AUTOMATED: CPT | Performed by: INTERNAL MEDICINE

## 2024-10-03 PROCEDURE — 83036 HEMOGLOBIN GLYCOSYLATED A1C: CPT | Performed by: FAMILY MEDICINE

## 2024-10-03 PROCEDURE — 80061 LIPID PANEL: CPT | Performed by: INTERNAL MEDICINE

## 2024-10-03 RX ORDER — ATORVASTATIN CALCIUM 40 MG/1
40 TABLET, FILM COATED ORAL
Qty: 90 TABLET | Refills: 0 | OUTPATIENT
Start: 2024-10-03

## 2024-10-03 RX ORDER — ATORVASTATIN CALCIUM 40 MG/1
40 TABLET, FILM COATED ORAL DAILY
Qty: 90 TABLET | Refills: 0 | Status: SHIPPED | OUTPATIENT
Start: 2024-10-03

## 2024-10-03 NOTE — TELEPHONE ENCOUNTER
Refill Routing Note   Medication(s) are not appropriate for processing by [facility] for the following reason(s):        Due for refill >6 months ago    [facility] action(s):  Defer  Approve   Requires labs : Yes             Appointments  past 12m or future 3m with PCP    Date Provider   Last Visit   10/2/2024 Oniel Johnson MD   Next Visit   Visit date not found Oniel Johnson MD   ED visits in past 90 days: 0        Note composed:8:16 AM 10/03/2024

## 2024-10-03 NOTE — TELEPHONE ENCOUNTER
Refill Decision Note   Krystina Washington  is requesting a refill authorization.  Brief Assessment and Rationale for Refill:  Quick Discontinue     Medication Therapy Plan:        Comments:     Note composed:8:14 AM 10/03/2024

## 2024-10-04 RX ORDER — NITROGLYCERIN 400 UG/1
SPRAY ORAL
Qty: 12 G | Refills: 11 | Status: SHIPPED | OUTPATIENT
Start: 2024-10-04

## 2024-10-10 ENCOUNTER — OUTPATIENT CASE MANAGEMENT (OUTPATIENT)
Dept: NEUROLOGY | Facility: CLINIC | Age: 89
End: 2024-10-10
Payer: MEDICARE

## 2024-10-10 NOTE — PROGRESS NOTES
Protocol: The [facility] Effectiveness Study  Identifier: [id number]  IRB#: 2022.247  PI: Dr. Shankar Vasquez, PhD  CO-I: Dr. Chayo Diamond PsyD  Version Date: 12/05/2022  Pt Study ID: [id number]  Visit Month: M9     Timeline:   (*Note for CTN - complete timeline using completed or planned date for study events. Add any important events (i.e. Withdrawals, Lost to Follow Up, Adverse Events, etc.).  Consent: Completed(1/31/24)  Baseline questionnaires: Completed(1/31/24)  6-month questionnaires: Planned(7/31/24)  12-month questionnaires: Planned(1/31/25)     Visit Note:   Spoke with caregiver Hermelindo Mackenzie (Son) for month 9 visit.   This was a short conversation.  CG followed up with [facility] and has not had a chance to follow up on hearing aids.  No new needs were expressed. CG agreed to reach out as needed.      Falls in the past month: 0  UTIs in the past month: 0  Hospital encounters in the past month (reported by caregiver): 0        Next monthly visit scheduled for: 11/12/24. Caregiver knows how to reach CTN, and is encouraged to do so, as needed before our next scheduled call.      Action items for CTN: Continue to follow

## 2024-11-06 RX ORDER — ALLOPURINOL 100 MG/1
100 TABLET ORAL
Qty: 90 TABLET | Refills: 3 | Status: SHIPPED | OUTPATIENT
Start: 2024-11-06

## 2024-11-06 NOTE — TELEPHONE ENCOUNTER
Care Due:                  Date            Visit Type   Department     Provider  --------------------------------------------------------------------------------                                EP -                              PRIMARY      [facility]  Last Visit: 10-      CARE ([facility])   VANDANA Johnson                              EP -                              PRIMARY      [facility]  Next Visit: 04-      CARE ([facility])   VANDANA Johnson                                                            Last  Test          Frequency    Reason                     Performed    Due Date  --------------------------------------------------------------------------------    Uric Acid...  12 months..  allopurinoL..............  Not Found    Overdue    [employer] Embedded Care Due Messages. Reference number: [id number].   11/06/2024 8:07:52 AM CST

## 2024-11-06 NOTE — TELEPHONE ENCOUNTER
Refill Routing Note   Medication(s) are not appropriate for processing by [facility] for the following reason(s):        Required labs outdated  Required labs abnormal    [facility] action(s):  Defer   Requires labs : Yes             Appointments  past 12m or future 3m with PCP    Date Provider   Last Visit   10/2/2024 Oniel Johnson MD   Next Visit   4/2/2025 Oniel Johnson MD   ED visits in past 90 days: 0        Note composed:11:44 AM 11/06/2024

## 2024-11-12 ENCOUNTER — OUTPATIENT CASE MANAGEMENT (OUTPATIENT)
Dept: NEUROLOGY | Facility: CLINIC | Age: 89
End: 2024-11-12
Payer: MEDICARE

## 2024-11-12 NOTE — PROGRESS NOTES
Protocol: The [facility] Effectiveness Study  Identifier: [id number]  IRB#: 2022.247  PI: Dr. Shankar Vasquez, PhD  CO-I: Dr. Chayo Diamond PsyD  Version Date: 12/05/2022  Pt Study ID: [id number]  Visit Month: M10   Timeline:   (*Note for CTN - complete timeline using completed or planned date for study events. Add any important events (i.e. Withdrawals, Lost to Follow Up, Adverse Events, etc.).  Consent: Completed(1/31/24)  Baseline questionnaires: Completed(1/31/24)  6-month questionnaires: Planned(7/31/24)  12-month questionnaires: Planned(1/31/25)     Visit Note:   Spoke with caregiver Hemrelindo Mackenzie (Son) for month 10 visit.       LVM: 11/12/24  LVM:  11/15/24    Falls in the past month: 0  UTIs in the past month: 0  Hospital encounters in the past month (reported by caregiver): 0        Next monthly visit scheduled for: /24. Caregiver knows how to reach CTN, and is encouraged to do so, as needed before our next scheduled call.      Action items for CTN: Continue to follow

## 2024-11-14 ENCOUNTER — OFFICE VISIT (OUTPATIENT)
Dept: PODIATRY | Facility: CLINIC | Age: 89
End: 2024-11-14
Payer: MEDICARE

## 2024-11-14 VITALS — DIASTOLIC BLOOD PRESSURE: 74 MMHG | HEART RATE: 55 BPM | SYSTOLIC BLOOD PRESSURE: 140 MMHG

## 2024-11-14 DIAGNOSIS — N18.4 TYPE 2 DIABETES MELLITUS WITH STAGE 4 CHRONIC KIDNEY DISEASE, WITHOUT LONG-TERM CURRENT USE OF INSULIN: ICD-10-CM

## 2024-11-14 DIAGNOSIS — E11.42 DIABETIC POLYNEUROPATHY ASSOCIATED WITH TYPE 2 DIABETES MELLITUS: ICD-10-CM

## 2024-11-14 DIAGNOSIS — E11.9 ENCOUNTER FOR DIABETIC FOOT EXAM: Primary | ICD-10-CM

## 2024-11-14 DIAGNOSIS — E11.22 TYPE 2 DIABETES MELLITUS WITH STAGE 4 CHRONIC KIDNEY DISEASE, WITHOUT LONG-TERM CURRENT USE OF INSULIN: ICD-10-CM

## 2024-11-14 DIAGNOSIS — L60.9 DISEASE OF NAIL: ICD-10-CM

## 2024-11-14 PROCEDURE — 11721 DEBRIDE NAIL 6 OR MORE: CPT | Mod: PBBFAC,PN | Performed by: STUDENT IN AN ORGANIZED HEALTH CARE EDUCATION/TRAINING PROGRAM

## 2024-11-14 PROCEDURE — 99999 PR PBB SHADOW E&M-EST. PATIENT-LVL III: CPT | Mod: PBBFAC,,, | Performed by: STUDENT IN AN ORGANIZED HEALTH CARE EDUCATION/TRAINING PROGRAM

## 2024-11-14 PROCEDURE — 99213 OFFICE O/P EST LOW 20 MIN: CPT | Mod: PBBFAC,PN | Performed by: STUDENT IN AN ORGANIZED HEALTH CARE EDUCATION/TRAINING PROGRAM

## 2024-11-14 NOTE — PROGRESS NOTES
Subjective:      Patient ID: Krystina Washington is a 89 y.o. female.    Chief Complaint: Diabetes Mellitus (PCP Dr. Johnson, 10/2/24), Diabetic Foot Exam, and Routine Foot Care    Krystina is a 89 y.o. female who presents to the clinic for evaluation and treatment of high risk feet. 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 (12/03/2020), Anemia in stage 3b chronic kidney disease (03/08/2017), Arthritis, CHF (congestive heart failure), Chronic bronchitis, Closed displaced subtrochanteric fracture of right femur s/p IM nail on 10/17/2021 (10/16/2021), Coronary artery disease, Diabetes mellitus, Glaucoma, Gout, joint, CABG (09/21/2010), Hyperlipidemia, Hypertension, NSTEMI (non-ST elevated myocardial infarction) (09/21/2010), Obesity, Renal manifestation of secondary diabetes mellitus, Stage 3b chronic kidney disease (10/21/2021), Stenosis of aortic and mitral valves, Systolic heart failure (06/19/2015), and TIA (transient ischemic attack) (01/07/2021). The patient's chief complaint is long, thick toenails. This patient has documented high risk feet requiring routine maintenance secondary to diabetes mellitis and those secondary complications of diabetes, as mentioned..    11/14/24: Seen today, has not been seen in over 2 years. Here for diabetic foot exam.     PCP: Oniel Johnson MD    Date Last Seen by PCP: See above     Last encounter in this department: Visit date not found    Hemoglobin A1C   Date Value Ref Range Status   10/03/2024 5.6 4.0 - 5.6 % Final     Comment:     ADA Screening Guidelines:  5.7-6.4%  Consistent with prediabetes  >or=6.5%  Consistent with diabetes    High levels of fetal hemoglobin interfere with the HbA1C  assay. Heterozygous hemoglobin variants (HbS, HgC, etc)do  not significantly interfere with this assay.   However, presence of multiple variants may affect accuracy.     12/12/2023 5.9 (H) 4.0 - 5.6 % Final     Comment:      ADA Screening Guidelines:  5.7-6.4%  Consistent with prediabetes  >or=6.5%  Consistent with diabetes    High levels of fetal hemoglobin interfere with the HbA1C  assay. Heterozygous hemoglobin variants (HbS, HgC, etc)do  not significantly interfere with this assay.   However, presence of multiple variants may affect accuracy.     08/13/2023 6.0 (H) 4.0 - 5.6 % Final     Comment:     ADA Screening Guidelines:  5.7-6.4%  Consistent with prediabetes  >or=6.5%  Consistent with diabetes    High levels of fetal hemoglobin interfere with the HbA1C  assay. Heterozygous hemoglobin variants (HbS, HgC, etc)do  not significantly interfere with this assay.   However, presence of multiple variants may affect accuracy.         Review of Systems   Constitutional: Negative for chills, decreased appetite, diaphoresis and fever.   HENT:  Positive for hearing loss. Negative for congestion.    Cardiovascular:  Negative for chest pain, claudication, leg swelling and syncope.   Respiratory:  Negative for cough and shortness of breath.    Skin:  Positive for dry skin and nail changes. Negative for color change, flushing, itching, poor wound healing, rash, suspicious lesions and unusual hair distribution.   Musculoskeletal:  Positive for joint pain. Negative for arthritis, back pain, joint swelling and myalgias.   Gastrointestinal:  Negative for nausea and vomiting.   Neurological:  Positive for numbness. Negative for focal weakness, loss of balance, paresthesias, sensory change and weakness.   Psychiatric/Behavioral:  Negative for altered mental status, suicidal ideas and thoughts of violence. The patient is not nervous/anxious.            Objective:      Physical Exam  Constitutional:       General: She is not in acute distress.     Appearance: She is well-developed. She is not diaphoretic.   Cardiovascular:      Pulses:           Dorsalis pedis pulses are 2+ on the right side and 2+ on the left side.        Posterior tibial pulses  are 0 on the right side and 0 on the left side.      Comments: Skin temperature is within normal limits. Toes are cool to touch and feet are warm proximally. Hair growth is diminished. Skin is mildly atrophic and with mild hyperpigmentation. Mild edema noted, jyoti.   Musculoskeletal:         General: No tenderness.      Right foot: Decreased range of motion. Deformity present.      Left foot: Decreased range of motion. Deformity present.      Comments: Adequate joint range of motion without pain, limitation, nor crepitation to bilateral feet and ankle joints.Muscle strength is 4/5 in all groups bilaterally.       Feet:      Right foot:      Skin integrity: Dry skin present. No blister, erythema or warmth.      Left foot:      Skin integrity: Dry skin present. No blister, erythema or warmth.   Lymphadenopathy:      Comments: Negative lymphadenopathy bilateral popliteal fossa and tarsal tunnel.    Skin:     General: Skin is warm and dry.      Coloration: Skin is not pale.      Findings: No abrasion, bruising, burn, erythema, laceration, petechiae or rash.      Nails: There is no clubbing.      Comments: Skin is warm and dry, bilaterally, no acute SOI noted, bilaterally, appears stable.     Nails are thickened by 2-4 mm's, dystrophic, and are darkened in coloration with subungual fungal debris.        Neurological:      Mental Status: She is alert and oriented to person, place, and time.      Sensory: No sensory deficit.      Motor: No abnormal muscle tone.      Comments: Vibratory sensation is diminished jyoti.    Psychiatric:         Behavior: Behavior normal.         Thought Content: Thought content normal.         Judgment: Judgment normal.               Assessment:       Encounter Diagnoses   Name Primary?    Type 2 diabetes mellitus with stage 4 chronic kidney disease, without long-term current use of insulin     Disease of nail     Diabetic polyneuropathy associated with type 2 diabetes mellitus     Encounter for  diabetic foot exam Yes         Plan:       Krystina was seen today for diabetes mellitus, diabetic foot exam and routine foot care.    Diagnoses and all orders for this visit:    Encounter for diabetic foot exam  -     Foot Exam Performed    Type 2 diabetes mellitus with stage 4 chronic kidney disease, without long-term current use of insulin  -     Ambulatory referral/consult to Podiatry    Disease of nail  -     Routine Foot Care    Diabetic polyneuropathy associated with type 2 diabetes mellitus  -     Routine Foot Care      I counseled the patient on her conditions, their implications and medical management.    Routine nail care per attached note.     Discussed importance of keeping feet dry and changing socks and shoes on a regular basis to prevent spread of toenail fungus. Discussed treatment options for fungal toenails including topical home remedies, topical OTC and Rx medications as well as oral Rx medications and laser treatment. All pros and cons discussed of each treatment. Patient elects for home remedy, recommend sigifredo's vapor rub.     Annual diabetic foot exam performed today. Shoe inspection. Diabetic Foot Education. Patient reminded of the importance of good nutrition and blood sugar control to help prevent podiatric complications of diabetes. Patient instructed on proper foot hygeine. We discussed wearing proper shoe gear, daily foot inspections, never walking without protective shoe gear, never putting sharp instruments to feet.    Return to clinic in 6 mo, sooner PRN

## 2024-11-14 NOTE — PROCEDURES
"Routine Foot Care    Date/Time: 11/14/2024 2:15 PM    Performed by: Yue Andrew DPM  Authorized by: Yue Andrew DPM    Time out: Immediately prior to procedure a "time out" was called to verify the correct patient, procedure, equipment, support staff and site/side marked as required.    Consent Done?:  Yes (Verbal)  Hyperkeratotic Skin Lesions?: No      Nail Care Type:  Debride  Location(s): All  (Left 1st Toe, Left 3rd Toe, Left 2nd Toe, Left 4th Toe, Left 5th Toe, Right 1st Toe, Right 2nd Toe, Right 3rd Toe, Right 4th Toe and Right 5th Toe)  Patient tolerance:  Patient tolerated the procedure well with no immediate complications     Assisted by Ana Don LPN    "

## 2024-12-09 DIAGNOSIS — I50.22 CHRONIC SYSTOLIC HEART FAILURE: ICD-10-CM

## 2024-12-09 RX ORDER — HYDRALAZINE HYDROCHLORIDE 25 MG/1
25 TABLET, FILM COATED ORAL EVERY 8 HOURS
Qty: 270 TABLET | Refills: 3 | Status: SHIPPED | OUTPATIENT
Start: 2024-12-09 | End: 2025-12-09

## 2024-12-09 NOTE — TELEPHONE ENCOUNTER
No care due was identified.  [facility] Embedded Care Due Messages. Reference number: [id number].   12/09/2024 8:57:47 AM CST  
VTE Assessment already completed for this visit

## 2024-12-16 ENCOUNTER — OUTPATIENT CASE MANAGEMENT (OUTPATIENT)
Dept: NEUROLOGY | Facility: CLINIC | Age: 89
End: 2024-12-16
Payer: MEDICARE

## 2024-12-16 NOTE — PROGRESS NOTES
Protocol: The [facility] Effectiveness Study  Identifier: [id number]  IRB#: 2022.247  PI: Dr. Shankar Vasquez, PhD  CO-I: Dr. Chayo Diamond PsyD  Version Date: 12/05/2022  Pt Study ID: [id number]  Visit Month: M11   Timeline:   (*Note for CTN - complete timeline using completed or planned date for study events. Add any important events (i.e. Withdrawals, Lost to Follow Up, Adverse Events, etc.).  Consent: Completed(1/31/24)  Baseline questionnaires: Completed(1/31/24)  6-month questionnaires: Planned(7/31/24)  12-month questionnaires: Planned(1/31/25)     Visit Note:   Spoke with caregiver Hermelindo Mackenzie (Son) for month 11 visit.     LVM--12/16/24  LVM--12/19/24     Falls in the past month: 0  UTIs in the past month: 0  Hospital encounters in the past month (reported by caregiver): 0        Next monthly visit scheduled for: /24. Caregiver knows how to reach CTN, and is encouraged to do so, as needed before our next scheduled call.      Action items for CTN: Continue to follow

## 2025-01-03 RX ORDER — SACUBITRIL AND VALSARTAN 49; 51 MG/1; MG/1
1 TABLET, FILM COATED ORAL 2 TIMES DAILY
Qty: 60 TABLET | Refills: 3 | Status: SHIPPED | OUTPATIENT
Start: 2025-01-03

## 2025-01-11 NOTE — TELEPHONE ENCOUNTER
No care due was identified.  [facility] Embedded Care Due Messages. Reference number: [id number].   1/11/2025 4:19:56 PM CST

## 2025-01-12 RX ORDER — ATORVASTATIN CALCIUM 40 MG/1
40 TABLET, FILM COATED ORAL
Qty: 90 TABLET | Refills: 2 | Status: SHIPPED | OUTPATIENT
Start: 2025-01-12

## 2025-01-12 NOTE — TELEPHONE ENCOUNTER
Refill Decision Note   Krystina Washington  is requesting a refill authorization.  Brief Assessment and Rationale for Refill:  Approve     Medication Therapy Plan:       Medication Reconciliation Completed: No   Comments:     No Care Gaps recommended.     Note composed:11:38 AM 01/12/2025

## 2025-01-14 ENCOUNTER — E-VISIT (OUTPATIENT)
Dept: FAMILY MEDICINE | Facility: CLINIC | Age: OVER 89
End: 2025-01-14
Payer: MEDICARE

## 2025-01-14 ENCOUNTER — PATIENT MESSAGE (OUTPATIENT)
Dept: FAMILY MEDICINE | Facility: CLINIC | Age: OVER 89
End: 2025-01-14

## 2025-01-14 DIAGNOSIS — I10 HYPERTENSION, UNSPECIFIED TYPE: Primary | ICD-10-CM

## 2025-01-14 DIAGNOSIS — N39.0 URINARY TRACT INFECTION WITHOUT HEMATURIA, SITE UNSPECIFIED: ICD-10-CM

## 2025-01-14 RX ORDER — SULFAMETHOXAZOLE AND TRIMETHOPRIM 800; 160 MG/1; MG/1
1 TABLET ORAL 2 TIMES DAILY
Qty: 14 TABLET | Refills: 0 | Status: SHIPPED | OUTPATIENT
Start: 2025-01-14 | End: 2025-01-21

## 2025-01-14 NOTE — PROGRESS NOTES
The patient location is: [place]  The chief complaint leading to consultation is: E visit    Visit type: E visit    Face to Face time with patient: 15 minutes of total time spent on the encounter, which includes face to face time and non-face to face time preparing to see the patient (eg, review of tests), Obtaining and/or reviewing separately obtained history, Documenting clinical information in the electronic or other health record, Independently interpreting results (not separately reported) and communicating results to the patient/family/caregiver, or Care coordination (not separately reported).         Each patient to whom he or she provides medical services by telemedicine is:  (1) informed of the relationship between the physician and patient and the respective role of any other health care provider with respect to management of the patient; and (2) notified that he or she may decline to receive medical services by telemedicine and may withdraw from such care at any time.    Notes: 89 years old female was evaluated by telemedicine for urinary symptoms.  No fever or chills.    Krystina was seen today for general illness and urinary tract infection.    Diagnoses and all orders for this visit:    Hypertension, unspecified type    Urinary tract infection without hematuria, site unspecified  -     sulfamethoxazole-trimethoprim 800-160mg (BACTRIM DS) 800-160 mg Tab; Take 1 tablet by mouth 2 (two) times daily. for 7 days  -     Urine Culture High Risk; Future

## 2025-01-15 ENCOUNTER — OFFICE VISIT (OUTPATIENT)
Dept: CARDIOLOGY | Facility: CLINIC | Age: OVER 89
End: 2025-01-15
Payer: MEDICARE

## 2025-01-15 ENCOUNTER — TELEPHONE (OUTPATIENT)
Dept: PHARMACY | Facility: CLINIC | Age: OVER 89
End: 2025-01-15
Payer: MEDICARE

## 2025-01-15 VITALS
HEART RATE: 60 BPM | WEIGHT: 145.06 LBS | BODY MASS INDEX: 25.7 KG/M2 | HEIGHT: 63 IN | SYSTOLIC BLOOD PRESSURE: 114 MMHG | DIASTOLIC BLOOD PRESSURE: 64 MMHG | OXYGEN SATURATION: 98 %

## 2025-01-15 DIAGNOSIS — Z95.2 S/P TAVR (TRANSCATHETER AORTIC VALVE REPLACEMENT): ICD-10-CM

## 2025-01-15 DIAGNOSIS — I50.22 CHRONIC SYSTOLIC HEART FAILURE: Primary | ICD-10-CM

## 2025-01-15 DIAGNOSIS — Z95.1 S/P CABG X 3: ICD-10-CM

## 2025-01-15 DIAGNOSIS — I35.0 AORTIC STENOSIS, SEVERE: ICD-10-CM

## 2025-01-15 DIAGNOSIS — E78.00 PURE HYPERCHOLESTEROLEMIA: ICD-10-CM

## 2025-01-15 DIAGNOSIS — I10 ESSENTIAL HYPERTENSION: ICD-10-CM

## 2025-01-15 DIAGNOSIS — I70.0 AORTIC ATHEROSCLEROSIS: ICD-10-CM

## 2025-01-15 DIAGNOSIS — N18.4 CHRONIC KIDNEY DISEASE, STAGE 4 (SEVERE): ICD-10-CM

## 2025-01-15 DIAGNOSIS — I25.118 CORONARY ARTERY DISEASE OF NATIVE ARTERY OF NATIVE HEART WITH STABLE ANGINA PECTORIS: ICD-10-CM

## 2025-01-15 PROCEDURE — 99214 OFFICE O/P EST MOD 30 MIN: CPT | Mod: S$PBB,,, | Performed by: PHYSICIAN ASSISTANT

## 2025-01-15 PROCEDURE — 99999 PR PBB SHADOW E&M-EST. PATIENT-LVL V: CPT | Mod: PBBFAC,,, | Performed by: PHYSICIAN ASSISTANT

## 2025-01-15 PROCEDURE — 99215 OFFICE O/P EST HI 40 MIN: CPT | Mod: PBBFAC | Performed by: PHYSICIAN ASSISTANT

## 2025-01-15 RX ORDER — VALSARTAN 40 MG/1
20 TABLET ORAL 2 TIMES DAILY
Qty: 30 TABLET | Refills: 11 | Status: SHIPPED | OUTPATIENT
Start: 2025-01-15 | End: 2025-01-15 | Stop reason: SDUPTHER

## 2025-01-15 RX ORDER — VALSARTAN 40 MG/1
20 TABLET ORAL 2 TIMES DAILY
Qty: 30 TABLET | Refills: 11 | Status: SHIPPED | OUTPATIENT
Start: 2025-01-15 | End: 2025-01-15

## 2025-01-15 NOTE — TELEPHONE ENCOUNTER
We have reached out to MsSarah Felix via letter and portal message  to inform her of the [employer] application process for [employer]. A follow-up phone call will be made in 5 business days.    Andreia Baptiste  Pharmacy Patient Assistance Team

## 2025-01-15 NOTE — LETTER
January 15, 2025    Krystina Washington  [address] [place] [address]               Dear MsSarah Lozadaer,      My name is Andreia Emile  I am reaching out on behalf of [facility]s Pharmacy Patient Assistance Team in regards to your request for medication assistance. Our goal is to assist qualified [facility] patients with obtaining financial assistance for prescribed medications.     Please note that enrollment into available support may require the following documents:      Proof of household Income (such as social security award letter, pension statement,1040)  Copy of all insurance cards (front and back)  Print out from your insurance or pharmacy showing how much you have spent on prescriptions for the current year  Completed [facility] Authorization Forms        Please reach out to my phone number below if you are still in need of assistance with your medications. Follow-up call will be made in 5 business days. We look forward to hearing from you soon!    Thank you for choosing [facility] for your healthcare needs      Sincerely  Andreia LOCKHART @[phone]  Pharmacy Patient Assistance Team  [address] [place] [address]  [id number] [place] [id number]  Fax: [phone]  Email: [email or web address]

## 2025-01-15 NOTE — PROGRESS NOTES
Cardiology Clinic Note  Reason for Visit: Annual visit   General Cardiologist: Dr. Golden     HPI:     Problem List and HPI:   HFrEF, EF 25% >50%  TAVR 26 S3 2017 for severe aortic stenosis    CAD   S/P CABG x3  9/10  Old MI   HTN   Hyperlipidemia   T2D - long standing   CKD 3  Stroke 12/2020  TIA 1/2021      Krystina Washington is a 89 y.o. F, who presents to clinic accompanied by her grandson Hermelindo Cross. He has been her caretaker for many years and does an exceptional job of monitoring her health.  has been stable over the past year and has had no recent hospital visits. She had a stroke last year and eliquis was initiated, however she subsequently had GIB and DAPT was recommended by neurology instead. She has had no further bleeding issues and hemoglobin was stable with labs in Oct. She reports no shortness of breath today. Hermelindo Cross checks her legs and abdomen for fluid retention regularly, and has not noticed any changes. He also monitors her BP at home, which he reports typical range between 100-130. Never too low or too high. They are very cautious with salt intake. He tells me that her dementia is slightly progressed and she is currently being treated for UTI. Unfortunately Entresto has become expensive and they are in needed of assistance for this medication.     ROS:    Pertinent ROS included in HPI and below.  PMH:     Past Medical History:   Diagnosis Date    Acute ischemic left posterior cerebral artery (PCA) stroke 12/03/2020    Anemia in stage 3b chronic kidney disease 03/08/2017    Arthritis     CHF (congestive heart failure)     Chronic bronchitis     Closed displaced subtrochanteric fracture of right femur s/p IM nail on 10/17/2021 10/16/2021    Coronary artery disease     Diabetes mellitus     Glaucoma     Gout, joint     Hx of CABG 09/21/2010    Hyperlipidemia     Hypertension     NSTEMI (non-ST elevated myocardial infarction) 09/21/2010    Obesity     Renal manifestation of  secondary diabetes mellitus     Stage 3b chronic kidney disease 10/21/2021    Stenosis of aortic and mitral valves     Systolic heart failure 06/19/2015    TIA (transient ischemic attack) 01/07/2021     Past Surgical History:   Procedure Laterality Date    ANTEGRADE INTRAMEDULLARY RODDING OF FEMUR Right 10/17/2021    Procedure: INSERTION, INTRAMEDULLARY ALTAF, FEMUR, ANTEGRADE;  Surgeon: Dino Rutledge MD;  Location: [facility] OR [address] [place] [address];  Service: Orthopedics;  Laterality: Right;    CARDIAC VALVE SURGERY      CATARACT EXTRACTION      CORONARY ARTERY BYPASS GRAFT  09/21/2010    ENTROPIAN REPAIR Left 03/06/2020    Procedure: REPAIR, ENTROPION;  Surgeon: Yulia Kincaid MD;  Location: [facility] OR [address] [place] [address];  Service: Ophthalmology;  Laterality: Left;    ESOPHAGOGASTRODUODENOSCOPY N/A 12/18/2023    Procedure: EGD (ESOPHAGOGASTRODUODENOSCOPY);  Surgeon: Mike Rene MD;  Location: [facility];  Service: Endoscopy;  Laterality: N/A;    EYE SURGERY      FRACTURE SURGERY      HYSTERECTOMY      JOINT REPLACEMENT      ORIF FEMUR FRACTURE Right 10/17/2021    Procedure: ORIF, FRACTURE, FEMUR;  Surgeon: Dino Rutledge MD;  Location: [facility] OR [address] [place] [address];  Service: Orthopedics;  Laterality: Right;     Allergies:     Review of patient's allergies indicates:   Allergen Reactions    Indocin [indomethacin sodium] Other (See Comments)     Either caused hot,burning body or caused madness per patient's grandson    Lotensin [benazepril] Other (See Comments)     Either caused hot ,burning body or caused madness per patient's grandson     Medications:     Current Outpatient Medications on File Prior to Visit   Medication Sig Dispense Refill    acetaminophen (TYLENOL) 500 MG tablet Take 500 mg by mouth every 6 (six) hours as needed for Pain.      allopurinoL (ZYLOPRIM) 100 MG tablet TAKE 1 TABLET(100 MG) BY MOUTH EVERY DAY 90 tablet 3    aspirin (ECOTRIN) 81 MG EC tablet Take 81 mg by mouth as needed for Pain.      atorvastatin (LIPITOR) 40  MG tablet TAKE 1 TABLET(40 MG) BY MOUTH DAILY 90 tablet 2    bisacodyL (DULCOLAX) 5 mg EC tablet Take 5 mg by mouth daily as needed for Constipation.      blood sugar diagnostic Strp Check BG daily prn if glucose found to be elevated on BMP, per facility protocol. 100 strip 6    blood-glucose meter (FREESTYLE SYSTEM KIT) kit Patient to check blood glucose daily every evening. 1 each 0    carvediloL (COREG) 12.5 MG tablet TAKE 1 TABLET(12.5 MG) BY MOUTH TWICE DAILY 180 tablet 3    clopidogreL (PLAVIX) 75 mg tablet Take 1 tablet (75 mg total) by mouth once daily. 90 tablet 3    ENTRESTO 49-51 mg per tablet TAKE 1 TABLET BY MOUTH TWICE DAILY 60 tablet 3    EScitalopram oxalate (LEXAPRO) 20 MG tablet TAKE 1 TABLET BY MOUTH EVERY DAY 90 tablet 3    furosemide (LASIX) 20 MG tablet TAKE 1 TABLET(20 MG) BY MOUTH EVERY DAY 90 tablet 0    hydrALAZINE (APRESOLINE) 25 MG tablet Take 1 tablet (25 mg total) by mouth every 8 (eight) hours. 270 tablet 3    isosorbide mononitrate (IMDUR) 30 MG 24 hr tablet Take 1 tablet (30 mg total) by mouth once daily. 90 tablet 3    lancets Misc Use daily prn if blood glucose found to be elevated on routine BMP, per facility protocol. 100 each 6    multivit-min-iron-FA-vit K-lut (CENTRUM SILVER WOMEN) 8 mg iron-400 mcg-50 mcg Tab Take 1 tablet by mouth once daily.      nitroGLYCERIN 0.4 MG/DOSE TL SPRY (NITROLINGUAL) 400 mcg/spray spray PLACE 1 SPRAY ONTO THE TONGUE EVERY 5 (FIVE) MINUTES AS NEEDED. 12 g 11    sulfamethoxazole-trimethoprim 800-160mg (BACTRIM DS) 800-160 mg Tab Take 1 tablet by mouth 2 (two) times daily. for 7 days 14 tablet 0    timolol maleate 0.5% (TIMOPTIC) 0.5 % Drop Place 1 drop into both eyes 2 (two) times daily. 15 mL 3    vitamin D (VITAMIN D3) 1000 units Tab Take 1,000 Units by mouth once daily.       No current facility-administered medications on file prior to visit.     Social History:     Social History     Tobacco Use    Smoking status: Never    Smokeless tobacco:  "Never   Substance Use Topics    Alcohol use: No     Family History:     Family History   Problem Relation Name Age of Onset    Diabetes Mother Mom     Hypertension Father Pop     Diabetes Father Pop     Glaucoma Father Pop     No Known Problems Sister      No Known Problems Brother      No Known Problems Maternal Aunt      No Known Problems Maternal Uncle      No Known Problems Paternal Aunt      No Known Problems Paternal Uncle      No Known Problems Maternal Grandmother      No Known Problems Maternal Grandfather      No Known Problems Paternal Grandmother      No Known Problems Paternal Grandfather       Physical Exam:   /64 (Patient Position: Sitting)   Pulse 60   Ht 5' 3" (1.6 m)   Wt 65.8 kg (145 lb 1 oz)   LMP  (LMP Unknown)   SpO2 98%   BMI 25.70 kg/m²      Physical Exam  Vitals and nursing note reviewed.   Constitutional:       Appearance: Normal appearance. She is well-developed.      Comments: Examined in wheelchair   HENT:      Head: Normocephalic and atraumatic.      Right Ear: Decreased hearing noted.      Left Ear: Decreased hearing noted.   Neck:      Vascular: No carotid bruit or JVD.   Cardiovascular:      Rate and Rhythm: Normal rate and regular rhythm.      Chest Wall: PMI is not displaced.      Pulses:           Radial pulses are 2+ on the right side and 2+ on the left side.        Dorsalis pedis pulses are 2+ on the right side and 2+ on the left side.        Posterior tibial pulses are 2+ on the right side and 2+ on the left side.      Heart sounds: S1 normal and S2 normal. No murmur heard.  Pulmonary:      Effort: Pulmonary effort is normal.      Breath sounds: Normal breath sounds. No decreased breath sounds, wheezing, rhonchi or rales.   Chest:      Chest wall: There is no dullness to percussion.   Abdominal:      General: Bowel sounds are normal. There is no abdominal bruit.      Palpations: Abdomen is soft. There is no hepatomegaly, splenomegaly or pulsatile mass.      " Tenderness: There is no abdominal tenderness.   Musculoskeletal:      Right lower leg: No edema.      Left lower leg: No edema.   Feet:      Right foot:      Skin integrity: Skin integrity normal.      Left foot:      Skin integrity: Skin integrity normal.   Skin:     General: Skin is warm.      Capillary Refill: Capillary refill takes less than 2 seconds.      Findings: No bruising.      Nails: There is no clubbing.   Neurological:      General: No focal deficit present.      Mental Status: She is alert and oriented to person, place, and time.   Psychiatric:         Mood and Affect: Mood and affect normal.         Speech: Speech normal.         Behavior: Behavior normal. Behavior is cooperative.         Thought Content: Thought content normal.          Labs:     Blood Tests:  Lab Results   Component Value Date     (H) 11/23/2021     (L) 10/03/2024    K 4.4 10/03/2024     10/03/2024    CO2 22 (L) 10/03/2024    BUN 70 (H) 10/03/2024    CREATININE 2.4 (H) 10/03/2024    GLU 98 10/03/2024    HGBA1C 5.6 10/03/2024    MG 2.0 08/14/2023    AST 28 10/03/2024    ALT 11 10/03/2024    ALBUMIN 2.9 (L) 10/03/2024    PROT 6.6 10/03/2024    BILITOT 0.4 10/03/2024    WBC 10.44 10/03/2024    HGB 8.8 (L) 10/03/2024    HCT 29.2 (L) 10/03/2024    HCT 23 (L) 08/12/2023     (H) 10/03/2024     10/03/2024    INR 1.1 12/12/2023    INR 1.4 (H) 12/12/2023    TSH 0.102 (L) 12/12/2023       Lab Results   Component Value Date    CHOL 87 (L) 10/03/2024    HDL 32 (L) 10/03/2024    TRIG 64 10/03/2024       Lab Results   Component Value Date    LDLCALC 42.2 (L) 10/03/2024         Imaging:     Echocardiogram  TTE 12/12/2023    Left Ventricle: There is concentric hypertrophy. Septal motion is consistent with bundle branch block. There is mildly reduced systolic function with a visually estimated ejection fraction of 45 - 50%.    Right Ventricle: Normal right ventricular cavity size. Wall thickness is normal. Right  ventricle wall motion  is normal. Systolic function is normal.    Aortic Valve: There is a transcatheter valve replacement in the aortic position. It is reported to be a 26 mm [employer] valve. Normal bioprothesis.    Mitral Valve: There is mild mitral annular calcification present. There is no stenosis.    Pulmonary Artery: The estimated pulmonary artery systolic pressure is 39 mmHg.    IVC/SVC: Normal venous pressure at 3 mmHg.    Assessment:     1. Chronic systolic heart failure    2. Coronary artery disease of native artery of native heart with stable angina pectoris    3. S/P CABG x 3    4. Aortic atherosclerosis    5. Pure hypercholesterolemia    6. Aortic stenosis, severe    7. S/P TAVR (transcatheter aortic valve replacement)    8. Essential hypertension    9. Chronic kidney disease, stage 4 (severe)        Plan:     Chronic systolic heart failure  -     Ambulatory referral/consult to Pharmacy Assistance; Future; Expected date: 01/16/2025    Coronary artery disease of native artery of native heart with stable angina pectoris  S/P CABG x 3  Aortic atherosclerosis  Continue DAPT and statin     Pure hypercholesterolemia  LDL well controlled  Continue atorvastatin 40 mg qD    Aortic stenosis, severe  S/P TAVR (transcatheter aortic valve replacement)  No significant aortic murmur of PE   Consider repeat echo at next visit     Essential hypertension  Well controlled  Monitor for hypotension     Chronic kidney disease, stage 4 (severe)  Stable, monitored by PCP         Signed:  Deyanira Patel PA-C  Cardiology     1/15/2025 3:25 PM    Follow-up:     Future Appointments   Date Time Provider Department Center   4/2/2025  1:00 PM Oniel Johnson MD [facility]

## 2025-01-15 NOTE — LETTER
January 29, 2025    Krystina Felix  [address] [address]             Dear MsSarah Felix      My name is Andreia Baptiste I'm reaching out on behalf of [facility]s Pharmacy Patient Assistance Team, after receiving a referral from your Provider inquiring about assistance with your medications. We've been unsuccessful reaching you and/or obtaining required documentation to initiate this process. If you are still in need of assistance, please give us a call at the number listed below.      The Pharmacy Patient Assistance Team is unable to move forward with the application process until the following documentation is received.         Proof of household Income(such as social security award letter, pension statement or 3 consecutive pay stubs)  Copy of all insurance cards (front and back)  Completed Medication Access Center Authorization Forms       Please note this will be our final attempt to reach you regarding your assistance.     Sincerely  Andreia LOCKHART @[phone]  Pharmacy Patient Assistance   [address] [place], [place] [address] [place] [id number]  Fax: [phone]  Email: [email or web address]

## 2025-01-22 ENCOUNTER — OUTPATIENT CASE MANAGEMENT (OUTPATIENT)
Dept: NEUROLOGY | Facility: CLINIC | Age: OVER 89
End: 2025-01-22
Payer: MEDICARE

## 2025-01-22 NOTE — PROGRESS NOTES
Care [facility] Dementia Care Management Plan - Discharge      Assigned Care Team Navigator: Misty Black LCSW  Referring Provider: Rubi LOPEZ  Primary Care: Oniel Johnson MD      Protocol: The Care Ecosystem [facility] Effectiveness Study  Identifier: [id number]  IRB#: 2022.247  PI: Dr. Shankar Vasquez, PhD  CO-I: Dr. Chayo Diamond, PsyD  Version Date: 2022  Pt Study ID: [id number]  Visit Month: M12     Timeline:   (*Note for CTN - complete timeline using completed or planned date for study events. Add any important events (i.e. Withdrawals, Lost to Follow Up, Adverse Events, etc.).  Consent: Completed(24)  Baseline questionnaires: Completed(24)  6-month questionnaires: Completed(24)  12-month questionnaires: Completed(25)     Patient Demographic Information      Name: Krystina Washington  Preferred Name: Ms. Washington  MRN: [id number]  : 1932  Age: 89 y.o.  Gender: female  Race: Black or   Ethnicity: Not  or /a  Level of Education: Less than High School   Occupational Status/History: Homemaker - , worked at a [employer]     Communication Preferences      Language: English   Please contact primary caregiver by Phone for scheduling purposes.   Please send information and forms via E-mail     Caregiver(s) and Social History      Family Status: Pt is , she has one child(CG's father), CG father had three boys.  CG has two sons.  CG lives locally as well has one of his brothers who is legally blind. CG cares for his brother as well. CG's fiance and his adopted sister live with him.   Current Living Situation:  Pt lives with CG, his fiance and his adopted sister.  Support System: They have good neighbors who are supportive, CG's ex-wife  Patient Hobbies/Activities: Pt likes to cook, but after broken hip she is no longer stable enough to stand.  CG said he sees improvement, and last week she walked through the house independently. Pt uses a  walker. Pt used to like to fish, sew and garden.  Now she plays slot machines on tablet and has a slot machine in living.          (Examples: music, TV shows/movies, exercise/physical activity, social, outings, games, arts/crafts,         beauty/grooming, spiritual practices)  Patient Stressors (e.g., Pain): Pt broke her hip 4 years ago, titanium yani. Pt is worried about falling.  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   Current Programs/Services: Pt receives PT through  since her stroke in Dec 2023.          Caregiver Name  Role Relationship Main Contact #   Hermelindo Mackenzie Primary Other Family Grandson [phone]                                          Medical History      Providers   (Relevant to dementia care) OPCM   Types of help wanted   (at baseline) Information about dementia and what to expect in the future, Ideas for coping with the stress of caregiving, Caregiving strategies for helping Test do as much as he/she still can, Ideas for managing behavior symptoms, Recreational or purposeful activity ideas, Advice about safety risks like falls, wandering, or poor judgment, Advice about medications, Information about caregiving support services like in-home care, adult day care, or care facilities, Information about programs that might help pay for caregiving services, Information about medical, legal, and/or financial planning, and Help with back-up or crisis planning   Concerns and Goals   (from caregiver and PWD) Goal is to continue to provide care at home for the patient   Type of Dementia and Stage  (all that apply) Dementia Type: Alzheimer's Disease  Stage: Moderate  MoCA /MMSE   Date: Not on file             Medication Review (at discharge)   Medication reviewed with caregiver Hermelindo Mackenzie.   Information is based off patient chart and patient and/or caregiver self-report as of ()  *Make note of any changes to current medication list.*  Current Outpatient Medications    Medication Instructions    acetaminophen (TYLENOL) 500 mg, Oral, Every 6 hours PRN    albuterol 90 mcg/actuation inhaler 1 puff, Inhalation, Every 6 hours PRN, 1 HFA Aerosol Inhaler Inhalation Twice a day    allopurinoL (ZYLOPRIM) 100 mg, Oral    aspirin (ECOTRIN) 81 mg, Oral, As needed (PRN)    atorvastatin (LIPITOR) 40 MG tablet TAKE 1 TABLET BY MOUTH EVERY DAY    benzonatate (TESSALON PERLES) 100 mg, Oral, 3 times daily PRN    bisacodyL (DULCOLAX) 5 mg, Oral, Daily PRN    blood sugar diagnostic Strp Check BG daily prn if glucose found to be elevated on BMP, per facility protocol.    blood-glucose meter (FREESTYLE SYSTEM KIT) kit Patient to check blood glucose daily every evening.    carvediloL (COREG) 12.5 MG tablet TAKE 1 TABLET(12.5 MG) BY MOUTH TWICE DAILY    clopidogreL (PLAVIX) 75 mg, Oral, Daily    EScitalopram oxalate (LEXAPRO) 20 MG tablet TAKE 1 TABLET BY MOUTH EVERY DAY    furosemide (LASIX) 20 MG tablet TAKE 1 TABLET(20 MG) BY MOUTH EVERY DAY    hydrALAZINE (APRESOLINE) 25 mg, Oral, Every 8 hours    isosorbide mononitrate (IMDUR) 30 MG 24 hr tablet TAKE 1 TABLET(30 MG) BY MOUTH EVERY DAY    lancets Misc Use daily prn if blood glucose found to be elevated on routine BMP, per facility protocol.    multivit-min-iron-FA-vit K-lut (CENTRUM SILVER WOMEN) 8 mg iron-400 mcg-50 mcg Tab 1 tablet, Oral, Daily    nitroGLYCERIN 0.4 MG/DOSE TL SPRY (NITROLINGUAL) 400 mcg/spray spray PLACE 1 SPRAY ONTO THE TONGUE EVERY 5 (FIVE) MINUTES AS NEEDED.    sacubitriL-valsartan (ENTRESTO) 49-51 mg per tablet 1 tablet, Oral, 2 times daily    timolol maleate 0.5% (TIMOPTIC) 0.5 % Drop 1 drop, Both Eyes, 2 times daily    vitamin D (VITAMIN D3) 1,000 Units, Oral, Daily       Vitamins, supplements: Vitamin D  Caffeine, alcohol, tobacco, marijuana products: No        THE FOLLOWING CONCERNS WERE IDENTIFIED:  Medication management: No  Access/cost: Yes - Beginning of the year, before deductible is met meds are expensive.    Side  "effects: No  Recent changes: Yes - taken off of Eloquis because because "it was too powerful in conjunction with other meds."  Polypharmacy (>9 routine prescription medications?): Yes -   Behaviors/Sleep: Yes - sometimes pt gets her days and nights mixed up, wants to get up at 1 or 2 in the morning and wants to cook.  Pt is able to be redirected  Other symptoms (falls/incontinence/diarrhea/swelling/itching/weight loss): Yes - pt does have a problem with itching, especially on upper back.  Pt has shoulder pain she complains about.         PLAN:  CTN to send medication list to PharmD this week. Review will be addended by PharmD.   CTN will route pharmacist recommendations to Oniel Johnson MD   CTN will share and discuss pharmacist recommendations with caregiver during next scheduled call.            Advanced Care Planning       Living Will: Yes      Copy on chart: Yes  LaPOST: No:      Medical Power of : Yes      Copy on chart: Yes   Financial Power of : No: CG manages finances, but no formal paperwork.       Copy on chart: No   Agent's Name: N/A         Goals of Care       Patient Values: Engage in Activities and physical health , family     Future Care Plans:   - Long term care goal: Home  - Resources available to pay for care: Income/Private Pay and Insurance Coverage         Concerns - BASELINE      Primary Concern(s)  (Immediate needs) Hearing aids   Cognitive Concerns  (Include decision making capacity) Pt is still able to carry on a conversation, she is happy and "smiley, very engaged." No current cognitive concerns.    Primary Behavior Concerns  (Symptoms, triggers, strategies) She wants to cook at night sometimes, and be active, but is easily redirected.    Functional  (Include PWD daily routine and sensory - vision/hearing - information) Pt is afraid of falling so she is more sedentary than what is good for her.  She had two strokes last year.            1/31/2024     2:36 PM "   LA-GWEP   Memory and Recall Consistent mild forgetfulness or partial recollection of events that may interfere with performing everyday activities: repeats questions/statements, misplaces items, forgets appointments   Orientation Slight difficulty keeping track of time, may forget day or date more frequently than in the past   Decision Making and Problem Solving Abilities Severely impaired in handling problems, making only simple personal decisions, social judgment and behavior often impaired, lacks insight   Activities Outside the Home No pretense of independent function outside the home, appears well enough to be taken to activities outside the family home but generally needs to be accompanied   Function at Home and Hobby Activities No meaningful function in household chores or with prior hobbies   Toileting and Personal Hygeine Requires some assistance in dressing, hygiene, keeping of personal items, occasionally incontinent   Behavior and Personality Changes Mild changes in behavior or personality   Language and Communication Abilities No language difficulty or occasional word searching, reads and writes as well as in past   Mood Moderate issues with sadness, depression, anxiety, nervousness or loss of interest/motivation   Attention and Concentration Significant portion of the day is spent sleeping, not paying attention to environment, when having a              1/31/2024     2:38 PM   NPIQ RFS   WHO IS FILLING OUT FORM? Caregiver   Does this patient have false beliefs, such as thinking that others are stealing from him/her or planning to harm him/her in some way? No   Does this patient have hallucinations such as false visions or voices? [place] she/he seem to hear or see things that are not present? Yes   Hallucination Severity 1   Hallucination Distress 4   Is the patient resistive to help from others at times, or hard to handle? No   Does the patient seem sad or say that he/she is depressed? Yes    Depression/Dysphoria Severity 1   Depression/Dysphoria Distress 4   Does the patient become upset when  from you? Does he/she have any other signs of nervousness such as shortness of breath, sighing, being unable tor elax, or feeling excessively tense? Yes   Anxiety Severity 3   Anxiety Distress 5   Does the patient appear to feel good or act excessively happy? No   Does this patient seem less interested in his/her usual activities or in the activities and plans of others? Yes   Apathy/Indifference Severity 1   Apathy/Indifference Distress 3   Does this patient seem to act cumpolsively, for example, talking to strangers as if she/he knows them, or saying things that may hurt people's feelings? No   Is the patient impatient and cranky? Does he/she have difficulty coping with delays or waiting for planned activities? Yes   Irritability/Liability Severity 2   Irritability/Liability Distress 4   Does the patient engage in repetitive activities such as pacing around the house, handling buttons, wrapping string, or doing other things repeatedly? No   Does this patient awaken you during the night, rise too early in the morning, or take excessive naps during the day? Yes   Nightime Behavior Severity 1   Nightime Behavior Distress 3   Has the patient lost or gained weight, or had a change in the type of food he/she likes? No   NPI Total Severity Score 9   NPI Total Distress Score 23            Monthly Questions      Falls in the past month: 0  UTIs in the past month: 0  Hospital encounters in the past month (reported by caregiver): 0        CTN Plan & Recommendations        1/22/25--LVM  1/24/25--LVM  Discussed new hallucinations. They are not distressing to the pt.  SW provided tips to CG on how to manage them.  No other needs reported.  SW will remain available.

## 2025-01-22 NOTE — TELEPHONE ENCOUNTER
A 2nd attempt has been made to establish contact with Ms. Washington  via phone. I spoke with Ms. Washington's son and caregiver and he will get the paper work together.  The final contact attempt will be made in 5 business days     Andreia Baptiste  Pharmacy Patient Assistance Team

## 2025-01-25 RX ORDER — ESCITALOPRAM OXALATE 20 MG/1
TABLET ORAL
Qty: 90 TABLET | Refills: 2 | Status: SHIPPED | OUTPATIENT
Start: 2025-01-25

## 2025-01-25 NOTE — TELEPHONE ENCOUNTER
Refill Decision Note   Krystina Washington  is requesting a refill authorization.  Brief Assessment and Rationale for Refill:  Approve     Medication Therapy Plan:        Comments:     Note composed:11:45 AM 01/25/2025

## 2025-01-25 NOTE — TELEPHONE ENCOUNTER
No care due was identified.  [facility] Embedded Care Due Messages. Reference number: [id number].   1/25/2025 11:23:37 AM CST

## 2025-01-29 NOTE — TELEPHONE ENCOUNTER
Ms. Washington hasn't returned calls, messages and /or requested documentation to begin the assistance process. After multiple unsuccessful attempts to reach her over 10 business days. A final letter has been mailed.  Please advise patient to return requested documentation to Patient Assistance team (contact info located at bottom of letter) to begin application process. Questions or concerns may be directed to assigned advocate Andreia LOCKHART @[phone] or [email or web address].       Your help is greatly appreciated!  Pharmacy Patent Assistance Team

## 2025-02-03 NOTE — PROGRESS NOTES
Care Ecosystem Follow-up Surveys:  CTN Paula Nelson reached out to cg to  complete the 12 month with caregiver Hermelindo Mackenzie (Son) on (date: 02/03/25 at 1:20 PM). Cg was not available and requested a call back. Cg and CTN planned for the CTN to call the cg back on Wednesday 02/05/2025 at 11:30 AM.

## 2025-02-20 DIAGNOSIS — I10 ESSENTIAL HYPERTENSION: ICD-10-CM

## 2025-02-20 NOTE — TELEPHONE ENCOUNTER
No care due was identified.  [facility] Embedded Care Due Messages. Reference number: [id number].   2/20/2025 4:59:33 PM CST

## 2025-02-21 NOTE — TELEPHONE ENCOUNTER
Refill Routing Note   Medication(s) are not appropriate for processing by [facility] for the following reason(s):        Drug-disease interaction: carvediloL and Mucopurulent chronic bronchitis     [facility] action(s):  Defer             Appointments  past 12m or future 3m with PCP    Date Provider   Last Visit   1/14/2025 Oniel Johnson MD   Next Visit   4/2/2025 Oniel Johnson MD   ED visits in past 90 days: 0        Note composed:4:47 PM 02/21/2025

## 2025-02-24 RX ORDER — CARVEDILOL 12.5 MG/1
12.5 TABLET ORAL 2 TIMES DAILY
Qty: 180 TABLET | Refills: 3 | Status: SHIPPED | OUTPATIENT
Start: 2025-02-24

## 2025-02-27 ENCOUNTER — TELEPHONE (OUTPATIENT)
Dept: FAMILY MEDICINE | Facility: CLINIC | Age: OVER 89
End: 2025-02-27
Payer: MEDICARE

## 2025-02-27 NOTE — TELEPHONE ENCOUNTER
Spoke with pt's son informing him appt will need to be rescheduled due to provider being out of office. Pt's son rescheduled appt 4/3.

## 2025-03-09 NOTE — TELEPHONE ENCOUNTER
No care due was identified.  [facility] Embedded Care Due Messages. Reference number: [id number].   3/09/2025 1:15:36 PM CDT

## 2025-03-10 RX ORDER — ISOSORBIDE MONONITRATE 30 MG/1
TABLET, EXTENDED RELEASE ORAL
Qty: 90 TABLET | Refills: 1 | Status: SHIPPED | OUTPATIENT
Start: 2025-03-10

## 2025-03-10 NOTE — TELEPHONE ENCOUNTER
Refill Routing Note   Medication(s) are not appropriate for processing by [facility] for the following reason(s):        Drug-disease interaction    [facility] action(s):  Defer             Appointments  past 12m or future 3m with PCP    Date Provider   Last Visit   1/14/2025 Oniel Johnson MD   Next Visit   4/3/2025 Oniel Johnson MD   ED visits in past 90 days: 0        Note composed:7:52 AM 03/10/2025

## 2025-04-03 ENCOUNTER — PATIENT OUTREACH (OUTPATIENT)
Dept: ADMINISTRATIVE | Facility: OTHER | Age: OVER 89
End: 2025-04-03

## 2025-04-06 NOTE — TELEPHONE ENCOUNTER
Care Due:                  Date            Visit Type   Department     Provider  --------------------------------------------------------------------------------                                EP -                              PRIMARY      [facility]  Last Visit: 10-      CARE ([facility])   MEDICINE       Oniel Johnson  Next Visit: None Scheduled  None         None Found                                                            Last  Test          Frequency    Reason                     Performed    Due Date  --------------------------------------------------------------------------------    eGFR........  12 months..  ENTRESTO.................  Not Found    Overdue    [employer] Embedded Care Due Messages. Reference number: [id number].   4/06/2025 6:01:23 PM CDT

## 2025-04-07 RX ORDER — CLOPIDOGREL BISULFATE 75 MG/1
75 TABLET ORAL
Qty: 90 TABLET | Refills: 3 | Status: SHIPPED | OUTPATIENT
Start: 2025-04-07

## 2025-04-07 NOTE — TELEPHONE ENCOUNTER
Refill Routing Note   Medication(s) are not appropriate for processing by [facility] for the following reason(s):        Required labs abnormal    [facility] action(s):  Defer     Requires labs : Yes             Appointments  past 12m or future 3m with PCP    Date Provider   Last Visit   1/14/2025 Oniel Johnson MD   Next Visit   Visit date not found Oniel Johnson MD   ED visits in past 90 days: 0        Note composed:5:33 AM 04/07/2025

## 2025-04-10 ENCOUNTER — TELEPHONE (OUTPATIENT)
Dept: FAMILY MEDICINE | Facility: CLINIC | Age: OVER 89
End: 2025-04-10
Payer: MEDICARE

## 2025-07-02 ENCOUNTER — PATIENT MESSAGE (OUTPATIENT)
Dept: ADMINISTRATIVE | Facility: HOSPITAL | Age: OVER 89
End: 2025-07-02
Payer: MEDICARE

## 2025-07-22 DIAGNOSIS — N18.4 STAGE 4 CHRONIC KIDNEY DISEASE: Primary | ICD-10-CM

## (undated) DEVICE — DRAPE STERI U-SHAPED 47X51IN

## (undated) DEVICE — DRESSING TRANS 4X4 3/4

## (undated) DEVICE — APPLICATOR STERILE 3IN

## (undated) DEVICE — SUT VICRYL PLUS 2-0 CT1 18

## (undated) DEVICE — ADHESIVE DERMABOND ADVANCED

## (undated) DEVICE — DRESSING EYE OVAL LF

## (undated) DEVICE — SKINMARKER & RULER REGULAR X-F

## (undated) DEVICE — SEE MEDLINE ITEM 157131

## (undated) DEVICE — ELECTRODE REM PLYHSV RETURN 9

## (undated) DEVICE — SET CABLE BEADED 2MM

## (undated) DEVICE — SUT VICRYL PLUS 3-0 SH 18IN

## (undated) DEVICE — TAPE SURG DURAPORE 2 X10YD

## (undated) DEVICE — CUP MEDICINE STERILE 2OZ

## (undated) DEVICE — KIT PT CARE HANA PROFX SSXT

## (undated) DEVICE — GOWN SURGICAL X-LARGE

## (undated) DEVICE — SEE MEDLINE ITEM 152622

## (undated) DEVICE — NDL HYPO A BEVEL 30X1/2

## (undated) DEVICE — DRAPE PLASTIC U 60X72

## (undated) DEVICE — DRESSING AQUACEL AG 3.5X10IN

## (undated) DEVICE — SYR 10CC LUER LOCK

## (undated) DEVICE — TRAY MINOR ORTHO

## (undated) DEVICE — REAMER MOD BIXCUT 8X48MM STER.

## (undated) DEVICE — CLEANER TIP ELECSURG 2X2IN

## (undated) DEVICE — DRAPE C ARM 42 X 120 10/BX

## (undated) DEVICE — DROPPER MEDICINE

## (undated) DEVICE — BIT DRILL T2 4.2 X 180MM L

## (undated) DEVICE — NDL STRAIGHT 4CM LEIBINGER

## (undated) DEVICE — DRESSING AQUACEL RIBBON 2X45CM

## (undated) DEVICE — DRAPE STERI-DRAPE 1000 17X11IN

## (undated) DEVICE — INSTRUMENT SURG SUCT FRZR W/C

## (undated) DEVICE — SOL BSS BALANCED SALT

## (undated) DEVICE — SUT VICRYL PLUS 0 CT1 18IN

## (undated) DEVICE — DRESSING AQUACEL AG ADV 3.5X6

## (undated) DEVICE — SOL BETADINE 5%

## (undated) DEVICE — SUT MONOCRYL 3-0 PS-2 UND

## (undated) DEVICE — GUIDE WIRE 3.0X1000MM BALL TIP
Type: IMPLANTABLE DEVICE | Site: FEMUR | Status: NON-FUNCTIONAL
Removed: 2021-10-17

## (undated) DEVICE — SUT 0 VICRYL / CT-1

## (undated) DEVICE — SYR 5CC LUER LOCK W/O NDL

## (undated) DEVICE — GAUZE SPONGE 4X4 12PLY

## (undated) DEVICE — TRAY MUSCLE LID EYE

## (undated) DEVICE — APPLICATOR CHLORAPREP ORN 26ML

## (undated) DEVICE — DRAPE IOBAN 2 STERI

## (undated) DEVICE — PAD EYE OVAL CNTOUR 1.62X2.62

## (undated) DEVICE — WIRE KIRSCHNER T2 3X285MM SS
Type: IMPLANTABLE DEVICE | Site: FEMUR | Status: NON-FUNCTIONAL
Removed: 2021-10-17

## (undated) DEVICE — TAPE SURG TRANSPORE 1 SNG USE

## (undated) DEVICE — SOL WATER STRL IRR 1000ML

## (undated) DEVICE — DRAPE C-ARMOR EQUIPMENT COVER

## (undated) DEVICE — DRAPE STERI INSTRUMENT 1018

## (undated) DEVICE — PENCIL ROCKER SWITCH 10FT CORD

## (undated) DEVICE — SEE MEDLINE ITEM 152487

## (undated) DEVICE — PROTECTOR CORNEAL CROUCH ST

## (undated) DEVICE — GUIDEWIRE 3.2 X 450
Type: IMPLANTABLE DEVICE | Site: FEMUR | Status: NON-FUNCTIONAL
Removed: 2021-10-17

## (undated) DEVICE — DRESSING TRANS 4X4 TEGADERM

## (undated) DEVICE — SHEET EENT SPLIT

## (undated) DEVICE — SUT CTD VICRYL 6-0 VIL S-14

## (undated) DEVICE — NDL 22GA X1 1/2 REG BEVEL

## (undated) DEVICE — SEE MEDLINE ITEM 157150

## (undated) DEVICE — BLADE SURG #15 CARBON STEEL